# Patient Record
Sex: FEMALE | Race: WHITE | NOT HISPANIC OR LATINO | Employment: OTHER | URBAN - METROPOLITAN AREA
[De-identification: names, ages, dates, MRNs, and addresses within clinical notes are randomized per-mention and may not be internally consistent; named-entity substitution may affect disease eponyms.]

---

## 2017-01-04 ENCOUNTER — OFFICE VISIT (OUTPATIENT)
Dept: UROLOGY | Facility: CLINIC | Age: 22
End: 2017-01-04
Payer: COMMERCIAL

## 2017-01-04 VITALS — HEIGHT: 56 IN | HEART RATE: 85 BPM | DIASTOLIC BLOOD PRESSURE: 77 MMHG | SYSTOLIC BLOOD PRESSURE: 132 MMHG

## 2017-01-04 DIAGNOSIS — N31.9 NEUROGENIC BLADDER: Primary | ICD-10-CM

## 2017-01-04 DIAGNOSIS — Z78.9 SELF-CATHETERIZES URINARY BLADDER: ICD-10-CM

## 2017-01-04 PROCEDURE — 1159F MED LIST DOCD IN RCRD: CPT | Mod: S$GLB,,, | Performed by: UROLOGY

## 2017-01-04 PROCEDURE — 99999 PR PBB SHADOW E&M-EST. PATIENT-LVL III: CPT | Mod: PBBFAC,,, | Performed by: UROLOGY

## 2017-01-04 PROCEDURE — 99213 OFFICE O/P EST LOW 20 MIN: CPT | Mod: S$GLB,,, | Performed by: UROLOGY

## 2017-01-04 NOTE — PROGRESS NOTES
CHIEF COMPLAINT:    Ms. Samuel is a 21 y.o. female presenting for a follow up on neurogenic bladder.    PRESENTING ILLNESS:    Shashank Samuel is a 21 y.o. female who returns stating that she did not bring her voiding diary but she has been catheterizing well and emptying better.  She denies having an incontinence issue since her last visit.  She has decreased her intake and is drinking 16.9 oz bottles of water and drinks 5 a day.      PATIENT HISTORY:    Past Medical History   Diagnosis Date    Arnold-Chiari malformation, type II     Hydrocephalus     Neurogenic bladder     Seizures      only w/ shunt malfunction    Spinal bifida, closed        Past Surgical History   Procedure Laterality Date    Cystostomy      Myelominingocele repair      Bladder surgery      Colon surgery       flush site for bowel movements from appendix    Ventriculoperitoneal shunt      Eye surgery       x 5    Strabismus surgery       ou dr peña       Family History   Problem Relation Age of Onset    Cataracts Maternal Grandmother      Social History    Marital status: Single     Social History Main Topics    Smoking status: Never Smoker    Smokeless tobacco: Never Used    Alcohol use No    Drug use: No    Sexual activity: No     Social History Narrative    Intact family. Wheelchair bound.  Self catheterizes herself       Allergies:  Latex, natural rubber; Nitrofurantoin; Bactrim  [sulfamethoxazole-trimethoprim]; Gardasil  [h papillomavirus vac,qval (pf)]; Menactra  [mening vac a,c,y,w135 dip (pf)]; Nitrofurantoin; Sulfa (sulfonamide antibiotics); Tramadol; and Zofran [ondansetron hcl (pf)]    Medications:  Outpatient Encounter Prescriptions as of 1/4/2017   Medication Sig Dispense Refill    catheter 10 Fr Misc Use up to 7 times daily for self catheterization 210 each 11    catheter 8 Fr Misc Use up to twice daily 40 each 11    diaper,brief,adult,disposable (DEPEND PANTS) Misc Change BID for pressure ulcer 60 each 11     epinephrine (EPIPEN 2-DEEPIKA) 0.3 mg/0.3 mL AtIn USE ONE INJECTION INTO THE MUSCLE AS NEEDED FOR ANAPHYLAXIS 2 each 0    glycerin pediatric suppository Use PRN for constipation    We need 1 bottle 12 suppository 5    levetiracetam (KEPPRA) 500 MG Tab Take 1 tablet (500 mg total) by mouth 2 (two) times daily. 60 tablet 0    lidocaine (LIDODERM) 5 % Place 2-3 patches onto the skin once daily. Remove & Discard patch within 12 hours or as directed by MD Intreiano patch 2    menthol-zinc oxide (CALMOSEPTINE) 0.44-20.6 % Oint Apply topically 2 (two) times daily as needed. 1 Tube 6    menthol-zinc oxide (CALMOSEPTINE) 0.44-20.6 % Oint Apply topically daily as needed.      oxycodone-acetaminophen (PERCOCET)  mg per tablet Take 1 tablet by mouth every 4 (four) hours as needed for Pain. 180 tablet 0    syringe, disposable, 60 mL Syrg Use for self catheterization up to 7 times daily 60 each 11    EPIPEN 2-DEEPIKA 0.3 mg/0.3 mL AtIn INJECT PEN INTO THE MUSCLE AS NEEDED FOR ANAPHYLAXIS AS A ONE TIME USE 2 each 0     No facility-administered encounter medications on file as of 1/4/2017.          PHYSICAL EXAMINATION:    The patient generally appears in good health, is appropriately interactive, and is in no apparent distress.  Does not smell of urine today.      Skin: No lesions.    Mental: Cooperative with normal affect.    Neuro: Grossly intact.    HEENT: Normal. No evidence of lymphadenopathy.    Chest:  normal inspiratory effort.    Extremities: No clubbing, cyanosis, or edema      IMPRESSION:    Encounter Diagnoses   Name Primary?    Neurogenic bladder Yes    Self-catheterizes urinary bladder        PLAN:    1.  Decrease fluid intake by 1 bottle  2.  Continue trying to self catheterize more effectively as she has been doing  3.  Follow up in 1 year.

## 2017-01-18 RX ORDER — OXYCODONE AND ACETAMINOPHEN 10; 325 MG/1; MG/1
1 TABLET ORAL EVERY 4 HOURS PRN
Qty: 180 TABLET | Refills: 0 | Status: SHIPPED | OUTPATIENT
Start: 2017-01-22 | End: 2017-02-21 | Stop reason: SDUPTHER

## 2017-01-18 RX ORDER — LIDOCAINE 50 MG/G
2-3 PATCH TOPICAL DAILY
Qty: 90 PATCH | Refills: 2 | Status: SHIPPED | OUTPATIENT
Start: 2017-01-18 | End: 2017-02-21 | Stop reason: SDUPTHER

## 2017-01-18 NOTE — TELEPHONE ENCOUNTER
12/19/16 last Rx refill- lidocaine  12/23/16 last Rx refill-oxycodone  02/14/16 RTC      Original authorizing provider: MD Shashank Hernandez would like a refill of the following medications:   lidocaine (LIDODERM) 5 % [Mary Clark MD]   oxycodone-acetaminophen (PERCOCET)  mg per tablet [Mary Clark MD]     [address] [address] [facility]     Comment:

## 2017-01-25 ENCOUNTER — TELEPHONE (OUTPATIENT)
Dept: UROLOGY | Facility: CLINIC | Age: 22
End: 2017-01-25

## 2017-01-25 NOTE — TELEPHONE ENCOUNTER
Shashank gets her catheters from [facility].  She uses 12 Fr catheters.  [phone]    Called to verify if she needs an updated prescription.  They do.  They will fax the rx to us.

## 2017-01-31 DIAGNOSIS — R56.9 CONVULSIONS, UNSPECIFIED CONVULSION TYPE: ICD-10-CM

## 2017-01-31 RX ORDER — LEVETIRACETAM 500 MG/1
TABLET ORAL
Qty: 60 TABLET | Refills: 0 | Status: SHIPPED | OUTPATIENT
Start: 2017-01-31 | End: 2017-03-01 | Stop reason: SDUPTHER

## 2017-02-03 ENCOUNTER — OFFICE VISIT (OUTPATIENT)
Dept: PLASTIC SURGERY | Facility: CLINIC | Age: 22
End: 2017-02-03
Payer: COMMERCIAL

## 2017-02-03 VITALS — BODY MASS INDEX: 22.5 KG/M2 | WEIGHT: 100 LBS | HEIGHT: 56 IN

## 2017-02-03 DIAGNOSIS — G89.29 CHRONIC NECK PAIN: ICD-10-CM

## 2017-02-03 DIAGNOSIS — M54.50 CHRONIC BILATERAL LOW BACK PAIN WITHOUT SCIATICA: ICD-10-CM

## 2017-02-03 DIAGNOSIS — Q05.2 SPINA BIFIDA OF LUMBAR REGION WITH HYDROCEPHALUS: ICD-10-CM

## 2017-02-03 DIAGNOSIS — M54.2 CHRONIC NECK PAIN: ICD-10-CM

## 2017-02-03 DIAGNOSIS — G89.29 CHRONIC BILATERAL LOW BACK PAIN WITHOUT SCIATICA: ICD-10-CM

## 2017-02-03 DIAGNOSIS — N62 MACROMASTIA: Primary | ICD-10-CM

## 2017-02-03 DIAGNOSIS — R21 EXCORIATED RASH: ICD-10-CM

## 2017-02-03 PROCEDURE — 99999 PR PBB SHADOW E&M-EST. PATIENT-LVL II: CPT | Mod: PBBFAC,,, | Performed by: SURGERY

## 2017-02-03 PROCEDURE — 99213 OFFICE O/P EST LOW 20 MIN: CPT | Mod: S$GLB,,, | Performed by: SURGERY

## 2017-02-03 NOTE — LETTER
February 3, 2017      Mary Clark MD  [address]  [facility] [address]           [facility] Plastic Surgery  [address] [facility] [address] [place] [address]  Phone: [phone]  Fax: [phone]          Patient: Shashank Samuel   MR Number: [id number]   YOB: 1995   Date of Visit: 2/3/2017       Dear Dr. Mary Clark:    Thank you for referring Shashank Samuel to me for evaluation. Attached you will find relevant portions of my assessment and plan of care.    If you have questions, please do not hesitate to call me. I look forward to following Shashank Samuel along with you.    Sincerely,    Lyndon Sesay MD    Enclosure  CC:  No Recipients    If you would like to receive this communication electronically, please contact [email or web address] or [phone] to request more information on [facility] [employer] access.    For providers and/or their staff who would like to refer a patient to [facility], please contact us through our one-stop-shop provider referral line, [facility], at [phone].    If you feel you have received this communication in error or would no longer like to receive these types of communications, please e-mail [email or web address]

## 2017-02-03 NOTE — LETTER
[place] [facility] Plastic Surgery  [address] [facility] [address]  [place] [address]  Phone: [phone]  Fax: [phone] February 22, 2017      Mary Clark MD  [address]  [place] [address]    Patient: Shashank Samuel   MR Number: [id number]   YOB: 1995   Date of Visit: 2/3/2017     Dear Dr. Clark:    Thank you for referring Shashank Samuel to me for evaluation. Below are the relevant portions of my assessment and plan of care.    Miss Shashank Samuel is a 21-year-old female, born with spina bifida and hydrocephalus. She has had Newman rods placed in the past for severe scoliosis. She is wheelchair-bound now. She was referred to Plastic Surgery three years ago due to her heavy pendulous breasts, which her orthopedic surgeon believes is causing fracture to her Newman rods. She was denied a breast reduction in the past because of her age only. This young lady has recentlly fractured her rods and again she is being referred by Physical Medicine to reevaluate her for a breast reduction.     She is not a candidate for replacing the rods because of calcification of the rods. She has been treated with physical therapy, again without any relief of the severe pain that she suffers secondary to her breasts. She has tried nonsteroidal anti-inflammatory medications. She is now taking Percocet and Lidocaine patches again to try to reduce the pain without complaint. She has deep shoulder grooving.    If you have questions, please do not hesitate to call me. I look forward to following Shashank along with you.    Sincerely,        Lyndon Sesay MD  Section of Plastic & Reconstructive Surgery  [facility]     CRWINDY/dr SUSHIL Garcia MD

## 2017-02-03 NOTE — PROGRESS NOTES
Shashank Samuel is a 21-year-old white female who was born with spina bifida and   hydrocephalus.  She has had Newman rods placed in the past for severe   scoliosis.  She is wheelchair-bound now.  She was referred 3 years ago to   Plastic Surgery due to her heavy pendulous breasts, which her orthopedic surgeon   believes is causing fracture of her Newman rods.  She was denied a breast   reduction in the past because of her age only.  This young lady has fractured   her rods again recently and again she has been sent by Physical Medicine to   reevaluate her for a breast reduction.  She is not a candidate for replacing the   rods because of calcification of the rods.  She has been treated with physical   therapy, again without any relief of the severe pain that she suffers secondary   to her breasts.  She has tried nonsteroidal anti-inflammatory medications, is   now taking Percocet and lidocaine patches again to try to reduce the pain   without complaint.  She has deep shoulder grooving.    PHYSICAL EXAMINATION:  Reveals that she is a wheelchair-bound young lady.  She   has large pendulous breasts.    ASSESSMENT:  1.  Chronic severe back pain, which is caused by the weight of her breasts.  2.  Macromastia.    She needs to undergo a breast reduction in order to help with her chronic neck   and back pain and to prevent further fracture of her Newman rods.  This is   definitely a medically indicated procedure being referred by Physical Medicine   for this procedure.      KALI  dd: 02/03/2017 11:04:27 (CST)  td: 02/03/2017 16:15:04 (CST)  Doc ID   #[id number]  Job ID #[id number]    CC:

## 2017-02-14 ENCOUNTER — OFFICE VISIT (OUTPATIENT)
Dept: PHYSICAL MEDICINE AND REHAB | Facility: CLINIC | Age: 22
End: 2017-02-14
Payer: COMMERCIAL

## 2017-02-14 VITALS — SYSTOLIC BLOOD PRESSURE: 118 MMHG | HEART RATE: 90 BPM | HEIGHT: 56 IN | DIASTOLIC BLOOD PRESSURE: 76 MMHG

## 2017-02-14 DIAGNOSIS — Q05.2 SPINA BIFIDA OF LUMBAR REGION WITH HYDROCEPHALUS: ICD-10-CM

## 2017-02-14 DIAGNOSIS — M41.9 SCOLIOSIS, UNSPECIFIED SCOLIOSIS TYPE, UNSPECIFIED SPINAL REGION: ICD-10-CM

## 2017-02-14 DIAGNOSIS — G82.20 PARAPLEGIA: ICD-10-CM

## 2017-02-14 DIAGNOSIS — M54.6 CHRONIC MIDLINE THORACIC BACK PAIN: Primary | ICD-10-CM

## 2017-02-14 DIAGNOSIS — G89.29 CHRONIC MIDLINE THORACIC BACK PAIN: Primary | ICD-10-CM

## 2017-02-14 PROCEDURE — 99213 OFFICE O/P EST LOW 20 MIN: CPT | Mod: S$GLB,,, | Performed by: PHYSICAL MEDICINE & REHABILITATION

## 2017-02-14 PROCEDURE — 99999 PR PBB SHADOW E&M-EST. PATIENT-LVL II: CPT | Mod: PBBFAC,,, | Performed by: PHYSICAL MEDICINE & REHABILITATION

## 2017-02-14 NOTE — PROGRESS NOTES
Subjective:       Patient ID: Shashank Samuel is a 21 y.o. female.    Chief Complaint: No chief complaint on file.    HPI    Ms. Samuel is a 21-year-old white female with past medical history of spina   bifida, complete T7 paraplegia, status post thoracolumbar fusion surgery with   rods, hydrocephalus status post  shunt, and neurogenic bladder.  She is   followed up in the Physical Medicine Clinic for chronic thoracic pain.  Her last   visit was on 11/14/2016.  She is maintained on oxycodone/APAP p.r.n.  Since her   last visit, the patient developed osteomyelitis in her feet.  She underwent   prolonged antibiotic course, but later necessitated left partial foot amputation   and left fifth toe amputation.  She stayed for about 12 weeks in longterm acute   unit in [place].  She is now maintained on oral antibiotics.    She is coming today to the clinic for followup.  Her back pain is about the   same.  It is a constant aching pain in thoracic spine.  It is usually localized.    It is aggravated by excess activity and better with rest and her pain   medications.  Her maximum pain is 9-10/10 and minimum 2-3/10.  Today, it is   2-3/10.    She is currently taking oxycodone/APAP 10/325 p.r.n., usually four times per   day, but occasionally six times.      MS/HN  dd: 02/14/2017 13:59:10 (CST)  td: 02/15/2017 03:53:07 (CST)  Doc ID   #[id number]  Job ID #[id number]    CC:         Review of Systems   Constitutional: Positive for fatigue.   Gastrointestinal: Positive for abdominal pain. Negative for constipation, nausea and vomiting.   Musculoskeletal: Positive for back pain. Negative for neck pain.   Neurological: Negative for dizziness and headaches.   Psychiatric/Behavioral: Negative for behavioral problems and sleep disturbance.       Objective:      Physical Exam   Constitutional: She appears well-developed and well-nourished. No distress.   Coming to the clinic in a manual wheelchair, accompanied by her mother.     Neck:  Normal range of motion.   Musculoskeletal:   BUE:  ROM:full.  Strength:    RUE: 5-/5 at shoulder abduction, elbow flexion, elbow extension, hand .   LUE: 5-/5 at shoulder abduction, elbow flexion, elbow extension, hand .  Sensation to pinprick:   RUE: intact.   LUE: intact.      BLE:  ROM:mild bilateral knee flexion contractures.  Strength:      RLE: 0/5.     LLE:  0/5.  Sensation to pinprick:     RLE:absent.      LLE: absent.          Neurological: She is alert.   Skin:   Multiple healed back surgery scars.      Psychiatric: She has a normal mood and affect.   Vitals reviewed.      Assessment:       1. Chronic midline thoracic back pain    2. Paraplegia, T7 Complete    3. Spina bifida of lumbar region with hydrocephalus    4. Scoliosis, unspecified scoliosis type, unspecified spinal region         Plan:     - DRUGS OF ABUSE SCREEN, BLOOD; Future  - Continue oxycodone-acetaminophen (PERCOCET)  mg per tablet; Take 1 tablet by mouth 4 times per day as needed for Pain. May try to reduce the strength next refill.  - Return in about 3 months (around 5/14/2017).

## 2017-02-21 RX ORDER — LIDOCAINE 50 MG/G
2-3 PATCH TOPICAL DAILY
Qty: 90 PATCH | Refills: 2 | Status: SHIPPED | OUTPATIENT
Start: 2017-02-21 | End: 2017-05-17 | Stop reason: SDUPTHER

## 2017-02-21 RX ORDER — OXYCODONE AND ACETAMINOPHEN 10; 325 MG/1; MG/1
1 TABLET ORAL EVERY 4 HOURS PRN
Qty: 180 TABLET | Refills: 0 | Status: SHIPPED | OUTPATIENT
Start: 2017-02-21 | End: 2017-03-20 | Stop reason: SDUPTHER

## 2017-02-21 NOTE — TELEPHONE ENCOUNTER
02/14/17 last office visit   01/18/17 last Rx refill  05/15/17 RTC    Original authorizing provider: MD Shashank Hernandez would like a refill of the following medications:   lidocaine (LIDODERM) 5 % [Mary Clark MD]   oxycodone-acetaminophen (PERCOCET)  mg per tablet [Mary Clark MD]     Preferred pharmacy: [address]     Comment:

## 2017-03-01 DIAGNOSIS — R56.9 CONVULSIONS, UNSPECIFIED CONVULSION TYPE: ICD-10-CM

## 2017-03-01 RX ORDER — LEVETIRACETAM 500 MG/1
TABLET ORAL
Qty: 60 TABLET | Refills: 0 | Status: SHIPPED | OUTPATIENT
Start: 2017-03-01 | End: 2017-03-31 | Stop reason: SDUPTHER

## 2017-03-20 RX ORDER — OXYCODONE AND ACETAMINOPHEN 10; 325 MG/1; MG/1
1 TABLET ORAL EVERY 4 HOURS PRN
Qty: 180 TABLET | Refills: 0 | Status: SHIPPED | OUTPATIENT
Start: 2017-03-23 | End: 2017-04-21 | Stop reason: SDUPTHER

## 2017-03-20 NOTE — TELEPHONE ENCOUNTER
02/14/17 last Office visit  02/21/17 last Rx refill  05/15/17 RTC    Original authorizing provider: MD Shashank Hernandez would like a refill of the following medications:   oxycodone-acetaminophen (PERCOCET)  mg per tablet [Mary Clark MD]     [address] [address] [facility]     Comment:   Shashank needs her blood test scheduled still can you do that with the Makayla Feng

## 2017-03-31 DIAGNOSIS — R56.9 CONVULSIONS, UNSPECIFIED CONVULSION TYPE: ICD-10-CM

## 2017-03-31 RX ORDER — LEVETIRACETAM 500 MG/1
500 TABLET ORAL 2 TIMES DAILY
Qty: 60 TABLET | Refills: 0 | Status: SHIPPED | OUTPATIENT
Start: 2017-03-31 | End: 2017-04-20 | Stop reason: SDUPTHER

## 2017-03-31 NOTE — TELEPHONE ENCOUNTER
----- Message from Trudi Srinivasan sent at 3/31/2017 11:31 AM CDT -----  Contact: Father- Abdi  Patient need refill.     levetiracetam (KEPPRA) 500 MG Tab

## 2017-04-03 DIAGNOSIS — R56.9 CONVULSIONS, UNSPECIFIED CONVULSION TYPE: ICD-10-CM

## 2017-04-03 RX ORDER — LEVETIRACETAM 500 MG/1
500 TABLET ORAL 2 TIMES DAILY
Qty: 60 TABLET | Refills: 0 | Status: CANCELLED | OUTPATIENT
Start: 2017-04-03

## 2017-04-03 NOTE — LETTER
April 4, 2017    Shashank Samuel  [address] [facility] [address]             [facility]  [address]  [address]  [place] [facility] [address]  Phone: [phone]  Fax: [phone] Dear Ms. Samuel:    This is a reminder that it is time for an appointment with Dr. Garcia and blood work. Please call [phone] to schedule your appointment       If you have any questions or concerns, please don't hesitate to call.    Sincerely,        William Garcia MD

## 2017-04-20 DIAGNOSIS — T65.811A: ICD-10-CM

## 2017-04-20 DIAGNOSIS — R56.9 CONVULSIONS, UNSPECIFIED CONVULSION TYPE: ICD-10-CM

## 2017-04-20 RX ORDER — EPINEPHRINE 0.3 MG/.3ML
1 INJECTION SUBCUTANEOUS ONCE
Qty: 0.3 ML | Refills: 0 | Status: SHIPPED | OUTPATIENT
Start: 2017-04-20 | End: 2017-04-20

## 2017-04-20 RX ORDER — LEVETIRACETAM 500 MG/1
500 TABLET ORAL 2 TIMES DAILY
Qty: 60 TABLET | Refills: 0 | Status: SHIPPED | OUTPATIENT
Start: 2017-04-20 | End: 2017-06-01 | Stop reason: SDUPTHER

## 2017-04-21 RX ORDER — OXYCODONE AND ACETAMINOPHEN 10; 325 MG/1; MG/1
1 TABLET ORAL EVERY 4 HOURS PRN
Qty: 180 TABLET | Refills: 0 | Status: SHIPPED | OUTPATIENT
Start: 2017-04-21 | End: 2017-05-17 | Stop reason: SDUPTHER

## 2017-05-02 ENCOUNTER — TELEPHONE (OUTPATIENT)
Dept: PLASTIC SURGERY | Facility: CLINIC | Age: 22
End: 2017-05-02

## 2017-05-17 ENCOUNTER — TELEPHONE (OUTPATIENT)
Dept: PLASTIC SURGERY | Facility: CLINIC | Age: 22
End: 2017-05-17

## 2017-05-17 DIAGNOSIS — T65.811A: ICD-10-CM

## 2017-05-17 RX ORDER — EPINEPHRINE 0.3 MG/.3ML
INJECTION SUBCUTANEOUS
Qty: 2 EACH | Refills: 0 | Status: SHIPPED | OUTPATIENT
Start: 2017-05-17 | End: 2018-01-15 | Stop reason: SDUPTHER

## 2017-05-17 RX ORDER — OXYCODONE AND ACETAMINOPHEN 10; 325 MG/1; MG/1
1 TABLET ORAL EVERY 4 HOURS PRN
Qty: 180 TABLET | Refills: 0 | Status: SHIPPED | OUTPATIENT
Start: 2017-05-17 | End: 2017-06-26 | Stop reason: SDUPTHER

## 2017-05-17 RX ORDER — LIDOCAINE 50 MG/G
2-3 PATCH TOPICAL DAILY
Qty: 90 PATCH | Refills: 2 | Status: ON HOLD | OUTPATIENT
Start: 2017-05-17 | End: 2020-03-26

## 2017-05-17 NOTE — TELEPHONE ENCOUNTER
02/14/17  Last  Office visit  04/02/2017 last Rx refill-Oxycodone  02/21/17 last Rx refill-Lidocaine  05/30/17 RTC          Original authorizing provider: MD Shashank Hernandez would like a refill of the following medications:   lidocaine (LIDODERM) 5 % [Mary Clark MD]   oxycodone-acetaminophen (PERCOCET)  mg per tablet [Mary Clark MD]     [address] [address] [facility]     Comment:       Medication renewals requested in this message routed to other providers:   epinephrine (EPIPEN 2-DEEPIKA) 0.3 mg/0.3 mL AtIn [William Garcia MD]

## 2017-05-30 ENCOUNTER — OFFICE VISIT (OUTPATIENT)
Dept: PHYSICAL MEDICINE AND REHAB | Facility: CLINIC | Age: 22
End: 2017-05-30
Payer: COMMERCIAL

## 2017-05-30 VITALS — DIASTOLIC BLOOD PRESSURE: 78 MMHG | HEART RATE: 86 BPM | SYSTOLIC BLOOD PRESSURE: 118 MMHG

## 2017-05-30 DIAGNOSIS — Q05.2 SPINA BIFIDA OF LUMBAR REGION WITH HYDROCEPHALUS: ICD-10-CM

## 2017-05-30 DIAGNOSIS — M41.9 SCOLIOSIS, UNSPECIFIED SCOLIOSIS TYPE, UNSPECIFIED SPINAL REGION: ICD-10-CM

## 2017-05-30 DIAGNOSIS — G89.29 CHRONIC MIDLINE THORACIC BACK PAIN: Primary | ICD-10-CM

## 2017-05-30 DIAGNOSIS — G82.20 PARAPLEGIA: ICD-10-CM

## 2017-05-30 DIAGNOSIS — M54.6 CHRONIC MIDLINE THORACIC BACK PAIN: Primary | ICD-10-CM

## 2017-05-30 PROCEDURE — 99213 OFFICE O/P EST LOW 20 MIN: CPT | Mod: S$GLB,,, | Performed by: PHYSICAL MEDICINE & REHABILITATION

## 2017-05-30 PROCEDURE — 99999 PR PBB SHADOW E&M-EST. PATIENT-LVL II: CPT | Mod: PBBFAC,,, | Performed by: PHYSICAL MEDICINE & REHABILITATION

## 2017-05-30 NOTE — PROGRESS NOTES
Subjective:       Patient ID: Shashank Samuel is a 22 y.o. female.    Chief Complaint: No chief complaint on file.    HPI    Mrs. Samuel is a 22-year-old white female with past medical history of spina   bifida, complete T7 paraplegia, status post thoracolumbar fusion surgery with   rods, hydrocephalus status post  shunt and neurogenic bladder.  She is   followed up in the Physical Medicine Clinic for chronic thoracic pain.  Her last   visit was on 02/14/2017.  She has been maintained on oxycodone/APAP p.r.n.  She   was also referred in the past to Plastic Surgery due to large breasts   contributing to her back pain.  She was seen on 02/03/2017.  Breast reduction   surgery was suggested, but the patient is still awaiting insurance approval.    She was contacted recently by Plastic Surgery, but she did not get back to them   yet.    The patient continues to complain of thoracic spine pain.  It is an intermittent   aching pain.  It is aggravated by movement including transfers and propelling   her wheelchair.  It is better with rest and her pain medications.  Her maximum   pain is 7-8/10 and minimum 0/10.  Today, it is 0/10.  She continues to take   oxycodone/APAP p.r.n., usually 5 to 6 times per day.    The patient also finished a course of antibiotics for chronic osteomyelitis of   her feet, ultimately requiring left partial foot amputation and left fifth toe   amputation.  She has been medically stable with that respect.      MS/HN  dd: 05/30/2017 14:43:08 (CDT)  td: 05/31/2017 09:37:07 (CDT)  Doc ID   #[id number]  Job ID #[id number]    CC:         Review of Systems   Constitutional: Positive for fatigue.   Gastrointestinal: Negative for nausea and vomiting.   Musculoskeletal: Positive for back pain. Negative for neck pain.   Neurological: Negative for dizziness and headaches.   Psychiatric/Behavioral: Negative for behavioral problems and sleep disturbance.       Objective:      Physical Exam   Constitutional: She  appears well-developed and well-nourished. No distress.   Coming to the clinic in a manual wheelchair, accompanied by her mother.     Neck: Normal range of motion.   Musculoskeletal:   BUE:  ROM:full.  Strength:    RUE: 5-/5 at shoulder abduction, elbow flexion, elbow extension, hand .   LUE: 5-/5 at shoulder abduction, elbow flexion, elbow extension, hand .  Sensation to pinprick:   RUE: intact.   LUE: intact.      BLE:  ROM:mild bilateral knee flexion contractures.  Strength:      RLE: 0/5.     LLE:  0/5.  Sensation to pinprick:     RLE:absent.      LLE: absent.          Neurological: She is alert.   Skin:   Multiple healed back surgery scars.      Psychiatric: She has a normal mood and affect.   Vitals reviewed.      Assessment:       1. Chronic midline thoracic back pain    2. Paraplegia, T7 Complete    3. Spina bifida of lumbar region with hydrocephalus    4. Scoliosis, unspecified scoliosis type, unspecified spinal region         Plan:     - DRUGS OF ABUSE SCREEN, BLOOD; Future  - Continue oxycodone-acetaminophen (PERCOCET)  mg per tablet; Take 1 tablet by mouth very 4-6 hours as needed for Pain.   - Return in about 3 months (around 8/30/2017).

## 2017-05-31 ENCOUNTER — TELEPHONE (OUTPATIENT)
Dept: PLASTIC SURGERY | Facility: CLINIC | Age: 22
End: 2017-05-31

## 2017-05-31 NOTE — TELEPHONE ENCOUNTER
Returned pt's call re: her request to schedule breast reduction surgery. No answer. Left a voice message.

## 2017-05-31 NOTE — TELEPHONE ENCOUNTER
----- Message from Shawnee Lares sent at 5/31/2017 10:27 AM CDT -----  Contact: Padma/mom  Pt states she is returning phone call from 05/17/2017 to schedule surgery.Please call Padma back @ [phone] or [phone] (dad).Thank you.

## 2017-06-01 ENCOUNTER — TELEPHONE (OUTPATIENT)
Dept: PLASTIC SURGERY | Facility: CLINIC | Age: 22
End: 2017-06-01

## 2017-06-01 DIAGNOSIS — R56.9 CONVULSIONS, UNSPECIFIED CONVULSION TYPE: ICD-10-CM

## 2017-06-01 RX ORDER — LEVETIRACETAM 500 MG/1
500 TABLET ORAL 2 TIMES DAILY
Qty: 60 TABLET | Refills: 0 | Status: SHIPPED | OUTPATIENT
Start: 2017-06-01 | End: 2017-06-25 | Stop reason: SDUPTHER

## 2017-06-01 NOTE — TELEPHONE ENCOUNTER
Multiple unsuccessful attempts dating back to April 2017 to contact pt regarding breast reduction surgery. No answer again today. Left a voice message.

## 2017-06-13 RX ORDER — OXYCODONE AND ACETAMINOPHEN 10; 325 MG/1; MG/1
1 TABLET ORAL EVERY 4 HOURS PRN
Qty: 180 TABLET | Refills: 0 | Status: CANCELLED | OUTPATIENT
Start: 2017-06-13 | End: 2017-07-13

## 2017-06-19 RX ORDER — OXYCODONE AND ACETAMINOPHEN 10; 325 MG/1; MG/1
1 TABLET ORAL EVERY 4 HOURS PRN
Qty: 180 TABLET | Refills: 0 | OUTPATIENT
Start: 2017-06-19 | End: 2017-07-19

## 2017-06-25 DIAGNOSIS — R56.9 CONVULSIONS, UNSPECIFIED CONVULSION TYPE: ICD-10-CM

## 2017-06-26 ENCOUNTER — TELEPHONE (OUTPATIENT)
Dept: UROLOGY | Facility: CLINIC | Age: 22
End: 2017-06-26

## 2017-06-26 RX ORDER — OXYCODONE AND ACETAMINOPHEN 10; 325 MG/1; MG/1
1 TABLET ORAL EVERY 4 HOURS PRN
Qty: 180 TABLET | Refills: 0 | Status: SHIPPED | OUTPATIENT
Start: 2017-06-26 | End: 2017-07-24 | Stop reason: SDUPTHER

## 2017-06-26 RX ORDER — LEVETIRACETAM 500 MG/1
TABLET ORAL
Qty: 60 TABLET | Refills: 0 | Status: SHIPPED | OUTPATIENT
Start: 2017-06-26 | End: 2017-06-29 | Stop reason: SDUPTHER

## 2017-06-26 NOTE — TELEPHONE ENCOUNTER
----- Message from Ana Chiu sent at 6/26/2017  8:06 AM CDT -----  Contact: Miya Samuel  Mom called regarding if any urologists that specialize spinal bifida in the [place] area. Stating her colon rerouted goes through her navel. They see Urologists in [facility] but want someone close. Please contact [phone]

## 2017-06-26 NOTE — TELEPHONE ENCOUNTER
05/30/17 Last office visit   05/17/17 last Rx refill  08/29/17 RTC      Shashank Samuel would like a refill of the following medications:   oxycodone-acetaminophen (PERCOCET)  mg per tablet [Mary Clark MD]     Preferred pharmacy: [address] [facility]     Comment:   She is out and is now currently bed books no due to back pain. She needs her medication to elevate the pa N. Could it be the 2 gallons ns of water she consumed and the way she voids would speed up the removal of the medicine from her system. She's never shown signs that f addiction.

## 2017-06-29 DIAGNOSIS — R56.9 CONVULSIONS, UNSPECIFIED CONVULSION TYPE: ICD-10-CM

## 2017-06-29 NOTE — TELEPHONE ENCOUNTER
----- Message from Yessi Santizo sent at 6/29/2017 12:17 PM CDT -----  Contact: Mom  Please call pharmacy to add refills to pt medication of levetiracetam (KEPPRA) 500 MG Tab

## 2017-06-30 RX ORDER — LEVETIRACETAM 500 MG/1
500 TABLET ORAL 2 TIMES DAILY
Qty: 60 TABLET | Refills: 1 | Status: SHIPPED | OUTPATIENT
Start: 2017-06-30 | End: 2017-07-25 | Stop reason: SDUPTHER

## 2017-07-13 ENCOUNTER — TELEPHONE (OUTPATIENT)
Dept: PLASTIC SURGERY | Facility: CLINIC | Age: 22
End: 2017-07-13

## 2017-07-13 NOTE — TELEPHONE ENCOUNTER
Pt's mother called stating that no one called her to schedule her daughter's breast reduction surgery. Pt said that she spoke with someone name Lexie and was told that Dr. Sesay' schedule was booked. Spoke with Jhoan about pt and was told that she ( Jhoan) had called the pt several times throughout the month of April and May and no one answered or returned her call. Was also told that Rosa had also tried to call to schedule pt. Pt's mother was very upset and asking for another Plastic Surgeon to do her daughter's surgery. She was informed that Dr. Sesay was the only Plastic Surgeon at [facility] to do Breast Reductions and he has no available spots for this year. Pt said she will find somewhere else to go.

## 2017-07-19 RX ORDER — OXYCODONE AND ACETAMINOPHEN 10; 325 MG/1; MG/1
1 TABLET ORAL EVERY 4 HOURS PRN
Qty: 180 TABLET | Refills: 0 | Status: CANCELLED | OUTPATIENT
Start: 2017-07-19 | End: 2017-08-18

## 2017-07-21 RX ORDER — OXYCODONE AND ACETAMINOPHEN 10; 325 MG/1; MG/1
1 TABLET ORAL EVERY 4 HOURS PRN
Qty: 180 TABLET | Refills: 0 | Status: CANCELLED | OUTPATIENT
Start: 2017-07-21 | End: 2017-08-20

## 2017-07-24 ENCOUNTER — PATIENT MESSAGE (OUTPATIENT)
Dept: PHYSICAL MEDICINE AND REHAB | Facility: CLINIC | Age: 22
End: 2017-07-24

## 2017-07-24 ENCOUNTER — PATIENT MESSAGE (OUTPATIENT)
Dept: FAMILY MEDICINE | Facility: CLINIC | Age: 22
End: 2017-07-24

## 2017-07-24 DIAGNOSIS — R56.9 CONVULSIONS, UNSPECIFIED CONVULSION TYPE: ICD-10-CM

## 2017-07-24 RX ORDER — OXYCODONE AND ACETAMINOPHEN 10; 325 MG/1; MG/1
1 TABLET ORAL EVERY 4 HOURS PRN
Qty: 180 TABLET | Refills: 0 | Status: SHIPPED | OUTPATIENT
Start: 2017-07-26 | End: 2017-08-22 | Stop reason: SDUPTHER

## 2017-07-25 RX ORDER — LEVETIRACETAM 500 MG/1
500 TABLET ORAL 2 TIMES DAILY
Qty: 60 TABLET | Refills: 1 | Status: SHIPPED | OUTPATIENT
Start: 2017-07-25 | End: 2017-09-22 | Stop reason: SDUPTHER

## 2017-08-09 PROBLEM — N62 BREAST HYPERTROPHY: Status: ACTIVE | Noted: 2017-08-09

## 2017-08-19 ENCOUNTER — PATIENT MESSAGE (OUTPATIENT)
Dept: PHYSICAL MEDICINE AND REHAB | Facility: CLINIC | Age: 22
End: 2017-08-19

## 2017-08-22 RX ORDER — OXYCODONE AND ACETAMINOPHEN 10; 325 MG/1; MG/1
1 TABLET ORAL EVERY 4 HOURS PRN
Qty: 180 TABLET | Refills: 0 | Status: SHIPPED | OUTPATIENT
Start: 2017-08-23 | End: 2017-09-15 | Stop reason: SDUPTHER

## 2017-09-15 RX ORDER — OXYCODONE AND ACETAMINOPHEN 10; 325 MG/1; MG/1
1 TABLET ORAL EVERY 4 HOURS PRN
Qty: 180 TABLET | Refills: 0 | Status: SHIPPED | OUTPATIENT
Start: 2017-09-21 | End: 2017-10-17 | Stop reason: SDUPTHER

## 2017-09-21 ENCOUNTER — PATIENT MESSAGE (OUTPATIENT)
Dept: WOUND CARE | Facility: CLINIC | Age: 22
End: 2017-09-21

## 2017-09-21 ENCOUNTER — PATIENT MESSAGE (OUTPATIENT)
Dept: PLASTIC SURGERY | Facility: CLINIC | Age: 22
End: 2017-09-21

## 2017-09-21 ENCOUNTER — PATIENT MESSAGE (OUTPATIENT)
Dept: UROLOGY | Facility: CLINIC | Age: 22
End: 2017-09-21

## 2017-09-21 ENCOUNTER — PATIENT MESSAGE (OUTPATIENT)
Dept: FAMILY MEDICINE | Facility: CLINIC | Age: 22
End: 2017-09-21

## 2017-09-21 ENCOUNTER — PATIENT MESSAGE (OUTPATIENT)
Dept: PHYSICAL MEDICINE AND REHAB | Facility: CLINIC | Age: 22
End: 2017-09-21

## 2017-09-22 DIAGNOSIS — R56.9 CONVULSIONS, UNSPECIFIED CONVULSION TYPE: ICD-10-CM

## 2017-09-22 RX ORDER — LEVETIRACETAM 500 MG/1
500 TABLET ORAL 2 TIMES DAILY
Qty: 60 TABLET | Refills: 0 | Status: SHIPPED | OUTPATIENT
Start: 2017-09-22 | End: 2017-10-12 | Stop reason: SDUPTHER

## 2017-09-29 DIAGNOSIS — L97.221 NON-PRESSURE CHRONIC ULCER OF LEFT CALF, LIMITED TO BREAKDOWN OF SKIN: Primary | ICD-10-CM

## 2017-09-29 RX ORDER — MENTHOL AND ZINC OXIDE .44; 20.625 G/100G; G/100G
OINTMENT TOPICAL 2 TIMES DAILY PRN
Qty: 1 TUBE | Refills: 6 | Status: ON HOLD | COMMUNITY
Start: 2017-09-29 | End: 2020-04-02 | Stop reason: HOSPADM

## 2017-10-12 DIAGNOSIS — R56.9 CONVULSIONS, UNSPECIFIED CONVULSION TYPE: ICD-10-CM

## 2017-10-12 RX ORDER — LEVETIRACETAM 500 MG/1
500 TABLET ORAL 2 TIMES DAILY
Qty: 60 TABLET | Refills: 0 | Status: SHIPPED | OUTPATIENT
Start: 2017-10-12 | End: 2017-11-14 | Stop reason: SDUPTHER

## 2017-10-17 RX ORDER — OXYCODONE AND ACETAMINOPHEN 10; 325 MG/1; MG/1
1 TABLET ORAL EVERY 4 HOURS PRN
Qty: 180 TABLET | Refills: 0 | Status: SHIPPED | OUTPATIENT
Start: 2017-10-18 | End: 2017-10-19 | Stop reason: SDUPTHER

## 2017-10-19 RX ORDER — OXYCODONE AND ACETAMINOPHEN 10; 325 MG/1; MG/1
1 TABLET ORAL EVERY 4 HOURS PRN
Qty: 180 TABLET | Refills: 0 | Status: SHIPPED | OUTPATIENT
Start: 2017-10-20 | End: 2017-11-13 | Stop reason: SDUPTHER

## 2017-10-23 ENCOUNTER — TELEPHONE (OUTPATIENT)
Dept: PHYSICAL MEDICINE AND REHAB | Facility: CLINIC | Age: 22
End: 2017-10-23

## 2017-10-23 NOTE — TELEPHONE ENCOUNTER
----- Message from Mary Clark MD sent at 10/19/2017  9:35 AM CDT -----  Contact: Padma Omalley [phone]  Pls let her know I sent the prescription to [facility] pharmacy dated for tomorrow.  Her lat prescription was picked up 9/21/17.  ----- Message -----  From: Halle Garcia MA  Sent: 10/18/2017  10:20 AM  To: Mary Clark MD    Please forward Rx refill to [facility].  ----- Message -----  From: Lesa David  Sent: 10/18/2017   9:59 AM  To: Eduardo CHRISTIAN Staff    Padma is calling to see if script for patients oxycodone-acetaminophen (PERCOCET)  mg per tablet can be called into another pharmacy, the original pharmacy it was called into will have a week delay. Mom also told she was told that the medical necessity code was missing and the insurance will not cover it. Mom is worried that patient will have withdrawals. Please call into [facility]       [address] [place] [address]  Hours: Open today · 7AM-7PM  Phone: [phone]

## 2017-11-13 RX ORDER — OXYCODONE AND ACETAMINOPHEN 10; 325 MG/1; MG/1
1 TABLET ORAL EVERY 4 HOURS PRN
Qty: 180 TABLET | Refills: 0 | Status: SHIPPED | OUTPATIENT
Start: 2017-11-18 | End: 2017-12-14 | Stop reason: SDUPTHER

## 2017-11-14 DIAGNOSIS — R56.9 CONVULSIONS, UNSPECIFIED CONVULSION TYPE: ICD-10-CM

## 2017-11-14 RX ORDER — LEVETIRACETAM 500 MG/1
500 TABLET ORAL 2 TIMES DAILY
Qty: 15 TABLET | Refills: 0 | Status: SHIPPED | OUTPATIENT
Start: 2017-11-14 | End: 2017-11-15 | Stop reason: SDUPTHER

## 2017-11-15 ENCOUNTER — OFFICE VISIT (OUTPATIENT)
Dept: FAMILY MEDICINE | Facility: CLINIC | Age: 22
End: 2017-11-15
Payer: COMMERCIAL

## 2017-11-15 VITALS
DIASTOLIC BLOOD PRESSURE: 88 MMHG | SYSTOLIC BLOOD PRESSURE: 136 MMHG | TEMPERATURE: 98 F | HEART RATE: 92 BPM | RESPIRATION RATE: 16 BRPM | OXYGEN SATURATION: 99 %

## 2017-11-15 DIAGNOSIS — R56.9 CONVULSIONS, UNSPECIFIED CONVULSION TYPE: ICD-10-CM

## 2017-11-15 PROCEDURE — 99214 OFFICE O/P EST MOD 30 MIN: CPT | Mod: S$GLB,,, | Performed by: PHYSICIAN ASSISTANT

## 2017-11-15 PROCEDURE — 99999 PR PBB SHADOW E&M-EST. PATIENT-LVL IV: CPT | Mod: PBBFAC,,, | Performed by: PHYSICIAN ASSISTANT

## 2017-11-15 RX ORDER — LEVETIRACETAM 500 MG/1
500 TABLET ORAL 2 TIMES DAILY
Qty: 60 TABLET | Refills: 11 | Status: SHIPPED | OUTPATIENT
Start: 2017-11-15 | End: 2018-01-15 | Stop reason: SDUPTHER

## 2017-11-15 NOTE — PROGRESS NOTES
Subjective:       Patient ID: Shashank Samuel is a 22 y.o. female with multiple medical diagnoses as listed in the medical history and problem list that presents for Medication Refill  .    Chief Complaint: Medication Refill      HPI   Pt here today for refills of Keppra. Has not had any seizures since she has been on it.     She does have home health right now that comes MWF. Recent breast reduction surgery in August. She had a follow up about a month ago.     Review of Systems   Constitutional: Negative for chills and fever.   Skin: Positive for wound (right breast from surgery site).         PAST MEDICAL HISTORY:  Past Medical History:   Diagnosis Date    Arnold-Chiari malformation, type II     Encounter for blood transfusion     Hydrocephalus     Neurogenic bladder     self catheterizes every 3 hours    Seizures     only w/ shunt malfunction    Spinal bifida, closed     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       SOCIAL HISTORY:  Social History     Social History    Marital status: Single     Spouse name: N/A    Number of children: N/A    Years of education: N/A     Occupational History    Not on file.     Social History Main Topics    Smoking status: Never Smoker    Smokeless tobacco: Never Used    Alcohol use No    Drug use: No    Sexual activity: No     Other Topics Concern    Not on file     Social History Narrative    Intact family. Wheelchair bound.  Self catheterizes herself       ALLERGIES AND MEDICATIONS: updated and reviewed.  Review of patient's allergies indicates:   Allergen Reactions    Latex, natural rubber Anaphylaxis and Other (See Comments)     angioedema    Nitrofurantoin Shortness Of Breath    Bactrim  [sulfamethoxazole-trimethoprim]      Other reaction(s): Swelling of face    Gardasil  [h papillomavirus vac,qval (pf)]      Other reaction(s): Shortness of breath    Menactra  [mening vac a,c,y,w135 dip (pf)]      Other reaction(s): Shortness of breath    Nitrofurantoin      Other reaction(s):  Difficulty breathing    Sulfa (sulfonamide antibiotics)     Tramadol Hives    Zofran [ondansetron hcl (pf)]      Current Outpatient Prescriptions   Medication Sig Dispense Refill    catheter 12 Fr Misc by Misc.(Non-Drug; Combo Route) route.      catheter 8 Fr Misc Use up to twice daily 40 each 11    diaper,brief,adult,disposable (DEPEND PANTS) Misc Change BID for pressure ulcer 60 each 11    epinephrine (EPIPEN 2-DEEPIKA) 0.3 mg/0.3 mL AtIn USE ONE INJECTION INTO THE MUSCLE AS NEEDED FOR ANAPHYLAXIS 2 each 0    glycerin pediatric suppository Use PRN for constipation    We need 1 bottle 12 suppository 5    levETIRAcetam (KEPPRA) 500 MG Tab Take 1 tablet (500 mg total) by mouth 2 (two) times daily. (last refill, need appointment) 15 tablet 0    lidocaine (LIDODERM) 5 % Place 2-3 patches onto the skin once daily. Remove & Discard patch within 12 hours or as directed by MD Interiano patch 2    menthol-zinc oxide (CALMOSEPTINE) 0.44-20.6 % Oint Apply topically 2 (two) times daily as needed. 1 Tube 6    [START ON 11/18/2017] oxyCODONE-acetaminophen (PERCOCET)  mg per tablet Take 1 tablet by mouth every 4 (four) hours as needed for Pain. 180 tablet 0    syringe, disposable, 60 mL Syrg Use for self catheterization up to 7 times daily 60 each 11     No current facility-administered medications for this visit.          Objective:   /88   Pulse 92   Temp 98.2 °F (36.8 °C) (Oral)   Resp 16   LMP 10/25/2017   SpO2 99%      Physical Exam   Constitutional: She is oriented to person, place, and time.   Wheelchair with her boots on    Eyes: Conjunctivae and EOM are normal.   Cardiovascular: Normal rate and regular rhythm.    Pulmonary/Chest: Effort normal and breath sounds normal.   Neurological: She is alert and oriented to person, place, and time.   Psychiatric: She has a normal mood and affect. Her behavior is normal.           Assessment:       1. Convulsions, unspecified convulsion type        Plan:        Convulsions, unspecified convulsion type  -     levETIRAcetam (KEPPRA) 500 MG Tab; Take 1 tablet (500 mg total) by mouth 2 (two) times daily. (last refill, need appointment)  Dispense: 60 tablet; Refill: 5    Since no seizure no labs today. Will refill for 6 months.         No Follow-up on file.

## 2017-11-17 PROBLEM — N62 BREAST HYPERTROPHY: Status: RESOLVED | Noted: 2017-08-09 | Resolved: 2017-11-17

## 2017-12-14 ENCOUNTER — PATIENT MESSAGE (OUTPATIENT)
Dept: PHYSICAL MEDICINE AND REHAB | Facility: CLINIC | Age: 22
End: 2017-12-14

## 2017-12-14 RX ORDER — OXYCODONE AND ACETAMINOPHEN 10; 325 MG/1; MG/1
1 TABLET ORAL EVERY 4 HOURS PRN
Qty: 180 TABLET | Refills: 0 | OUTPATIENT
Start: 2017-12-14 | End: 2018-01-13

## 2017-12-14 RX ORDER — OXYCODONE AND ACETAMINOPHEN 10; 325 MG/1; MG/1
1 TABLET ORAL EVERY 4 HOURS PRN
Qty: 180 TABLET | Refills: 0 | Status: SHIPPED | OUTPATIENT
Start: 2017-12-18 | End: 2018-02-13 | Stop reason: SDUPTHER

## 2017-12-15 ENCOUNTER — PATIENT MESSAGE (OUTPATIENT)
Dept: FAMILY MEDICINE | Facility: CLINIC | Age: 22
End: 2017-12-15

## 2017-12-17 DIAGNOSIS — G40.309 NONINTRACTABLE GENERALIZED IDIOPATHIC EPILEPSY WITHOUT STATUS EPILEPTICUS: ICD-10-CM

## 2017-12-17 DIAGNOSIS — Q05.2 SPINA BIFIDA OF LUMBAR REGION WITH HYDROCEPHALUS: ICD-10-CM

## 2017-12-18 ENCOUNTER — OFFICE VISIT (OUTPATIENT)
Dept: FAMILY MEDICINE | Facility: CLINIC | Age: 22
End: 2017-12-18
Payer: COMMERCIAL

## 2017-12-18 VITALS
RESPIRATION RATE: 16 BRPM | DIASTOLIC BLOOD PRESSURE: 70 MMHG | TEMPERATURE: 98 F | OXYGEN SATURATION: 98 % | HEART RATE: 91 BPM | SYSTOLIC BLOOD PRESSURE: 106 MMHG

## 2017-12-18 DIAGNOSIS — L08.9 INFECTION OF LEFT FOOT: Primary | ICD-10-CM

## 2017-12-18 DIAGNOSIS — T75.3XXA MOTION SICKNESS, INITIAL ENCOUNTER: ICD-10-CM

## 2017-12-18 DIAGNOSIS — Q05.2 SPINA BIFIDA OF LUMBAR REGION WITH HYDROCEPHALUS: ICD-10-CM

## 2017-12-18 DIAGNOSIS — N31.9 NEUROGENIC BLADDER: ICD-10-CM

## 2017-12-18 PROCEDURE — 99214 OFFICE O/P EST MOD 30 MIN: CPT | Mod: S$GLB,,, | Performed by: PHYSICIAN ASSISTANT

## 2017-12-18 PROCEDURE — 99999 PR PBB SHADOW E&M-EST. PATIENT-LVL V: CPT | Mod: PBBFAC,,, | Performed by: PHYSICIAN ASSISTANT

## 2017-12-18 RX ORDER — SCOLOPAMINE TRANSDERMAL SYSTEM 1 MG/1
1 PATCH, EXTENDED RELEASE TRANSDERMAL
Qty: 4 PATCH | Refills: 0 | Status: SHIPPED | OUTPATIENT
Start: 2017-12-18 | End: 2018-01-17

## 2017-12-18 RX ORDER — CLINDAMYCIN HYDROCHLORIDE 300 MG/1
300 CAPSULE ORAL 3 TIMES DAILY
Qty: 30 CAPSULE | Refills: 0 | Status: SHIPPED | OUTPATIENT
Start: 2017-12-18 | End: 2017-12-28

## 2017-12-18 RX ORDER — LEVETIRACETAM 500 MG/1
500 TABLET ORAL 2 TIMES DAILY
Qty: 60 TABLET | Refills: 11 | Status: CANCELLED | OUTPATIENT
Start: 2017-12-18

## 2017-12-18 NOTE — PATIENT INSTRUCTIONS
Wound Check, Infection  You have a wound that has become infected. The wound will not heal properly unless the infection is cleared. Infection in a wound may also spread if it is not treated. In most cases, antibiotic medicines are prescribed to treat a wound infection.   Symptoms of a wound infection include:  · Redness or swelling around the wound  · Warmth coming from the wound  · New or worsening pain  · Red streaks around the wound  · Draining pus  · Fever  Home care  Follow all directions you are given to treat the infection.  Medicines  Take all medicines as prescribed.   · If you were given antibiotics, take them until they are gone or your healthcare provider tells you to stop. It is vital to finish the antibiotics even if you feel better. If you do not finish them, the infection may come back and be harder to treat.  · If your infection is not responding to the medicines you are taking, you may be prescribed new medicines.  · Take medicine for pain as directed by your healthcare provider.  Wound care  Care for your wound as directed by your healthcare provider.  · Apply a warm compress (clean cloth soaked in hot water) to the infected area for about 5 to 10 minutes at a time. Be very careful not to burn yourself. Test the cloth on a non-infected area to make sure it is not too hot.  · Continue to change the dressing daily. If it becomes wet, stained with wound fluid, or dirty, change it sooner. To change it:  ¨ Wash your hands with soap and water before changing the dressing.  ¨ Carefully remove the dressing and tape. If it sticks to the wound, you may need to wet it a little to remove it. (Do not do this if your healthcare provider has told you not to.)  ¨ Gently clean the wound with clean water (or saline) using gauze, a clean washcloth, or cotton swab.  ¨ Do not use soap, alcohol, peroxide or other cleansers.  ¨ If you were told to dry the wound before putting on a new dressing, gently pat. Do not  rub.  ¨ Throw out the old dressing.  ¨ Wash your hands again before opening the new, clean dressing.  ¨ Wash your hands again when you are done.  Follow-up care  Follow up with your healthcare provider as advised. If a culture was done, you will be notified if your treatment needs to change. Call as directed for the results.  When to seek medical advice  Call your health care provider right away if any of these occur:  · Symptoms of infection don't start to improve within 2 days of starting antibiotics  · Symptoms of infection get worse  · New symptoms, such as red streaks around the wound  · Fever of 100.4°F (38.0°C) or higher for more than 2 days after starting the antibiotics  Date Last Reviewed: 8/10/2015  © 1639-2845 [employer]. [address] [place] [address], [place] [address]. All rights reserved. This information is not intended as a substitute for professional medical care. Always follow your healthcare professional's instructions.
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
## 2017-12-19 ENCOUNTER — OFFICE VISIT (OUTPATIENT)
Dept: PHYSICAL MEDICINE AND REHAB | Facility: CLINIC | Age: 22
End: 2017-12-19
Payer: COMMERCIAL

## 2017-12-19 VITALS — DIASTOLIC BLOOD PRESSURE: 76 MMHG | SYSTOLIC BLOOD PRESSURE: 115 MMHG | HEART RATE: 86 BPM

## 2017-12-19 DIAGNOSIS — Q05.2 SPINA BIFIDA OF LUMBAR REGION WITH HYDROCEPHALUS: ICD-10-CM

## 2017-12-19 DIAGNOSIS — G89.29 CHRONIC MIDLINE THORACIC BACK PAIN: Primary | ICD-10-CM

## 2017-12-19 DIAGNOSIS — M54.6 CHRONIC MIDLINE THORACIC BACK PAIN: Primary | ICD-10-CM

## 2017-12-19 DIAGNOSIS — G82.20 PARAPLEGIA: ICD-10-CM

## 2017-12-19 DIAGNOSIS — M41.9 SCOLIOSIS, UNSPECIFIED SCOLIOSIS TYPE, UNSPECIFIED SPINAL REGION: ICD-10-CM

## 2017-12-19 PROCEDURE — 99999 PR PBB SHADOW E&M-EST. PATIENT-LVL II: CPT | Mod: PBBFAC,,, | Performed by: PHYSICAL MEDICINE & REHABILITATION

## 2017-12-19 PROCEDURE — 99213 OFFICE O/P EST LOW 20 MIN: CPT | Mod: S$GLB,,, | Performed by: PHYSICAL MEDICINE & REHABILITATION

## 2017-12-19 NOTE — PROGRESS NOTES
Subjective:       Patient ID: Shashank Samuel is a 22 y.o. female.    Chief Complaint: No chief complaint on file.    HPI    HISTORY OF PRESENT ILLNESS:  Ms. Samuel is a 22-year-old white female with past   medical history of spina bifida, complete T7 paraplegia, status post   thoracolumbar fusion surgery with rods, hydrocephalus status post  shunt and   neurogenic bowel/bladder.  She is followed up in the Physical Medicine Clinic   for chronic thoracic pain.  Her last visit was on 05/30/2017.  She was   maintained on p.r.n. oxycodone/APAP.  The patient was lost to follow up for   personal reasons.  Since her last visit, she underwent breast reduction surgery   on 08/14/2017.  She reports that it helped her back pain.  She still has some   ongoing wound care management under her left breast.  She is also being treated   for right foot ulcers.  She reports that there is ongoing workup to rule out   osteomyelitis.    Her thoracic pain has been under fair control.  It is an intermittent aching   pain in the interscapular regions.  She denies any radiation anteriorly to her   sternum.  Her pain is sporadic, but can sometimes be aggravated by lateral   transfers from her bed to the wheelchair.  Her maximum pain is 4-5/10 and   minimum 1-2/10.  Today, it is 1-2/10.    She is currently taking oxycodone/APAP 10/325 p.r.n., usually once per day.  Her   mother is with her and she reports that her dose has been tapered gradually   from six tablets per day down to once per day.      MS/HN  dd: 12/19/2017 14:10:54 (CST)  td: 12/20/2017 06:20:27 ([facility])  Doc ID   #[id number]  Job ID #[id number]    CC:         Review of Systems   Constitutional: Negative for fatigue.   Gastrointestinal: Negative for nausea and vomiting.   Musculoskeletal: Positive for back pain. Negative for neck pain.   Neurological: Negative for dizziness and headaches.   Psychiatric/Behavioral: Negative for behavioral problems and sleep disturbance.       Objective:       Physical Exam   Constitutional: She appears well-developed and well-nourished. No distress.   Coming to the clinic in a manual wheelchair, accompanied by her mother.     Neck: Normal range of motion.   Musculoskeletal:   BUE:  ROM:full.  Strength:    RUE: 5-/5 at shoulder abduction, elbow flexion, elbow extension, hand .   LUE: 5-/5 at shoulder abduction, elbow flexion, elbow extension, hand .  Sensation to pinprick:   RUE: intact.   LUE: intact.      BLE:  ROM:mild bilateral knee flexion contractures.  Strength:      RLE: 0/5.     LLE:  0/5.  Sensation to pinprick:     RLE:absent.      LLE: absent.          Neurological: She is alert.   Skin:   Multiple healed back surgery scars.      Psychiatric: She has a normal mood and affect.   Vitals reviewed.      Assessment:       1. Chronic midline thoracic back pain    2. Paraplegia, T7 Complete    3. Spina bifida of lumbar region with hydrocephalus    4. Scoliosis, unspecified scoliosis type, unspecified spinal region         Plan:     - Continue OTC naproxen 220 mg po bid prn.  - Continue oxycodone-acetaminophen (PERCOCET)  mg per tablet; Take 1 tablet by mouth once daily as needed for Pain.   - Return in about 4 months (around 4/19/2018).

## 2017-12-20 ENCOUNTER — OUTPATIENT CASE MANAGEMENT (OUTPATIENT)
Dept: ADMINISTRATIVE | Facility: OTHER | Age: 22
End: 2017-12-20

## 2017-12-20 ENCOUNTER — TELEPHONE (OUTPATIENT)
Dept: FAMILY MEDICINE | Facility: CLINIC | Age: 22
End: 2017-12-20

## 2017-12-20 NOTE — PROGRESS NOTES
Please note the following patient has been referred to the [facility] Case Management Department.    Please see the patient's  for more information.    Please contact Outpatient Case Management at Ext. [phone] with any questions.    Thank you,    Ida Steen, SSC

## 2017-12-20 NOTE — TELEPHONE ENCOUNTER
----- Message from Ida Steen sent at 12/20/2017 12:46 PM CST -----  Please note the following patient has been referred to the [facility] Case Management Department.     Please see the patient's  for more information.     Please contact Outpatient Case Management at Ext. [phone] with any questions.     Thank you,     Ida Steen, SSC

## 2017-12-21 ENCOUNTER — TELEPHONE (OUTPATIENT)
Dept: FAMILY MEDICINE | Facility: CLINIC | Age: 22
End: 2017-12-21

## 2017-12-21 ENCOUNTER — TELEPHONE (OUTPATIENT)
Dept: ADMINISTRATIVE | Facility: OTHER | Age: 22
End: 2017-12-21
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
## 2018-01-14 ENCOUNTER — PATIENT MESSAGE (OUTPATIENT)
Dept: FAMILY MEDICINE | Facility: CLINIC | Age: 23
End: 2018-01-14

## 2018-01-15 DIAGNOSIS — R56.9 CONVULSIONS, UNSPECIFIED CONVULSION TYPE: ICD-10-CM

## 2018-01-15 DIAGNOSIS — T65.811A: ICD-10-CM

## 2018-01-15 DIAGNOSIS — T75.3XXA MOTION SICKNESS, INITIAL ENCOUNTER: ICD-10-CM

## 2018-01-15 RX ORDER — SCOLOPAMINE TRANSDERMAL SYSTEM 1 MG/1
1 PATCH, EXTENDED RELEASE TRANSDERMAL
Qty: 4 PATCH | Refills: 0 | OUTPATIENT
Start: 2018-01-15 | End: 2018-02-14

## 2018-01-15 RX ORDER — EPINEPHRINE 0.3 MG/.3ML
INJECTION SUBCUTANEOUS
Qty: 2 EACH | Refills: 0 | Status: ON HOLD | OUTPATIENT
Start: 2018-01-15 | End: 2020-04-02 | Stop reason: HOSPADM

## 2018-01-15 RX ORDER — LEVETIRACETAM 500 MG/1
500 TABLET ORAL 2 TIMES DAILY
Qty: 60 TABLET | Refills: 11 | Status: SHIPPED | OUTPATIENT
Start: 2018-01-15 | End: 2018-02-21 | Stop reason: SDUPTHER

## 2018-01-18 RX ORDER — OXYCODONE AND ACETAMINOPHEN 10; 325 MG/1; MG/1
1 TABLET ORAL EVERY 4 HOURS PRN
Qty: 180 TABLET | Refills: 0 | OUTPATIENT
Start: 2018-01-18 | End: 2018-02-17

## 2018-01-22 RX ORDER — OXYCODONE AND ACETAMINOPHEN 10; 325 MG/1; MG/1
1 TABLET ORAL EVERY 4 HOURS PRN
Qty: 180 TABLET | Refills: 0 | Status: CANCELLED | OUTPATIENT
Start: 2018-01-22 | End: 2018-02-21

## 2018-02-11 ENCOUNTER — PATIENT MESSAGE (OUTPATIENT)
Dept: PHYSICAL MEDICINE AND REHAB | Facility: CLINIC | Age: 23
End: 2018-02-11

## 2018-02-12 ENCOUNTER — PATIENT MESSAGE (OUTPATIENT)
Dept: PHYSICAL MEDICINE AND REHAB | Facility: CLINIC | Age: 23
End: 2018-02-12

## 2018-02-13 RX ORDER — OXYCODONE AND ACETAMINOPHEN 10; 325 MG/1; MG/1
1 TABLET ORAL EVERY 6 HOURS PRN
Qty: 120 TABLET | Refills: 0 | Status: SHIPPED | OUTPATIENT
Start: 2018-02-13 | End: 2018-03-12 | Stop reason: SDUPTHER

## 2018-02-20 ENCOUNTER — TELEPHONE (OUTPATIENT)
Dept: PHYSICAL MEDICINE AND REHAB | Facility: CLINIC | Age: 23
End: 2018-02-20

## 2018-02-20 NOTE — TELEPHONE ENCOUNTER
----- Message from Mary Clark MD sent at 2/20/2018  4:18 PM CST -----  Contact: self  Pls let her know this medication is not prescribed by me but by Dr. William Garcia.  They should call his office for prior Authorization.  ----- Message -----  From: Halle Garcia MA  Sent: 2/20/2018   2:28 PM  To: Mary Clark MD        ----- Message -----  From: Felicita Hercules  Sent: 2/20/2018   1:58 PM  To: Eduardo CHRISTIAN Staff    Pt called and stated that she attempted to get a refill on her rx for levETIRAcetam (KEPPRA) 500 MG Tab at her pharmacy, however her pharmacist stated that they cannot refill the medication without a physician's authorization for more refills. Please authorize, and call in to pharmacy at [phone]. Pt stated that she ran out of the medication yesterday, so she would like for this to be addressed asap. Pt would like the nurse to give her a call back to confirm when rx is called in.    Pt can be reached at [phone].

## 2018-02-21 DIAGNOSIS — R56.9 CONVULSIONS, UNSPECIFIED CONVULSION TYPE: ICD-10-CM

## 2018-02-21 RX ORDER — LEVETIRACETAM 500 MG/1
500 TABLET ORAL 2 TIMES DAILY
Qty: 60 TABLET | Refills: 11 | Status: ON HOLD | OUTPATIENT
Start: 2018-02-21 | End: 2020-03-26

## 2018-02-26 ENCOUNTER — PATIENT MESSAGE (OUTPATIENT)
Dept: PHYSICAL MEDICINE AND REHAB | Facility: CLINIC | Age: 23
End: 2018-02-26

## 2018-03-12 RX ORDER — OXYCODONE AND ACETAMINOPHEN 10; 325 MG/1; MG/1
1 TABLET ORAL EVERY 6 HOURS PRN
Qty: 120 TABLET | Refills: 0 | Status: SHIPPED | OUTPATIENT
Start: 2018-03-15 | End: 2018-04-10 | Stop reason: SDUPTHER

## 2018-03-13 ENCOUNTER — PATIENT MESSAGE (OUTPATIENT)
Dept: PHYSICAL MEDICINE AND REHAB | Facility: CLINIC | Age: 23
End: 2018-03-13

## 2018-04-10 ENCOUNTER — PATIENT MESSAGE (OUTPATIENT)
Dept: PHYSICAL MEDICINE AND REHAB | Facility: CLINIC | Age: 23
End: 2018-04-10

## 2018-04-10 DIAGNOSIS — G82.20 PARAPLEGIA: ICD-10-CM

## 2018-04-10 DIAGNOSIS — Q05.2 SPINA BIFIDA OF LUMBAR REGION WITH HYDROCEPHALUS: ICD-10-CM

## 2018-04-10 DIAGNOSIS — M54.6 CHRONIC MIDLINE THORACIC BACK PAIN: Primary | ICD-10-CM

## 2018-04-10 DIAGNOSIS — G89.29 CHRONIC MIDLINE THORACIC BACK PAIN: Primary | ICD-10-CM

## 2018-04-10 DIAGNOSIS — M41.9 SCOLIOSIS, UNSPECIFIED SCOLIOSIS TYPE, UNSPECIFIED SPINAL REGION: ICD-10-CM

## 2018-04-10 RX ORDER — OXYCODONE AND ACETAMINOPHEN 10; 325 MG/1; MG/1
1 TABLET ORAL EVERY 6 HOURS PRN
Qty: 120 TABLET | Refills: 0 | Status: ON HOLD | OUTPATIENT
Start: 2018-04-14 | End: 2021-08-02

## 2018-12-04 PROCEDURE — 99232 SBSQ HOSP IP/OBS MODERATE 35: CPT | Mod: ,,, | Performed by: UROLOGY

## 2018-12-04 PROCEDURE — 99232 PR SUBSEQUENT HOSPITAL CARE,LEVL II: ICD-10-PCS | Mod: ,,, | Performed by: UROLOGY

## 2018-12-05 ENCOUNTER — OUTSIDE PLACE OF SERVICE (OUTPATIENT)
Dept: UROLOGY | Facility: CLINIC | Age: 23
End: 2018-12-05

## 2018-12-05 ENCOUNTER — TELEPHONE (OUTPATIENT)
Dept: UROLOGY | Facility: CLINIC | Age: 23
End: 2018-12-05

## 2018-12-05 PROCEDURE — 51700 IRRIGATION OF BLADDER: CPT | Mod: ,,, | Performed by: UROLOGY

## 2018-12-05 PROCEDURE — 99233 SBSQ HOSP IP/OBS HIGH 50: CPT | Mod: 25,,, | Performed by: UROLOGY

## 2018-12-05 NOTE — TELEPHONE ENCOUNTER
----- Message from Miguel Mcleod sent at 12/5/2018  2:43 PM CST -----  Contact: Teresa Samano want to speak with a nurse regarding putting catheter in patient please call back at [phone]

## 2018-12-05 NOTE — TELEPHONE ENCOUNTER
Returned call, nurse states that patient gets in and out catheterized and they are now unable to get catheter in, wants advisement, informed our provider is not available but I can inform the MD on call. She stated she will contact the MD on call.

## 2018-12-06 ENCOUNTER — OUTSIDE PLACE OF SERVICE (OUTPATIENT)
Dept: UROLOGY | Facility: CLINIC | Age: 23
End: 2018-12-06

## 2018-12-06 ENCOUNTER — TELEPHONE (OUTPATIENT)
Dept: UROLOGY | Facility: CLINIC | Age: 23
End: 2018-12-06

## 2018-12-06 ENCOUNTER — HISTORICAL (OUTPATIENT)
Dept: ADMINISTRATIVE | Facility: HOSPITAL | Age: 23
End: 2018-12-06

## 2018-12-06 NOTE — TELEPHONE ENCOUNTER
----- Message from Diane Newby sent at 12/6/2018 10:28 AM CST -----  Contact: John at [facility]   John at [facility] needs to speak to the nurse about patient    Please call back [phone]  Ask for John

## 2018-12-06 NOTE — TELEPHONE ENCOUNTER
This pt was seen at [facility] today but note was written into [facility] for convenience and making future follow-up easier. It was copied over to [facility] notes.       Urology Consult Progress Note-[place]  Staff: Saleem/Anai/Quang/Lorenza    Referring physician or department: Keron Snow    Subjective:      Shashank Samuel is a 23 y.o. female with arnold chiari malformation, hydrocephalus, spina bifida and neurogenic bladder s/p ACE, augmentation cystoplasty and mitrofanoff at 7yrs old    fuds 11/17/16 by : With a capacity of 1200 ml, she may not be draining the catheter well, especially with the mucous production of the bowel augment.  There is a communication issue with this patient.  She acts like she wants to know the answer one wants to hear instead of answering questions directly.     Re-consulted for difficulty with placing catheter via mitrofanoff  Nurse was able to do cic via urethra     They have been having difficulty for the past few months catheterizing.        Objective:       Nursing note and vitals reviewed.    Physical Exam   Constitutional: Pt is oriented to person, place, and time. Pt appears well-developed and well-nourished.   Head: Normocephalic and atraumatic.   Eyes: Pupils are equal, round, and reactive to light.   Cardiovascular: no pallor  Pulmonary/Chest: Effort normal.   Abdominal: no distension  Musculoskeletal: abnormal range of motion  Neurological: Pt is alert and oriented to person, place, and time.   Skin: Skin is intact.       Bedside irrigation:  Umbilicus - placed an 8fr and 10fr easily. Then placed a 12 fr with some difficulty but able to advance. Was planning to teach pt to place but could not replace sitting or laying down. Retried multiple times. Then placed a 10 and finally an 8 again which advanced. Irrigated and aspirated and a large volume of mucus removed. Aspirated a total of around 400cc then irrigated and aspirated until urine clear. Then tried a  12fr again and it went. Left 12 fr in for now.       Labs:       Cr/gfr: 0.55  Wbc: 10.6 down from 28.1 on admit    Component       Urine Culture, Routine   12/3/18       NG, cath: leuk and blood   9/30/2016       PROVIDENCIA RETTGERI . . .   6/15/2016       YEAST, NOT GOVIND ALBICANS . . .   5/19/2016       PSEUDOMONAS AERUGINOSA . . .   8/12/2015      11:12 AM clinically necessary.   8/12/2015      11:12 AM Multiple organisms isolated. None in predominance.  Repeat if   1/5/2015      11:33 AM DIPHTHEROIDS . . .   1/5/2015      11:33 AM ESCHERICHIA COLI . . .   3/14/2014       ESCHERICHIA COLI ESBL . . .   10/16/2013       ESCHERICHIA COLI . . .   4/16/2013       MULTIPLE ORGANISMS ISOLATED. NONE IN PREDOMINANCE. REPEAT IF CLINICALLY NECESSARY.   11/15/2012       ESCHERICHIA COLI   3/18/2012       >100,000 ORGANISMS/ML -KLEBSIELLA PNEUMONIAE . . .   6/22/2011      2:09 PM NO OTHER SIGNIFICANT ISOLATE.   6/22/2011      2:09 PM >100,000 ORGANISMS/ML    MORGANELLA MORGANII . . .   3/30/2011      5:07 PM >100,000 ORGANISMS/ML    PROTEUS MIRABILIS . . .   3/30/2011      5:07 PM >100,000 ORGANISMS/ML    KLEBSIELLA PNEUMONIAE . . .   11/7/2008      12:15 PM NO OTHER SIGNIFICANT ISOLATE.   11/7/2008      12:15 PM >100,000 ORGANISMS/ML -ESCHERICHIA COLI . . .   2/26/2008       >100,000 ORGANISMS/ML -CITROBACTER FREUNDII COMPLEX . . .   5/7/2007       >100,000 ORGANISMS/ML -ESCHERICHIA COLI . . .       Imaging   ctap w iv 12/3/18  Kidneys are normal in size with moderate distention of the renal pelvis and calyces. There is mild distention of the left ureter as well. The urinary bladder has irregular thickened walls and seems to have a fistulous extension to the umbilicus and this may represent a persistent urachus  Augmented bladder not overly distended      rbus 9/30/16  On the right, the kidney measures cm.  No mass, cyst or hydronephrosis is noted.  The renal parenchymal thickness and echogenicity is normal.  No  perinephric masses or fluid collections are seen.  On the left the kidney measures cm.  No mass, cyst or hydronephrosis is noted.  The renal parenchymal thickness and echogenicity is normal.  No perinephric masses or fluid collections are seen.  The bladder is large and patulous.  A focal mass is not seen    Assessment:     Shashank Samuel is a 23 y.o. female with with arnold chiari malformation, hydrocephalus, spina bifida and neurogenic bladder s/p ACE, augmentation cystoplasty and mitrofanoff at 7yrs old with b hydro with mostly empty bladder admitted for sepsis and uti who know has difficulty catheterizing the mitrofanoff stoma.        Plan:     Unable to easily advance 12fr every time. 10fr and 8fr drain but not adequately  -will leave 12fr in overnight for now, likley to fall out without balloon. If it falls out recommend indwelling naranjo while pt in the hospital but continue to recath with 12fr, 10fr or 8fr if unable.   -recommend repeat rbus to see if improved with adequate drainage.     1.pt will need to f/u in [place] with either  (who she last saw in 2016) at [facility], I will send her a message to arrange f/u. She says she's been having this problem for a few months.   2. Renal  Imaging shows b hydro with thick bladder. Recommend repeat imaging.   3. Family will need to learn CIC via urethra for now until they can f/u with /. Would do this with 12fr ot 14fr and irrigate 2x a day to make sure pouch drains adequately.         Elisa Monge MD  [facility]   Office: [phone]

## 2018-12-06 NOTE — TELEPHONE ENCOUNTER
Spoke with John at [facility] he states patient's spt came out and he is unable to get catheter back in. Per  informed John to place 16f uretheral naranjo for now while patient inpatient. Patient needs to be taught CIC every 3 hours with 16f. John states he cannot place the 8fr in spt due to unable to get catheter in per  this is okay. Informed them to call [facility] to schedule appointment follow up with . John verbally voiced understanding.

## 2018-12-06 NOTE — TELEPHONE ENCOUNTER
----- Message from Elisa Monge MD sent at 12/5/2018 10:40 PM CST -----  Pt needs to f/u with dr hoyos  Please request copy of my note  F/u progress level III  Bladder irrigation

## 2018-12-10 ENCOUNTER — TELEPHONE (OUTPATIENT)
Dept: UROLOGY | Facility: CLINIC | Age: 23
End: 2018-12-10

## 2018-12-10 NOTE — TELEPHONE ENCOUNTER
, Wanted to make sure this pt had f/u with  for her difficulty in cathing with mitrofanoff. Previously ahd surgery by  as well.     Brenda please make sure pt called to make f/u with  or  for her mitrofanoff

## 2018-12-10 NOTE — TELEPHONE ENCOUNTER
Spoke with [facility] nurse Blanca patient is currently still admitted in the hospital. Gave recommendations for patient to schedule follow up outpatient with .

## 2018-12-17 ENCOUNTER — TELEPHONE (OUTPATIENT)
Dept: UROLOGY | Facility: CLINIC | Age: 23
End: 2018-12-17

## 2018-12-18 NOTE — TELEPHONE ENCOUNTER
Please call pt an ask her to call and make follow-up with 's office if she is still not able to catheterize her stoma. He will see adult patients still if he has operated on them sometimes

## 2019-01-01 NOTE — TELEPHONE ENCOUNTER
Was refilled on 03/31/17. Requesting additional refills.   Last ov 07/26/16  Return in about 4 weeks (around 8/23/2016) for assess treatment plan.   Statement Selected

## 2019-01-15 ENCOUNTER — TELEPHONE (OUTPATIENT)
Dept: INFECTIOUS DISEASES | Facility: CLINIC | Age: 24
End: 2019-01-15

## 2019-01-15 NOTE — TELEPHONE ENCOUNTER
----- Message from Gill Mullins MD sent at 1/15/2019  2:21 PM CST -----  Contact: [facility] [phone]  First available  ----- Message -----  From: Thelma Correa  Sent: 1/15/2019  11:05 AM  To: Gill Mullins MD    Is being discharged from facility and needs a follow up appointment. How soon?

## 2019-01-21 ENCOUNTER — TELEPHONE (OUTPATIENT)
Dept: INFECTIOUS DISEASES | Facility: CLINIC | Age: 24
End: 2019-01-21

## 2019-01-21 NOTE — TELEPHONE ENCOUNTER
Was in the hospital for 6 weeks discharged a couple days ago saw dr Alegre a infection in her foot . She is needed to see her for a follow up

## 2019-01-24 ENCOUNTER — TELEPHONE (OUTPATIENT)
Dept: INFECTIOUS DISEASES | Facility: CLINIC | Age: 24
End: 2019-01-24

## 2019-01-24 NOTE — TELEPHONE ENCOUNTER
Left message on phone recorder stating that the appointment I had originally told her we had for Friday, January 25th at 2:30 pm has been filled.

## 2019-01-24 NOTE — TELEPHONE ENCOUNTER
----- Message from Thelma Correa sent at 1/23/2019 11:30 AM CST -----  Left message for her to call schedule an appointment for Dr Mullins on Friday, January 25th at 2:30

## 2019-01-29 PROBLEM — L03.115 CELLULITIS OF RIGHT FOOT: Status: ACTIVE | Noted: 2019-01-29

## 2019-01-29 PROBLEM — Z87.39 PERSONAL HISTORY OF OSTEOMYELITIS: Status: ACTIVE | Noted: 2019-01-29

## 2019-01-29 PROBLEM — L89.890: Status: ACTIVE | Noted: 2019-01-29

## 2019-02-08 ENCOUNTER — TELEPHONE (OUTPATIENT)
Dept: UROLOGY | Facility: CLINIC | Age: 24
End: 2019-02-08

## 2019-02-08 ENCOUNTER — OFFICE VISIT (OUTPATIENT)
Dept: UROLOGY | Facility: CLINIC | Age: 24
End: 2019-02-08
Payer: MEDICAID

## 2019-02-08 VITALS
BODY MASS INDEX: 22.95 KG/M2 | DIASTOLIC BLOOD PRESSURE: 72 MMHG | SYSTOLIC BLOOD PRESSURE: 129 MMHG | HEIGHT: 56 IN | HEART RATE: 91 BPM | WEIGHT: 102 LBS

## 2019-02-08 DIAGNOSIS — Z87.440 HISTORY OF RECURRENT UTI (URINARY TRACT INFECTION): ICD-10-CM

## 2019-02-08 DIAGNOSIS — N31.9 NEUROGENIC BLADDER: Primary | ICD-10-CM

## 2019-02-08 PROCEDURE — 99213 PR OFFICE/OUTPT VISIT, EST, LEVL III, 20-29 MIN: ICD-10-PCS | Mod: S$PBB,,, | Performed by: UROLOGY

## 2019-02-08 PROCEDURE — 99999 PR PBB SHADOW E&M-EST. PATIENT-LVL V: ICD-10-PCS | Mod: PBBFAC,,, | Performed by: UROLOGY

## 2019-02-08 PROCEDURE — 99215 OFFICE O/P EST HI 40 MIN: CPT | Mod: PBBFAC | Performed by: UROLOGY

## 2019-02-08 PROCEDURE — 87086 URINE CULTURE/COLONY COUNT: CPT

## 2019-02-08 PROCEDURE — 99999 PR PBB SHADOW E&M-EST. PATIENT-LVL V: CPT | Mod: PBBFAC,,, | Performed by: UROLOGY

## 2019-02-08 PROCEDURE — 99213 OFFICE O/P EST LOW 20 MIN: CPT | Mod: S$PBB,,, | Performed by: UROLOGY

## 2019-02-08 NOTE — PROGRESS NOTES
CHIEF COMPLAINT:    Mrs. Samuel is a 23 y.o. female presenting for a hospital follow up    PRESENTING ILLNESS:    Shashank Samuel is a 23 y.o. female who is accompanied by her mother and sister today.  She was hospitalized for two months after developing heel ulcers and becoming septic.  Apparently, she had osteomylitis, and it sounds like the organism became blood borne, infecting her bladder and kidneys.  She had some difficulty self catheterizing through the Mitrofanoff as she became edematous.  She was able to maintain intermittent catheterization but the urine was malodorous and cloudy.  She was hospitalized with high dose vancomycin IV, presently she is on cefalexin 500 mg bid.  She still catheterizes through the Mitrofanoff every 3-4 hours while awake.  At night she leaks through the urethra as it acts as a pop off.      It has been a while since she has had a FUDS and her mother was advised to bring her back due to the intractable nature of the urinary tract infections.      REVIEW OF SYSTEMS:    Review of Systems   Constitutional: Negative.    HENT: Negative.    Eyes: Negative.    Respiratory: Negative.    Cardiovascular: Negative.    Gastrointestinal:        Bowel incontinence   Genitourinary:        Insensate   Musculoskeletal: Negative.    Skin: Negative.    Neurological:        History of spina bifida   Endo/Heme/Allergies: Negative.    Psychiatric/Behavioral: Negative.        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       Past Surgical History:   Procedure Laterality Date    AMPUTATION      right 4th and 5th toes; left 5th toe and portion of left great toe    BACK SURGERY      BLADDER SURGERY      COLON SURGERY      flush site for bowel  movements from appendix    CYSTOSTOMY      EYE SURGERY      x 5    FLUORO URODYNAMIC STUDY (FUDS) N/A 11/15/2016    Performed by [facility]ST F[facility]    MYELOMININGOCELE REPAIR      REDUCTION-MAMMOPLASTY-BILATERAL Bilateral 8/9/2017    [facility] OR    STRABISMUS SURGERY      ou dr peña    VENTRICULOPERITONEAL SHUNT         Family History   Problem Relation Age of Onset    Cataracts Maternal Grandmother      Socioeconomic History    Marital status: Single   Tobacco Use    Smoking status: Never Smoker    Smokeless tobacco: Never Used   Substance and Sexual Activity    Alcohol use: No    Drug use: No    Sexual activity: No   Social History Narrative    Intact family. Wheelchair bound.  Self catheterizes herself       Allergies:  Latex, natural rubber; Nitrofurantoin; Bactrim  [sulfamethoxazole-trimethoprim]; Gardasil  [h papillomavirus vac,qval (pf)]; Menactra  [mening vac a,c,y,w135 dip (pf)]; Nitrofurantoin; Sulfa (sulfonamide antibiotics); Tramadol; and Zofran [ondansetron hcl (pf)]    Medications:  Outpatient Encounter Medications as of 2/8/2019   Medication Sig Dispense Refill    catheter 12 Fr Misc by Misc.(Non-Drug; Combo Route) route.      catheter 8 Fr Misc Use up to twice daily 40 each 11    ciprofloxacin HCl (CIPRO) 500 MG tablet Take 1 tablet (500 mg total) by mouth 2 (two) times daily. for 10 days 20 tablet 0    clindamycin (CLEOCIN) 300 MG capsule Take 1 capsule (300 mg total) by mouth every 8 (eight) hours. for 10 days 30 capsule 0    diaper,brief,adult,disposable (DEPEND PANTS) Misc Change BID for pressure ulcer 60 each 11    EPINEPHrine (EPIPEN 2-DEEPIKA) 0.3 mg/0.3 mL AtIn USE ONE INJECTION INTO THE MUSCLE AS NEEDED FOR ANAPHYLAXIS 2 each 0    glycerin pediatric suppository Use PRN for constipation    We need 1 bottle 12 suppository 5    levETIRAcetam (KEPPRA) 500 MG Tab Take 1 tablet (500 mg total) by mouth 2 (two) times  daily. (last refill, need appointment) 60 tablet 11    lidocaine (LIDODERM) 5 % Place 2-3 patches onto the skin once daily. Remove & Discard patch within 12 hours or as directed by MD Interiano patch 2    menthol-zinc oxide (CALMOSEPTINE) 0.44-20.6 % Oint Apply topically 2 (two) times daily as needed. 1 Tube 6    mupirocin (BACTROBAN) 2 % ointment Apply topically once daily. for 10 days 30 g 3    syringe, disposable, 60 mL Syrg Use for self catheterization up to 7 times daily 60 each 11    oxyCODONE-acetaminophen (PERCOCET)  mg per tablet Take 1 tablet by mouth every 6 (six) hours as needed for Pain. 120 tablet 0     No facility-administered encounter medications on file as of 2/8/2019.          PHYSICAL EXAMINATION:    The patient generally appears in good health, is appropriately interactive, and is in no apparent distress.    Skin: No lesions.    Mental: Cooperative with normal affect.    Neuro: Grossly intact.    HEENT: Normal. No evidence of lymphadenopathy.    Chest:  normal inspiratory effort.    Abdomen:  Soft, non-tender. No masses or organomegaly. Bladder is not palpable. No evidence of flank discomfort. No evidence of inguinal hernia.  Site for the Mitrofanoff is CDI.  No erythema, edema    Extremities: No clubbing, cyanosis, or edema      LABS:    Lab Results   Component Value Date    BUN 11 05/23/2016    CREATININE 0.50 05/23/2016       IMPRESSION:    Encounter Diagnoses   Name Primary?    Neurogenic bladder Yes    History of recurrent UTI (urinary tract infection)        PLAN:    1.  The catheterized specimen was sent for culture  2.  The patient and her mother state they irrigate the mucous from the bladder regularly  3.  FUDS.

## 2019-02-08 NOTE — PATIENT INSTRUCTIONS
Urodynamics Studies     The bladder holds urine until it leaves the body through the urethra.     Urodynamics studies are a series of tests that give your doctor a close look at the working of your bladder and urethra. The tests can help your doctor learn about any problems storing urine or voiding (eliminating) urine from your body.  Understanding the lower urinary tract  The lower part of the urinary tract has several parts.  · The bladder stores urine until youre ready to release it.  · The urethra is the tube that carries urine from the bladder out of the body.  · The sphincter is made up of muscles around the opening of the bladder. The sphincter muscles tighten to hold urine in the bladder. They relax to let urine flow. Signals from the brain tell the sphincter when to tighten and relax. These signals also tell the bladder when to contract to let urine flow out of the body.  Why you need a urodynamics study  This test may be ordered if you:  · Are incontinent (leak urine)  · Have a bladder that does not empty all the way.  · Have symptoms such as the need to urinate often or a constant strong need to urinate  · Have intermittent or weak urine stream  · Have persistent urinary tract infections  Preparing for the study  · Tell your doctor about any medicine youre taking. Ask if you should stop them before the study.  · Keep a diary of your bathroom habits. Do this for a few days before the study. This diary can be a helpful part of the evaluation.  · Ask if you need to arrive for the study with a full bladder.  Date Last Reviewed: 1/1/2017  © 3330-0979 [employer], [facility]. [address] [place] [address], [place] [address]. All rights reserved. This information is not intended as a substitute for professional medical care. Always follow your healthcare professional's instructions.        Urodynamic Studies     The equipment used for the study varies depending upon the facility and what tests are done.      Urodynamic studies may be done in your doctors office, a clinic, or a hospital. The studies may take up to an hour or more. This depends on which tests your doctor does. The tests are generally painless. You wont need sedating medicine.  Tests that may be done  Uroflowmetry. This measures the amount and speed of urine you void from your bladder. You urinate into a funnel. Its attached to a computer that records your urine flow over time. The amount of urine left in your bladder after you void may also be measured right after this test.  Cystometry. This test evaluates how much your bladder can hold. It also measures how strong your bladder muscle is and how well the signals work that tell you when your bladder is full. Your healthcare provider fills your bladder with sterile water or saline solution, through a catheter. Your doctor will instruct you to report any sensations you feel. Mention if theyre similar to symptoms youve felt at home. Your doctor may ask you to cough, stand and walk, or bear down during this test.  Electromyogram. This helps evaluate the muscle contractions that control urination, such as sphincter muscle contractions. Your healthcare provider may place electrode patches or wires near your rectum or urethra to make the recording. He or she may ask you to try to tighten or relax your sphincter muscles during this test.  Pressure flow study. This test measures your detrusor, urethral, and abdominal pressures. Detrusor is the muscle surrounding the bladder walls that relaxes to allow your bladder to fill, and and contracts to squeeze out urine. A pressure flow study is often done after cystometry. Youre asked to urinate while a probe in your urethra measures pressures.  Video cystourethrography. This takes video pictures of urine flow through your urinary tract. It can help identify blockages or other problems. The bladder is filled with an X-ray contrast fluid. Then X-ray video  pictures are taken as the fluid is urinated out. Ultrasound imaging may also be combined with routine urodynamic studies.  Ambulatory urodynamics. This test can be used to evaluate you while doing usual activities.  Getting your results  After the study, youll get dressed and return to the consultation room. Test results may be ready soon after the study is finished. Or, you may return to your doctors office in a few days for your results. Your doctor can talk with you about the study report and your options.   Date Last Reviewed: 1/1/2017  © 5787-0735 [employer]. [address] [place] [address]. All rights reserved. This information is not intended as a substitute for professional medical care. Always follow your healthcare professional's instructions.

## 2019-02-10 LAB — BACTERIA UR CULT: NORMAL

## 2019-03-12 ENCOUNTER — HOSPITAL ENCOUNTER (OUTPATIENT)
Facility: HOSPITAL | Age: 24
Discharge: HOME | End: 2019-03-12
Attending: UROLOGY | Admitting: UROLOGY
Payer: MEDICAID

## 2019-03-12 VITALS
OXYGEN SATURATION: 100 % | DIASTOLIC BLOOD PRESSURE: 76 MMHG | RESPIRATION RATE: 17 BRPM | SYSTOLIC BLOOD PRESSURE: 126 MMHG | HEART RATE: 74 BPM | TEMPERATURE: 98 F | BODY MASS INDEX: 22.5 KG/M2 | HEIGHT: 56 IN | WEIGHT: 100 LBS

## 2019-03-12 DIAGNOSIS — Q05.2 SPINA BIFIDA OF LUMBAR REGION WITH HYDROCEPHALUS: Primary | ICD-10-CM

## 2019-03-12 DIAGNOSIS — N31.9 NEUROGENIC BLADDER: ICD-10-CM

## 2019-03-12 PROCEDURE — 87077 CULTURE AEROBIC IDENTIFY: CPT

## 2019-03-12 PROCEDURE — 87086 URINE CULTURE/COLONY COUNT: CPT

## 2019-03-12 PROCEDURE — 36000705 HC OR TIME LEV I EA ADD 15 MIN: Performed by: UROLOGY

## 2019-03-12 PROCEDURE — 87186 SC STD MICRODIL/AGAR DIL: CPT

## 2019-03-12 PROCEDURE — 87088 URINE BACTERIA CULTURE: CPT

## 2019-03-12 PROCEDURE — 36000704 HC OR TIME LEV I 1ST 15 MIN: Performed by: UROLOGY

## 2019-03-12 NOTE — OP NOTE
Cancellation note: The patient presented stating that her urine was particularly malodorous.  The urinalysis was brightly nitrite positive.  A catheterized specimen was sent for culture.  Will plan to treat and try to get testing done while she is on antibiotics.

## 2019-03-15 ENCOUNTER — TELEPHONE (OUTPATIENT)
Dept: UROLOGY | Facility: CLINIC | Age: 24
End: 2019-03-15

## 2019-03-15 ENCOUNTER — PATIENT MESSAGE (OUTPATIENT)
Dept: UROLOGY | Facility: CLINIC | Age: 24
End: 2019-03-15

## 2019-03-15 DIAGNOSIS — N31.9 NEUROGENIC BLADDER: Primary | ICD-10-CM

## 2019-03-15 DIAGNOSIS — N30.90 BLADDER INFECTION: Primary | ICD-10-CM

## 2019-03-15 DIAGNOSIS — R56.9 CONVULSIONS, UNSPECIFIED CONVULSION TYPE: ICD-10-CM

## 2019-03-15 LAB — BACTERIA UR CULT: NORMAL

## 2019-03-15 RX ORDER — DOXYCYCLINE 100 MG/1
100 CAPSULE ORAL 2 TIMES DAILY
Qty: 14 CAPSULE | Refills: 1 | Status: SHIPPED | OUTPATIENT
Start: 2019-03-15 | End: 2019-03-22

## 2019-03-18 RX ORDER — LEVETIRACETAM 500 MG/1
TABLET ORAL
Qty: 60 TABLET | Refills: 11 | OUTPATIENT
Start: 2019-03-18

## 2019-03-27 ENCOUNTER — TELEPHONE (OUTPATIENT)
Dept: UROLOGY | Facility: CLINIC | Age: 24
End: 2019-03-27

## 2019-03-27 DIAGNOSIS — N30.90 BLADDER INFECTION: Primary | ICD-10-CM

## 2019-03-27 RX ORDER — DOXYCYCLINE 100 MG/1
100 CAPSULE ORAL 2 TIMES DAILY
Qty: 14 CAPSULE | Refills: 0 | Status: SHIPPED | OUTPATIENT
Start: 2019-03-27 | End: 2019-04-03

## 2019-03-27 NOTE — TELEPHONE ENCOUNTER
Doxycycline sent to preferred pharmacy.  Start on Sunday, the 31st in anticipation of FUDS on 4/4/2019

## 2019-03-27 NOTE — TELEPHONE ENCOUNTER
Spoke to pt's mom and told her Dr. Charles called in an antibiotic for the pt to start on Sunday, March 31 to prepare for FUDs test on 4/4. Pt's mom verbalized understanding.

## 2019-04-04 ENCOUNTER — HOSPITAL ENCOUNTER (OUTPATIENT)
Facility: HOSPITAL | Age: 24
Discharge: HOME | End: 2019-04-04
Attending: UROLOGY | Admitting: UROLOGY
Payer: MEDICAID

## 2019-04-04 VITALS
OXYGEN SATURATION: 100 % | DIASTOLIC BLOOD PRESSURE: 84 MMHG | HEIGHT: 56 IN | RESPIRATION RATE: 16 BRPM | WEIGHT: 110 LBS | SYSTOLIC BLOOD PRESSURE: 125 MMHG | BODY MASS INDEX: 24.75 KG/M2 | HEART RATE: 78 BPM | TEMPERATURE: 98 F

## 2019-04-04 DIAGNOSIS — N32.9 BLADDER DISORDER: ICD-10-CM

## 2019-04-04 PROCEDURE — 36000705 HC OR TIME LEV I EA ADD 15 MIN: Performed by: UROLOGY

## 2019-04-04 PROCEDURE — 36000704 HC OR TIME LEV I 1ST 15 MIN: Performed by: UROLOGY

## 2019-04-04 PROCEDURE — 51784 ANAL/URINARY MUSCLE STUDY: CPT | Mod: 26,51,, | Performed by: UROLOGY

## 2019-04-04 PROCEDURE — 51728 PR COMPLEX CYSTOMETROGRAM VOIDING PRESSURE STUDIES: ICD-10-PCS | Mod: 26,,, | Performed by: UROLOGY

## 2019-04-04 PROCEDURE — 51797 INTRAABDOMINAL PRESSURE TEST: CPT | Mod: 26,,, | Performed by: UROLOGY

## 2019-04-04 PROCEDURE — 51728 CYSTOMETROGRAM W/VP: CPT | Mod: 26,,, | Performed by: UROLOGY

## 2019-04-04 PROCEDURE — 76000 PR  FLUOROSCOPE EXAMINATION: ICD-10-PCS | Mod: 26,59,, | Performed by: UROLOGY

## 2019-04-04 PROCEDURE — 76000 FLUOROSCOPY <1 HR PHYS/QHP: CPT | Mod: 26,59,, | Performed by: UROLOGY

## 2019-04-04 PROCEDURE — 51784 PR ANAL/URINARY MUSCLE STUDY: ICD-10-PCS | Mod: 26,51,, | Performed by: UROLOGY

## 2019-04-04 PROCEDURE — 51797 PR VOIDING PRESS STUDY INTRA-ABDOMINAL VOID: ICD-10-PCS | Mod: 26,,, | Performed by: UROLOGY

## 2019-04-04 PROCEDURE — 27200973 HC CYSTO SUPPLY IV (URODYNAMICS): Performed by: UROLOGY

## 2019-04-04 NOTE — SUBJECTIVE & OBJECTIVE
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    STRABISMUS SURGERY      ou dr peña    URODYNAMIC STUDY, FLUOROSCOPIC N/A 3/12/2019    Performed by [facility] at [facility] OR 1ST FLR    VENTRICULOPERITONEAL SHUNT         Review of patient's allergies indicates:   Allergen Reactions    Latex, natural rubber Anaphylaxis and Other (See Comments)     angioedema    Nitrofurantoin Shortness Of Breath    Bactrim  [sulfamethoxazole-trimethoprim]      Other reaction(s): Swelling of face    Gardasil  [h papillomavirus vac,qval (pf)]      Other reaction(s): Shortness of breath    Menactra  [mening vac a,c,y,w135 dip (pf)]      Other reaction(s): Shortness of breath    Nitrofurantoin      Other reaction(s): Difficulty breathing    Sulfa (sulfonamide antibiotics)     Tramadol Hives    Zofran [ondansetron hcl (pf)]        Family History     Problem Relation (Age of Onset)    Cataracts Maternal Grandmother          Tobacco Use    Smoking status: Never Smoker    Smokeless tobacco: Never Used   Substance and Sexual Activity    Alcohol use: No    Drug use: No    Sexual activity: Never       Review of Systems   Constitutional: Negative.    HENT:  Negative.    Eyes: Negative.    Respiratory: Negative.    Cardiovascular: Negative.    Gastrointestinal: Negative.    Genitourinary:        Urinary incontinence   Musculoskeletal:        Paraplegic from spina bifida   Skin: Negative.    Neurological:        Spina bifida   Psychiatric/Behavioral:        Can be difficult getting a straight answer from the patient       Objective:     Temp:  [97.7 °F (36.5 °C)] 97.7 °F (36.5 °C)  Pulse:  [78] 78  Resp:  [16] 16  SpO2:  [100 %] 100 %  BP: (125)/(84) 125/84     Body mass index is 24.66 kg/m².    No intake/output data recorded.       Drains     Drain                 Colostomy Other (see comments) RLQ -- days         Urostomy 08/09/17 0920 other (see comments) Umbilicus 603 days                Physical Exam   Constitutional: She appears well-developed and well-nourished.   HENT:   Head: Normocephalic.   Neck: Normal range of motion.   Pulmonary/Chest: Effort normal.   Musculoskeletal: Normal range of motion.   Neurological: She is alert.   Skin: Skin is warm and dry.     Psychiatric: She has a normal mood and affect. Her behavior is normal.

## 2019-04-04 NOTE — H&P
[facility]  Urology  History & Physical    Patient Name: Shashank Samuel  MRN: [id number]  Admission Date: 4/4/2019  Code Status: No Order   Attending Provider: Kenzie Charles MD  Primary Care Physician: Primary Doctor No  Principal Problem:<principal problem not specified>    Subjective:     HPI:  CHIEF COMPLAINT:     Mrs. Samuel is a 23 y.o. female presenting for a hospital follow up     PRESENTING ILLNESS:     Shashank Samuel is a 23 y.o. female who is accompanied by her mother and sister today.  She was hospitalized for two months after developing heel ulcers and becoming septic.  Apparently, she had osteomylitis, and it sounds like the organism became blood borne, infecting her bladder and kidneys.  She had some difficulty self catheterizing through the Mitrofanoff as she became edematous.  She was able to maintain intermittent catheterization but the urine was malodorous and cloudy.  She was hospitalized with high dose vancomycin IV, presently she is on cefalexin 500 mg bid.  She still catheterizes through the Mitrofanoff every 3-4 hours while awake.  At night she leaks through the urethra as it acts as a pop off.       It has been a while since she has had a FUDS and her mother was advised to bring her back due to the intractable nature of the urinary tract infections.      The patient was cancelled the last time she was scheduled as the urine was grossly nitrite positive.  She has been on culture appropriate antibiotics and is presently taking doxycycline.       REVIEW OF SYSTEMS:     Review of Systems   Constitutional: Negative.    HENT: Negative.    Eyes: Negative.    Respiratory: Negative.    Cardiovascular: Negative.    Gastrointestinal:        Bowel incontinence   Genitourinary:        Insensate   Musculoskeletal: Negative.    Skin: Negative.    Neurological:        History of spina bifida   Endo/Heme/Allergies: Negative.    Psychiatric/Behavioral: Negative.         Past Medical  History:   Diagnosis Date    Arnold-Chiari malformation, type II     Encounter for blood transfusion     Hydrocephalus     Neurogenic bladder     self catheterizes every 3 hours    Seizures     only w/ shunt malfunction    Spinal bifida, closed     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       Past Surgical History:   Procedure Laterality Date    AMPUTATION      right 4th and 5th toes; left 5th toe and portion of left great toe    BACK SURGERY      BLADDER SURGERY      COLON SURGERY      flush site for bowel movements from appendix    CYSTOSTOMY      EYE SURGERY      x 5    FLUORO URODYNAMIC STUDY (FUDS) N/A 11/15/2016    [facility] 1ST FLR    MYELOMININGOCELE REPAIR      REDUCTION-MAMMOPLASTY-BILATERAL Bilateral 8/9/2017    [facility]    STRABISMUS SURGERY      ou dr peña    URODYNAMIC STUDY, FLUOROSCOPIC N/A 3/12/2019    Performed by [facility] at [facility] OR 1ST FLR    VENTRICULOPERITONEAL SHUNT         Review of patient's allergies indicates:   Allergen Reactions    Latex, natural rubber Anaphylaxis and Other (See Comments)     angioedema    Nitrofurantoin Shortness Of Breath    Bactrim  [sulfamethoxazole-trimethoprim]      Other reaction(s): Swelling of face    Gardasil  [h papillomavirus vac,qval (pf)]      Other reaction(s): Shortness of breath    Menactra  [mening vac a,c,y,w135 dip (pf)]      Other reaction(s): Shortness of breath    Nitrofurantoin      Other reaction(s): Difficulty breathing    Sulfa (sulfonamide antibiotics)     Tramadol Hives    Zofran [ondansetron hcl (pf)]        Family History     Problem Relation (Age of Onset)    Cataracts Maternal Grandmother          Tobacco Use    Smoking status: Never Smoker    Smokeless tobacco: Never Used   Substance and Sexual Activity    Alcohol use: No    Drug use: No    Sexual activity: Never       Review of Systems   Constitutional: Negative.    HENT: Negative.     Eyes: Negative.    Respiratory: Negative.    Cardiovascular: Negative.    Gastrointestinal: Negative.    Genitourinary:        Urinary incontinence   Musculoskeletal:        Paraplegic from spina bifida   Skin: Negative.    Neurological:        Spina bifida   Psychiatric/Behavioral:        Can be difficult getting a straight answer from the patient       Objective:     Temp:  [97.7 °F (36.5 °C)] 97.7 °F (36.5 °C)  Pulse:  [78] 78  Resp:  [16] 16  SpO2:  [100 %] 100 %  BP: (125)/(84) 125/84     Body mass index is 24.66 kg/m².    No intake/output data recorded.       Drains     Drain                 Colostomy Other (see comments) RLQ -- days         Urostomy 08/09/17 0920 other (see comments) Umbilicus 603 days                Physical Exam   Constitutional: She appears well-developed and well-nourished.   She smells strongly of urine  HENT:   Head: Normocephalic.   Neck: Normal range of motion.   Pulmonary/Chest: Effort normal.   Musculoskeletal: Normal range of motion.   Neurological: She is alert.   Skin: Skin is warm and dry.     Psychiatric: She has a normal mood and affect. Her behavior is normal.     Assessment and Plan:     23 year old woman with history of spina bifida, status post augmentation cystoplasty continent catheterizable stoma    1.  For BARRIE Charles MD  Urology  [facility]

## 2019-04-04 NOTE — OP NOTE
Date of procedure:  4/4/2019    Fluoro Urodynamic Report    Indication:  Neurogenic bladder in a patient with history of spina bifida    Patient was taken to the Urodynamic Suite and the patient was prepped and the urinary residual was drained with a catheter.  A 7 Fr dual lumen catheter was placed to measure intravesical pressures through the Mitrofanoff.  A 10 Fr balloon manometer was placed into the rectum for abdominal pressure measurements.  Patch EMG electrodes were placed on the perineum.  The patient was connected to the Victiv Urodynamic machine, using a multichannel technique.  The bladder was filled with Cysto ConRay at room temperature at a rate of 30 ml/min.  Patient is filled to urgency.  Filling is performed with the patient in the seated position.      The following are the results of the study:    1.  Amount drained from the bladder:  500 ml    2.  CMG       Sensation:         First Desire:  113.7 ml         Normal Desire:  286.6 ml         Strong Desire:  578 ml         Urgency:  642 ml       Capacity:  850 ml       Abnormal Contractions:  none       Compliance:  normal    3.  Abdominal Leak Point Pressure:  Did not leak    4.  EMG:  Normal guarding reflex    5. Fluoroscopy:  Bladder and augment showed no filling defects.  She had what appeared to be reflux only at capacity    6.  Analysis:  Sensation is difficult to gauge as the patient seems to answer what she thinks one wants to hear.  No leakage was noted during the entire filling phase.      7.  Recommendations:       A.  I encouraged her to cath when she has sensations that she did when she presented and had 500 ml in the bladder         B.  Also had a conversation about personal hygiene.  She stated that she took a shower yesterday evening but she smelled strongly of urine.  She wore a pull up today but stated that she does not always do so.

## 2019-04-04 NOTE — HPI
CHIEF COMPLAINT:     Mrs. Samuel is a 23 y.o. female presenting for a hospital follow up     PRESENTING ILLNESS:     Shashank Samuel is a 23 y.o. female who is accompanied by her mother and sister today.  She was hospitalized for two months after developing heel ulcers and becoming septic.  Apparently, she had osteomylitis, and it sounds like the organism became blood borne, infecting her bladder and kidneys.  She had some difficulty self catheterizing through the Mitrofanoff as she became edematous.  She was able to maintain intermittent catheterization but the urine was malodorous and cloudy.  She was hospitalized with high dose vancomycin IV, presently she is on cefalexin 500 mg bid.  She still catheterizes through the Mitrofanoff every 3-4 hours while awake.  At night she leaks through the urethra as it acts as a pop off.       It has been a while since she has had a FUDS and her mother was advised to bring her back due to the intractable nature of the urinary tract infections.      The patient was cancelled the last time she was scheduled as the urine was grossly nitrite positive.  She has been on culture appropriate antibiotics and is presently taking doxycycline.       REVIEW OF SYSTEMS:     Review of Systems   Constitutional: Negative.    HENT: Negative.    Eyes: Negative.    Respiratory: Negative.    Cardiovascular: Negative.    Gastrointestinal:        Bowel incontinence   Genitourinary:        Insensate   Musculoskeletal: Negative.    Skin: Negative.    Neurological:        History of spina bifida   Endo/Heme/Allergies: Negative.    Psychiatric/Behavioral: Negative.

## 2019-05-16 ENCOUNTER — OFFICE VISIT (OUTPATIENT)
Dept: PODIATRY | Facility: CLINIC | Age: 24
End: 2019-05-16
Payer: MEDICAID

## 2019-05-16 ENCOUNTER — TELEPHONE (OUTPATIENT)
Dept: PODIATRY | Facility: CLINIC | Age: 24
End: 2019-05-16

## 2019-05-16 VITALS
BODY MASS INDEX: 24.75 KG/M2 | DIASTOLIC BLOOD PRESSURE: 87 MMHG | HEART RATE: 101 BPM | RESPIRATION RATE: 16 BRPM | HEIGHT: 56 IN | SYSTOLIC BLOOD PRESSURE: 128 MMHG | WEIGHT: 110 LBS

## 2019-05-16 DIAGNOSIS — L97.519 ULCER OF RIGHT FOOT, UNSPECIFIED ULCER STAGE: Primary | ICD-10-CM

## 2019-05-16 DIAGNOSIS — L97.529 ULCER OF LEFT FOOT, UNSPECIFIED ULCER STAGE: ICD-10-CM

## 2019-05-16 PROCEDURE — 99213 PR OFFICE/OUTPT VISIT, EST, LEVL III, 20-29 MIN: ICD-10-PCS | Mod: 25,,, | Performed by: PODIATRIST

## 2019-05-16 PROCEDURE — 99213 OFFICE O/P EST LOW 20 MIN: CPT | Mod: 25,,, | Performed by: PODIATRIST

## 2019-05-16 PROCEDURE — 11042 WOUND DEBRIDEMENT: ICD-10-PCS | Mod: ,,, | Performed by: PODIATRIST

## 2019-05-16 PROCEDURE — 11042 DBRDMT SUBQ TIS 1ST 20SQCM/<: CPT | Mod: ,,, | Performed by: PODIATRIST

## 2019-05-16 NOTE — TELEPHONE ENCOUNTER
Called pt to instruct her to continue to see Dr. Simon for wound care as he is treating sacral wound.  I had to leave a message for the patient to call us back.  Cancelled wound care referral to Nadine.

## 2019-05-16 NOTE — PROGRESS NOTES
[address]  [place] [address]  Phone: [phone]   [phone]    Patient's PCP:Primary Doctor No  Referring Provider: No ref. provider found    Subjective:      Chief Complaint:: Foot Ulcer (right second toe b0ramyt)    HPI  Shashank Samuel is a 23 y.o. female who presents with a complaint of  New ulcer right foot second toe lasting for one month. Onset of the symptoms was unknown now she has VRE.  [employer] yet discussed he was to follow-up with nt symptoms include draining and redness. Treatment to date have included dressing changes three times a week with bactroban. Patients rates pain 0/10 on pain scale.        Vitals:    05/16/19 0931   BP: 128/87   Pulse: 101   Resp: 16     is  Past Surgical History:   Procedure Laterality Date    AMPUTATION      right 4th and 5th toes; left 5th toe and portion of left great toe    BACK SURGERY      BLADDER SURGERY      COLON SURGERY      flush site for bowel movements from appendix    CYSTOSTOMY      EYE SURGERY      x 5    FLUORO URODYNAMIC STUDY (FUDS) N/A 11/15/2016    [facility] 1ST FLR    MYELOMININGOCELE REPAIR      REDUCTION-MAMMOPLASTY-BILATERAL Bilateral 8/9/2017    Performed by [facility] at [facility] OR    STRABISMUS SURGERY      ou dr peña    URODYNAMIC STUDY, FLUOROSCOPIC N/A 4/4/2019    Performed by [facility] at [facility] OR 1ST FLR    URODYNAMIC STUDY, FLUOROSCOPIC N/A 3/12/2019    Performed by [facility] at [facility] OR 1ST FLR    VENTRICULOPERITONEAL SHUNT       Past Medical History:   Diagnosis Date    Arnold-Chiari malformation, type II     Encounter for blood transfusion     Hydrocephalus     Neurogenic bladder     self catheterizes every 3 hours    Seizures     only w/ shunt malfunction    Spinal bifida, closed     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     Family History   Problem Relation Age of Onset    Cataracts Maternal Grandmother     Amblyopia Neg Hx      Blindness Neg Hx     Cancer Neg Hx     Diabetes Neg Hx     Glaucoma Neg Hx     Hypertension Neg Hx     Macular degeneration Neg Hx     Retinal detachment Neg Hx     Strabismus Neg Hx     Stroke Neg Hx     Thyroid disease Neg Hx         Social History:   Marital Status: Single  Alcohol History:  reports that she does not drink alcohol.  Tobacco History:  reports that she has never smoked. She has never used smokeless tobacco.  Drug History:  reports that she does not use drugs.    Review of patient's allergies indicates:   Allergen Reactions    Latex, natural rubber Anaphylaxis and Other (See Comments)     angioedema    Nitrofurantoin Shortness Of Breath    Bactrim  [sulfamethoxazole-trimethoprim]      Other reaction(s): Swelling of face    Gardasil  [h papillomavirus vac,qval (pf)]      Other reaction(s): Shortness of breath    Menactra  [mening vac a,c,y,w135 dip (pf)]      Other reaction(s): Shortness of breath    Nitrofurantoin      Other reaction(s): Difficulty breathing    Sulfa (sulfonamide antibiotics)     Tramadol Hives    Zofran [ondansetron hcl (pf)]        Current Outpatient Medications   Medication Sig Dispense Refill    diaper,brief,adult,disposable (DEPEND PANTS) Misc Change BID for pressure ulcer 60 each 11    EPINEPHrine (EPIPEN 2-DEEPIKA) 0.3 mg/0.3 mL AtIn USE ONE INJECTION INTO THE MUSCLE AS NEEDED FOR ANAPHYLAXIS 2 each 0    glycerin pediatric suppository Use PRN for constipation    We need 1 bottle 12 suppository 5    levETIRAcetam (KEPPRA) 500 MG Tab Take 1 tablet (500 mg total) by mouth 2 (two) times daily. (last refill, need appointment) 60 tablet 11    lidocaine (LIDODERM) 5 % Place 2-3 patches onto the skin once daily. Remove & Discard patch within 12 hours or as directed by MD Interiano patch 2    menthol-zinc oxide (CALMOSEPTINE) 0.44-20.6 % Oint Apply topically 2 (two) times daily as needed. 1 Tube 6    syringe, disposable, 60 mL Syrg Use for self catheterization up to 7  times daily 60 each 11    catheter 12 Fr Misc by Misc.(Non-Drug; Combo Route) route.      catheter 8 Fr Misc Use up to twice daily 40 each 11    oxyCODONE-acetaminophen (PERCOCET)  mg per tablet Take 1 tablet by mouth every 6 (six) hours as needed for Pain. 120 tablet 0     No current facility-administered medications for this visit.        Review of Systems   Constitutional: Negative for chills, fatigue, fever and unexpected weight change.   HENT: Negative for hearing loss and trouble swallowing.    Eyes: Negative for photophobia and visual disturbance.   Respiratory: Negative for cough, shortness of breath and wheezing.    Cardiovascular: Negative for chest pain, palpitations and leg swelling.   Gastrointestinal: Negative for abdominal pain and nausea.   Genitourinary: Negative for dysuria and frequency.   Musculoskeletal: Positive for arthralgias. Negative for back pain and joint swelling.   Skin: Negative for rash.   Neurological: Positive for tremors, weakness, numbness and headaches. Negative for seizures.   Hematological: Bruises/bleeds easily.         Objective:        Physical Exam:   Foot Exam  Physical Exam   Pressure ulcer of toe of right foot, 2nd toe  No signs of infection. Small amount of serosanguineous drainage. Grade 3 ulceration. 70 percent red granular tissue, and 30% yellow fibrotic slough base.  Measures: 1.6cm by 1.7cm by 0.2cm deep.   Pressure ulcer of right lateral foot  No signs of infection. Small amount of serosanguineous drainage. Grade 3 ulceration.  90% pink tissue base and 10% yellow fibrotic slough base  Measures 1.2 cm x 0.6 cm x 0.1 cm depth  Pressure ulcer of left 1st toe  No signs of infection. Small amount of serosanguineous drainage. Grade 3 ulceration. 90 percent red granular tissue, and 10% yellow fibrotic slough base.  Measures approximately 1.1 cm x 0.8 cm x 0.1 cm          Assessment:       1. Ulcer of right foot, unspecified ulcer stage      Plan:   Ulcer of  "right foot, unspecified ulcer stage  -     Cancel: Ambulatory consult to Wound Clinic      No follow-ups on file.    Wound Debridement  Date/Time: 5/16/2019 11:12 AM  Performed by: Etta Bland DPM  Authorized by: Etta Bland DPM     Time out: Immediately prior to procedure a "time out" was called to verify the correct patient, procedure, equipment, support staff and site/side marked as required.    Consent Done?:  Yes (Verbal)    Preparation: Patient was prepped and draped in usual sterile fashion    Local anesthesia used?: No      Wound Details:    Location:  Right foot    Location:  Right 2nd Toe    Type of Debridement:  Excisional       Length (cm):  1.6       Area (sq cm):  2.72       Width (cm):  1.7       Percent Debrided (%):  100       Depth (cm):  0.2       Total Area Debrided (sq cm):  2.72    Depth of debridement:  Subcutaneous tissue    Tissue debrided:  Dermis, Epidermis and Subcutaneous    Devitalized tissue debrided:  Necrotic/Eschar, Biofilm and Slough    Instruments:  Forceps and Curette    Additional wounds:  2    2nd Wound Details:     Location:  Right foot    Location:  Right Midfoot    Location:  Right Midfoot    Type of Debridement:  Excisional       Length (cm):  1.2       Area (sq cm):  0.72       Width (cm):  0.6       Percent Debrided (%):  100       Depth (cm):  0.1       Total Area Debrided (sq cm):  0.72    Depth of debridement:  Subcutaneous tissue    Tissue debrided:  Dermis, Epidermis and Subcutaneous    Devitalized tissue debrided:  Necrotic/Eschar, Slough and Biofilm    Instruments:  Curette and Forceps    3rd Wound Details:     Location:  Left foot    Location:  Left 1st Toe    Location:  Left 1st Toe       Length (cm):  1.1       Area (sq cm):  0.88       Width (cm):  0.8       Percent Debrided (%):  100       Depth (cm):  0.1       Total Area Debrided (sq cm):  0.88    Depth of debridement:  Subcutaneous tissue    Tissue debrided:  Epidermis, Dermis and Subcutaneous    " Devitalized tissue debrided:  Biofilm, Necrotic/Eschar and Slough    Instruments:  Curette and Forceps    Bleeding:  Minimal  Hemostasis Achieved: Yes    Method Used:  Pressure  Patient tolerance:  Patient tolerated the procedure well with no immediate complications     This ulcer was debrided in an attempt to decrease bioburden,  to try to decrease the risk of infection, and to promote healing.  Wounds dressed with iodosorp, 4 by 4s and kerlix.     -       Counseling:     I provided patient education verbally regarding:   Patient diagnosis, treatment options, as well as alternatives, risks, and benefits.     This note was created using Dragon voice recognition software that occasionally misinterpreted phrases or words.     Follow up with me in wound care center.    Patient sister to do wound dressing: Remove dressing, cleanse wounds with wound cleanser, pat dry. Daily wound care to all feet wounds: Apply antibiotic ointment Mupirocin to wounds, cover with border foam dressing or absorbent dressing and tape.  Wear padded boots to both feet at all times. Protect areas from trauma while transferring positions.  Elevate legs often during the day. Do chair lifts every 15-30 minutes.  Change your position every 1-2 hours, take breaks during playing video games to change position, Chair lifts/pressure release every 15-20 minutes while in your chair and elevate legs.

## 2019-08-12 ENCOUNTER — TELEPHONE (OUTPATIENT)
Dept: UROLOGY | Facility: CLINIC | Age: 24
End: 2019-08-12

## 2019-08-12 NOTE — TELEPHONE ENCOUNTER
----- Message from Alma Logan sent at 8/12/2019  2:31 PM CDT -----  Contact: Kayleen (mom): [phone]  Needs Advice    Reason for call: pt's mom state they are changing the company the pt get her cath from and would like for Rx to be sent to the new medical supply company         Communication Preference: Kayleen (mom): [phone]    Additional Information: can fax Rx  [facility] at [phone]

## 2019-08-21 NOTE — TELEPHONE ENCOUNTER
----- Message from Yun Gipson sent at 8/19/2019 10:50 AM CDT -----  Contact: Kalyani ([facility]) @ [phone] ext [phone]  Calling to speak with someone regarding an order/referral for the patient's catheters , says patient has been out for 6 days. Please call.

## 2019-09-09 ENCOUNTER — OFFICE VISIT (OUTPATIENT)
Dept: PODIATRY | Facility: CLINIC | Age: 24
End: 2019-09-09
Payer: MEDICAID

## 2019-09-09 VITALS
DIASTOLIC BLOOD PRESSURE: 78 MMHG | SYSTOLIC BLOOD PRESSURE: 127 MMHG | HEIGHT: 56 IN | BODY MASS INDEX: 24.75 KG/M2 | HEART RATE: 85 BPM | WEIGHT: 110 LBS

## 2019-09-09 DIAGNOSIS — L97.514 SKIN ULCER OF RIGHT FOOT WITH NECROSIS OF BONE: Primary | ICD-10-CM

## 2019-09-09 PROCEDURE — 99213 OFFICE O/P EST LOW 20 MIN: CPT | Mod: S$PBB,,, | Performed by: PODIATRIST

## 2019-09-09 PROCEDURE — 99999 PR PBB SHADOW E&M-EST. PATIENT-LVL III: ICD-10-PCS | Mod: PBBFAC,,, | Performed by: PODIATRIST

## 2019-09-09 PROCEDURE — 99213 OFFICE O/P EST LOW 20 MIN: CPT | Mod: PBBFAC | Performed by: PODIATRIST

## 2019-09-09 PROCEDURE — 99999 PR PBB SHADOW E&M-EST. PATIENT-LVL III: CPT | Mod: PBBFAC,,, | Performed by: PODIATRIST

## 2019-09-09 PROCEDURE — 99213 PR OFFICE/OUTPT VISIT, EST, LEVL III, 20-29 MIN: ICD-10-PCS | Mod: S$PBB,,, | Performed by: PODIATRIST

## 2019-09-09 RX ORDER — AMOXICILLIN AND CLAVULANATE POTASSIUM 875; 125 MG/1; MG/1
1 TABLET, FILM COATED ORAL EVERY 12 HOURS
Qty: 20 TABLET | Refills: 1 | Status: SHIPPED | OUTPATIENT
Start: 2019-09-09 | End: 2019-09-19

## 2019-09-09 NOTE — PROGRESS NOTES
[address] [facility] [address]  [place] [address]  Phone: [phone]   [phone]    Patient's PCP:Primary Doctor No  Referring Provider: No ref. provider found    Subjective:      Chief Complaint:: Foot Injury (Right foot wound)    SABRINA Samuel is a 24 y.o. female who presents with a complaint of a Right foot wound lasting for months.Treatment to date have included antibiotic ointment and covering. Patient does not feel pain due to being paralyzed.         Vitals:    09/09/19 0947   BP: 127/78   Pulse: 85     Shoe Size:     Past Surgical History:   Procedure Laterality Date    AMPUTATION      right 4th and 5th toes; left 5th toe and portion of left great toe    BACK SURGERY      BLADDER SURGERY      COLON SURGERY      flush site for bowel movements from appendix    CYSTOSTOMY      EYE SURGERY      x 5    FLUORO URODYNAMIC STUDY (FUDS) N/A 11/15/2016    [facility]    MYELOMININGOCELE REPAIR      REDUCTION-MAMMOPLASTY-BILATERAL Bilateral 8/9/2017    Performed by Kamran Allen MD at [facility] OR    STRABISMUS SURGERY      ou dr peña    URODYNAMIC STUDY, FLUOROSCOPIC N/A 4/4/2019    Performed by [facility]zie Charles MD at [facility] OR [address] [place] [address]    URODYNAMIC STUDY, FLUOROSCOPIC N/A 3/12/2019    Performed by [facility] at [facility]    VENTRICULOPERITONEAL SHUNT       Past Medical History:   Diagnosis Date    Arnold-Chiari malformation, type II     Encounter for blood transfusion     Hydrocephalus     Neurogenic bladder     self catheterizes every 3 hours    Seizures     only w/ shunt malfunction    Spinal bifida, closed     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     Family History   Problem Relation Age of Onset    Cataracts Maternal Grandmother     Amblyopia Neg Hx     Blindness Neg Hx     Cancer Neg Hx     Diabetes Neg Hx     Glaucoma Neg Hx     Hypertension Neg Hx     Macular degeneration Neg Hx     Retinal detachment Neg Hx     Strabismus  Neg Hx     Stroke Neg Hx     Thyroid disease Neg Hx         Social History:   Marital Status: Single  Alcohol History:  reports that she does not drink alcohol.  Tobacco History:  reports that she has never smoked. She has never used smokeless tobacco.  Drug History:  reports that she does not use drugs.    Review of patient's allergies indicates:   Allergen Reactions    Latex, natural rubber Anaphylaxis and Other (See Comments)     angioedema    Nitrofurantoin Shortness Of Breath    Bactrim  [sulfamethoxazole-trimethoprim]      Other reaction(s): Swelling of face    Gardasil  [h papillomavirus vac,qval (pf)]      Other reaction(s): Shortness of breath    Menactra  [mening vac a,c,y,w135 dip (pf)]      Other reaction(s): Shortness of breath    Nitrofurantoin      Other reaction(s): Difficulty breathing    Sulfa (sulfonamide antibiotics)     Tramadol Hives    Zofran [ondansetron hcl (pf)]        Current Outpatient Medications   Medication Sig Dispense Refill    catheter 12 Fr Misc by Misc.(Non-Drug; Combo Route) route.      catheter 8 Fr Misc Use up to twice daily 40 each 11    diaper,brief,adult,disposable (DEPEND PANTS) Misc Change BID for pressure ulcer 60 each 11    EPINEPHrine (EPIPEN 2-DEEPIKA) 0.3 mg/0.3 mL AtIn USE ONE INJECTION INTO THE MUSCLE AS NEEDED FOR ANAPHYLAXIS 2 each 0    glycerin pediatric suppository Use PRN for constipation    We need 1 bottle 12 suppository 5    levETIRAcetam (KEPPRA) 500 MG Tab Take 1 tablet (500 mg total) by mouth 2 (two) times daily. (last refill, need appointment) 60 tablet 11    lidocaine (LIDODERM) 5 % Place 2-3 patches onto the skin once daily. Remove & Discard patch within 12 hours or as directed by MD Interiano patch 2    menthol-zinc oxide (CALMOSEPTINE) 0.44-20.6 % Oint Apply topically 2 (two) times daily as needed. 1 Tube 6    syringe, disposable, 60 mL Syrg Use for self catheterization up to 7 times daily 60 each 11    amoxicillin-clavulanate 875-125mg  (AUGMENTIN) 875-125 mg per tablet Take 1 tablet by mouth every 12 (twelve) hours. for 10 days 20 tablet 1    oxyCODONE-acetaminophen (PERCOCET)  mg per tablet Take 1 tablet by mouth every 6 (six) hours as needed for Pain. 120 tablet 0     No current facility-administered medications for this visit.        Review of Systems      Objective:                Physical Exam:   Foot Exam  Physical Exam   Musculoskeletal:        Feet:        Imaging:            Assessment:       1. Skin ulcer of right foot with necrosis of bone      Plan:   Skin ulcer of right foot with necrosis of bone    Other orders  -     amoxicillin-clavulanate 875-125mg (AUGMENTIN) 875-125 mg per tablet; Take 1 tablet by mouth every 12 (twelve) hours. for 10 days  Dispense: 20 tablet; Refill: 1    Patient was educated about the entire pre, cornelio and post-operative time period.  They  were educated about the pro's and con's of surgery.  All conservative measures have been exhausted. Patient understands the particular risks involved which may occur in connection with the procedure proposed including pain, swelling, infection, stiffness, decreased ROM, recurrence, rejection, numbness, delayed healing, scar formation.    Patient was educated that their diagnosis may be surgically treated.  The patient's problem will probably advance and usually will not get better without surgery.  All of the pre-operative treatment plans have been exhausted or will no longer be successful at this point in time.  Patient was told of the possible outcomes and expectations of the surgical procedure.  They  will need to be followed-up post-operatively.  Today, pictures were drawn, questions answered.      We discussed the following surgical procedures:  Amputation 2nd toe right foot.  The patient and family will discuss this and also discuss with wound care doctor.  They decide to proceed with the amputation toe notify my office to be scheduled in a week.  If they  decide not to than no continue wound care with Dr. Van.   No follow-ups on file.    Procedures - None    Counseling:     I provided patient education verbally regarding:   Patient diagnosis, treatment options, as well as alternatives, risks, and benefits.     This note was created using Dragon voice recognition software that occasionally misinterpreted phrases or words.

## 2019-10-21 ENCOUNTER — OFFICE VISIT (OUTPATIENT)
Dept: PODIATRY | Facility: CLINIC | Age: 24
End: 2019-10-21
Payer: MEDICAID

## 2019-10-21 VITALS
HEART RATE: 62 BPM | SYSTOLIC BLOOD PRESSURE: 126 MMHG | DIASTOLIC BLOOD PRESSURE: 76 MMHG | WEIGHT: 110 LBS | HEIGHT: 56 IN | BODY MASS INDEX: 24.75 KG/M2

## 2019-10-21 DIAGNOSIS — L97.514 SKIN ULCER OF RIGHT FOOT WITH NECROSIS OF BONE: Primary | ICD-10-CM

## 2019-10-21 DIAGNOSIS — L97.514 ULCER OF TOE OF RIGHT FOOT, WITH NECROSIS OF BONE: ICD-10-CM

## 2019-10-21 PROCEDURE — 99214 PR OFFICE/OUTPT VISIT, EST, LEVL IV, 30-39 MIN: ICD-10-PCS | Mod: S$PBB,,, | Performed by: PODIATRIST

## 2019-10-21 PROCEDURE — 99213 OFFICE O/P EST LOW 20 MIN: CPT | Performed by: PODIATRIST

## 2019-10-21 PROCEDURE — 99214 OFFICE O/P EST MOD 30 MIN: CPT | Mod: S$PBB,,, | Performed by: PODIATRIST

## 2019-10-21 RX ORDER — LEVOFLOXACIN 500 MG/1
TABLET, FILM COATED ORAL
Refills: 0 | Status: ON HOLD | COMMUNITY
Start: 2019-10-09 | End: 2020-03-26

## 2019-10-21 RX ORDER — NITROFURANTOIN 25; 75 MG/1; MG/1
CAPSULE ORAL
Refills: 0 | Status: ON HOLD | COMMUNITY
Start: 2019-10-01 | End: 2020-03-26

## 2019-10-21 NOTE — PROGRESS NOTES
[address] [facility] [address]  [place] [address]  Phone: [phone]   [phone]    Patient's PCP:Primary Doctor No  Referring Provider: No ref. provider found    Subjective:      Chief Complaint:: Consult (right second toe ulcer)    HPI  Shashank Samuel is a 24 y.o. female who presents with office today to discuss amputation of right second toe.I have been doing daily dressings.    Vitals:    10/21/19 0928   BP: 126/76   Pulse: 62     Shoe Size:     Past Surgical History:   Procedure Laterality Date    AMPUTATION      right 4th and 5th toes; left 5th toe and portion of left great toe    BACK SURGERY      BLADDER SURGERY      COLON SURGERY      flush site for bowel movements from appendix    CYSTOSTOMY      EYE SURGERY      x 5    FLUOROSCOPIC URODYNAMIC STUDY N/A 3/12/2019    Procedure: URODYNAMIC STUDY, FLUOROSCOPIC;  Surgeon: Kenzie Charles MD;  Location: [facility] OR [address] [place] [address];  Service: Urology;  Laterality: N/A;  1 hour    FLUOROSCOPIC URODYNAMIC STUDY N/A 4/4/2019    Procedure: URODYNAMIC STUDY, FLUOROSCOPIC;  Surgeon: Kenzie Charles MD;  Location: [facility] OR [address] [place] [address];  Service: Urology;  Laterality: N/A;  1 hour    MYELOMININGOCELE REPAIR      STRABISMUS SURGERY      ou dr peña    VENTRICULOPERITONEAL SHUNT       Past Medical History:   Diagnosis Date    Arnold-Chiari malformation, type II     Encounter for blood transfusion     Hydrocephalus     Neurogenic bladder     self catheterizes every 3 hours    Seizures     only w/ shunt malfunction    Spinal bifida, closed     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     Family History   Problem Relation Age of Onset    Cataracts Maternal Grandmother     Amblyopia Neg Hx     Blindness Neg Hx     Cancer Neg Hx     Diabetes Neg Hx     Glaucoma Neg Hx     Hypertension Neg Hx     Macular degeneration Neg Hx     Retinal detachment Neg Hx     Strabismus Neg Hx     Stroke Neg Hx     Thyroid disease Neg Hx         Social History:   Marital Status:  Single  Alcohol History:  reports that she does not drink alcohol.  Tobacco History:  reports that she has never smoked. She has never used smokeless tobacco.  Drug History:  reports that she does not use drugs.    Review of patient's allergies indicates:   Allergen Reactions    Latex, natural rubber Anaphylaxis and Other (See Comments)     angioedema    Nitrofurantoin Shortness Of Breath    Bactrim  [sulfamethoxazole-trimethoprim]      Other reaction(s): Swelling of face    Gardasil  [h papillomavirus vac,qval (pf)]      Other reaction(s): Shortness of breath    Menactra  [mening vac a,c,y,w135 dip (pf)]      Other reaction(s): Shortness of breath    Nitrofurantoin      Other reaction(s): Difficulty breathing    Sulfa (sulfonamide antibiotics)     Tramadol Hives    Zofran [ondansetron hcl (pf)]        Current Outpatient Medications   Medication Sig Dispense Refill    catheter 12 Fr Misc by Misc.(Non-Drug; Combo Route) route.      catheter 8 Fr Misc Use up to twice daily 40 each 11    diaper,brief,adult,disposable (DEPEND PANTS) Misc Change BID for pressure ulcer 60 each 11    EPINEPHrine (EPIPEN 2-DEEPIKA) 0.3 mg/0.3 mL AtIn USE ONE INJECTION INTO THE MUSCLE AS NEEDED FOR ANAPHYLAXIS 2 each 0    glycerin pediatric suppository Use PRN for constipation    We need 1 bottle 12 suppository 5    levETIRAcetam (KEPPRA) 500 MG Tab Take 1 tablet (500 mg total) by mouth 2 (two) times daily. (last refill, need appointment) 60 tablet 11    levoFLOXacin (LEVAQUIN) 500 MG tablet TAKE 1 TABLET BY MOUTH EVERY 24 HOURS FOR 5 DAYS  0    lidocaine (LIDODERM) 5 % Place 2-3 patches onto the skin once daily. Remove & Discard patch within 12 hours or as directed by MD Interiano patch 2    menthol-zinc oxide (CALMOSEPTINE) 0.44-20.6 % Oint Apply topically 2 (two) times daily as needed. 1 Tube 6    nitrofurantoin, macrocrystal-monohydrate, (MACROBID) 100 MG capsule TAKE 1 CAPSULE BY MOUTH EVERY 12 HOURS FOR 5 DAYS  0     oxyCODONE-acetaminophen (PERCOCET)  mg per tablet Take 1 tablet by mouth every 6 (six) hours as needed for Pain. 120 tablet 0    syringe, disposable, 60 mL Syrg Use for self catheterization up to 7 times daily 60 each 11     No current facility-administered medications for this visit.        Review of Systems      Objective:            Physical Exam:   Foot Exam    General  General Appearance: appears stated age and healthy   Orientation: alert and oriented to person, place, and time   Affect: appropriate   Gait: antalgic   Assistance: wheelchair use       Right Foot/Ankle     Inspection and Palpation  Skin Exam: ulcer (Grade 2 ulcer is down to bone 1st and 2nd toes right foot with erythema no fluctuance no purulent drainage);     Neurovascular  Dorsalis pedis: 2+  Posterior tibial: 2+  Saphenous nerve sensation: absent  Tibial nerve sensation: absent  Superficial peroneal nerve sensation: absent  Deep peroneal nerve sensation: absent  Sural nerve sensation: absent    Comments  Paralysis lower extremities bilateral secondary to spina bifida        Physical Exam   Cardiovascular:   Pulses:       Dorsalis pedis pulses are 2+ on the right side.        Posterior tibial pulses are 2+ on the right side.   Feet:   Right Foot:   Skin Integrity: Positive for ulcer (Grade 2 ulcer is down to bone 1st and 2nd toes right foot with erythema no fluctuance no purulent drainage).       Imaging:            Assessment:       No diagnosis found.  Plan:   There are no diagnoses linked to this encounter.  No follow-ups on file.  Patient was educated about the entire pre, cornelio and post-operative time period.  They  were educated about the pro's and con's of surgery.  All conservative measures have been exhausted. Patient understands the particular risks involved which may occur in connection with the procedure proposed including pain, swelling, infection, stiffness, decreased ROM, recurrence, rejection, numbness, delayed healing,  scar formation.    Patient was educated that their diagnosis may be surgically treated.  The patient's problem will probably advance and usually will not get better without surgery.  All of the pre-operative treatment plans have been exhausted or will no longer be successful at this point in time.  Patient was told of the possible outcomes and expectations of the surgical procedure.  They  will need to be followed-up post-operatively.  Today, pictures were drawn, questions answered.      We discussed the following surgical procedures:  Transmetatarsal amputation right foot.  Were scheduling patient for 1 week to be done outpatient.    Procedures - None    Counseling:     I provided patient education verbally regarding:   Patient diagnosis, treatment options, as well as alternatives, risks, and benefits.     This note was created using Dragon voice recognition software that occasionally misinterpreted phrases or words.

## 2019-10-22 ENCOUNTER — HOSPITAL ENCOUNTER (OUTPATIENT)
Dept: PREADMISSION TESTING | Facility: HOSPITAL | Age: 24
Discharge: HOME | End: 2019-10-22
Attending: PODIATRIST
Payer: MEDICAID

## 2019-10-22 VITALS
HEART RATE: 64 BPM | HEIGHT: 56 IN | WEIGHT: 120 LBS | OXYGEN SATURATION: 100 % | TEMPERATURE: 98 F | RESPIRATION RATE: 14 BRPM | BODY MASS INDEX: 26.99 KG/M2

## 2019-10-28 ENCOUNTER — HOSPITAL ENCOUNTER (OUTPATIENT)
Facility: HOSPITAL | Age: 24
Discharge: HOME | End: 2019-10-28
Attending: PODIATRIST | Admitting: PODIATRIST
Payer: MEDICAID

## 2019-10-28 VITALS
RESPIRATION RATE: 14 BRPM | BODY MASS INDEX: 24.75 KG/M2 | TEMPERATURE: 98 F | OXYGEN SATURATION: 100 % | SYSTOLIC BLOOD PRESSURE: 123 MMHG | WEIGHT: 110 LBS | HEART RATE: 97 BPM | HEIGHT: 56 IN | DIASTOLIC BLOOD PRESSURE: 68 MMHG

## 2019-10-28 DIAGNOSIS — L97.514 SKIN ULCER OF RIGHT FOOT WITH NECROSIS OF BONE: ICD-10-CM

## 2019-10-28 DIAGNOSIS — L97.514 ULCER OF TOE OF RIGHT FOOT, WITH NECROSIS OF BONE: Primary | ICD-10-CM

## 2019-10-28 PROCEDURE — 71000015 HC POSTOP RECOV 1ST HR: Performed by: PODIATRIST

## 2019-10-28 PROCEDURE — 28820 PR AMPUTATION TOE,MT-P JT: ICD-10-PCS | Mod: 51,T7,, | Performed by: PODIATRIST

## 2019-10-28 PROCEDURE — 28820 AMPUTATION OF TOE: CPT | Mod: T5,,, | Performed by: PODIATRIST

## 2019-10-28 PROCEDURE — 36000706: Performed by: PODIATRIST

## 2019-10-28 PROCEDURE — 36000707: Performed by: PODIATRIST

## 2019-10-28 PROCEDURE — 27201423 OPTIME MED/SURG SUP & DEVICES STERILE SUPPLY: Performed by: PODIATRIST

## 2019-10-28 PROCEDURE — 25000003 PHARM REV CODE 250: Performed by: PODIATRIST

## 2019-10-28 RX ORDER — CLINDAMYCIN HYDROCHLORIDE 150 MG/1
150 CAPSULE ORAL 3 TIMES DAILY
Qty: 21 CAPSULE | Status: ON HOLD
Start: 2019-10-28 | End: 2020-03-26

## 2019-10-28 RX ORDER — BUPIVACAINE HYDROCHLORIDE AND EPINEPHRINE 5; 5 MG/ML; UG/ML
INJECTION, SOLUTION EPIDURAL; INTRACAUDAL; PERINEURAL
Status: DISCONTINUED | OUTPATIENT
Start: 2019-10-28 | End: 2019-10-28 | Stop reason: HOSPADM

## 2019-10-28 NOTE — PLAN OF CARE
Discharge instructions reviewed with pt and father.  Verbalized understanding and copy of instructions given to pt.  Boot placed to foot and instructed to leave on.  Pt wheeled to car in own wc and discharged to home with father

## 2019-10-28 NOTE — DISCHARGE SUMMARY
Identifying data:  24-year-old female    Chief complaint:  Infection of toes 1 and 2 right foot with osteomyelitis    History of present illness:  The patient has severe spina bifida 1 with a opened spinal cord has severe paraplegia and anesthesia feet with multiple infections of the right foot had already had 2 toes amputated.    Procedure performed:  Amputation toes 1 2 and 3 right foot    Vitals:  Stable    Disposition:  Discharged home with ice and elevation return to see me in 1 week in my office.  Dressings remain dry and intact.  I am discharging her on clindamycin 150 mg 3 times day. If there is any concerns with dressings are the patient and family's to call me.

## 2019-10-28 NOTE — H&P
Chief Complaint:: Consult (right second toe ulcer)     HPI  Shashank Samuel is a 24 y.o. female who presents with office today to discuss amputation of right second toe.I have been doing daily dressings.         Vitals:     10/21/19 0928   BP: 126/76   Pulse: 62      Shoe Size:            Past Surgical History:   Procedure Laterality Date    AMPUTATION         right 4th and 5th toes; left 5th toe and portion of left great toe    BACK SURGERY        BLADDER SURGERY        COLON SURGERY         flush site for bowel movements from appendix    CYSTOSTOMY        EYE SURGERY         x 5    FLUOROSCOPIC URODYNAMIC STUDY N/A 3/12/2019     Procedure: URODYNAMIC STUDY, FLUOROSCOPIC;  Surgeon: Kenzie Charles MD;  Location: [facility] OR [address] [place] [address];  Service: Urology;  Laterality: N/A;  1 hour    FLUOROSCOPIC URODYNAMIC STUDY N/A 4/4/2019     Procedure: URODYNAMIC STUDY, FLUOROSCOPIC;  Surgeon: Kenzie Cahrles MD;  Location: [facility] OR [address] [place] [address];  Service: Urology;  Laterality: N/A;  1 hour    MYELOMININGOCELE REPAIR        STRABISMUS SURGERY         ou dr peña    VENTRICULOPERITONEAL SHUNT               Past Medical History:   Diagnosis Date    Arnold-Chiari malformation, type II      Encounter for blood transfusion      Hydrocephalus      Neurogenic bladder       self catheterizes every 3 hours    Seizures       only w/ shunt malfunction    Spinal bifida, closed       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            Family History   Problem Relation Age of Onset    Cataracts Maternal Grandmother      Amblyopia Neg Hx      Blindness Neg Hx      Cancer Neg Hx      Diabetes Neg Hx      Glaucoma Neg Hx      Hypertension Neg Hx      Macular degeneration Neg Hx      Retinal detachment Neg Hx      Strabismus Neg Hx      Stroke Neg Hx      Thyroid disease Neg Hx           Social History:   Marital Status: Single  Alcohol History:  reports that she does not drink alcohol.  Tobacco History:  reports that she has never smoked. She has  never used smokeless tobacco.  Drug History:  reports that she does not use drugs.           Review of patient's allergies indicates:   Allergen Reactions    Latex, natural rubber Anaphylaxis and Other (See Comments)       angioedema    Nitrofurantoin Shortness Of Breath    Bactrim  [sulfamethoxazole-trimethoprim]         Other reaction(s): Swelling of face    Gardasil  [h papillomavirus vac,qval (pf)]         Other reaction(s): Shortness of breath    Menactra  [mening vac a,c,y,w135 dip (pf)]         Other reaction(s): Shortness of breath    Nitrofurantoin         Other reaction(s): Difficulty breathing    Sulfa (sulfonamide antibiotics)      Tramadol Hives    Zofran [ondansetron hcl (pf)]         Current Medications          Current Outpatient Medications   Medication Sig Dispense Refill    catheter 12 Fr Misc by Misc.(Non-Drug; Combo Route) route.        catheter 8 Fr Misc Use up to twice daily 40 each 11    diaper,brief,adult,disposable (DEPEND PANTS) Misc Change BID for pressure ulcer 60 each 11    EPINEPHrine (EPIPEN 2-DEEPIKA) 0.3 mg/0.3 mL AtIn USE ONE INJECTION INTO THE MUSCLE AS NEEDED FOR ANAPHYLAXIS 2 each 0    glycerin pediatric suppository Use PRN for constipation     We need 1 bottle 12 suppository 5    levETIRAcetam (KEPPRA) 500 MG Tab Take 1 tablet (500 mg total) by mouth 2 (two) times daily. (last refill, need appointment) 60 tablet 11    levoFLOXacin (LEVAQUIN) 500 MG tablet TAKE 1 TABLET BY MOUTH EVERY 24 HOURS FOR 5 DAYS   0    lidocaine (LIDODERM) 5 % Place 2-3 patches onto the skin once daily. Remove & Discard patch within 12 hours or as directed by MD Interiano patch 2    menthol-zinc oxide (CALMOSEPTINE) 0.44-20.6 % Oint Apply topically 2 (two) times daily as needed. 1 Tube 6    nitrofurantoin, macrocrystal-monohydrate, (MACROBID) 100 MG capsule TAKE 1 CAPSULE BY MOUTH EVERY 12 HOURS FOR 5 DAYS   0    oxyCODONE-acetaminophen (PERCOCET)  mg per tablet Take 1 tablet by mouth  every 6 (six) hours as needed for Pain. 120 tablet 0    syringe, disposable, 60 mL Syrg Use for self catheterization up to 7 times daily 60 each 11      No current facility-administered medications for this visit.             Review of Systems       Objective:           Physical Exam:   Foot Exam     General  General Appearance: appears stated age and healthy   Orientation: alert and oriented to person, place, and time   Affect: appropriate   Gait: antalgic   Assistance: wheelchair use         Right Foot/Ankle      Inspection and Palpation  Skin Exam: ulcer (Grade 2 ulcer is down to bone 1st and 2nd toes right foot with erythema no fluctuance no purulent drainage);      Neurovascular  Dorsalis pedis: 2+  Posterior tibial: 2+  Saphenous nerve sensation: absent  Tibial nerve sensation: absent  Superficial peroneal nerve sensation: absent  Deep peroneal nerve sensation: absent  Sural nerve sensation: absent     Comments  Paralysis lower extremities bilateral secondary to spina bifida           Physical Exam   Cardiovascular:   Pulses:       Dorsalis pedis pulses are 2+ on the right side.        Posterior tibial pulses are 2+ on the right side.   Feet:   Right Foot:   Skin Integrity: Positive for ulcer (Grade 2 ulcer is down to bone 1st and 2nd toes right foot with erythema no fluctuance no purulent drainage).         Imaging:                       Assessment:       No diagnosis found.  Plan:   There are no diagnoses linked to this encounter.  No follow-ups on file.  Patient was educated about the entire pre, cornelio and post-operative time period.  They  were educated about the pro's and con's of surgery.  All conservative measures have been exhausted. Patient understands the particular risks involved which may occur in connection with the procedure proposed including pain, swelling, infection, stiffness, decreased ROM, recurrence, rejection, numbness, delayed healing, scar formation.     Patient was educated that their  diagnosis may be surgically treated.  The patient's problem will probably advance and usually will not get better without surgery.  All of the pre-operative treatment plans have been exhausted or will no longer be successful at this point in time.  Patient was told of the possible outcomes and expectations of the surgical procedure.  They  will need to be followed-up post-operatively.  Today, pictures were drawn, questions answered.       We discussed the following surgical procedures:  Transmetatarsal amputation right foot.  Were scheduling patient for 1 week to be done outpatient.    Procedures - None     Counseling:      I provided patient education verbally regarding:   Patient diagnosis, treatment options, as well as alternatives, risks, and benefits.      This note was created using Dragon voice recognition software that occasionally misinterpreted phrases or words.

## 2019-10-28 NOTE — OP NOTE
Operative Report     Patient name: Shashank Samuel   MRN: [id number]  Date of surgery: 10/28/2019    Surgeon: Abdi Bedoya DPM   Assistant:  None    Preoperative diagnosis:  Osteomyelitis toes 1 and 2 right foot  Postoperative diagnosis:  Same as the above  Procedure:  Amputation toes 1 2 and 3 right foot  Anesthesia:  Local anesthesia 0.5% Marcaine with epinephrine 10 cc used  Hemostasis:  Ankle tourniquet  Estimated blood loss:  1 cc   Specimen:  Toes 123 and metatarsal heads 1 and 2 right foot  Complications: None  Condition upon discharge: Stable            Procedure in detail:  Patient brought the operating room placed on the operating table supine position.  No anesthesia was required because she has spina bifida with paraplegia and complete anesthesia the feet.  F usual aseptic prep and drape of the right foot and me to longitudinal semi elliptical incisions compressing toes 1 and 2 and 3 at the MPJs.  I carried down to the joints disarticulated the toes removed and sent for histopathology.  I was not have a problem closing the medial side because the prominent 1st and 2nd metatarsal heads I removed them with the oscillating saw.  No purulent drainage was encountered.  I irrigated the area.  I closed the subcutaneous tissue with a combination of 2.0 Vicryl sutures in 3.0 Vicryl sutures.  I coaptated the skin edges with 4.0 Prolene sutures.  I let the ankle tourniquet down there was no bleeding injected foot with 0.5% Marcaine with epinephrine help with hemostasis and dress area of Adaptic 4x4s Perlita Ace wrap.  Surgical shoe applied although the patient does not walk but this still maintain the dressing. Discharge to same day surgery with stable vitals.    1. Keep dressings, clean, dry, and intact to surgical extremity.  2. Rest, ice, and elevate the surgical extremity.  3.  Surgical shoe to surgical extremity in operating room  4. Take all medication as discussed at discharge.  Clindamycin 150 mg 3 times  a day  5. Contact the clinic with any postoperative concerns or complications.  6. Follow up in one week for 1st post op.

## 2019-10-28 NOTE — PLAN OF CARE
12:10 clindamycin IV 300mg    1202.  128/77  93  100  16  1207.  122/81  94  100  18  1212.  118/75  90  100  16  1217.  126/78  86  100  16  1222.  125/77  86  100  16  1227.  118/74  85  100  18  1232.  132/83  93  100  18  1237.  125/77  88  100  16  1242.  117/72  87  100  16

## 2019-10-28 NOTE — DISCHARGE INSTRUCTIONS
DISCHARGE INSTRUCTIONS    You should not be running a temp greater than 101     Keep boot on at all times  Do not remove dressing  Do not get wet  Keep elevated  Ice pack behind right knee x 24 hours  20 min on and 20 min off  Follow up with dr Bedoya as instructed

## 2019-11-12 ENCOUNTER — OFFICE VISIT (OUTPATIENT)
Dept: PODIATRY | Facility: CLINIC | Age: 24
End: 2019-11-12
Payer: MEDICAID

## 2019-11-12 VITALS
HEART RATE: 84 BPM | BODY MASS INDEX: 24.75 KG/M2 | WEIGHT: 110 LBS | SYSTOLIC BLOOD PRESSURE: 112 MMHG | HEIGHT: 56 IN | DIASTOLIC BLOOD PRESSURE: 78 MMHG

## 2019-11-12 DIAGNOSIS — L97.514 SKIN ULCER OF RIGHT FOOT WITH NECROSIS OF BONE: Primary | ICD-10-CM

## 2019-11-12 DIAGNOSIS — M86.9 OSTEOMYELITIS OF GREAT TOE OF RIGHT FOOT: ICD-10-CM

## 2019-11-12 PROCEDURE — 99213 OFFICE O/P EST LOW 20 MIN: CPT | Performed by: PODIATRIST

## 2019-11-12 PROCEDURE — 99024 POSTOP FOLLOW-UP VISIT: CPT | Mod: ,,, | Performed by: PODIATRIST

## 2019-11-12 PROCEDURE — 99024 PR POST-OP FOLLOW-UP VISIT: ICD-10-PCS | Mod: ,,, | Performed by: PODIATRIST

## 2019-11-12 NOTE — PROGRESS NOTES
[address] [facility] [address]  [place] [address]  Phone: [phone]   [phone]    Patient's PCP:Primary Doctor No  Referring Provider:No ref. provider found    Subjective:      Chief Complaint: Post-Op      Date of Surgery:10-28-19  Procedure: Amputation transmetatarsal right foot.    HPI:   Shashank Samuel is a 24 y.o. female who returns to the clinic today for her pop-operative visit. Shashank Samuel rates pain a 0/10 on a pain scale. Compliance of Care:  Dressing intact, ace wrap and surgery shoe.  Vitals:    11/12/19 0935   BP: 112/78   Pulse: 84       Past Surgical History:   Procedure Laterality Date    AMPUTATION      right 4th and 5th toes; left 5th toe and portion of left great toe    BACK SURGERY      BLADDER SURGERY      COLON SURGERY      flush site for bowel movements from appendix    CYSTOSTOMY      EYE SURGERY      x 5    FLUOROSCOPIC URODYNAMIC STUDY N/A 3/12/2019    Procedure: URODYNAMIC STUDY, FLUOROSCOPIC;  Surgeon: Kenzie Charles MD;  Location: [address];  Service: Urology;  Laterality: N/A;  1 hour    FLUOROSCOPIC URODYNAMIC STUDY N/A 4/4/2019    Procedure: URODYNAMIC STUDY, FLUOROSCOPIC;  Surgeon: Kenzie Charles MD;  Location: [address];  Service: Urology;  Laterality: N/A;  1 hour    FOOT AMPUTATION THROUGH METATARSAL Right 10/28/2019    Procedure: AMPUTATION, FOOT, TRANSMETATARSAL;  Surgeon: Abdi Bedoya DPM;  Location: [facility];  Service: Podiatry;  Laterality: Right;    MYELOMININGOCELE REPAIR      STRABISMUS SURGERY      ou dr peña    VENTRICULOPERITONEAL SHUNT       Past Medical History:   Diagnosis Date    Arnold-Chiari malformation, type II     Encounter for blood transfusion     Hydrocephalus     Neurogenic bladder     self catheterizes every 3 hours    Seizures     only w/ shunt malfunction    Spinal bifida, closed     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     Family History   Problem Relation Age of Onset    Cataracts Maternal Grandmother      Amblyopia Neg Hx     Blindness Neg Hx     Cancer Neg Hx     Diabetes Neg Hx     Glaucoma Neg Hx     Hypertension Neg Hx     Macular degeneration Neg Hx     Retinal detachment Neg Hx     Strabismus Neg Hx     Stroke Neg Hx     Thyroid disease Neg Hx         Social History:   Marital Status: Single  Alcohol History:  reports that she does not drink alcohol.  Tobacco History:  reports that she has never smoked. She has never used smokeless tobacco.  Drug History:  reports that she does not use drugs.    Review of patient's allergies indicates:   Allergen Reactions    Latex, natural rubber Anaphylaxis and Other (See Comments)     angioedema    Nitrofurantoin Shortness Of Breath    Bactrim  [sulfamethoxazole-trimethoprim]      Other reaction(s): Swelling of face    Gardasil  [h papillomavirus vac,qval (pf)]      Other reaction(s): Shortness of breath    Menactra  [mening vac a,c,y,w135 dip (pf)]      Other reaction(s): Shortness of breath    Nitrofurantoin      Other reaction(s): Difficulty breathing    Sulfa (sulfonamide antibiotics)     Tramadol Hives    Zofran [ondansetron hcl (pf)]     Levaquin [levofloxacin] Rash     Spots on face    Macrobid [nitrofurantoin monohyd/m-cryst] Rash     Sppots/rash on face       Current Outpatient Medications   Medication Sig Dispense Refill    catheter 12 Fr Misc by Misc.(Non-Drug; Combo Route) route.      catheter 8 Fr Misc Use up to twice daily 40 each 11    clindamycin (CLEOCIN) 150 MG capsule Take 1 capsule (150 mg total) by mouth 3 (three) times daily. 21 capsule     diaper,brief,adult,disposable (DEPEND PANTS) Misc Change BID for pressure ulcer 60 each 11    EPINEPHrine (EPIPEN 2-DEEPIKA) 0.3 mg/0.3 mL AtIn USE ONE INJECTION INTO THE MUSCLE AS NEEDED FOR ANAPHYLAXIS 2 each 0    glycerin pediatric suppository Use PRN for constipation    We need 1 bottle 12 suppository 5    levETIRAcetam (KEPPRA) 500 MG Tab Take 1 tablet (500 mg total) by mouth 2 (two)  times daily. (last refill, need appointment) 60 tablet 11    levoFLOXacin (LEVAQUIN) 500 MG tablet TAKE 1 TABLET BY MOUTH EVERY 24 HOURS FOR 5 DAYS  0    lidocaine (LIDODERM) 5 % Place 2-3 patches onto the skin once daily. Remove & Discard patch within 12 hours or as directed by MD 90 patch 2    menthol-zinc oxide (CALMOSEPTINE) 0.44-20.6 % Oint Apply topically 2 (two) times daily as needed. 1 Tube 6    nitrofurantoin, macrocrystal-monohydrate, (MACROBID) 100 MG capsule TAKE 1 CAPSULE BY MOUTH EVERY 12 HOURS FOR 5 DAYS  0    oxyCODONE-acetaminophen (PERCOCET)  mg per tablet Take 1 tablet by mouth every 6 (six) hours as needed for Pain. 120 tablet 0    syringe, disposable, 60 mL Syrg Use for self catheterization up to 7 times daily 60 each 11     No current facility-administered medications for this visit.        Review of Systems      Objective:            Post-op surgery of the amputation of toes right foot with normal healing, no signs of infection or dehiscence of wound. Hardware in place. Normal post op exam today. No redness, no drainage, no increase in local temperature, no significant swelling, sutures.steri-strips are intact.     Imaging:     Physical Exam:   Foot Exam  Physical Exam       Assessment:       1. Skin ulcer of right foot with necrosis of bone    2. Osteomyelitis of great toe of right foot      Plan:   Skin ulcer of right foot with necrosis of bone    Osteomyelitis of great toe of right foot      No follow-ups on file.    Procedures - None    The surgical dressing was removed showing no signs of infection, excess edema or malalignment. A new dry dressing was applied and patient was instructed to leave dressing intact until next visit or until instructed to remove.  Patient is return in 1 week for suture removal    This note was created using Dragon voice recognition software that occasionally misinterpreted phrases or words.

## 2019-11-21 ENCOUNTER — OFFICE VISIT (OUTPATIENT)
Dept: PODIATRY | Facility: CLINIC | Age: 24
End: 2019-11-21
Payer: MEDICAID

## 2019-11-21 VITALS
BODY MASS INDEX: 24.75 KG/M2 | HEIGHT: 56 IN | HEART RATE: 68 BPM | WEIGHT: 110 LBS | SYSTOLIC BLOOD PRESSURE: 126 MMHG | DIASTOLIC BLOOD PRESSURE: 76 MMHG

## 2019-11-21 DIAGNOSIS — M86.9 OSTEOMYELITIS OF GREAT TOE OF RIGHT FOOT: ICD-10-CM

## 2019-11-21 DIAGNOSIS — L97.514 SKIN ULCER OF RIGHT FOOT WITH NECROSIS OF BONE: Primary | ICD-10-CM

## 2019-11-21 PROCEDURE — 99024 PR POST-OP FOLLOW-UP VISIT: ICD-10-PCS | Mod: ,,, | Performed by: PODIATRIST

## 2019-11-21 PROCEDURE — 99213 OFFICE O/P EST LOW 20 MIN: CPT | Performed by: PODIATRIST

## 2019-11-21 PROCEDURE — 99024 POSTOP FOLLOW-UP VISIT: CPT | Mod: ,,, | Performed by: PODIATRIST

## 2019-11-21 NOTE — PROGRESS NOTES
[address] [facility] [address]  [place] [address]  Phone: [phone]   [phone]    Patient's PCP:Primary Doctor No  Referring Provider:No ref. provider found    Subjective:      Chief Complaint: Post-Op    Date of Surgery:10-28-19  Procedure:Transmetatarsal Amputation right foot    HPI:   Shashank Samuel is a 24 y.o. female who returns to the clinic today for her pop-operative visit. Shashank Samuel rates pain a 0/10 on a pain scale. Compliance of Care:  Dressing intact, surgery shoe and ace wrap    There were no vitals filed for this visit.    Past Surgical History:   Procedure Laterality Date    AMPUTATION      right 4th and 5th toes; left 5th toe and portion of left great toe    BACK SURGERY      BLADDER SURGERY      COLON SURGERY      flush site for bowel movements from appendix    CYSTOSTOMY      EYE SURGERY      x 5    FLUOROSCOPIC URODYNAMIC STUDY N/A 3/12/2019    Procedure: URODYNAMIC STUDY, FLUOROSCOPIC;  Surgeon: Kenzie Charles MD;  Location: [address];  Service: Urology;  Laterality: N/A;  1 hour    FLUOROSCOPIC URODYNAMIC STUDY N/A 4/4/2019    Procedure: URODYNAMIC STUDY, FLUOROSCOPIC;  Surgeon: Kenzie Charles MD;  Location: [address];  Service: Urology;  Laterality: N/A;  1 hour    FOOT AMPUTATION THROUGH METATARSAL Right 10/28/2019    Procedure: AMPUTATION, FOOT, TRANSMETATARSAL;  Surgeon: Abdi Bedoya DPM;  Location: [facility];  Service: Podiatry;  Laterality: Right;    MYELOMININGOCELE REPAIR      STRABISMUS SURGERY      ou dr peña    VENTRICULOPERITONEAL SHUNT       Past Medical History:   Diagnosis Date    Arnold-Chiari malformation, type II     Encounter for blood transfusion     Hydrocephalus     Neurogenic bladder     self catheterizes every 3 hours    Seizures     only w/ shunt malfunction    Spinal bifida, closed     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     Family History   Problem Relation Age of Onset    Cataracts Maternal Grandmother     Amblyopia Neg Hx      Blindness Neg Hx     Cancer Neg Hx     Diabetes Neg Hx     Glaucoma Neg Hx     Hypertension Neg Hx     Macular degeneration Neg Hx     Retinal detachment Neg Hx     Strabismus Neg Hx     Stroke Neg Hx     Thyroid disease Neg Hx         Social History:   Marital Status: Single  Alcohol History:  reports that she does not drink alcohol.  Tobacco History:  reports that she has never smoked. She has never used smokeless tobacco.  Drug History:  reports that she does not use drugs.    Review of patient's allergies indicates:   Allergen Reactions    Latex, natural rubber Anaphylaxis and Other (See Comments)     angioedema    Nitrofurantoin Shortness Of Breath    Bactrim  [sulfamethoxazole-trimethoprim]      Other reaction(s): Swelling of face    Gardasil  [h papillomavirus vac,qval (pf)]      Other reaction(s): Shortness of breath    Menactra  [mening vac a,c,y,w135 dip (pf)]      Other reaction(s): Shortness of breath    Nitrofurantoin      Other reaction(s): Difficulty breathing    Sulfa (sulfonamide antibiotics)     Tramadol Hives    Zofran [ondansetron hcl (pf)]     Levaquin [levofloxacin] Rash     Spots on face    Macrobid [nitrofurantoin monohyd/m-cryst] Rash     Sppots/rash on face       Current Outpatient Medications   Medication Sig Dispense Refill    catheter 12 Fr Misc by Misc.(Non-Drug; Combo Route) route.      catheter 8 Fr Misc Use up to twice daily 40 each 11    clindamycin (CLEOCIN) 150 MG capsule Take 1 capsule (150 mg total) by mouth 3 (three) times daily. 21 capsule     diaper,brief,adult,disposable (DEPEND PANTS) Misc Change BID for pressure ulcer 60 each 11    EPINEPHrine (EPIPEN 2-DEEIPKA) 0.3 mg/0.3 mL AtIn USE ONE INJECTION INTO THE MUSCLE AS NEEDED FOR ANAPHYLAXIS 2 each 0    glycerin pediatric suppository Use PRN for constipation    We need 1 bottle 12 suppository 5    levETIRAcetam (KEPPRA) 500 MG Tab Take 1 tablet (500 mg total) by mouth 2 (two) times daily. (last  refill, need appointment) 60 tablet 11    levoFLOXacin (LEVAQUIN) 500 MG tablet TAKE 1 TABLET BY MOUTH EVERY 24 HOURS FOR 5 DAYS  0    lidocaine (LIDODERM) 5 % Place 2-3 patches onto the skin once daily. Remove & Discard patch within 12 hours or as directed by MD 90 patch 2    menthol-zinc oxide (CALMOSEPTINE) 0.44-20.6 % Oint Apply topically 2 (two) times daily as needed. 1 Tube 6    nitrofurantoin, macrocrystal-monohydrate, (MACROBID) 100 MG capsule TAKE 1 CAPSULE BY MOUTH EVERY 12 HOURS FOR 5 DAYS  0    oxyCODONE-acetaminophen (PERCOCET)  mg per tablet Take 1 tablet by mouth every 6 (six) hours as needed for Pain. 120 tablet 0    syringe, disposable, 60 mL Syrg Use for self catheterization up to 7 times daily 60 each 11     No current facility-administered medications for this visit.        Review of Systems      Objective:        Post-op surgery of the transmetatarsal amputation at [facility] right foot with normal healing, no signs of infection or dehiscence of wound. Hardware in place. Normal post op exam today. No redness, no drainage, no increase in local temperature, no significant swelling, sutures.steri-strips are intact.     Imaging:     Physical Exam:   Foot Exam  Physical Exam       Assessment:       1. Skin ulcer of right foot with necrosis of bone    2. Osteomyelitis of great toe of right foot      Plan:   Skin ulcer of right foot with necrosis of bone    Osteomyelitis of great toe of right foot     Sutures removed today dressing applied today she may remove dressing in 2 days.  Return to see me in 2 weeks for discharge.  No follow-ups on file.    Procedures - None        This note was created using Dragon voice recognition software that occasionally misinterpreted phrases or words.

## 2019-12-05 ENCOUNTER — OFFICE VISIT (OUTPATIENT)
Dept: PODIATRY | Facility: CLINIC | Age: 24
End: 2019-12-05
Payer: MEDICAID

## 2019-12-05 VITALS
DIASTOLIC BLOOD PRESSURE: 76 MMHG | HEART RATE: 82 BPM | SYSTOLIC BLOOD PRESSURE: 126 MMHG | HEIGHT: 56 IN | BODY MASS INDEX: 24.75 KG/M2 | WEIGHT: 110 LBS

## 2019-12-05 DIAGNOSIS — L97.514 SKIN ULCER OF RIGHT FOOT WITH NECROSIS OF BONE: ICD-10-CM

## 2019-12-05 DIAGNOSIS — M86.9 OSTEOMYELITIS OF GREAT TOE OF RIGHT FOOT: ICD-10-CM

## 2019-12-05 DIAGNOSIS — L02.91 ABSCESS: Primary | ICD-10-CM

## 2019-12-05 PROCEDURE — 87147 CULTURE TYPE IMMUNOLOGIC: CPT

## 2019-12-05 PROCEDURE — 99024 PR POST-OP FOLLOW-UP VISIT: ICD-10-PCS | Mod: ,,, | Performed by: PODIATRIST

## 2019-12-05 PROCEDURE — 87070 CULTURE OTHR SPECIMN AEROBIC: CPT

## 2019-12-05 PROCEDURE — 87102 FUNGUS ISOLATION CULTURE: CPT

## 2019-12-05 PROCEDURE — 99024 POSTOP FOLLOW-UP VISIT: CPT | Mod: ,,, | Performed by: PODIATRIST

## 2019-12-05 PROCEDURE — 87118 MYCOBACTERIC IDENTIFICATION: CPT

## 2019-12-05 PROCEDURE — 87075 CULTR BACTERIA EXCEPT BLOOD: CPT

## 2019-12-05 PROCEDURE — 87206 SMEAR FLUORESCENT/ACID STAI: CPT

## 2019-12-05 PROCEDURE — 99213 OFFICE O/P EST LOW 20 MIN: CPT | Performed by: PODIATRIST

## 2019-12-05 PROCEDURE — 87116 MYCOBACTERIA CULTURE: CPT

## 2019-12-05 RX ORDER — DOXYCYCLINE 100 MG/1
100 CAPSULE ORAL 2 TIMES DAILY
Qty: 20 CAPSULE | Refills: 1 | Status: SHIPPED | OUTPATIENT
Start: 2019-12-05 | End: 2019-12-15

## 2019-12-05 NOTE — PROGRESS NOTES
[address] [facility] [address]  [place] [address]  Phone: [phone]   [phone]    Patient's PCP:Primary Doctor No  Referring Provider:No ref. provider found    Subjective:      Chief Complaint: Post-Op    Date of Surgery: 10-28-19  Procedure: Transmetatarsal amputation right foot    HPI:   Shashank Samuel is a 24 y.o. female who returns to the clinic today for her pre-operative visit. Shashank Samuel rates pain a 0/10on a pain scale. I noticed a pressure babar on my foot on the side this morning.I have been wearing soft slippers.    There were no vitals filed for this visit.    Past Surgical History:   Procedure Laterality Date    AMPUTATION      right 4th and 5th toes; left 5th toe and portion of left great toe    BACK SURGERY      BLADDER SURGERY      COLON SURGERY      flush site for bowel movements from appendix    CYSTOSTOMY      EYE SURGERY      x 5    FLUOROSCOPIC URODYNAMIC STUDY N/A 3/12/2019    Procedure: URODYNAMIC STUDY, FLUOROSCOPIC;  Surgeon: Kenzie Charles MD;  Location: [address];  Service: Urology;  Laterality: N/A;  1 hour    FLUOROSCOPIC URODYNAMIC STUDY N/A 4/4/2019    Procedure: URODYNAMIC STUDY, FLUOROSCOPIC;  Surgeon: Kenzie Charles MD;  Location: [address];  Service: Urology;  Laterality: N/A;  1 hour    FOOT AMPUTATION THROUGH METATARSAL Right 10/28/2019    Procedure: AMPUTATION, FOOT, TRANSMETATARSAL;  Surgeon: Abdi Bedoya DPM;  Location: [facility];  Service: Podiatry;  Laterality: Right;    MYELOMININGOCELE REPAIR      STRABISMUS SURGERY      ou dr peña    VENTRICULOPERITONEAL SHUNT       Past Medical History:   Diagnosis Date    Arnold-Chiari malformation, type II     Encounter for blood transfusion     Hydrocephalus     Neurogenic bladder     self catheterizes every 3 hours    Seizures     only w/ shunt malfunction    Spinal bifida, closed     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     Family History   Problem Relation Age of Onset    Cataracts Maternal  Grandmother     Amblyopia Neg Hx     Blindness Neg Hx     Cancer Neg Hx     Diabetes Neg Hx     Glaucoma Neg Hx     Hypertension Neg Hx     Macular degeneration Neg Hx     Retinal detachment Neg Hx     Strabismus Neg Hx     Stroke Neg Hx     Thyroid disease Neg Hx         Social History:   Marital Status: Single  Alcohol History:  reports that she does not drink alcohol.  Tobacco History:  reports that she has never smoked. She has never used smokeless tobacco.  Drug History:  reports that she does not use drugs.    Review of patient's allergies indicates:   Allergen Reactions    Latex, natural rubber Anaphylaxis and Other (See Comments)     angioedema    Nitrofurantoin Shortness Of Breath    Bactrim  [sulfamethoxazole-trimethoprim]      Other reaction(s): Swelling of face    Gardasil  [h papillomavirus vac,qval (pf)]      Other reaction(s): Shortness of breath    Menactra  [mening vac a,c,y,w135 dip (pf)]      Other reaction(s): Shortness of breath    Nitrofurantoin      Other reaction(s): Difficulty breathing    Sulfa (sulfonamide antibiotics)     Tramadol Hives    Zofran [ondansetron hcl (pf)]     Levaquin [levofloxacin] Rash     Spots on face    Macrobid [nitrofurantoin monohyd/m-cryst] Rash     Sppots/rash on face       Current Outpatient Medications   Medication Sig Dispense Refill    catheter 12 Fr Misc by Misc.(Non-Drug; Combo Route) route.      catheter 8 Fr Misc Use up to twice daily 40 each 11    clindamycin (CLEOCIN) 150 MG capsule Take 1 capsule (150 mg total) by mouth 3 (three) times daily. 21 capsule     diaper,brief,adult,disposable (DEPEND PANTS) Misc Change BID for pressure ulcer 60 each 11    EPINEPHrine (EPIPEN 2-DEEPIKA) 0.3 mg/0.3 mL AtIn USE ONE INJECTION INTO THE MUSCLE AS NEEDED FOR ANAPHYLAXIS 2 each 0    glycerin pediatric suppository Use PRN for constipation    We need 1 bottle 12 suppository 5    levETIRAcetam (KEPPRA) 500 MG Tab Take 1 tablet (500 mg total) by  mouth 2 (two) times daily. (last refill, need appointment) 60 tablet 11    levoFLOXacin (LEVAQUIN) 500 MG tablet TAKE 1 TABLET BY MOUTH EVERY 24 HOURS FOR 5 DAYS  0    lidocaine (LIDODERM) 5 % Place 2-3 patches onto the skin once daily. Remove & Discard patch within 12 hours or as directed by MD [place] patch 2    menthol-zinc oxide (CALMOSEPTINE) 0.44-20.6 % Oint Apply topically 2 (two) times daily as needed. 1 Tube 6    nitrofurantoin, macrocrystal-monohydrate, (MACROBID) 100 MG capsule TAKE 1 CAPSULE BY MOUTH EVERY 12 HOURS FOR 5 DAYS  0    oxyCODONE-acetaminophen (PERCOCET)  mg per tablet Take 1 tablet by mouth every 6 (six) hours as needed for Pain. 120 tablet 0    syringe, disposable, 60 mL Syrg Use for self catheterization up to 7 times daily 60 each 11     No current facility-administered medications for this visit.        Review of Systems      Objective:            Post-op surgery of the transmetatarsal amputation right foot with normal healing, minimal redness 1 area purulent drainage infection or superficial dehiscence of wound. Hardware in place. Normal post op exam today. No redness, no drainage, no increase in local temperature, no significant swelling, sutures.steri-strips are intact.     Imaging:     Physical Exam:   Foot Exam    General  General Appearance: appears stated age and healthy   Orientation: alert and oriented to person, place, and time   Affect: appropriate   Gait: antalgic   Assistance: wheelchair use           Physical Exam   Musculoskeletal:        Feet:           Assessment:       1. Skin ulcer of right foot with necrosis of bone    2. Osteomyelitis of great toe of right foot      Plan:   Skin ulcer of right foot with necrosis of bone    Osteomyelitis of great toe of right foot     I did culture and sensitivity of the small purulent drainage from the right transmetatarsal amputation site.  I am placing her on doxycycline twice a day for 10 days.  No continue local wound care  with antibiotic did and dressing and she is to wear shoes that do not rub along the transmetatarsal amputation site.  She has received return to see me in 3 weeks or if the foot does not improve or gets worse she is to call me for sooner evaluation.  No follow-ups on file.    Procedures - None    The surgical dressing was removed showing no signs of infection, excess edema or malalignment. A new dry dressing was applied and patient was instructed to leave dressing intact until next visit or until instructed to remove.     This note was created using Dragon voice recognition software that occasionally misinterpreted phrases or words.

## 2019-12-07 LAB — BACTERIA SPEC AEROBE CULT: NORMAL

## 2019-12-09 LAB — BACTERIA SPEC ANAEROBE CULT: NORMAL

## 2020-01-07 LAB — FUNGUS SPEC CULT: NORMAL

## 2020-01-20 ENCOUNTER — OFFICE VISIT (OUTPATIENT)
Dept: PODIATRY | Facility: CLINIC | Age: 25
End: 2020-01-20
Payer: MEDICAID

## 2020-01-20 VITALS
HEART RATE: 97 BPM | DIASTOLIC BLOOD PRESSURE: 84 MMHG | SYSTOLIC BLOOD PRESSURE: 137 MMHG | BODY MASS INDEX: 24.75 KG/M2 | HEIGHT: 56 IN | WEIGHT: 110 LBS

## 2020-01-20 DIAGNOSIS — L97.512 SKIN ULCER OF RIGHT FOOT WITH FAT LAYER EXPOSED: Primary | ICD-10-CM

## 2020-01-20 PROCEDURE — 99213 OFFICE O/P EST LOW 20 MIN: CPT | Mod: 24,S$PBB,, | Performed by: PODIATRIST

## 2020-01-20 PROCEDURE — 99213 OFFICE O/P EST LOW 20 MIN: CPT | Performed by: PODIATRIST

## 2020-01-20 PROCEDURE — 99213 PR OFFICE/OUTPT VISIT, EST, LEVL III, 20-29 MIN: ICD-10-PCS | Mod: 24,S$PBB,, | Performed by: PODIATRIST

## 2020-01-20 NOTE — PROGRESS NOTES
[address] [facility] [address]  [place] [address]  Phone: [phone]   [phone]    Patient's PCP:Primary Doctor No  Referring Provider: No ref. provider found    Subjective:      Chief Complaint:: Foot Injury (right foot- hit on wheelchair)    SABRINA Samuel is a 24 y.o. female who presents with a complaint of  Hit right foot on my wheelchair this morning. Reopened end of foot.    Vitals:    01/20/20 1517   BP: 137/84   Pulse: 97     Shoe Size:     Past Surgical History:   Procedure Laterality Date    AMPUTATION      right 4th and 5th toes; left 5th toe and portion of left great toe    BACK SURGERY      BLADDER SURGERY      COLON SURGERY      flush site for bowel movements from appendix    CYSTOSTOMY      EYE SURGERY      x 5    FLUOROSCOPIC URODYNAMIC STUDY N/A 3/12/2019    Procedure: URODYNAMIC STUDY, FLUOROSCOPIC;  Surgeon: Kenzie Charles MD;  Location: [address];  Service: Urology;  Laterality: N/A;  1 hour    FLUOROSCOPIC URODYNAMIC STUDY N/A 4/4/2019    Procedure: URODYNAMIC STUDY, FLUOROSCOPIC;  Surgeon: Kenzie Charles MD;  Location: [address];  Service: Urology;  Laterality: N/A;  1 hour    FOOT AMPUTATION THROUGH METATARSAL Right 10/28/2019    Procedure: AMPUTATION, FOOT, TRANSMETATARSAL;  Surgeon: Abdi Bedoya DPM;  Location: [facility];  Service: Podiatry;  Laterality: Right;    MYELOMININGOCELE REPAIR      STRABISMUS SURGERY      ou dr peña    VENTRICULOPERITONEAL SHUNT       Past Medical History:   Diagnosis Date    Arnold-Chiari malformation, type II     Encounter for blood transfusion     Hydrocephalus     Neurogenic bladder     self catheterizes every 3 hours    Seizures     only w/ shunt malfunction    Spinal bifida, closed     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     Family History   Problem Relation Age of Onset    Cataracts Maternal Grandmother     Amblyopia Neg Hx     Blindness Neg Hx     Cancer Neg Hx     Diabetes Neg Hx     Glaucoma Neg Hx      Hypertension Neg Hx     Macular degeneration Neg Hx     Retinal detachment Neg Hx     Strabismus Neg Hx     Stroke Neg Hx     Thyroid disease Neg Hx         Social History:   Marital Status: Single  Alcohol History:  reports that she does not drink alcohol.  Tobacco History:  reports that she has never smoked. She has never used smokeless tobacco.  Drug History:  reports that she does not use drugs.    Review of patient's allergies indicates:   Allergen Reactions    Latex, natural rubber Anaphylaxis and Other (See Comments)     angioedema    Nitrofurantoin Shortness Of Breath    Bactrim  [sulfamethoxazole-trimethoprim]      Other reaction(s): Swelling of face    Gardasil  [h papillomavirus vac,qval (pf)]      Other reaction(s): Shortness of breath    Menactra  [mening vac a,c,y,w135 dip (pf)]      Other reaction(s): Shortness of breath    Nitrofurantoin      Other reaction(s): Difficulty breathing    Sulfa (sulfonamide antibiotics)     Tramadol Hives    Zofran [ondansetron hcl (pf)]     Levaquin [levofloxacin] Rash     Spots on face    Macrobid [nitrofurantoin monohyd/m-cryst] Rash     Sppots/rash on face       Current Outpatient Medications   Medication Sig Dispense Refill    catheter 12 Fr Misc by Misc.(Non-Drug; Combo Route) route.      catheter 8 Fr Misc Use up to twice daily 40 each 11    diaper,brief,adult,disposable (DEPEND PANTS) Misc Change BID for pressure ulcer 60 each 11    EPINEPHrine (EPIPEN 2-DEEPIKA) 0.3 mg/0.3 mL AtIn USE ONE INJECTION INTO THE MUSCLE AS NEEDED FOR ANAPHYLAXIS 2 each 0    glycerin pediatric suppository Use PRN for constipation    We need 1 bottle 12 suppository 5    levETIRAcetam (KEPPRA) 500 MG Tab Take 1 tablet (500 mg total) by mouth 2 (two) times daily. (last refill, need appointment) 60 tablet 11    lidocaine (LIDODERM) 5 % Place 2-3 patches onto the skin once daily. Remove & Discard patch within 12 hours or as directed by MD Interiano patch 2    menthol-zinc oxide  (CALMOSEPTINE) 0.44-20.6 % Oint Apply topically 2 (two) times daily as needed. 1 Tube 6    nitrofurantoin, macrocrystal-monohydrate, (MACROBID) 100 MG capsule TAKE 1 CAPSULE BY MOUTH EVERY 12 HOURS FOR 5 DAYS  0    syringe, disposable, 60 mL Syrg Use for self catheterization up to 7 times daily 60 each 11    clindamycin (CLEOCIN) 150 MG capsule Take 1 capsule (150 mg total) by mouth 3 (three) times daily. (Patient not taking: Reported on 1/20/2020) 21 capsule     levoFLOXacin (LEVAQUIN) 500 MG tablet TAKE 1 TABLET BY MOUTH EVERY 24 HOURS FOR 5 DAYS  0    oxyCODONE-acetaminophen (PERCOCET)  mg per tablet Take 1 tablet by mouth every 6 (six) hours as needed for Pain. 120 tablet 0     No current facility-administered medications for this visit.        Review of Systems      Objective:        Physical Exam:   Foot Exam    General  General Appearance: appears stated age and healthy   Orientation: alert and oriented to person, place, and time   Affect: appropriate   Gait: antalgic   Assistance: wheelchair use       Right Foot/Ankle     Inspection and Palpation  Skin Exam: ulcer (Patient has superficial dehiscence of amputation site 1st toe right foot from trauma.  There is no signs of infection.  Clean granulation tissue.);           Physical Exam   Musculoskeletal:        Feet:    Feet:   Right Foot:   Skin Integrity: Positive for ulcer (Patient has superficial dehiscence of amputation site 1st toe right foot from trauma.  There is no signs of infection.  Clean granulation tissue.).        Imaging:            Assessment:       1. Skin ulcer of right foot with fat layer exposed      Plan:   Skin ulcer of right foot with fat layer exposed     Patient will clean the wound daily apply Bactroban and re-dress.  Return to see me in 2 weeks for evaluation.  No procedures performed today if the wound does not show adequate healing I will do a secondary closure.  No follow-ups on file.    Procedures -  None    Counseling:     I provided patient education verbally regarding:   Patient diagnosis, treatment options, as well as alternatives, risks, and benefits.     This note was created using Dragon voice recognition software that occasionally misinterpreted phrases or words.

## 2020-01-23 LAB
ACID FAST MOD KINY STN SPEC: NORMAL
MYCOBACTERIUM SPEC QL CULT: NORMAL

## 2020-02-03 ENCOUNTER — TELEPHONE (OUTPATIENT)
Dept: PHYSICAL MEDICINE AND REHAB | Facility: CLINIC | Age: 25
End: 2020-02-03

## 2020-02-03 ENCOUNTER — OFFICE VISIT (OUTPATIENT)
Dept: PODIATRY | Facility: CLINIC | Age: 25
End: 2020-02-03
Payer: MEDICAID

## 2020-02-03 VITALS — HEIGHT: 56 IN | BODY MASS INDEX: 24.66 KG/M2 | RESPIRATION RATE: 16 BRPM

## 2020-02-03 DIAGNOSIS — L97.514 FOOT ULCER WITH NECROSIS OF BONE, RIGHT: Primary | ICD-10-CM

## 2020-02-03 DIAGNOSIS — R56.9 CONVULSIONS, UNSPECIFIED CONVULSION TYPE: ICD-10-CM

## 2020-02-03 PROCEDURE — 99213 OFFICE O/P EST LOW 20 MIN: CPT | Performed by: PODIATRIST

## 2020-02-03 PROCEDURE — 87077 CULTURE AEROBIC IDENTIFY: CPT

## 2020-02-03 PROCEDURE — 87118 MYCOBACTERIC IDENTIFICATION: CPT

## 2020-02-03 PROCEDURE — 87186 SC STD MICRODIL/AGAR DIL: CPT | Mod: 59

## 2020-02-03 PROCEDURE — 99213 PR OFFICE/OUTPT VISIT, EST, LEVL III, 20-29 MIN: ICD-10-PCS | Mod: S$PBB,,, | Performed by: PODIATRIST

## 2020-02-03 PROCEDURE — 87147 CULTURE TYPE IMMUNOLOGIC: CPT

## 2020-02-03 PROCEDURE — 87116 MYCOBACTERIA CULTURE: CPT

## 2020-02-03 PROCEDURE — 87102 FUNGUS ISOLATION CULTURE: CPT

## 2020-02-03 PROCEDURE — 87070 CULTURE OTHR SPECIMN AEROBIC: CPT

## 2020-02-03 PROCEDURE — 87075 CULTR BACTERIA EXCEPT BLOOD: CPT

## 2020-02-03 PROCEDURE — 87206 SMEAR FLUORESCENT/ACID STAI: CPT

## 2020-02-03 PROCEDURE — 99213 OFFICE O/P EST LOW 20 MIN: CPT | Mod: S$PBB,,, | Performed by: PODIATRIST

## 2020-02-03 RX ORDER — DOXYCYCLINE 100 MG/1
100 CAPSULE ORAL EVERY 12 HOURS
Qty: 20 CAPSULE | Refills: 1 | Status: SHIPPED | OUTPATIENT
Start: 2020-02-03 | End: 2020-02-13

## 2020-02-03 NOTE — TELEPHONE ENCOUNTER
MD Halle Wilson MA   Caller: Unspecified (Today,  2:01 PM)             Please let the patient know I have not seen her for over 2 years.   I did not prescribed Keppra either.  It was prescribed by Dr. William Garcia.  She should call his office for refill.    Previous Messages         Message on patient answer machine.  Office number also provided.            12/19/17, last office visit        ----- Message from Miky Diaz sent at 2/3/2020  1:41 PM CST -----  Contact: Pt  Rx Refill/Request- The Pt states that she is completely out and her stomach has been upset.       Is this a Refill or New Rx:  Refill    Rx Name and Strength:  levETIRAcetam (KEPPRA) 500 MG Tab    [id number] [address]- PHONE [phone]    Communication Preference:[phone]    Additional Information:

## 2020-02-03 NOTE — TELEPHONE ENCOUNTER
Last Office Visit Info:   The patient's last visit with Primary Doctor No was on Visit date not found.    The patient's last visit in current department was on Visit date not found.        Last CBC Results:   Lab Results   Component Value Date    WBC 3.01 (L) 06/15/2016    HGB 11.5 (L) 06/15/2016    HCT 35.7 (L) 06/15/2016     06/15/2016       Last CMP Results  Lab Results   Component Value Date     05/23/2016    K 4.2 05/23/2016     05/23/2016    CO2 24 05/23/2016    BUN 11 05/23/2016    CREATININE 0.50 05/23/2016    CALCIUM 8.8 05/23/2016    ALBUMIN 4.9 (H) 11/15/2012    AST 11 11/15/2012    ALT 18 11/15/2012       Last Lipids  No results found for: CHOL, TRIG, HDL, LDLCALC    Last A1C  No results found for: HGBA1C    Last TSH  No results found for: TSH      Current Med Refills  Medication List with Changes/Refills   Current Medications    CATHETER 12 FR MISC    by Misc.(Non-Drug; Combo Route) route.       Start Date: --        End Date: --    CATHETER 8 FR MISC    Use up to twice daily       Start Date: 1/22/2016 End Date: --    CLINDAMYCIN (CLEOCIN) 150 MG CAPSULE    Take 1 capsule (150 mg total) by mouth 3 (three) times daily.       Start Date: 10/28/2019End Date: --    DIAPER,BRIEF,ADULT,DISPOSABLE (DEPEND PANTS) MISC    Change BID for pressure ulcer       Start Date: 10/17/2016End Date: --    DOXYCYCLINE (VIBRAMYCIN) 100 MG CAP    Take 1 capsule (100 mg total) by mouth every 12 (twelve) hours. for 10 days       Start Date: 2/3/2020  End Date: 2/13/2020    EPINEPHRINE (EPIPEN 2-DEEPIKA) 0.3 MG/0.3 ML ATIN    USE ONE INJECTION INTO THE MUSCLE AS NEEDED FOR ANAPHYLAXIS       Start Date: 1/15/2018 End Date: --    GLYCERIN PEDIATRIC SUPPOSITORY    Use PRN for constipation    We need 1 bottle       Start Date: 9/22/2016 End Date: --    LEVETIRACETAM (KEPPRA) 500 MG TAB    Take 1 tablet (500 mg total) by mouth 2 (two) times daily. (last refill, need appointment)       Start Date: 2/21/2018 End Date:  --    LEVOFLOXACIN (LEVAQUIN) 500 MG TABLET    TAKE 1 TABLET BY MOUTH EVERY 24 HOURS FOR 5 DAYS       Start Date: 10/9/2019 End Date: --    LIDOCAINE (LIDODERM) 5 %    Place 2-3 patches onto the skin once daily. Remove & Discard patch within 12 hours or as directed by MD       Start Date: 5/17/2017 End Date: 11/15/2020    MENTHOL-ZINC OXIDE (CALMOSEPTINE) 0.44-20.6 % OINT    Apply topically 2 (two) times daily as needed.       Start Date: 9/29/2017 End Date: --    NITROFURANTOIN, MACROCRYSTAL-MONOHYDRATE, (MACROBID) 100 MG CAPSULE    TAKE 1 CAPSULE BY MOUTH EVERY 12 HOURS FOR 5 DAYS       Start Date: 10/1/2019 End Date: --    OXYCODONE-ACETAMINOPHEN (PERCOCET)  MG PER TABLET    Take 1 tablet by mouth every 6 (six) hours as needed for Pain.       Start Date: 4/14/2018 End Date: 5/28/2019    SYRINGE, DISPOSABLE, 60 ML SYRG    Use for self catheterization up to 7 times daily       Start Date: 1/22/2016 End Date: --       Order(s) placed per written order guidelines: none    Please advise.

## 2020-02-03 NOTE — PROGRESS NOTES
[address] [facility] [address]  [place] [address]  Phone: [phone]   [phone]    Patient's PCP:Primary Doctor No  Referring Provider: No ref. provider found    Subjective:      Chief Complaint:: Follow-up (ulcer right foot)    SABRINA Samuel is a 24 y.o. female who presents to clinic to follow-up for ulcer right foot.  Noticed new wounds on lower leg and right foot, no pain.  Has been dressing with Mepilex bandages.    Vitals:    02/03/20 1017   Resp: 16     Shoe Size:     Past Surgical History:   Procedure Laterality Date    AMPUTATION      right 4th and 5th toes; left 5th toe and portion of left great toe    BACK SURGERY      BLADDER SURGERY      COLON SURGERY      flush site for bowel movements from appendix    CYSTOSTOMY      EYE SURGERY      x 5    FLUOROSCOPIC URODYNAMIC STUDY N/A 3/12/2019    Procedure: URODYNAMIC STUDY, FLUOROSCOPIC;  Surgeon: Kenzie Charles MD;  Location: [address];  Service: Urology;  Laterality: N/A;  1 hour    FLUOROSCOPIC URODYNAMIC STUDY N/A 4/4/2019    Procedure: URODYNAMIC STUDY, FLUOROSCOPIC;  Surgeon: Kenzie Charles MD;  Location: [address];  Service: Urology;  Laterality: N/A;  1 hour    FOOT AMPUTATION THROUGH METATARSAL Right 10/28/2019    Procedure: AMPUTATION, FOOT, TRANSMETATARSAL;  Surgeon: Abdi Bedoya DPM;  Location: [facility];  Service: Podiatry;  Laterality: Right;    MYELOMININGOCELE REPAIR      STRABISMUS SURGERY      ou dr peña    VENTRICULOPERITONEAL SHUNT       Past Medical History:   Diagnosis Date    Arnold-Chiari malformation, type II     Encounter for blood transfusion     Hydrocephalus     Neurogenic bladder     self catheterizes every 3 hours    Seizures     only w/ shunt malfunction    Spinal bifida, closed     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     Family History   Problem Relation Age of Onset    Cataracts Maternal Grandmother     Amblyopia Neg Hx     Blindness Neg Hx     Cancer Neg Hx     Diabetes Neg Hx      Glaucoma Neg Hx     Hypertension Neg Hx     Macular degeneration Neg Hx     Retinal detachment Neg Hx     Strabismus Neg Hx     Stroke Neg Hx     Thyroid disease Neg Hx         Social History:   Marital Status: Single  Alcohol History:  reports that she does not drink alcohol.  Tobacco History:  reports that she has never smoked. She has never used smokeless tobacco.  Drug History:  reports that she does not use drugs.    Review of patient's allergies indicates:   Allergen Reactions    Latex, natural rubber Anaphylaxis and Other (See Comments)     angioedema    Nitrofurantoin Shortness Of Breath    Bactrim  [sulfamethoxazole-trimethoprim]      Other reaction(s): Swelling of face    Gardasil  [h papillomavirus vac,qval (pf)]      Other reaction(s): Shortness of breath    Menactra  [mening vac a,c,y,w135 dip (pf)]      Other reaction(s): Shortness of breath    Nitrofurantoin      Other reaction(s): Difficulty breathing    Sulfa (sulfonamide antibiotics)     Tramadol Hives    Zofran [ondansetron hcl (pf)]     Levaquin [levofloxacin] Rash     Spots on face    Macrobid [nitrofurantoin monohyd/m-cryst] Rash     Sppots/rash on face       Current Outpatient Medications   Medication Sig Dispense Refill    catheter 12 Fr Misc by Misc.(Non-Drug; Combo Route) route.      catheter 8 Fr Misc Use up to twice daily 40 each 11    clindamycin (CLEOCIN) 150 MG capsule Take 1 capsule (150 mg total) by mouth 3 (three) times daily. 21 capsule     diaper,brief,adult,disposable (DEPEND PANTS) Misc Change BID for pressure ulcer 60 each 11    EPINEPHrine (EPIPEN 2-DEEPIKA) 0.3 mg/0.3 mL AtIn USE ONE INJECTION INTO THE MUSCLE AS NEEDED FOR ANAPHYLAXIS 2 each 0    glycerin pediatric suppository Use PRN for constipation    We need 1 bottle 12 suppository 5    levETIRAcetam (KEPPRA) 500 MG Tab Take 1 tablet (500 mg total) by mouth 2 (two) times daily. (last refill, need appointment) 60 tablet 11    levoFLOXacin  (LEVAQUIN) 500 MG tablet TAKE 1 TABLET BY MOUTH EVERY 24 HOURS FOR 5 DAYS  0    lidocaine (LIDODERM) 5 % Place 2-3 patches onto the skin once daily. Remove & Discard patch within 12 hours or as directed by MD Interiano patch 2    menthol-zinc oxide (CALMOSEPTINE) 0.44-20.6 % Oint Apply topically 2 (two) times daily as needed. 1 Tube 6    nitrofurantoin, macrocrystal-monohydrate, (MACROBID) 100 MG capsule TAKE 1 CAPSULE BY MOUTH EVERY 12 HOURS FOR 5 DAYS  0    syringe, disposable, 60 mL Syrg Use for self catheterization up to 7 times daily 60 each 11    doxycycline (VIBRAMYCIN) 100 MG Cap Take 1 capsule (100 mg total) by mouth every 12 (twelve) hours. for 10 days 20 capsule 1    oxyCODONE-acetaminophen (PERCOCET)  mg per tablet Take 1 tablet by mouth every 6 (six) hours as needed for Pain. 120 tablet 0     No current facility-administered medications for this visit.        Review of Systems      Objective:        Physical Exam:   Foot Exam    General  General Appearance: appears stated age and healthy   Orientation: alert and oriented to person, place, and time   Affect: appropriate   Gait: antalgic   Assistance: wheelchair use       Right Foot/Ankle     Inspection and Palpation  Skin Exam: ulcer (medial incision site of TMA probes to bone with perimeter of redness but no purulent drainage.  Small lateral foot ulcer and lower leg ulcer without infection);     Neurovascular  Dorsalis pedis: 2+  Posterior tibial: 2+  Saphenous nerve sensation: absent  Tibial nerve sensation: absent  Superficial peroneal nerve sensation: absent  Deep peroneal nerve sensation: absent  Sural nerve sensation: absent          Physical Exam   Cardiovascular:   Pulses:       Dorsalis pedis pulses are 2+ on the right side.        Posterior tibial pulses are 2+ on the right side.   Musculoskeletal:        Feet:    Feet:   Right Foot:   Skin Integrity: Positive for ulcer (medial incision site of TMA probes to bone with perimeter of redness  but no purulent drainage.  Small lateral foot ulcer and lower leg ulcer without infection).               Imaging:            Assessment:       1. Foot ulcer with necrosis of bone, right      Plan:   Foot ulcer with necrosis of bone, right  -     NM Bone Scan 3 Phase Foot; Future; Expected date: 02/03/2020  -     Aerobic culture  -     Culture, Anaerobic  -     AFB Culture & Smear  -     Fungus culture    Other orders  -     doxycycline (VIBRAMYCIN) 100 MG Cap; Take 1 capsule (100 mg total) by mouth every 12 (twelve) hours. for 10 days  Dispense: 20 capsule; Refill: 1    superficial debridement and C and S performed.  Continue local wound care and I am ordering three phase bone scan attention to the right foot for osteo.  Pt can not have MRI because of metal in her back from spina bifida.  If bone infection will take pt to OR for revision of the TMA.  No follow-ups on file.    Procedures - None    Counseling:   I discussed what bone infection is and the treatment choices are either 6 weeks of IV antibiotics ( rarely oral antibiotics) with no guarantee of cure or removal of infected bone which can include amputation of the foot part infected.  I provided patient education verbally regarding:   Patient diagnosis, treatment options, as well as alternatives, risks, and benefits.     This note was created using Dragon voice recognition software that occasionally misinterpreted phrases or words.

## 2020-02-03 NOTE — TELEPHONE ENCOUNTER
----- Message from Miky Diaz sent at 2/3/2020  1:41 PM CST -----  Contact: Pt  Rx Refill/Request- The Pt states that she is completely out and her stomach has been upset.       Is this a Refill or New Rx:  Refill    Rx Name and Strength:  levETIRAcetam (KEPPRA) 500 MG Tab    Preferred Pharmacy with phone number: [facility] [address] [facility] [address] [employer] [address] [employer]- PHONE [phone]    Communication Preference:[phone]    Additional Information:

## 2020-02-04 ENCOUNTER — TELEPHONE (OUTPATIENT)
Dept: NEUROLOGY | Facility: CLINIC | Age: 25
End: 2020-02-04

## 2020-02-04 RX ORDER — LEVETIRACETAM 500 MG/1
500 TABLET ORAL 2 TIMES DAILY
Qty: 60 TABLET | Refills: 11 | OUTPATIENT
Start: 2020-02-04

## 2020-02-04 NOTE — TELEPHONE ENCOUNTER
----- Message from Brandie Lane sent at 2/4/2020  9:20 AM CST -----  Contact: Kayleen (mother) @ [phone]  Calling to schedule a NP appt for seizures asap but nothing is available.  Pt has Medicaid.  Pls call.

## 2020-02-04 NOTE — TELEPHONE ENCOUNTER
----- Message from Brandie Lane sent at 2/4/2020  9:20 AM CST -----  Contact: Kayleen (mother) @ [phone]  Calling to schedule a NP appt for seizures asap but nothing is available.  Pt has Medicaid.  Pls call.

## 2020-02-04 NOTE — TELEPHONE ENCOUNTER
Sorry we currently do not have any availability. It could be around the May time frame before we possibly get any open appt. thanks

## 2020-02-05 ENCOUNTER — TELEPHONE (OUTPATIENT)
Dept: PODIATRY | Facility: CLINIC | Age: 25
End: 2020-02-05

## 2020-02-05 DIAGNOSIS — L97.514 ULCER OF TOE OF RIGHT FOOT, WITH NECROSIS OF BONE: Primary | ICD-10-CM

## 2020-02-05 DIAGNOSIS — L97.512 SKIN ULCER OF RIGHT FOOT WITH FAT LAYER EXPOSED: ICD-10-CM

## 2020-02-05 LAB
BACTERIA SPEC AEROBE CULT: ABNORMAL
BACTERIA SPEC AEROBE CULT: ABNORMAL
BACTERIA SPEC ANAEROBE CULT: NORMAL

## 2020-02-05 NOTE — TELEPHONE ENCOUNTER
Imaging called and said that the needed a most recent x-ray of foot on right side. Putting in the order now and patient will have them before the bone scan.

## 2020-02-07 ENCOUNTER — TELEPHONE (OUTPATIENT)
Dept: PHYSICAL MEDICINE AND REHAB | Facility: CLINIC | Age: 25
End: 2020-02-07

## 2020-02-07 NOTE — TELEPHONE ENCOUNTER
MD Halle Wilson MA   Caller:02/03/20              Please let the patient know I have not seen her for over 2 years.   I did not prescribed Keppra either.  It was prescribed by Dr. William Garcia.  She should call his office for refill             ----- Message from Olvin Motley sent at 2/7/2020  4:13 PM CST -----  Contact: Patient @ [phone]  Patient returning a missed call, pls return call

## 2020-02-20 ENCOUNTER — HOSPITAL ENCOUNTER (OUTPATIENT)
Dept: RADIOLOGY | Facility: HOSPITAL | Age: 25
Discharge: HOME | End: 2020-02-20
Attending: PODIATRIST
Payer: MEDICAID

## 2020-02-20 DIAGNOSIS — L97.514 FOOT ULCER WITH NECROSIS OF BONE, RIGHT: ICD-10-CM

## 2020-02-20 DIAGNOSIS — L97.514 ULCER OF TOE OF RIGHT FOOT, WITH NECROSIS OF BONE: ICD-10-CM

## 2020-02-20 DIAGNOSIS — L97.512 SKIN ULCER OF RIGHT FOOT WITH FAT LAYER EXPOSED: ICD-10-CM

## 2020-02-20 PROCEDURE — A9503 TC99M MEDRONATE: HCPCS

## 2020-02-20 PROCEDURE — 73630 X-RAY EXAM OF FOOT: CPT | Mod: TC,RT

## 2020-02-24 ENCOUNTER — TELEPHONE (OUTPATIENT)
Dept: PODIATRY | Facility: CLINIC | Age: 25
End: 2020-02-24

## 2020-02-24 NOTE — TELEPHONE ENCOUNTER
Left message for patient to call back regarding results of Bone Scan.    Per- Dr. Thomason No bone infection, to continue wound care and return to clinic in 2-3 weeks.

## 2020-03-02 LAB — FUNGUS SPEC CULT: NORMAL

## 2020-03-23 ENCOUNTER — OFFICE VISIT (OUTPATIENT)
Dept: PODIATRY | Facility: CLINIC | Age: 25
End: 2020-03-23
Payer: MEDICAID

## 2020-03-23 VITALS
RESPIRATION RATE: 18 BRPM | BODY MASS INDEX: 24.75 KG/M2 | SYSTOLIC BLOOD PRESSURE: 137 MMHG | HEIGHT: 56 IN | WEIGHT: 110 LBS | DIASTOLIC BLOOD PRESSURE: 80 MMHG | TEMPERATURE: 98 F

## 2020-03-23 DIAGNOSIS — L97.514 SKIN ULCER OF RIGHT FOOT WITH NECROSIS OF BONE: ICD-10-CM

## 2020-03-23 DIAGNOSIS — L97.514 ULCER OF TOE OF RIGHT FOOT, WITH NECROSIS OF BONE: Primary | ICD-10-CM

## 2020-03-23 PROCEDURE — 99214 OFFICE O/P EST MOD 30 MIN: CPT | Performed by: PODIATRIST

## 2020-03-23 PROCEDURE — 99213 PR OFFICE/OUTPT VISIT, EST, LEVL III, 20-29 MIN: ICD-10-PCS | Mod: S$PBB,,, | Performed by: PODIATRIST

## 2020-03-23 PROCEDURE — 99213 OFFICE O/P EST LOW 20 MIN: CPT | Mod: S$PBB,,, | Performed by: PODIATRIST

## 2020-03-23 NOTE — PROGRESS NOTES
[address] [facility] [address]  [place] [address]  Phone: [phone]   [phone]    Patient's PCP:Primary Doctor No  Referring Provider: No ref. provider found    Subjective:      Chief Complaint:: Follow-up ( Foot ulcer with necrosis of bone, right)    SABRINA  Shashank Samuel is a 24 y.o. female who returns to clinic for follow- up on Foot ulcer with necrosis of bone, right. wounds are healing however skin is inflammed, no pain or drainage.  Has been dressing with Mepilex bandages and needs restock        Vitals:    03/23/20 1057   Resp: 18   Temp: 97.8 °F (36.6 °C)     Shoe Size:     Past Surgical History:   Procedure Laterality Date    AMPUTATION      right 4th and 5th toes; left 5th toe and portion of left great toe    BACK SURGERY      BLADDER SURGERY      COLON SURGERY      flush site for bowel movements from appendix    CYSTOSTOMY      EYE SURGERY      x 5    FLUOROSCOPIC URODYNAMIC STUDY N/A 3/12/2019    Procedure: URODYNAMIC STUDY, FLUOROSCOPIC;  Surgeon: Kenzie Charles MD;  Location: [address];  Service: Urology;  Laterality: N/A;  1 hour    FLUOROSCOPIC URODYNAMIC STUDY N/A 4/4/2019    Procedure: URODYNAMIC STUDY, FLUOROSCOPIC;  Surgeon: Kenzie Charles MD;  Location: [address];  Service: Urology;  Laterality: N/A;  1 hour    FOOT AMPUTATION THROUGH METATARSAL Right 10/28/2019    Procedure: AMPUTATION, FOOT, TRANSMETATARSAL;  Surgeon: Abdi Bedoya DPM;  Location: [facility];  Service: Podiatry;  Laterality: Right;    MYELOMININGOCELE REPAIR      STRABISMUS SURGERY      ou dr peña    VENTRICULOPERITONEAL SHUNT       Past Medical History:   Diagnosis Date    Arnold-Chiari malformation, type II     Encounter for blood transfusion     Hydrocephalus     Neurogenic bladder     self catheterizes every 3 hours    Seizures     only w/ shunt malfunction    Spinal bifida, closed     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     Family History   Problem Relation Age of Onset    Cataracts  Maternal Grandmother     Amblyopia Neg Hx     Blindness Neg Hx     Cancer Neg Hx     Diabetes Neg Hx     Glaucoma Neg Hx     Hypertension Neg Hx     Macular degeneration Neg Hx     Retinal detachment Neg Hx     Strabismus Neg Hx     Stroke Neg Hx     Thyroid disease Neg Hx         Social History:   Marital Status: Single  Alcohol History:  reports that she does not drink alcohol.  Tobacco History:  reports that she has never smoked. She has never used smokeless tobacco.  Drug History:  reports that she does not use drugs.    Review of patient's allergies indicates:   Allergen Reactions    Latex, natural rubber Anaphylaxis and Other (See Comments)     angioedema    Nitrofurantoin Shortness Of Breath    Bactrim  [sulfamethoxazole-trimethoprim]      Other reaction(s): Swelling of face    Gardasil  [h papillomavirus vac,qval (pf)]      Other reaction(s): Shortness of breath    Menactra  [mening vac a,c,y,w135 dip (pf)]      Other reaction(s): Shortness of breath    Nitrofurantoin      Other reaction(s): Difficulty breathing    Sulfa (sulfonamide antibiotics)     Tramadol Hives    Zofran [ondansetron hcl (pf)]     Levaquin [levofloxacin] Rash     Spots on face    Macrobid [nitrofurantoin monohyd/m-cryst] Rash     Sppots/rash on face       Current Outpatient Medications   Medication Sig Dispense Refill    catheter 12 Fr Misc by Misc.(Non-Drug; Combo Route) route.      catheter 8 Fr Misc Use up to twice daily 40 each 11    clindamycin (CLEOCIN) 150 MG capsule Take 1 capsule (150 mg total) by mouth 3 (three) times daily. 21 capsule     diaper,brief,adult,disposable (DEPEND PANTS) Misc Change BID for pressure ulcer 60 each 11    EPINEPHrine (EPIPEN 2-DEEPIKA) 0.3 mg/0.3 mL AtIn USE ONE INJECTION INTO THE MUSCLE AS NEEDED FOR ANAPHYLAXIS 2 each 0    glycerin pediatric suppository Use PRN for constipation    We need 1 bottle 12 suppository 5    levETIRAcetam (KEPPRA) 500 MG Tab Take 1 tablet (500 mg  total) by mouth 2 (two) times daily. (last refill, need appointment) 60 tablet 11    levoFLOXacin (LEVAQUIN) 500 MG tablet TAKE 1 TABLET BY MOUTH EVERY 24 HOURS FOR 5 DAYS  0    lidocaine (LIDODERM) 5 % Place 2-3 patches onto the skin once daily. Remove & Discard patch within 12 hours or as directed by MD Interiano patch 2    menthol-zinc oxide (CALMOSEPTINE) 0.44-20.6 % Oint Apply topically 2 (two) times daily as needed. 1 Tube 6    nitrofurantoin, macrocrystal-monohydrate, (MACROBID) 100 MG capsule TAKE 1 CAPSULE BY MOUTH EVERY 12 HOURS FOR 5 DAYS  0    oxyCODONE-acetaminophen (PERCOCET)  mg per tablet Take 1 tablet by mouth every 6 (six) hours as needed for Pain. 120 tablet 0    syringe, disposable, 60 mL Syrg Use for self catheterization up to 7 times daily 60 each 11     No current facility-administered medications for this visit.        Review of Systems      Objective:        Physical Exam:   Foot Exam    General  General Appearance: appears stated age and healthy   Orientation: alert and oriented to person, place, and time   Affect: appropriate   Gait: antalgic   Assistance: wheelchair use       Right Foot/Ankle     Inspection and Palpation  Skin Exam: ulcer (The distal lateral ulcer has gotten bigger since last visit and there is more redness and edema than last visit.  No purulent drainage no fluctuance no lymphangitis.);           Physical Exam   Musculoskeletal:        Feet:    Feet:   Right Foot:   Skin Integrity: Positive for ulcer (The distal lateral ulcer has gotten bigger since last visit and there is more redness and edema than last visit.  No purulent drainage no fluctuance no lymphangitis.).           Imaging:            Assessment:       No diagnosis found.  Plan:   There are no diagnoses linked to this encounter.  No follow-ups on file.  Patient will continue local wound care at home.  I am ordering a 3 phase bone scan with attention the right foot for osteomyelitis.  Patient has metal in  her back and is unable to do an MRI.  Depending on the results of the 3 phase bone scan she may need to have a full-thickness debridement possibility of revision of the transmetatarsal amputation into a more proximal amputation.  We will contact the patient once we get the results of the three-phase bone scan.  Procedures - None    Counseling:     I provided patient education verbally regarding:   Patient diagnosis, treatment options, as well as alternatives, risks, and benefits.     This note was created using Dragon voice recognition software that occasionally misinterpreted phrases or words.

## 2020-03-25 ENCOUNTER — TELEPHONE (OUTPATIENT)
Dept: PODIATRY | Facility: CLINIC | Age: 25
End: 2020-03-25

## 2020-03-25 DIAGNOSIS — L97.514 SKIN ULCER OF RIGHT FOOT WITH NECROSIS OF BONE: ICD-10-CM

## 2020-03-25 DIAGNOSIS — L97.514 ULCER OF TOE OF RIGHT FOOT, WITH NECROSIS OF BONE: Primary | ICD-10-CM

## 2020-03-25 NOTE — TELEPHONE ENCOUNTER
Imaging called and patient is scheduled tomorrow for the bone scan and they need a new xray order to see if anything has changed.      Order attached, please sign.

## 2020-03-25 NOTE — TELEPHONE ENCOUNTER
Joana Jean from [facility] scheduling department regarding order placed for 3 Phase Bone Scan and if STAT was correctly indicated. Order is corrected marked STAT due to risk of recurrent osteomyelitis and she is unable to have MRIs due to metal plate in her back.

## 2020-03-26 ENCOUNTER — HOSPITAL ENCOUNTER (OUTPATIENT)
Dept: RADIOLOGY | Facility: HOSPITAL | Age: 25
Discharge: HOME | DRG: 501 | End: 2020-03-26
Attending: PODIATRIST
Payer: MEDICAID

## 2020-03-26 ENCOUNTER — HOSPITAL ENCOUNTER (OUTPATIENT)
Dept: RADIOLOGY | Facility: HOSPITAL | Age: 25
Discharge: HOME | End: 2020-03-26
Attending: PODIATRIST
Payer: MEDICAID

## 2020-03-26 ENCOUNTER — HOSPITAL ENCOUNTER (INPATIENT)
Facility: HOSPITAL | Age: 25
LOS: 7 days | Discharge: NURSING, REHAB OR LONG-TERM CARE | DRG: 501 | End: 2020-04-02
Attending: INTERNAL MEDICINE | Admitting: INTERNAL MEDICINE
Payer: MEDICAID

## 2020-03-26 ENCOUNTER — ANESTHESIA EVENT (OUTPATIENT)
Dept: SURGERY | Facility: HOSPITAL | Age: 25
DRG: 501 | End: 2020-03-26
Payer: MEDICAID

## 2020-03-26 DIAGNOSIS — M86.171 ACUTE OSTEOMYELITIS OF RIGHT FOOT: ICD-10-CM

## 2020-03-26 DIAGNOSIS — L97.512 SKIN ULCER OF RIGHT FOOT WITH FAT LAYER EXPOSED: ICD-10-CM

## 2020-03-26 DIAGNOSIS — R07.9 CHEST PAIN: ICD-10-CM

## 2020-03-26 DIAGNOSIS — L97.514 ULCER OF TOE OF RIGHT FOOT, WITH NECROSIS OF BONE: ICD-10-CM

## 2020-03-26 DIAGNOSIS — L97.514 SKIN ULCER OF RIGHT FOOT WITH NECROSIS OF BONE: ICD-10-CM

## 2020-03-26 DIAGNOSIS — M86.9 OSTEOMYELITIS: Primary | ICD-10-CM

## 2020-03-26 DIAGNOSIS — L97.514 ULCER OF TOE OF RIGHT FOOT, WITH NECROSIS OF BONE: Primary | ICD-10-CM

## 2020-03-26 PROBLEM — Q05.9: Status: ACTIVE | Noted: 2020-03-26

## 2020-03-26 LAB
ACID FAST MOD KINY STN SPEC: NORMAL
ALBUMIN SERPL BCP-MCNC: 4.5 G/DL (ref 3.5–5.2)
ALP SERPL-CCNC: 71 U/L (ref 55–135)
ALT SERPL W/O P-5'-P-CCNC: 12 U/L (ref 10–44)
ANION GAP SERPL CALC-SCNC: 11 MMOL/L (ref 8–16)
AST SERPL-CCNC: 12 U/L (ref 10–40)
BACTERIA #/AREA URNS HPF: ABNORMAL /HPF
BASOPHILS # BLD AUTO: 0.03 K/UL (ref 0–0.2)
BASOPHILS NFR BLD: 0.4 % (ref 0–1.9)
BILIRUB SERPL-MCNC: 0.5 MG/DL (ref 0.1–1)
BILIRUB UR QL STRIP: NEGATIVE
BUN SERPL-MCNC: 17 MG/DL (ref 6–20)
CALCIUM SERPL-MCNC: 9.3 MG/DL (ref 8.7–10.5)
CHLORIDE SERPL-SCNC: 107 MMOL/L (ref 95–110)
CLARITY UR: ABNORMAL
CO2 SERPL-SCNC: 22 MMOL/L (ref 23–29)
COLOR UR: YELLOW
CREAT SERPL-MCNC: 0.5 MG/DL (ref 0.5–1.4)
DIFFERENTIAL METHOD: ABNORMAL
EOSINOPHIL # BLD AUTO: 0 K/UL (ref 0–0.5)
EOSINOPHIL NFR BLD: 0.5 % (ref 0–8)
ERYTHROCYTE [DISTWIDTH] IN BLOOD BY AUTOMATED COUNT: 15.7 % (ref 11.5–14.5)
EST. GFR  (AFRICAN AMERICAN): >60 ML/MIN/1.73 M^2
EST. GFR  (NON AFRICAN AMERICAN): >60 ML/MIN/1.73 M^2
GLUCOSE SERPL-MCNC: 63 MG/DL (ref 70–110)
GLUCOSE UR QL STRIP: NEGATIVE
HCT VFR BLD AUTO: 43.1 % (ref 37–48.5)
HGB BLD-MCNC: 13.3 G/DL (ref 12–16)
HGB UR QL STRIP: ABNORMAL
HYALINE CASTS #/AREA URNS LPF: 3 /LPF
IMM GRANULOCYTES # BLD AUTO: 0.02 K/UL (ref 0–0.04)
IMM GRANULOCYTES NFR BLD AUTO: 0.3 % (ref 0–0.5)
KETONES UR QL STRIP: NEGATIVE
LEUKOCYTE ESTERASE UR QL STRIP: ABNORMAL
LYMPHOCYTES # BLD AUTO: 2.1 K/UL (ref 1–4.8)
LYMPHOCYTES NFR BLD: 26.9 % (ref 18–48)
MCH RBC QN AUTO: 25.8 PG (ref 27–31)
MCHC RBC AUTO-ENTMCNC: 30.9 G/DL (ref 32–36)
MCV RBC AUTO: 84 FL (ref 82–98)
MICROSCOPIC COMMENT: ABNORMAL
MONOCYTES # BLD AUTO: 0.6 K/UL (ref 0.3–1)
MONOCYTES NFR BLD: 7.4 % (ref 4–15)
MYCOBACTERIUM SPEC QL CULT: NORMAL
NEUTROPHILS # BLD AUTO: 5.1 K/UL (ref 1.8–7.7)
NEUTROPHILS NFR BLD: 64.5 % (ref 38–73)
NITRITE UR QL STRIP: NEGATIVE
NRBC BLD-RTO: 0 /100 WBC
PH UR STRIP: 8 [PH] (ref 5–8)
PLATELET # BLD AUTO: 445 K/UL (ref 150–350)
PMV BLD AUTO: 9.4 FL (ref 9.2–12.9)
POTASSIUM SERPL-SCNC: 3.8 MMOL/L (ref 3.5–5.1)
PROT SERPL-MCNC: 8.7 G/DL (ref 6–8.4)
PROT UR QL STRIP: ABNORMAL
RBC # BLD AUTO: 5.15 M/UL (ref 4–5.4)
RBC #/AREA URNS HPF: 7 /HPF (ref 0–4)
SODIUM SERPL-SCNC: 140 MMOL/L (ref 136–145)
SP GR UR STRIP: 1.01 (ref 1–1.03)
SQUAMOUS #/AREA URNS HPF: 0 /HPF
URN SPEC COLLECT METH UR: ABNORMAL
UROBILINOGEN UR STRIP-ACNC: NEGATIVE EU/DL
WBC # BLD AUTO: 7.85 K/UL (ref 3.9–12.7)
WBC #/AREA URNS HPF: >100 /HPF (ref 0–5)

## 2020-03-26 PROCEDURE — 87086 URINE CULTURE/COLONY COUNT: CPT

## 2020-03-26 PROCEDURE — 80053 COMPREHEN METABOLIC PANEL: CPT

## 2020-03-26 PROCEDURE — 12000002 HC ACUTE/MED SURGE SEMI-PRIVATE ROOM

## 2020-03-26 PROCEDURE — 73630 X-RAY EXAM OF FOOT: CPT | Mod: TC,RT

## 2020-03-26 PROCEDURE — 81001 URINALYSIS AUTO W/SCOPE: CPT

## 2020-03-26 PROCEDURE — 85025 COMPLETE CBC W/AUTO DIFF WBC: CPT

## 2020-03-26 PROCEDURE — A9503 TC99M MEDRONATE: HCPCS

## 2020-03-26 PROCEDURE — 36415 COLL VENOUS BLD VENIPUNCTURE: CPT

## 2020-03-26 RX ORDER — OXYCODONE AND ACETAMINOPHEN 10; 325 MG/1; MG/1
1 TABLET ORAL EVERY 6 HOURS PRN
Status: DISCONTINUED | OUTPATIENT
Start: 2020-03-26 | End: 2020-04-02 | Stop reason: HOSPADM

## 2020-03-26 RX ORDER — ACETAMINOPHEN 325 MG/1
650 TABLET ORAL EVERY 4 HOURS PRN
Status: DISCONTINUED | OUTPATIENT
Start: 2020-03-26 | End: 2020-04-02 | Stop reason: HOSPADM

## 2020-03-26 RX ORDER — ACETAMINOPHEN 325 MG/1
650 TABLET ORAL EVERY 8 HOURS PRN
Status: DISCONTINUED | OUTPATIENT
Start: 2020-03-26 | End: 2020-04-02 | Stop reason: HOSPADM

## 2020-03-26 RX ORDER — LEVETIRACETAM 500 MG/1
500 TABLET ORAL 2 TIMES DAILY
Status: DISCONTINUED | OUTPATIENT
Start: 2020-03-26 | End: 2020-03-26

## 2020-03-26 NOTE — ANESTHESIA PREPROCEDURE EVALUATION
03/27/2020  Shashank Samuel is a 24 y.o., female.  Patient Active Problem List   Diagnosis    Spina bifida, lumbar region    Hydrocephalus    Neurogenic bladder    Scoliosis    Paraplegia, T7 Complete    Urinary obstruction    Chronic low back pain    Acute osteomyelitis of right foot    Self-catheterizes urinary bladder    Latex allergy, anaphylaxis    Toe amputation status, left    Nonintractable generalized idiopathic epilepsy without status epilepticus    Personal history of osteomyelitis    Skin ulcer of right foot with fat layer exposed    Pressure ulcer of right foot, unstageable    Cellulitis of right foot    Ulcer of toe of right foot, with necrosis of bone    Osteomyelitis of great toe of right foot    Meningomyelocele    Osteomyelitis       Past Surgical History:   Procedure Laterality Date    AMPUTATION      right 4th and 5th toes; left 5th toe and portion of left great toe    BACK SURGERY      BLADDER SURGERY      COLON SURGERY      flush site for bowel movements from appendix    CYSTOSTOMY      EYE SURGERY      x 5    FLUOROSCOPIC URODYNAMIC STUDY N/A 3/12/2019    Procedure: URODYNAMIC STUDY, FLUOROSCOPIC;  Surgeon: Kenzie Charles MD;  Location: [address];  Service: Urology;  Laterality: N/A;  1 hour    FLUOROSCOPIC URODYNAMIC STUDY N/A 4/4/2019    Procedure: URODYNAMIC STUDY, FLUOROSCOPIC;  Surgeon: Kenzie Charles MD;  Location: [address];  Service: Urology;  Laterality: N/A;  1 hour    FOOT AMPUTATION THROUGH METATARSAL Right 10/28/2019    Procedure: AMPUTATION, FOOT, TRANSMETATARSAL;  Surgeon: Abdi Bedoya DPM;  Location: [facility];  Service: Podiatry;  Laterality: Right;    MYELOMININGOCELE REPAIR      STRABISMUS SURGERY      ou dr peña    VENTRICULOPERITONEAL SHUNT          Tobacco Use:  The patient  reports that she has never  smoked. She has never used smokeless tobacco.               Lab Results   Component Value Date    WBC 7.85 03/26/2020    HGB 13.3 03/26/2020    HCT 43.1 03/26/2020    MCV 84 03/26/2020     (H) 03/26/2020     BMP  Lab Results   Component Value Date     03/26/2020    K 3.8 03/26/2020     03/26/2020    CO2 22 (L) 03/26/2020    BUN 17 03/26/2020    CREATININE 0.5 03/26/2020    CALCIUM 9.3 03/26/2020    ANIONGAP 11 03/26/2020    ESTGFRAFRICA >60.0 03/26/2020    EGFRNONAA >60.0 03/26/2020           Pre-op Assessment    I have reviewed the Patient Summary Reports.     I have reviewed the Nursing Notes.   I have reviewed the Medications.     Review of Systems  Anesthesia Hx:  No problems with previous Anesthesia Denies Hx of Anesthetic complications  Denies Family Hx of Anesthesia complications.   Denies Personal Hx of Anesthesia complications.   Social:  Non-Smoker, No Alcohol Use    Hematology/Oncology:     Oncology Normal    -- Anemia:   EENT/Dental:EENT/Dental Normal   Cardiovascular:  Cardiovascular Normal  ECG has been reviewed.    Pulmonary:  Pulmonary Normal    Renal/:  Renal/ Normal     Hepatic/GI:  Hepatic/GI Normal    Musculoskeletal:  Spine Disorders: lumbar Chronic Pain    Neurological:   Seizures T7 paraplegia  Spina bifida  Hydrocephalus with  shunt  Neurogenic bladder  Last seizure years ago the patient has been discharged from neurologist care  Chronic Pain Syndrome Seizure Disorder , Most recent seizure occurred >1 year ago  Nervous System Malformations, Chiari Malformation, Hydrocephalus, s/p  shunt, stable, Spina Bifida    Endocrine:  Endocrine Normal    Dermatological:  Skin Normal    Psych:  Psychiatric Normal           Physical Exam   Airway/Jaw/Neck:  Airway Findings: Mouth Opening: Normal Tongue: Normal  Mallampati: II  Improves to II with phonation.  TM Distance: Normal, at least 6 cm  Jaw/Neck Findings:  Neck ROM: Normal ROM      Dental:  Dental Findings: In tact, Lower  retainer   Chest/Lungs:  Chest/Lungs Findings: Normal Respiratory Rate, Clear to auscultation     Heart/Vascular:  Heart Findings: Rate: Normal  Rhythm: Regular Rhythm             Anesthesia Plan  Type of Anesthesia, risks & benefits discussed:  Anesthesia Type:  general, MAC  Patient's Preference: General or MAC  Intra-op Monitoring Plan: standard ASA monitors  Intra-op Monitoring Plan Comments:   Post Op Pain Control Plan:   Post Op Pain Control Plan Comments:   Induction:   IV  Beta Blocker:  Patient is not currently on a Beta-Blocker (No further documentation required).       Informed Consent: Patient understands risks and agrees with Anesthesia plan.  Questions answered. Anesthesia consent signed with patient.  ASA Score: 3     Day of Surgery Review of History & Physical:        Anesthesia Plan Notes: GETA or MAC with Propofol        Ready For Surgery From Anesthesia Perspective.

## 2020-03-26 NOTE — CONSULTS
Consults   Identifying data:  24-year-old female    Chief complaint:  Infected right foot    History of present illness:  Patient previous removal of all toes on right foot by me was doing quite well cornelio over the last couple weeks dehisced the wound got infected she came in to see me in the office a few days back and order a 3 phase bone scan which positive for osteomyelitis of remaining bones.  I enter admitted to the hospital for incision drainage debridement and bone removal for culture and sensitivity aerobic, anaerobic, acid-fast, and fungal cultures.    Objective:  Vascular:  Palpable pedal pulses  Neurological:  Insensate feet and paraplegia secondary of myelomeningocele  Integument:  Grade 2 ulceration with erythema edema throughout the foot distal metatarsal amputation site right foot            Assessment:  Infected right foot with osteomyelitis    Plan:  Plantar taken to the operating room tomorrow do an incision drainage debridement and bone biopsies for cultures for IV antibiotics per Infectious Disease.  Patient will need to go to long-term a care acute facility for care.

## 2020-03-27 ENCOUNTER — ANESTHESIA (OUTPATIENT)
Dept: SURGERY | Facility: HOSPITAL | Age: 25
DRG: 501 | End: 2020-03-27
Payer: MEDICAID

## 2020-03-27 LAB
ANION GAP SERPL CALC-SCNC: 8 MMOL/L (ref 8–16)
BASOPHILS # BLD AUTO: 0.04 K/UL (ref 0–0.2)
BASOPHILS NFR BLD: 0.4 % (ref 0–1.9)
BUN SERPL-MCNC: 20 MG/DL (ref 6–20)
CALCIUM SERPL-MCNC: 8.5 MG/DL (ref 8.7–10.5)
CHLORIDE SERPL-SCNC: 107 MMOL/L (ref 95–110)
CO2 SERPL-SCNC: 20 MMOL/L (ref 23–29)
CREAT SERPL-MCNC: 0.3 MG/DL (ref 0.5–1.4)
DIFFERENTIAL METHOD: ABNORMAL
EOSINOPHIL # BLD AUTO: 0.1 K/UL (ref 0–0.5)
EOSINOPHIL NFR BLD: 0.7 % (ref 0–8)
ERYTHROCYTE [DISTWIDTH] IN BLOOD BY AUTOMATED COUNT: 15.6 % (ref 11.5–14.5)
EST. GFR  (AFRICAN AMERICAN): >60 ML/MIN/1.73 M^2
EST. GFR  (NON AFRICAN AMERICAN): >60 ML/MIN/1.73 M^2
GLUCOSE SERPL-MCNC: 82 MG/DL (ref 70–110)
HCT VFR BLD AUTO: 34.9 % (ref 37–48.5)
HGB BLD-MCNC: 11 G/DL (ref 12–16)
IMM GRANULOCYTES # BLD AUTO: 0.03 K/UL (ref 0–0.04)
IMM GRANULOCYTES NFR BLD AUTO: 0.3 % (ref 0–0.5)
LYMPHOCYTES # BLD AUTO: 2.5 K/UL (ref 1–4.8)
LYMPHOCYTES NFR BLD: 26.5 % (ref 18–48)
MAGNESIUM SERPL-MCNC: 2.1 MG/DL (ref 1.6–2.6)
MCH RBC QN AUTO: 26 PG (ref 27–31)
MCHC RBC AUTO-ENTMCNC: 31.5 G/DL (ref 32–36)
MCV RBC AUTO: 83 FL (ref 82–98)
MONOCYTES # BLD AUTO: 1 K/UL (ref 0.3–1)
MONOCYTES NFR BLD: 10.2 % (ref 4–15)
NEUTROPHILS # BLD AUTO: 5.8 K/UL (ref 1.8–7.7)
NEUTROPHILS NFR BLD: 61.9 % (ref 38–73)
NRBC BLD-RTO: 0 /100 WBC
PLATELET # BLD AUTO: 375 K/UL (ref 150–350)
PMV BLD AUTO: 9.6 FL (ref 9.2–12.9)
POTASSIUM SERPL-SCNC: 4 MMOL/L (ref 3.5–5.1)
RBC # BLD AUTO: 4.23 M/UL (ref 4–5.4)
SODIUM SERPL-SCNC: 135 MMOL/L (ref 136–145)
WBC # BLD AUTO: 9.4 K/UL (ref 3.9–12.7)

## 2020-03-27 PROCEDURE — 63600175 PHARM REV CODE 636 W HCPCS: Performed by: NURSE ANESTHETIST, CERTIFIED REGISTERED

## 2020-03-27 PROCEDURE — 25000003 PHARM REV CODE 250: Performed by: PODIATRIST

## 2020-03-27 PROCEDURE — 87077 CULTURE AEROBIC IDENTIFY: CPT | Mod: 59

## 2020-03-27 PROCEDURE — 87102 FUNGUS ISOLATION CULTURE: CPT

## 2020-03-27 PROCEDURE — 28112 PR FULL EXCIS 2,3 OR 4TH METATAR HEAD: ICD-10-PCS | Mod: 51,T6,, | Performed by: PODIATRIST

## 2020-03-27 PROCEDURE — 80048 BASIC METABOLIC PNL TOTAL CA: CPT

## 2020-03-27 PROCEDURE — 37000009 HC ANESTHESIA EA ADD 15 MINS: Performed by: PODIATRIST

## 2020-03-27 PROCEDURE — 11042 DBRDMT SUBQ TIS 1ST 20SQCM/<: CPT | Mod: 59,,, | Performed by: PODIATRIST

## 2020-03-27 PROCEDURE — 37000008 HC ANESTHESIA 1ST 15 MINUTES: Performed by: PODIATRIST

## 2020-03-27 PROCEDURE — 11042 PR DEBRIDEMENT, SKIN, SUB-Q TISSUE,=<20 SQ CM: ICD-10-PCS | Mod: 59,,, | Performed by: PODIATRIST

## 2020-03-27 PROCEDURE — 63600175 PHARM REV CODE 636 W HCPCS: Performed by: INTERNAL MEDICINE

## 2020-03-27 PROCEDURE — 36415 COLL VENOUS BLD VENIPUNCTURE: CPT

## 2020-03-27 PROCEDURE — 87147 CULTURE TYPE IMMUNOLOGIC: CPT

## 2020-03-27 PROCEDURE — 83735 ASSAY OF MAGNESIUM: CPT

## 2020-03-27 PROCEDURE — 85025 COMPLETE CBC W/AUTO DIFF WBC: CPT

## 2020-03-27 PROCEDURE — 87118 MYCOBACTERIC IDENTIFICATION: CPT

## 2020-03-27 PROCEDURE — 71000033 HC RECOVERY, INTIAL HOUR: Performed by: PODIATRIST

## 2020-03-27 PROCEDURE — 36000707: Performed by: PODIATRIST

## 2020-03-27 PROCEDURE — 87205 SMEAR GRAM STAIN: CPT

## 2020-03-27 PROCEDURE — 87116 MYCOBACTERIA CULTURE: CPT

## 2020-03-27 PROCEDURE — 36000706: Performed by: PODIATRIST

## 2020-03-27 PROCEDURE — 94761 N-INVAS EAR/PLS OXIMETRY MLT: CPT

## 2020-03-27 PROCEDURE — 87075 CULTR BACTERIA EXCEPT BLOOD: CPT

## 2020-03-27 PROCEDURE — 71000039 HC RECOVERY, EACH ADD'L HOUR: Performed by: PODIATRIST

## 2020-03-27 PROCEDURE — 25000003 PHARM REV CODE 250: Performed by: INTERNAL MEDICINE

## 2020-03-27 PROCEDURE — 27201423 OPTIME MED/SURG SUP & DEVICES STERILE SUPPLY: Performed by: PODIATRIST

## 2020-03-27 PROCEDURE — 87070 CULTURE OTHR SPECIMN AEROBIC: CPT

## 2020-03-27 PROCEDURE — 28111 PART REMOVAL OF METATARSAL: CPT | Mod: T5,,, | Performed by: PODIATRIST

## 2020-03-27 PROCEDURE — 87206 SMEAR FLUORESCENT/ACID STAI: CPT

## 2020-03-27 PROCEDURE — 28112 PART REMOVAL OF METATARSAL: CPT | Mod: 51,T6,, | Performed by: PODIATRIST

## 2020-03-27 PROCEDURE — 28111 PR FULL EXCIS 1ST METATARSAL HEAD: ICD-10-PCS | Mod: T5,,, | Performed by: PODIATRIST

## 2020-03-27 PROCEDURE — 12000002 HC ACUTE/MED SURGE SEMI-PRIVATE ROOM

## 2020-03-27 PROCEDURE — 87186 SC STD MICRODIL/AGAR DIL: CPT

## 2020-03-27 RX ORDER — VANCOMYCIN HCL IN 5 % DEXTROSE 1G/250ML
1000 PLASTIC BAG, INJECTION (ML) INTRAVENOUS
Status: DISCONTINUED | OUTPATIENT
Start: 2020-03-27 | End: 2020-03-27

## 2020-03-27 RX ORDER — BALSAM PERU/CASTOR OIL
OINTMENT (GRAM) TOPICAL DAILY
Status: DISCONTINUED | OUTPATIENT
Start: 2020-03-27 | End: 2020-03-31

## 2020-03-27 RX ORDER — MIDAZOLAM HYDROCHLORIDE 1 MG/ML
INJECTION INTRAMUSCULAR; INTRAVENOUS
Status: DISCONTINUED | OUTPATIENT
Start: 2020-03-27 | End: 2020-03-27

## 2020-03-27 RX ORDER — DIPHENHYDRAMINE HYDROCHLORIDE 50 MG/ML
12.5 INJECTION INTRAMUSCULAR; INTRAVENOUS
Status: DISCONTINUED | OUTPATIENT
Start: 2020-03-27 | End: 2020-04-02 | Stop reason: HOSPADM

## 2020-03-27 RX ORDER — MEPERIDINE HYDROCHLORIDE 50 MG/ML
12.5 INJECTION INTRAMUSCULAR; INTRAVENOUS; SUBCUTANEOUS EVERY 10 MIN PRN
Status: ACTIVE | OUTPATIENT
Start: 2020-03-27 | End: 2020-03-27

## 2020-03-27 RX ORDER — ONDANSETRON 2 MG/ML
INJECTION INTRAMUSCULAR; INTRAVENOUS
Status: DISCONTINUED | OUTPATIENT
Start: 2020-03-27 | End: 2020-03-27

## 2020-03-27 RX ORDER — FENTANYL CITRATE 50 UG/ML
25 INJECTION, SOLUTION INTRAMUSCULAR; INTRAVENOUS
Status: DISCONTINUED | OUTPATIENT
Start: 2020-03-27 | End: 2020-04-02 | Stop reason: HOSPADM

## 2020-03-27 RX ORDER — VANCOMYCIN HCL IN 5 % DEXTROSE 1G/250ML
1000 PLASTIC BAG, INJECTION (ML) INTRAVENOUS
Status: DISCONTINUED | OUTPATIENT
Start: 2020-03-27 | End: 2020-03-30

## 2020-03-27 RX ORDER — BUPIVACAINE HYDROCHLORIDE AND EPINEPHRINE 5; 5 MG/ML; UG/ML
INJECTION, SOLUTION EPIDURAL; INTRACAUDAL; PERINEURAL
Status: DISCONTINUED | OUTPATIENT
Start: 2020-03-27 | End: 2020-03-27 | Stop reason: HOSPADM

## 2020-03-27 RX ORDER — PROMETHAZINE HYDROCHLORIDE 25 MG/1
25 TABLET ORAL EVERY 6 HOURS PRN
Status: DISCONTINUED | OUTPATIENT
Start: 2020-03-27 | End: 2020-04-02 | Stop reason: HOSPADM

## 2020-03-27 RX ORDER — PROPOFOL 10 MG/ML
VIAL (ML) INTRAVENOUS
Status: DISCONTINUED | OUTPATIENT
Start: 2020-03-27 | End: 2020-03-27

## 2020-03-27 RX ORDER — SODIUM CHLORIDE 9 MG/ML
INJECTION, SOLUTION INTRAVENOUS CONTINUOUS PRN
Status: DISCONTINUED | OUTPATIENT
Start: 2020-03-27 | End: 2020-03-27

## 2020-03-27 RX ADMIN — VANCOMYCIN HYDROCHLORIDE 1000 MG: 1 INJECTION, POWDER, LYOPHILIZED, FOR SOLUTION INTRAVENOUS at 10:03

## 2020-03-27 RX ADMIN — PROPOFOL 30 MG: 10 INJECTION, EMULSION INTRAVENOUS at 09:03

## 2020-03-27 RX ADMIN — PROPOFOL 20 MG: 10 INJECTION, EMULSION INTRAVENOUS at 10:03

## 2020-03-27 RX ADMIN — PROPOFOL 10 MG: 10 INJECTION, EMULSION INTRAVENOUS at 10:03

## 2020-03-27 RX ADMIN — SODIUM CHLORIDE: 0.9 INJECTION, SOLUTION INTRAVENOUS at 09:03

## 2020-03-27 RX ADMIN — ONDANSETRON 4 MG: 2 INJECTION INTRAMUSCULAR; INTRAVENOUS at 09:03

## 2020-03-27 RX ADMIN — CASTOR OIL AND BALSAM, PERU: 788; 87 OINTMENT TOPICAL at 04:03

## 2020-03-27 RX ADMIN — CEFTRIAXONE 2 G: 2 INJECTION, SOLUTION INTRAVENOUS at 09:03

## 2020-03-27 RX ADMIN — MIDAZOLAM HYDROCHLORIDE 2 MG: 1 INJECTION, SOLUTION INTRAMUSCULAR; INTRAVENOUS at 09:03

## 2020-03-27 NOTE — SUBJECTIVE & OBJECTIVE
Past Medical History:   Diagnosis Date    Arnold-Chiari malformation, type II     Encounter for blood transfusion     Hydrocephalus     Neurogenic bladder     self catheterizes every 3 hours    Seizures     only w/ shunt malfunction    Spinal bifida, closed     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       Past Surgical History:   Procedure Laterality Date    AMPUTATION      right 4th and 5th toes; left 5th toe and portion of left great toe    BACK SURGERY      BLADDER SURGERY      COLON SURGERY      flush site for bowel movements from appendix    CYSTOSTOMY      EYE SURGERY      x 5    FLUOROSCOPIC URODYNAMIC STUDY N/A 3/12/2019    Procedure: URODYNAMIC STUDY, FLUOROSCOPIC;  Surgeon: Kenzie Charles MD;  Location: [facility] OR [address] [place] [address];  Service: Urology;  Laterality: N/A;  1 hour    FLUOROSCOPIC URODYNAMIC STUDY N/A 4/4/2019    Procedure: URODYNAMIC STUDY, FLUOROSCOPIC;  Surgeon: Kenzie Charles MD;  Location: [address];  Service: Urology;  Laterality: N/A;  1 hour    FOOT AMPUTATION THROUGH METATARSAL Right 10/28/2019    Procedure: AMPUTATION, FOOT, TRANSMETATARSAL;  Surgeon: Abdi Bedoya DPM;  Location: [facility];  Service: Podiatry;  Laterality: Right;    MYELOMININGOCELE REPAIR      STRABISMUS SURGERY      ou dr peña    VENTRICULOPERITONEAL SHUNT         Review of patient's allergies indicates:   Allergen Reactions    Latex, natural rubber Anaphylaxis and Other (See Comments)     angioedema    Nitrofurantoin Shortness Of Breath    Bactrim  [sulfamethoxazole-trimethoprim]      Other reaction(s): Swelling of face    Gardasil  [h papillomavirus vac,qval (pf)]      Other reaction(s): Shortness of breath    Menactra  [mening vac a,c,y,w135 dip (pf)]      Other reaction(s): Shortness of breath    Nitrofurantoin      Other reaction(s): Difficulty breathing    Sulfa (sulfonamide antibiotics)     Tramadol Hives    Zofran [ondansetron hcl (pf)]     Levaquin [levofloxacin] Rash     Spots on face     Macrobid [nitrofurantoin monohyd/m-cryst] Rash     Sppots/rash on face       No current facility-administered medications on file prior to encounter.      Current Outpatient Medications on File Prior to Encounter   Medication Sig    catheter 12 Fr Misc by Misc.(Non-Drug; Combo Route) route.    catheter 8 Fr Misc Use up to twice daily    diaper,brief,adult,disposable (DEPEND PANTS) Misc Change BID for pressure ulcer    EPINEPHrine (EPIPEN 2-DEEPIKA) 0.3 mg/0.3 mL AtIn USE ONE INJECTION INTO THE MUSCLE AS NEEDED FOR ANAPHYLAXIS    glycerin pediatric suppository Use PRN for constipation    We need 1 bottle    menthol-zinc oxide (CALMOSEPTINE) 0.44-20.6 % Oint Apply topically 2 (two) times daily as needed.    oxyCODONE-acetaminophen (PERCOCET)  mg per tablet Take 1 tablet by mouth every 6 (six) hours as needed for Pain.    syringe, disposable, 60 mL Syrg Use for self catheterization up to 7 times daily    [DISCONTINUED] clindamycin (CLEOCIN) 150 MG capsule Take 1 capsule (150 mg total) by mouth 3 (three) times daily.    [DISCONTINUED] levETIRAcetam (KEPPRA) 500 MG Tab Take 1 tablet (500 mg total) by mouth 2 (two) times daily. (last refill, need appointment)    [DISCONTINUED] levoFLOXacin (LEVAQUIN) 500 MG tablet TAKE 1 TABLET BY MOUTH EVERY 24 HOURS FOR 5 DAYS    [DISCONTINUED] lidocaine (LIDODERM) 5 % Place 2-3 patches onto the skin once daily. Remove & Discard patch within 12 hours or as directed by MD    [DISCONTINUED] nitrofurantoin, macrocrystal-monohydrate, (MACROBID) 100 MG capsule TAKE 1 CAPSULE BY MOUTH EVERY 12 HOURS FOR 5 DAYS     Family History     Problem Relation (Age of Onset)    Cataracts Maternal Grandmother        Tobacco Use    Smoking status: Never Smoker    Smokeless tobacco: Never Used   Substance and Sexual Activity    Alcohol use: No    Drug use: No    Sexual activity: Never       Objective:     Vital Signs (Most Recent):  Temp: 98.6 °F (37 °C) (03/26/20 1632)  Pulse: 88  (03/26/20 1632)  Resp: 18 (03/26/20 1632)  BP: (!) 150/93 (03/26/20 1632)  SpO2: 100 % (03/26/20 1632) Vital Signs (24h Range):  Temp:  [98.6 °F (37 °C)] 98.6 °F (37 °C)  Pulse:  [88] 88  Resp:  [18] 18  SpO2:  [100 %] 100 %  BP: (150)/(93) 150/93     Weight: 51.7 kg (113 lb 15.7 oz)  Body mass index is 25.55 kg/m².    Physical Exam   Constitutional: She is oriented to person, place, and time. She appears well-developed and well-nourished.   HENT:   Head: Normocephalic and atraumatic.   Mouth/Throat: Oropharynx is clear and moist. No oropharyngeal exudate.   Eyes: Pupils are equal, round, and reactive to light. Conjunctivae are normal. No scleral icterus.   Neck: Neck supple. No thyromegaly present.   Cardiovascular: Normal rate, regular rhythm, normal heart sounds and intact distal pulses.   Pulmonary/Chest: Effort normal and breath sounds normal. No stridor. No respiratory distress.   Abdominal: Soft. Bowel sounds are normal. She exhibits no distension. There is no tenderness.   Musculoskeletal: She exhibits edema and deformity. She exhibits no tenderness.   S/p right metatarsal amputation    Neurological: She is alert and oriented to person, place, and time. A sensory deficit is present.   Paraplegic    Skin: Capillary refill takes less than 2 seconds. There is erythema. No pallor.   Right foot distal ulcer with erythema and edema    Psychiatric: She has a normal mood and affect. Her behavior is normal.   Nursing note and vitals reviewed.              CRANIAL NERVES     CN III, IV, VI   Pupils are equal, round, and reactive to light.       Significant Labs:   CBC:   Recent Labs   Lab 03/26/20  1706   WBC 7.85   HGB 13.3   HCT 43.1   *     CMP:   Recent Labs   Lab 03/26/20  1706      K 3.8      CO2 22*   GLU 63*   BUN 17   CREATININE 0.5   CALCIUM 9.3   PROT 8.7*   ALBUMIN 4.5   BILITOT 0.5   ALKPHOS 71   AST 12   ALT 12   ANIONGAP 11   EGFRNONAA >60.0       Significant Imaging: I have reviewed  all pertinent imaging results/findings within the past 24 hours.

## 2020-03-27 NOTE — H&P
[facility] Medicine  History & Physical    Patient Name: Shashank Samuel  MRN: [id number]  Admission Date: 3/26/2020  Attending Physician: Giorgi Pratt MD  Primary Care Provider: Primary Doctor No         Patient information was obtained from patient and past medical records.     Subjective:     Principal Problem:Acute osteomyelitis of right foot    Chief Complaint:   Chief Complaint   Patient presents with    Osteomyelitis         HPI: Patient is a 24-year-old  female with known history of meningiomyelocele, spina bifida and paraplegia as well as neurogenic bladder who presents to the hospital as a direct admit from her podiatrist's office secondary to right foot osteomyelitis.    She has known history of right foot osteomyelitis and has hx of amputation of all her right foot toes.  Over the last 2 weeks she noticed wound dehiscence with redness and swelling and presented to her podiatrist's office. Sx are gradually worsening. She underwent a bone scan earlier today which revelaed right foot osteomyelitis after which she was directed to be admitted here for incision and debridement.  Patient has baseline lack of sensation in her feet secondary to her paraplegia.  She does admit to increased redness, swelling as well as ulceration at the metatarsal site.  No exacerbating or relieving factors.  Symptoms are moderate in nature.  She denies fever, chills, nausea/vomiting or lightheadedness/dizziness.  At baseline she self catheterizes herself secondary to neurogenic bladder.      Rest of the 10 point review of systems is negative except as mentioned above.      Past Medical History:   Diagnosis Date    Arnold-Chiari malformation, type II     Encounter for blood transfusion     Hydrocephalus     Neurogenic bladder     self catheterizes every 3 hours    Seizures     only w/ shunt malfunction    Spinal bifida, closed     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       Past Surgical History:   Procedure  Laterality Date    AMPUTATION      right 4th and 5th toes; left 5th toe and portion of left great toe    BACK SURGERY      BLADDER SURGERY      COLON SURGERY      flush site for bowel movements from appendix    CYSTOSTOMY      EYE SURGERY      x 5    FLUOROSCOPIC URODYNAMIC STUDY N/A 3/12/2019    Procedure: URODYNAMIC STUDY, FLUOROSCOPIC;  Surgeon: Kenzie Charles MD;  Location: [facility] OR [address] [place] [address];  Service: Urology;  Laterality: N/A;  1 hour    FLUOROSCOPIC URODYNAMIC STUDY N/A 4/4/2019    Procedure: URODYNAMIC STUDY, FLUOROSCOPIC;  Surgeon: Kenzie Charles MD;  Location: [facility] OR [address] [place] [address];  Service: Urology;  Laterality: N/A;  1 hour    FOOT AMPUTATION THROUGH METATARSAL Right 10/28/2019    Procedure: AMPUTATION, FOOT, TRANSMETATARSAL;  Surgeon: Abdi Bedoya DPM;  Location: [facility];  Service: Podiatry;  Laterality: Right;    MYELOMININGOCELE REPAIR      STRABISMUS SURGERY      ou dr peña    VENTRICULOPERITONEAL SHUNT         Review of patient's allergies indicates:   Allergen Reactions    Latex, natural rubber Anaphylaxis and Other (See Comments)     angioedema    Nitrofurantoin Shortness Of Breath    Bactrim  [sulfamethoxazole-trimethoprim]      Other reaction(s): Swelling of face    Gardasil  [h papillomavirus vac,qval (pf)]      Other reaction(s): Shortness of breath    Menactra  [mening vac a,c,y,w135 dip (pf)]      Other reaction(s): Shortness of breath    Nitrofurantoin      Other reaction(s): Difficulty breathing    Sulfa (sulfonamide antibiotics)     Tramadol Hives    Zofran [ondansetron hcl (pf)]     Levaquin [levofloxacin] Rash     Spots on face    Macrobid [nitrofurantoin monohyd/m-cryst] Rash     Sppots/rash on face       No current facility-administered medications on file prior to encounter.      Current Outpatient Medications on File Prior to Encounter   Medication Sig    catheter 12 Fr Misc by Misc.(Non-Drug; Combo Route) route.    catheter 8 Fr Misc Use up to twice  daily    diaper,brief,adult,disposable (DEPEND PANTS) Misc Change BID for pressure ulcer    EPINEPHrine (EPIPEN 2-DEEPIKA) 0.3 mg/0.3 mL AtIn USE ONE INJECTION INTO THE MUSCLE AS NEEDED FOR ANAPHYLAXIS    glycerin pediatric suppository Use PRN for constipation    We need 1 bottle    menthol-zinc oxide (CALMOSEPTINE) 0.44-20.6 % Oint Apply topically 2 (two) times daily as needed.    oxyCODONE-acetaminophen (PERCOCET)  mg per tablet Take 1 tablet by mouth every 6 (six) hours as needed for Pain.    syringe, disposable, 60 mL Syrg Use for self catheterization up to 7 times daily    [DISCONTINUED] clindamycin (CLEOCIN) 150 MG capsule Take 1 capsule (150 mg total) by mouth 3 (three) times daily.    [DISCONTINUED] levETIRAcetam (KEPPRA) 500 MG Tab Take 1 tablet (500 mg total) by mouth 2 (two) times daily. (last refill, need appointment)    [DISCONTINUED] levoFLOXacin (LEVAQUIN) 500 MG tablet TAKE 1 TABLET BY MOUTH EVERY 24 HOURS FOR 5 DAYS    [DISCONTINUED] lidocaine (LIDODERM) 5 % Place 2-3 patches onto the skin once daily. Remove & Discard patch within 12 hours or as directed by MD    [DISCONTINUED] nitrofurantoin, macrocrystal-monohydrate, (MACROBID) 100 MG capsule TAKE 1 CAPSULE BY MOUTH EVERY 12 HOURS FOR 5 DAYS     Family History     Problem Relation (Age of Onset)    Cataracts Maternal Grandmother        Tobacco Use    Smoking status: Never Smoker    Smokeless tobacco: Never Used   Substance and Sexual Activity    Alcohol use: No    Drug use: No    Sexual activity: Never       Objective:     Vital Signs (Most Recent):  Temp: 98.6 °F (37 °C) (03/26/20 1632)  Pulse: 88 (03/26/20 1632)  Resp: 18 (03/26/20 1632)  BP: (!) 150/93 (03/26/20 1632)  SpO2: 100 % (03/26/20 1632) Vital Signs (24h Range):  Temp:  [98.6 °F (37 °C)] 98.6 °F (37 °C)  Pulse:  [88] 88  Resp:  [18] 18  SpO2:  [100 %] 100 %  BP: (150)/(93) 150/93     Weight: 51.7 kg (113 lb 15.7 oz)  Body mass index is 25.55 kg/m².    Physical Exam    Constitutional: She is oriented to person, place, and time. She appears well-developed and well-nourished.   HENT:   Head: Normocephalic and atraumatic.   Mouth/Throat: Oropharynx is clear and moist. No oropharyngeal exudate.   Eyes: Pupils are equal, round, and reactive to light. Conjunctivae are normal. No scleral icterus.   Neck: Neck supple. No thyromegaly present.   Cardiovascular: Normal rate, regular rhythm, normal heart sounds and intact distal pulses.   Pulmonary/Chest: Effort normal and breath sounds normal. No stridor. No respiratory distress.   Abdominal: Soft. Bowel sounds are normal. She exhibits no distension. There is no tenderness.   Musculoskeletal: She exhibits edema and deformity. She exhibits no tenderness.   S/p right metatarsal amputation    Neurological: She is alert and oriented to person, place, and time. A sensory deficit is present.   Paraplegic    Skin: Capillary refill takes less than 2 seconds. There is erythema. No pallor.   Right foot distal ulcer with erythema and edema    Psychiatric: She has a normal mood and affect. Her behavior is normal.   Nursing note and vitals reviewed.              CRANIAL NERVES     CN III, IV, VI   Pupils are equal, round, and reactive to light.       Significant Labs:   CBC:   Recent Labs   Lab 03/26/20  1706   WBC 7.85   HGB 13.3   HCT 43.1   *     CMP:   Recent Labs   Lab 03/26/20  1706      K 3.8      CO2 22*   GLU 63*   BUN 17   CREATININE 0.5   CALCIUM 9.3   PROT 8.7*   ALBUMIN 4.5   BILITOT 0.5   ALKPHOS 71   AST 12   ALT 12   ANIONGAP 11   EGFRNONAA >60.0       Significant Imaging: I have reviewed all pertinent imaging results/findings within the past 24 hours.       Assessment/Plan:     Active Hospital Problems    Diagnosis  POA    *Acute osteomyelitis of right foot [M86.171]  Yes    Meningomyelocele [Q05.9]  Not Applicable    Osteomyelitis [M86.9]  Yes    Ulcer of toe of right foot, with necrosis of bone [L97.514]  Yes     Self-catheterizes urinary bladder [Z78.9]  Yes    Paraplegia, T7 Complete [G82.20]  Yes    Neurogenic bladder [N31.9]  Yes    Spina bifida, lumbar region [Q05.7]  Not Applicable      Resolved Hospital Problems   No resolved problems to display.       Plan:  Podiatry on board   Plan for full-thickness debridement with possibility of revision of the transmetatarsal amputation into a more proximal amputation  NPO from midnight   Self-cath for neurogenic bladder   Consulted ID; will wait until intra-operative wound cultures are obtained before starting antibiotics       VTE Risk Mitigation (From admission, onward)         Ordered     Place sequential compression device  Until discontinued      03/26/20 1617     IP VTE HIGH RISK PATIENT  Once      03/26/20 1617                   Giorgi Pratt MD  Department of Hospital Medicine   [facility]

## 2020-03-27 NOTE — PLAN OF CARE
03/27/20 0833   Patient Assessment/Suction   Level of Consciousness (AVPU) alert   Respiratory Effort Normal;Unlabored   Expansion/Accessory Muscles/Retractions no use of accessory muscles   PRE-TX-O2   O2 Device (Oxygen Therapy) room air   SpO2 99 %   Pulse Oximetry Type Intermittent   $ Pulse Oximetry - Multiple Charge Pulse Oximetry - Multiple   Pulse 90   Resp 18

## 2020-03-27 NOTE — PLAN OF CARE
Problem: Adult Inpatient Plan of Care  Goal: Plan of Care Review  Outcome: Ongoing, Progressing  Goal: Patient-Specific Goal (Individualization)  Outcome: Ongoing, Progressing  Goal: Absence of Hospital-Acquired Illness or Injury  Outcome: Ongoing, Progressing  Goal: Optimal Comfort and Wellbeing  Outcome: Ongoing, Progressing  Goal: Readiness for Transition of Care  Outcome: Ongoing, Progressing  Goal: Rounds/Family Conference  Outcome: Ongoing, Progressing     Problem: Skin Injury Risk Increased  Goal: Skin Health and Integrity  Outcome: Ongoing, Progressing     Problem: Fall Injury Risk  Goal: Absence of Fall and Fall-Related Injury  Outcome: Ongoing, Progressing     Problem: Wound  Goal: Optimal Wound Healing  Outcome: Ongoing, Progressing

## 2020-03-27 NOTE — TRANSFER OF CARE
"Anesthesia Transfer of Care Note    Patient: Shashank Samuel    Procedure(s) Performed: Procedure(s) (LRB):  DEBRIDEMENT, FOOT (Right)    Patient location: PACU    Anesthesia Type: MAC    Transport from OR: Transported from OR on room air with adequate spontaneous ventilation    Post pain: adequate analgesia    Post assessment: no apparent anesthetic complications    Post vital signs: stable    Level of consciousness: awake and alert    Nausea/Vomiting: no nausea/vomiting    Complications: none    Transfer of care protocol was followed      Last vitals:   Visit Vitals  /78   Pulse 90   Temp 36.9 °C (98.5 °F) (Oral)   Resp 18   Ht 4' 8" (1.422 m)   Wt 51.7 kg (113 lb 15.7 oz)   LMP  (LMP Unknown)   SpO2 99%   Breastfeeding? No   BMI 25.55 kg/m²     "

## 2020-03-27 NOTE — PROGRESS NOTES
[facility] Medicine  Progress Note    Patient Name: hSashank Samuel  MRN: [id number]  Patient Class: IP- Inpatient   Admission Date: 3/26/2020  Length of Stay: 1 days  Attending Physician: Giorgi Pratt MD  Primary Care Provider: Primary Doctor No        Subjective:     Principal Problem:Acute osteomyelitis of right foot        HPI:  Patient is a 24-year-old  female with known history of meningiomyelocele, spina bifida and paraplegia as well as neurogenic bladder who presents to the hospital as a direct admit from her podiatrist's office secondary to right foot osteomyelitis.    She has known history of right foot osteomyelitis and has hx of amputation of all her right foot toes.  Over the last 2 weeks she noticed wound dehiscence with redness and swelling and presented to her podiatrist's office. Sx are gradually worsening. She underwent a bone scan earlier today which revelaed right foot osteomyelitis after which she was directed to be admitted here for incision and debridement.  Patient has baseline lack of sensation in her feet secondary to her paraplegia.  She does admit to increased redness, swelling as well as ulceration at the metatarsal site.  No exacerbating or relieving factors.  Symptoms are moderate in nature.  She denies fever, chills, nausea/vomiting or lightheadedness/dizziness.  At baseline she self catheterizes herself secondary to neurogenic bladder.      Rest of the 10 point review of systems is negative except as mentioned above.      Overview/Hospital Course:  No notes on file    Interval History:  No acute overnight events reported.  Remained afebrile overnight.  Awaiting full-thickness debridement for right foot osteomyelitis later today.  Denies worsening redness or swelling.  Also denies fever or chills.      Objective:     Vital Signs (Most Recent):  Temp: 98.5 °F (36.9 °C) (03/27/20 0701)  Pulse: 90 (03/27/20 0833)  Resp: 18 (03/27/20 0833)  BP: 124/78  (03/27/20 0701)  SpO2: 99 % (03/27/20 0833) Vital Signs (24h Range):  Temp:  [98 °F (36.7 °C)-98.8 °F (37.1 °C)] 98.5 °F (36.9 °C)  Pulse:  [] 90  Resp:  [16-18] 18  SpO2:  [95 %-100 %] 99 %  BP: (111-150)/(74-93) 124/78     Weight: 51.7 kg (113 lb 15.7 oz)  Body mass index is 25.55 kg/m².    Intake/Output Summary (Last 24 hours) at 3/27/2020 1006  Last data filed at 3/27/2020 0400  Gross per 24 hour   Intake --   Output 2000 ml   Net -2000 ml      Physical Exam   Constitutional: She is oriented to person, place, and time. She appears well-developed and well-nourished.   HENT:   Head: Normocephalic and atraumatic.   Mouth/Throat: Oropharynx is clear and moist. No oropharyngeal exudate.   Eyes: Pupils are equal, round, and reactive to light. Conjunctivae are normal. No scleral icterus.   Neck: Neck supple. No thyromegaly present.   Cardiovascular: Normal rate, regular rhythm, normal heart sounds and intact distal pulses.   Pulmonary/Chest: Effort normal and breath sounds normal. No stridor. No respiratory distress.   Abdominal: Soft. Bowel sounds are normal. She exhibits no distension. There is no tenderness.   Musculoskeletal: She exhibits edema and deformity. She exhibits no tenderness.   S/p right metatarsal amputation    Neurological: She is alert and oriented to person, place, and time. A sensory deficit is present.   Paraplegic    Skin: Capillary refill takes less than 2 seconds. There is erythema. No pallor.   Right foot distal ulcer with erythema and edema    Psychiatric: She has a normal mood and affect. Her behavior is normal.   Nursing note and vitals reviewed.      Significant Labs:   CBC:   Recent Labs   Lab 03/26/20  1706 03/27/20 0427   WBC 7.85 9.40   HGB 13.3 11.0*   HCT 43.1 34.9*   * 375*     CMP:   Recent Labs   Lab 03/26/20 1706 03/27/20 0427    135*   K 3.8 4.0    107   CO2 22* 20*   GLU 63* 82   BUN 17 20   CREATININE 0.5 0.3*   CALCIUM 9.3 8.5*   PROT 8.7*  --     ALBUMIN 4.5  --    BILITOT 0.5  --    ALKPHOS 71  --    AST 12  --    ALT 12  --    ANIONGAP 11 8   EGFRNONAA >60.0 >60.0       Significant Imaging: I have reviewed all pertinent imaging results/findings within the past 24 hours.     Procedure Component Value Units Date/Time   X-Ray Chest AP Portable [[id number]] Resulted: 03/26/20 1719   Order Status: Completed Updated: 03/26/20 1721   Narrative:     EXAMINATION:  XR CHEST AP PORTABLE    CLINICAL HISTORY:  Preoperative exam, Non-pressure chronic ulcer of other part of right foot with necrosis of bone, L 97.514, L 97.512.    FINDINGS:  Portable chest radiograph compared to multiple prior exams shows the cardiomediastinal silhouette and pulmonary vasculature are within normal limits.    The lungs are normally expanded, with no consolidation, large pleural effusion, or evidence of pulmonary edema. No confluent infiltrates or pneumothorax.  There are thoracic spinal fusion rods and spinal fusion hardware, with unchanged alignment.  Ventriculoperitoneal shunt catheter courses through the left hemithorax.   Impression:       No evidence of active cardiopulmonary disease.      Electronically signed by: Joe Fuentes MD  Date: 03/26/2020  Time: 17:19           Assessment/Plan:      Active Hospital Problems    Diagnosis    *Acute osteomyelitis of right foot    Meningomyelocele    Osteomyelitis    Ulcer of toe of right foot, with necrosis of bone    Self-catheterizes urinary bladder    Paraplegia, T7 Complete    Neurogenic bladder    Spina bifida, lumbar region       Plan:  OR later today for full-thickness debridement with possibility of revision of the transmetatarsal amputation into a more proximal amputation  Consulted ID; will wait until intra-operative wound cultures are obtained before starting antibiotics   Self-cath for neurogenic bladder      VTE Risk Mitigation (From admission, onward)         Ordered     Place sequential compression device  Until  discontinued      03/26/20 1617     IP VTE HIGH RISK PATIENT  Once      03/26/20 1617                      Giorgi Pratt MD  Department of Hospital Medicine   [facility]

## 2020-03-27 NOTE — NURSING
24 yr old female  Alert paraplegic had surgery on the rt foot today.      Sacral is red blanching and has scars   Dressing intact to the rt foot ankle.     rt hip  Rt hip stage 2 injury 1x3x.2   Rt hip Clean with chlorhexidine/ns.  Pat dry. Apply Medihoney and cover with mepilex. Daily

## 2020-03-27 NOTE — PLAN OF CARE
"   03/27/20 1128   Discharge Assessment   Assessment Type Discharge Planning Assessment   Confirmed/corrected address and phone number on facesheet? Yes   Assessment information obtained from? Caregiver   Communicated expected length of stay with patient/caregiver no   Prior to hospitilization cognitive status: Alert/Oriented   Prior to hospitalization functional status: Completely Dependent   Current cognitive status: Alert/Oriented   Current Functional Status: Completely Dependent   Facility Arrived From: home   Lives With parent(s)   Able to Return to Prior Arrangements no   Is patient able to care for self after discharge? Yes   Who are your caregiver(s) and their phone number(s)? Kayleen [phone]   Patient's perception of discharge disposition home or selfcare   Readmission Within the Last 30 Days no previous admission in last 30 days   Patient currently being followed by outpatient case management? No   Patient currently receives any other outside agency services? No   Equipment Currently Used at Home wheelchair;slide board;shower chair;bath bench   Do you have any problems affording any of your prescribed medications? No   Is the patient taking medications as prescribed? yes   Does the patient have transportation home? Yes   Transportation Anticipated family or friend will provide   Dialysis Name and Scheduled days n/a   Does the patient receive services at the [facility]? No   Discharge Plan A Home with family;Home Health   Discharge Plan B Long-term acute care facility (LTAC)   DME Needed Upon Discharge  none   Patient/Family in Agreement with Plan yes       CM spoke to mom(Kayleen [phone]) who was ok with doing discharge planning over the phone, Family would rather take Shashank home due to her "being susceptible to all bugs" She states she and her other daughter should be able to manage her needs.  LTAC only as last result.    "

## 2020-03-27 NOTE — PLAN OF CARE
Problem: Adult Inpatient Plan of Care  Goal: Plan of Care Review  Outcome: Ongoing, Progressing     Problem: Adult Inpatient Plan of Care  Goal: Patient-Specific Goal (Individualization)  Outcome: Ongoing, Progressing     Problem: Adult Inpatient Plan of Care  Goal: Optimal Comfort and Wellbeing  Outcome: Ongoing, Progressing     Problem: Adult Inpatient Plan of Care  Goal: Rounds/Family Conference  Outcome: Ongoing, Progressing     Problem: Skin Injury Risk Increased  Goal: Skin Health and Integrity  Outcome: Ongoing, Progressing     Problem: Fall Injury Risk  Goal: Absence of Fall and Fall-Related Injury  Outcome: Ongoing, Progressing

## 2020-03-27 NOTE — OP NOTE
Operative Report     Patient name: Shashank Samuel   MRN: [id number]  Date of surgery: 3/27/2020    Surgeon: Abdi Bedoya DPM   Assistant:  None    Preoperative diagnosis:  1.  Ulceration right foot site of previous transmetatarsal amputation with osteomyelitis.  2.  Grade 2 ulcer distal lateral right ankle with no signs of infection  Postoperative diagnosis:  Same as the above  Procedure:  1.  Revision of transmetatarsal amputation right foot with bone from 1st and 2nd metatarsal being sent for culture and sensitivity aerobic, anaerobic, acid-fast, fungal cultures.  2.  Debridement of lateral ankle ulcer epidermal and dermal and subcutaneous tissue  Anesthesia:  IV sedation monitored anesthesia care  Hemostasis:  Calf tourniquet 250 mm of mercury  Estimated blood loss:  5 cc   Specimen:  Bone for 1st and 2nd metatarsal for aerobic, anaerobic, acid-fast, and fungal cultures.  Ulcerated skin sent out for histopathology.  Complications: None  Condition upon discharge: Stable                Procedure in detail:  Patient brought the operating room placed on the operating table in a supine position.  She had IV sedation performed along with monitored anesthesia care.  The right leg and foot were prepped and draped in the usual aseptic manner.  Attention was placed to the previous transmetatarsal amputation site of the right foot which now had a grade 2 ulcer approximately 4 cm x 2 cm x 1 cm deep.  Three-phase bone scan was consistent with osteomyelitis of 1st and 2nd metatarsal.  I did 2 semi elliptical converging incisions in excise the complete ulcer area and carried the dissection down to the bone of the 1st and 2nd metatarsals.  No purulent drainage was encountered.  I underscored the 1st and 2nd metatarsal remnants and used a saw and removed the heads and sent them out for a aerobic, anaerobic, acid-fast, and fungal cultures.  I irrigated the area.  I deflated the calf tourniquet and coagulated bleeders.  I  really elevated the tourniquet irrigated the area again and I closed the skin with simple interrupted 3.0 Prolene sutures.  I placed my attention to the lateral ankle ulcer and I used a curette and curetted the dermis and epidermis and subcutaneous tissue down to bleeding tissue.  I irrigated the area.  No purulent drainage was seen.  It did not extending deeper the subcutaneous tissue.  I injected the forefoot with 0.5% Marcaine with epinephrine.  I dressed the incision site and the ulcer with Adaptic 4x4s Perlita.  The pneumatic calf tourniquet was deflated.  Ace wrap applied.  Patient left the operating room with stable vitals.    1.  Patient will be transferred back to her room in the hospital.  2.  I will consult wound care for sacral and pelvic of ulcerations.

## 2020-03-27 NOTE — SUBJECTIVE & OBJECTIVE
Interval History:  No acute overnight events reported.  Remained afebrile overnight.  Awaiting full-thickness debridement for right foot osteomyelitis later today.  Denies worsening redness or swelling.  Also denies fever or chills.      Objective:     Vital Signs (Most Recent):  Temp: 98.5 °F (36.9 °C) (03/27/20 0701)  Pulse: 90 (03/27/20 0833)  Resp: 18 (03/27/20 0833)  BP: 124/78 (03/27/20 0701)  SpO2: 99 % (03/27/20 0833) Vital Signs (24h Range):  Temp:  [98 °F (36.7 °C)-98.8 °F (37.1 °C)] 98.5 °F (36.9 °C)  Pulse:  [] 90  Resp:  [16-18] 18  SpO2:  [95 %-100 %] 99 %  BP: (111-150)/(74-93) 124/78     Weight: 51.7 kg (113 lb 15.7 oz)  Body mass index is 25.55 kg/m².    Intake/Output Summary (Last 24 hours) at 3/27/2020 1006  Last data filed at 3/27/2020 0400  Gross per 24 hour   Intake --   Output 2000 ml   Net -2000 ml      Physical Exam   Constitutional: She is oriented to person, place, and time. She appears well-developed and well-nourished.   HENT:   Head: Normocephalic and atraumatic.   Mouth/Throat: Oropharynx is clear and moist. No oropharyngeal exudate.   Eyes: Pupils are equal, round, and reactive to light. Conjunctivae are normal. No scleral icterus.   Neck: Neck supple. No thyromegaly present.   Cardiovascular: Normal rate, regular rhythm, normal heart sounds and intact distal pulses.   Pulmonary/Chest: Effort normal and breath sounds normal. No stridor. No respiratory distress.   Abdominal: Soft. Bowel sounds are normal. She exhibits no distension. There is no tenderness.   Musculoskeletal: She exhibits edema and deformity. She exhibits no tenderness.   S/p right metatarsal amputation    Neurological: She is alert and oriented to person, place, and time. A sensory deficit is present.   Paraplegic    Skin: Capillary refill takes less than 2 seconds. There is erythema. No pallor.   Right foot distal ulcer with erythema and edema    Psychiatric: She has a normal mood and affect. Her behavior is  normal.   Nursing note and vitals reviewed.      Significant Labs:   CBC:   Recent Labs   Lab 03/26/20  1706 03/27/20 0427   WBC 7.85 9.40   HGB 13.3 11.0*   HCT 43.1 34.9*   * 375*     CMP:   Recent Labs   Lab 03/26/20  1706 03/27/20 0427    135*   K 3.8 4.0    107   CO2 22* 20*   GLU 63* 82   BUN 17 20   CREATININE 0.5 0.3*   CALCIUM 9.3 8.5*   PROT 8.7*  --    ALBUMIN 4.5  --    BILITOT 0.5  --    ALKPHOS 71  --    AST 12  --    ALT 12  --    ANIONGAP 11 8   EGFRNONAA >60.0 >60.0       Significant Imaging: I have reviewed all pertinent imaging results/findings within the past 24 hours.     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    FINDINGS:  Portable chest radiograph compared to multiple prior exams shows the cardiomediastinal silhouette and pulmonary vasculature are within normal limits.    The lungs are normally expanded, with no consolidation, large pleural effusion, or evidence of pulmonary edema. No confluent infiltrates or pneumothorax.  There are thoracic spinal fusion rods and spinal fusion hardware, with unchanged alignment.  Ventriculoperitoneal shunt catheter courses through the left hemithorax.   Impression:       No evidence of active cardiopulmonary disease.      Electronically signed by: Joe Fuentes MD  Date: 03/26/2020  Time: 17:19

## 2020-03-27 NOTE — HPI
Patient is a 24-year-old  female with known history of meningiomyelocele, spina bifida and paraplegia as well as neurogenic bladder who presents to the hospital as a direct admit from her podiatrist's office secondary to right foot osteomyelitis.    She has known history of right foot osteomyelitis and has hx of amputation of all her right foot toes.  Over the last 2 weeks she noticed wound dehiscence with redness and swelling and presented to her podiatrist's office. Sx are gradually worsening. She underwent a bone scan earlier today which revelaed right foot osteomyelitis after which she was directed to be admitted here for incision and debridement.  Patient has baseline lack of sensation in her feet secondary to her paraplegia.  She does admit to increased redness, swelling as well as ulceration at the metatarsal site.  No exacerbating or relieving factors.  Symptoms are moderate in nature.  She denies fever, chills, nausea/vomiting or lightheadedness/dizziness.  At baseline she self catheterizes herself secondary to neurogenic bladder.      Rest of the 10 point review of systems is negative except as mentioned above.

## 2020-03-27 NOTE — ANESTHESIA POSTPROCEDURE EVALUATION
Anesthesia Post Evaluation    Patient: Shashank Samuel    Procedure(s) Performed: Procedure(s) (LRB):  DEBRIDEMENT, FOOT (Right)    Final Anesthesia Type: general    Patient location during evaluation: PACU  Patient participation: Yes- Able to Participate  Level of consciousness: awake and alert and oriented  Post-procedure vital signs: reviewed and stable  Pain management: adequate  Airway patency: patent    PONV status at discharge: No PONV  Anesthetic complications: no      Cardiovascular status: blood pressure returned to baseline, hemodynamically stable and stable  Respiratory status: unassisted, spontaneous ventilation and room air  Hydration status: euvolemic  Follow-up not needed.          Vitals Value Taken Time   /83 3/27/2020 11:00 AM   Temp 36.4 °C (97.5 °F) 3/27/2020 11:00 AM   Pulse 75 3/27/2020 11:07 AM   Resp 16 3/27/2020 11:00 AM   SpO2 100 % 3/27/2020 11:07 AM   Vitals shown include unvalidated device data.      No case tracking events are documented in the log.      Pain/Yahaira Score: Yahaira Score: 9 (3/27/2020 11:00 AM)

## 2020-03-28 LAB
ANION GAP SERPL CALC-SCNC: 10 MMOL/L (ref 8–16)
BACTERIA UR CULT: NORMAL
BACTERIA UR CULT: NORMAL
BASOPHILS # BLD AUTO: 0.01 K/UL (ref 0–0.2)
BASOPHILS NFR BLD: 0.1 % (ref 0–1.9)
BUN SERPL-MCNC: 17 MG/DL (ref 6–20)
CALCIUM SERPL-MCNC: 8.3 MG/DL (ref 8.7–10.5)
CHLORIDE SERPL-SCNC: 110 MMOL/L (ref 95–110)
CO2 SERPL-SCNC: 18 MMOL/L (ref 23–29)
CREAT SERPL-MCNC: <0.3 MG/DL (ref 0.5–1.4)
CRP SERPL-MCNC: 3.46 MG/DL (ref 0–0.75)
DIFFERENTIAL METHOD: ABNORMAL
EOSINOPHIL # BLD AUTO: 0.1 K/UL (ref 0–0.5)
EOSINOPHIL NFR BLD: 0.9 % (ref 0–8)
ERYTHROCYTE [DISTWIDTH] IN BLOOD BY AUTOMATED COUNT: 15.7 % (ref 11.5–14.5)
EST. GFR  (AFRICAN AMERICAN): >60 ML/MIN/1.73 M^2
EST. GFR  (NON AFRICAN AMERICAN): >60 ML/MIN/1.73 M^2
GLUCOSE SERPL-MCNC: 89 MG/DL (ref 70–110)
HCT VFR BLD AUTO: 34.6 % (ref 37–48.5)
HGB BLD-MCNC: 10.7 G/DL (ref 12–16)
IMM GRANULOCYTES # BLD AUTO: 0.02 K/UL (ref 0–0.04)
IMM GRANULOCYTES NFR BLD AUTO: 0.3 % (ref 0–0.5)
LYMPHOCYTES # BLD AUTO: 1.6 K/UL (ref 1–4.8)
LYMPHOCYTES NFR BLD: 21 % (ref 18–48)
MAGNESIUM SERPL-MCNC: 2 MG/DL (ref 1.6–2.6)
MCH RBC QN AUTO: 25.7 PG (ref 27–31)
MCHC RBC AUTO-ENTMCNC: 30.9 G/DL (ref 32–36)
MCV RBC AUTO: 83 FL (ref 82–98)
MONOCYTES # BLD AUTO: 0.9 K/UL (ref 0.3–1)
MONOCYTES NFR BLD: 12 % (ref 4–15)
NEUTROPHILS # BLD AUTO: 5.1 K/UL (ref 1.8–7.7)
NEUTROPHILS NFR BLD: 65.7 % (ref 38–73)
NRBC BLD-RTO: 0 /100 WBC
PLATELET # BLD AUTO: 356 K/UL (ref 150–350)
PMV BLD AUTO: 9.9 FL (ref 9.2–12.9)
POTASSIUM SERPL-SCNC: 4.1 MMOL/L (ref 3.5–5.1)
RBC # BLD AUTO: 4.17 M/UL (ref 4–5.4)
SODIUM SERPL-SCNC: 138 MMOL/L (ref 136–145)
WBC # BLD AUTO: 7.81 K/UL (ref 3.9–12.7)

## 2020-03-28 PROCEDURE — 80048 BASIC METABOLIC PNL TOTAL CA: CPT

## 2020-03-28 PROCEDURE — 25000003 PHARM REV CODE 250: Performed by: INTERNAL MEDICINE

## 2020-03-28 PROCEDURE — 63600175 PHARM REV CODE 636 W HCPCS: Performed by: INTERNAL MEDICINE

## 2020-03-28 PROCEDURE — 86140 C-REACTIVE PROTEIN: CPT

## 2020-03-28 PROCEDURE — 12000002 HC ACUTE/MED SURGE SEMI-PRIVATE ROOM

## 2020-03-28 PROCEDURE — 85025 COMPLETE CBC W/AUTO DIFF WBC: CPT

## 2020-03-28 PROCEDURE — 83735 ASSAY OF MAGNESIUM: CPT

## 2020-03-28 PROCEDURE — 36415 COLL VENOUS BLD VENIPUNCTURE: CPT

## 2020-03-28 RX ORDER — DIPHENHYDRAMINE HCL 25 MG
25 CAPSULE ORAL EVERY 6 HOURS PRN
Status: DISCONTINUED | OUTPATIENT
Start: 2020-03-28 | End: 2020-04-02 | Stop reason: HOSPADM

## 2020-03-28 RX ADMIN — ACETAMINOPHEN 650 MG: 325 TABLET ORAL at 09:03

## 2020-03-28 RX ADMIN — DIPHENHYDRAMINE HYDROCHLORIDE 25 MG: 25 CAPSULE ORAL at 01:03

## 2020-03-28 RX ADMIN — CEFTRIAXONE 2 G: 2 INJECTION, SOLUTION INTRAVENOUS at 08:03

## 2020-03-28 RX ADMIN — CASTOR OIL AND BALSAM, PERU: 788; 87 OINTMENT TOPICAL at 09:03

## 2020-03-28 RX ADMIN — VANCOMYCIN HYDROCHLORIDE 1000 MG: 1 INJECTION, POWDER, LYOPHILIZED, FOR SOLUTION INTRAVENOUS at 09:03

## 2020-03-28 NOTE — SUBJECTIVE & OBJECTIVE
Interval History:  No acute overnight events reported.  She is status post revision of transmetatarsal amputation of the right foot in the setting of osteomyelitis.  Afebrile overnight.  No pain reported; patient has baseline sensory deficit secondary to paraplegia.      Objective:     Vital Signs (Most Recent):  Temp: 98.9 °F (37.2 °C) (03/28/20 0749)  Pulse: 101 (03/28/20 0749)  Resp: 16 (03/28/20 0749)  BP: 111/75 (03/28/20 0749)  SpO2: 100 % (03/28/20 0749) Vital Signs (24h Range):  Temp:  [97.4 °F (36.3 °C)-99 °F (37.2 °C)] 98.9 °F (37.2 °C)  Pulse:  [] 101  Resp:  [16-20] 16  SpO2:  [96 %-100 %] 100 %  BP: (111-143)/(73-89) 111/75     Weight: 51.7 kg (113 lb 15.7 oz)  Body mass index is 25.55 kg/m².    Intake/Output Summary (Last 24 hours) at 3/28/2020 0933  Last data filed at 3/27/2020 1427  Gross per 24 hour   Intake 900 ml   Output 960 ml   Net -60 ml      Physical Exam   Constitutional: She is oriented to person, place, and time. She appears well-developed and well-nourished.   HENT:   Head: Normocephalic and atraumatic.   Mouth/Throat: Oropharynx is clear and moist. No oropharyngeal exudate.   Eyes: Pupils are equal, round, and reactive to light. Conjunctivae are normal. No scleral icterus.   Neck: Neck supple. No thyromegaly present.   Cardiovascular: Normal rate, regular rhythm, normal heart sounds and intact distal pulses.   Pulmonary/Chest: Effort normal and breath sounds normal. No stridor. No respiratory distress.   Abdominal: Soft. Bowel sounds are normal. She exhibits no distension. There is no tenderness.   Musculoskeletal: She exhibits deformity. She exhibits no tenderness.   S/p right metatarsal amputation    Neurological: She is alert and oriented to person, place, and time. A sensory deficit is present.   Paraplegic    Skin: Capillary refill takes less than 2 seconds. No pallor.   Right foot dressing noted which is clean, dry and intact  Right hip - stage 2 ulcer - POA   Psychiatric:  She has a normal mood and affect. Her behavior is normal.   Nursing note and vitals reviewed.      Significant Labs:   CBC:   Recent Labs   Lab 03/26/20  1706 03/27/20  0427 03/28/20  0548   WBC 7.85 9.40 7.81   HGB 13.3 11.0* 10.7*   HCT 43.1 34.9* 34.6*   * 375* 356*     CMP:   Recent Labs   Lab 03/26/20  1706 03/27/20 0427 03/28/20  0548    135* 138   K 3.8 4.0 4.1    107 110   CO2 22* 20* 18*   GLU 63* 82 89   BUN 17 20 17   CREATININE 0.5 0.3* <0.3*   CALCIUM 9.3 8.5* 8.3*   PROT 8.7*  --   --    ALBUMIN 4.5  --   --    BILITOT 0.5  --   --    ALKPHOS 71  --   --    AST 12  --   --    ALT 12  --   --    ANIONGAP 11 8 10   EGFRNONAA >60.0 >60.0 >60.0

## 2020-03-28 NOTE — PROGRESS NOTES
[facility]  Podiatry  Progress Note    Patient Name: Shashank Samuel  MRN: [id number]  Admission Date: 3/26/2020  Hospital Length of Stay: 2 days  Attending Physician: Girogi Pratt MD  Primary Care Provider: Primary Doctor No     Subjective:     Interval History: Pt lying in bed. No no complaints. Surgical dressing clean, dry, and intact.     Follow-up For: Procedure(s) (LRB):  AMPUTATION, FOOT, TRANSMETATARSAL (Right)  DEBRIDEMENT, WOUND    Post-Operative Day: 1 Day Post-Op    Scheduled Meds:   balsam peru-castor oiL   Topical (Top) Daily    cefTRIAXone (ROCEPHIN) IVPB  2 g Intravenous Q24H    vancomycin (VANCOCIN) IVPB  1,000 mg Intravenous Q12H     Continuous Infusions:  PRN Meds:acetaminophen, acetaminophen, diphenhydrAMINE, diphenhydrAMINE, fentaNYL, oxyCODONE-acetaminophen, promethazine (PHENERGAN) IVPB, promethazine (PHENERGAN) IVPB, promethazine, Pharmacy to dose Vancomycin consult **AND** vancomycin - pharmacy to dose    Review of Systems  Objective:     Vital Signs (Most Recent):  Temp: 98.9 °F (37.2 °C) (03/28/20 0749)  Pulse: 101 (03/28/20 0749)  Resp: 16 (03/28/20 0749)  BP: 111/75 (03/28/20 0749)  SpO2: 100 % (03/28/20 0749) Vital Signs (24h Range):  Temp:  [97.4 °F (36.3 °C)-99 °F (37.2 °C)] 98.9 °F (37.2 °C)  Pulse:  [] 101  Resp:  [16-20] 16  SpO2:  [96 %-100 %] 100 %  BP: (111-143)/(73-89) 111/75     Weight: 51.7 kg (113 lb 15.7 oz)  Body mass index is 25.55 kg/m².    Foot Exam    Right Foot/Ankle     Comments  Surgical dressing clean, dry, and intact. No strike through noted. Left undisturbed.           Laboratory:  Wound Cultures: No results back yet.        Assessment/Plan:     Active Diagnoses:    Diagnosis Date Noted POA    PRINCIPAL PROBLEM:  Acute osteomyelitis of right foot [M86.171] 10/09/2015 Yes    Meningomyelocele [Q05.9] 03/26/2020 Not Applicable    Osteomyelitis [M86.9] 03/26/2020 Yes    Ulcer of toe of right foot, with necrosis of bone [L97.514]  10/28/2019 Yes    Self-catheterizes urinary bladder [Z78.9] 01/19/2016 Yes    Paraplegia, T7 Complete [G82.20] 12/10/2013 Yes    Neurogenic bladder [N31.9]  Yes    Spina bifida, lumbar region [Q05.7] 04/16/2013 Not Applicable      Problems Resolved During this Admission:       Await bone culture results  Continue local wound care  Pt to LTAC once results final     Roc Bland DPM  Podiatry  [facility]

## 2020-03-28 NOTE — PLAN OF CARE
Problem: Adult Inpatient Plan of Care  Goal: Plan of Care Review  Outcome: Ongoing, Progressing  Goal: Patient-Specific Goal (Individualization)  Outcome: Ongoing, Progressing  Goal: Absence of Hospital-Acquired Illness or Injury  Outcome: Ongoing, Progressing  Goal: Optimal Comfort and Wellbeing  Outcome: Ongoing, Progressing  Goal: Readiness for Transition of Care  Outcome: Ongoing, Progressing  Goal: Rounds/Family Conference  Outcome: Ongoing, Progressing     Problem: Skin Injury Risk Increased  Goal: Skin Health and Integrity  Outcome: Ongoing, Progressing     Problem: Fall Injury Risk  Goal: Absence of Fall and Fall-Related Injury  Outcome: Ongoing, Progressing     Problem: Wound  Goal: Optimal Wound Healing  Outcome: Ongoing, Progressing     Problem: Infection  Goal: Infection Symptom Resolution  Outcome: Ongoing, Progressing

## 2020-03-28 NOTE — PLAN OF CARE
Problem: Adult Inpatient Plan of Care  Goal: Plan of Care Review  Outcome: Ongoing, Progressing     Problem: Adult Inpatient Plan of Care  Goal: Patient-Specific Goal (Individualization)  Outcome: Ongoing, Progressing     Problem: Adult Inpatient Plan of Care  Goal: Optimal Comfort and Wellbeing  Outcome: Ongoing, Progressing     Problem: Adult Inpatient Plan of Care  Goal: Readiness for Transition of Care  Outcome: Ongoing, Progressing     Problem: Adult Inpatient Plan of Care  Goal: Rounds/Family Conference  Outcome: Ongoing, Progressing     Problem: Skin Injury Risk Increased  Goal: Skin Health and Integrity  Outcome: Ongoing, Progressing     Problem: Fall Injury Risk  Goal: Absence of Fall and Fall-Related Injury  Outcome: Ongoing, Progressing

## 2020-03-28 NOTE — CONSULTS
"Consult Note  Infectious Disease    Reason for Consult:      HPI: Shashank Samuel is a24 y.o.  d  female with PMHx of meningiomyelocele, spina bifida and paraplegia as well as neurogenic bladder,  self caths herself, right foot osteomyelitis and has hx of amputation of all her right foot toes came in referred by podiatrist.  Patient has been  complaining of increased redness swelling pains and ulceration at right metatarsal site.    She was seen by podiatrist about 2 weeks ago, underwent wound care, bone scan which revealed right foot osteomyelitis after which she was directed to be admitted here for incision and debridement.      Patient denies systemic signs and symptoms of infection.  Podiatrist already performed on 03/27/2020 :   "  Revision of transmetatarsal amputation right foot with bone from 1st and 2nd metatarsal being sent for culture and sensitivity aerobic, anaerobic, acid-fast, fungal cultures.  2.  Debridement of lateral ankle ulcer epidermal and dermal and subcutaneous tissue"    Antibiotics (From admission, onward)    None        Antivirals (From admission, onward)    None        Antifungals (From admission, onward)    None            Review of patient's allergies indicates:   Allergen Reactions    Latex, natural rubber Anaphylaxis and Other (See Comments)     angioedema    Nitrofurantoin Shortness Of Breath    Bactrim  [sulfamethoxazole-trimethoprim]      Other reaction(s): Swelling of face    Gardasil  [h papillomavirus vac,qval (pf)]      Other reaction(s): Shortness of breath    Menactra  [mening vac a,c,y,w135 dip (pf)]      Other reaction(s): Shortness of breath    Nitrofurantoin      Other reaction(s): Difficulty breathing    Sulfa (sulfonamide antibiotics)     Tramadol Hives    Zofran [ondansetron hcl (pf)]     Levaquin [levofloxacin] Rash     Spots on face    Macrobid [nitrofurantoin monohyd/m-cryst] Rash     Sppots/rash on face     Past Medical History:   Diagnosis " Date    Arnold-Chiari malformation, type II     Encounter for blood transfusion     Hydrocephalus     Neurogenic bladder     self catheterizes every 3 hours    Seizures     only w/ shunt malfunction    Spinal bifida, closed     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     Past Surgical History:   Procedure Laterality Date    AMPUTATION      right 4th and 5th toes; left 5th toe and portion of left great toe    BACK SURGERY      BLADDER SURGERY      COLON SURGERY      flush site for bowel movements from appendix    CYSTOSTOMY      EYE SURGERY      x 5    FLUOROSCOPIC URODYNAMIC STUDY N/A 3/12/2019    Procedure: URODYNAMIC STUDY, FLUOROSCOPIC;  Surgeon: Kenzie Charles MD;  Location: [address];  Service: Urology;  Laterality: N/A;  1 hour    FLUOROSCOPIC URODYNAMIC STUDY N/A 4/4/2019    Procedure: URODYNAMIC STUDY, FLUOROSCOPIC;  Surgeon: Kenzie Charles MD;  Location: [address];  Service: Urology;  Laterality: N/A;  1 hour    FOOT AMPUTATION THROUGH METATARSAL Right 10/28/2019    Procedure: AMPUTATION, FOOT, TRANSMETATARSAL;  Surgeon: Abdi Bedoya DPM;  Location: [facility];  Service: Podiatry;  Laterality: Right;    MYELOMININGOCELE REPAIR      STRABISMUS SURGERY      ou dr peña    VENTRICULOPERITONEAL SHUNT       Social History     Socioeconomic History    Marital status: Single     Spouse name: Not on file    Number of children: Not on file    Years of education: Not on file    Highest education level: Not on file   Occupational History    Not on file   Social Needs    Financial resource strain: Not on file    Food insecurity:     Worry: Not on file     Inability: Not on file    Transportation needs:     Medical: Not on file     Non-medical: Not on file   Tobacco Use    Smoking status: Never Smoker    Smokeless tobacco: Never Used   Substance and Sexual Activity    Alcohol use: No    Drug use: No    Sexual activity: Never   Lifestyle    Physical activity:     Days per week: Not on file      Minutes per session: Not on file    Stress: Not on file   Relationships    Social connections:     Talks on phone: Not on file     Gets together: Not on file     Attends Anabaptism service: Not on file     Active member of club or organization: Not on file     Attends meetings of clubs or organizations: Not on file     Relationship status: Not on file   Other Topics Concern    Not on file   Social History Narrative    Intact family. Wheelchair bound.  Self catheterizes herself     Family History   Problem Relation Age of Onset    Cataracts Maternal Grandmother     Amblyopia Neg Hx     Blindness Neg Hx     Cancer Neg Hx     Diabetes Neg Hx     Glaucoma Neg Hx     Hypertension Neg Hx     Macular degeneration Neg Hx     Retinal detachment Neg Hx     Strabismus Neg Hx     Stroke Neg Hx     Thyroid disease Neg Hx        Pertinent medications noted:     Review of Systems:   No chills, fever, sweats, weight loss  No change in vision, loss of vision or diplopia  No sinus congestion, purulent nasal discharge, post nasal drip or facial pain  No pain in mouth or throat. No problems with teeth, gums.  No chest pain, palpitations, syncope  No cough, sputum production, shortness of breath, dyspnea on exertion, pleurisy, hemoptysis  No nausea, vomiting, diarrhea, constipation, blood in stool, or focal abd pain,  No dysphagia, odynophagia  No dysuria, hematuria, strangury, retention, incontinence, nocturia, prostatism,   No vaginal/penile discharge, genital ulcers, risk for STD  She self caths due to neurogenic bladder  No swelling of joints, redness of joints, injuries, or new focal pain  No unusual headaches, dizziness, vertigo, numbness, paresthesias, neuropathy, falls.  Same paraplegia, smaller lower extremities due to baseline spina bifida  No anxiety, depression, substance abuse, sleep disturbance  No diabetes, thyroid, hypogonadal conditions  No bleeding, lymphadenopathy, anemia, malignancy, unusual  bruising  As above skin changes  No TB exposure  No recent/prior steroids  Outdoor activities:  No  Travel:  No  Implants:   Antibiotic History:     EXAM & DIAGNOSTICS REVIEWED:   Vitals:     Temp:  [97.4 °F (36.3 °C)-98.8 °F (37.1 °C)]   Temp: 98.5 °F (36.9 °C) (03/27/20 1600)  Pulse: 102 (03/27/20 1600)  Resp: 18 (03/27/20 1600)  BP: 120/79 (03/27/20 1600)  SpO2: 100 % (03/27/20 1600)    Intake/Output Summary (Last 24 hours) at 3/27/2020 1913  Last data filed at 3/27/2020 1427  Gross per 24 hour   Intake 900 ml   Output 1960 ml   Net -1060 ml       General:  In NAD. Alert and attentive, cooperative, comfortable  Eyes:  Anicteric, PERRL, EOMI  ENT:  No ulcers, exudates, thrush, nares patent, dentition is fair  Neck:  supple, no masses or adenopathy appreciated  Lungs: Clear, no consolidation, rales, wheezes, rub  Heart:  RRR, no gallop/murmur/rub noted  Abd:  Soft, NT, ND, normal BS, no masses or organomegaly appreciated.  :  Voids/Heranndez, urine clear, no flank tenderness  Musc:  Lower extremities smaller than upper extremities   Skin:  No rashes. No palmar or plantar lesions. No subungual petechiae  Wound: Please see picture.  Today dressing has not been disturbed.  It has just been wrapped up by podiatrist  Neuro: Alert, attentive, speech fluent, face symmetric,  paraplegic  Psych:  Calm, cooperative  Lymphatic:     No cervical, supraclavicular, axillary, or inguinal nodes  Extrem: Decreased size, see pictures  VAD:       Isolation:                  Lines/Tubes/Drains:    General Labs reviewed:  Recent Labs   Lab 03/26/20  1706 03/27/20 0427   WBC 7.85 9.40   HGB 13.3 11.0*   HCT 43.1 34.9*   * 375*       Recent Labs   Lab 03/26/20  1706 03/27/20 0427    135*   K 3.8 4.0    107   CO2 22* 20*   BUN 17 20   CREATININE 0.5 0.3*   CALCIUM 9.3 8.5*   PROT 8.7*  --    BILITOT 0.5  --    ALKPHOS 71  --    ALT 12  --    AST 12  --            Micro:  Microbiology Results (last 7 days)     Procedure  Component Value Units Date/Time    Tissue culture [[id number]] Collected:  03/27/20 1015    Order Status:  Completed Specimen:  Bone from Foot, Right Updated:  03/27/20 1423     Gram Stain Result No WBC's      No organisms seen    Narrative:       1st and 2nd metatarsal    AFB Culture & Smear [[id number]] Collected:  03/27/20 1015    Order Status:  Sent Specimen:  Bone from Foot, Right Updated:  03/27/20 1038    Fungus culture [[id number]] Collected:  03/27/20 1015    Order Status:  Sent Specimen:  Bone from Foot, Right Updated:  03/27/20 1037    Culture, Anaerobe [[id number]] Collected:  03/27/20 1015    Order Status:  Sent Specimen:  Bone from Foot, Right Updated:  03/27/20 1036    Aerobic culture [[id number]] Collected:  03/27/20 1015    Order Status:  Canceled Specimen:  Bone from Foot, Right     Culture, Anaerobe [[id number]]     Order Status:  Canceled Specimen:  Bone from Foot, Right     Aerobic culture [[id number]]     Order Status:  Canceled Specimen:  Bone from Foot, Right     AFB Culture & Smear [[id number]]     Order Status:  Canceled Specimen:  Bone from Foot, Right     Fungus culture [[id number]]     Order Status:  Canceled Specimen:  Bone from Foot, Right     Urine culture [[id number]] Collected:  03/26/20 1932    Order Status:  Completed Specimen:  Urine Updated:  03/27/20 0807     Urine Culture, Routine Insufficient incubation, culture in progress    Narrative:       Preferred Collection Type->Urine, Clean Catch  Specimen Source->Urine        Shashank Samuel #[id number] (CSN: [id number]) (24 y.o. F)    Results    Aerobic culture (Order [id number])   Contains abnormal data Aerobic culture   Order: [id number]   Status:  Final result   Visible to patient:  No (Not Released) Next appt:  None Dx:  Foot ulcer with necrosis of bone, right   Specimen Information: Foot, Right; Wound        Component 1mo ago   Aerobic Bacterial Culture Abnormal    METHICILLIN RESISTANT STAPHYLOCOCCUS AUREUS   Moderate     Aerobic Bacterial  Culture Abnormal    PROTEUS MIRABILIS   Few     Resulting Agency [facility]   Susceptibility      Methicillin resistant staphylococcus aureus Proteus mirabilis     CULTURE, AEROBIC  (SPECIFY SOURCE) CULTURE, AEROBIC  (SPECIFY SOURCE)     Amox/K Clav'ate   <=8/4 mcg/mL Sensitive     Amp/Sulbactam   <=8/4 mcg/mL Sensitive     Ampicillin   <=8 mcg/mL Sensitive     Cefazolin   <=8 mcg/mL Sensitive     Cefepime   <=4 mcg/mL Sensitive     Ceftriaxone 32 mcg/mL Resistant <=8 mcg/mL Sensitive     Ciprofloxacin   <=1 mcg/mL Sensitive     Clindamycin <=0.5 mcg/mL Sensitive       Ertapenem   <=2 mcg/mL Sensitive     Erythromycin <=0.5 mcg/mL Sensitive       Gentamicin   <=4 mcg/mL Sensitive     Levofloxacin   <=2 mcg/mL Sensitive     Meropenem   <=1 mcg/mL Sensitive     Oxacillin >2 mcg/mL Resistant       Penicillin 8 mcg/mL Resistant       Piperacillin/Tazo   <=16 mcg/mL Sensitive     Tetracycline <=4 mcg/mL Sensitive       Tobramycin   <=4 mcg/mL Sensitive     Trimeth/Sulfa <=0.5/9.5 m... Sensitive <=2/38 mcg/mL Sensitive     Vancomycin 1 mcg/mL Sensitive                Linear View                    Imaging Reviewed:   CXR  No evidence of active cardiopulmonary disease.  Bone scanTriple phase radiotracer uptake identified in the region of the 1st and 2nd metatarsal osteotomies and at the head of the 3rd metatarsal.  Findings are consistent with osteomyelitis.  Correlate.  Cardiology:    IMPRESSION & PLAN     -Osteomyelitis of  transmetatarsal amputation with osteomyelitis.    -Sp 023/27  Revision of transmetatarsal amputation right foot with bone from 1st and 2nd metatarsal being sent for culture  -Grade 2 ulcer distal lateral right ankle   -Sacral ulcers , stage 1-2.  Patient is aware she needs to stay off of it  - ESR 55  - UTI, Pyuria    Recommendations:  based on prior cultures-- will give vanc and cetriaxone  LTAC placement  Follow cultures

## 2020-03-28 NOTE — PROGRESS NOTES
"Progress Note  Infectious Disease    Reason for Consult:  Osteomyelitis of right metatarsal area    Consult 03/27/2020 Shashank Samuel is a24 y.o.  d  female with PMHx of meningiomyelocele, spina bifida and paraplegia as well as neurogenic bladder,  self caths herself, right foot osteomyelitis and has hx of amputation of all her right foot toes came in referred by podiatrist.  Patient has been  complaining of increased redness swelling pains and ulceration at right metatarsal site.    She was seen by podiatrist about 2 weeks ago, underwent wound care, bone scan which revealed right foot osteomyelitis after which she was directed to be admitted here for incision and debridement.      Patient denies systemic signs and symptoms of infection.  Podiatrist already performed on 03/27/2020 :   "  Revision of transmetatarsal amputation right foot with bone from 1st and 2nd metatarsal being sent for culture and sensitivity aerobic, anaerobic, acid-fast, fungal cultures.  2.  Debridement of lateral ankle ulcer epidermal and dermal and subcutaneous tissue"      03/28/2020.  T-max 99°.  Feels well.  No pain over the right foot as she does not have a sensation or movement  Walking different showed 110.      Antibiotics (From admission, onward)    Start     Stop Route Frequency Ordered    03/27/20 2100  vancomycin in dextrose 5 % 1 gram/250 mL IVPB 1,000 mg      -- IV Every 12 hours (non-standard times) 03/27/20 1953 03/27/20 2021  vancomycin - pharmacy to dose  (vancomycin IVPB)      -- IV pharmacy to manage frequency 03/27/20 1921 03/27/20 2000  cefTRIAXone (ROCEPHIN) 2 g in dextrose 5 % 50 mL IVPB      -- IV Every 24 hours (non-standard times) 03/27/20 1921        Antivirals (From admission, onward)    None        Antifungals (From admission, onward)    None          EXAM & DIAGNOSTICS REVIEWED:   Vitals:     Temp:  [97.4 °F (36.3 °C)-99 °F (37.2 °C)]   Temp: 98.9 °F (37.2 °C) (03/28/20 0749)  Pulse: 101 " (03/28/20 0749)  Resp: 16 (03/28/20 0749)  BP: 111/75 (03/28/20 0749)  SpO2: 100 % (03/28/20 0749)    Intake/Output Summary (Last 24 hours) at 3/28/2020 0948  Last data filed at 3/27/2020 1427  Gross per 24 hour   Intake 900 ml   Output 960 ml   Net -60 ml       General:  In NAD. Alert and attentive, cooperative, comfortable  Eyes:  Anicteric, EOMI  ENT:  No ulcers, exudates, thrush, nares patent, dentition is fair  Neck:  supple, no masses or adenopathy appreciated  Lungs: Clear, no consolidation, rales, wheezes, rub  Heart:  RRR, no gallop/murmur/rub noted  Abd:  Soft, NT, ND, normal BS, no masses or organomegaly appreciated.  :  Voids/Hernandez, urine clear, no flank tenderness  Musc:  Lower extremities smaller than upper extremities   Skin:  No rashes. No palmar or plantar lesions. No subungual petechiae  Wound:   03/27/2020 Please see picture.  Today dressing has not been disturbed.  It has just been wrapped up by podiatrist  03/28/2020---- I looked at right  foot.  It is healing great.  Stitches in place    Neuro: Alert, attentive, speech fluent, face symmetric paraplegic  Psych:  Calm, cooperative  Lymphatic:     No cervical, supraclavicular, axillary, or inguinal nodes  Extrem: Decreased size, see pictures  VAD:       Isolation:                  Lines/Tubes/Drains:    General Labs reviewed:  Recent Labs   Lab 03/26/20 1706 03/27/20 0427 03/28/20  0548   WBC 7.85 9.40 7.81   HGB 13.3 11.0* 10.7*   HCT 43.1 34.9* 34.6*   * 375* 356*       Recent Labs   Lab 03/26/20 1706 03/27/20 0427 03/28/20  0548    135* 138   K 3.8 4.0 4.1    107 110   CO2 22* 20* 18*   BUN 17 20 17   CREATININE 0.5 0.3* <0.3*   CALCIUM 9.3 8.5* 8.3*   PROT 8.7*  --   --    BILITOT 0.5  --   --    ALKPHOS 71  --   --    ALT 12  --   --    AST 12  --   --            Micro:  Microbiology Results (last 7 days)     Procedure Component Value Units Date/Time    Tissue culture [[id number]] Collected:  03/27/20 1015    Order  Status:  Completed Specimen:  Bone from Foot, Right Updated:  03/28/20 0824     Aerobic Culture - Tissue Insufficient incubation, culture in progress     Gram Stain Result No WBC's      No organisms seen    Narrative:       1st and 2nd metatarsal    Urine culture [[id number]] Collected:  03/26/20 1932    Order Status:  Completed Specimen:  Urine Updated:  03/28/20 0736     Urine Culture, Routine Multiple organisms isolated. None in predominance.  Repeat if      clinically necessary.    Narrative:       Preferred Collection Type->Urine, Clean Catch  Specimen Source->Urine    AFB Culture & Smear [[id number]] Collected:  03/27/20 1015    Order Status:  Sent Specimen:  Bone from Foot, Right Updated:  03/27/20 1038    Fungus culture [[id number]] Collected:  03/27/20 1015    Order Status:  Sent Specimen:  Bone from Foot, Right Updated:  03/27/20 1037    Culture, Anaerobe [[id number]] Collected:  03/27/20 1015    Order Status:  Sent Specimen:  Bone from Foot, Right Updated:  03/27/20 1036    Aerobic culture [[id number]] Collected:  03/27/20 1015    Order Status:  Canceled Specimen:  Bone from Foot, Right     Culture, Anaerobe [[id number]]     Order Status:  Canceled Specimen:  Bone from Foot, Right     Aerobic culture [[id number]]     Order Status:  Canceled Specimen:  Bone from Foot, Right     AFB Culture & Smear [[id number]]     Order Status:  Canceled Specimen:  Bone from Foot, Right     Fungus culture [[id number]]     Order Status:  Canceled Specimen:  Bone from Foot, Right         Shashank Samuel #[id number] (CSN: [id number]) (24 y.o. F)    Results    Aerobic culture (Order [id number])   Contains abnormal data Aerobic culture   Order: [id number]   Status:  Final result   Visible to patient:  No (Not Released) Next appt:  None Dx:  Foot ulcer with necrosis of bone, right   Specimen Information: Foot, Right; Wound        Component 1mo ago   Aerobic Bacterial Culture Abnormal    METHICILLIN RESISTANT STAPHYLOCOCCUS AUREUS   Moderate      Aerobic Bacterial Culture Abnormal    PROTEUS MIRABILIS   Few     Resulting Agency [facility]   Susceptibility      Methicillin resistant staphylococcus aureus Proteus mirabilis     CULTURE, AEROBIC  (SPECIFY SOURCE) CULTURE, AEROBIC  (SPECIFY SOURCE)     Amox/K Clav'ate   <=8/4 mcg/mL Sensitive     Amp/Sulbactam   <=8/4 mcg/mL Sensitive     Ampicillin   <=8 mcg/mL Sensitive     Cefazolin   <=8 mcg/mL Sensitive     Cefepime   <=4 mcg/mL Sensitive     Ceftriaxone 32 mcg/mL Resistant <=8 mcg/mL Sensitive     Ciprofloxacin   <=1 mcg/mL Sensitive     Clindamycin <=0.5 mcg/mL Sensitive       Ertapenem   <=2 mcg/mL Sensitive     Erythromycin <=0.5 mcg/mL Sensitive       Gentamicin   <=4 mcg/mL Sensitive     Levofloxacin   <=2 mcg/mL Sensitive     Meropenem   <=1 mcg/mL Sensitive     Oxacillin >2 mcg/mL Resistant       Penicillin 8 mcg/mL Resistant       Piperacillin/Tazo   <=16 mcg/mL Sensitive     Tetracycline <=4 mcg/mL Sensitive       Tobramycin   <=4 mcg/mL Sensitive     Trimeth/Sulfa <=0.5/9.5 m... Sensitive <=2/38 mcg/mL Sensitive     Vancomycin 1 mcg/mL Sensitive                Linear View                    Imaging Reviewed:   CXR   CT  Cardiology:    IMPRESSION & PLAN     -Osteomyelitis of  transmetatarsal amputation with osteomyelitis.    -Sp 03/27  Revision of transmetatarsal amputation right foot with bone from 1st and 2nd metatarsal being sent for culture  -Grade 2 ulcer distal lateral right ankle   -Sacral ulcers , stage 1-2.  Patient is aware she needs to stay off of it  - ESR 55  - UTI, Pyuria    Recommendations:  based on prior cultures-- will give vanc and cetriaxone  LTAC placement  Follow cultures obtained on 03/27 from Dr. Bedoya

## 2020-03-28 NOTE — PROGRESS NOTES
[facility] Medicine  Progress Note    Patient Name: Shashank Samuel  MRN: [id number]  Patient Class: IP- Inpatient   Admission Date: 3/26/2020  Length of Stay: 2 days  Attending Physician: Giorgi Pratt MD  Primary Care Provider: Primary Doctor No        Subjective:     Principal Problem:Acute osteomyelitis of right foot        HPI:  Patient is a 24-year-old  female with known history of meningiomyelocele, spina bifida and paraplegia as well as neurogenic bladder who presents to the hospital as a direct admit from her podiatrist's office secondary to right foot osteomyelitis.    She has known history of right foot osteomyelitis and has hx of amputation of all her right foot toes.  Over the last 2 weeks she noticed wound dehiscence with redness and swelling and presented to her podiatrist's office. Sx are gradually worsening. She underwent a bone scan earlier today which revelaed right foot osteomyelitis after which she was directed to be admitted here for incision and debridement.  Patient has baseline lack of sensation in her feet secondary to her paraplegia.  She does admit to increased redness, swelling as well as ulceration at the metatarsal site.  No exacerbating or relieving factors.  Symptoms are moderate in nature.  She denies fever, chills, nausea/vomiting or lightheadedness/dizziness.  At baseline she self catheterizes herself secondary to neurogenic bladder.      Rest of the 10 point review of systems is negative except as mentioned above.      Overview/Hospital Course:  No notes on file    Interval History:  No acute overnight events reported.  She is status post revision of transmetatarsal amputation of the right foot in the setting of osteomyelitis.  Afebrile overnight.  No pain reported; patient has baseline sensory deficit secondary to paraplegia.      Objective:     Vital Signs (Most Recent):  Temp: 98.9 °F (37.2 °C) (03/28/20 0749)  Pulse: 101 (03/28/20  0749)  Resp: 16 (03/28/20 0749)  BP: 111/75 (03/28/20 0749)  SpO2: 100 % (03/28/20 0749) Vital Signs (24h Range):  Temp:  [97.4 °F (36.3 °C)-99 °F (37.2 °C)] 98.9 °F (37.2 °C)  Pulse:  [] 101  Resp:  [16-20] 16  SpO2:  [96 %-100 %] 100 %  BP: (111-143)/(73-89) 111/75     Weight: 51.7 kg (113 lb 15.7 oz)  Body mass index is 25.55 kg/m².    Intake/Output Summary (Last 24 hours) at 3/28/2020 0933  Last data filed at 3/27/2020 1427  Gross per 24 hour   Intake 900 ml   Output 960 ml   Net -60 ml      Physical Exam   Constitutional: She is oriented to person, place, and time. She appears well-developed and well-nourished.   HENT:   Head: Normocephalic and atraumatic.   Mouth/Throat: Oropharynx is clear and moist. No oropharyngeal exudate.   Eyes: Pupils are equal, round, and reactive to light. Conjunctivae are normal. No scleral icterus.   Neck: Neck supple. No thyromegaly present.   Cardiovascular: Normal rate, regular rhythm, normal heart sounds and intact distal pulses.   Pulmonary/Chest: Effort normal and breath sounds normal. No stridor. No respiratory distress.   Abdominal: Soft. Bowel sounds are normal. She exhibits no distension. There is no tenderness.   Musculoskeletal: She exhibits deformity. She exhibits no tenderness.   S/p right metatarsal amputation    Neurological: She is alert and oriented to person, place, and time. A sensory deficit is present.   Paraplegic    Skin: Capillary refill takes less than 2 seconds. No pallor.   Right foot dressing noted which is clean, dry and intact  Right hip - stage 2 ulcer - POA   Psychiatric: She has a normal mood and affect. Her behavior is normal.   Nursing note and vitals reviewed.      Significant Labs:   CBC:   Recent Labs   Lab 03/26/20  1706 03/27/20  0427 03/28/20  0548   WBC 7.85 9.40 7.81   HGB 13.3 11.0* 10.7*   HCT 43.1 34.9* 34.6*   * 375* 356*     CMP:   Recent Labs   Lab 03/26/20  1706 03/27/20  0427 03/28/20  0548    135* 138   K 3.8  4.0 4.1    107 110   CO2 22* 20* 18*   GLU 63* 82 89   BUN 17 20 17   CREATININE 0.5 0.3* <0.3*   CALCIUM 9.3 8.5* 8.3*   PROT 8.7*  --   --    ALBUMIN 4.5  --   --    BILITOT 0.5  --   --    ALKPHOS 71  --   --    AST 12  --   --    ALT 12  --   --    ANIONGAP 11 8 10   EGFRNONAA >60.0 >60.0 >60.0           Assessment/Plan:      Active Hospital Problems    Diagnosis    *Acute osteomyelitis of right foot    Meningomyelocele    Osteomyelitis    Ulcer of toe of right foot, with necrosis of bone    Self-catheterizes urinary bladder    Paraplegia, T7 Complete    Neurogenic bladder    Spina bifida, lumbar region       Plan:  POD day 1 - s/p revision of the transmetatarsal amputation of right foot and debridement of lateral ankle ulcer   Continue empiric vancomycin and ceftriaxone; follow intraoperative wound cultures  ID and podiatry following   Self-cath for neurogenic bladder    Wound care on board    VTE Risk Mitigation (From admission, onward)         Ordered     IP VTE LOW RISK PATIENT  Once      03/27/20 1036     Place sequential compression device  Until discontinued      03/26/20 1617                      Giorgi Pratt MD  Department of Hospital Medicine   [facility]

## 2020-03-29 LAB
ANION GAP SERPL CALC-SCNC: 11 MMOL/L (ref 8–16)
BASOPHILS # BLD AUTO: 0.02 K/UL (ref 0–0.2)
BASOPHILS NFR BLD: 0.2 % (ref 0–1.9)
BUN SERPL-MCNC: 20 MG/DL (ref 6–20)
CALCIUM SERPL-MCNC: 9.2 MG/DL (ref 8.7–10.5)
CHLORIDE SERPL-SCNC: 103 MMOL/L (ref 95–110)
CO2 SERPL-SCNC: 22 MMOL/L (ref 23–29)
CREAT SERPL-MCNC: 0.5 MG/DL (ref 0.5–1.4)
DIFFERENTIAL METHOD: ABNORMAL
EOSINOPHIL # BLD AUTO: 0.2 K/UL (ref 0–0.5)
EOSINOPHIL NFR BLD: 1.6 % (ref 0–8)
ERYTHROCYTE [DISTWIDTH] IN BLOOD BY AUTOMATED COUNT: 15.9 % (ref 11.5–14.5)
EST. GFR  (AFRICAN AMERICAN): >60 ML/MIN/1.73 M^2
EST. GFR  (NON AFRICAN AMERICAN): >60 ML/MIN/1.73 M^2
GLUCOSE SERPL-MCNC: 91 MG/DL (ref 70–110)
HCT VFR BLD AUTO: 39.6 % (ref 37–48.5)
HGB BLD-MCNC: 12.1 G/DL (ref 12–16)
IMM GRANULOCYTES # BLD AUTO: 0.03 K/UL (ref 0–0.04)
IMM GRANULOCYTES NFR BLD AUTO: 0.3 % (ref 0–0.5)
LYMPHOCYTES # BLD AUTO: 1.9 K/UL (ref 1–4.8)
LYMPHOCYTES NFR BLD: 20.4 % (ref 18–48)
MAGNESIUM SERPL-MCNC: 2.3 MG/DL (ref 1.6–2.6)
MCH RBC QN AUTO: 25.9 PG (ref 27–31)
MCHC RBC AUTO-ENTMCNC: 30.6 G/DL (ref 32–36)
MCV RBC AUTO: 85 FL (ref 82–98)
MONOCYTES # BLD AUTO: 0.8 K/UL (ref 0.3–1)
MONOCYTES NFR BLD: 9.1 % (ref 4–15)
NEUTROPHILS # BLD AUTO: 6.4 K/UL (ref 1.8–7.7)
NEUTROPHILS NFR BLD: 68.4 % (ref 38–73)
NRBC BLD-RTO: 0 /100 WBC
PLATELET # BLD AUTO: 408 K/UL (ref 150–350)
PMV BLD AUTO: 9.9 FL (ref 9.2–12.9)
POTASSIUM SERPL-SCNC: 4.2 MMOL/L (ref 3.5–5.1)
RBC # BLD AUTO: 4.68 M/UL (ref 4–5.4)
SODIUM SERPL-SCNC: 136 MMOL/L (ref 136–145)
VANCOMYCIN TROUGH SERPL-MCNC: 20.8 UG/ML (ref 10–22)
WBC # BLD AUTO: 9.28 K/UL (ref 3.9–12.7)

## 2020-03-29 PROCEDURE — 80048 BASIC METABOLIC PNL TOTAL CA: CPT

## 2020-03-29 PROCEDURE — 63600175 PHARM REV CODE 636 W HCPCS: Performed by: INTERNAL MEDICINE

## 2020-03-29 PROCEDURE — 12000002 HC ACUTE/MED SURGE SEMI-PRIVATE ROOM

## 2020-03-29 PROCEDURE — 83735 ASSAY OF MAGNESIUM: CPT

## 2020-03-29 PROCEDURE — 25000003 PHARM REV CODE 250: Performed by: PODIATRIST

## 2020-03-29 PROCEDURE — 25000003 PHARM REV CODE 250: Performed by: INTERNAL MEDICINE

## 2020-03-29 PROCEDURE — 85025 COMPLETE CBC W/AUTO DIFF WBC: CPT

## 2020-03-29 PROCEDURE — 36415 COLL VENOUS BLD VENIPUNCTURE: CPT

## 2020-03-29 PROCEDURE — 80202 ASSAY OF VANCOMYCIN: CPT

## 2020-03-29 RX ORDER — CEFEPIME HYDROCHLORIDE 1 G/50ML
2 INJECTION, SOLUTION INTRAVENOUS
Status: DISCONTINUED | OUTPATIENT
Start: 2020-03-29 | End: 2020-04-02 | Stop reason: HOSPADM

## 2020-03-29 RX ADMIN — PROMETHAZINE HYDROCHLORIDE 25 MG: 25 TABLET ORAL at 09:03

## 2020-03-29 RX ADMIN — VANCOMYCIN HYDROCHLORIDE 1000 MG: 1 INJECTION, POWDER, LYOPHILIZED, FOR SOLUTION INTRAVENOUS at 09:03

## 2020-03-29 RX ADMIN — PROMETHAZINE HYDROCHLORIDE 25 MG: 25 TABLET ORAL at 01:03

## 2020-03-29 RX ADMIN — PROMETHAZINE HYDROCHLORIDE 6.25 MG: 25 INJECTION INTRAMUSCULAR; INTRAVENOUS at 09:03

## 2020-03-29 RX ADMIN — CEFEPIME HYDROCHLORIDE 2 G: 2 INJECTION, SOLUTION INTRAVENOUS at 12:03

## 2020-03-29 RX ADMIN — CASTOR OIL AND BALSAM, PERU: 788; 87 OINTMENT TOPICAL at 09:03

## 2020-03-29 RX ADMIN — Medication: at 12:03

## 2020-03-29 RX ADMIN — PROMETHAZINE HYDROCHLORIDE 25 MG: 25 TABLET ORAL at 03:03

## 2020-03-29 RX ADMIN — CEFEPIME HYDROCHLORIDE 2 G: 2 INJECTION, SOLUTION INTRAVENOUS at 11:03

## 2020-03-29 RX ADMIN — VANCOMYCIN HYDROCHLORIDE 1000 MG: 1 INJECTION, POWDER, LYOPHILIZED, FOR SOLUTION INTRAVENOUS at 08:03

## 2020-03-29 NOTE — PROGRESS NOTES
"Progress Note  Infectious Disease    Reason for Consult:  Osteomyelitis of right metatarsal area    Consult 03/27/2020 Shashank Samuel is a24 y.o.  d  female with PMHx of meningiomyelocele, spina bifida and paraplegia as well as neurogenic bladder,  self caths herself, right foot osteomyelitis and has hx of amputation of all her right foot toes came in referred by podiatrist.  Patient has been  complaining of increased redness swelling pains and ulceration at right metatarsal site.    She was seen by podiatrist about 2 weeks ago, underwent wound care, bone scan which revealed right foot osteomyelitis after which she was directed to be admitted here for incision and debridement.      Patient denies systemic signs and symptoms of infection.  Podiatrist already performed on 03/27/2020 :   "  Revision of transmetatarsal amputation right foot with bone from 1st and 2nd metatarsal being sent for culture and sensitivity aerobic, anaerobic, acid-fast, fungal cultures.  2.  Debridement of lateral ankle ulcer epidermal and dermal and subcutaneous tissue"    03/28/2020.  T-max 99°.  Feels well.  No pain over the right foot as she does not have a sensation or movement  Walking different showed 110.    03/29/2020.  She feels constipated, bloated.  She had a bowel movement yesterday.  She uses glycerin enema for a good BM    Antibiotics (From admission, onward)    Start     Stop Route Frequency Ordered    03/27/20 2100  vancomycin in dextrose 5 % 1 gram/250 mL IVPB 1,000 mg      -- IV Every 12 hours (non-standard times) 03/27/20 1953 03/27/20 2021  vancomycin - pharmacy to dose  (vancomycin IVPB)      -- IV pharmacy to manage frequency 03/27/20 1921 03/27/20 2000  cefTRIAXone (ROCEPHIN) 2 g in dextrose 5 % 50 mL IVPB      -- IV Every 24 hours (non-standard times) 03/27/20 1921        Antivirals (From admission, onward)    None        Antifungals (From admission, onward)    None          EXAM & DIAGNOSTICS " REVIEWED:   Vitals:     Temp:  [98.1 °F (36.7 °C)-99.7 °F (37.6 °C)]   Temp: 99.1 °F (37.3 °C) (03/29/20 0800)  Pulse: 102 (03/29/20 0800)  Resp: 18 (03/29/20 0800)  BP: 123/74 (03/29/20 0800)  SpO2: 100 % (03/29/20 0800)    Intake/Output Summary (Last 24 hours) at 3/29/2020 1044  Last data filed at 3/29/2020 0500  Gross per 24 hour   Intake 880 ml   Output 1300 ml   Net -420 ml       General:  In NAD. Alert and attentive, cooperative, comfortable  Eyes:  Anicteric, EOMI  ENT:  No ulcers, exudates, thrush, nares patent, dentition is fair  Neck:  supple,  Lungs:   Heart:    Abd:    :    Musc:  Lower extremities smaller than upper extremities   Skin:  No rashes. No palmar or plantar lesions. No subungual petechiae  Wound:   03/27/2020 Please see picture.  Today dressing has not been disturbed.  It has just been wrapped up by podiatrist  03/28/2020---- I looked at right  foot.  It is healing great.  Stitches in place  03/29/2020.  Dressing is not disturbed    Neuro: Alert, attentive, speech fluent, face symmetric paraplegic  Psych:  Calm, cooperative  Extrem: Decreased size, see pictures  VAD:       Isolation:                  Lines/Tubes/Drains:    General Labs reviewed:  Recent Labs   Lab 03/27/20  0427 03/28/20  0548 03/29/20  0523   WBC 9.40 7.81 9.28   HGB 11.0* 10.7* 12.1   HCT 34.9* 34.6* 39.6   * 356* 408*       Recent Labs   Lab 03/26/20  1706 03/27/20  0427 03/28/20  0548 03/29/20  0523    135* 138 136   K 3.8 4.0 4.1 4.2    107 110 103   CO2 22* 20* 18* 22*   BUN 17 20 17 20   CREATININE 0.5 0.3* <0.3* 0.5   CALCIUM 9.3 8.5* 8.3* 9.2   PROT 8.7*  --   --   --    BILITOT 0.5  --   --   --    ALKPHOS 71  --   --   --    ALT 12  --   --   --    AST 12  --   --   --        Micro:  Microbiology Results (last 7 days)     Procedure Component Value Units Date/Time    Culture, Anaerobe [[id number]] Collected:  03/27/20 1015    Order Status:  Completed Specimen:  Bone from Foot, Right Updated:   03/29/20 0953     Anaerobic Culture Culture in progress    Narrative:       1st and 2nd metatarsal    Tissue culture [[id number]]  (Abnormal) Collected:  03/27/20 1015    Order Status:  Completed Specimen:  Bone from Foot, Right Updated:  03/29/20 0820     Aerobic Culture - Tissue PRESUMPTIVE PSEUDOMONAS SPECIES  From broth only  Identification and susceptibility pending       Gram Stain Result No WBC's      No organisms seen    Narrative:       1st and 2nd metatarsal    AFB Culture & Smear [[id number]] Collected:  03/27/20 1015    Order Status:  Completed Specimen:  Bone from Foot, Right Updated:  03/28/20 1330     AFB CULTURE STAIN No acid fast bacilli seen.     AFB CULTURE STAIN Testing performed by:     AFB CULTURE STAIN Lab [facility]     AFB CULTURE STAIN [address] [facility]     [place] [address]     AFB CULTURE STAIN Dr.Brian Robel MD    Narrative:       1st and 2nd metatarsal    Urine culture [[id number]] Collected:  03/26/20 1932    Order Status:  Completed Specimen:  Urine Updated:  03/28/20 0736     Urine Culture, Routine Multiple organisms isolated. None in predominance.  Repeat if      clinically necessary.    Narrative:       Preferred Collection Type->Urine, Clean Catch  Specimen Source->Urine    Fungus culture [[id number]] Collected:  03/27/20 1015    Order Status:  Sent Specimen:  Bone from Foot, Right Updated:  03/27/20 1037    Aerobic culture [[id number]] Collected:  03/27/20 1015    Order Status:  Canceled Specimen:  Bone from Foot, Right     Culture, Anaerobe [[id number]]     Order Status:  Canceled Specimen:  Bone from Foot, Right     Aerobic culture [[id number]]     Order Status:  Canceled Specimen:  Bone from Foot, Right     AFB Culture & Smear [[id number]]     Order Status:  Canceled Specimen:  Bone from Foot, Right     Fungus culture [[id number]]     Order Status:  Canceled Specimen:  Bone from Foot, Right         Shashank Samuel #[id number] (CSN: [id number]) (24  y.o. F)    Results    Aerobic culture (Order [id number])   Contains abnormal data Aerobic culture   Order: [id number]   Status:  Final result   Visible to patient:  No (Not Released) Next appt:  None Dx:  Foot ulcer with necrosis of bone, right   Specimen Information: Foot, Right; Wound        Component 1mo ago   Aerobic Bacterial Culture Abnormal    METHICILLIN RESISTANT STAPHYLOCOCCUS AUREUS   Moderate     Aerobic Bacterial Culture Abnormal    PROTEUS MIRABILIS   Few     Resulting Agency [facility]   Susceptibility      Methicillin resistant staphylococcus aureus Proteus mirabilis     CULTURE, AEROBIC  (SPECIFY SOURCE) CULTURE, AEROBIC  (SPECIFY SOURCE)     Amox/K Clav'ate   <=8/4 mcg/mL Sensitive     Amp/Sulbactam   <=8/4 mcg/mL Sensitive     Ampicillin   <=8 mcg/mL Sensitive     Cefazolin   <=8 mcg/mL Sensitive     Cefepime   <=4 mcg/mL Sensitive     Ceftriaxone 32 mcg/mL Resistant <=8 mcg/mL Sensitive     Ciprofloxacin   <=1 mcg/mL Sensitive     Clindamycin <=0.5 mcg/mL Sensitive       Ertapenem   <=2 mcg/mL Sensitive     Erythromycin <=0.5 mcg/mL Sensitive       Gentamicin   <=4 mcg/mL Sensitive     Levofloxacin   <=2 mcg/mL Sensitive     Meropenem   <=1 mcg/mL Sensitive     Oxacillin >2 mcg/mL Resistant       Penicillin 8 mcg/mL Resistant       Piperacillin/Tazo   <=16 mcg/mL Sensitive     Tetracycline <=4 mcg/mL Sensitive       Tobramycin   <=4 mcg/mL Sensitive     Trimeth/Sulfa <=0.5/9.5 m... Sensitive <=2/38 mcg/mL Sensitive     Vancomycin 1 mcg/mL Sensitive                Linear View                    Imaging Reviewed:      IMPRESSION & PLAN     -Osteomyelitis of  transmetatarsal amputation with osteomyelitis.    -Sp 03/27  Revision of transmetatarsal amputation right foot with bone from 1st and 2nd metatarsal being sent for culture  -Grade 2 ulcer distal lateral right ankle   -Sacral ulcers , stage 1-2.  Patient is aware she needs to stay off of it  - ESR 55  - UTI, Pyuria    Recommendations:  - LTAC  placement  - I gave vanc and cetriaxone based on prior cultures but now she is growing Pseudomonas  Change ceftriaxone to cefepime  Follow cultures obtained on 03/27 from Dr. Bedoya

## 2020-03-29 NOTE — NURSING
Pt given oral Phenergan for c/o nausea. Pt vomited right after she swallowed the pill. Will notify MD

## 2020-03-29 NOTE — PLAN OF CARE
Pain is well controlled, slept well and safety maintained. Dressing to foot changed after seen and examined by Dr. Montemayor. No other events or issues.

## 2020-03-29 NOTE — PROGRESS NOTES
"[facility] Medicine Progress Note  Patient Name: Shashank Samuel MRN: [id number]   Patient Class: IP- Inpatient  Length of Stay: 3   Admission Date: 3/26/2020  3:54 PM Attending Physician: Radha Jones MD   Primary Care Provider: Primary Doctor No Face-to-Face encounter date: 03/29/2020   Chief Complaint: Osteomyelitis     Assessment & Plan:   Shashank Samuel is a 24 y.o. female admitted for    Problem List  Acute osteomyelitis of right foot  Meningomyelocele  Osteomyelitis  Ulcer of toe of right foot, with necrosis of bone  Self-catheterizes urinary bladder  Paraplegia, T7 Complete  Neurogenic bladder  Spina bifida, lumbar region  Nausea    Plan:  POD day 2 - s/p revision of the transmetatarsal amputation of right foot and debridement of lateral ankle ulcer   Pain well controlled  Phenergan ordered  Continue empiric vancomycin and ceftriaxone; follow intraoperative wound cultures, Presumptive pseudomonas, awaiting final recc from ID  ID and podiatry following   Self-cath for neurogenic bladder    Wound care on board       Discharge Planning:   LTAC  Subjective:    Interval History: Patient is doing well. She was nauseous yesterday but otherwise feels better. No concerns/issues overnight reported by the patient or the nursing staff.    Review of Systems All other Review of Systems were found to be negative expect for that mentioned already in HPI.   Objective:   Physical Exam  /86   Pulse 88   Temp 98.1 °F (36.7 °C) (Oral)   Resp 18   Ht 4' 8" (1.422 m)   Wt 51.7 kg (113 lb 15.7 oz)   LMP  (LMP Unknown)   SpO2 100%   Breastfeeding? No   BMI 25.55 kg/m²   Vitals reviewed.    Constitutional: No distress.   HENT: Atraumatic.   Cardiovascular: Normal rate, regular rhythm and normal heart sounds.   Pulmonary/Chest: Effort normal. Clear to auscultation bilaterally. No wheezes.   Abdominal: Soft. Bowel sounds are normal. Exhibits no distension and no mass. No " tenderness  Neurological: Alert.   Skin: Skin is warm and dry.     Following labs were Reviewed   Recent Labs   Lab 03/29/20  0523   WBC 9.28   HGB 12.1   HCT 39.6   *   CALCIUM 9.2      K 4.2   CO2 22*      BUN 20   CREATININE 0.5     No results found for: POCTGLUCOSE     All labs within the past 24 hours have been reviewed  Microbiology Results (last 7 days)     Procedure Component Value Units Date/Time    Tissue culture [[id number]]  (Abnormal) Collected:  03/27/20 1015    Order Status:  Completed Specimen:  Bone from Foot, Right Updated:  03/29/20 0820     Aerobic Culture - Tissue PRESUMPTIVE PSEUDOMONAS SPECIES  From broth only  Identification and susceptibility pending       Gram Stain Result No WBC's      No organisms seen    Narrative:       1st and 2nd metatarsal    AFB Culture & Smear [[id number]] Collected:  03/27/20 1015    Order Status:  Completed Specimen:  Bone from Foot, Right Updated:  03/28/20 1330     AFB CULTURE STAIN No acid fast bacilli seen.     AFB CULTURE STAIN Testing performed by:     AFB CULTURE STAIN Lab [facility]     [facility] STAIN [address] [facility]     [place] [address]     AFB CULTURE STAIN Dr.Brian Robel MD    Narrative:       1st and 2nd metatarsal    Urine culture [[id number]] Collected:  03/26/20 1932    Order Status:  Completed Specimen:  Urine Updated:  03/28/20 0736     Urine Culture, Routine Multiple organisms isolated. None in predominance.  Repeat if      clinically necessary.    Narrative:       Preferred Collection Type->Urine, Clean Catch  Specimen Source->Urine    Fungus culture [[id number]] Collected:  03/27/20 1015    Order Status:  Sent Specimen:  Bone from Foot, Right Updated:  03/27/20 1037    Culture, Anaerobe [[id number]] Collected:  03/27/20 1015    Order Status:  Sent Specimen:  Bone from Foot, Right Updated:  03/27/20 1036    Aerobic culture [[id number]] Collected:  03/27/20 1015    Order Status:  Canceled  Specimen:  Bone from Foot, Right     Culture, Anaerobe [[id number]]     Order Status:  Canceled Specimen:  Bone from Foot, Right     Aerobic culture [[id number]]     Order Status:  Canceled Specimen:  Bone from Foot, Right     AFB Culture & Smear [[id number]]     Order Status:  Canceled Specimen:  Bone from Foot, Right     Fungus culture [[id number]]     Order Status:  Canceled Specimen:  Bone from Foot, Right         X-Ray Chest AP Portable   Final Result      No evidence of active cardiopulmonary disease.         Electronically signed by: Joe Fuentes MD   Date:    03/26/2020   Time:    17:19          Inpatient medications  Scheduled Meds:   balsam peru-castor oiL   Topical (Top) Daily    cefTRIAXone (ROCEPHIN) IVPB  2 g Intravenous Q24H    vancomycin (VANCOCIN) IVPB  1,000 mg Intravenous Q12H     Continuous Infusions:  PRN Meds:.acetaminophen, acetaminophen, diphenhydrAMINE, diphenhydrAMINE, fentaNYL, oxyCODONE-acetaminophen, promethazine (PHENERGAN) IVPB, promethazine (PHENERGAN) IVPB, promethazine, Pharmacy to dose Vancomycin consult **AND** vancomycin - pharmacy to dose    Above encounter included review of the medical records, interviewing and examining the patient face-to-face, discussion with family and other health care providers, ordering and interpreting lab/test results and formulating a plan of care.     Medical Decision Making:    [] Low Complexity  [x] Moderate Complexity  [] High Complexity    Radha Jones  [facility] Hospitalist  03/29/2020

## 2020-03-30 LAB
ANION GAP SERPL CALC-SCNC: 9 MMOL/L (ref 8–16)
BACTERIA SPEC ANAEROBE CULT: NORMAL
BASOPHILS # BLD AUTO: 0.02 K/UL (ref 0–0.2)
BASOPHILS NFR BLD: 0.2 % (ref 0–1.9)
BUN SERPL-MCNC: 17 MG/DL (ref 6–20)
CALCIUM SERPL-MCNC: 8.8 MG/DL (ref 8.7–10.5)
CHLORIDE SERPL-SCNC: 106 MMOL/L (ref 95–110)
CO2 SERPL-SCNC: 19 MMOL/L (ref 23–29)
CREAT SERPL-MCNC: 0.4 MG/DL (ref 0.5–1.4)
DIFFERENTIAL METHOD: ABNORMAL
EOSINOPHIL # BLD AUTO: 0.1 K/UL (ref 0–0.5)
EOSINOPHIL NFR BLD: 1.1 % (ref 0–8)
ERYTHROCYTE [DISTWIDTH] IN BLOOD BY AUTOMATED COUNT: 15.6 % (ref 11.5–14.5)
EST. GFR  (AFRICAN AMERICAN): >60 ML/MIN/1.73 M^2
EST. GFR  (NON AFRICAN AMERICAN): >60 ML/MIN/1.73 M^2
GLUCOSE SERPL-MCNC: 99 MG/DL (ref 70–110)
HCT VFR BLD AUTO: 36 % (ref 37–48.5)
HGB BLD-MCNC: 11.1 G/DL (ref 12–16)
IMM GRANULOCYTES # BLD AUTO: 0.05 K/UL (ref 0–0.04)
IMM GRANULOCYTES NFR BLD AUTO: 0.4 % (ref 0–0.5)
LYMPHOCYTES # BLD AUTO: 1.7 K/UL (ref 1–4.8)
LYMPHOCYTES NFR BLD: 15 % (ref 18–48)
MAGNESIUM SERPL-MCNC: 2.5 MG/DL (ref 1.6–2.6)
MCH RBC QN AUTO: 26.1 PG (ref 27–31)
MCHC RBC AUTO-ENTMCNC: 30.8 G/DL (ref 32–36)
MCV RBC AUTO: 85 FL (ref 82–98)
MONOCYTES # BLD AUTO: 1.1 K/UL (ref 0.3–1)
MONOCYTES NFR BLD: 9.5 % (ref 4–15)
NEUTROPHILS # BLD AUTO: 8.3 K/UL (ref 1.8–7.7)
NEUTROPHILS NFR BLD: 73.8 % (ref 38–73)
NRBC BLD-RTO: 0 /100 WBC
PLATELET # BLD AUTO: 403 K/UL (ref 150–350)
PMV BLD AUTO: 9.8 FL (ref 9.2–12.9)
POTASSIUM SERPL-SCNC: 4.1 MMOL/L (ref 3.5–5.1)
RBC # BLD AUTO: 4.25 M/UL (ref 4–5.4)
SODIUM SERPL-SCNC: 134 MMOL/L (ref 136–145)
VANCOMYCIN TROUGH SERPL-MCNC: 11.3 UG/ML (ref 10–22)
WBC # BLD AUTO: 11.31 K/UL (ref 3.9–12.7)

## 2020-03-30 PROCEDURE — 63600175 PHARM REV CODE 636 W HCPCS: Performed by: INTERNAL MEDICINE

## 2020-03-30 PROCEDURE — 80202 ASSAY OF VANCOMYCIN: CPT

## 2020-03-30 PROCEDURE — 36569 INSJ PICC 5 YR+ W/O IMAGING: CPT

## 2020-03-30 PROCEDURE — 83735 ASSAY OF MAGNESIUM: CPT

## 2020-03-30 PROCEDURE — 12000002 HC ACUTE/MED SURGE SEMI-PRIVATE ROOM

## 2020-03-30 PROCEDURE — 80048 BASIC METABOLIC PNL TOTAL CA: CPT

## 2020-03-30 PROCEDURE — 85025 COMPLETE CBC W/AUTO DIFF WBC: CPT

## 2020-03-30 PROCEDURE — 36415 COLL VENOUS BLD VENIPUNCTURE: CPT

## 2020-03-30 RX ORDER — VANCOMYCIN HCL IN 5 % DEXTROSE 1G/250ML
1000 PLASTIC BAG, INJECTION (ML) INTRAVENOUS
Status: DISCONTINUED | OUTPATIENT
Start: 2020-03-30 | End: 2020-03-31

## 2020-03-30 RX ADMIN — VANCOMYCIN HYDROCHLORIDE 1000 MG: 1 INJECTION, POWDER, LYOPHILIZED, FOR SOLUTION INTRAVENOUS at 09:03

## 2020-03-30 RX ADMIN — VANCOMYCIN HYDROCHLORIDE 1000 MG: 1 INJECTION, POWDER, LYOPHILIZED, FOR SOLUTION INTRAVENOUS at 10:03

## 2020-03-30 RX ADMIN — CEFEPIME HYDROCHLORIDE 2 G: 2 INJECTION, SOLUTION INTRAVENOUS at 01:03

## 2020-03-30 NOTE — PROGRESS NOTES
"Formerly [facility] Medicine Progress Note  Patient Name: Shashank Samuel MRN: [id number]   Patient Class: IP- Inpatient  Length of Stay: 4   Admission Date: 3/26/2020  3:54 PM Attending Physician: Radha Jones MD   Primary Care Provider: Primary Doctor No Face-to-Face encounter date: 03/30/2020   Chief Complaint: Osteomyelitis     Assessment & Plan:   Shashank Samuel is a 24 y.o. female admitted for    Problem List  Acute osteomyelitis of right foot  Meningomyelocele  Osteomyelitis  Ulcer of toe of right foot, with necrosis of bone  Self-catheterizes urinary bladder  Paraplegia, T7 Complete  Neurogenic bladder  Spina bifida, lumbar region  Nausea    Plan:  POD day 3 - s/p revision of the transmetatarsal amputation of right foot and debridement of lateral ankle ulcer   Pain well controlled  Phenergan ordered  Continue empiric vancomycin and cefepime; follow intraoperative wound cultures, Presumptive pseudomonas, Staph awaiting final recc from ID  ID and podiatry following   Self-cath for neurogenic bladder    Wound care on board       Discharge Planning:   LTAC  Subjective:    Interval History: Patient is doing well. No concerns/issues overnight reported by the patient or the nursing staff.    Review of Systems All other Review of Systems were found to be negative expect for that mentioned already in HPI.   Objective:   Physical Exam  /77   Pulse 100   Temp 98.6 °F (37 °C) (Oral)   Resp 18   Ht 4' 8" (1.422 m)   Wt 51.7 kg (113 lb 15.7 oz)   LMP  (LMP Unknown)   SpO2 98%   Breastfeeding? No   BMI 25.55 kg/m²   Vitals reviewed.    Constitutional: No distress.   HENT: Atraumatic.   Cardiovascular: Normal rate, regular rhythm and normal heart sounds.   Pulmonary/Chest: Effort normal. Clear to auscultation bilaterally. No wheezes.   Abdominal: Soft. Bowel sounds are normal. Exhibits no distension and no mass. No tenderness  Neurological: Alert.   Skin: Skin is warm and dry. "     Following labs were Reviewed   Recent Labs   Lab 03/30/20  0506   WBC 11.31   HGB 11.1*   HCT 36.0*   *   CALCIUM 8.8   *   K 4.1   CO2 19*      BUN 17   CREATININE 0.4*     No results found for: POCTGLUCOSE     All labs within the past 24 hours have been reviewed  Microbiology Results (last 7 days)     Procedure Component Value Units Date/Time    Culture, Anaerobe [[id number]] Collected:  03/27/20 1015    Order Status:  Completed Specimen:  Bone from Foot, Right Updated:  03/30/20 0736     Anaerobic Culture No anaerobes isolated    Narrative:       1st and 2nd metatarsal    Tissue culture [[id number]]  (Abnormal)  (Susceptibility) Collected:  03/27/20 1015    Order Status:  Completed Specimen:  Bone from Foot, Right Updated:  03/30/20 0702     Aerobic Culture - Tissue PSEUDOMONAS AERUGINOSA  From broth only        STAPHYLOCOCCUS AUREUS  Rare  Susceptibility pending       Gram Stain Result No WBC's      No organisms seen    Narrative:       1st and 2nd metatarsal    AFB Culture & Smear [[id number]] Collected:  03/27/20 1015    Order Status:  Completed Specimen:  Bone from Foot, Right Updated:  03/28/20 1330     AFB CULTURE STAIN No acid fast bacilli seen.     AFB CULTURE STAIN Testing performed by:     AFB CULTURE STAIN Lab [facility]     AFB CULTURE STAIN [address] [facility] [place] [place]     [place] [address]     AFB CULTURE STAIN Dr.Brian Robel MD    Narrative:       1st and 2nd metatarsal    Urine culture [[id number]] Collected:  03/26/20 1932    Order Status:  Completed Specimen:  Urine Updated:  03/28/20 0736     Urine Culture, Routine Multiple organisms isolated. None in predominance.  Repeat if      clinically necessary.    Narrative:       Preferred Collection Type->Urine, Clean Catch  Specimen Source->Urine    Fungus culture [[id number]] Collected:  03/27/20 1015    Order Status:  Sent Specimen:  Bone from Foot, Right Updated:  03/27/20 1037    Aerobic culture  [[id number]] Collected:  03/27/20 1015    Order Status:  Canceled Specimen:  Bone from Foot, Right     Culture, Anaerobe [[id number]]     Order Status:  Canceled Specimen:  Bone from Foot, Right     Aerobic culture [[id number]]     Order Status:  Canceled Specimen:  Bone from Foot, Right     AFB Culture & Smear [[id number]]     Order Status:  Canceled Specimen:  Bone from Foot, Right     Fungus culture [[id number]]     Order Status:  Canceled Specimen:  Bone from Foot, Right         X-Ray Chest AP Portable   Final Result      1. No acute cardiopulmonary abnormality.   2. Right PICC line with tip in the anatomic region of the SVC.  No pneumothorax.         Electronically signed by: Kamran Batista MD   Date:    03/30/2020   Time:    09:38      X-Ray Chest AP Portable   Final Result      No evidence of active cardiopulmonary disease.         Electronically signed by: Joe Fuentes MD   Date:    03/26/2020   Time:    17:19          Inpatient medications  Scheduled Meds:   balsam peru-castor oiL   Topical (Top) Daily    ceFEPime (MAXIPIME) IVPB  2 g Intravenous Q12H    vancomycin (VANCOCIN) IVPB  1,000 mg Intravenous Q10H     Continuous Infusions:  PRN Meds:.acetaminophen, acetaminophen, diphenhydrAMINE, diphenhydrAMINE, fentaNYL, oxyCODONE-acetaminophen, promethazine (PHENERGAN) IVPB, promethazine (PHENERGAN) IVPB, promethazine, Pharmacy to dose Vancomycin consult **AND** vancomycin - pharmacy to dose    Above encounter included review of the medical records, interviewing and examining the patient face-to-face, discussion with family and other health care providers, ordering and interpreting lab/test results and formulating a plan of care.     Medical Decision Making:    [] Low Complexity  [x] Moderate Complexity  [] High Complexity    Radha Jones  [facility] Hospitalist  03/30/2020

## 2020-03-30 NOTE — PLAN OF CARE
Problem: Adult Inpatient Plan of Care  Goal: Plan of Care Review  Outcome: Ongoing, Not Progressing  Goal: Patient-Specific Goal (Individualization)  Outcome: Ongoing, Not Progressing  Goal: Absence of Hospital-Acquired Illness or Injury  Outcome: Ongoing, Not Progressing  Goal: Optimal Comfort and Wellbeing  Outcome: Ongoing, Not Progressing  Goal: Readiness for Transition of Care  Outcome: Ongoing, Not Progressing  Goal: Rounds/Family Conference  Outcome: Ongoing, Not Progressing     Problem: Skin Injury Risk Increased  Goal: Skin Health and Integrity  Outcome: Ongoing, Not Progressing     Problem: Fall Injury Risk  Goal: Absence of Fall and Fall-Related Injury  Outcome: Ongoing, Not Progressing     Problem: Wound  Goal: Optimal Wound Healing  Outcome: Ongoing, Not Progressing     Problem: Infection  Goal: Infection Symptom Resolution  Outcome: Ongoing, Not Progressing

## 2020-03-30 NOTE — PROGRESS NOTES
"[facility]  Adult Nutrition   Progress Note (Initial Assessment)     SUMMARY     Recommendations  Recommendation/Intervention: 1. Continue diet as tolerated by pt 2.  to assist with meal choices daily 3. Steve BID to aid healing   Goals: 1. Pt to meet at least 75% estimated needs via PO diet 2. Wound healing   Nutrition Goal Status: new    Dietitian Rounds Brief    Pt assessed 2' LOS. Noted s/p revision of R transmetatarsal amputation (3/27) by podiatry. Awaiting LTAC per notes. Tolerating diet; intake good. Occasional N/V noted--tx with medications. LBM 3/29. Noted sacral decubitus. Agreeable to Stvee--has taken in past.     Reason for Assessment  Reason For Assessment: length of stay  Diagnosis: infection/sepsis(osteo of R foot)  Relevant Medical History: paraplegia, spina bifida, neurogenic bladder     Nutrition Risk Screen  Nutrition Risk Screen: large or nonhealing wound, burn or pressure injury       Wound 03/27/20 1037 Sacral Spine-Wound Image: (OR nurse noted after cleaning pt, Dr. Bedoya notified, wound care nurse consulted.)  MST Score: 0  Have you recently lost weight without trying?: No  Weight loss score: 0  Have you been eating poorly because of a decreased appetite?: No  Appetite score: 0       Nutrition/Diet History  Spiritual, Cultural Beliefs, Rastafarian Practices, Values that Affect Care: no  Food Allergies: NKFA  Factors Affecting Nutritional Intake: None identified at this time    Anthropometrics  Temp: 98.6 °F (37 °C)  Height: 4' 8" (142.2 cm)  Height (inches): 56 in  Weight Method: Bed Scale  Weight: 51.7 kg (113 lb 15.7 oz)  Weight (lb): 113.98 lb  Ideal Body Weight (IBW), Female: 80 lb  % Ideal Body Weight, Female (lb): 142.48 %  BMI (Calculated): 25.6  BMI Grade: 25 - 29.9 - overweight       Weight History:  Wt Readings from Last 10 Encounters:   03/30/20 51.7 kg (113 lb 15.7 oz)   03/23/20 49.9 kg (110 lb)   01/20/20 49.9 kg (110 lb)   12/05/19 49.9 kg (110 lb) "   11/21/19 49.9 kg (110 lb)   11/12/19 49.9 kg (110 lb)   10/28/19 49.9 kg (110 lb)   10/22/19 54.4 kg (120 lb)   10/21/19 49.9 kg (110 lb)   09/09/19 49.9 kg (110 lb)       Lab/Procedures/Meds: Pertinent Labs Reviewed  Clinical Chemistry:  Recent Labs   Lab 03/26/20  1706  03/30/20  0506      < > 134*   K 3.8   < > 4.1      < > 106   CO2 22*   < > 19*   GLU 63*   < > 99   BUN 17   < > 17   CREATININE 0.5   < > 0.4*   CALCIUM 9.3   < > 8.8   PROT 8.7*  --   --    ALBUMIN 4.5  --   --    BILITOT 0.5  --   --    ALKPHOS 71  --   --    AST 12  --   --    ALT 12  --   --    ANIONGAP 11   < > 9   ESTGFRAFRICA >60.0   < > >60.0   EGFRNONAA >60.0   < > >60.0   MG  --    < > 2.5    < > = values in this interval not displayed.     CBC:   Recent Labs   Lab 03/30/20  0506   WBC 11.31   RBC 4.25   HGB 11.1*   HCT 36.0*   *   MCV 85   MCH 26.1*   MCHC 30.8*     Inflammatory Labs:  Recent Labs   Lab 03/28/20  0548   CRP 3.46*       Medications: Pertinent Medications reviewed  Scheduled Meds:   balsam peru-castor oiL   Topical (Top) Daily    ceFEPime (MAXIPIME) IVPB  2 g Intravenous Q12H    vancomycin (VANCOCIN) IVPB  1,000 mg Intravenous Q10H     Continuous Infusions:  PRN Meds:.acetaminophen, acetaminophen, diphenhydrAMINE, diphenhydrAMINE, fentaNYL, oxyCODONE-acetaminophen, promethazine (PHENERGAN) IVPB, promethazine (PHENERGAN) IVPB, promethazine, Pharmacy to dose Vancomycin consult **AND** vancomycin - pharmacy to dose    Estimated/Assessed Needs  Weight Used For Calorie Calculations: 51.7 kg (113 lb 15.7 oz)  Energy Calorie Requirements (kcal): 4343-0791 (25-30 kcal/kg)  Energy Need Method: Kcal/kg  Protein Requirements: 62-77 g (1.2-1.5 g/kg)  Weight Used For Protein Calculations: 51.7 kg (113 lb 15.7 oz)     Estimated Fluid Requirement Method: RDA Method  RDA Method (mL): 1292       Nutrition Prescription Ordered  Current Diet Order: regular     Evaluation of Received Nutrient/Fluid Intake  Energy  Calories Required: meeting needs  Protein Required: meeting needs  Fluid Required: meeting needs  Tolerance: tolerating     Intake/Output Summary (Last 24 hours) at 3/30/2020 1345  Last data filed at 3/29/2020 2358  Gross per 24 hour   Intake 300 ml   Output 1250 ml   Net -950 ml        % Meal Intake: Other: % (varied)    Nutrition Risk  Level of Risk/Frequency of Follow-up: moderate - high     Monitor and Evaluation  Food and Nutrient Intake: energy intake, food and beverage intake  Food and Nutrient Adminstration: diet order  Physical Activity and Function: nutrition-related ADLs and IADLs  Anthropometric Measurements: weight, weight change  Biochemical Data, Medical Tests and Procedures: electrolyte and renal panel, gastrointestinal profile, glucose/endocrine profile  Nutrition-Focused Physical Findings: overall appearance     Nutrition Follow-Up  RD Follow-up?: Yes    Ninfa Douglass RD 03/30/2020 1:45 PM

## 2020-03-30 NOTE — PLAN OF CARE
Problem: Wound  Goal: Optimal Wound Healing  Outcome: Ongoing, Progressing       Recommendation/Intervention: 1. Continue diet as tolerated by pt 2.  to assist with meal choices daily 3. Steve BID to aid healing   Goals: 1. Pt to meet at least 75% estimated needs via PO diet 2. Wound healing

## 2020-03-30 NOTE — PROGRESS NOTES
"Progress Note  Infectious Disease    Reason for Consult:  Osteomyelitis of right metatarsal area    Consult 03/27/2020 Shashank Samuel is a24 y.o.  d  female with PMHx of meningiomyelocele, spina bifida and paraplegia as well as neurogenic bladder,  self caths herself, right foot osteomyelitis and has hx of amputation of all her right foot toes came in referred by podiatrist.  Patient has been  complaining of increased redness swelling pains and ulceration at right metatarsal site.    She was seen by podiatrist about 2 weeks ago, underwent wound care, bone scan which revealed right foot osteomyelitis after which she was directed to be admitted here for incision and debridement.      Patient denies systemic signs and symptoms of infection.  Podiatrist already performed on 03/27/2020 :   "  Revision of transmetatarsal amputation right foot with bone from 1st and 2nd metatarsal being sent for culture and sensitivity aerobic, anaerobic, acid-fast, fungal cultures.  2.  Debridement of lateral ankle ulcer epidermal and dermal and subcutaneous tissue"    03/28/2020.  T-max 99°.  Feels well.  No pain over the right foot as she does not have a sensation or movement  Walking different showed 110.    03/29/2020.  She feels constipated, bloated.  She had a bowel movement yesterday.  She uses glycerin enema for a good BM    03/30/2020  Afebrile  Watching shows as usual  She had a good BM  Feels back to normal    Antibiotics (From admission, onward)    Start     Stop Route Frequency Ordered    03/30/20 2000  vancomycin in dextrose 5 % 1 gram/250 mL IVPB 1,000 mg      -- IV Every 10 hours 03/30/20 1057    03/29/20 1200  cefepime in dextrose 5 % IVPB 2 g      -- IV Every 12 hours (non-standard times) 03/29/20 1049    03/27/20 2021  vancomycin - pharmacy to dose  (vancomycin IVPB)      -- IV pharmacy to manage frequency 03/27/20 1921        Antivirals (From admission, onward)    None        Antifungals (From admission, " onward)    None          EXAM & DIAGNOSTICS REVIEWED:   Vitals:     Temp:  [98.6 °F (37 °C)-99.1 °F (37.3 °C)]   Temp: 98.8 °F (37.1 °C) (03/30/20 1600)  Pulse: 108 (03/30/20 1600)  Resp: 20 (03/30/20 1600)  BP: 126/85 (03/30/20 1600)  SpO2: 96 % (03/30/20 1600)    Intake/Output Summary (Last 24 hours) at 3/30/2020 1718  Last data filed at 3/29/2020 2358  Gross per 24 hour   Intake 300 ml   Output 600 ml   Net -300 ml       General:  In NAD. Alert and attentive, cooperative, comfortable  Eyes:  Anicteric, EOMI  ENT:    Neck:  supple,  Lungs:   Heart:    Abd:    :    Musc:  Lower extremities smaller than upper extremities   Skin:  No rashes on exposed skin  Wound:   03/27/2020 Please see picture.  Today dressing has not been disturbed.  It has just been wrapped up by podiatrist  03/28/2020---- I looked at right  foot.  It is healing great.  Stitches in place  03/29/2020.  Dressing is not disturbed  03/30/2020 dressing is not disturbed    Neuro: Alert, attentive, speech fluent, face symmetric paraplegic  Psych:  Calm, cooperative  Extrem: Decreased size, see pictures  VAD:       Isolation: MRSA    On admission:          Lines/Tubes/Drains:    General Labs reviewed:  Recent Labs   Lab 03/28/20  0548 03/29/20  0523 03/30/20  0506   WBC 7.81 9.28 11.31   HGB 10.7* 12.1 11.1*   HCT 34.6* 39.6 36.0*   * 408* 403*       Recent Labs   Lab 03/26/20  1706  03/28/20  0548 03/29/20  0523 03/30/20  0506      < > 138 136 134*   K 3.8   < > 4.1 4.2 4.1      < > 110 103 106   CO2 22*   < > 18* 22* 19*   BUN 17   < > 17 20 17   CREATININE 0.5   < > <0.3* 0.5 0.4*   CALCIUM 9.3   < > 8.3* 9.2 8.8   PROT 8.7*  --   --   --   --    BILITOT 0.5  --   --   --   --    ALKPHOS 71  --   --   --   --    ALT 12  --   --   --   --    AST 12  --   --   --   --     < > = values in this interval not displayed.       Micro:  Microbiology Results (last 7 days)     Procedure Component Value Units Date/Time    Culture, Anaerobe  [[id number]] Collected:  03/27/20 1015    Order Status:  Completed Specimen:  Bone from Foot, Right Updated:  03/30/20 0736     Anaerobic Culture No anaerobes isolated    Narrative:       1st and 2nd metatarsal    Tissue culture [[id number]]  (Abnormal)  (Susceptibility) Collected:  03/27/20 1015    Order Status:  Completed Specimen:  Bone from Foot, Right Updated:  03/30/20 0702     Aerobic Culture - Tissue PSEUDOMONAS AERUGINOSA  From broth only        STAPHYLOCOCCUS AUREUS  Rare  Susceptibility pending       Gram Stain Result No WBC's      No organisms seen    Narrative:       1st and 2nd metatarsal    AFB Culture & Smear [[id number]] Collected:  03/27/20 1015    Order Status:  Completed Specimen:  Bone from Foot, Right Updated:  03/28/20 1330     AFB CULTURE STAIN No acid fast bacilli seen.     AFB CULTURE STAIN Testing performed by:     AFB CULTURE STAIN Lab [facility]     AFB CULTURE STAIN [address] [facility]     [place] [address]     AFB CULTURE STAIN Dr.Brian Robel MD    Narrative:       1st and 2nd metatarsal    Urine culture [[id number]] Collected:  03/26/20 1932    Order Status:  Completed Specimen:  Urine Updated:  03/28/20 0736     Urine Culture, Routine Multiple organisms isolated. None in predominance.  Repeat if      clinically necessary.    Narrative:       Preferred Collection Type->Urine, Clean Catch  Specimen Source->Urine    Fungus culture [[id number]] Collected:  03/27/20 1015    Order Status:  Sent Specimen:  Bone from Foot, Right Updated:  03/27/20 1037    Aerobic culture [[id number]] Collected:  03/27/20 1015    Order Status:  Canceled Specimen:  Bone from Foot, Right     Culture, Anaerobe [[id number]]     Order Status:  Canceled Specimen:  Bone from Foot, Right     Aerobic culture [[id number]]     Order Status:  Canceled Specimen:  Bone from Foot, Right     AFB Culture & Smear [[id number]]     Order Status:  Canceled Specimen:  Bone from Foot, Right     Fungus  culture [[id number]]     Order Status:  Canceled Specimen:  Bone from Foot, Right         Shashank Samuel #[id number] (CSN: [id number]) (24 y.o. F)    Results    Aerobic culture (Order [id number])   Contains abnormal data Aerobic culture   Order: [id number]   Status:  Final result   Visible to patient:  No (Not Released) Next appt:  None Dx:  Foot ulcer with necrosis of bone, right   Specimen Information: Foot, Right; Wound        Component 1mo ago   Aerobic Bacterial Culture Abnormal    METHICILLIN RESISTANT STAPHYLOCOCCUS AUREUS   Moderate     Aerobic Bacterial Culture Abnormal    PROTEUS MIRABILIS   Few     Resulting Agency [facility]   Susceptibility      Methicillin resistant staphylococcus aureus Proteus mirabilis     CULTURE, AEROBIC  (SPECIFY SOURCE) CULTURE, AEROBIC  (SPECIFY SOURCE)     Amox/K Clav'ate   <=8/4 mcg/mL Sensitive     Amp/Sulbactam   <=8/4 mcg/mL Sensitive     Ampicillin   <=8 mcg/mL Sensitive     Cefazolin   <=8 mcg/mL Sensitive     Cefepime   <=4 mcg/mL Sensitive     Ceftriaxone 32 mcg/mL Resistant <=8 mcg/mL Sensitive     Ciprofloxacin   <=1 mcg/mL Sensitive     Clindamycin <=0.5 mcg/mL Sensitive       Ertapenem   <=2 mcg/mL Sensitive     Erythromycin <=0.5 mcg/mL Sensitive       Gentamicin   <=4 mcg/mL Sensitive     Levofloxacin   <=2 mcg/mL Sensitive     Meropenem   <=1 mcg/mL Sensitive     Oxacillin >2 mcg/mL Resistant       Penicillin 8 mcg/mL Resistant       Piperacillin/Tazo   <=16 mcg/mL Sensitive     Tetracycline <=4 mcg/mL Sensitive       Tobramycin   <=4 mcg/mL Sensitive     Trimeth/Sulfa <=0.5/9.5 m... Sensitive <=2/38 mcg/mL Sensitive     Vancomycin 1 mcg/mL Sensitive                Linear View                    Imaging Reviewed:      IMPRESSION & PLAN     -Osteomyelitis of  transmetatarsal amputation with osteomyelitis  due to pseudomonas + MSSA, (prior Cx MRSA and proteus)  -Sp 03/27  Revision of transmetatarsal amputation right foot with bone from 1st and 2nd metatarsal being sent for  culture  -Grade 2 ulcer distal lateral right ankle   -Sacral ulcers , stage 1-2.  Patient is aware she needs to stay off of it  - ESR 55  - UTI, Pyuria    Recommendations:  - LTAC placement  - I gave vanc + cefepime  -  6  Weeks, counting from  03/27/2020  -  Weekly labs: ESR. CRP, CBC, BMP  Biweekly labs: VANc trough      Will sign off  Thank you

## 2020-03-30 NOTE — PROGRESS NOTES
VANCOMYCIN PHARMACOKINETIC NOTE:  Vancomycin Day # 4    Objective:    24 y.o., female, Actual Body Weight = 51.7 kg (113 lb 15.7 oz)    Diagnosis/Indication for Vancomycin:  Osteomyelitis   Desired Vancomycin Peak:  35-40 mcg/ml; Desired Trough: 15-20 mcg/ml    Current Vancomycin Regimen:  1000 mg IV every 12 hours    Date Time Dose # Dosage Serum Vancomycin Level Comments   3/27/20 22:59 1 1000 mg       3/28/20 09:44 2 1000 mg        21:21 3 1000 mg       3/29/20 08:38 -   20.8 mcg/ml Trough     09:38 4 1000 mg        20:58 5 1000 mg       3/30/20 09:39 -   11.3 mcg/ml Trough     10:42 6 1000 mg           Receiving other antibiotics:   Antibiotics (From admission, onward)      Start     Stop Route Frequency Ordered    03/29/20 1200  cefepime in dextrose 5 % IVPB 2 g      -- IV Every 12 hours (non-standard times) 03/29/20 1049             The patient has the following labs:     3/30/2020 Estimated Creatinine Clearance: 180.1 mL/min (A) (based on SCr of 0.4 mg/dL (L)). Lab Results   Component Value Date    BUN 17 03/30/2020       Lab Results   Component Value Date    WBC 11.31 03/30/2020          Vancomycin Trough:  11.3 mcg/mL collected 12.7 hours after Dose # 5 (drawn correctly).    Microbiology Results (last 7 days)       Procedure Component Value Units Date/Time    Culture, Anaerobe [[id number]] Collected:  03/27/20 1015    Order Status:  Completed Specimen:  Bone from Foot, Right Updated:  03/30/20 0736     Anaerobic Culture No anaerobes isolated    Narrative:       1st and 2nd metatarsal    Tissue culture [[id number]]  (Abnormal)  (Susceptibility) Collected:  03/27/20 1015    Order Status:  Completed Specimen:  Bone from Foot, Right Updated:  03/30/20 0702     Aerobic Culture - Tissue PSEUDOMONAS AERUGINOSA  From broth only        STAPHYLOCOCCUS AUREUS  Rare  Susceptibility pending       Gram Stain Result No WBC's      No organisms seen    Narrative:       1st and 2nd metatarsal    AFB Culture & Smear [[id number]]  Collected:  03/27/20 1015    Order Status:  Completed Specimen:  Bone from Foot, Right Updated:  03/28/20 1330     AFB CULTURE STAIN No acid fast bacilli seen.     AFB CULTURE STAIN Testing performed by:     AFB CULTURE STAIN Lab [facility] [place]     AFB CULTURE STAIN [address] [facility]     [place] [address]     AFB CULTURE STAIN Dr.Brian Robel MD    Narrative:       1st and 2nd metatarsal    Urine culture [[id number]] Collected:  03/26/20 1932    Order Status:  Completed Specimen:  Urine Updated:  03/28/20 0736     Urine Culture, Routine Multiple organisms isolated. None in predominance.  Repeat if      clinically necessary.    Narrative:       Preferred Collection Type->Urine, Clean Catch  Specimen Source->Urine    Fungus culture [[id number]] Collected:  03/27/20 1015    Order Status:  Sent Specimen:  Bone from Foot, Right Updated:  03/27/20 1037    Aerobic culture [[id number]] Collected:  03/27/20 1015    Order Status:  Canceled Specimen:  Bone from Foot, Right     Culture, Anaerobe [[id number]]     Order Status:  Canceled Specimen:  Bone from Foot, Right     Aerobic culture [[id number]]     Order Status:  Canceled Specimen:  Bone from Foot, Right     AFB Culture & Smear [[id number]]     Order Status:  Canceled Specimen:  Bone from Foot, Right     Fungus culture [[id number]]     Order Status:  Canceled Specimen:  Bone from Foot, Right              Assessment:  Vancomycin dose =  19.3 mg/kg  Renal function is: stable.  Vancomycin trough is below goal range.    Plan:  Will change vancomycin to 1000 mg IV every 10 hours.  Will obtain vancomycin trough after 3 more dose(s), prior to dose due 3/31 at 1600.    Pharmacy will continue to manage vancomycin therapy and adjust regimen as necessary.    Thank you for allowing us to participate in this patient's care.     Douglas Lozada 3/30/2020 10:47 AM  Department of Pharmacy  Ext [phone]

## 2020-03-31 LAB
ANION GAP SERPL CALC-SCNC: 8 MMOL/L (ref 8–16)
BASOPHILS # BLD AUTO: 0.03 K/UL (ref 0–0.2)
BASOPHILS NFR BLD: 0.4 % (ref 0–1.9)
BUN SERPL-MCNC: 24 MG/DL (ref 6–20)
CALCIUM SERPL-MCNC: 8.7 MG/DL (ref 8.7–10.5)
CHLORIDE SERPL-SCNC: 110 MMOL/L (ref 95–110)
CO2 SERPL-SCNC: 18 MMOL/L (ref 23–29)
CREAT SERPL-MCNC: 0.3 MG/DL (ref 0.5–1.4)
DIFFERENTIAL METHOD: ABNORMAL
EOSINOPHIL # BLD AUTO: 0.2 K/UL (ref 0–0.5)
EOSINOPHIL NFR BLD: 2.1 % (ref 0–8)
ERYTHROCYTE [DISTWIDTH] IN BLOOD BY AUTOMATED COUNT: 15.3 % (ref 11.5–14.5)
EST. GFR  (AFRICAN AMERICAN): >60 ML/MIN/1.73 M^2
EST. GFR  (NON AFRICAN AMERICAN): >60 ML/MIN/1.73 M^2
GLUCOSE SERPL-MCNC: 97 MG/DL (ref 70–110)
HCT VFR BLD AUTO: 33.1 % (ref 37–48.5)
HGB BLD-MCNC: 10.4 G/DL (ref 12–16)
IMM GRANULOCYTES # BLD AUTO: 0.03 K/UL (ref 0–0.04)
IMM GRANULOCYTES NFR BLD AUTO: 0.4 % (ref 0–0.5)
LYMPHOCYTES # BLD AUTO: 1.5 K/UL (ref 1–4.8)
LYMPHOCYTES NFR BLD: 21.2 % (ref 18–48)
MAGNESIUM SERPL-MCNC: 2.1 MG/DL (ref 1.6–2.6)
MCH RBC QN AUTO: 25.9 PG (ref 27–31)
MCHC RBC AUTO-ENTMCNC: 31.4 G/DL (ref 32–36)
MCV RBC AUTO: 83 FL (ref 82–98)
MONOCYTES # BLD AUTO: 0.7 K/UL (ref 0.3–1)
MONOCYTES NFR BLD: 10.5 % (ref 4–15)
NEUTROPHILS # BLD AUTO: 4.6 K/UL (ref 1.8–7.7)
NEUTROPHILS NFR BLD: 65.4 % (ref 38–73)
NRBC BLD-RTO: 0 /100 WBC
PLATELET # BLD AUTO: 307 K/UL (ref 150–350)
PMV BLD AUTO: 9.7 FL (ref 9.2–12.9)
POTASSIUM SERPL-SCNC: 3.5 MMOL/L (ref 3.5–5.1)
RBC # BLD AUTO: 4.01 M/UL (ref 4–5.4)
SODIUM SERPL-SCNC: 136 MMOL/L (ref 136–145)
VANCOMYCIN TROUGH SERPL-MCNC: 18.2 UG/ML (ref 10–22)
WBC # BLD AUTO: 6.98 K/UL (ref 3.9–12.7)

## 2020-03-31 PROCEDURE — 83735 ASSAY OF MAGNESIUM: CPT

## 2020-03-31 PROCEDURE — 80202 ASSAY OF VANCOMYCIN: CPT

## 2020-03-31 PROCEDURE — 63600175 PHARM REV CODE 636 W HCPCS: Performed by: INTERNAL MEDICINE

## 2020-03-31 PROCEDURE — 80048 BASIC METABOLIC PNL TOTAL CA: CPT

## 2020-03-31 PROCEDURE — 85025 COMPLETE CBC W/AUTO DIFF WBC: CPT

## 2020-03-31 PROCEDURE — 12000002 HC ACUTE/MED SURGE SEMI-PRIVATE ROOM

## 2020-03-31 RX ORDER — VANCOMYCIN HCL IN 5 % DEXTROSE 1G/250ML
1000 PLASTIC BAG, INJECTION (ML) INTRAVENOUS
Status: DISCONTINUED | OUTPATIENT
Start: 2020-03-31 | End: 2020-04-02 | Stop reason: HOSPADM

## 2020-03-31 RX ADMIN — CEFEPIME HYDROCHLORIDE 2 G: 2 INJECTION, SOLUTION INTRAVENOUS at 12:03

## 2020-03-31 RX ADMIN — VANCOMYCIN HYDROCHLORIDE 1000 MG: 1 INJECTION, POWDER, LYOPHILIZED, FOR SOLUTION INTRAVENOUS at 05:03

## 2020-03-31 RX ADMIN — VANCOMYCIN HYDROCHLORIDE 1000 MG: 1 INJECTION, POWDER, LYOPHILIZED, FOR SOLUTION INTRAVENOUS at 07:03

## 2020-03-31 RX ADMIN — CASTOR OIL AND BALSAM, PERU: 788; 87 OINTMENT TOPICAL at 08:03

## 2020-03-31 RX ADMIN — CEFEPIME HYDROCHLORIDE 2 G: 2 INJECTION, SOLUTION INTRAVENOUS at 11:03

## 2020-03-31 RX ADMIN — CEFEPIME HYDROCHLORIDE 2 G: 2 INJECTION, SOLUTION INTRAVENOUS at 02:03

## 2020-03-31 NOTE — HOSPITAL COURSE
03/31 Consultant's attendance noted and appreciated. Reportedly mother did not want patient to be transferred to LTAC because of COVID fears. Discussed with patient that no COVID patient are being sent to LTAC; that it was only patients like herself. Patient is now open to the idea of transfer. No new complaints. No CFP/SOB. Labs reviewed CBC and BMP stable. Discussed patient personally with  for LTAC placement.    04/01 Awaiting LTAC placement for 6 weeks antibiotic therapy. Labs reviewed: CBC stable; BMP mild pre-renal azotemia    04/02 Awaiting LTAC placement for 6 weeks antibiotic therapy. Lab holiday today. No new complaints  1630 hours Accepted to LTAC:Vancomycin and cefepime for 6  Weeks, counting from  03/27/2020 with weekly labs: ESR. CRP, CBC, BMP, and biweekly labs: vancomycin trough    VSS  Lungs: decreased entry  Heart: S1S2  Abdo: soft  Foot dressed

## 2020-03-31 NOTE — PROGRESS NOTES
Identifying data:  24-year-old female    Chief complaint:  Infected right foot    History of present illness:  Patient had a previous transmetatarsal amputation and informed that ulceration infection and saw me last week and was admitted to the hospital for revision of the amputation.  Her 3 phase bone scan was positive for osteomyelitis.    Objective:  Incision intact sutures in place no redness today.    Labs:  Patient has Pseudomonas and methicillin-resistant Staph aureus    Assessment:  Status post revision transmetatarsal amputation right foot with osteomyelitis of 1st and 2nd metatarsals.    Plan:  I agree with Infectious Disease that she needs to be sent to an LTAC to receive IV antibiotics for 6 weeks and wound care.  Once she sent to LTAC I will write orders for wound care.

## 2020-03-31 NOTE — PLAN OF CARE
03/31/20 1140   Discharge Assessment   Assessment Type Discharge Planning Reassessment     Patient choice discussed with patient for LTAC placement. Pt signed and document scanned. Referral called to [facility]. Await response.

## 2020-03-31 NOTE — PROGRESS NOTES
03/31/20 1030        Incision/Site 03/27/20 1025 Right Foot   Date First Assessed/Time First Assessed: 03/27/20 1025   Side: Right  Location: Foot   Wound Image     Dressing Appearance Intact;Dry   Drainage Amount None   Appearance Pink;Red;Sutures intact  (lateral foot 1x0.8cm dry black scab, right heel 0.5x0.6cm dry scab with pink area noted.)   Periwound Area Scar tissue   Care Cleansed with:;Wound cleanser  (incision line keep clean and dry, covered with gauze, venelex applied to wounds on lateral foot and heel.  spoke with Dr. Rubio with new recommendations.  orders received.)   Dressing   (gauze, kerlix, ace)        Wound 03/27/20 1037 Sacral Spine   Date First Assessed/Time First Assessed: 03/27/20 1037   Pre-existing: Yes  Location: Sacral Spine   Wound WDL   (pink scarring, no open wound)        Pressure Injury 03/31/20 1030 Right posterior Ischial tuberosity Stage 3   Date First Assessed/Time First Assessed: 03/31/20 1030   Pressure Injury Present on Admission: (c) yes  Side: Right  Orientation: posterior  Location: Ischial tuberosity  Staging: Stage 3   Wound Image    Staging Stage 2   Dressing Appearance Intact   Drainage Amount None   Appearance Yellow;Pink   Periwound Area Scar tissue   Wound Length (cm) 1.2 cm   Wound Width (cm) 2.5 cm   Wound Depth (cm) 0.2 cm   Wound Volume (cm^3) 0.6 cm^3   Wound Surface Area (cm^2) 3 cm^2   Care Cleansed with:;Wound cleanser  (recommendations for Med[facility], received orders)   Dressing   (Medihoney)        Pressure Injury 03/31/20 1030 Right lateral Leg Stage 2   Date First Assessed/Time First Assessed: 03/31/20 1030   Pressure Injury Present on Admission: (c) yes  Side: Right  Orientation: lateral  Location: Leg  Staging: Stage 2   Wound Image    Staging Stage 2   Dressing Appearance Intact   Drainage Amount None   Appearance Yellow;Pink   Periwound Area [place]   Wound Edges Open   Wound Length (cm) 1.5 cm   Wound Width (cm) 1 cm   Wound Depth (cm) 0.3 cm    Wound Volume (cm^3) 0.45 cm^3   Wound Surface Area (cm^2) 1.5 cm^2   Care Cleansed with:;Wound cleanser   Dressing   (cleaned and redress applied venelex.  spoke with Dr Rubio.)        Pressure Injury 03/31/20 1030 Left medial Heel   Date First Assessed/Time First Assessed: 03/31/20 1030   Pressure Injury Present on Admission: (c)   Side: Left  Orientation: medial  Location: Heel   Wound Image    Staging Stage 2   Drainage Amount None   Appearance Red   Periwound Area Pink   Wound Length (cm) 2 cm   Wound Width (cm) 2.8 cm   Wound Depth (cm)   (non open)   Wound Surface Area (cm^2) 5.6 cm^2   Care Cleansed with:;Wound cleanser  (applied venelex, Mepilex.)   6cm incisions with sutures intact

## 2020-03-31 NOTE — PROGRESS NOTES
[facility] Medicine  Progress Note    Patient Name: Shashank Samuel  MRN: [id number]  Patient Class: IP- Inpatient   Admission Date: 3/26/2020  Length of Stay: 5 days  Attending Physician: Epifanio Rubio MD  Primary Care Provider: Primary Doctor No        Subjective:     Principal Problem:Acute osteomyelitis of right foot        HPI:  Patient is a 24-year-old  female with known history of meningiomyelocele, spina bifida and paraplegia as well as neurogenic bladder who presents to the hospital as a direct admit from her podiatrist's office secondary to right foot osteomyelitis.    She has known history of right foot osteomyelitis and has hx of amputation of all her right foot toes.  Over the last 2 weeks she noticed wound dehiscence with redness and swelling and presented to her podiatrist's office. Sx are gradually worsening. She underwent a bone scan earlier today which revelaed right foot osteomyelitis after which she was directed to be admitted here for incision and debridement.  Patient has baseline lack of sensation in her feet secondary to her paraplegia.  She does admit to increased redness, swelling as well as ulceration at the metatarsal site.  No exacerbating or relieving factors.  Symptoms are moderate in nature.  She denies fever, chills, nausea/vomiting or lightheadedness/dizziness.  At baseline she self catheterizes herself secondary to neurogenic bladder.      Rest of the 10 point review of systems is negative except as mentioned above.      Overview/Hospital Course:  03/31 Consultant's attendance noted and appreciated. Reportedly mother did not want patient to be transferred to LTAC because of COVID fears. Discussed with patient that no COVID patient are being sent to LTAC; that it was only patients like herself. Patient is now open to the idea of transfer. No new complaints. No CFP/SOB. Labs reviewed CBC and BMP stable. Discussed patient personally with Case  Manager for LTAC placement.    Interval History:  No acute overnight events reported.  She is status post revision of transmetatarsal amputation of the right foot in the setting of osteomyelitis.  Afebrile overnight.  No pain reported; patient has baseline sensory deficit secondary to paraplegia.      Objective:     Vital Signs (Most Recent):  Temp: 98.9 °F (37.2 °C) (03/28/20 0749)  Pulse: 101 (03/28/20 0749)  Resp: 16 (03/28/20 0749)  BP: 111/75 (03/28/20 0749)  SpO2: 100 % (03/28/20 0749) Vital Signs (24h Range):  Temp:  [97.4 °F (36.3 °C)-99 °F (37.2 °C)] 98.9 °F (37.2 °C)  Pulse:  [] 101  Resp:  [16-20] 16  SpO2:  [96 %-100 %] 100 %  BP: (111-143)/(73-89) 111/75     Weight: 51.7 kg (113 lb 15.7 oz)  Body mass index is 25.55 kg/m².    Intake/Output Summary (Last 24 hours) at 3/28/2020 0933  Last data filed at 3/27/2020 1427  Gross per 24 hour   Intake 900 ml   Output 960 ml   Net -60 ml      Physical Exam   Constitutional: She is oriented to person, place, and time. She appears well-developed and well-nourished.   HENT:   Head: Normocephalic and atraumatic.   Mouth/Throat: Oropharynx is clear and moist. No oropharyngeal exudate.   Eyes: Pupils are equal, round, and reactive to light. Conjunctivae are normal. No scleral icterus.   Neck: Neck supple. No thyromegaly present.   Cardiovascular: Normal rate, regular rhythm, normal heart sounds and intact distal pulses.   Pulmonary/Chest: Effort normal and breath sounds normal. No stridor. No respiratory distress.   Abdominal: Soft. Bowel sounds are normal. She exhibits no distension. There is no tenderness.   Musculoskeletal: She exhibits deformity. She exhibits no tenderness.   S/p right metatarsal amputation    Neurological: She is alert and oriented to person, place, and time. A sensory deficit is present.   Paraplegic    Skin: Capillary refill takes less than 2 seconds. No pallor.   Right foot dressing noted which is clean, dry and intact  Right hip - stage  2 ulcer - POA   Psychiatric: She has a normal mood and affect. Her behavior is normal.   Nursing note and vitals reviewed.      Significant Labs:   CBC:   Recent Labs   Lab 03/26/20  1706 03/27/20  0427 03/28/20  0548   WBC 7.85 9.40 7.81   HGB 13.3 11.0* 10.7*   HCT 43.1 34.9* 34.6*   * 375* 356*     CMP:   Recent Labs   Lab 03/26/20  1706 03/27/20  0427 03/28/20  0548    135* 138   K 3.8 4.0 4.1    107 110   CO2 22* 20* 18*   GLU 63* 82 89   BUN 17 20 17   CREATININE 0.5 0.3* <0.3*   CALCIUM 9.3 8.5* 8.3*   PROT 8.7*  --   --    ALBUMIN 4.5  --   --    BILITOT 0.5  --   --    ALKPHOS 71  --   --    AST 12  --   --    ALT 12  --   --    ANIONGAP 11 8 10   EGFRNONAA >60.0 >60.0 >60.0           Assessment/Plan:      * Acute osteomyelitis of right foot  Continue current management; LTAC placement for 6 weeks ABx      Osteomyelitis  As above      Meningomyelocele  Continue current management      Ulcer of toe of right foot, with necrosis of bone    Continue current management    Self-catheterizes urinary bladder    Continue current management    Paraplegia, T7 Complete  Continue current management      Neurogenic bladder    Continue current management    Spina bifida, lumbar region  Continue current management        VTE Risk Mitigation (From admission, onward)         Ordered     IP VTE LOW RISK PATIENT  Once      03/27/20 1036     Place sequential compression device  Until discontinued      03/26/20 1617                      Epifanio Rubio MD  Department of Hospital Medicine   Formerly [facility], later [facility]

## 2020-04-01 LAB
BACTERIA TISS AEROBE CULT: ABNORMAL
BACTERIA TISS AEROBE CULT: ABNORMAL
GRAM STN SPEC: ABNORMAL
GRAM STN SPEC: ABNORMAL

## 2020-04-01 PROCEDURE — 25000003 PHARM REV CODE 250: Performed by: INTERNAL MEDICINE

## 2020-04-01 PROCEDURE — 12000002 HC ACUTE/MED SURGE SEMI-PRIVATE ROOM

## 2020-04-01 PROCEDURE — 63600175 PHARM REV CODE 636 W HCPCS: Performed by: INTERNAL MEDICINE

## 2020-04-01 RX ADMIN — CEFEPIME HYDROCHLORIDE 2 G: 2 INJECTION, SOLUTION INTRAVENOUS at 11:04

## 2020-04-01 RX ADMIN — VANCOMYCIN HYDROCHLORIDE 1000 MG: 1 INJECTION, POWDER, LYOPHILIZED, FOR SOLUTION INTRAVENOUS at 03:04

## 2020-04-01 RX ADMIN — COLLAGENASE SANTYL: 250 OINTMENT TOPICAL at 09:04

## 2020-04-01 NOTE — PROGRESS NOTES
Identifying data:  24-year-old female    Chief complaint:  Infected right foot, ulcer lateral right ankle, new pre ulcerative area left heel    History of present illness:  Patient is status post revisional transmetatarsal amputation right foot with excision of 1st and 2nd metatarsals for osteomyelitis.  Debridement of lateral ankle ulcer right and now receiving wound care for the right foot and ankle and the left heel.    Objective:  The right foot is looking good suture line intact no purulent drainage no fluctuance no compromise of flaps.  Right ankle ulcers clean and slowly healing with wound care.  Left heel has stage I pre ulcerative area with no signs of infection.    Assessment:  1.  Osteomyelitis right foot with revisional transmetatarsal amputation 2.  Grade 2 ulcer lateral right ankle 3.  Pre ulcerative lesion left heel    Plan:  Patient will be discharged to LTAC and I am writing wound care orders to have dry dressings performed on the right incision site and Santyl to right ankle ulcer.  [place] honey to the left heel and heel protection devices on both feet.  We will follow up with the patient at the LTAC as needed.

## 2020-04-01 NOTE — PLAN OF CARE
Patient accepted at [facility] for LTAC with a target date for admission on Thursday. MD updated on above information. CM following.

## 2020-04-01 NOTE — SUBJECTIVE & OBJECTIVE
Interval History: stable    Review of Systems   Constitutional: Negative.    HENT: Negative.    Eyes: Negative.    Respiratory: Negative.    Cardiovascular: Negative.    Gastrointestinal: Negative.    Endocrine: Negative.    Genitourinary: Negative.    Musculoskeletal: Negative.    Skin: Negative.    Allergic/Immunologic: Negative.    Neurological: Negative.    Hematological: Negative.    All other systems reviewed and are negative.    Objective:     Vital Signs (Most Recent):  Temp: 99.8 °F (37.7 °C) (04/01/20 1202)  Pulse: 110 (04/01/20 1202)  Resp: 17 (04/01/20 1202)  BP: 114/71 (04/01/20 1202)  SpO2: (!) 94 % (04/01/20 1202) Vital Signs (24h Range):  Temp:  [97.5 °F (36.4 °C)-99.8 °F (37.7 °C)] 99.8 °F (37.7 °C)  Pulse:  [] 110  Resp:  [17-19] 17  SpO2:  [94 %-100 %] 94 %  BP: (114-146)/(71-96) 114/71     Weight: 51.7 kg (113 lb 15.7 oz)  Body mass index is 25.55 kg/m².    Intake/Output Summary (Last 24 hours) at 4/1/2020 1319  Last data filed at 4/1/2020 0500  Gross per 24 hour   Intake 1000 ml   Output 1400 ml   Net -400 ml      Physical Exam   Constitutional: She is oriented to person, place, and time. She appears well-developed and well-nourished.   HENT:   Head: Normocephalic and atraumatic.   Eyes: Pupils are equal, round, and reactive to light. Conjunctivae and EOM are normal.   Neck: Normal range of motion. Neck supple.   Cardiovascular: Normal rate, regular rhythm, normal heart sounds and intact distal pulses.   Pulmonary/Chest: Effort normal and breath sounds normal.   Decreased entry bases without adventitious sounds   Abdominal: Soft. Bowel sounds are normal.   Musculoskeletal:   Dressed previously described wound   Neurological: She is alert and oriented to person, place, and time.   Skin: Skin is warm and dry. Capillary refill takes less than 2 seconds.   Psychiatric: She has a normal mood and affect. Her behavior is normal. Judgment and thought content normal.   Nursing note and vitals  reviewed.      Significant Labs:   BMP:   Recent Labs   Lab 03/31/20  0501   GLU 97      K 3.5      CO2 18*   BUN 24*   CREATININE 0.3*   CALCIUM 8.7   MG 2.1     CBC:   Recent Labs   Lab 03/31/20  0501   WBC 6.98   HGB 10.4*   HCT 33.1*          Significant Imaging: I have reviewed and interpreted all pertinent imaging results/findings within the past 24 hours.

## 2020-04-02 VITALS
HEIGHT: 56 IN | HEART RATE: 94 BPM | SYSTOLIC BLOOD PRESSURE: 128 MMHG | OXYGEN SATURATION: 99 % | TEMPERATURE: 99 F | RESPIRATION RATE: 18 BRPM | WEIGHT: 114 LBS | BODY MASS INDEX: 25.64 KG/M2 | DIASTOLIC BLOOD PRESSURE: 73 MMHG

## 2020-04-02 LAB — VANCOMYCIN TROUGH SERPL-MCNC: 18.9 UG/ML (ref 10–22)

## 2020-04-02 PROCEDURE — 63600175 PHARM REV CODE 636 W HCPCS: Performed by: INTERNAL MEDICINE

## 2020-04-02 PROCEDURE — 80202 ASSAY OF VANCOMYCIN: CPT

## 2020-04-02 RX ORDER — VANCOMYCIN HCL IN 5 % DEXTROSE 1G/250ML
1000 PLASTIC BAG, INJECTION (ML) INTRAVENOUS
Status: ON HOLD
Start: 2020-04-02 | End: 2021-01-08

## 2020-04-02 RX ORDER — CEFEPIME HYDROCHLORIDE 1 G/50ML
2 INJECTION, SOLUTION INTRAVENOUS EVERY 12 HOURS
Status: ON HOLD
Start: 2020-04-03 | End: 2021-01-08

## 2020-04-02 RX ADMIN — VANCOMYCIN HYDROCHLORIDE 1000 MG: 1 INJECTION, POWDER, LYOPHILIZED, FOR SOLUTION INTRAVENOUS at 02:04

## 2020-04-02 RX ADMIN — CEFEPIME HYDROCHLORIDE 2 G: 2 INJECTION, SOLUTION INTRAVENOUS at 12:04

## 2020-04-02 RX ADMIN — COLLAGENASE SANTYL: 250 OINTMENT TOPICAL at 09:04

## 2020-04-02 RX ADMIN — VANCOMYCIN HYDROCHLORIDE 1000 MG: 1 INJECTION, POWDER, LYOPHILIZED, FOR SOLUTION INTRAVENOUS at 01:04

## 2020-04-02 NOTE — DISCHARGE SUMMARY
[facility] Medicine  Discharge Summary      Patient Name: Shashank Samuel  MRN: [id number]  Admission Date: 3/26/2020  Hospital Length of Stay: 7 days  Discharge Date and Time:  04/02/2020 4:35 PM  Attending Physician: Epifanio Rubio MD   Discharging Provider: Epifanio Rubio MD  Primary Care Provider: Primary Doctor No      HPI:   Patient is a 24-year-old  female with known history of meningiomyelocele, spina bifida and paraplegia as well as neurogenic bladder who presents to the hospital as a direct admit from her podiatrist's office secondary to right foot osteomyelitis.    She has known history of right foot osteomyelitis and has hx of amputation of all her right foot toes.  Over the last 2 weeks she noticed wound dehiscence with redness and swelling and presented to her podiatrist's office. Sx are gradually worsening. She underwent a bone scan earlier today which revelaed right foot osteomyelitis after which she was directed to be admitted here for incision and debridement.  Patient has baseline lack of sensation in her feet secondary to her paraplegia.  She does admit to increased redness, swelling as well as ulceration at the metatarsal site.  No exacerbating or relieving factors.  Symptoms are moderate in nature.  She denies fever, chills, nausea/vomiting or lightheadedness/dizziness.  At baseline she self catheterizes herself secondary to neurogenic bladder.      Rest of the 10 point review of systems is negative except as mentioned above.      Procedure(s) (LRB):  AMPUTATION, FOOT, TRANSMETATARSAL (Right)  DEBRIDEMENT, WOUND      Hospital Course:   03/31 Consultant's attendance noted and appreciated. Reportedly mother did not want patient to be transferred to LTAC because of COVID fears. Discussed with patient that no COVID patient are being sent to LTAC; that it was only patients like herself. Patient is now open to the idea of transfer. No new complaints. No CFP/SOB.  Labs reviewed CBC and BMP stable. Discussed patient personally with  for LTAC placement.    04/01 Awaiting LTAC placement for 6 weeks antibiotic therapy. Labs reviewed: CBC stable; BMP mild pre-renal azotemia    04/02 Awaiting LTAC placement for 6 weeks antibiotic therapy. Lab holiday today. No new complaints  1630 hours Accepted to LTAC:Vancomycin and cefepime for 6  Weeks, counting from  03/27/2020  with weekly labs: ESR. CRP, CBC, BMP, and biweekly labs: vancomycin trough    VSS  Lungs: decreased entry  Heart: S1S2  Abdo: soft  Foot dressed     Consults:   Consults (From admission, onward)        Status Ordering Provider     Consult to Anesthesiology  Once     Provider:  Evan Urias MD    Acknowledged BARBI FONSECA     Inpatient consult to Infectious Diseases  Once     Provider:  Gill Mullins MD    Completed LALO PIRES     Inpatient consult to PICC Line Nurse  Once     Provider:  (Not yet assigned)    Acknowledged ADELA BEAL     Inpatient consult to   Once     Provider:  (Not yet assigned)    Completed BARBI FONSECA     Inpatient consult to Social Work/Case Management  Once     Provider:  (Not yet assigned)    Completed ADELA BEAL     Pharmacy to dose Vancomycin consult  Once     Provider:  (Not yet assigned)    Acknowledged ADELA BEAL          No new Assessment & Plan notes have been filed under this hospital service since the last note was generated.  Service: Hospital Medicine    Final Active Diagnoses:    Diagnosis Date Noted POA    PRINCIPAL PROBLEM:  Acute osteomyelitis of right foot [M86.171] 10/09/2015 Yes    Meningomyelocele [Q05.9] 03/26/2020 Not Applicable    Osteomyelitis [M86.9] 03/26/2020 Yes    Ulcer of toe of right foot, with necrosis of bone [L97.514] 10/28/2019 Yes    Self-catheterizes urinary bladder [Z78.9] 01/19/2016 Yes    Paraplegia, T7 Complete [G82.20] 12/10/2013 Yes    Neurogenic bladder [N31.9]  Yes    Spina bifida,  lumbar region [Q05.7] 04/16/2013 Not Applicable      Problems Resolved During this Admission:       Discharged Condition: good    Disposition: Long Term Care    Follow Up:    Patient Instructions:      Diet Adult Regular     Activity as tolerated       Significant Diagnostic Studies: Labs: BMP: No results for input(s): GLU, NA, K, CL, CO2, BUN, CREATININE, CALCIUM, MG in the last 48 hours. and CBC No results for input(s): WBC, HGB, HCT, PLT in the last 48 hours.    Pending Diagnostic Studies:     None         Medications:  Reconciled Home Medications:      Medication List      START taking these medications    cefepime in dextrose 5 % 2 gram/50 mL Pgbk  Commonly known as:  MAXIPIME  Inject 50 mLs (2 g total) into the vein every 12 (twelve) hours.  Start taking on:  April 3, 2020     collagenase ointment  Commonly known as:  SANTYL  Apply topically once daily.  Start taking on:  April 3, 2020     vancomycin in dextrose 5 % 1 gram/250 mL Soln  Inject 250 mLs (1,000 mg total) into the vein every 10 hours.        CONTINUE taking these medications    oxyCODONE-acetaminophen  mg per tablet  Commonly known as:  PERCOCET  Take 1 tablet by mouth every 6 (six) hours as needed for Pain.        STOP taking these medications    catheter 12 Fr Misc     catheter 8 Fr Misc     diaper,brief,adult,disposable Misc  Commonly known as:  DEPEND PANTS     EPINEPHrine 0.3 mg/0.3 mL Atin  Commonly known as:  EPIPEN 2-DEEPIKA     glycerin pediatric suppository     menthol-zinc oxide 0.44-20.6 % Oint  Commonly known as:  CALMOSEPTINE     syringe (disposable) 60 mL Syrg            Indwelling Lines/Drains at time of discharge:   Lines/Drains/Airways     Peripherally Inserted Central Catheter Line            PICC Double Lumen 03/30/20 0830 right brachial;right cephalic 3 days          Drain                 Colostomy Other (see comments) RLQ -- days                Time spent on the discharge of patient: 32 minutes  Patient was seen and examined  on the date of discharge and determined to be suitable for discharge.         Epifanio Rubio MD  Department of Hospital Medicine  [facility]

## 2020-04-02 NOTE — NURSING
Called report to CC at [facility] [phone].     They set up transport MOLLY.  They will arrive in a few minutes.

## 2020-04-02 NOTE — PROGRESS NOTES
Critical access hospital Medicine  Progress Note    Patient Name: Shashank Samuel  MRN: [id number]  Patient Class: IP- Inpatient   Admission Date: 3/26/2020  Length of Stay: 7 days  Attending Physician: Epifanio Rubio MD  Primary Care Provider: Primary Doctor No        Subjective:     Principal Problem:Acute osteomyelitis of right foot        HPI:  Patient is a 24-year-old  female with known history of meningiomyelocele, spina bifida and paraplegia as well as neurogenic bladder who presents to the hospital as a direct admit from her podiatrist's office secondary to right foot osteomyelitis.    She has known history of right foot osteomyelitis and has hx of amputation of all her right foot toes.  Over the last 2 weeks she noticed wound dehiscence with redness and swelling and presented to her podiatrist's office. Sx are gradually worsening. She underwent a bone scan earlier today which revelaed right foot osteomyelitis after which she was directed to be admitted here for incision and debridement.  Patient has baseline lack of sensation in her feet secondary to her paraplegia.  She does admit to increased redness, swelling as well as ulceration at the metatarsal site.  No exacerbating or relieving factors.  Symptoms are moderate in nature.  She denies fever, chills, nausea/vomiting or lightheadedness/dizziness.  At baseline she self catheterizes herself secondary to neurogenic bladder.      Rest of the 10 point review of systems is negative except as mentioned above.      Overview/Hospital Course:  03/31 Consultant's attendance noted and appreciated. Reportedly mother did not want patient to be transferred to LTAC because of COVID fears. Discussed with patient that no COVID patient are being sent to LTAC; that it was only patients like herself. Patient is now open to the idea of transfer. No new complaints. No CFP/SOB. Labs reviewed CBC and BMP stable. Discussed patient personally with Case  Manager for LTAC placement.    04/01 Awaiting LTAC placement for 6 weeks antibiotic therapy. Labs reviewed: CBC stable; BMP mild pre-renal azotemia    04/02 Awaiting LTAC placement for 6 weeks antibiotic therapy. Lab holiday today. No new complaints    Interval History: stable    Review of Systems   Constitutional: Negative.    HENT: Negative.    Eyes: Negative.    Respiratory: Negative.    Cardiovascular: Negative.    Gastrointestinal: Negative.    Endocrine: Negative.    Genitourinary: Negative.    Musculoskeletal: Negative.    Skin: Negative.    Allergic/Immunologic: Negative.    Neurological: Negative.    Hematological: Negative.    All other systems reviewed and are negative.    Objective:     Vital Signs (Most Recent):  Temp: 98.5 °F (36.9 °C) (04/02/20 1227)  Pulse: 102 (04/02/20 1227)  Resp: 19 (04/02/20 1227)  BP: 119/75 (04/02/20 1227)  SpO2: 100 % (04/02/20 1227) Vital Signs (24h Range):  Temp:  [97.4 °F (36.3 °C)-98.6 °F (37 °C)] 98.5 °F (36.9 °C)  Pulse:  [] 102  Resp:  [16-19] 19  SpO2:  [98 %-100 %] 100 %  BP: (119-134)/(75-90) 119/75     Weight: 51.7 kg (113 lb 15.7 oz)  Body mass index is 25.55 kg/m².    Intake/Output Summary (Last 24 hours) at 4/2/2020 1401  Last data filed at 4/2/2020 1323  Gross per 24 hour   Intake 250 ml   Output 1000 ml   Net -750 ml      Physical Exam   Constitutional: She is oriented to person, place, and time. She appears well-developed and well-nourished.   HENT:   Head: Normocephalic and atraumatic.   Eyes: Pupils are equal, round, and reactive to light. Conjunctivae and EOM are normal.   Neck: Normal range of motion. Neck supple.   Cardiovascular: Normal rate, regular rhythm, normal heart sounds and intact distal pulses.   Pulmonary/Chest: Effort normal and breath sounds normal.   Abdominal: Soft. Bowel sounds are normal.   Musculoskeletal:   .  Osteomyelitis right foot with revisional transmetatarsal amputation 2.  Grade 2 ulcer lateral right ankle 3.  Pre  ulcerative lesion left heel   Neurological: She is alert and oriented to person, place, and time.   Spina bifida changes   Skin: Capillary refill takes less than 2 seconds.   Psychiatric: She has a normal mood and affect. Her behavior is normal. Judgment and thought content normal.   Nursing note and vitals reviewed.      Significant Labs: None    Significant Imaging: I have reviewed and interpreted all pertinent imaging results/findings within the past 24 hours.      Assessment/Plan:      * Acute osteomyelitis of right foot  Continue current management; LTAC placement for 6 weeks ABx      Osteomyelitis  As above      Meningomyelocele  Continue current management      Ulcer of toe of right foot, with necrosis of bone    Continue current management    Self-catheterizes urinary bladder    Continue current management    Paraplegia, T7 Complete  Continue current management      Neurogenic bladder    Continue current management    Spina bifida, lumbar region  Continue current management        VTE Risk Mitigation (From admission, onward)         Ordered     IP VTE LOW RISK PATIENT  Once      03/27/20 1036     Place sequential compression device  Until discontinued      03/26/20 5831                      Epifanio Rubio MD  Department of Hospital Medicine   [facility]

## 2020-04-02 NOTE — SUBJECTIVE & OBJECTIVE
Interval History: stable    Review of Systems   Constitutional: Negative.    HENT: Negative.    Eyes: Negative.    Respiratory: Negative.    Cardiovascular: Negative.    Gastrointestinal: Negative.    Endocrine: Negative.    Genitourinary: Negative.    Musculoskeletal: Negative.    Skin: Negative.    Allergic/Immunologic: Negative.    Neurological: Negative.    Hematological: Negative.    All other systems reviewed and are negative.    Objective:     Vital Signs (Most Recent):  Temp: 98.5 °F (36.9 °C) (04/02/20 1227)  Pulse: 102 (04/02/20 1227)  Resp: 19 (04/02/20 1227)  BP: 119/75 (04/02/20 1227)  SpO2: 100 % (04/02/20 1227) Vital Signs (24h Range):  Temp:  [97.4 °F (36.3 °C)-98.6 °F (37 °C)] 98.5 °F (36.9 °C)  Pulse:  [] 102  Resp:  [16-19] 19  SpO2:  [98 %-100 %] 100 %  BP: (119-134)/(75-90) 119/75     Weight: 51.7 kg (113 lb 15.7 oz)  Body mass index is 25.55 kg/m².    Intake/Output Summary (Last 24 hours) at 4/2/2020 1401  Last data filed at 4/2/2020 1323  Gross per 24 hour   Intake 250 ml   Output 1000 ml   Net -750 ml      Physical Exam   Constitutional: She is oriented to person, place, and time. She appears well-developed and well-nourished.   HENT:   Head: Normocephalic and atraumatic.   Eyes: Pupils are equal, round, and reactive to light. Conjunctivae and EOM are normal.   Neck: Normal range of motion. Neck supple.   Cardiovascular: Normal rate, regular rhythm, normal heart sounds and intact distal pulses.   Pulmonary/Chest: Effort normal and breath sounds normal.   Abdominal: Soft. Bowel sounds are normal.   Musculoskeletal:   .  Osteomyelitis right foot with revisional transmetatarsal amputation 2.  Grade 2 ulcer lateral right ankle 3.  Pre ulcerative lesion left heel   Neurological: She is alert and oriented to person, place, and time.   Spina bifida changes   Skin: Capillary refill takes less than 2 seconds.   Psychiatric: She has a normal mood and affect. Her behavior is normal. Judgment and  thought content normal.   Nursing note and vitals reviewed.      Significant Labs: None    Significant Imaging: I have reviewed and interpreted all pertinent imaging results/findings within the past 24 hours.

## 2020-04-02 NOTE — PROGRESS NOTES
04/02/20 1530        Incision/Site 03/27/20 1025 Right Foot   Date First Assessed/Time First Assessed: 03/27/20 1025   Side: Right  Location: Foot   Appearance   (incision appears to be healing, sutures intact)   Care   (remains clean and dry,  cleaned periwound. redressed with abd, kerlix, ace)        Wound 03/27/20 1037 Sacral Spine   Date First Assessed/Time First Assessed: 03/27/20 1037   Pre-existing: Yes  Location: Sacral Spine   Drainage Amount None   Appearance   (small 0.2cm round opening with 0.5x0.1cm purple discoloration,  appears to be healing periwound is scarred)   Care Cleansed with:;Wound cleanser  (applied Medihoney)        Pressure Injury 03/31/20 1030 Right posterior Ischial tuberosity Stage 3   Date First Assessed/Time First Assessed: 03/31/20 1030   Pressure Injury Present on Admission: (c) yes  Side: Right  Orientation: posterior  Location: Ischial tuberosity  Staging: Stage 3   Appearance   (no changes, periwound is scarring)   Care   (Medihoney applied and new Mepilex)        Pressure Injury 03/31/20 1030 Right lateral Leg Stage 2   Date First Assessed/Time First Assessed: 03/31/20 1030   Pressure Injury Present on Admission: (c) yes  Side: Right  Orientation: lateral  Location: Leg  Staging: Stage 2   Appearance   (pink healing wound, small amount of bleeding when cleaned)   Care Wound cleanser   Dressing   (applied Santyl, covered with abd, gauze wrap, ace)        Pressure Injury 03/31/20 1030 Left medial Heel   Date First Assessed/Time First Assessed: 03/31/20 1030   Pressure Injury Present on Admission: (c)   Side: Left  Orientation: medial  Location: Heel   Appearance   (small fluid filled blister, )   Dressing   (cleaned and medihoney applied, covered with mepilex, heel protectors, elevated on pillow)

## 2020-04-02 NOTE — PLAN OF CARE
04/02/20 1329   Post-Acute Status   Post-Acute Authorization Placement   Post-Acute Placement Status Referrals Sent   Called Hamida from [facility] and they have no beds. Reached out to Indy at [facility] and she submitted it to insurance for approval. Will follow up.

## 2020-04-02 NOTE — PLAN OF CARE
04/02/20 1647   Final Note   Assessment Type Final Discharge Note   Anticipated Discharge Disposition Long Term   Sent discharge orders to [facility] via right fax. Told Kia the nurse to call report to Valeria at [phone]. Indy will set up transport to come  patient.

## 2020-04-27 LAB — FUNGUS SPEC CULT: NORMAL

## 2020-05-10 LAB
ACID FAST MOD KINY STN SPEC: NORMAL
MYCOBACTERIUM SPEC QL CULT: NORMAL

## 2020-05-21 ENCOUNTER — OFFICE VISIT (OUTPATIENT)
Dept: PODIATRY | Facility: CLINIC | Age: 25
End: 2020-05-21
Payer: MEDICAID

## 2020-05-21 VITALS
DIASTOLIC BLOOD PRESSURE: 79 MMHG | TEMPERATURE: 98 F | HEIGHT: 56 IN | BODY MASS INDEX: 25.55 KG/M2 | SYSTOLIC BLOOD PRESSURE: 124 MMHG | HEART RATE: 88 BPM

## 2020-05-21 DIAGNOSIS — L97.511 SKIN ULCER OF RIGHT FOOT, LIMITED TO BREAKDOWN OF SKIN: Primary | ICD-10-CM

## 2020-05-21 PROCEDURE — 99213 OFFICE O/P EST LOW 20 MIN: CPT | Performed by: PODIATRIST

## 2020-05-21 PROCEDURE — 99024 PR POST-OP FOLLOW-UP VISIT: ICD-10-PCS | Mod: S$PBB,,, | Performed by: PODIATRIST

## 2020-05-21 PROCEDURE — 99024 POSTOP FOLLOW-UP VISIT: CPT | Mod: S$PBB,,, | Performed by: PODIATRIST

## 2020-05-21 NOTE — PROGRESS NOTES
[address]  [place] [address]  Phone: [phone]   [phone]    Patient's PCP:Primary Doctor No  Referring Provider:No ref. provider found    Subjective:      Chief Complaint: Post-Op    Date of Surgery: 03/27/20  Procedure: 1.  Revision of transmetatarsal amputation right foot with bone from 1st and 2nd metatarsal being sent for culture and sensitivity aerobic, anaerobic, acid-fast, fungal cultures.  2.  Debridement of lateral ankle ulcer epidermal and dermal and subcutaneous tissue    HPI:   Shashank Samuel is a 24 y.o. female who returns to the clinic today for her post-operative visit. Shashank Samuel rates pain a 0/10 on a pain scale.    Also c/o of wound opening up on right lower leg    Vitals:    05/21/20 1341   BP: 124/79   Pulse: 88   Temp: 98.2 °F (36.8 °C)       Past Surgical History:   Procedure Laterality Date    AMPUTATION      right 4th and 5th toes; left 5th toe and portion of left great toe    BACK SURGERY      BLADDER SURGERY      COLON SURGERY      flush site for bowel movements from appendix    CYSTOSTOMY      EYE SURGERY      x 5    FLUOROSCOPIC URODYNAMIC STUDY N/A 3/12/2019    Procedure: URODYNAMIC STUDY, FLUOROSCOPIC;  Surgeon: Kenzie Charles MD;  Location: [address];  Service: Urology;  Laterality: N/A;  1 hour    FLUOROSCOPIC URODYNAMIC STUDY N/A 4/4/2019    Procedure: URODYNAMIC STUDY, FLUOROSCOPIC;  Surgeon: Kenzie Charles MD;  Location: [address];  Service: Urology;  Laterality: N/A;  1 hour    FOOT AMPUTATION THROUGH METATARSAL Right 10/28/2019    Procedure: AMPUTATION, FOOT, TRANSMETATARSAL;  Surgeon: Abdi Bedoya DPM;  Location[facility] OR;  Service: Podiatry;  Laterality: Right;    FOOT AMPUTATION THROUGH METATARSAL Right 3/27/2020    Procedure: AMPUTATION, FOOT, TRANSMETATARSAL;  Surgeon: Abdi Bedoya DPM;  Location: [facility];  Service: Podiatry;  Laterality: Right;  REVISION    MYELOMININGOCELE REPAIR       STRABISMUS SURGERY      ou dr peña    VENTRICULOPERITONEAL SHUNT      WOUND DEBRIDEMENT  3/27/2020    Procedure: DEBRIDEMENT, WOUND;  Surgeon: Abdi Bedoya DPM;  Location: [facility] OR;  Service: Podiatry;;     Past Medical History:   Diagnosis Date    Arnold-Chiari malformation, type II     Encounter for blood transfusion     Hydrocephalus     Neurogenic bladder     self catheterizes every 3 hours    Seizures     only w/ shunt malfunction    Spinal bifida, closed     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     Family History   Problem Relation Age of Onset    Cataracts Maternal Grandmother     Amblyopia Neg Hx     Blindness Neg Hx     Cancer Neg Hx     Diabetes Neg Hx     Glaucoma Neg Hx     Hypertension Neg Hx     Macular degeneration Neg Hx     Retinal detachment Neg Hx     Strabismus Neg Hx     Stroke Neg Hx     Thyroid disease Neg Hx         Social History:   Marital Status: Single  Alcohol History:  reports that she does not drink alcohol.  Tobacco History:  reports that she has never smoked. She has never used smokeless tobacco.  Drug History:  reports that she does not use drugs.    Review of patient's allergies indicates:   Allergen Reactions    Latex, natural rubber Anaphylaxis and Other (See Comments)     angioedema    Nitrofurantoin Shortness Of Breath    Bactrim  [sulfamethoxazole-trimethoprim]      Other reaction(s): Swelling of face    Gardasil  [h papillomavirus vac,qval (pf)]      Other reaction(s): Shortness of breath    Menactra  [mening vac a,c,y,w135 dip (pf)]      Other reaction(s): Shortness of breath    Nitrofurantoin      Other reaction(s): Difficulty breathing    Sulfa (sulfonamide antibiotics)     Tramadol Hives    Zofran [ondansetron hcl (pf)]     Levaquin [levofloxacin] Rash     Spots on face    Macrobid [nitrofurantoin monohyd/m-cryst] Rash     Sppots/rash on face       Current Outpatient Medications   Medication Sig Dispense Refill    cefepime in dextrose 5 % (MAXIPIME) 2  gram/50 mL PgBk Inject 50 mLs (2 g total) into the vein every 12 (twelve) hours.      collagenase (SANTYL) ointment Apply topically once daily.  0    vancomycin HCl in 5 % dextrose (VANCOMYCIN IN DEXTROSE 5 %) 1 gram/250 mL Soln Inject 250 mLs (1,000 mg total) into the vein every 10 hours.      oxyCODONE-acetaminophen (PERCOCET)  mg per tablet Take 1 tablet by mouth every 6 (six) hours as needed for Pain. (Patient not taking: Reported on 5/21/2020) 120 tablet 0     No current facility-administered medications for this visit.        Review of Systems      Objective:        Post-op surgery of the revisional transmetatarsal amputation right foot with normal healing, no signs of infection or dehiscence of wound. Hardware in place. Normal post op exam today. No redness, no drainage, no increase in local temperature, no significant swelling, sutures.steri-strips are intact.     Imaging:     Physical Exam:   Foot Exam    General  General Appearance: appears stated age and healthy   Orientation: alert and oriented to person, place, and time   Affect: appropriate   Gait: antalgic   Assistance: wheelchair use       Right Foot/Ankle     Inspection and Palpation  Skin Exam: ulcer (Grade 2 ulcer approximately 2 cm in diameter lateral 5th metatarsal with perimeter of slough pink granulation tissue with some fibrous tissue no fluctuance minimal redness no purulent drainage.  Superficial grade 1 ulcer lateral lower leg just proximal to );     Neurovascular  Dorsalis pedis: 2+  Posterior tibial: 2+      Left Foot/Ankle      Inspection and Palpation  Skin Exam: skin intact;     Neurovascular  Dorsalis pedis: 2+  Posterior tibial: 2+          Physical Exam   Cardiovascular:   Pulses:       Dorsalis pedis pulses are 2+ on the right side, and 2+ on the left side.        Posterior tibial pulses are 2+ on the right side, and 2+ on the left side.   Musculoskeletal:        Feet:    Feet:   Right Foot:   Skin Integrity: Positive for  ulcer (Grade 2 ulcer approximately 2 cm in diameter lateral 5th metatarsal with perimeter of slough pink granulation tissue with some fibrous tissue no fluctuance minimal redness no purulent drainage.  Superficial grade 1 ulcer lateral lower leg just proximal to ).              Assessment:       1. Skin ulcer of right foot, limited to breakdown of skin      Plan:   Skin ulcer of right foot, limited to breakdown of skin     1.  Today I evaluated the ulcerations on the right foot there was no need for debridement today.  2.  I am recommending continue using the Medihoney and I am giving her some samples of a silver alginate dressing to apply the wound daily.  She is return to see me in 3 weeks and monitor for any signs of infection.  3.  Unfortunately the patient unfortunately lives in a somewhat less desired environment as far as cleanliness goes.  I told them to try to keep the area clean and free of animal hair.  No follow-ups on file.    Procedures - None    The surgical dressing was removed showing no signs of infection, excess edema or malalignment. A new dry dressing was applied and patient was instructed to leave dressing intact until next visit or until instructed to remove.     This note was created using Dragon voice recognition software that occasionally misinterpreted phrases or words.

## 2020-06-11 ENCOUNTER — OFFICE VISIT (OUTPATIENT)
Dept: PODIATRY | Facility: CLINIC | Age: 25
End: 2020-06-11
Payer: MEDICAID

## 2020-06-11 VITALS
HEART RATE: 84 BPM | WEIGHT: 113 LBS | SYSTOLIC BLOOD PRESSURE: 124 MMHG | DIASTOLIC BLOOD PRESSURE: 78 MMHG | BODY MASS INDEX: 25.42 KG/M2 | TEMPERATURE: 98 F | HEIGHT: 56 IN

## 2020-06-11 DIAGNOSIS — L97.511 SKIN ULCER OF RIGHT FOOT, LIMITED TO BREAKDOWN OF SKIN: Primary | ICD-10-CM

## 2020-06-11 PROCEDURE — 97597 DBRDMT OPN WND 1ST 20 CM/<: CPT | Mod: PBBFAC | Performed by: PODIATRIST

## 2020-06-11 PROCEDURE — 97597 WOUND DEBRIDEMENT: ICD-10-PCS | Mod: 79,S$PBB,, | Performed by: PODIATRIST

## 2020-06-11 PROCEDURE — 99024 POSTOP FOLLOW-UP VISIT: CPT | Mod: ,,, | Performed by: PODIATRIST

## 2020-06-11 PROCEDURE — 99024 PR POST-OP FOLLOW-UP VISIT: ICD-10-PCS | Mod: ,,, | Performed by: PODIATRIST

## 2020-06-11 PROCEDURE — 99214 OFFICE O/P EST MOD 30 MIN: CPT | Mod: 25 | Performed by: PODIATRIST

## 2020-06-11 NOTE — PROGRESS NOTES
[address] [facility] [address]  [place] [address]  Phone: [phone]   [phone]    Patient's PCP:Primary Doctor No  Referring Provider:No ref. provider found    Subjective:      Chief Complaint: Post-Op        Date of Surgery:3-27-20  Procedure:  Revision of transmetatarsal amputation right foot with bone from 1st and 2nd metatarsal being sent for culture and sensitivity aerobic, anaerobic, acid-fast, fungal cultures.  2.  Debridement of lateral ankle ulcer epidermal and dermal and subcutaneous tissue    HPI:   Shashank Samuel is a 25 y.o. female who returns to the clinic today for her post-operative visit. Shashank Samuel rates pain a 0/10 on a pain scale. Compliance of Care: dressing intact.Daily dressing changes at home     Vitals:    06/11/20 0940   BP: 124/78   Pulse: 84   Temp: 98.4 °F (36.9 °C)       Past Surgical History:   Procedure Laterality Date    AMPUTATION      right 4th and 5th toes; left 5th toe and portion of left great toe    BACK SURGERY      BLADDER SURGERY      COLON SURGERY      flush site for bowel movements from appendix    CYSTOSTOMY      EYE SURGERY      x 5    FLUOROSCOPIC URODYNAMIC STUDY N/A 3/12/2019    Procedure: URODYNAMIC STUDY, FLUOROSCOPIC;  Surgeon: Kenzie Charles MD;  Location: [address];  Service: Urology;  Laterality: N/A;  1 hour    FLUOROSCOPIC URODYNAMIC STUDY N/A 4/4/2019    Procedure: URODYNAMIC STUDY, FLUOROSCOPIC;  Surgeon: Kenzie Charles MD;  Location: [address];  Service: Urology;  Laterality: N/A;  1 hour    FOOT AMPUTATION THROUGH METATARSAL Right 10/28/2019    Procedure: AMPUTATION, FOOT, TRANSMETATARSAL;  Surgeon: Abdi Bedoya DPM;  Location: [facility] OR;  Service: Podiatry;  Laterality: Right;    FOOT AMPUTATION THROUGH METATARSAL Right 3/27/2020    Procedure: AMPUTATION, FOOT, TRANSMETATARSAL;  Surgeon: Abdi Bedoya DPM;  Location: [facility];  Service: Podiatry;  Laterality: Right;  REVISION    MYELOMININGOCELE  REPAIR      STRABISMUS SURGERY      ou dr peña    VENTRICULOPERITONEAL SHUNT      WOUND DEBRIDEMENT  3/27/2020    Procedure: DEBRIDEMENT, WOUND;  Surgeon: Abdi Bedoya DPM;  Location: [facility] OR;  Service: Podiatry;;     Past Medical History:   Diagnosis Date    Arnold-Chiari malformation, type II     Encounter for blood transfusion     Hydrocephalus     Neurogenic bladder     self catheterizes every 3 hours    Seizures     only w/ shunt malfunction    Spinal bifida, closed     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     Family History   Problem Relation Age of Onset    Cataracts Maternal Grandmother     Amblyopia Neg Hx     Blindness Neg Hx     Cancer Neg Hx     Diabetes Neg Hx     Glaucoma Neg Hx     Hypertension Neg Hx     Macular degeneration Neg Hx     Retinal detachment Neg Hx     Strabismus Neg Hx     Stroke Neg Hx     Thyroid disease Neg Hx         Social History:   Marital Status: Single  Alcohol History:  reports that she does not drink alcohol.  Tobacco History:  reports that she has never smoked. She has never used smokeless tobacco.  Drug History:  reports that she does not use drugs.    Review of patient's allergies indicates:   Allergen Reactions    Latex, natural rubber Anaphylaxis and Other (See Comments)     angioedema    Nitrofurantoin Shortness Of Breath    Bactrim  [sulfamethoxazole-trimethoprim]      Other reaction(s): Swelling of face    Gardasil  [h papillomavirus vac,qval (pf)]      Other reaction(s): Shortness of breath    Menactra  [mening vac a,c,y,w135 dip (pf)]      Other reaction(s): Shortness of breath    Nitrofurantoin      Other reaction(s): Difficulty breathing    Sulfa (sulfonamide antibiotics)     Tramadol Hives    Zofran [ondansetron hcl (pf)]     Levaquin [levofloxacin] Rash     Spots on face    Macrobid [nitrofurantoin monohyd/m-cryst] Rash     Sppots/rash on face       Current Outpatient Medications   Medication Sig Dispense Refill    cefepime in dextrose 5 %  (MAXIPIME) 2 gram/50 mL PgBk Inject 50 mLs (2 g total) into the vein every 12 (twelve) hours.      collagenase (SANTYL) ointment Apply topically once daily.  0    oxyCODONE-acetaminophen (PERCOCET)  mg per tablet Take 1 tablet by mouth every 6 (six) hours as needed for Pain. (Patient not taking: Reported on 5/21/2020) 120 tablet 0    vancomycin HCl in 5 % dextrose (VANCOMYCIN IN DEXTROSE 5 %) 1 gram/250 mL Soln Inject 250 mLs (1,000 mg total) into the vein every 10 hours.       No current facility-administered medications for this visit.        Review of Systems      Objective:        Post-op surgery of the revision transmetatarsal amputation  Right foot with normal healing, no signs of infection or dehiscence of wound. Hardware in place. Normal post op exam today. No redness, no drainage, no increase in local temperature, no significant swelling, sutures.steri-strips are intact.     Imaging:     Physical Exam:   Foot Exam    General  General Appearance: appears stated age and healthy   Orientation: alert and oriented to person, place, and time   Affect: appropriate   Gait: antalgic   Assistance: wheelchair use       Right Foot/Ankle     Inspection and Palpation  Skin Exam: ulcer (grade 2 ulcer lateral 5th metatarsal 2.5 cm by 1 cm wide by .1 cm deep with no SOI and pink granulation tissue with a small amount of fibrotic tissue.);           Physical Exam   Feet:   Right Foot:   Skin Integrity: Positive for ulcer (grade 2 ulcer lateral 5th metatarsal 2.5 cm by 1 cm wide by .1 cm deep with no SOI and pink granulation tissue with a small amount of fibrotic tissue.).              Assessment:       1. Skin ulcer of right foot, limited to breakdown of skin      Plan:   Skin ulcer of right foot, limited to breakdown of skin    1.  I evaluated the patient today.  2.  Continue local wound care with medi honey daily and redress  3.  No follow-ups on file.    Wound Debridement  Date/Time: 6/11/2020 9:40 AM  Performed  "by: Abdi Bedoya DPM  Authorized by: Abdi Bedoya DPM     Time out: Immediately prior to procedure a "time out" was called to verify the correct patient, procedure, equipment, support staff and site/side marked as required.    Consent Done?:  Yes (Verbal)    Preparation: Patient was prepped and draped in usual sterile fashion    Local anesthesia used?: No      Wound Details:    Location:  Right foot    Location:  Right 5th Metatarsal Head       Length (cm):  2.5       Area (sq cm):  2.5       Width (cm):  1       Percent Debrided (%):  100       Depth (cm):  0.1       Total Area Debrided (sq cm):  2.5    Depth of debridement:  Epidermis/Dermis    Tissue debrided:  Dermis and Epidermis    Devitalized tissue debrided:  Biofilm and Fibrin    Instruments:  Curette    Bleeding:  Minimal  Hemostasis Achieved: Yes    Method Used:  Pressure  Patient tolerance:  Patient tolerated the procedure well with no immediate complications     Continue medi honey and dressing daily.  Return 4 weeks.     - None    The surgical dressing was removed showing no signs of infection, excess edema or malalignment. A new dry dressing was applied and patient was instructed to leave dressing intact until next visit or until instructed to remove.     This note was created using Dragon voice recognition software that occasionally misinterpreted phrases or words.          "

## 2020-08-03 ENCOUNTER — OFFICE VISIT (OUTPATIENT)
Dept: PODIATRY | Facility: CLINIC | Age: 25
End: 2020-08-03
Payer: MEDICAID

## 2020-08-03 VITALS
SYSTOLIC BLOOD PRESSURE: 122 MMHG | HEIGHT: 56 IN | TEMPERATURE: 99 F | BODY MASS INDEX: 25.42 KG/M2 | HEART RATE: 86 BPM | WEIGHT: 113 LBS | DIASTOLIC BLOOD PRESSURE: 78 MMHG

## 2020-08-03 DIAGNOSIS — L97.512 SKIN ULCER OF RIGHT FOOT WITH FAT LAYER EXPOSED: Primary | ICD-10-CM

## 2020-08-03 PROCEDURE — 99213 OFFICE O/P EST LOW 20 MIN: CPT | Mod: S$PBB,,, | Performed by: PODIATRIST

## 2020-08-03 PROCEDURE — 99214 OFFICE O/P EST MOD 30 MIN: CPT | Performed by: PODIATRIST

## 2020-08-03 PROCEDURE — 99213 PR OFFICE/OUTPT VISIT, EST, LEVL III, 20-29 MIN: ICD-10-PCS | Mod: S$PBB,,, | Performed by: PODIATRIST

## 2020-08-03 NOTE — PROGRESS NOTES
[address] [facility] [address]  [place] [address]  Phone: [phone]   [phone]    Patient's PCP:Primary Doctor No  Referring Provider: No ref. provider found    Subjective:      Chief Complaint:: Follow-up (ulcer right foot lateral)    SABRINA Samuel is a 25 y.o. female who presents with a follow up ulcer lateral right foot. Daily dressing with medi honey. Pain scale 0/10.  There were no vitals filed for this visit.  Shoe Size:     Past Surgical History:   Procedure Laterality Date    AMPUTATION      right 4th and 5th toes; left 5th toe and portion of left great toe    BACK SURGERY      BLADDER SURGERY      COLON SURGERY      flush site for bowel movements from appendix    CYSTOSTOMY      EYE SURGERY      x 5    FLUOROSCOPIC URODYNAMIC STUDY N/A 3/12/2019    Procedure: URODYNAMIC STUDY, FLUOROSCOPIC;  Surgeon: Kenzie Charles MD;  Location: [address];  Service: Urology;  Laterality: N/A;  1 hour    FLUOROSCOPIC URODYNAMIC STUDY N/A 4/4/2019    Procedure: URODYNAMIC STUDY, FLUOROSCOPIC;  Surgeon: Kenzie Charles MD;  Location: [address];  Service: Urology;  Laterality: N/A;  1 hour    FOOT AMPUTATION THROUGH METATARSAL Right 10/28/2019    Procedure: AMPUTATION, FOOT, TRANSMETATARSAL;  Surgeon: Abdi Bedoya DPM;  Location: [facility];  Service: Podiatry;  Laterality: Right;    FOOT AMPUTATION THROUGH METATARSAL Right 3/27/2020    Procedure: AMPUTATION, FOOT, TRANSMETATARSAL;  Surgeon: Abdi Bedoya DPM;  Location: [facility];  Service: Podiatry;  Laterality: Right;  REVISION    MYELOMININGOCELE REPAIR      STRABISMUS SURGERY      ou dr peña    VENTRICULOPERITONEAL SHUNT      WOUND DEBRIDEMENT  3/27/2020    Procedure: DEBRIDEMENT, WOUND;  Surgeon: Abdi Bedoya DPM;  Location: [facility];  Service: Podiatry;;     Past Medical History:   Diagnosis Date    Arnold-Chiari malformation, type II     Encounter for blood transfusion     Hydrocephalus      Neurogenic bladder     self catheterizes every 3 hours    Seizures     only w/ shunt malfunction    Spinal bifida, closed     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     Family History   Problem Relation Age of Onset    Cataracts Maternal Grandmother     Amblyopia Neg Hx     Blindness Neg Hx     Cancer Neg Hx     Diabetes Neg Hx     Glaucoma Neg Hx     Hypertension Neg Hx     Macular degeneration Neg Hx     Retinal detachment Neg Hx     Strabismus Neg Hx     Stroke Neg Hx     Thyroid disease Neg Hx         Social History:   Marital Status: Single  Alcohol History:  reports no history of alcohol use.  Tobacco History:  reports that she has never smoked. She has never used smokeless tobacco.  Drug History:  reports no history of drug use.    Review of patient's allergies indicates:   Allergen Reactions    Latex, natural rubber Anaphylaxis and Other (See Comments)     angioedema    Nitrofurantoin Shortness Of Breath    Bactrim  [sulfamethoxazole-trimethoprim]      Other reaction(s): Swelling of face    Gardasil  [h papillomavirus vac,qval (pf)]      Other reaction(s): Shortness of breath    Menactra  [mening vac a,c,y,w135 dip (pf)]      Other reaction(s): Shortness of breath    Nitrofurantoin      Other reaction(s): Difficulty breathing    Sulfa (sulfonamide antibiotics)     Tramadol Hives    Zofran [ondansetron hcl (pf)]     Levaquin [levofloxacin] Rash     Spots on face    Macrobid [nitrofurantoin monohyd/m-cryst] Rash     Sppots/rash on face       Current Outpatient Medications   Medication Sig Dispense Refill    cefepime in dextrose 5 % (MAXIPIME) 2 gram/50 mL PgBk Inject 50 mLs (2 g total) into the vein every 12 (twelve) hours.      collagenase (SANTYL) ointment Apply topically once daily.  0    oxyCODONE-acetaminophen (PERCOCET)  mg per tablet Take 1 tablet by mouth every 6 (six) hours as needed for Pain. (Patient not taking: Reported on 5/21/2020) 120 tablet 0    vancomycin HCl in 5 % dextrose  (VANCOMYCIN IN DEXTROSE 5 %) 1 gram/250 mL Soln Inject 250 mLs (1,000 mg total) into the vein every 10 hours.       No current facility-administered medications for this visit.        Review of Systems      Objective:        Physical Exam:   Foot Exam    General  General Appearance: appears stated age and healthy   Orientation: alert and oriented to person, place, and time   Affect: appropriate   Gait: antalgic   Assistance: wheelchair use       Right Foot/Ankle     Inspection and Palpation  Ecchymosis: none  Tenderness: none   Swelling: none   Skin Exam: ulcer (Grade 2 ulcer approximately 2 cm x 1 cm x 0.1 cm deep lateral transmetatarsal amputation site with no signs of infection);     Neurovascular  Dorsalis pedis: 2+  Posterior tibial: 2+  Saphenous nerve sensation: absent  Tibial nerve sensation: absent  Superficial peroneal nerve sensation: absent  Deep peroneal nerve sensation: absent  Sural nerve sensation: absent          Physical Exam   Cardiovascular:   Pulses:       Dorsalis pedis pulses are 2+ on the right side.        Posterior tibial pulses are 2+ on the right side.   Musculoskeletal:        Feet:    Feet:   Right Foot:   Skin Integrity: Positive for ulcer (Grade 2 ulcer approximately 2 cm x 1 cm x 0.1 cm deep lateral transmetatarsal amputation site with no signs of infection).           Imaging:            Assessment:       1. Skin ulcer of right foot, limited to breakdown of skin      Plan:   Skin ulcer of right foot, limited to breakdown of skin     1.  Today evaluate the patient and recommend a silver alginate dressing that I am giving her samples of the be applied to the area daily.  She return to see me in 4 weeks.  If there is an non enough improvement I will consider applying Apligraf.  2.  Return in 4 weeks.  No follow-ups on file.    Procedures - None    Counseling:     I provided patient education verbally regarding:   Patient diagnosis, treatment options, as well as alternatives, risks, and  benefits.     This note was created using Dragon voice recognition software that occasionally misinterpreted phrases or words.

## 2020-08-31 ENCOUNTER — OFFICE VISIT (OUTPATIENT)
Dept: PODIATRY | Facility: CLINIC | Age: 25
End: 2020-08-31
Payer: MEDICAID

## 2020-08-31 VITALS
HEART RATE: 78 BPM | BODY MASS INDEX: 25.42 KG/M2 | HEIGHT: 56 IN | TEMPERATURE: 99 F | WEIGHT: 113 LBS | SYSTOLIC BLOOD PRESSURE: 136 MMHG | DIASTOLIC BLOOD PRESSURE: 80 MMHG

## 2020-08-31 DIAGNOSIS — L97.512 SKIN ULCER OF RIGHT FOOT WITH FAT LAYER EXPOSED: Primary | ICD-10-CM

## 2020-08-31 PROCEDURE — 99213 PR OFFICE/OUTPT VISIT, EST, LEVL III, 20-29 MIN: ICD-10-PCS | Mod: S$PBB,,, | Performed by: PODIATRIST

## 2020-08-31 PROCEDURE — 99213 OFFICE O/P EST LOW 20 MIN: CPT | Mod: S$PBB,,, | Performed by: PODIATRIST

## 2020-08-31 PROCEDURE — 99214 OFFICE O/P EST MOD 30 MIN: CPT | Performed by: PODIATRIST

## 2020-08-31 RX ORDER — DOXYCYCLINE 100 MG/1
100 CAPSULE ORAL 2 TIMES DAILY
Qty: 20 CAPSULE | Refills: 0 | Status: ON HOLD | OUTPATIENT
Start: 2020-08-31 | End: 2021-01-08

## 2020-08-31 NOTE — PROGRESS NOTES
[address] [facility] [address]  [place] [address]  Phone: [phone]   [phone]    Patient's PCP:Primary Doctor No  Referring Provider: No ref. provider found    Subjective:      Chief Complaint:: Follow-up (ulcer lateral right foot)    HPI  Shashank Samuel is a 25 y.o. female who presents with a follow up ulcer lateral right foot.Daily dressing with medi honey. Pain scale 0/10.       Vitals:    08/31/20 0952   BP: 136/80   Pulse: 78   Temp: 98.7 °F (37.1 °C)     Shoe Size:     Past Surgical History:   Procedure Laterality Date    AMPUTATION      right 4th and 5th toes; left 5th toe and portion of left great toe    BACK SURGERY      BLADDER SURGERY      COLON SURGERY      flush site for bowel movements from appendix    CYSTOSTOMY      EYE SURGERY      x 5    FLUOROSCOPIC URODYNAMIC STUDY N/A 3/12/2019    Procedure: URODYNAMIC STUDY, FLUOROSCOPIC;  Surgeon: Kenzie Charles MD;  Location: [address];  Service: Urology;  Laterality: N/A;  1 hour    FLUOROSCOPIC URODYNAMIC STUDY N/A 4/4/2019    Procedure: URODYNAMIC STUDY, FLUOROSCOPIC;  Surgeon: Kenzie Charles MD;  Location: [address];  Service: Urology;  Laterality: N/A;  1 hour    FOOT AMPUTATION THROUGH METATARSAL Right 10/28/2019    Procedure: AMPUTATION, FOOT, TRANSMETATARSAL;  Surgeon: Abdi Bedoya DPM;  Location: [facility];  Service: Podiatry;  Laterality: Right;    FOOT AMPUTATION THROUGH METATARSAL Right 3/27/2020    Procedure: AMPUTATION, FOOT, TRANSMETATARSAL;  Surgeon: Abdi Bedoya DPM;  Location: [facility] OR;  Service: Podiatry;  Laterality: Right;  REVISION    MYELOMININGOCELE REPAIR      STRABISMUS SURGERY      ou dr peña    VENTRICULOPERITONEAL SHUNT      WOUND DEBRIDEMENT  3/27/2020    Procedure: DEBRIDEMENT, WOUND;  Surgeon: Abdi Bedoya DPM;  Location: [facility];  Service: Podiatry;;     Past Medical History:   Diagnosis Date    Arnold-Chiari malformation, type II     Encounter for blood  transfusion     Hydrocephalus     Neurogenic bladder     self catheterizes every 3 hours    Seizures     only w/ shunt malfunction    Spinal bifida, closed     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     Family History   Problem Relation Age of Onset    Cataracts Maternal Grandmother     Amblyopia Neg Hx     Blindness Neg Hx     Cancer Neg Hx     Diabetes Neg Hx     Glaucoma Neg Hx     Hypertension Neg Hx     Macular degeneration Neg Hx     Retinal detachment Neg Hx     Strabismus Neg Hx     Stroke Neg Hx     Thyroid disease Neg Hx         Social History:   Marital Status: Single  Alcohol History:  reports no history of alcohol use.  Tobacco History:  reports that she has never smoked. She has never used smokeless tobacco.  Drug History:  reports no history of drug use.    Review of patient's allergies indicates:   Allergen Reactions    Latex, natural rubber Anaphylaxis and Other (See Comments)     angioedema    Nitrofurantoin Shortness Of Breath    Bactrim  [sulfamethoxazole-trimethoprim]      Other reaction(s): Swelling of face    Gardasil  [h papillomavirus vac,qval (pf)]      Other reaction(s): Shortness of breath    Menactra  [mening vac a,c,y,w135 dip (pf)]      Other reaction(s): Shortness of breath    Nitrofurantoin      Other reaction(s): Difficulty breathing    Sulfa (sulfonamide antibiotics)     Tramadol Hives    Zofran [ondansetron hcl (pf)]     Levaquin [levofloxacin] Rash     Spots on face    Macrobid [nitrofurantoin monohyd/m-cryst] Rash     Sppots/rash on face       Current Outpatient Medications   Medication Sig Dispense Refill    collagenase (SANTYL) ointment Apply topically once daily.  0    cefepime in dextrose 5 % (MAXIPIME) 2 gram/50 mL PgBk Inject 50 mLs (2 g total) into the vein every 12 (twelve) hours. (Patient not taking: Reported on 8/3/2020)      doxycycline (MONODOX) 100 MG capsule Take 1 capsule (100 mg total) by mouth 2 (two) times daily. 20 capsule 0    oxyCODONE-acetaminophen  (PERCOCET)  mg per tablet Take 1 tablet by mouth every 6 (six) hours as needed for Pain. (Patient not taking: Reported on 5/21/2020) 120 tablet 0    vancomycin HCl in 5 % dextrose (VANCOMYCIN IN DEXTROSE 5 %) 1 gram/250 mL Soln Inject 250 mLs (1,000 mg total) into the vein every 10 hours. (Patient not taking: Reported on 8/3/2020)       No current facility-administered medications for this visit.        Review of Systems      Objective:        Physical Exam:   Foot Exam    General  General Appearance: appears stated age and healthy   Orientation: alert and oriented to person, place, and time   Affect: appropriate   Gait: antalgic   Assistance: wheelchair use       Right Foot/Ankle     Inspection and Palpation  Skin Exam: ulcer (Grade 2 ulcer lateral foot approximately 3 cm x 2 cm x 0.  2 cm deep with perimeter of erythema no fluctuance no purulent drainage fibrous tissue in the base.);     Neurovascular  Dorsalis pedis: 2+  Posterior tibial: 2+  Saphenous nerve sensation: absent  Tibial nerve sensation: absent  Superficial peroneal nerve sensation: absent  Deep peroneal nerve sensation: absent  Sural nerve sensation: absent          Physical Exam   Cardiovascular:   Pulses:       Dorsalis pedis pulses are 2+ on the right side.        Posterior tibial pulses are 2+ on the right side.   Musculoskeletal:        Feet:    Feet:   Right Foot:   Skin Integrity: Positive for ulcer (Grade 2 ulcer lateral foot approximately 3 cm x 2 cm x 0.  2 cm deep with perimeter of erythema no fluctuance no purulent drainage fibrous tissue in the base.).               Imaging:            Assessment:       1. Skin ulcer of right foot with fat layer exposed      Plan:   Skin ulcer of right foot with fat layer exposed  -     doxycycline (MONODOX) 100 MG capsule; Take 1 capsule (100 mg total) by mouth 2 (two) times daily.  Dispense: 20 capsule; Refill: 0     1.  Today evaluate the patient I debrided the ulceration with a curette down  to bleeding tissue.  She will continue local wound care at home with antibiotics and dressing and I am recommending she use the heel protector she has at home to offload the ulcer.  As I explained hurt her father part of the problem is that she is paraplegic and she rest with foot on the lateral surface putting pressure on the ulcer.  I placed her in doxycycline 100 mg twice a day and she is to return to see me in 4 weeks or if the redness because more severe she is to call me.  No follow-ups on file.    Procedures - None    Counseling:     I provided patient education verbally regarding:   Patient diagnosis, treatment options, as well as alternatives, risks, and benefits.     This note was created using Dragon voice recognition software that occasionally misinterpreted phrases or words.

## 2020-12-08 ENCOUNTER — TELEPHONE (OUTPATIENT)
Dept: PODIATRY | Facility: CLINIC | Age: 25
End: 2020-12-08

## 2020-12-08 NOTE — TELEPHONE ENCOUNTER
Spoke to pt's mother today. Pt's ins has changed to a medicaid plan that we are out of network with. Advised mother of this and to please go to ER if pt needs to.

## 2020-12-29 ENCOUNTER — OFFICE VISIT (OUTPATIENT)
Dept: PODIATRY | Facility: CLINIC | Age: 25
End: 2020-12-29
Payer: MEDICAID

## 2020-12-29 VITALS — WEIGHT: 112 LBS | HEIGHT: 56 IN | BODY MASS INDEX: 25.19 KG/M2 | RESPIRATION RATE: 18 BRPM | HEART RATE: 80 BPM

## 2020-12-29 DIAGNOSIS — L97.512 SKIN ULCER OF RIGHT FOOT WITH FAT LAYER EXPOSED: Primary | ICD-10-CM

## 2020-12-29 PROCEDURE — 99213 PR OFFICE/OUTPT VISIT, EST, LEVL III, 20-29 MIN: ICD-10-PCS | Mod: S$GLB,,, | Performed by: PODIATRIST

## 2020-12-29 PROCEDURE — 99213 OFFICE O/P EST LOW 20 MIN: CPT | Mod: S$GLB,,, | Performed by: PODIATRIST

## 2020-12-29 NOTE — PROGRESS NOTES
"  [address] [facility] [address]  [place] [address]  Phone: [phone]   [phone]    Patient's PCP:Primary Doctor No  Referring Provider: No ref. provider found    Subjective:      Chief Complaint:: Follow-up (Ulcer right foot)    HPI  Shashank Samuel is a 25 y.o. female who presents follow-up right foot lateral aspect ulcer. Dressing changes daily with medi-honey and padded bandage.      Vitals:    12/29/20 1024   Pulse: 80   Resp: 18   Weight: 50.8 kg (112 lb)   Height: 4' 8" (1.422 m)   PainSc: 0-No pain      Shoe Size:     Past Surgical History:   Procedure Laterality Date    AMPUTATION      right 4th and 5th toes; left 5th toe and portion of left great toe    BACK SURGERY      BLADDER SURGERY      COLON SURGERY      flush site for bowel movements from appendix    CYSTOSTOMY      EYE SURGERY      x 5    FLUOROSCOPIC URODYNAMIC STUDY N/A 3/12/2019    Procedure: URODYNAMIC STUDY, FLUOROSCOPIC;  Surgeon: Kenzie Charles MD;  Location: [address];  Service: Urology;  Laterality: N/A;  1 hour    FLUOROSCOPIC URODYNAMIC STUDY N/A 4/4/2019    Procedure: URODYNAMIC STUDY, FLUOROSCOPIC;  Surgeon: Kenzie Charles MD;  Location: [address];  Service: Urology;  Laterality: N/A;  1 hour    FOOT AMPUTATION THROUGH METATARSAL Right 10/28/2019    Procedure: AMPUTATION, FOOT, TRANSMETATARSAL;  Surgeon: Abdi Bedoya DPM;  Location: [facility];  Service: Podiatry;  Laterality: Right;    FOOT AMPUTATION THROUGH METATARSAL Right 3/27/2020    Procedure: AMPUTATION, FOOT, TRANSMETATARSAL;  Surgeon: Abdi Bedoya DPM;  Location: [facility];  Service: Podiatry;  Laterality: Right;  REVISION    MYELOMININGOCELE REPAIR      STRABISMUS SURGERY      ou dr peña    VENTRICULOPERITONEAL SHUNT      WOUND DEBRIDEMENT  3/27/2020    Procedure: DEBRIDEMENT, WOUND;  Surgeon: Abdi Bedoya DPM;  Location: [facility];  Service: Podiatry;;     Past Medical History:   Diagnosis Date    Arnold-Chiari " malformation, type II     Encounter for blood transfusion     Hydrocephalus     Neurogenic bladder     self catheterizes every 3 hours    Seizures     only w/ shunt malfunction    Spinal bifida, closed     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     Family History   Problem Relation Age of Onset    Cataracts Maternal Grandmother     Amblyopia Neg Hx     Blindness Neg Hx     Cancer Neg Hx     Diabetes Neg Hx     Glaucoma Neg Hx     Hypertension Neg Hx     Macular degeneration Neg Hx     Retinal detachment Neg Hx     Strabismus Neg Hx     Stroke Neg Hx     Thyroid disease Neg Hx         Social History:   Marital Status: Single  Alcohol History:  reports no history of alcohol use.  Tobacco History:  reports that she has never smoked. She has never used smokeless tobacco.  Drug History:  reports no history of drug use.    Review of patient's allergies indicates:   Allergen Reactions    Latex, natural rubber Anaphylaxis and Other (See Comments)     angioedema    Nitrofurantoin Shortness Of Breath    Bactrim  [sulfamethoxazole-trimethoprim]      Other reaction(s): Swelling of face    Gardasil  [h papillomavirus vac,qval (pf)]      Other reaction(s): Shortness of breath    Menactra  [mening vac a,c,y,w135 dip (pf)]      Other reaction(s): Shortness of breath    Nitrofurantoin      Other reaction(s): Difficulty breathing    Sulfa (sulfonamide antibiotics)     Tramadol Hives    Zofran [ondansetron hcl (pf)]     Levaquin [levofloxacin] Rash     Spots on face    Macrobid [nitrofurantoin monohyd/m-cryst] Rash     Sppots/rash on face       Current Outpatient Medications   Medication Sig Dispense Refill    cefepime in dextrose 5 % (MAXIPIME) 2 gram/50 mL PgBk Inject 50 mLs (2 g total) into the vein every 12 (twelve) hours. (Patient not taking: Reported on 8/3/2020)      collagenase (SANTYL) ointment Apply topically once daily. (Patient not taking: Reported on 12/29/2020)  0    doxycycline (MONODOX) 100 MG capsule Take 1  capsule (100 mg total) by mouth 2 (two) times daily. (Patient not taking: Reported on 12/29/2020) 20 capsule 0    oxyCODONE-acetaminophen (PERCOCET)  mg per tablet Take 1 tablet by mouth every 6 (six) hours as needed for Pain. (Patient not taking: Reported on 5/21/2020) 120 tablet 0    vancomycin HCl in 5 % dextrose (VANCOMYCIN IN DEXTROSE 5 %) 1 gram/250 mL Soln Inject 250 mLs (1,000 mg total) into the vein every 10 hours. (Patient not taking: Reported on 8/3/2020)       No current facility-administered medications for this visit.        Review of Systems      Objective:        Physical Exam:   Foot Exam    General  General Appearance: appears stated age and healthy   Orientation: alert and oriented to person, place, and time   Affect: appropriate   Gait: antalgic   Assistance: wheelchair use           Physical Exam   Musculoskeletal:        Feet:        Imaging:            Assessment:       1. Skin ulcer of right foot with fat layer exposed      Plan:   Skin ulcer of right foot with fat layer exposed  -     Cancel: NM Bone Scan 3 Phase Foot; Future; Expected date: 12/29/2020  -     NM Bone Scan 3 Phase Foot; Future; Expected date: 12/29/2020     1. Today evaluate patient we discussed that there is no improvement in the ulceration on the lateral aspect of the transmetatarsal amputation of the right foot. Patient states she cannot wear the heel protection devices to relieve pressure on the foot because they will not stay in place on her foot.  2.  I will order a 3 phase bone scan to make sure there is no residual bone infection in the foot. If the bone scan shows infection then of perform a bone biopsy for IV antibiotics.  If there is no infection I will schedule her for debridement outpatient application of Apligraf.  My office will contact her.  No follow-ups on file.    Procedures  No notes on file       Counseling:     I provided patient education verbally regarding:   Patient diagnosis, treatment  options, as well as alternatives, risks, and benefits.     This note was created using Dragon voice recognition software that occasionally misinterpreted phrases or words.

## 2020-12-30 ENCOUNTER — HOSPITAL ENCOUNTER (OUTPATIENT)
Dept: RADIOLOGY | Facility: HOSPITAL | Age: 25
Discharge: HOME | End: 2020-12-30
Attending: PODIATRIST
Payer: MEDICAID

## 2020-12-30 ENCOUNTER — TELEPHONE (OUTPATIENT)
Dept: PODIATRY | Facility: CLINIC | Age: 25
End: 2020-12-30

## 2020-12-30 DIAGNOSIS — L97.512 SKIN ULCER OF RIGHT FOOT WITH FAT LAYER EXPOSED: ICD-10-CM

## 2020-12-30 PROCEDURE — A9503 TC99M MEDRONATE: HCPCS

## 2020-12-30 PROCEDURE — 73630 X-RAY EXAM OF FOOT: CPT | Mod: TC,RT

## 2020-12-30 NOTE — TELEPHONE ENCOUNTER
Left message on voicemail regarding Bone scan results and scheduling for surgery. I will continue to follow up with patient until she is reached.

## 2021-01-06 ENCOUNTER — TELEPHONE (OUTPATIENT)
Dept: PODIATRY | Facility: CLINIC | Age: 26
End: 2021-01-06

## 2021-01-06 ENCOUNTER — HOSPITAL ENCOUNTER (OUTPATIENT)
Dept: PREADMISSION TESTING | Facility: HOSPITAL | Age: 26
Discharge: HOME | End: 2021-01-06
Attending: PODIATRIST
Payer: MEDICAID

## 2021-01-06 VITALS
RESPIRATION RATE: 14 BRPM | HEIGHT: 56 IN | SYSTOLIC BLOOD PRESSURE: 121 MMHG | DIASTOLIC BLOOD PRESSURE: 82 MMHG | HEART RATE: 94 BPM | OXYGEN SATURATION: 98 % | WEIGHT: 112 LBS | TEMPERATURE: 98 F | BODY MASS INDEX: 25.19 KG/M2

## 2021-01-06 DIAGNOSIS — L97.512 SKIN ULCER OF RIGHT FOOT WITH FAT LAYER EXPOSED: ICD-10-CM

## 2021-01-06 LAB
ALBUMIN SERPL BCP-MCNC: 3.5 G/DL (ref 3.5–5.2)
ALP SERPL-CCNC: 64 U/L (ref 55–135)
ALT SERPL W/O P-5'-P-CCNC: 14 U/L (ref 10–44)
ANION GAP SERPL CALC-SCNC: 11 MMOL/L (ref 8–16)
AST SERPL-CCNC: 12 U/L (ref 10–40)
BASOPHILS # BLD AUTO: 0.01 K/UL (ref 0–0.2)
BASOPHILS NFR BLD: 0.1 % (ref 0–1.9)
BILIRUB SERPL-MCNC: 0.6 MG/DL (ref 0.1–1)
BUN SERPL-MCNC: 10 MG/DL (ref 6–20)
CALCIUM SERPL-MCNC: 9 MG/DL (ref 8.7–10.5)
CHLORIDE SERPL-SCNC: 107 MMOL/L (ref 95–110)
CO2 SERPL-SCNC: 23 MMOL/L (ref 23–29)
CREAT SERPL-MCNC: 0.3 MG/DL (ref 0.5–1.4)
DIFFERENTIAL METHOD: ABNORMAL
EOSINOPHIL # BLD AUTO: 0.1 K/UL (ref 0–0.5)
EOSINOPHIL NFR BLD: 0.7 % (ref 0–8)
ERYTHROCYTE [DISTWIDTH] IN BLOOD BY AUTOMATED COUNT: 14.6 % (ref 11.5–14.5)
EST. GFR  (AFRICAN AMERICAN): >60 ML/MIN/1.73 M^2
EST. GFR  (NON AFRICAN AMERICAN): >60 ML/MIN/1.73 M^2
GLUCOSE SERPL-MCNC: 65 MG/DL (ref 70–110)
HCT VFR BLD AUTO: 34.8 % (ref 37–48.5)
HGB BLD-MCNC: 10.3 G/DL (ref 12–16)
IMM GRANULOCYTES # BLD AUTO: 0.02 K/UL (ref 0–0.04)
IMM GRANULOCYTES NFR BLD AUTO: 0.3 % (ref 0–0.5)
LYMPHOCYTES # BLD AUTO: 1.4 K/UL (ref 1–4.8)
LYMPHOCYTES NFR BLD: 20.9 % (ref 18–48)
MCH RBC QN AUTO: 23.6 PG (ref 27–31)
MCHC RBC AUTO-ENTMCNC: 29.6 G/DL (ref 32–36)
MCV RBC AUTO: 80 FL (ref 82–98)
MONOCYTES # BLD AUTO: 0.7 K/UL (ref 0.3–1)
MONOCYTES NFR BLD: 10 % (ref 4–15)
NEUTROPHILS # BLD AUTO: 4.6 K/UL (ref 1.8–7.7)
NEUTROPHILS NFR BLD: 68 % (ref 38–73)
NRBC BLD-RTO: 0 /100 WBC
PLATELET # BLD AUTO: 499 K/UL (ref 150–350)
PMV BLD AUTO: 8.8 FL (ref 9.2–12.9)
POTASSIUM SERPL-SCNC: 3.5 MMOL/L (ref 3.5–5.1)
PROT SERPL-MCNC: 8.3 G/DL (ref 6–8.4)
RBC # BLD AUTO: 4.37 M/UL (ref 4–5.4)
SODIUM SERPL-SCNC: 141 MMOL/L (ref 136–145)
WBC # BLD AUTO: 6.79 K/UL (ref 3.9–12.7)

## 2021-01-06 PROCEDURE — 85025 COMPLETE CBC W/AUTO DIFF WBC: CPT

## 2021-01-06 PROCEDURE — 36415 COLL VENOUS BLD VENIPUNCTURE: CPT

## 2021-01-06 PROCEDURE — U0003 INFECTIOUS AGENT DETECTION BY NUCLEIC ACID (DNA OR RNA); SEVERE ACUTE RESPIRATORY SYNDROME CORONAVIRUS 2 (SARS-COV-2) (CORONAVIRUS DISEASE [COVID-19]), AMPLIFIED PROBE TECHNIQUE, MAKING USE OF HIGH THROUGHPUT TECHNOLOGIES AS DESCRIBED BY CMS-2020-01-R: HCPCS

## 2021-01-06 PROCEDURE — 80053 COMPREHEN METABOLIC PANEL: CPT

## 2021-01-07 LAB — SARS-COV-2 RNA RESP QL NAA+PROBE: NOT DETECTED

## 2021-01-08 ENCOUNTER — ANESTHESIA EVENT (OUTPATIENT)
Dept: SURGERY | Facility: HOSPITAL | Age: 26
End: 2021-01-08
Payer: MEDICAID

## 2021-01-08 ENCOUNTER — ANESTHESIA (OUTPATIENT)
Dept: SURGERY | Facility: HOSPITAL | Age: 26
End: 2021-01-08
Payer: MEDICAID

## 2021-01-08 ENCOUNTER — HOSPITAL ENCOUNTER (OUTPATIENT)
Facility: HOSPITAL | Age: 26
Discharge: HOME | End: 2021-01-08
Attending: PODIATRIST | Admitting: PODIATRIST
Payer: MEDICAID

## 2021-01-08 VITALS
HEIGHT: 56 IN | BODY MASS INDEX: 25.19 KG/M2 | SYSTOLIC BLOOD PRESSURE: 118 MMHG | DIASTOLIC BLOOD PRESSURE: 80 MMHG | RESPIRATION RATE: 18 BRPM | OXYGEN SATURATION: 100 % | TEMPERATURE: 98 F | HEART RATE: 90 BPM | WEIGHT: 112 LBS

## 2021-01-08 DIAGNOSIS — Z41.9 SURGERY, ELECTIVE: ICD-10-CM

## 2021-01-08 DIAGNOSIS — L97.512 SKIN ULCER OF RIGHT FOOT WITH FAT LAYER EXPOSED: Primary | ICD-10-CM

## 2021-01-08 LAB — HCG SERPL QL: NEGATIVE

## 2021-01-08 PROCEDURE — 00400 ANES INTEGUMENTARY SYS NOS: CPT | Performed by: PODIATRIST

## 2021-01-08 PROCEDURE — 63600175 PHARM REV CODE 636 W HCPCS: Performed by: NURSE ANESTHETIST, CERTIFIED REGISTERED

## 2021-01-08 PROCEDURE — 27201423 OPTIME MED/SURG SUP & DEVICES STERILE SUPPLY: Performed by: PODIATRIST

## 2021-01-08 PROCEDURE — 25000003 PHARM REV CODE 250: Performed by: NURSE ANESTHETIST, CERTIFIED REGISTERED

## 2021-01-08 PROCEDURE — 84703 CHORIONIC GONADOTROPIN ASSAY: CPT

## 2021-01-08 PROCEDURE — 15275 SKIN SUB GRAFT FACE/NK/HF/G: CPT | Mod: ,,, | Performed by: PODIATRIST

## 2021-01-08 PROCEDURE — 25000003 PHARM REV CODE 250: Performed by: PODIATRIST

## 2021-01-08 PROCEDURE — 36000706: Performed by: PODIATRIST

## 2021-01-08 PROCEDURE — 71000015 HC POSTOP RECOV 1ST HR: Performed by: PODIATRIST

## 2021-01-08 PROCEDURE — 15275 PR SKIN SUB GRAFT FACE/NK/HF/G UP TO 100 SQCM: ICD-10-PCS | Mod: ,,, | Performed by: PODIATRIST

## 2021-01-08 PROCEDURE — 37000008 HC ANESTHESIA 1ST 15 MINUTES: Performed by: PODIATRIST

## 2021-01-08 PROCEDURE — 37000009 HC ANESTHESIA EA ADD 15 MINS: Performed by: PODIATRIST

## 2021-01-08 PROCEDURE — 71000033 HC RECOVERY, INTIAL HOUR: Performed by: PODIATRIST

## 2021-01-08 PROCEDURE — 36000707: Performed by: PODIATRIST

## 2021-01-08 DEVICE — IMPLANTABLE DEVICE: Type: IMPLANTABLE DEVICE | Site: FOOT | Status: FUNCTIONAL

## 2021-01-08 RX ORDER — FAMOTIDINE 10 MG/ML
INJECTION INTRAVENOUS
Status: DISCONTINUED | OUTPATIENT
Start: 2021-01-08 | End: 2021-01-08

## 2021-01-08 RX ORDER — MIDAZOLAM HYDROCHLORIDE 1 MG/ML
INJECTION INTRAMUSCULAR; INTRAVENOUS
Status: DISCONTINUED | OUTPATIENT
Start: 2021-01-08 | End: 2021-01-08

## 2021-01-08 RX ORDER — FENTANYL CITRATE 50 UG/ML
INJECTION, SOLUTION INTRAMUSCULAR; INTRAVENOUS
Status: DISCONTINUED | OUTPATIENT
Start: 2021-01-08 | End: 2021-01-08

## 2021-01-08 RX ORDER — SODIUM CHLORIDE, SODIUM LACTATE, POTASSIUM CHLORIDE, CALCIUM CHLORIDE 600; 310; 30; 20 MG/100ML; MG/100ML; MG/100ML; MG/100ML
INJECTION, SOLUTION INTRAVENOUS CONTINUOUS PRN
Status: DISCONTINUED | OUTPATIENT
Start: 2021-01-08 | End: 2021-01-08

## 2021-01-08 RX ORDER — SODIUM CHLORIDE 0.9 G/100ML
IRRIGANT IRRIGATION
Status: DISCONTINUED | OUTPATIENT
Start: 2021-01-08 | End: 2021-01-08 | Stop reason: HOSPADM

## 2021-01-08 RX ORDER — PROPOFOL 10 MG/ML
VIAL (ML) INTRAVENOUS
Status: DISCONTINUED | OUTPATIENT
Start: 2021-01-08 | End: 2021-01-08

## 2021-01-08 RX ORDER — LIDOCAINE HYDROCHLORIDE 20 MG/ML
INJECTION, SOLUTION EPIDURAL; INFILTRATION; INTRACAUDAL; PERINEURAL
Status: DISCONTINUED | OUTPATIENT
Start: 2021-01-08 | End: 2021-01-08

## 2021-01-08 RX ORDER — DOXYCYCLINE 100 MG/1
100 CAPSULE ORAL 2 TIMES DAILY
Qty: 20 CAPSULE | Refills: 0 | Status: ON HOLD
Start: 2021-01-08 | End: 2021-08-02

## 2021-01-08 RX ORDER — MEPERIDINE HYDROCHLORIDE 50 MG/ML
12.5 INJECTION INTRAMUSCULAR; INTRAVENOUS; SUBCUTANEOUS EVERY 10 MIN PRN
Status: DISCONTINUED | OUTPATIENT
Start: 2021-01-08 | End: 2021-01-08 | Stop reason: HOSPADM

## 2021-01-08 RX ORDER — DIPHENHYDRAMINE HYDROCHLORIDE 50 MG/ML
25 INJECTION INTRAMUSCULAR; INTRAVENOUS EVERY 6 HOURS PRN
Status: DISCONTINUED | OUTPATIENT
Start: 2021-01-08 | End: 2021-01-08 | Stop reason: HOSPADM

## 2021-01-08 RX ADMIN — FENTANYL CITRATE 25 MCG: 50 INJECTION INTRAMUSCULAR; INTRAVENOUS at 08:01

## 2021-01-08 RX ADMIN — PROPOFOL 10 MG: 10 INJECTION, EMULSION INTRAVENOUS at 08:01

## 2021-01-08 RX ADMIN — LIDOCAINE HYDROCHLORIDE 40 MG: 20 INJECTION, SOLUTION INTRAVENOUS at 08:01

## 2021-01-08 RX ADMIN — PROPOFOL 20 MG: 10 INJECTION, EMULSION INTRAVENOUS at 08:01

## 2021-01-08 RX ADMIN — FAMOTIDINE 20 MG: 10 INJECTION, SOLUTION INTRAVENOUS at 08:01

## 2021-01-08 RX ADMIN — MIDAZOLAM HYDROCHLORIDE 1 MG: 1 INJECTION, SOLUTION INTRAMUSCULAR; INTRAVENOUS at 08:01

## 2021-01-08 RX ADMIN — SODIUM CHLORIDE, SODIUM LACTATE, POTASSIUM CHLORIDE, AND CALCIUM CHLORIDE: .6; .31; .03; .02 INJECTION, SOLUTION INTRAVENOUS at 08:01

## 2021-01-08 RX ADMIN — DEXTROSE 2 G: 50 INJECTION, SOLUTION INTRAVENOUS at 08:01

## 2021-01-12 ENCOUNTER — OFFICE VISIT (OUTPATIENT)
Dept: PODIATRY | Facility: CLINIC | Age: 26
End: 2021-01-12
Payer: MEDICAID

## 2021-01-12 VITALS
HEART RATE: 92 BPM | OXYGEN SATURATION: 99 % | HEIGHT: 55 IN | BODY MASS INDEX: 25.92 KG/M2 | WEIGHT: 112 LBS | RESPIRATION RATE: 20 BRPM

## 2021-01-12 DIAGNOSIS — L97.511 SKIN ULCER OF RIGHT FOOT, LIMITED TO BREAKDOWN OF SKIN: Primary | ICD-10-CM

## 2021-01-12 DIAGNOSIS — L97.512 SKIN ULCER OF RIGHT FOOT WITH FAT LAYER EXPOSED: ICD-10-CM

## 2021-01-12 PROCEDURE — 99213 PR OFFICE/OUTPT VISIT, EST, LEVL III, 20-29 MIN: ICD-10-PCS | Mod: S$GLB,,, | Performed by: PODIATRIST

## 2021-01-12 PROCEDURE — 99213 OFFICE O/P EST LOW 20 MIN: CPT | Mod: S$GLB,,, | Performed by: PODIATRIST

## 2021-02-02 ENCOUNTER — OFFICE VISIT (OUTPATIENT)
Dept: PODIATRY | Facility: CLINIC | Age: 26
End: 2021-02-02
Payer: MEDICAID

## 2021-02-02 VITALS
HEIGHT: 55 IN | WEIGHT: 112 LBS | OXYGEN SATURATION: 97 % | HEART RATE: 94 BPM | BODY MASS INDEX: 25.92 KG/M2 | RESPIRATION RATE: 18 BRPM

## 2021-02-02 DIAGNOSIS — L97.512 SKIN ULCER OF RIGHT FOOT WITH FAT LAYER EXPOSED: Primary | ICD-10-CM

## 2021-02-02 PROCEDURE — 99213 OFFICE O/P EST LOW 20 MIN: CPT | Mod: S$GLB,,, | Performed by: PODIATRIST

## 2021-02-02 PROCEDURE — 99213 PR OFFICE/OUTPT VISIT, EST, LEVL III, 20-29 MIN: ICD-10-PCS | Mod: S$GLB,,, | Performed by: PODIATRIST

## 2021-02-23 ENCOUNTER — OFFICE VISIT (OUTPATIENT)
Dept: PODIATRY | Facility: CLINIC | Age: 26
End: 2021-02-23
Payer: MEDICAID

## 2021-02-23 VITALS — BODY MASS INDEX: 25.92 KG/M2 | WEIGHT: 112 LBS | HEIGHT: 55 IN

## 2021-02-23 DIAGNOSIS — L97.512 SKIN ULCER OF RIGHT FOOT WITH FAT LAYER EXPOSED: Primary | ICD-10-CM

## 2021-02-23 PROCEDURE — 99213 OFFICE O/P EST LOW 20 MIN: CPT | Mod: S$GLB,,, | Performed by: PODIATRIST

## 2021-02-23 PROCEDURE — 99213 PR OFFICE/OUTPT VISIT, EST, LEVL III, 20-29 MIN: ICD-10-PCS | Mod: S$GLB,,, | Performed by: PODIATRIST

## 2021-03-08 ENCOUNTER — OFFICE VISIT (OUTPATIENT)
Dept: UROLOGY | Facility: CLINIC | Age: 26
End: 2021-03-08
Payer: MEDICAID

## 2021-03-08 VITALS
BODY MASS INDEX: 27 KG/M2 | DIASTOLIC BLOOD PRESSURE: 82 MMHG | HEART RATE: 82 BPM | SYSTOLIC BLOOD PRESSURE: 135 MMHG | HEIGHT: 55 IN

## 2021-03-08 DIAGNOSIS — Q05.2 SPINA BIFIDA OF LUMBAR REGION WITH HYDROCEPHALUS: ICD-10-CM

## 2021-03-08 DIAGNOSIS — Z78.9 INTERMITTENT SELF-CATHETERIZATION OF BLADDER: ICD-10-CM

## 2021-03-08 DIAGNOSIS — R33.9 URINARY RETENTION: ICD-10-CM

## 2021-03-08 DIAGNOSIS — N31.9 NEUROGENIC BLADDER: Primary | ICD-10-CM

## 2021-03-08 PROCEDURE — 99213 OFFICE O/P EST LOW 20 MIN: CPT | Mod: PBBFAC | Performed by: PHYSICIAN ASSISTANT

## 2021-03-08 PROCEDURE — 99999 PR PBB SHADOW E&M-EST. PATIENT-LVL III: ICD-10-PCS | Mod: PBBFAC,,, | Performed by: PHYSICIAN ASSISTANT

## 2021-03-08 PROCEDURE — 99213 PR OFFICE/OUTPT VISIT, EST, LEVL III, 20-29 MIN: ICD-10-PCS | Mod: S$PBB,,, | Performed by: PHYSICIAN ASSISTANT

## 2021-03-08 PROCEDURE — 99213 OFFICE O/P EST LOW 20 MIN: CPT | Mod: S$PBB,,, | Performed by: PHYSICIAN ASSISTANT

## 2021-03-08 PROCEDURE — 99999 PR PBB SHADOW E&M-EST. PATIENT-LVL III: CPT | Mod: PBBFAC,,, | Performed by: PHYSICIAN ASSISTANT

## 2021-03-10 ENCOUNTER — IMMUNIZATION (OUTPATIENT)
Dept: PRIMARY CARE CLINIC | Facility: CLINIC | Age: 26
End: 2021-03-10
Payer: MEDICAID

## 2021-03-10 DIAGNOSIS — Z23 NEED FOR VACCINATION: Primary | ICD-10-CM

## 2021-03-10 PROCEDURE — 91303 PR SARSCOV2 VAC AD26 .5ML IM: ICD-10-PCS | Mod: S$GLB,,, | Performed by: INTERNAL MEDICINE

## 2021-03-10 PROCEDURE — 0031A PR IMMUNIZ ADMIN, SARS-COV-2 COVID-19 VACC, 5X10VP/0.5ML: CPT | Mod: CV19,S$GLB,, | Performed by: INTERNAL MEDICINE

## 2021-03-10 PROCEDURE — 0031A PR IMMUNIZ ADMIN, SARS-COV-2 COVID-19 VACC, 5X10VP/0.5ML: ICD-10-PCS | Mod: CV19,S$GLB,, | Performed by: INTERNAL MEDICINE

## 2021-03-10 PROCEDURE — 91303 PR SARSCOV2 VAC AD26 .5ML IM: CPT | Mod: S$GLB,,, | Performed by: INTERNAL MEDICINE

## 2021-03-17 ENCOUNTER — TELEPHONE (OUTPATIENT)
Dept: UROLOGY | Facility: CLINIC | Age: 26
End: 2021-03-17

## 2021-03-23 ENCOUNTER — OFFICE VISIT (OUTPATIENT)
Dept: PODIATRY | Facility: CLINIC | Age: 26
End: 2021-03-23
Payer: MEDICAID

## 2021-03-23 VITALS
HEIGHT: 55 IN | HEART RATE: 76 BPM | BODY MASS INDEX: 25.92 KG/M2 | DIASTOLIC BLOOD PRESSURE: 78 MMHG | WEIGHT: 112 LBS | RESPIRATION RATE: 18 BRPM | OXYGEN SATURATION: 98 % | SYSTOLIC BLOOD PRESSURE: 128 MMHG

## 2021-03-23 DIAGNOSIS — L97.511 SKIN ULCER OF RIGHT FOOT, LIMITED TO BREAKDOWN OF SKIN: Primary | ICD-10-CM

## 2021-03-23 PROCEDURE — 99213 PR OFFICE/OUTPT VISIT, EST, LEVL III, 20-29 MIN: ICD-10-PCS | Mod: S$GLB,,, | Performed by: PODIATRIST

## 2021-03-23 PROCEDURE — 99213 OFFICE O/P EST LOW 20 MIN: CPT | Mod: S$GLB,,, | Performed by: PODIATRIST

## 2021-03-25 DIAGNOSIS — L97.514 ULCER OF TOE OF RIGHT FOOT, WITH NECROSIS OF BONE: ICD-10-CM

## 2021-03-25 DIAGNOSIS — L97.514 SKIN ULCER OF RIGHT FOOT WITH NECROSIS OF BONE: ICD-10-CM

## 2021-03-25 DIAGNOSIS — L97.511 SKIN ULCER OF RIGHT FOOT, LIMITED TO BREAKDOWN OF SKIN: Primary | ICD-10-CM

## 2021-03-26 ENCOUNTER — TELEPHONE (OUTPATIENT)
Dept: PODIATRY | Facility: CLINIC | Age: 26
End: 2021-03-26

## 2021-04-20 ENCOUNTER — OFFICE VISIT (OUTPATIENT)
Dept: PODIATRY | Facility: CLINIC | Age: 26
End: 2021-04-20
Payer: MEDICAID

## 2021-04-20 DIAGNOSIS — L97.511 SKIN ULCER OF RIGHT FOOT, LIMITED TO BREAKDOWN OF SKIN: Primary | ICD-10-CM

## 2021-04-20 PROCEDURE — 99213 OFFICE O/P EST LOW 20 MIN: CPT | Mod: S$GLB,,, | Performed by: PODIATRIST

## 2021-04-20 PROCEDURE — 99213 PR OFFICE/OUTPT VISIT, EST, LEVL III, 20-29 MIN: ICD-10-PCS | Mod: S$GLB,,, | Performed by: PODIATRIST

## 2021-05-27 ENCOUNTER — OFFICE VISIT (OUTPATIENT)
Dept: PODIATRY | Facility: CLINIC | Age: 26
End: 2021-05-27
Payer: MEDICAID

## 2021-05-27 VITALS
WEIGHT: 112 LBS | BODY MASS INDEX: 25.92 KG/M2 | SYSTOLIC BLOOD PRESSURE: 110 MMHG | DIASTOLIC BLOOD PRESSURE: 71 MMHG | HEART RATE: 102 BPM | HEIGHT: 55 IN

## 2021-05-27 DIAGNOSIS — Z89.431 HISTORY OF TRANSMETATARSAL AMPUTATION OF RIGHT FOOT: ICD-10-CM

## 2021-05-27 DIAGNOSIS — L97.511 SKIN ULCER OF RIGHT FOOT, LIMITED TO BREAKDOWN OF SKIN: Primary | ICD-10-CM

## 2021-05-27 PROCEDURE — 99213 OFFICE O/P EST LOW 20 MIN: CPT | Mod: S$GLB,,, | Performed by: PODIATRIST

## 2021-05-27 PROCEDURE — 99213 PR OFFICE/OUTPT VISIT, EST, LEVL III, 20-29 MIN: ICD-10-PCS | Mod: S$GLB,,, | Performed by: PODIATRIST

## 2021-07-29 ENCOUNTER — OFFICE VISIT (OUTPATIENT)
Dept: SURGERY | Facility: CLINIC | Age: 26
End: 2021-07-29
Payer: MEDICAID

## 2021-07-29 VITALS — DIASTOLIC BLOOD PRESSURE: 80 MMHG | HEART RATE: 153 BPM | TEMPERATURE: 98 F | SYSTOLIC BLOOD PRESSURE: 128 MMHG

## 2021-07-29 DIAGNOSIS — L89.159 PRESSURE INJURY OF SKIN OF SACRAL REGION, UNSPECIFIED INJURY STAGE: Primary | ICD-10-CM

## 2021-07-29 PROCEDURE — 99204 OFFICE O/P NEW MOD 45 MIN: CPT | Mod: S$GLB,,, | Performed by: STUDENT IN AN ORGANIZED HEALTH CARE EDUCATION/TRAINING PROGRAM

## 2021-07-29 PROCEDURE — 99204 PR OFFICE/OUTPT VISIT, NEW, LEVL IV, 45-59 MIN: ICD-10-PCS | Mod: S$GLB,,, | Performed by: STUDENT IN AN ORGANIZED HEALTH CARE EDUCATION/TRAINING PROGRAM

## 2021-07-29 RX ORDER — PROMETHAZINE HYDROCHLORIDE 25 MG/1
25 SUPPOSITORY RECTAL EVERY 4 HOURS PRN
COMMUNITY
Start: 2021-07-07 | End: 2022-01-20

## 2021-08-01 ENCOUNTER — HOSPITAL ENCOUNTER (EMERGENCY)
Facility: HOSPITAL | Age: 26
Discharge: ANOTHER HOSPITAL | End: 2021-08-01
Attending: EMERGENCY MEDICINE
Payer: MEDICAID

## 2021-08-01 VITALS
SYSTOLIC BLOOD PRESSURE: 122 MMHG | OXYGEN SATURATION: 100 % | HEART RATE: 120 BPM | DIASTOLIC BLOOD PRESSURE: 69 MMHG | TEMPERATURE: 100 F | RESPIRATION RATE: 22 BRPM | HEIGHT: 55 IN | WEIGHT: 110 LBS | BODY MASS INDEX: 25.46 KG/M2

## 2021-08-01 DIAGNOSIS — R10.9 ABDOMINAL PAIN: ICD-10-CM

## 2021-08-01 DIAGNOSIS — D64.9 SYMPTOMATIC ANEMIA: ICD-10-CM

## 2021-08-01 DIAGNOSIS — R11.10 VOMITING: ICD-10-CM

## 2021-08-01 DIAGNOSIS — N15.1 PERINEPHRIC ABSCESS: ICD-10-CM

## 2021-08-01 DIAGNOSIS — N13.6 ACUTE PYONEPHROSIS: Primary | ICD-10-CM

## 2021-08-01 DIAGNOSIS — N10 PYELONEPHRITIS, ACUTE: ICD-10-CM

## 2021-08-01 LAB
ABO + RH BLD: NORMAL
ALBUMIN SERPL BCP-MCNC: 2.3 G/DL (ref 3.5–5.2)
ALP SERPL-CCNC: 68 U/L (ref 55–135)
ALT SERPL W/O P-5'-P-CCNC: 11 U/L (ref 10–44)
ANION GAP SERPL CALC-SCNC: 10 MMOL/L (ref 8–16)
APTT PPP: 51.4 SEC (ref 25.6–35.8)
AST SERPL-CCNC: 12 U/L (ref 10–40)
B-HCG UR QL: NEGATIVE
BASOPHILS # BLD AUTO: 0.03 K/UL (ref 0–0.2)
BASOPHILS NFR BLD: 0.1 % (ref 0–1.9)
BILIRUB SERPL-MCNC: 0.4 MG/DL (ref 0.1–1)
BILIRUB UR QL STRIP: NEGATIVE
BLD GP AB SCN CELLS X3 SERPL QL: NORMAL
BUN SERPL-MCNC: 12 MG/DL (ref 6–20)
CALCIUM SERPL-MCNC: 8.3 MG/DL (ref 8.7–10.5)
CHLORIDE SERPL-SCNC: 99 MMOL/L (ref 95–110)
CLARITY UR: ABNORMAL
CO2 SERPL-SCNC: 22 MMOL/L (ref 23–29)
COLOR UR: ABNORMAL
CREAT SERPL-MCNC: 0.5 MG/DL (ref 0.5–1.4)
CTP QC/QA: YES
DIFFERENTIAL METHOD: ABNORMAL
EOSINOPHIL # BLD AUTO: 0 K/UL (ref 0–0.5)
EOSINOPHIL NFR BLD: 0.1 % (ref 0–8)
ERYTHROCYTE [DISTWIDTH] IN BLOOD BY AUTOMATED COUNT: 21.4 % (ref 11.5–14.5)
EST. GFR  (AFRICAN AMERICAN): >60 ML/MIN/1.73 M^2
EST. GFR  (NON AFRICAN AMERICAN): >60 ML/MIN/1.73 M^2
GLUCOSE SERPL-MCNC: 105 MG/DL (ref 70–110)
GLUCOSE UR QL STRIP: NEGATIVE
HCT VFR BLD AUTO: 23.5 % (ref 37–48.5)
HGB BLD-MCNC: 6.6 G/DL (ref 12–16)
HGB UR QL STRIP: ABNORMAL
IMM GRANULOCYTES # BLD AUTO: 0.23 K/UL (ref 0–0.04)
IMM GRANULOCYTES NFR BLD AUTO: 1.1 % (ref 0–0.5)
INR PPP: 1.8
KETONES UR QL STRIP: NEGATIVE
LACTATE SERPL-SCNC: 1 MMOL/L (ref 0.5–1.9)
LEUKOCYTE ESTERASE UR QL STRIP: ABNORMAL
LIPASE SERPL-CCNC: 17 U/L (ref 4–60)
LYMPHOCYTES # BLD AUTO: 2.1 K/UL (ref 1–4.8)
LYMPHOCYTES NFR BLD: 10.1 % (ref 18–48)
MCH RBC QN AUTO: 20.4 PG (ref 27–31)
MCHC RBC AUTO-ENTMCNC: 28.1 G/DL (ref 32–36)
MCV RBC AUTO: 73 FL (ref 82–98)
MICROSCOPIC COMMENT: ABNORMAL
MONOCYTES # BLD AUTO: 2.2 K/UL (ref 0.3–1)
MONOCYTES NFR BLD: 10.4 % (ref 4–15)
NEUTROPHILS # BLD AUTO: 16.1 K/UL (ref 1.8–7.7)
NEUTROPHILS NFR BLD: 78.2 % (ref 38–73)
NITRITE UR QL STRIP: NEGATIVE
NRBC BLD-RTO: 0 /100 WBC
PH UR STRIP: 8 [PH] (ref 5–8)
PLATELET # BLD AUTO: 693 K/UL (ref 150–450)
PMV BLD AUTO: 8.4 FL (ref 9.2–12.9)
POTASSIUM SERPL-SCNC: 3.9 MMOL/L (ref 3.5–5.1)
PROT SERPL-MCNC: 8.3 G/DL (ref 6–8.4)
PROT UR QL STRIP: NEGATIVE
PROTHROMBIN TIME: 20 SEC (ref 11.8–14.3)
RBC # BLD AUTO: 3.24 M/UL (ref 4–5.4)
RBC #/AREA URNS HPF: >100 /HPF (ref 0–4)
SARS-COV-2 RDRP RESP QL NAA+PROBE: NEGATIVE
SODIUM SERPL-SCNC: 131 MMOL/L (ref 136–145)
SP GR UR STRIP: 1 (ref 1–1.03)
TROPONIN I SERPL DL<=0.01 NG/ML-MCNC: 0.03 NG/ML
URN SPEC COLLECT METH UR: ABNORMAL
UROBILINOGEN UR STRIP-ACNC: NEGATIVE EU/DL
WBC # BLD AUTO: 20.6 K/UL (ref 3.9–12.7)
WBC #/AREA URNS HPF: 50 /HPF (ref 0–5)

## 2021-08-01 PROCEDURE — 93010 EKG 12-LEAD: ICD-10-PCS | Mod: ,,, | Performed by: INTERNAL MEDICINE

## 2021-08-01 PROCEDURE — 85025 COMPLETE CBC W/AUTO DIFF WBC: CPT | Performed by: NURSE PRACTITIONER

## 2021-08-01 PROCEDURE — 87040 BLOOD CULTURE FOR BACTERIA: CPT | Mod: 59 | Performed by: EMERGENCY MEDICINE

## 2021-08-01 PROCEDURE — U0002 COVID-19 LAB TEST NON-CDC: HCPCS | Performed by: NURSE PRACTITIONER

## 2021-08-01 PROCEDURE — 87086 URINE CULTURE/COLONY COUNT: CPT | Performed by: NURSE PRACTITIONER

## 2021-08-01 PROCEDURE — 81025 URINE PREGNANCY TEST: CPT | Performed by: NURSE PRACTITIONER

## 2021-08-01 PROCEDURE — 86900 BLOOD TYPING SEROLOGIC ABO: CPT | Performed by: EMERGENCY MEDICINE

## 2021-08-01 PROCEDURE — 25500020 PHARM REV CODE 255: Performed by: EMERGENCY MEDICINE

## 2021-08-01 PROCEDURE — 99285 EMERGENCY DEPT VISIT HI MDM: CPT | Mod: ,,, | Performed by: EMERGENCY MEDICINE

## 2021-08-01 PROCEDURE — 99285 EMERGENCY DEPT VISIT HI MDM: CPT | Mod: 25

## 2021-08-01 PROCEDURE — 25000003 PHARM REV CODE 250: Performed by: EMERGENCY MEDICINE

## 2021-08-01 PROCEDURE — 36415 COLL VENOUS BLD VENIPUNCTURE: CPT | Performed by: NURSE PRACTITIONER

## 2021-08-01 PROCEDURE — 84484 ASSAY OF TROPONIN QUANT: CPT | Performed by: EMERGENCY MEDICINE

## 2021-08-01 PROCEDURE — 63600175 PHARM REV CODE 636 W HCPCS: Performed by: EMERGENCY MEDICINE

## 2021-08-01 PROCEDURE — 85610 PROTHROMBIN TIME: CPT | Performed by: EMERGENCY MEDICINE

## 2021-08-01 PROCEDURE — C9113 INJ PANTOPRAZOLE SODIUM, VIA: HCPCS | Performed by: EMERGENCY MEDICINE

## 2021-08-01 PROCEDURE — 36430 TRANSFUSION BLD/BLD COMPNT: CPT

## 2021-08-01 PROCEDURE — 81001 URINALYSIS AUTO W/SCOPE: CPT | Performed by: NURSE PRACTITIONER

## 2021-08-01 PROCEDURE — 96374 THER/PROPH/DIAG INJ IV PUSH: CPT

## 2021-08-01 PROCEDURE — 93010 ELECTROCARDIOGRAM REPORT: CPT | Mod: ,,, | Performed by: INTERNAL MEDICINE

## 2021-08-01 PROCEDURE — 86920 COMPATIBILITY TEST SPIN: CPT | Performed by: EMERGENCY MEDICINE

## 2021-08-01 PROCEDURE — 96375 TX/PRO/DX INJ NEW DRUG ADDON: CPT

## 2021-08-01 PROCEDURE — 93005 ELECTROCARDIOGRAM TRACING: CPT | Performed by: INTERNAL MEDICINE

## 2021-08-01 PROCEDURE — 80053 COMPREHEN METABOLIC PANEL: CPT | Performed by: NURSE PRACTITIONER

## 2021-08-01 PROCEDURE — P9016 RBC LEUKOCYTES REDUCED: HCPCS | Performed by: EMERGENCY MEDICINE

## 2021-08-01 PROCEDURE — 83690 ASSAY OF LIPASE: CPT | Performed by: NURSE PRACTITIONER

## 2021-08-01 PROCEDURE — 51701 INSERT BLADDER CATHETER: CPT

## 2021-08-01 PROCEDURE — 85730 THROMBOPLASTIN TIME PARTIAL: CPT | Performed by: EMERGENCY MEDICINE

## 2021-08-01 PROCEDURE — 99285 PR EMERGENCY DEPT VISIT,LEVEL V: ICD-10-PCS | Mod: ,,, | Performed by: EMERGENCY MEDICINE

## 2021-08-01 PROCEDURE — 96361 HYDRATE IV INFUSION ADD-ON: CPT

## 2021-08-01 PROCEDURE — 99291 CRITICAL CARE FIRST HOUR: CPT

## 2021-08-01 PROCEDURE — 96365 THER/PROPH/DIAG IV INF INIT: CPT

## 2021-08-01 PROCEDURE — 83605 ASSAY OF LACTIC ACID: CPT | Performed by: EMERGENCY MEDICINE

## 2021-08-01 PROCEDURE — 96367 TX/PROPH/DG ADDL SEQ IV INF: CPT

## 2021-08-01 RX ORDER — ACETAMINOPHEN 500 MG
1000 TABLET ORAL EVERY 6 HOURS PRN
COMMUNITY
End: 2022-01-20

## 2021-08-01 RX ORDER — SODIUM CHLORIDE, SODIUM LACTATE, POTASSIUM CHLORIDE, CALCIUM CHLORIDE 600; 310; 30; 20 MG/100ML; MG/100ML; MG/100ML; MG/100ML
1000 INJECTION, SOLUTION INTRAVENOUS
Status: DISCONTINUED | OUTPATIENT
Start: 2021-08-01 | End: 2021-08-01 | Stop reason: HOSPADM

## 2021-08-01 RX ORDER — HYDROCODONE BITARTRATE AND ACETAMINOPHEN 500; 5 MG/1; MG/1
TABLET ORAL
Status: DISCONTINUED | OUTPATIENT
Start: 2021-08-01 | End: 2021-08-01 | Stop reason: HOSPADM

## 2021-08-01 RX ORDER — DIPHENHYDRAMINE HYDROCHLORIDE 50 MG/ML
25 INJECTION INTRAMUSCULAR; INTRAVENOUS
Status: COMPLETED | OUTPATIENT
Start: 2021-08-01 | End: 2021-08-01

## 2021-08-01 RX ORDER — PANTOPRAZOLE SODIUM 40 MG/10ML
80 INJECTION, POWDER, LYOPHILIZED, FOR SOLUTION INTRAVENOUS
Status: COMPLETED | OUTPATIENT
Start: 2021-08-01 | End: 2021-08-01

## 2021-08-01 RX ORDER — PROCHLORPERAZINE EDISYLATE 5 MG/ML
10 INJECTION INTRAMUSCULAR; INTRAVENOUS
Status: COMPLETED | OUTPATIENT
Start: 2021-08-01 | End: 2021-08-01

## 2021-08-01 RX ORDER — ACETAMINOPHEN 500 MG
1000 TABLET ORAL
Status: COMPLETED | OUTPATIENT
Start: 2021-08-01 | End: 2021-08-01

## 2021-08-01 RX ADMIN — SODIUM CHLORIDE 1497 ML: 0.9 INJECTION, SOLUTION INTRAVENOUS at 05:08

## 2021-08-01 RX ADMIN — PANTOPRAZOLE SODIUM 80 MG: 40 INJECTION, POWDER, LYOPHILIZED, FOR SOLUTION INTRAVENOUS at 05:08

## 2021-08-01 RX ADMIN — IOHEXOL 100 ML: 350 INJECTION, SOLUTION INTRAVENOUS at 05:08

## 2021-08-01 RX ADMIN — PROCHLORPERAZINE EDISYLATE 10 MG: 5 INJECTION INTRAMUSCULAR; INTRAVENOUS at 05:08

## 2021-08-01 RX ADMIN — DIPHENHYDRAMINE HYDROCHLORIDE 25 MG: 50 INJECTION INTRAMUSCULAR; INTRAVENOUS at 08:08

## 2021-08-01 RX ADMIN — VANCOMYCIN HYDROCHLORIDE 750 MG: 750 INJECTION, POWDER, LYOPHILIZED, FOR SOLUTION INTRAVENOUS at 07:08

## 2021-08-01 RX ADMIN — ACETAMINOPHEN 1000 MG: 500 TABLET, FILM COATED ORAL at 08:08

## 2021-08-01 RX ADMIN — PIPERACILLIN AND TAZOBACTAM 4.5 G: 4; .5 INJECTION, POWDER, LYOPHILIZED, FOR SOLUTION INTRAVENOUS; PARENTERAL at 05:08

## 2021-08-02 ENCOUNTER — ANESTHESIA EVENT (OUTPATIENT)
Dept: ENDOSCOPY | Facility: HOSPITAL | Age: 26
DRG: 871 | End: 2021-08-02
Payer: MEDICAID

## 2021-08-02 ENCOUNTER — HOSPITAL ENCOUNTER (INPATIENT)
Facility: HOSPITAL | Age: 26
LOS: 2 days | Discharge: HOME | DRG: 871 | End: 2021-08-04
Attending: EMERGENCY MEDICINE | Admitting: INTERNAL MEDICINE
Payer: MEDICAID

## 2021-08-02 ENCOUNTER — ANESTHESIA (OUTPATIENT)
Dept: ENDOSCOPY | Facility: HOSPITAL | Age: 26
DRG: 871 | End: 2021-08-02
Payer: MEDICAID

## 2021-08-02 DIAGNOSIS — D64.9 ANEMIA, UNSPECIFIED TYPE: ICD-10-CM

## 2021-08-02 DIAGNOSIS — Q05.9 SPINA BIFIDA, UNSPECIFIED HYDROCEPHALUS PRESENCE, UNSPECIFIED SPINAL REGION: ICD-10-CM

## 2021-08-02 DIAGNOSIS — R07.9 CHEST PAIN: ICD-10-CM

## 2021-08-02 DIAGNOSIS — N13.9 URINARY OBSTRUCTION: ICD-10-CM

## 2021-08-02 DIAGNOSIS — K68.19 RETROPERITONEAL ABSCESS: Primary | ICD-10-CM

## 2021-08-02 PROBLEM — A41.9 SEPSIS: Status: ACTIVE | Noted: 2021-08-02

## 2021-08-02 PROBLEM — N28.89 OBSTRUCTION OF KIDNEY: Status: ACTIVE | Noted: 2021-08-02

## 2021-08-02 PROBLEM — K59.2 NEUROGENIC BOWEL: Status: ACTIVE | Noted: 2021-08-02

## 2021-08-02 PROBLEM — Z87.898 HISTORY OF SEIZURES: Status: ACTIVE | Noted: 2021-08-02

## 2021-08-02 PROBLEM — D68.9 COAGULOPATHY: Status: ACTIVE | Noted: 2021-08-02

## 2021-08-02 LAB
ALBUMIN SERPL BCP-MCNC: 1.9 G/DL (ref 3.5–5.2)
ALP SERPL-CCNC: 78 U/L (ref 55–135)
ALT SERPL W/O P-5'-P-CCNC: 7 U/L (ref 10–44)
ANION GAP SERPL CALC-SCNC: 10 MMOL/L (ref 8–16)
ANISOCYTOSIS BLD QL SMEAR: SLIGHT
APTT BLDCRRT: 24.8 SEC (ref 21–32)
AST SERPL-CCNC: 13 U/L (ref 10–40)
BASOPHILS # BLD AUTO: 0.03 K/UL (ref 0–0.2)
BASOPHILS # BLD AUTO: 0.04 K/UL (ref 0–0.2)
BASOPHILS NFR BLD: 0.2 % (ref 0–1.9)
BASOPHILS NFR BLD: 0.3 % (ref 0–1.9)
BILIRUB SERPL-MCNC: 0.6 MG/DL (ref 0.1–1)
BUN SERPL-MCNC: 8 MG/DL (ref 6–20)
BURR CELLS BLD QL SMEAR: ABNORMAL
CALCIUM SERPL-MCNC: 8.4 MG/DL (ref 8.7–10.5)
CHLORIDE SERPL-SCNC: 109 MMOL/L (ref 95–110)
CO2 SERPL-SCNC: 18 MMOL/L (ref 23–29)
CREAT SERPL-MCNC: 0.5 MG/DL (ref 0.5–1.4)
DIFFERENTIAL METHOD: ABNORMAL
EOSINOPHIL # BLD AUTO: 0 K/UL (ref 0–0.5)
EOSINOPHIL # BLD AUTO: 0.1 K/UL (ref 0–0.5)
EOSINOPHIL NFR BLD: 0.1 % (ref 0–8)
EOSINOPHIL NFR BLD: 0.1 % (ref 0–8)
EOSINOPHIL NFR BLD: 0.2 % (ref 0–8)
EOSINOPHIL NFR BLD: 0.5 % (ref 0–8)
ERYTHROCYTE [DISTWIDTH] IN BLOOD BY AUTOMATED COUNT: 20.9 % (ref 11.5–14.5)
ERYTHROCYTE [DISTWIDTH] IN BLOOD BY AUTOMATED COUNT: 21 % (ref 11.5–14.5)
ERYTHROCYTE [DISTWIDTH] IN BLOOD BY AUTOMATED COUNT: 21 % (ref 11.5–14.5)
ERYTHROCYTE [DISTWIDTH] IN BLOOD BY AUTOMATED COUNT: 21.1 % (ref 11.5–14.5)
EST. GFR  (AFRICAN AMERICAN): >60 ML/MIN/1.73 M^2
EST. GFR  (NON AFRICAN AMERICAN): >60 ML/MIN/1.73 M^2
GLUCOSE SERPL-MCNC: 85 MG/DL (ref 70–110)
GRAM STN SPEC: NORMAL
HCT VFR BLD AUTO: 25.8 % (ref 37–48.5)
HCT VFR BLD AUTO: 26.3 % (ref 37–48.5)
HCT VFR BLD AUTO: 26.7 % (ref 37–48.5)
HCT VFR BLD AUTO: 26.8 % (ref 37–48.5)
HCV AB SERPL QL IA: NEGATIVE
HGB BLD-MCNC: 7.4 G/DL (ref 12–16)
HGB BLD-MCNC: 7.5 G/DL (ref 12–16)
HGB BLD-MCNC: 7.6 G/DL (ref 12–16)
HGB BLD-MCNC: 7.9 G/DL (ref 12–16)
HIV 1+2 AB+HIV1 P24 AG SERPL QL IA: NEGATIVE
HYPOCHROMIA BLD QL SMEAR: ABNORMAL
IMM GRANULOCYTES # BLD AUTO: 0.28 K/UL (ref 0–0.04)
IMM GRANULOCYTES # BLD AUTO: 0.31 K/UL (ref 0–0.04)
IMM GRANULOCYTES # BLD AUTO: 0.33 K/UL (ref 0–0.04)
IMM GRANULOCYTES # BLD AUTO: 0.34 K/UL (ref 0–0.04)
IMM GRANULOCYTES NFR BLD AUTO: 1.6 % (ref 0–0.5)
IMM GRANULOCYTES NFR BLD AUTO: 1.8 % (ref 0–0.5)
IMM GRANULOCYTES NFR BLD AUTO: 1.9 % (ref 0–0.5)
IMM GRANULOCYTES NFR BLD AUTO: 2.5 % (ref 0–0.5)
INR PPP: 1.1 (ref 0.8–1.2)
LYMPHOCYTES # BLD AUTO: 1.5 K/UL (ref 1–4.8)
LYMPHOCYTES # BLD AUTO: 1.8 K/UL (ref 1–4.8)
LYMPHOCYTES # BLD AUTO: 1.9 K/UL (ref 1–4.8)
LYMPHOCYTES # BLD AUTO: 2.2 K/UL (ref 1–4.8)
LYMPHOCYTES NFR BLD: 10.6 % (ref 18–48)
LYMPHOCYTES NFR BLD: 12.5 % (ref 18–48)
LYMPHOCYTES NFR BLD: 12.8 % (ref 18–48)
LYMPHOCYTES NFR BLD: 8.2 % (ref 18–48)
MAGNESIUM SERPL-MCNC: 1.9 MG/DL (ref 1.6–2.6)
MCH RBC QN AUTO: 21 PG (ref 27–31)
MCH RBC QN AUTO: 21.1 PG (ref 27–31)
MCH RBC QN AUTO: 21.6 PG (ref 27–31)
MCH RBC QN AUTO: 21.9 PG (ref 27–31)
MCHC RBC AUTO-ENTMCNC: 28.1 G/DL (ref 32–36)
MCHC RBC AUTO-ENTMCNC: 28.5 G/DL (ref 32–36)
MCHC RBC AUTO-ENTMCNC: 29.1 G/DL (ref 32–36)
MCHC RBC AUTO-ENTMCNC: 29.5 G/DL (ref 32–36)
MCV RBC AUTO: 74 FL (ref 82–98)
MCV RBC AUTO: 75 FL (ref 82–98)
MONOCYTES # BLD AUTO: 1.8 K/UL (ref 0.3–1)
MONOCYTES # BLD AUTO: 1.8 K/UL (ref 0.3–1)
MONOCYTES # BLD AUTO: 1.9 K/UL (ref 0.3–1)
MONOCYTES # BLD AUTO: 1.9 K/UL (ref 0.3–1)
MONOCYTES NFR BLD: 10.4 % (ref 4–15)
MONOCYTES NFR BLD: 10.8 % (ref 4–15)
MONOCYTES NFR BLD: 12.7 % (ref 4–15)
MONOCYTES NFR BLD: 9.9 % (ref 4–15)
NEUTROPHILS # BLD AUTO: 12.9 K/UL (ref 1.8–7.7)
NEUTROPHILS # BLD AUTO: 13.7 K/UL (ref 1.8–7.7)
NEUTROPHILS # BLD AUTO: 14.2 K/UL (ref 1.8–7.7)
NEUTROPHILS # BLD AUTO: 9.8 K/UL (ref 1.8–7.7)
NEUTROPHILS NFR BLD: 71.2 % (ref 38–73)
NEUTROPHILS NFR BLD: 74.5 % (ref 38–73)
NEUTROPHILS NFR BLD: 77.1 % (ref 38–73)
NEUTROPHILS NFR BLD: 79.7 % (ref 38–73)
NRBC BLD-RTO: 0 /100 WBC
OVALOCYTES BLD QL SMEAR: ABNORMAL
PHOSPHATE SERPL-MCNC: 2.7 MG/DL (ref 2.7–4.5)
PLATELET # BLD AUTO: 573 K/UL (ref 150–450)
PLATELET # BLD AUTO: 585 K/UL (ref 150–450)
PLATELET # BLD AUTO: 607 K/UL (ref 150–450)
PLATELET # BLD AUTO: 612 K/UL (ref 150–450)
PLATELET BLD QL SMEAR: ABNORMAL
PMV BLD AUTO: 8.4 FL (ref 9.2–12.9)
PMV BLD AUTO: 8.5 FL (ref 9.2–12.9)
PMV BLD AUTO: 8.6 FL (ref 9.2–12.9)
PMV BLD AUTO: 8.7 FL (ref 9.2–12.9)
POIKILOCYTOSIS BLD QL SMEAR: SLIGHT
POLYCHROMASIA BLD QL SMEAR: ABNORMAL
POTASSIUM SERPL-SCNC: 3.5 MMOL/L (ref 3.5–5.1)
PROT SERPL-MCNC: 7.7 G/DL (ref 6–8.4)
PROTHROMBIN TIME: 12.2 SEC (ref 9–12.5)
RBC # BLD AUTO: 3.48 M/UL (ref 4–5.4)
RBC # BLD AUTO: 3.53 M/UL (ref 4–5.4)
RBC # BLD AUTO: 3.61 M/UL (ref 4–5.4)
RBC # BLD AUTO: 3.61 M/UL (ref 4–5.4)
SODIUM SERPL-SCNC: 137 MMOL/L (ref 136–145)
WBC # BLD AUTO: 13.78 K/UL (ref 3.9–12.7)
WBC # BLD AUTO: 17.28 K/UL (ref 3.9–12.7)
WBC # BLD AUTO: 17.78 K/UL (ref 3.9–12.7)
WBC # BLD AUTO: 17.82 K/UL (ref 3.9–12.7)

## 2021-08-02 PROCEDURE — 86803 HEPATITIS C AB TEST: CPT | Performed by: EMERGENCY MEDICINE

## 2021-08-02 PROCEDURE — 87077 CULTURE AEROBIC IDENTIFY: CPT | Mod: 59 | Performed by: STUDENT IN AN ORGANIZED HEALTH CARE EDUCATION/TRAINING PROGRAM

## 2021-08-02 PROCEDURE — 25000003 PHARM REV CODE 250: Performed by: NURSE ANESTHETIST, CERTIFIED REGISTERED

## 2021-08-02 PROCEDURE — 99223 PR INITIAL HOSPITAL CARE,LEVL III: ICD-10-PCS | Mod: ,,, | Performed by: INTERNAL MEDICINE

## 2021-08-02 PROCEDURE — 63600175 PHARM REV CODE 636 W HCPCS: Performed by: STUDENT IN AN ORGANIZED HEALTH CARE EDUCATION/TRAINING PROGRAM

## 2021-08-02 PROCEDURE — 85610 PROTHROMBIN TIME: CPT | Performed by: EMERGENCY MEDICINE

## 2021-08-02 PROCEDURE — 87389 HIV-1 AG W/HIV-1&-2 AB AG IA: CPT | Performed by: EMERGENCY MEDICINE

## 2021-08-02 PROCEDURE — D9220A PRA ANESTHESIA: Mod: ANES,,, | Performed by: ANESTHESIOLOGY

## 2021-08-02 PROCEDURE — 87088 URINE BACTERIA CULTURE: CPT | Performed by: STUDENT IN AN ORGANIZED HEALTH CARE EDUCATION/TRAINING PROGRAM

## 2021-08-02 PROCEDURE — 37000009 HC ANESTHESIA EA ADD 15 MINS

## 2021-08-02 PROCEDURE — 25000003 PHARM REV CODE 250: Performed by: INTERNAL MEDICINE

## 2021-08-02 PROCEDURE — D9220A PRA ANESTHESIA: ICD-10-PCS | Mod: CRNA,,, | Performed by: NURSE ANESTHETIST, CERTIFIED REGISTERED

## 2021-08-02 PROCEDURE — 63600175 PHARM REV CODE 636 W HCPCS: Performed by: INTERNAL MEDICINE

## 2021-08-02 PROCEDURE — 63600175 PHARM REV CODE 636 W HCPCS: Performed by: NURSE ANESTHETIST, CERTIFIED REGISTERED

## 2021-08-02 PROCEDURE — D9220A PRA ANESTHESIA: Mod: CRNA,,, | Performed by: NURSE ANESTHETIST, CERTIFIED REGISTERED

## 2021-08-02 PROCEDURE — 25000003 PHARM REV CODE 250: Performed by: STUDENT IN AN ORGANIZED HEALTH CARE EDUCATION/TRAINING PROGRAM

## 2021-08-02 PROCEDURE — 99223 1ST HOSP IP/OBS HIGH 75: CPT | Mod: ,,, | Performed by: INTERNAL MEDICINE

## 2021-08-02 PROCEDURE — 94761 N-INVAS EAR/PLS OXIMETRY MLT: CPT

## 2021-08-02 PROCEDURE — 87184 SC STD DISK METHOD PER PLATE: CPT | Mod: 59 | Performed by: STUDENT IN AN ORGANIZED HEALTH CARE EDUCATION/TRAINING PROGRAM

## 2021-08-02 PROCEDURE — 85025 COMPLETE CBC W/AUTO DIFF WBC: CPT | Performed by: EMERGENCY MEDICINE

## 2021-08-02 PROCEDURE — 87075 CULTR BACTERIA EXCEPT BLOOD: CPT | Performed by: STUDENT IN AN ORGANIZED HEALTH CARE EDUCATION/TRAINING PROGRAM

## 2021-08-02 PROCEDURE — 71000033 HC RECOVERY, INTIAL HOUR

## 2021-08-02 PROCEDURE — 87205 SMEAR GRAM STAIN: CPT | Performed by: STUDENT IN AN ORGANIZED HEALTH CARE EDUCATION/TRAINING PROGRAM

## 2021-08-02 PROCEDURE — D9220A PRA ANESTHESIA: ICD-10-PCS | Mod: ANES,,, | Performed by: ANESTHESIOLOGY

## 2021-08-02 PROCEDURE — 84100 ASSAY OF PHOSPHORUS: CPT | Performed by: STUDENT IN AN ORGANIZED HEALTH CARE EDUCATION/TRAINING PROGRAM

## 2021-08-02 PROCEDURE — 87070 CULTURE OTHR SPECIMN AEROBIC: CPT | Performed by: STUDENT IN AN ORGANIZED HEALTH CARE EDUCATION/TRAINING PROGRAM

## 2021-08-02 PROCEDURE — 20600001 HC STEP DOWN PRIVATE ROOM

## 2021-08-02 PROCEDURE — 87086 URINE CULTURE/COLONY COUNT: CPT | Performed by: STUDENT IN AN ORGANIZED HEALTH CARE EDUCATION/TRAINING PROGRAM

## 2021-08-02 PROCEDURE — 37000008 HC ANESTHESIA 1ST 15 MINUTES

## 2021-08-02 PROCEDURE — 71000039 HC RECOVERY, EACH ADD'L HOUR

## 2021-08-02 PROCEDURE — 80053 COMPREHEN METABOLIC PANEL: CPT | Performed by: STUDENT IN AN ORGANIZED HEALTH CARE EDUCATION/TRAINING PROGRAM

## 2021-08-02 PROCEDURE — 85025 COMPLETE CBC W/AUTO DIFF WBC: CPT | Mod: 91 | Performed by: STUDENT IN AN ORGANIZED HEALTH CARE EDUCATION/TRAINING PROGRAM

## 2021-08-02 PROCEDURE — 85730 THROMBOPLASTIN TIME PARTIAL: CPT | Performed by: EMERGENCY MEDICINE

## 2021-08-02 PROCEDURE — 87186 SC STD MICRODIL/AGAR DIL: CPT | Mod: 59 | Performed by: STUDENT IN AN ORGANIZED HEALTH CARE EDUCATION/TRAINING PROGRAM

## 2021-08-02 PROCEDURE — 83735 ASSAY OF MAGNESIUM: CPT | Performed by: STUDENT IN AN ORGANIZED HEALTH CARE EDUCATION/TRAINING PROGRAM

## 2021-08-02 PROCEDURE — 36415 COLL VENOUS BLD VENIPUNCTURE: CPT | Performed by: STUDENT IN AN ORGANIZED HEALTH CARE EDUCATION/TRAINING PROGRAM

## 2021-08-02 RX ORDER — SODIUM CHLORIDE 0.9 % (FLUSH) 0.9 %
3 SYRINGE (ML) INJECTION
Status: DISCONTINUED | OUTPATIENT
Start: 2021-08-02 | End: 2021-08-02 | Stop reason: HOSPADM

## 2021-08-02 RX ORDER — HYDROCODONE BITARTRATE AND ACETAMINOPHEN 500; 5 MG/1; MG/1
TABLET ORAL
Status: DISCONTINUED | OUTPATIENT
Start: 2021-08-02 | End: 2021-08-03

## 2021-08-02 RX ORDER — GLUCAGON 1 MG
1 KIT INJECTION
Status: DISCONTINUED | OUTPATIENT
Start: 2021-08-02 | End: 2021-08-04 | Stop reason: HOSPADM

## 2021-08-02 RX ORDER — BENZONATATE 100 MG/1
100 CAPSULE ORAL 3 TIMES DAILY PRN
Status: DISCONTINUED | OUTPATIENT
Start: 2021-08-02 | End: 2021-08-04 | Stop reason: HOSPADM

## 2021-08-02 RX ORDER — LINEZOLID 600 MG/1
600 TABLET, FILM COATED ORAL EVERY 12 HOURS
Status: DISCONTINUED | OUTPATIENT
Start: 2021-08-02 | End: 2021-08-04

## 2021-08-02 RX ORDER — ACETAMINOPHEN 325 MG/1
650 TABLET ORAL EVERY 6 HOURS PRN
Status: DISCONTINUED | OUTPATIENT
Start: 2021-08-02 | End: 2021-08-04

## 2021-08-02 RX ORDER — IBUPROFEN 200 MG
24 TABLET ORAL
Status: DISCONTINUED | OUTPATIENT
Start: 2021-08-02 | End: 2021-08-04 | Stop reason: HOSPADM

## 2021-08-02 RX ORDER — LINEZOLID 2 MG/ML
600 INJECTION, SOLUTION INTRAVENOUS
Status: DISCONTINUED | OUTPATIENT
Start: 2021-08-02 | End: 2021-08-02

## 2021-08-02 RX ORDER — IBUPROFEN 200 MG
16 TABLET ORAL
Status: DISCONTINUED | OUTPATIENT
Start: 2021-08-02 | End: 2021-08-04 | Stop reason: HOSPADM

## 2021-08-02 RX ORDER — FENTANYL CITRATE 50 UG/ML
25 INJECTION, SOLUTION INTRAMUSCULAR; INTRAVENOUS EVERY 5 MIN PRN
Status: DISCONTINUED | OUTPATIENT
Start: 2021-08-02 | End: 2021-08-02 | Stop reason: HOSPADM

## 2021-08-02 RX ORDER — HYDROCODONE BITARTRATE AND ACETAMINOPHEN 5; 325 MG/1; MG/1
1 TABLET ORAL EVERY 6 HOURS PRN
Status: DISCONTINUED | OUTPATIENT
Start: 2021-08-02 | End: 2021-08-04

## 2021-08-02 RX ORDER — IBUPROFEN 200 MG
400 TABLET ORAL EVERY 6 HOURS PRN
COMMUNITY
End: 2022-01-20

## 2021-08-02 RX ORDER — CEFEPIME HYDROCHLORIDE 2 G/1
2 INJECTION, POWDER, FOR SOLUTION INTRAVENOUS
Status: DISCONTINUED | OUTPATIENT
Start: 2021-08-02 | End: 2021-08-04

## 2021-08-02 RX ORDER — HYDROMORPHONE HYDROCHLORIDE 1 MG/ML
0.2 INJECTION, SOLUTION INTRAMUSCULAR; INTRAVENOUS; SUBCUTANEOUS EVERY 5 MIN PRN
Status: DISCONTINUED | OUTPATIENT
Start: 2021-08-02 | End: 2021-08-02 | Stop reason: HOSPADM

## 2021-08-02 RX ORDER — SODIUM CHLORIDE 0.9 % (FLUSH) 0.9 %
10 SYRINGE (ML) INJECTION
Status: DISCONTINUED | OUTPATIENT
Start: 2021-08-02 | End: 2021-08-04 | Stop reason: HOSPADM

## 2021-08-02 RX ORDER — MIDAZOLAM HYDROCHLORIDE 1 MG/ML
INJECTION, SOLUTION INTRAMUSCULAR; INTRAVENOUS
Status: DISCONTINUED | OUTPATIENT
Start: 2021-08-02 | End: 2021-08-02

## 2021-08-02 RX ORDER — PROCHLORPERAZINE EDISYLATE 5 MG/ML
5 INJECTION INTRAMUSCULAR; INTRAVENOUS EVERY 6 HOURS PRN
Status: DISCONTINUED | OUTPATIENT
Start: 2021-08-02 | End: 2021-08-04 | Stop reason: HOSPADM

## 2021-08-02 RX ORDER — SODIUM CHLORIDE 9 MG/ML
INJECTION, SOLUTION INTRAVENOUS CONTINUOUS
Status: DISCONTINUED | OUTPATIENT
Start: 2021-08-02 | End: 2021-08-02

## 2021-08-02 RX ORDER — SODIUM CHLORIDE 0.9 % (FLUSH) 0.9 %
10 SYRINGE (ML) INJECTION
Status: DISCONTINUED | OUTPATIENT
Start: 2021-08-02 | End: 2021-08-02 | Stop reason: HOSPADM

## 2021-08-02 RX ORDER — PROPOFOL 10 MG/ML
VIAL (ML) INTRAVENOUS CONTINUOUS PRN
Status: DISCONTINUED | OUTPATIENT
Start: 2021-08-02 | End: 2021-08-02

## 2021-08-02 RX ORDER — FENTANYL CITRATE 50 UG/ML
INJECTION, SOLUTION INTRAMUSCULAR; INTRAVENOUS
Status: DISCONTINUED | OUTPATIENT
Start: 2021-08-02 | End: 2021-08-02

## 2021-08-02 RX ORDER — HYDROCODONE BITARTRATE AND ACETAMINOPHEN 10; 325 MG/1; MG/1
1 TABLET ORAL EVERY 6 HOURS PRN
Status: DISCONTINUED | OUTPATIENT
Start: 2021-08-02 | End: 2021-08-04 | Stop reason: HOSPADM

## 2021-08-02 RX ORDER — LIDOCAINE HYDROCHLORIDE 20 MG/ML
INJECTION INTRAVENOUS
Status: DISCONTINUED | OUTPATIENT
Start: 2021-08-02 | End: 2021-08-02

## 2021-08-02 RX ADMIN — LINEZOLID 600 MG: 600 TABLET, FILM COATED ORAL at 08:08

## 2021-08-02 RX ADMIN — PIPERACILLIN SODIUM AND TAZOBACTAM SODIUM 4.5 G: 4; .5 INJECTION, POWDER, FOR SOLUTION INTRAVENOUS at 04:08

## 2021-08-02 RX ADMIN — BENZONATATE 100 MG: 100 CAPSULE ORAL at 03:08

## 2021-08-02 RX ADMIN — MIDAZOLAM HYDROCHLORIDE 2 MG: 1 INJECTION, SOLUTION INTRAMUSCULAR; INTRAVENOUS at 04:08

## 2021-08-02 RX ADMIN — PIPERACILLIN SODIUM AND TAZOBACTAM SODIUM 4.5 G: 4; .5 INJECTION, POWDER, FOR SOLUTION INTRAVENOUS at 12:08

## 2021-08-02 RX ADMIN — FENTANYL CITRATE 25 MCG: 50 INJECTION, SOLUTION INTRAMUSCULAR; INTRAVENOUS at 05:08

## 2021-08-02 RX ADMIN — PROPOFOL 50 MCG/KG/MIN: 10 INJECTION, EMULSION INTRAVENOUS at 04:08

## 2021-08-02 RX ADMIN — HYDROCODONE BITARTRATE AND ACETAMINOPHEN 1 TABLET: 10; 325 TABLET ORAL at 02:08

## 2021-08-02 RX ADMIN — CEFEPIME 2 G: 2 INJECTION, POWDER, FOR SOLUTION INTRAVENOUS at 08:08

## 2021-08-02 RX ADMIN — FENTANYL CITRATE 25 MCG: 50 INJECTION, SOLUTION INTRAMUSCULAR; INTRAVENOUS at 04:08

## 2021-08-02 RX ADMIN — CEFEPIME 2 G: 2 INJECTION, POWDER, FOR SOLUTION INTRAVENOUS at 02:08

## 2021-08-02 RX ADMIN — LIDOCAINE HYDROCHLORIDE 40 MG: 20 INJECTION, SOLUTION INTRAVENOUS at 04:08

## 2021-08-02 RX ADMIN — LINEZOLID 600 MG: 600 INJECTION, SOLUTION INTRAVENOUS at 08:08

## 2021-08-03 LAB
ALBUMIN SERPL BCP-MCNC: 1.9 G/DL (ref 3.5–5.2)
ALLENS TEST: ABNORMAL
ALP SERPL-CCNC: 77 U/L (ref 55–135)
ALT SERPL W/O P-5'-P-CCNC: 6 U/L (ref 10–44)
ANION GAP SERPL CALC-SCNC: 13 MMOL/L (ref 8–16)
AST SERPL-CCNC: 11 U/L (ref 10–40)
BACTERIA UR CULT: NORMAL
BACTERIA UR CULT: NORMAL
BASOPHILS # BLD AUTO: 0.07 K/UL (ref 0–0.2)
BASOPHILS NFR BLD: 0.6 % (ref 0–1.9)
BILIRUB SERPL-MCNC: 0.3 MG/DL (ref 0.1–1)
BUN SERPL-MCNC: 12 MG/DL (ref 6–20)
CALCIUM SERPL-MCNC: 8.8 MG/DL (ref 8.7–10.5)
CHLORIDE SERPL-SCNC: 109 MMOL/L (ref 95–110)
CO2 SERPL-SCNC: 14 MMOL/L (ref 23–29)
CREAT SERPL-MCNC: 0.5 MG/DL (ref 0.5–1.4)
DELSYS: ABNORMAL
DIFFERENTIAL METHOD: ABNORMAL
EOSINOPHIL # BLD AUTO: 0.1 K/UL (ref 0–0.5)
EOSINOPHIL NFR BLD: 0.8 % (ref 0–8)
ERYTHROCYTE [DISTWIDTH] IN BLOOD BY AUTOMATED COUNT: 21.7 % (ref 11.5–14.5)
EST. GFR  (AFRICAN AMERICAN): >60 ML/MIN/1.73 M^2
EST. GFR  (NON AFRICAN AMERICAN): >60 ML/MIN/1.73 M^2
GLUCOSE SERPL-MCNC: 73 MG/DL (ref 70–110)
HCO3 UR-SCNC: 20.8 MMOL/L (ref 24–28)
HCT VFR BLD AUTO: 30.7 % (ref 37–48.5)
HGB BLD-MCNC: 8.4 G/DL (ref 12–16)
IMM GRANULOCYTES # BLD AUTO: 0.4 K/UL (ref 0–0.04)
IMM GRANULOCYTES NFR BLD AUTO: 3.6 % (ref 0–0.5)
LYMPHOCYTES # BLD AUTO: 1.9 K/UL (ref 1–4.8)
LYMPHOCYTES NFR BLD: 17.5 % (ref 18–48)
MAGNESIUM SERPL-MCNC: 1.9 MG/DL (ref 1.6–2.6)
MCH RBC QN AUTO: 20.8 PG (ref 27–31)
MCHC RBC AUTO-ENTMCNC: 27.4 G/DL (ref 32–36)
MCV RBC AUTO: 76 FL (ref 82–98)
MODE: ABNORMAL
MONOCYTES # BLD AUTO: 1.2 K/UL (ref 0.3–1)
MONOCYTES NFR BLD: 10.8 % (ref 4–15)
NEUTROPHILS # BLD AUTO: 7.4 K/UL (ref 1.8–7.7)
NEUTROPHILS NFR BLD: 66.7 % (ref 38–73)
NRBC BLD-RTO: 0 /100 WBC
PCO2 BLDA: 37.6 MMHG (ref 35–45)
PH SMN: 7.35 [PH] (ref 7.35–7.45)
PHOSPHATE SERPL-MCNC: 3.6 MG/DL (ref 2.7–4.5)
PLATELET # BLD AUTO: 552 K/UL (ref 150–450)
PMV BLD AUTO: 9.9 FL (ref 9.2–12.9)
PO2 BLDA: 58 MMHG (ref 40–60)
POC BE: -5 MMOL/L
POC SATURATED O2: 89 % (ref 95–100)
POC TCO2: 22 MMOL/L (ref 24–29)
POTASSIUM SERPL-SCNC: 4.1 MMOL/L (ref 3.5–5.1)
PROT SERPL-MCNC: 7.9 G/DL (ref 6–8.4)
RBC # BLD AUTO: 4.04 M/UL (ref 4–5.4)
SAMPLE: ABNORMAL
SITE: ABNORMAL
SODIUM SERPL-SCNC: 136 MMOL/L (ref 136–145)
SP02: 96
WBC # BLD AUTO: 11.08 K/UL (ref 3.9–12.7)

## 2021-08-03 PROCEDURE — 25000003 PHARM REV CODE 250: Performed by: INTERNAL MEDICINE

## 2021-08-03 PROCEDURE — 99233 SBSQ HOSP IP/OBS HIGH 50: CPT | Mod: ,,, | Performed by: INTERNAL MEDICINE

## 2021-08-03 PROCEDURE — 25000003 PHARM REV CODE 250: Performed by: STUDENT IN AN ORGANIZED HEALTH CARE EDUCATION/TRAINING PROGRAM

## 2021-08-03 PROCEDURE — 63600175 PHARM REV CODE 636 W HCPCS: Performed by: INTERNAL MEDICINE

## 2021-08-03 PROCEDURE — 36415 COLL VENOUS BLD VENIPUNCTURE: CPT | Performed by: STUDENT IN AN ORGANIZED HEALTH CARE EDUCATION/TRAINING PROGRAM

## 2021-08-03 PROCEDURE — 84100 ASSAY OF PHOSPHORUS: CPT | Performed by: STUDENT IN AN ORGANIZED HEALTH CARE EDUCATION/TRAINING PROGRAM

## 2021-08-03 PROCEDURE — 80053 COMPREHEN METABOLIC PANEL: CPT | Performed by: STUDENT IN AN ORGANIZED HEALTH CARE EDUCATION/TRAINING PROGRAM

## 2021-08-03 PROCEDURE — 82803 BLOOD GASES ANY COMBINATION: CPT

## 2021-08-03 PROCEDURE — 99233 PR SUBSEQUENT HOSPITAL CARE,LEVL III: ICD-10-PCS | Mod: ,,, | Performed by: INTERNAL MEDICINE

## 2021-08-03 PROCEDURE — 20600001 HC STEP DOWN PRIVATE ROOM

## 2021-08-03 PROCEDURE — 99900035 HC TECH TIME PER 15 MIN (STAT)

## 2021-08-03 PROCEDURE — 83735 ASSAY OF MAGNESIUM: CPT | Performed by: STUDENT IN AN ORGANIZED HEALTH CARE EDUCATION/TRAINING PROGRAM

## 2021-08-03 PROCEDURE — 85025 COMPLETE CBC W/AUTO DIFF WBC: CPT | Performed by: STUDENT IN AN ORGANIZED HEALTH CARE EDUCATION/TRAINING PROGRAM

## 2021-08-03 RX ADMIN — ACETAMINOPHEN 650 MG: 325 TABLET ORAL at 09:08

## 2021-08-03 RX ADMIN — LINEZOLID 600 MG: 600 TABLET, FILM COATED ORAL at 08:08

## 2021-08-03 RX ADMIN — CEFEPIME 2 G: 2 INJECTION, POWDER, FOR SOLUTION INTRAVENOUS at 05:08

## 2021-08-03 RX ADMIN — LINEZOLID 600 MG: 600 TABLET, FILM COATED ORAL at 09:08

## 2021-08-03 RX ADMIN — CEFEPIME 2 G: 2 INJECTION, POWDER, FOR SOLUTION INTRAVENOUS at 09:08

## 2021-08-03 RX ADMIN — HYDROCODONE BITARTRATE AND ACETAMINOPHEN 1 TABLET: 5; 325 TABLET ORAL at 08:08

## 2021-08-03 RX ADMIN — CEFEPIME 2 G: 2 INJECTION, POWDER, FOR SOLUTION INTRAVENOUS at 02:08

## 2021-08-03 RX ADMIN — BENZONATATE 100 MG: 100 CAPSULE ORAL at 08:08

## 2021-08-04 VITALS
HEIGHT: 55 IN | OXYGEN SATURATION: 99 % | DIASTOLIC BLOOD PRESSURE: 68 MMHG | HEART RATE: 94 BPM | BODY MASS INDEX: 24.48 KG/M2 | WEIGHT: 105.81 LBS | SYSTOLIC BLOOD PRESSURE: 108 MMHG | TEMPERATURE: 98 F | RESPIRATION RATE: 18 BRPM

## 2021-08-04 PROBLEM — Z79.899 DVT PROPHYLAXIS: Status: ACTIVE | Noted: 2021-08-04

## 2021-08-04 LAB
ALBUMIN SERPL BCP-MCNC: 1.9 G/DL (ref 3.5–5.2)
ALP SERPL-CCNC: 73 U/L (ref 55–135)
ALT SERPL W/O P-5'-P-CCNC: 8 U/L (ref 10–44)
ANION GAP SERPL CALC-SCNC: 7 MMOL/L (ref 8–16)
ANISOCYTOSIS BLD QL SMEAR: SLIGHT
AST SERPL-CCNC: 10 U/L (ref 10–40)
BASOPHILS # BLD AUTO: ABNORMAL K/UL (ref 0–0.2)
BASOPHILS NFR BLD: 0 % (ref 0–1.9)
BILIRUB SERPL-MCNC: 0.2 MG/DL (ref 0.1–1)
BUN SERPL-MCNC: 13 MG/DL (ref 6–20)
CALCIUM SERPL-MCNC: 8.5 MG/DL (ref 8.7–10.5)
CHLORIDE SERPL-SCNC: 108 MMOL/L (ref 95–110)
CO2 SERPL-SCNC: 20 MMOL/L (ref 23–29)
CREAT SERPL-MCNC: 0.6 MG/DL (ref 0.5–1.4)
DIFFERENTIAL METHOD: ABNORMAL
EOSINOPHIL # BLD AUTO: ABNORMAL K/UL (ref 0–0.5)
EOSINOPHIL NFR BLD: 1 % (ref 0–8)
ERYTHROCYTE [DISTWIDTH] IN BLOOD BY AUTOMATED COUNT: 21.3 % (ref 11.5–14.5)
EST. GFR  (AFRICAN AMERICAN): >60 ML/MIN/1.73 M^2
EST. GFR  (NON AFRICAN AMERICAN): >60 ML/MIN/1.73 M^2
GLUCOSE SERPL-MCNC: 78 MG/DL (ref 70–110)
HCT VFR BLD AUTO: 27.5 % (ref 37–48.5)
HGB BLD-MCNC: 7.8 G/DL (ref 12–16)
HYPOCHROMIA BLD QL SMEAR: ABNORMAL
IMM GRANULOCYTES # BLD AUTO: ABNORMAL K/UL (ref 0–0.04)
IMM GRANULOCYTES NFR BLD AUTO: ABNORMAL % (ref 0–0.5)
LYMPHOCYTES # BLD AUTO: ABNORMAL K/UL (ref 1–4.8)
LYMPHOCYTES NFR BLD: 20 % (ref 18–48)
MAGNESIUM SERPL-MCNC: 2 MG/DL (ref 1.6–2.6)
MCH RBC QN AUTO: 21.1 PG (ref 27–31)
MCHC RBC AUTO-ENTMCNC: 28.4 G/DL (ref 32–36)
MCV RBC AUTO: 74 FL (ref 82–98)
MONOCYTES # BLD AUTO: ABNORMAL K/UL (ref 0.3–1)
MONOCYTES NFR BLD: 2 % (ref 4–15)
MYELOCYTES NFR BLD MANUAL: 1 %
NEUTROPHILS NFR BLD: 76 % (ref 38–73)
NRBC BLD-RTO: 0 /100 WBC
OVALOCYTES BLD QL SMEAR: ABNORMAL
PHOSPHATE SERPL-MCNC: 3.9 MG/DL (ref 2.7–4.5)
PLATELET # BLD AUTO: 688 K/UL (ref 150–450)
PLATELET BLD QL SMEAR: ABNORMAL
PMV BLD AUTO: 8.7 FL (ref 9.2–12.9)
POIKILOCYTOSIS BLD QL SMEAR: SLIGHT
POLYCHROMASIA BLD QL SMEAR: ABNORMAL
POTASSIUM SERPL-SCNC: 3.8 MMOL/L (ref 3.5–5.1)
PROT SERPL-MCNC: 7.5 G/DL (ref 6–8.4)
RBC # BLD AUTO: 3.7 M/UL (ref 4–5.4)
SODIUM SERPL-SCNC: 135 MMOL/L (ref 136–145)
WBC # BLD AUTO: 8.51 K/UL (ref 3.9–12.7)

## 2021-08-04 PROCEDURE — 25000003 PHARM REV CODE 250: Performed by: STUDENT IN AN ORGANIZED HEALTH CARE EDUCATION/TRAINING PROGRAM

## 2021-08-04 PROCEDURE — 84100 ASSAY OF PHOSPHORUS: CPT | Performed by: STUDENT IN AN ORGANIZED HEALTH CARE EDUCATION/TRAINING PROGRAM

## 2021-08-04 PROCEDURE — 99233 PR SUBSEQUENT HOSPITAL CARE,LEVL III: ICD-10-PCS | Mod: ,,, | Performed by: INTERNAL MEDICINE

## 2021-08-04 PROCEDURE — 83735 ASSAY OF MAGNESIUM: CPT | Performed by: STUDENT IN AN ORGANIZED HEALTH CARE EDUCATION/TRAINING PROGRAM

## 2021-08-04 PROCEDURE — 63600175 PHARM REV CODE 636 W HCPCS

## 2021-08-04 PROCEDURE — 80053 COMPREHEN METABOLIC PANEL: CPT | Performed by: STUDENT IN AN ORGANIZED HEALTH CARE EDUCATION/TRAINING PROGRAM

## 2021-08-04 PROCEDURE — 36415 COLL VENOUS BLD VENIPUNCTURE: CPT | Performed by: STUDENT IN AN ORGANIZED HEALTH CARE EDUCATION/TRAINING PROGRAM

## 2021-08-04 PROCEDURE — 25000003 PHARM REV CODE 250: Performed by: INTERNAL MEDICINE

## 2021-08-04 PROCEDURE — 63600175 PHARM REV CODE 636 W HCPCS: Performed by: INTERNAL MEDICINE

## 2021-08-04 PROCEDURE — 85007 BL SMEAR W/DIFF WBC COUNT: CPT | Performed by: STUDENT IN AN ORGANIZED HEALTH CARE EDUCATION/TRAINING PROGRAM

## 2021-08-04 PROCEDURE — 85027 COMPLETE CBC AUTOMATED: CPT | Performed by: STUDENT IN AN ORGANIZED HEALTH CARE EDUCATION/TRAINING PROGRAM

## 2021-08-04 PROCEDURE — 99233 SBSQ HOSP IP/OBS HIGH 50: CPT | Mod: ,,, | Performed by: INTERNAL MEDICINE

## 2021-08-04 RX ORDER — CIPROFLOXACIN 500 MG/1
500 TABLET ORAL EVERY 12 HOURS
Status: DISCONTINUED | OUTPATIENT
Start: 2021-08-04 | End: 2021-08-04

## 2021-08-04 RX ORDER — ACETAMINOPHEN 325 MG/1
650 TABLET ORAL EVERY 6 HOURS PRN
Status: DISCONTINUED | OUTPATIENT
Start: 2021-08-04 | End: 2021-08-04 | Stop reason: HOSPADM

## 2021-08-04 RX ORDER — HYDROCODONE BITARTRATE AND ACETAMINOPHEN 5; 325 MG/1; MG/1
1 TABLET ORAL EVERY 6 HOURS PRN
Status: DISCONTINUED | OUTPATIENT
Start: 2021-08-04 | End: 2021-08-04 | Stop reason: HOSPADM

## 2021-08-04 RX ORDER — MUPIROCIN 20 MG/G
OINTMENT TOPICAL 2 TIMES DAILY
Status: DISCONTINUED | OUTPATIENT
Start: 2021-08-04 | End: 2021-08-04 | Stop reason: HOSPADM

## 2021-08-04 RX ORDER — CIPROFLOXACIN 500 MG/1
500 TABLET ORAL EVERY 12 HOURS
Status: DISCONTINUED | OUTPATIENT
Start: 2021-08-04 | End: 2021-08-04 | Stop reason: HOSPADM

## 2021-08-04 RX ORDER — HEPARIN SODIUM 5000 [USP'U]/ML
5000 INJECTION, SOLUTION INTRAVENOUS; SUBCUTANEOUS EVERY 12 HOURS
Status: DISCONTINUED | OUTPATIENT
Start: 2021-08-04 | End: 2021-08-04 | Stop reason: HOSPADM

## 2021-08-04 RX ORDER — CIPROFLOXACIN 500 MG/1
500 TABLET ORAL EVERY 12 HOURS
Qty: 60 TABLET | Refills: 0 | Status: SHIPPED | OUTPATIENT
Start: 2021-08-04 | End: 2021-09-01 | Stop reason: SDUPTHER

## 2021-08-04 RX ORDER — HYDROCODONE BITARTRATE AND ACETAMINOPHEN 5; 325 MG/1; MG/1
1 TABLET ORAL EVERY 6 HOURS PRN
Status: DISCONTINUED | OUTPATIENT
Start: 2021-08-04 | End: 2021-08-04

## 2021-08-04 RX ADMIN — CIPROFLOXACIN 500 MG: 500 TABLET, FILM COATED ORAL at 03:08

## 2021-08-04 RX ADMIN — ACETAMINOPHEN 650 MG: 325 TABLET ORAL at 08:08

## 2021-08-04 RX ADMIN — CEFEPIME 2 G: 2 INJECTION, POWDER, FOR SOLUTION INTRAVENOUS at 04:08

## 2021-08-04 RX ADMIN — HEPARIN SODIUM 5000 UNITS: 5000 INJECTION INTRAVENOUS; SUBCUTANEOUS at 10:08

## 2021-08-04 RX ADMIN — HYDROCODONE BITARTRATE AND ACETAMINOPHEN 1 TABLET: 5; 325 TABLET ORAL at 10:08

## 2021-08-04 RX ADMIN — LINEZOLID 600 MG: 600 TABLET, FILM COATED ORAL at 08:08

## 2021-08-05 ENCOUNTER — TELEPHONE (OUTPATIENT)
Dept: UROLOGY | Facility: CLINIC | Age: 26
End: 2021-08-05

## 2021-08-05 ENCOUNTER — HOSPITAL ENCOUNTER (EMERGENCY)
Facility: HOSPITAL | Age: 26
Discharge: HOME | End: 2021-08-05
Attending: EMERGENCY MEDICINE
Payer: MEDICAID

## 2021-08-05 VITALS
SYSTOLIC BLOOD PRESSURE: 126 MMHG | HEART RATE: 107 BPM | HEIGHT: 55 IN | OXYGEN SATURATION: 98 % | RESPIRATION RATE: 18 BRPM | DIASTOLIC BLOOD PRESSURE: 81 MMHG | TEMPERATURE: 99 F | BODY MASS INDEX: 20.71 KG/M2 | WEIGHT: 89.5 LBS

## 2021-08-05 DIAGNOSIS — T83.011A MALFUNCTION OF FOLEY CATHETER, INITIAL ENCOUNTER: Primary | ICD-10-CM

## 2021-08-05 LAB
BACTERIA SPEC AEROBE CULT: ABNORMAL
BLD PROD TYP BPU: NORMAL
BLD PROD TYP BPU: NORMAL
BLOOD UNIT EXPIRATION DATE: NORMAL
BLOOD UNIT EXPIRATION DATE: NORMAL
BLOOD UNIT TYPE CODE: 6200
BLOOD UNIT TYPE CODE: 6200
BLOOD UNIT TYPE: NORMAL
BLOOD UNIT TYPE: NORMAL
CODING SYSTEM: NORMAL
CODING SYSTEM: NORMAL
DISPENSE STATUS: NORMAL
DISPENSE STATUS: NORMAL
NUM UNITS TRANS PACKED RBC: NORMAL
NUM UNITS TRANS PACKED RBC: NORMAL

## 2021-08-05 PROCEDURE — 51702 INSERT TEMP BLADDER CATH: CPT

## 2021-08-05 PROCEDURE — 99283 EMERGENCY DEPT VISIT LOW MDM: CPT

## 2021-08-06 LAB
BACTERIA BLD CULT: NORMAL
BACTERIA BLD CULT: NORMAL
BACTERIA SPEC ANAEROBE CULT: NORMAL
BACTERIA UR CULT: ABNORMAL
BACTERIA UR CULT: ABNORMAL

## 2021-08-07 ENCOUNTER — HOSPITAL ENCOUNTER (EMERGENCY)
Facility: HOSPITAL | Age: 26
Discharge: HOME | End: 2021-08-07
Attending: EMERGENCY MEDICINE
Payer: MEDICAID

## 2021-08-07 VITALS
HEART RATE: 112 BPM | BODY MASS INDEX: 29.29 KG/M2 | RESPIRATION RATE: 18 BRPM | TEMPERATURE: 99 F | WEIGHT: 96 LBS | OXYGEN SATURATION: 98 % | SYSTOLIC BLOOD PRESSURE: 125 MMHG | DIASTOLIC BLOOD PRESSURE: 81 MMHG

## 2021-08-07 DIAGNOSIS — R33.9 URINARY RETENTION: ICD-10-CM

## 2021-08-07 DIAGNOSIS — T83.9XXA FOLEY CATHETER PROBLEM, INITIAL ENCOUNTER: Primary | ICD-10-CM

## 2021-08-07 PROCEDURE — 99283 EMERGENCY DEPT VISIT LOW MDM: CPT

## 2021-08-07 PROCEDURE — 51702 INSERT TEMP BLADDER CATH: CPT

## 2021-08-13 ENCOUNTER — TELEPHONE (OUTPATIENT)
Dept: INFECTIOUS DISEASES | Facility: CLINIC | Age: 26
End: 2021-08-13

## 2021-09-01 ENCOUNTER — HOSPITAL ENCOUNTER (EMERGENCY)
Facility: HOSPITAL | Age: 26
Discharge: HOME | End: 2021-09-01
Attending: EMERGENCY MEDICINE
Payer: MEDICAID

## 2021-09-01 VITALS
HEIGHT: 55 IN | HEART RATE: 82 BPM | SYSTOLIC BLOOD PRESSURE: 132 MMHG | BODY MASS INDEX: 22.21 KG/M2 | RESPIRATION RATE: 18 BRPM | TEMPERATURE: 98 F | OXYGEN SATURATION: 99 % | DIASTOLIC BLOOD PRESSURE: 82 MMHG | WEIGHT: 96 LBS

## 2021-09-01 DIAGNOSIS — Z98.890 HISTORY OF SUPRAPUBIC CATHETER: Primary | ICD-10-CM

## 2021-09-01 DIAGNOSIS — N12 PYELONEPHRITIS: ICD-10-CM

## 2021-09-01 LAB
BACTERIA #/AREA URNS HPF: ABNORMAL /HPF
BACTERIA #/AREA URNS HPF: NEGATIVE /HPF
BILIRUB UR QL STRIP: NEGATIVE
BILIRUB UR QL STRIP: NEGATIVE
CLARITY UR: ABNORMAL
CLARITY UR: ABNORMAL
COLOR UR: ABNORMAL
COLOR UR: YELLOW
GLUCOSE UR QL STRIP: NEGATIVE
GLUCOSE UR QL STRIP: NEGATIVE
HGB UR QL STRIP: ABNORMAL
HGB UR QL STRIP: ABNORMAL
HYALINE CASTS #/AREA URNS LPF: 135 /LPF
HYALINE CASTS #/AREA URNS LPF: 21 /LPF
KETONES UR QL STRIP: NEGATIVE
KETONES UR QL STRIP: NEGATIVE
LEUKOCYTE ESTERASE UR QL STRIP: ABNORMAL
LEUKOCYTE ESTERASE UR QL STRIP: ABNORMAL
MICROSCOPIC COMMENT: ABNORMAL
MICROSCOPIC COMMENT: ABNORMAL
NITRITE UR QL STRIP: NEGATIVE
NITRITE UR QL STRIP: NEGATIVE
PH UR STRIP: 7 [PH] (ref 5–8)
PH UR STRIP: 7 [PH] (ref 5–8)
PROT UR QL STRIP: ABNORMAL
PROT UR QL STRIP: NEGATIVE
RBC #/AREA URNS HPF: 2 /HPF (ref 0–4)
RBC #/AREA URNS HPF: 30 /HPF (ref 0–4)
SP GR UR STRIP: 1 (ref 1–1.03)
SP GR UR STRIP: 1.01 (ref 1–1.03)
SQUAMOUS #/AREA URNS HPF: 0 /HPF
SQUAMOUS #/AREA URNS HPF: 2 /HPF
URN SPEC COLLECT METH UR: ABNORMAL
URN SPEC COLLECT METH UR: ABNORMAL
UROBILINOGEN UR STRIP-ACNC: NEGATIVE EU/DL
UROBILINOGEN UR STRIP-ACNC: NEGATIVE EU/DL
WBC #/AREA URNS HPF: >100 /HPF (ref 0–5)
WBC #/AREA URNS HPF: >100 /HPF (ref 0–5)

## 2021-09-01 PROCEDURE — 87147 CULTURE TYPE IMMUNOLOGIC: CPT | Performed by: EMERGENCY MEDICINE

## 2021-09-01 PROCEDURE — 81001 URINALYSIS AUTO W/SCOPE: CPT | Mod: 91 | Performed by: EMERGENCY MEDICINE

## 2021-09-01 PROCEDURE — 87086 URINE CULTURE/COLONY COUNT: CPT | Mod: 59 | Performed by: EMERGENCY MEDICINE

## 2021-09-01 PROCEDURE — 99283 EMERGENCY DEPT VISIT LOW MDM: CPT

## 2021-09-01 RX ORDER — CIPROFLOXACIN 500 MG/1
500 TABLET ORAL 2 TIMES DAILY
Qty: 60 TABLET | Refills: 0 | Status: SHIPPED | OUTPATIENT
Start: 2021-09-01 | End: 2021-10-01

## 2021-09-03 LAB — BACTERIA UR CULT: ABNORMAL

## 2021-09-13 ENCOUNTER — TELEPHONE (OUTPATIENT)
Dept: INFECTIOUS DISEASES | Facility: CLINIC | Age: 26
End: 2021-09-13

## 2021-09-22 ENCOUNTER — OFFICE VISIT (OUTPATIENT)
Dept: UROLOGY | Facility: CLINIC | Age: 26
End: 2021-09-22
Payer: MEDICAID

## 2021-09-22 VITALS
SYSTOLIC BLOOD PRESSURE: 126 MMHG | DIASTOLIC BLOOD PRESSURE: 82 MMHG | HEIGHT: 55 IN | WEIGHT: 95.88 LBS | BODY MASS INDEX: 22.19 KG/M2 | HEART RATE: 79 BPM

## 2021-09-22 DIAGNOSIS — N31.9 NEUROGENIC BLADDER: ICD-10-CM

## 2021-09-22 DIAGNOSIS — Q05.2 SPINA BIFIDA OF LUMBAR REGION WITH HYDROCEPHALUS: ICD-10-CM

## 2021-09-22 DIAGNOSIS — Z78.9 INTERMITTENT SELF-CATHETERIZATION OF BLADDER: ICD-10-CM

## 2021-09-22 DIAGNOSIS — N10 ACUTE PYELONEPHRITIS: ICD-10-CM

## 2021-09-22 DIAGNOSIS — N13.30 HYDRONEPHROSIS OF RIGHT KIDNEY: Primary | ICD-10-CM

## 2021-09-22 PROCEDURE — 99215 PR OFFICE/OUTPT VISIT, EST, LEVL V, 40-54 MIN: ICD-10-PCS | Mod: S$PBB,,, | Performed by: UROLOGY

## 2021-09-22 PROCEDURE — 99999 PR PBB SHADOW E&M-EST. PATIENT-LVL III: ICD-10-PCS | Mod: PBBFAC,,, | Performed by: UROLOGY

## 2021-09-22 PROCEDURE — 99215 OFFICE O/P EST HI 40 MIN: CPT | Mod: S$PBB,,, | Performed by: UROLOGY

## 2021-09-22 PROCEDURE — 99999 PR PBB SHADOW E&M-EST. PATIENT-LVL III: CPT | Mod: PBBFAC,,, | Performed by: UROLOGY

## 2021-09-22 PROCEDURE — 99213 OFFICE O/P EST LOW 20 MIN: CPT | Mod: PBBFAC | Performed by: UROLOGY

## 2021-09-29 ENCOUNTER — OFFICE VISIT (OUTPATIENT)
Dept: UROLOGY | Facility: CLINIC | Age: 26
End: 2021-09-29
Payer: MEDICAID

## 2021-09-29 ENCOUNTER — HOSPITAL ENCOUNTER (OUTPATIENT)
Dept: RADIOLOGY | Facility: HOSPITAL | Age: 26
Discharge: HOME | End: 2021-09-29
Attending: UROLOGY
Payer: MEDICAID

## 2021-09-29 VITALS
HEIGHT: 55 IN | HEART RATE: 100 BPM | DIASTOLIC BLOOD PRESSURE: 85 MMHG | SYSTOLIC BLOOD PRESSURE: 136 MMHG | WEIGHT: 95 LBS | BODY MASS INDEX: 21.98 KG/M2

## 2021-09-29 DIAGNOSIS — N31.9 NEUROGENIC BLADDER: ICD-10-CM

## 2021-09-29 DIAGNOSIS — Q05.2 SPINA BIFIDA OF LUMBAR REGION WITH HYDROCEPHALUS: ICD-10-CM

## 2021-09-29 DIAGNOSIS — N10 ACUTE PYELONEPHRITIS: ICD-10-CM

## 2021-09-29 DIAGNOSIS — N13.30 HYDRONEPHROSIS OF RIGHT KIDNEY: Primary | ICD-10-CM

## 2021-09-29 DIAGNOSIS — Z78.9 INTERMITTENT SELF-CATHETERIZATION OF BLADDER: ICD-10-CM

## 2021-09-29 PROCEDURE — 99999 PR PBB SHADOW E&M-EST. PATIENT-LVL III: CPT | Mod: PBBFAC,,, | Performed by: UROLOGY

## 2021-09-29 PROCEDURE — 99215 OFFICE O/P EST HI 40 MIN: CPT | Mod: S$PBB,,, | Performed by: UROLOGY

## 2021-09-29 PROCEDURE — 78701 KIDNEY IMAGING WITH FLOW: CPT | Mod: 26,,, | Performed by: RADIOLOGY

## 2021-09-29 PROCEDURE — 87077 CULTURE AEROBIC IDENTIFY: CPT | Performed by: UROLOGY

## 2021-09-29 PROCEDURE — A9567 TECHNETIUM TC-99M AEROSOL: HCPCS

## 2021-09-29 PROCEDURE — 99215 PR OFFICE/OUTPT VISIT, EST, LEVL V, 40-54 MIN: ICD-10-PCS | Mod: S$PBB,,, | Performed by: UROLOGY

## 2021-09-29 PROCEDURE — 78701 KIDNEY IMAGING WITH FLOW: CPT | Mod: TC

## 2021-09-29 PROCEDURE — 99999 PR PBB SHADOW E&M-EST. PATIENT-LVL III: ICD-10-PCS | Mod: PBBFAC,,, | Performed by: UROLOGY

## 2021-09-29 PROCEDURE — 99213 OFFICE O/P EST LOW 20 MIN: CPT | Mod: PBBFAC,25 | Performed by: UROLOGY

## 2021-09-29 PROCEDURE — 87088 URINE BACTERIA CULTURE: CPT | Performed by: UROLOGY

## 2021-09-29 PROCEDURE — 87186 SC STD MICRODIL/AGAR DIL: CPT | Performed by: UROLOGY

## 2021-09-29 PROCEDURE — 78701 NM KIDNEY IMAGING MORPH W VASCULAR: ICD-10-PCS | Mod: 26,,, | Performed by: RADIOLOGY

## 2021-09-29 PROCEDURE — 87086 URINE CULTURE/COLONY COUNT: CPT | Performed by: UROLOGY

## 2021-10-03 LAB — BACTERIA UR CULT: ABNORMAL

## 2021-10-04 ENCOUNTER — TELEPHONE (OUTPATIENT)
Dept: UROLOGY | Facility: CLINIC | Age: 26
End: 2021-10-04

## 2021-10-04 DIAGNOSIS — N30.90 BLADDER INFECTION: Primary | ICD-10-CM

## 2021-10-04 DIAGNOSIS — N39.0 URINARY TRACT INFECTION WITHOUT HEMATURIA, SITE UNSPECIFIED: ICD-10-CM

## 2021-10-04 RX ORDER — DOXYCYCLINE 100 MG/1
100 CAPSULE ORAL 2 TIMES DAILY
Qty: 14 CAPSULE | Refills: 0 | Status: SHIPPED | OUTPATIENT
Start: 2021-10-04 | End: 2021-10-11

## 2021-10-06 ENCOUNTER — TELEPHONE (OUTPATIENT)
Dept: UROLOGY | Facility: CLINIC | Age: 26
End: 2021-10-06

## 2021-10-06 ENCOUNTER — OFFICE VISIT (OUTPATIENT)
Dept: UROLOGY | Facility: CLINIC | Age: 26
End: 2021-10-06
Payer: MEDICAID

## 2021-10-06 VITALS — SYSTOLIC BLOOD PRESSURE: 131 MMHG | DIASTOLIC BLOOD PRESSURE: 89 MMHG | HEART RATE: 85 BPM

## 2021-10-06 DIAGNOSIS — N28.89 OBSTRUCTION OF KIDNEY: ICD-10-CM

## 2021-10-06 DIAGNOSIS — K68.19 RETROPERITONEAL ABSCESS: Primary | ICD-10-CM

## 2021-10-06 DIAGNOSIS — N28.9 NON-FUNCTIONING KIDNEY: Primary | ICD-10-CM

## 2021-10-06 PROCEDURE — 99999 PR PBB SHADOW E&M-EST. PATIENT-LVL III: CPT | Mod: PBBFAC,,, | Performed by: UROLOGY

## 2021-10-06 PROCEDURE — 99215 OFFICE O/P EST HI 40 MIN: CPT | Mod: S$PBB,,, | Performed by: UROLOGY

## 2021-10-06 PROCEDURE — 99215 PR OFFICE/OUTPT VISIT, EST, LEVL V, 40-54 MIN: ICD-10-PCS | Mod: S$PBB,,, | Performed by: UROLOGY

## 2021-10-06 PROCEDURE — 99999 PR PBB SHADOW E&M-EST. PATIENT-LVL III: ICD-10-PCS | Mod: PBBFAC,,, | Performed by: UROLOGY

## 2021-10-06 PROCEDURE — 99213 OFFICE O/P EST LOW 20 MIN: CPT | Mod: PBBFAC | Performed by: UROLOGY

## 2021-10-06 RX ORDER — CIPROFLOXACIN 500 MG/1
500 TABLET ORAL
COMMUNITY
End: 2022-01-20

## 2021-10-07 ENCOUNTER — PATIENT MESSAGE (OUTPATIENT)
Dept: SURGERY | Facility: HOSPITAL | Age: 26
End: 2021-10-07

## 2021-10-07 ENCOUNTER — HOSPITAL ENCOUNTER (EMERGENCY)
Facility: HOSPITAL | Age: 26
Discharge: HOME | End: 2021-10-08
Attending: EMERGENCY MEDICINE
Payer: MEDICAID

## 2021-10-07 DIAGNOSIS — N39.0 URINARY TRACT INFECTION WITHOUT HEMATURIA, SITE UNSPECIFIED: Primary | ICD-10-CM

## 2021-10-07 DIAGNOSIS — T85.698A MALFUNCTION OF DEVICE, INITIAL ENCOUNTER: ICD-10-CM

## 2021-10-07 PROCEDURE — 99285 EMERGENCY DEPT VISIT HI MDM: CPT | Mod: 25

## 2021-10-07 PROCEDURE — 80053 COMPREHEN METABOLIC PANEL: CPT | Performed by: STUDENT IN AN ORGANIZED HEALTH CARE EDUCATION/TRAINING PROGRAM

## 2021-10-07 PROCEDURE — 96365 THER/PROPH/DIAG IV INF INIT: CPT

## 2021-10-07 PROCEDURE — 96361 HYDRATE IV INFUSION ADD-ON: CPT

## 2021-10-07 PROCEDURE — 83605 ASSAY OF LACTIC ACID: CPT | Performed by: STUDENT IN AN ORGANIZED HEALTH CARE EDUCATION/TRAINING PROGRAM

## 2021-10-07 PROCEDURE — 36415 COLL VENOUS BLD VENIPUNCTURE: CPT | Performed by: STUDENT IN AN ORGANIZED HEALTH CARE EDUCATION/TRAINING PROGRAM

## 2021-10-08 VITALS
BODY MASS INDEX: 24.75 KG/M2 | HEART RATE: 106 BPM | OXYGEN SATURATION: 99 % | WEIGHT: 110 LBS | SYSTOLIC BLOOD PRESSURE: 139 MMHG | DIASTOLIC BLOOD PRESSURE: 80 MMHG | TEMPERATURE: 98 F | RESPIRATION RATE: 18 BRPM | HEIGHT: 56 IN

## 2021-10-08 LAB
ALBUMIN SERPL BCP-MCNC: 3.9 G/DL (ref 3.5–5.2)
ALP SERPL-CCNC: 56 U/L (ref 55–135)
ALT SERPL W/O P-5'-P-CCNC: 16 U/L (ref 10–44)
ANION GAP SERPL CALC-SCNC: 12 MMOL/L (ref 8–16)
AST SERPL-CCNC: 19 U/L (ref 10–40)
B-HCG UR QL: NEGATIVE
BACTERIA #/AREA URNS HPF: NEGATIVE /HPF
BASOPHILS # BLD AUTO: 0.02 K/UL (ref 0–0.2)
BASOPHILS NFR BLD: 0.2 % (ref 0–1.9)
BILIRUB SERPL-MCNC: 0.7 MG/DL (ref 0.1–1)
BILIRUB UR QL STRIP: NEGATIVE
BUN SERPL-MCNC: 28 MG/DL (ref 6–20)
CALCIUM SERPL-MCNC: 8.4 MG/DL (ref 8.7–10.5)
CHLORIDE SERPL-SCNC: 106 MMOL/L (ref 95–110)
CLARITY UR: CLEAR
CO2 SERPL-SCNC: 15 MMOL/L (ref 23–29)
COLOR UR: YELLOW
CREAT SERPL-MCNC: 0.5 MG/DL (ref 0.5–1.4)
CTP QC/QA: YES
DIFFERENTIAL METHOD: ABNORMAL
EOSINOPHIL # BLD AUTO: 0 K/UL (ref 0–0.5)
EOSINOPHIL NFR BLD: 0.3 % (ref 0–8)
ERYTHROCYTE [DISTWIDTH] IN BLOOD BY AUTOMATED COUNT: 20.1 % (ref 11.5–14.5)
EST. GFR  (AFRICAN AMERICAN): >60 ML/MIN/1.73 M^2
EST. GFR  (NON AFRICAN AMERICAN): >60 ML/MIN/1.73 M^2
GLUCOSE SERPL-MCNC: 77 MG/DL (ref 70–110)
GLUCOSE UR QL STRIP: NEGATIVE
HCT VFR BLD AUTO: 36.2 % (ref 37–48.5)
HGB BLD-MCNC: 10.9 G/DL (ref 12–16)
HGB UR QL STRIP: NEGATIVE
HYALINE CASTS #/AREA URNS LPF: 5 /LPF
IMM GRANULOCYTES # BLD AUTO: 0.04 K/UL (ref 0–0.04)
IMM GRANULOCYTES NFR BLD AUTO: 0.3 % (ref 0–0.5)
KETONES UR QL STRIP: ABNORMAL
LACTATE SERPL-SCNC: 0.7 MMOL/L (ref 0.5–1.9)
LEUKOCYTE ESTERASE UR QL STRIP: ABNORMAL
LYMPHOCYTES # BLD AUTO: 0.6 K/UL (ref 1–4.8)
LYMPHOCYTES NFR BLD: 5 % (ref 18–48)
MCH RBC QN AUTO: 23.5 PG (ref 27–31)
MCHC RBC AUTO-ENTMCNC: 30.1 G/DL (ref 32–36)
MCV RBC AUTO: 78 FL (ref 82–98)
MICROSCOPIC COMMENT: ABNORMAL
MONOCYTES # BLD AUTO: 0.4 K/UL (ref 0.3–1)
MONOCYTES NFR BLD: 3 % (ref 4–15)
NEUTROPHILS # BLD AUTO: 10.5 K/UL (ref 1.8–7.7)
NEUTROPHILS NFR BLD: 91.2 % (ref 38–73)
NITRITE UR QL STRIP: POSITIVE
NRBC BLD-RTO: 0 /100 WBC
PH UR STRIP: 7 [PH] (ref 5–8)
PLATELET # BLD AUTO: 264 K/UL (ref 150–450)
PMV BLD AUTO: 9 FL (ref 9.2–12.9)
POTASSIUM SERPL-SCNC: 3.9 MMOL/L (ref 3.5–5.1)
PROT SERPL-MCNC: 8.4 G/DL (ref 6–8.4)
PROT UR QL STRIP: NEGATIVE
RBC # BLD AUTO: 4.64 M/UL (ref 4–5.4)
RBC #/AREA URNS HPF: 0 /HPF (ref 0–4)
SARS-COV-2 RDRP RESP QL NAA+PROBE: NEGATIVE
SODIUM SERPL-SCNC: 133 MMOL/L (ref 136–145)
SP GR UR STRIP: 1.01 (ref 1–1.03)
SQUAMOUS #/AREA URNS HPF: 3 /HPF
URN SPEC COLLECT METH UR: ABNORMAL
UROBILINOGEN UR STRIP-ACNC: NEGATIVE EU/DL
WBC # BLD AUTO: 11.51 K/UL (ref 3.9–12.7)
WBC #/AREA URNS HPF: 3 /HPF (ref 0–5)

## 2021-10-08 PROCEDURE — 63600175 PHARM REV CODE 636 W HCPCS: Performed by: STUDENT IN AN ORGANIZED HEALTH CARE EDUCATION/TRAINING PROGRAM

## 2021-10-08 PROCEDURE — 85025 COMPLETE CBC W/AUTO DIFF WBC: CPT | Performed by: STUDENT IN AN ORGANIZED HEALTH CARE EDUCATION/TRAINING PROGRAM

## 2021-10-08 PROCEDURE — U0002 COVID-19 LAB TEST NON-CDC: HCPCS | Performed by: STUDENT IN AN ORGANIZED HEALTH CARE EDUCATION/TRAINING PROGRAM

## 2021-10-08 PROCEDURE — 81025 URINE PREGNANCY TEST: CPT | Performed by: STUDENT IN AN ORGANIZED HEALTH CARE EDUCATION/TRAINING PROGRAM

## 2021-10-08 PROCEDURE — 25500020 PHARM REV CODE 255: Performed by: EMERGENCY MEDICINE

## 2021-10-08 PROCEDURE — 81001 URINALYSIS AUTO W/SCOPE: CPT | Performed by: STUDENT IN AN ORGANIZED HEALTH CARE EDUCATION/TRAINING PROGRAM

## 2021-10-08 PROCEDURE — 96365 THER/PROPH/DIAG IV INF INIT: CPT

## 2021-10-08 PROCEDURE — 96361 HYDRATE IV INFUSION ADD-ON: CPT

## 2021-10-08 RX ADMIN — IOHEXOL 100 ML: 350 INJECTION, SOLUTION INTRAVENOUS at 01:10

## 2021-10-08 RX ADMIN — SODIUM CHLORIDE, SODIUM LACTATE, POTASSIUM CHLORIDE, AND CALCIUM CHLORIDE 1497 ML: .6; .31; .03; .02 INJECTION, SOLUTION INTRAVENOUS at 12:10

## 2021-10-08 RX ADMIN — CEFTRIAXONE 1 G: 1 INJECTION, SOLUTION INTRAVENOUS at 01:10

## 2021-10-14 ENCOUNTER — HOSPITAL ENCOUNTER (OUTPATIENT)
Dept: RADIOLOGY | Facility: HOSPITAL | Age: 26
Discharge: HOME | End: 2021-10-14
Attending: STUDENT IN AN ORGANIZED HEALTH CARE EDUCATION/TRAINING PROGRAM
Payer: MEDICAID

## 2021-10-14 DIAGNOSIS — K68.19 RETROPERITONEAL ABSCESS: ICD-10-CM

## 2021-10-14 LAB
CREAT SERPL-MCNC: 0.4 MG/DL (ref 0.5–1.4)
SAMPLE: ABNORMAL

## 2021-10-14 PROCEDURE — 71260 CT THORAX DX C+: CPT | Mod: TC,PO

## 2021-10-14 PROCEDURE — 82565 ASSAY OF CREATININE: CPT | Mod: PO

## 2021-10-14 PROCEDURE — 25500020 PHARM REV CODE 255: Mod: PO | Performed by: STUDENT IN AN ORGANIZED HEALTH CARE EDUCATION/TRAINING PROGRAM

## 2021-10-14 RX ADMIN — IOHEXOL 100 ML: 350 INJECTION, SOLUTION INTRAVENOUS at 03:10

## 2021-10-25 ENCOUNTER — OFFICE VISIT (OUTPATIENT)
Dept: UROLOGY | Facility: CLINIC | Age: 26
End: 2021-10-25
Payer: MEDICAID

## 2021-10-25 DIAGNOSIS — N28.9 NON-FUNCTIONING KIDNEY: Primary | ICD-10-CM

## 2021-10-25 PROCEDURE — 87086 URINE CULTURE/COLONY COUNT: CPT | Performed by: STUDENT IN AN ORGANIZED HEALTH CARE EDUCATION/TRAINING PROGRAM

## 2021-10-25 PROCEDURE — 99999 PR PBB SHADOW E&M-EST. PATIENT-LVL I: CPT | Mod: PBBFAC,,,

## 2021-10-25 PROCEDURE — 87088 URINE BACTERIA CULTURE: CPT | Performed by: STUDENT IN AN ORGANIZED HEALTH CARE EDUCATION/TRAINING PROGRAM

## 2021-10-25 PROCEDURE — 99499 UNLISTED E&M SERVICE: CPT | Mod: S$PBB,,, | Performed by: UROLOGY

## 2021-10-25 PROCEDURE — 87186 SC STD MICRODIL/AGAR DIL: CPT | Performed by: STUDENT IN AN ORGANIZED HEALTH CARE EDUCATION/TRAINING PROGRAM

## 2021-10-25 PROCEDURE — 99211 OFF/OP EST MAY X REQ PHY/QHP: CPT | Mod: PBBFAC

## 2021-10-25 PROCEDURE — 99999 PR PBB SHADOW E&M-EST. PATIENT-LVL I: ICD-10-PCS | Mod: PBBFAC,,,

## 2021-10-25 PROCEDURE — 99499 NO LOS: ICD-10-PCS | Mod: S$PBB,,, | Performed by: UROLOGY

## 2021-10-25 PROCEDURE — 87077 CULTURE AEROBIC IDENTIFY: CPT | Performed by: STUDENT IN AN ORGANIZED HEALTH CARE EDUCATION/TRAINING PROGRAM

## 2021-10-25 RX ORDER — LIDOCAINE HYDROCHLORIDE 10 MG/ML
1 INJECTION, SOLUTION EPIDURAL; INFILTRATION; INTRACAUDAL; PERINEURAL ONCE
Status: CANCELLED | OUTPATIENT
Start: 2021-10-25 | End: 2021-10-25

## 2021-10-25 RX ORDER — HEPARIN SODIUM 5000 [USP'U]/ML
5000 INJECTION, SOLUTION INTRAVENOUS; SUBCUTANEOUS EVERY 8 HOURS
Status: CANCELLED | OUTPATIENT
Start: 2021-10-25

## 2021-10-27 ENCOUNTER — TELEPHONE (OUTPATIENT)
Dept: PREADMISSION TESTING | Facility: HOSPITAL | Age: 26
End: 2021-10-27
Payer: MEDICAID

## 2021-10-27 LAB — BACTERIA UR CULT: ABNORMAL

## 2021-10-29 ENCOUNTER — ANESTHESIA EVENT (OUTPATIENT)
Dept: SURGERY | Facility: HOSPITAL | Age: 26
DRG: 661 | End: 2021-10-29
Payer: MEDICAID

## 2021-11-02 ENCOUNTER — LAB VISIT (OUTPATIENT)
Dept: LAB | Facility: HOSPITAL | Age: 26
End: 2021-11-02
Attending: ANESTHESIOLOGY
Payer: MEDICAID

## 2021-11-02 DIAGNOSIS — Z01.818 PREOPERATIVE TESTING: ICD-10-CM

## 2021-11-02 LAB
ABO + RH BLD: NORMAL
BLD GP AB SCN CELLS X3 SERPL QL: NORMAL

## 2021-11-02 PROCEDURE — 86900 BLOOD TYPING SEROLOGIC ABO: CPT | Performed by: ANESTHESIOLOGY

## 2021-11-02 PROCEDURE — 36415 COLL VENOUS BLD VENIPUNCTURE: CPT | Performed by: ANESTHESIOLOGY

## 2021-11-03 ENCOUNTER — OFFICE VISIT (OUTPATIENT)
Dept: INTERNAL MEDICINE | Facility: CLINIC | Age: 26
DRG: 661 | End: 2021-11-03
Payer: MEDICAID

## 2021-11-03 VITALS
DIASTOLIC BLOOD PRESSURE: 79 MMHG | OXYGEN SATURATION: 100 % | RESPIRATION RATE: 18 BRPM | HEART RATE: 79 BPM | TEMPERATURE: 98 F | SYSTOLIC BLOOD PRESSURE: 133 MMHG

## 2021-11-03 DIAGNOSIS — N31.9 NEUROGENIC BLADDER: ICD-10-CM

## 2021-11-03 DIAGNOSIS — M54.50 CHRONIC LOW BACK PAIN, UNSPECIFIED BACK PAIN LATERALITY, UNSPECIFIED WHETHER SCIATICA PRESENT: ICD-10-CM

## 2021-11-03 DIAGNOSIS — G91.9 HYDROCEPHALUS, UNSPECIFIED TYPE: ICD-10-CM

## 2021-11-03 DIAGNOSIS — A41.9 SEPSIS, DUE TO UNSPECIFIED ORGANISM, UNSPECIFIED WHETHER ACUTE ORGAN DYSFUNCTION PRESENT: ICD-10-CM

## 2021-11-03 DIAGNOSIS — G40.309 NONINTRACTABLE GENERALIZED IDIOPATHIC EPILEPSY WITHOUT STATUS EPILEPTICUS: ICD-10-CM

## 2021-11-03 DIAGNOSIS — G89.29 CHRONIC LOW BACK PAIN, UNSPECIFIED BACK PAIN LATERALITY, UNSPECIFIED WHETHER SCIATICA PRESENT: ICD-10-CM

## 2021-11-03 DIAGNOSIS — M86.171 ACUTE OSTEOMYELITIS OF RIGHT FOOT: ICD-10-CM

## 2021-11-03 DIAGNOSIS — D68.9 COAGULOPATHY: ICD-10-CM

## 2021-11-03 DIAGNOSIS — K59.2 NEUROGENIC BOWEL: ICD-10-CM

## 2021-11-03 DIAGNOSIS — D64.9 ANEMIA, UNSPECIFIED TYPE: ICD-10-CM

## 2021-11-03 DIAGNOSIS — M41.9 SCOLIOSIS, UNSPECIFIED SCOLIOSIS TYPE, UNSPECIFIED SPINAL REGION: ICD-10-CM

## 2021-11-03 DIAGNOSIS — Q05.2 SPINA BIFIDA OF LUMBAR REGION WITH HYDROCEPHALUS: ICD-10-CM

## 2021-11-03 PROCEDURE — 99214 OFFICE O/P EST MOD 30 MIN: CPT | Mod: S$PBB,,, | Performed by: NURSE PRACTITIONER

## 2021-11-03 PROCEDURE — 99214 PR OFFICE/OUTPT VISIT, EST, LEVL IV, 30-39 MIN: ICD-10-PCS | Mod: S$PBB,,, | Performed by: NURSE PRACTITIONER

## 2021-11-03 PROCEDURE — 99999 PR PBB SHADOW E&M-EST. PATIENT-LVL III: CPT | Mod: PBBFAC,,, | Performed by: NURSE PRACTITIONER

## 2021-11-03 PROCEDURE — 99213 OFFICE O/P EST LOW 20 MIN: CPT | Mod: PBBFAC | Performed by: NURSE PRACTITIONER

## 2021-11-03 PROCEDURE — 99999 PR PBB SHADOW E&M-EST. PATIENT-LVL III: ICD-10-PCS | Mod: PBBFAC,,, | Performed by: NURSE PRACTITIONER

## 2021-11-04 ENCOUNTER — HOSPITAL ENCOUNTER (INPATIENT)
Facility: HOSPITAL | Age: 26
LOS: 4 days | Discharge: HOME | DRG: 661 | End: 2021-11-08
Attending: UROLOGY | Admitting: UROLOGY
Payer: MEDICAID

## 2021-11-04 ENCOUNTER — ANESTHESIA (OUTPATIENT)
Dept: SURGERY | Facility: HOSPITAL | Age: 26
DRG: 661 | End: 2021-11-04
Payer: MEDICAID

## 2021-11-04 DIAGNOSIS — Z01.818 PREOPERATIVE TESTING: ICD-10-CM

## 2021-11-04 DIAGNOSIS — N28.9 NON-FUNCTIONING KIDNEY: Primary | ICD-10-CM

## 2021-11-04 DIAGNOSIS — R00.0 SINUS TACHYCARDIA: ICD-10-CM

## 2021-11-04 LAB
ALBUMIN SERPL BCP-MCNC: 3.8 G/DL (ref 3.5–5.2)
ALP SERPL-CCNC: 42 U/L (ref 55–135)
ALT SERPL W/O P-5'-P-CCNC: 31 U/L (ref 10–44)
ANION GAP SERPL CALC-SCNC: 11 MMOL/L (ref 8–16)
ANION GAP SERPL CALC-SCNC: 11 MMOL/L (ref 8–16)
AST SERPL-CCNC: 38 U/L (ref 10–40)
B-HCG UR QL: NEGATIVE
BASOPHILS # BLD AUTO: 0.01 K/UL (ref 0–0.2)
BASOPHILS NFR BLD: 0.1 % (ref 0–1.9)
BILIRUB SERPL-MCNC: 0.1 MG/DL (ref 0.1–1)
BUN SERPL-MCNC: 19 MG/DL (ref 6–20)
BUN SERPL-MCNC: 19 MG/DL (ref 6–20)
CALCIUM SERPL-MCNC: 6.7 MG/DL (ref 8.7–10.5)
CALCIUM SERPL-MCNC: 6.7 MG/DL (ref 8.7–10.5)
CHLORIDE SERPL-SCNC: 112 MMOL/L (ref 95–110)
CHLORIDE SERPL-SCNC: 112 MMOL/L (ref 95–110)
CO2 SERPL-SCNC: 15 MMOL/L (ref 23–29)
CO2 SERPL-SCNC: 15 MMOL/L (ref 23–29)
CREAT SERPL-MCNC: 0.6 MG/DL (ref 0.5–1.4)
CREAT SERPL-MCNC: 0.6 MG/DL (ref 0.5–1.4)
CTP QC/QA: YES
DIFFERENTIAL METHOD: ABNORMAL
EOSINOPHIL # BLD AUTO: 0 K/UL (ref 0–0.5)
EOSINOPHIL NFR BLD: 0.3 % (ref 0–8)
ERYTHROCYTE [DISTWIDTH] IN BLOOD BY AUTOMATED COUNT: 19.1 % (ref 11.5–14.5)
EST. GFR  (AFRICAN AMERICAN): >60 ML/MIN/1.73 M^2
EST. GFR  (AFRICAN AMERICAN): >60 ML/MIN/1.73 M^2
EST. GFR  (NON AFRICAN AMERICAN): >60 ML/MIN/1.73 M^2
EST. GFR  (NON AFRICAN AMERICAN): >60 ML/MIN/1.73 M^2
GLUCOSE SERPL-MCNC: 131 MG/DL (ref 70–110)
GLUCOSE SERPL-MCNC: 131 MG/DL (ref 70–110)
HCT VFR BLD AUTO: 36.6 % (ref 37–48.5)
HGB BLD-MCNC: 10.9 G/DL (ref 12–16)
IMM GRANULOCYTES # BLD AUTO: 0.04 K/UL (ref 0–0.04)
IMM GRANULOCYTES NFR BLD AUTO: 0.6 % (ref 0–0.5)
LYMPHOCYTES # BLD AUTO: 0.6 K/UL (ref 1–4.8)
LYMPHOCYTES NFR BLD: 8.2 % (ref 18–48)
MAGNESIUM SERPL-MCNC: 2.1 MG/DL (ref 1.6–2.6)
MCH RBC QN AUTO: 23.9 PG (ref 27–31)
MCHC RBC AUTO-ENTMCNC: 29.8 G/DL (ref 32–36)
MCV RBC AUTO: 80 FL (ref 82–98)
MONOCYTES # BLD AUTO: 0.3 K/UL (ref 0.3–1)
MONOCYTES NFR BLD: 3.9 % (ref 4–15)
NEUTROPHILS # BLD AUTO: 5.8 K/UL (ref 1.8–7.7)
NEUTROPHILS NFR BLD: 86.9 % (ref 38–73)
NRBC BLD-RTO: 0 /100 WBC
PHOSPHATE SERPL-MCNC: 3.7 MG/DL (ref 2.7–4.5)
PLATELET # BLD AUTO: 160 K/UL (ref 150–450)
PLATELET BLD QL SMEAR: ABNORMAL
PMV BLD AUTO: ABNORMAL FL (ref 9.2–12.9)
POTASSIUM SERPL-SCNC: 4.1 MMOL/L (ref 3.5–5.1)
POTASSIUM SERPL-SCNC: 4.1 MMOL/L (ref 3.5–5.1)
PROT SERPL-MCNC: 6.1 G/DL (ref 6–8.4)
RBC # BLD AUTO: 4.56 M/UL (ref 4–5.4)
SODIUM SERPL-SCNC: 138 MMOL/L (ref 136–145)
SODIUM SERPL-SCNC: 138 MMOL/L (ref 136–145)
WBC # BLD AUTO: 6.71 K/UL (ref 3.9–12.7)

## 2021-11-04 PROCEDURE — 86920 COMPATIBILITY TEST SPIN: CPT | Performed by: ANESTHESIOLOGY

## 2021-11-04 PROCEDURE — 37000008 HC ANESTHESIA 1ST 15 MINUTES: Performed by: UROLOGY

## 2021-11-04 PROCEDURE — 88307 TISSUE EXAM BY PATHOLOGIST: CPT | Mod: 26,,, | Performed by: STUDENT IN AN ORGANIZED HEALTH CARE EDUCATION/TRAINING PROGRAM

## 2021-11-04 PROCEDURE — 63600175 PHARM REV CODE 636 W HCPCS: Performed by: STUDENT IN AN ORGANIZED HEALTH CARE EDUCATION/TRAINING PROGRAM

## 2021-11-04 PROCEDURE — 85025 COMPLETE CBC W/AUTO DIFF WBC: CPT | Performed by: STUDENT IN AN ORGANIZED HEALTH CARE EDUCATION/TRAINING PROGRAM

## 2021-11-04 PROCEDURE — 88307 TISSUE EXAM BY PATHOLOGIST: CPT | Performed by: STUDENT IN AN ORGANIZED HEALTH CARE EDUCATION/TRAINING PROGRAM

## 2021-11-04 PROCEDURE — P9045 ALBUMIN (HUMAN), 5%, 250 ML: HCPCS | Mod: JG | Performed by: NURSE ANESTHETIST, CERTIFIED REGISTERED

## 2021-11-04 PROCEDURE — 25000003 PHARM REV CODE 250: Performed by: STUDENT IN AN ORGANIZED HEALTH CARE EDUCATION/TRAINING PROGRAM

## 2021-11-04 PROCEDURE — 83735 ASSAY OF MAGNESIUM: CPT | Performed by: STUDENT IN AN ORGANIZED HEALTH CARE EDUCATION/TRAINING PROGRAM

## 2021-11-04 PROCEDURE — D9220A PRA ANESTHESIA: Mod: ,,, | Performed by: ANESTHESIOLOGY

## 2021-11-04 PROCEDURE — 27201423 OPTIME MED/SURG SUP & DEVICES STERILE SUPPLY: Performed by: UROLOGY

## 2021-11-04 PROCEDURE — D9220A PRA ANESTHESIA: ICD-10-PCS | Mod: ,,, | Performed by: ANESTHESIOLOGY

## 2021-11-04 PROCEDURE — 71000015 HC POSTOP RECOV 1ST HR: Performed by: UROLOGY

## 2021-11-04 PROCEDURE — 37000009 HC ANESTHESIA EA ADD 15 MINS: Performed by: UROLOGY

## 2021-11-04 PROCEDURE — 94761 N-INVAS EAR/PLS OXIMETRY MLT: CPT

## 2021-11-04 PROCEDURE — 84100 ASSAY OF PHOSPHORUS: CPT | Performed by: STUDENT IN AN ORGANIZED HEALTH CARE EDUCATION/TRAINING PROGRAM

## 2021-11-04 PROCEDURE — 71000033 HC RECOVERY, INTIAL HOUR: Performed by: UROLOGY

## 2021-11-04 PROCEDURE — C9290 INJ, BUPIVACAINE LIPOSOME: HCPCS | Performed by: UROLOGY

## 2021-11-04 PROCEDURE — 25000003 PHARM REV CODE 250: Performed by: NURSE ANESTHETIST, CERTIFIED REGISTERED

## 2021-11-04 PROCEDURE — 63600175 PHARM REV CODE 636 W HCPCS: Performed by: NURSE ANESTHETIST, CERTIFIED REGISTERED

## 2021-11-04 PROCEDURE — 36000708 HC OR TIME LEV III 1ST 15 MIN: Performed by: UROLOGY

## 2021-11-04 PROCEDURE — 63600175 PHARM REV CODE 636 W HCPCS

## 2021-11-04 PROCEDURE — 25000242 PHARM REV CODE 250 ALT 637 W/ HCPCS: Performed by: STUDENT IN AN ORGANIZED HEALTH CARE EDUCATION/TRAINING PROGRAM

## 2021-11-04 PROCEDURE — 36000709 HC OR TIME LEV III EA ADD 15 MIN: Performed by: UROLOGY

## 2021-11-04 PROCEDURE — 81025 URINE PREGNANCY TEST: CPT | Performed by: UROLOGY

## 2021-11-04 PROCEDURE — 80053 COMPREHEN METABOLIC PANEL: CPT | Performed by: STUDENT IN AN ORGANIZED HEALTH CARE EDUCATION/TRAINING PROGRAM

## 2021-11-04 PROCEDURE — 50220 REMOVE KIDNEY OPEN: CPT | Mod: 22,RT,, | Performed by: UROLOGY

## 2021-11-04 PROCEDURE — 71000016 HC POSTOP RECOV ADDL HR: Performed by: UROLOGY

## 2021-11-04 PROCEDURE — 20600001 HC STEP DOWN PRIVATE ROOM

## 2021-11-04 PROCEDURE — C1729 CATH, DRAINAGE: HCPCS | Performed by: UROLOGY

## 2021-11-04 PROCEDURE — 50220 PR REMV KIDNEY,W/RIB RESECTION: ICD-10-PCS | Mod: 22,RT,, | Performed by: UROLOGY

## 2021-11-04 PROCEDURE — 88307 PR  SURG PATH,LEVEL V: ICD-10-PCS | Mod: 26,,, | Performed by: STUDENT IN AN ORGANIZED HEALTH CARE EDUCATION/TRAINING PROGRAM

## 2021-11-04 PROCEDURE — 63600175 PHARM REV CODE 636 W HCPCS: Performed by: UROLOGY

## 2021-11-04 RX ORDER — HYDROMORPHONE HYDROCHLORIDE 2 MG/ML
INJECTION, SOLUTION INTRAMUSCULAR; INTRAVENOUS; SUBCUTANEOUS
Status: DISCONTINUED | OUTPATIENT
Start: 2021-11-04 | End: 2021-11-04

## 2021-11-04 RX ORDER — HYDROMORPHONE HYDROCHLORIDE 1 MG/ML
0.2 INJECTION, SOLUTION INTRAMUSCULAR; INTRAVENOUS; SUBCUTANEOUS EVERY 5 MIN PRN
Status: DISCONTINUED | OUTPATIENT
Start: 2021-11-04 | End: 2021-11-04 | Stop reason: HOSPADM

## 2021-11-04 RX ORDER — HYDROMORPHONE HYDROCHLORIDE 1 MG/ML
INJECTION, SOLUTION INTRAMUSCULAR; INTRAVENOUS; SUBCUTANEOUS
Status: COMPLETED
Start: 2021-11-04 | End: 2021-11-04

## 2021-11-04 RX ORDER — FAMOTIDINE 20 MG/1
20 TABLET, FILM COATED ORAL 2 TIMES DAILY
Status: DISCONTINUED | OUTPATIENT
Start: 2021-11-04 | End: 2021-11-08 | Stop reason: HOSPADM

## 2021-11-04 RX ORDER — DIPHENHYDRAMINE HYDROCHLORIDE 50 MG/ML
25 INJECTION INTRAMUSCULAR; INTRAVENOUS EVERY 6 HOURS PRN
Status: DISCONTINUED | OUTPATIENT
Start: 2021-11-04 | End: 2021-11-04 | Stop reason: HOSPADM

## 2021-11-04 RX ORDER — CEFAZOLIN SODIUM 1 G/3ML
INJECTION, POWDER, FOR SOLUTION INTRAMUSCULAR; INTRAVENOUS
Status: DISCONTINUED | OUTPATIENT
Start: 2021-11-04 | End: 2021-11-04

## 2021-11-04 RX ORDER — SODIUM CHLORIDE 0.9 % (FLUSH) 0.9 %
10 SYRINGE (ML) INJECTION
Status: DISCONTINUED | OUTPATIENT
Start: 2021-11-04 | End: 2021-11-08 | Stop reason: HOSPADM

## 2021-11-04 RX ORDER — HEPARIN SODIUM 5000 [USP'U]/ML
5000 INJECTION, SOLUTION INTRAVENOUS; SUBCUTANEOUS EVERY 8 HOURS
Status: DISCONTINUED | OUTPATIENT
Start: 2021-11-04 | End: 2021-11-04

## 2021-11-04 RX ORDER — EPHEDRINE SULFATE 50 MG/ML
INJECTION, SOLUTION INTRAVENOUS
Status: DISCONTINUED | OUTPATIENT
Start: 2021-11-04 | End: 2021-11-04

## 2021-11-04 RX ORDER — HYDROCODONE BITARTRATE AND ACETAMINOPHEN 500; 5 MG/1; MG/1
TABLET ORAL
Status: DISCONTINUED | OUTPATIENT
Start: 2021-11-04 | End: 2021-11-04

## 2021-11-04 RX ORDER — ALBUTEROL SULFATE 90 UG/1
AEROSOL, METERED RESPIRATORY (INHALATION)
Status: DISCONTINUED | OUTPATIENT
Start: 2021-11-04 | End: 2021-11-04

## 2021-11-04 RX ORDER — ALBUMIN HUMAN 50 G/1000ML
SOLUTION INTRAVENOUS CONTINUOUS PRN
Status: DISCONTINUED | OUTPATIENT
Start: 2021-11-04 | End: 2021-11-04

## 2021-11-04 RX ORDER — HYDROCODONE BITARTRATE AND ACETAMINOPHEN 500; 5 MG/1; MG/1
TABLET ORAL
Status: DISCONTINUED | OUTPATIENT
Start: 2021-11-04 | End: 2021-11-08 | Stop reason: HOSPADM

## 2021-11-04 RX ORDER — HEPARIN SODIUM 5000 [USP'U]/ML
5000 INJECTION, SOLUTION INTRAVENOUS; SUBCUTANEOUS EVERY 12 HOURS
Status: DISCONTINUED | OUTPATIENT
Start: 2021-11-04 | End: 2021-11-08 | Stop reason: HOSPADM

## 2021-11-04 RX ORDER — KETOROLAC TROMETHAMINE 30 MG/ML
15 INJECTION, SOLUTION INTRAMUSCULAR; INTRAVENOUS EVERY 6 HOURS
Status: COMPLETED | OUTPATIENT
Start: 2021-11-04 | End: 2021-11-07

## 2021-11-04 RX ORDER — DIPHENHYDRAMINE HCL 25 MG
25 CAPSULE ORAL EVERY 12 HOURS PRN
Status: DISCONTINUED | OUTPATIENT
Start: 2021-11-04 | End: 2021-11-04

## 2021-11-04 RX ORDER — NEOSTIGMINE METHYLSULFATE 0.5 MG/ML
INJECTION, SOLUTION INTRAVENOUS
Status: DISCONTINUED | OUTPATIENT
Start: 2021-11-04 | End: 2021-11-04

## 2021-11-04 RX ORDER — HALOPERIDOL 5 MG/ML
0.5 INJECTION INTRAMUSCULAR EVERY 10 MIN PRN
Status: DISCONTINUED | OUTPATIENT
Start: 2021-11-04 | End: 2021-11-04 | Stop reason: HOSPADM

## 2021-11-04 RX ORDER — VASOPRESSIN 20 [USP'U]/ML
INJECTION, SOLUTION INTRAMUSCULAR; SUBCUTANEOUS
Status: DISCONTINUED | OUTPATIENT
Start: 2021-11-04 | End: 2021-11-04

## 2021-11-04 RX ORDER — TALC
6 POWDER (GRAM) TOPICAL NIGHTLY PRN
Status: DISCONTINUED | OUTPATIENT
Start: 2021-11-04 | End: 2021-11-08 | Stop reason: HOSPADM

## 2021-11-04 RX ORDER — METHOCARBAMOL 750 MG/1
750 TABLET, FILM COATED ORAL EVERY 6 HOURS PRN
Status: DISCONTINUED | OUTPATIENT
Start: 2021-11-04 | End: 2021-11-08 | Stop reason: HOSPADM

## 2021-11-04 RX ORDER — OXYCODONE HYDROCHLORIDE 5 MG/1
5 TABLET ORAL EVERY 4 HOURS PRN
Status: DISCONTINUED | OUTPATIENT
Start: 2021-11-04 | End: 2021-11-08 | Stop reason: HOSPADM

## 2021-11-04 RX ORDER — SODIUM CHLORIDE, SODIUM LACTATE, POTASSIUM CHLORIDE, CALCIUM CHLORIDE 600; 310; 30; 20 MG/100ML; MG/100ML; MG/100ML; MG/100ML
INJECTION, SOLUTION INTRAVENOUS CONTINUOUS
Status: DISCONTINUED | OUTPATIENT
Start: 2021-11-04 | End: 2021-11-06

## 2021-11-04 RX ORDER — FENTANYL CITRATE 50 UG/ML
25 INJECTION, SOLUTION INTRAMUSCULAR; INTRAVENOUS EVERY 5 MIN PRN
Status: DISCONTINUED | OUTPATIENT
Start: 2021-11-04 | End: 2021-11-04 | Stop reason: HOSPADM

## 2021-11-04 RX ORDER — ACETAMINOPHEN 325 MG/1
650 TABLET ORAL EVERY 6 HOURS
Status: DISPENSED | OUTPATIENT
Start: 2021-11-04 | End: 2021-11-06

## 2021-11-04 RX ORDER — OXYCODONE HYDROCHLORIDE 10 MG/1
10 TABLET ORAL EVERY 4 HOURS PRN
Status: DISCONTINUED | OUTPATIENT
Start: 2021-11-04 | End: 2021-11-08 | Stop reason: HOSPADM

## 2021-11-04 RX ORDER — KETAMINE HYDROCHLORIDE 100 MG/ML
INJECTION, SOLUTION INTRAMUSCULAR; INTRAVENOUS
Status: DISCONTINUED | OUTPATIENT
Start: 2021-11-04 | End: 2021-11-04

## 2021-11-04 RX ORDER — PROMETHAZINE HYDROCHLORIDE 25 MG/1
25 SUPPOSITORY RECTAL EVERY 6 HOURS PRN
Status: DISCONTINUED | OUTPATIENT
Start: 2021-11-04 | End: 2021-11-08 | Stop reason: HOSPADM

## 2021-11-04 RX ORDER — PHENYLEPHRINE HYDROCHLORIDE 10 MG/ML
INJECTION INTRAVENOUS
Status: DISCONTINUED | OUTPATIENT
Start: 2021-11-04 | End: 2021-11-04

## 2021-11-04 RX ORDER — DEXMEDETOMIDINE HYDROCHLORIDE 100 UG/ML
INJECTION, SOLUTION INTRAVENOUS
Status: DISCONTINUED | OUTPATIENT
Start: 2021-11-04 | End: 2021-11-04

## 2021-11-04 RX ORDER — FENTANYL CITRATE 50 UG/ML
INJECTION, SOLUTION INTRAMUSCULAR; INTRAVENOUS
Status: DISCONTINUED | OUTPATIENT
Start: 2021-11-04 | End: 2021-11-04

## 2021-11-04 RX ORDER — LIDOCAINE HYDROCHLORIDE 20 MG/ML
INJECTION INTRAVENOUS
Status: DISCONTINUED | OUTPATIENT
Start: 2021-11-04 | End: 2021-11-04

## 2021-11-04 RX ORDER — DOXYCYCLINE HYCLATE 100 MG/1
500 TABLET, DELAYED RELEASE ORAL DAILY
COMMUNITY
End: 2022-01-20

## 2021-11-04 RX ORDER — PROPOFOL 10 MG/ML
VIAL (ML) INTRAVENOUS
Status: DISCONTINUED | OUTPATIENT
Start: 2021-11-04 | End: 2021-11-04

## 2021-11-04 RX ORDER — MIDAZOLAM HYDROCHLORIDE 1 MG/ML
INJECTION, SOLUTION INTRAMUSCULAR; INTRAVENOUS
Status: DISCONTINUED | OUTPATIENT
Start: 2021-11-04 | End: 2021-11-04

## 2021-11-04 RX ORDER — DEXAMETHASONE SODIUM PHOSPHATE 4 MG/ML
INJECTION, SOLUTION INTRA-ARTICULAR; INTRALESIONAL; INTRAMUSCULAR; INTRAVENOUS; SOFT TISSUE
Status: DISCONTINUED | OUTPATIENT
Start: 2021-11-04 | End: 2021-11-04

## 2021-11-04 RX ORDER — ROCURONIUM BROMIDE 10 MG/ML
INJECTION, SOLUTION INTRAVENOUS
Status: DISCONTINUED | OUTPATIENT
Start: 2021-11-04 | End: 2021-11-04

## 2021-11-04 RX ORDER — ACETAMINOPHEN 10 MG/ML
INJECTION, SOLUTION INTRAVENOUS
Status: DISCONTINUED | OUTPATIENT
Start: 2021-11-04 | End: 2021-11-04

## 2021-11-04 RX ORDER — LIDOCAINE HYDROCHLORIDE 10 MG/ML
1 INJECTION, SOLUTION EPIDURAL; INFILTRATION; INTRACAUDAL; PERINEURAL ONCE
Status: DISCONTINUED | OUTPATIENT
Start: 2021-11-04 | End: 2021-11-04

## 2021-11-04 RX ADMIN — MIDAZOLAM HYDROCHLORIDE 2 MG: 1 INJECTION, SOLUTION INTRAMUSCULAR; INTRAVENOUS at 11:11

## 2021-11-04 RX ADMIN — PHENYLEPHRINE HYDROCHLORIDE 200 MCG: 10 INJECTION INTRAVENOUS at 04:11

## 2021-11-04 RX ADMIN — CEFAZOLIN 2 G: 330 INJECTION, POWDER, FOR SOLUTION INTRAMUSCULAR; INTRAVENOUS at 04:11

## 2021-11-04 RX ADMIN — PHENYLEPHRINE HYDROCHLORIDE 100 MCG: 10 INJECTION INTRAVENOUS at 12:11

## 2021-11-04 RX ADMIN — NEOSTIGMINE METHYLSULFATE 4 MG: 0.5 INJECTION INTRAVENOUS at 05:11

## 2021-11-04 RX ADMIN — OXYCODONE HYDROCHLORIDE 10 MG: 10 TABLET ORAL at 06:11

## 2021-11-04 RX ADMIN — ACETAMINOPHEN 650 MG: 325 TABLET ORAL at 06:11

## 2021-11-04 RX ADMIN — ALBUTEROL SULFATE 8 PUFF: 108 INHALANT RESPIRATORY (INHALATION) at 02:11

## 2021-11-04 RX ADMIN — KETOROLAC TROMETHAMINE 15 MG: 30 INJECTION, SOLUTION INTRAMUSCULAR; INTRAVENOUS at 11:11

## 2021-11-04 RX ADMIN — CALCIUM GLUCONATE 1 G: 94 INJECTION, SOLUTION INTRAVENOUS at 08:11

## 2021-11-04 RX ADMIN — ALBUMIN (HUMAN): 12.5 SOLUTION INTRAVENOUS at 05:11

## 2021-11-04 RX ADMIN — PHENYLEPHRINE HYDROCHLORIDE 100 MCG: 10 INJECTION INTRAVENOUS at 02:11

## 2021-11-04 RX ADMIN — HYDROMORPHONE HYDROCHLORIDE 0.2 MG: 2 INJECTION INTRAMUSCULAR; INTRAVENOUS; SUBCUTANEOUS at 05:11

## 2021-11-04 RX ADMIN — EPHEDRINE SULFATE 10 MG: 50 INJECTION INTRAVENOUS at 04:11

## 2021-11-04 RX ADMIN — SODIUM CHLORIDE: 0.9 INJECTION, SOLUTION INTRAVENOUS at 12:11

## 2021-11-04 RX ADMIN — DEXMEDETOMIDINE HYDROCHLORIDE 12 MCG: 100 INJECTION, SOLUTION INTRAVENOUS at 04:11

## 2021-11-04 RX ADMIN — PHENYLEPHRINE HYDROCHLORIDE 300 MCG: 10 INJECTION INTRAVENOUS at 12:11

## 2021-11-04 RX ADMIN — VASOPRESSIN 2 UNITS: 20 INJECTION, SOLUTION INTRAMUSCULAR; SUBCUTANEOUS at 03:11

## 2021-11-04 RX ADMIN — KETAMINE HYDROCHLORIDE 10 MG: 100 INJECTION, SOLUTION, CONCENTRATE INTRAMUSCULAR; INTRAVENOUS at 03:11

## 2021-11-04 RX ADMIN — EPHEDRINE SULFATE 15 MG: 50 INJECTION INTRAVENOUS at 04:11

## 2021-11-04 RX ADMIN — GLYCOPYRROLATE 0.4 MG: 0.2 INJECTION, SOLUTION INTRAMUSCULAR; INTRAVITREAL at 05:11

## 2021-11-04 RX ADMIN — PHENYLEPHRINE HYDROCHLORIDE 200 MCG: 10 INJECTION INTRAVENOUS at 02:11

## 2021-11-04 RX ADMIN — PHENYLEPHRINE HYDROCHLORIDE 100 MCG: 10 INJECTION INTRAVENOUS at 01:11

## 2021-11-04 RX ADMIN — CALCIUM GLUCONATE 1 G: 98 INJECTION, SOLUTION INTRAVENOUS at 11:11

## 2021-11-04 RX ADMIN — ACETAMINOPHEN 1000 MG: 10 INJECTION INTRAVENOUS at 01:11

## 2021-11-04 RX ADMIN — KETOROLAC TROMETHAMINE 15 MG: 30 INJECTION, SOLUTION INTRAMUSCULAR; INTRAVENOUS at 06:11

## 2021-11-04 RX ADMIN — PROPOFOL 100 MG: 10 INJECTION, EMULSION INTRAVENOUS at 12:11

## 2021-11-04 RX ADMIN — HYDROMORPHONE HYDROCHLORIDE 0.2 MG: 1 INJECTION, SOLUTION INTRAMUSCULAR; INTRAVENOUS; SUBCUTANEOUS at 06:11

## 2021-11-04 RX ADMIN — PHENYLEPHRINE HYDROCHLORIDE 100 MCG: 10 INJECTION INTRAVENOUS at 03:11

## 2021-11-04 RX ADMIN — SUGAMMADEX 200 MG: 100 INJECTION, SOLUTION INTRAVENOUS at 05:11

## 2021-11-04 RX ADMIN — HYDROMORPHONE HYDROCHLORIDE 0.4 MG: 2 INJECTION INTRAMUSCULAR; INTRAVENOUS; SUBCUTANEOUS at 05:11

## 2021-11-04 RX ADMIN — ESMOLOL HYDROCHLORIDE 30 MG: 10 INJECTION INTRAVENOUS at 12:11

## 2021-11-04 RX ADMIN — FENTANYL CITRATE 50 MCG: 50 INJECTION, SOLUTION INTRAMUSCULAR; INTRAVENOUS at 01:11

## 2021-11-04 RX ADMIN — VASOPRESSIN 1 UNITS: 20 INJECTION, SOLUTION INTRAMUSCULAR; SUBCUTANEOUS at 03:11

## 2021-11-04 RX ADMIN — SODIUM CHLORIDE, SODIUM LACTATE, POTASSIUM CHLORIDE, AND CALCIUM CHLORIDE: .6; .31; .03; .02 INJECTION, SOLUTION INTRAVENOUS at 06:11

## 2021-11-04 RX ADMIN — ACETAMINOPHEN 650 MG: 325 TABLET ORAL at 11:11

## 2021-11-04 RX ADMIN — ROCURONIUM BROMIDE 10 MG: 10 INJECTION, SOLUTION INTRAVENOUS at 01:11

## 2021-11-04 RX ADMIN — ESMOLOL HYDROCHLORIDE 20 MG: 10 INJECTION INTRAVENOUS at 01:11

## 2021-11-04 RX ADMIN — FENTANYL CITRATE 50 MCG: 50 INJECTION, SOLUTION INTRAMUSCULAR; INTRAVENOUS at 04:11

## 2021-11-04 RX ADMIN — HEPARIN SODIUM 5000 UNITS: 5000 INJECTION INTRAVENOUS; SUBCUTANEOUS at 09:11

## 2021-11-04 RX ADMIN — ROCURONIUM BROMIDE 20 MG: 10 INJECTION, SOLUTION INTRAVENOUS at 12:11

## 2021-11-04 RX ADMIN — KETAMINE HYDROCHLORIDE 10 MG: 100 INJECTION, SOLUTION, CONCENTRATE INTRAMUSCULAR; INTRAVENOUS at 01:11

## 2021-11-04 RX ADMIN — CEFAZOLIN 2 G: 330 INJECTION, POWDER, FOR SOLUTION INTRAMUSCULAR; INTRAVENOUS at 12:11

## 2021-11-04 RX ADMIN — ROCURONIUM BROMIDE 50 MG: 10 INJECTION, SOLUTION INTRAVENOUS at 12:11

## 2021-11-04 RX ADMIN — LIDOCAINE HYDROCHLORIDE 80 MG: 20 INJECTION, SOLUTION INTRAVENOUS at 12:11

## 2021-11-04 RX ADMIN — ESMOLOL HYDROCHLORIDE 30 MG: 10 INJECTION INTRAVENOUS at 05:11

## 2021-11-04 RX ADMIN — ERTAPENEM 1 G: 1 INJECTION INTRAMUSCULAR; INTRAVENOUS at 01:11

## 2021-11-04 RX ADMIN — FAMOTIDINE 20 MG: 20 TABLET ORAL at 09:11

## 2021-11-04 RX ADMIN — HYDROMORPHONE HYDROCHLORIDE 0.2 MG: 2 INJECTION INTRAMUSCULAR; INTRAVENOUS; SUBCUTANEOUS at 04:11

## 2021-11-04 RX ADMIN — VASOPRESSIN 1 UNITS: 20 INJECTION, SOLUTION INTRAMUSCULAR; SUBCUTANEOUS at 04:11

## 2021-11-04 RX ADMIN — KETAMINE HYDROCHLORIDE 20 MG: 100 INJECTION, SOLUTION, CONCENTRATE INTRAMUSCULAR; INTRAVENOUS at 12:11

## 2021-11-04 RX ADMIN — ROCURONIUM BROMIDE 10 MG: 10 INJECTION, SOLUTION INTRAVENOUS at 03:11

## 2021-11-04 RX ADMIN — DEXAMETHASONE SODIUM PHOSPHATE 4 MG: 4 INJECTION, SOLUTION INTRAMUSCULAR; INTRAVENOUS at 05:11

## 2021-11-04 RX ADMIN — FENTANYL CITRATE 100 MCG: 50 INJECTION, SOLUTION INTRAMUSCULAR; INTRAVENOUS at 12:11

## 2021-11-05 LAB
ANION GAP SERPL CALC-SCNC: 10 MMOL/L (ref 8–16)
BASOPHILS # BLD AUTO: 0.01 K/UL (ref 0–0.2)
BASOPHILS NFR BLD: 0.1 % (ref 0–1.9)
BUN SERPL-MCNC: 13 MG/DL (ref 6–20)
CALCIUM SERPL-MCNC: 8.5 MG/DL (ref 8.7–10.5)
CHLORIDE SERPL-SCNC: 107 MMOL/L (ref 95–110)
CO2 SERPL-SCNC: 17 MMOL/L (ref 23–29)
CREAT SERPL-MCNC: 0.6 MG/DL (ref 0.5–1.4)
DIFFERENTIAL METHOD: ABNORMAL
EOSINOPHIL # BLD AUTO: 0 K/UL (ref 0–0.5)
EOSINOPHIL NFR BLD: 0 % (ref 0–8)
ERYTHROCYTE [DISTWIDTH] IN BLOOD BY AUTOMATED COUNT: 19.4 % (ref 11.5–14.5)
EST. GFR  (AFRICAN AMERICAN): >60 ML/MIN/1.73 M^2
EST. GFR  (NON AFRICAN AMERICAN): >60 ML/MIN/1.73 M^2
GLUCOSE SERPL-MCNC: 143 MG/DL (ref 70–110)
HCT VFR BLD AUTO: 33.3 % (ref 37–48.5)
HGB BLD-MCNC: 9.8 G/DL (ref 12–16)
IMM GRANULOCYTES # BLD AUTO: 0.03 K/UL (ref 0–0.04)
IMM GRANULOCYTES NFR BLD AUTO: 0.2 % (ref 0–0.5)
LYMPHOCYTES # BLD AUTO: 0.9 K/UL (ref 1–4.8)
LYMPHOCYTES NFR BLD: 7 % (ref 18–48)
MAGNESIUM SERPL-MCNC: 2.1 MG/DL (ref 1.6–2.6)
MCH RBC QN AUTO: 24 PG (ref 27–31)
MCHC RBC AUTO-ENTMCNC: 29.4 G/DL (ref 32–36)
MCV RBC AUTO: 82 FL (ref 82–98)
MONOCYTES # BLD AUTO: 0.8 K/UL (ref 0.3–1)
MONOCYTES NFR BLD: 6 % (ref 4–15)
NEUTROPHILS # BLD AUTO: 11.1 K/UL (ref 1.8–7.7)
NEUTROPHILS NFR BLD: 86.7 % (ref 38–73)
NRBC BLD-RTO: 0 /100 WBC
PHOSPHATE SERPL-MCNC: 3 MG/DL (ref 2.7–4.5)
PLATELET # BLD AUTO: 267 K/UL (ref 150–450)
PMV BLD AUTO: 9.8 FL (ref 9.2–12.9)
POTASSIUM SERPL-SCNC: 4.7 MMOL/L (ref 3.5–5.1)
RBC # BLD AUTO: 4.08 M/UL (ref 4–5.4)
SODIUM SERPL-SCNC: 134 MMOL/L (ref 136–145)
WBC # BLD AUTO: 12.77 K/UL (ref 3.9–12.7)

## 2021-11-05 PROCEDURE — 84100 ASSAY OF PHOSPHORUS: CPT | Performed by: STUDENT IN AN ORGANIZED HEALTH CARE EDUCATION/TRAINING PROGRAM

## 2021-11-05 PROCEDURE — 36415 COLL VENOUS BLD VENIPUNCTURE: CPT | Performed by: STUDENT IN AN ORGANIZED HEALTH CARE EDUCATION/TRAINING PROGRAM

## 2021-11-05 PROCEDURE — 80048 BASIC METABOLIC PNL TOTAL CA: CPT | Performed by: STUDENT IN AN ORGANIZED HEALTH CARE EDUCATION/TRAINING PROGRAM

## 2021-11-05 PROCEDURE — 85025 COMPLETE CBC W/AUTO DIFF WBC: CPT | Performed by: STUDENT IN AN ORGANIZED HEALTH CARE EDUCATION/TRAINING PROGRAM

## 2021-11-05 PROCEDURE — 63600175 PHARM REV CODE 636 W HCPCS: Performed by: STUDENT IN AN ORGANIZED HEALTH CARE EDUCATION/TRAINING PROGRAM

## 2021-11-05 PROCEDURE — 25000003 PHARM REV CODE 250: Performed by: UROLOGY

## 2021-11-05 PROCEDURE — 25000003 PHARM REV CODE 250: Performed by: STUDENT IN AN ORGANIZED HEALTH CARE EDUCATION/TRAINING PROGRAM

## 2021-11-05 PROCEDURE — 20600001 HC STEP DOWN PRIVATE ROOM

## 2021-11-05 PROCEDURE — 83735 ASSAY OF MAGNESIUM: CPT | Performed by: STUDENT IN AN ORGANIZED HEALTH CARE EDUCATION/TRAINING PROGRAM

## 2021-11-05 RX ORDER — MUPIROCIN 20 MG/G
OINTMENT TOPICAL 2 TIMES DAILY
Status: DISCONTINUED | OUTPATIENT
Start: 2021-11-05 | End: 2021-11-08 | Stop reason: HOSPADM

## 2021-11-05 RX ADMIN — KETOROLAC TROMETHAMINE 15 MG: 30 INJECTION, SOLUTION INTRAMUSCULAR; INTRAVENOUS at 06:11

## 2021-11-05 RX ADMIN — KETOROLAC TROMETHAMINE 15 MG: 30 INJECTION, SOLUTION INTRAMUSCULAR; INTRAVENOUS at 11:11

## 2021-11-05 RX ADMIN — FAMOTIDINE 20 MG: 20 TABLET ORAL at 08:11

## 2021-11-05 RX ADMIN — OXYCODONE HYDROCHLORIDE 10 MG: 10 TABLET ORAL at 10:11

## 2021-11-05 RX ADMIN — ACETAMINOPHEN 650 MG: 325 TABLET ORAL at 06:11

## 2021-11-05 RX ADMIN — HEPARIN SODIUM 5000 UNITS: 5000 INJECTION INTRAVENOUS; SUBCUTANEOUS at 10:11

## 2021-11-05 RX ADMIN — METHOCARBAMOL 750 MG: 750 TABLET ORAL at 02:11

## 2021-11-05 RX ADMIN — FAMOTIDINE 20 MG: 20 TABLET ORAL at 10:11

## 2021-11-05 RX ADMIN — MUPIROCIN: 20 OINTMENT TOPICAL at 09:11

## 2021-11-05 RX ADMIN — ERTAPENEM 1 G: 1 INJECTION INTRAMUSCULAR; INTRAVENOUS at 08:11

## 2021-11-05 RX ADMIN — PROMETHAZINE HYDROCHLORIDE 25 MG: 25 SUPPOSITORY RECTAL at 11:11

## 2021-11-05 RX ADMIN — HEPARIN SODIUM 5000 UNITS: 5000 INJECTION INTRAVENOUS; SUBCUTANEOUS at 08:11

## 2021-11-05 RX ADMIN — KETOROLAC TROMETHAMINE 15 MG: 30 INJECTION, SOLUTION INTRAMUSCULAR; INTRAVENOUS at 12:11

## 2021-11-05 RX ADMIN — OXYCODONE HYDROCHLORIDE 10 MG: 10 TABLET ORAL at 02:11

## 2021-11-05 RX ADMIN — OXYCODONE HYDROCHLORIDE 10 MG: 10 TABLET ORAL at 06:11

## 2021-11-06 LAB
ANION GAP SERPL CALC-SCNC: 8 MMOL/L (ref 8–16)
BASOPHILS # BLD AUTO: 0.01 K/UL (ref 0–0.2)
BASOPHILS # BLD AUTO: 0.01 K/UL (ref 0–0.2)
BASOPHILS NFR BLD: 0.2 % (ref 0–1.9)
BASOPHILS NFR BLD: 0.2 % (ref 0–1.9)
BUN SERPL-MCNC: 17 MG/DL (ref 6–20)
CALCIUM SERPL-MCNC: 7.5 MG/DL (ref 8.7–10.5)
CHLORIDE SERPL-SCNC: 112 MMOL/L (ref 95–110)
CO2 SERPL-SCNC: 18 MMOL/L (ref 23–29)
CREAT SERPL-MCNC: 0.6 MG/DL (ref 0.5–1.4)
DIFFERENTIAL METHOD: ABNORMAL
DIFFERENTIAL METHOD: ABNORMAL
EOSINOPHIL # BLD AUTO: 0 K/UL (ref 0–0.5)
EOSINOPHIL # BLD AUTO: 0.1 K/UL (ref 0–0.5)
EOSINOPHIL NFR BLD: 0.6 % (ref 0–8)
EOSINOPHIL NFR BLD: 1.1 % (ref 0–8)
ERYTHROCYTE [DISTWIDTH] IN BLOOD BY AUTOMATED COUNT: 19.6 % (ref 11.5–14.5)
ERYTHROCYTE [DISTWIDTH] IN BLOOD BY AUTOMATED COUNT: 19.8 % (ref 11.5–14.5)
EST. GFR  (AFRICAN AMERICAN): >60 ML/MIN/1.73 M^2
EST. GFR  (NON AFRICAN AMERICAN): >60 ML/MIN/1.73 M^2
GLUCOSE SERPL-MCNC: 88 MG/DL (ref 70–110)
HCT VFR BLD AUTO: 24.9 % (ref 37–48.5)
HCT VFR BLD AUTO: 29.3 % (ref 37–48.5)
HGB BLD-MCNC: 7.5 G/DL (ref 12–16)
HGB BLD-MCNC: 8.7 G/DL (ref 12–16)
IMM GRANULOCYTES # BLD AUTO: 0.01 K/UL (ref 0–0.04)
IMM GRANULOCYTES # BLD AUTO: 0.01 K/UL (ref 0–0.04)
IMM GRANULOCYTES NFR BLD AUTO: 0.2 % (ref 0–0.5)
IMM GRANULOCYTES NFR BLD AUTO: 0.2 % (ref 0–0.5)
LYMPHOCYTES # BLD AUTO: 2.7 K/UL (ref 1–4.8)
LYMPHOCYTES # BLD AUTO: 2.8 K/UL (ref 1–4.8)
LYMPHOCYTES NFR BLD: 41.2 % (ref 18–48)
LYMPHOCYTES NFR BLD: 43.4 % (ref 18–48)
MAGNESIUM SERPL-MCNC: 2 MG/DL (ref 1.6–2.6)
MCH RBC QN AUTO: 24.1 PG (ref 27–31)
MCH RBC QN AUTO: 24.2 PG (ref 27–31)
MCHC RBC AUTO-ENTMCNC: 29.7 G/DL (ref 32–36)
MCHC RBC AUTO-ENTMCNC: 30.1 G/DL (ref 32–36)
MCV RBC AUTO: 80 FL (ref 82–98)
MCV RBC AUTO: 81 FL (ref 82–98)
MONOCYTES # BLD AUTO: 0.5 K/UL (ref 0.3–1)
MONOCYTES # BLD AUTO: 0.5 K/UL (ref 0.3–1)
MONOCYTES NFR BLD: 7.6 % (ref 4–15)
MONOCYTES NFR BLD: 8.3 % (ref 4–15)
NEUTROPHILS # BLD AUTO: 3 K/UL (ref 1.8–7.7)
NEUTROPHILS # BLD AUTO: 3.3 K/UL (ref 1.8–7.7)
NEUTROPHILS NFR BLD: 46.8 % (ref 38–73)
NEUTROPHILS NFR BLD: 50.2 % (ref 38–73)
NRBC BLD-RTO: 0 /100 WBC
NRBC BLD-RTO: 0 /100 WBC
PHOSPHATE SERPL-MCNC: 4.4 MG/DL (ref 2.7–4.5)
PLATELET # BLD AUTO: 225 K/UL (ref 150–450)
PLATELET # BLD AUTO: 248 K/UL (ref 150–450)
PMV BLD AUTO: 9.3 FL (ref 9.2–12.9)
PMV BLD AUTO: 9.9 FL (ref 9.2–12.9)
POTASSIUM SERPL-SCNC: 4.5 MMOL/L (ref 3.5–5.1)
RBC # BLD AUTO: 3.11 M/UL (ref 4–5.4)
RBC # BLD AUTO: 3.6 M/UL (ref 4–5.4)
SODIUM SERPL-SCNC: 138 MMOL/L (ref 136–145)
WBC # BLD AUTO: 6.38 K/UL (ref 3.9–12.7)
WBC # BLD AUTO: 6.6 K/UL (ref 3.9–12.7)

## 2021-11-06 PROCEDURE — 80048 BASIC METABOLIC PNL TOTAL CA: CPT | Performed by: STUDENT IN AN ORGANIZED HEALTH CARE EDUCATION/TRAINING PROGRAM

## 2021-11-06 PROCEDURE — 25000003 PHARM REV CODE 250: Performed by: STUDENT IN AN ORGANIZED HEALTH CARE EDUCATION/TRAINING PROGRAM

## 2021-11-06 PROCEDURE — 63600175 PHARM REV CODE 636 W HCPCS: Performed by: STUDENT IN AN ORGANIZED HEALTH CARE EDUCATION/TRAINING PROGRAM

## 2021-11-06 PROCEDURE — 83735 ASSAY OF MAGNESIUM: CPT | Performed by: STUDENT IN AN ORGANIZED HEALTH CARE EDUCATION/TRAINING PROGRAM

## 2021-11-06 PROCEDURE — 20600001 HC STEP DOWN PRIVATE ROOM

## 2021-11-06 PROCEDURE — 85025 COMPLETE CBC W/AUTO DIFF WBC: CPT | Performed by: STUDENT IN AN ORGANIZED HEALTH CARE EDUCATION/TRAINING PROGRAM

## 2021-11-06 PROCEDURE — 36415 COLL VENOUS BLD VENIPUNCTURE: CPT | Performed by: STUDENT IN AN ORGANIZED HEALTH CARE EDUCATION/TRAINING PROGRAM

## 2021-11-06 PROCEDURE — 84100 ASSAY OF PHOSPHORUS: CPT | Performed by: STUDENT IN AN ORGANIZED HEALTH CARE EDUCATION/TRAINING PROGRAM

## 2021-11-06 RX ADMIN — ACETAMINOPHEN 650 MG: 325 TABLET ORAL at 05:11

## 2021-11-06 RX ADMIN — HEPARIN SODIUM 5000 UNITS: 5000 INJECTION INTRAVENOUS; SUBCUTANEOUS at 09:11

## 2021-11-06 RX ADMIN — MUPIROCIN: 20 OINTMENT TOPICAL at 09:11

## 2021-11-06 RX ADMIN — ERTAPENEM 1 G: 1 INJECTION INTRAMUSCULAR; INTRAVENOUS at 09:11

## 2021-11-06 RX ADMIN — FAMOTIDINE 20 MG: 20 TABLET ORAL at 09:11

## 2021-11-06 RX ADMIN — OXYCODONE HYDROCHLORIDE 10 MG: 10 TABLET ORAL at 11:11

## 2021-11-06 RX ADMIN — KETOROLAC TROMETHAMINE 15 MG: 30 INJECTION, SOLUTION INTRAMUSCULAR; INTRAVENOUS at 05:11

## 2021-11-06 RX ADMIN — SODIUM CHLORIDE, SODIUM LACTATE, POTASSIUM CHLORIDE, AND CALCIUM CHLORIDE: .6; .31; .03; .02 INJECTION, SOLUTION INTRAVENOUS at 05:11

## 2021-11-06 RX ADMIN — KETOROLAC TROMETHAMINE 15 MG: 30 INJECTION, SOLUTION INTRAMUSCULAR; INTRAVENOUS at 11:11

## 2021-11-06 RX ADMIN — ACETAMINOPHEN 650 MG: 325 TABLET ORAL at 12:11

## 2021-11-06 RX ADMIN — CALCIUM GLUCONATE 1 G: 98 INJECTION, SOLUTION INTRAVENOUS at 10:11

## 2021-11-06 RX ADMIN — OXYCODONE 5 MG: 5 TABLET ORAL at 06:11

## 2021-11-07 LAB
ANION GAP SERPL CALC-SCNC: 10 MMOL/L (ref 8–16)
BASOPHILS # BLD AUTO: 0.01 K/UL (ref 0–0.2)
BASOPHILS NFR BLD: 0.1 % (ref 0–1.9)
BUN SERPL-MCNC: 19 MG/DL (ref 6–20)
CALCIUM SERPL-MCNC: 8.5 MG/DL (ref 8.7–10.5)
CHLORIDE SERPL-SCNC: 107 MMOL/L (ref 95–110)
CO2 SERPL-SCNC: 22 MMOL/L (ref 23–29)
CREAT SERPL-MCNC: 0.5 MG/DL (ref 0.5–1.4)
DIFFERENTIAL METHOD: ABNORMAL
EOSINOPHIL # BLD AUTO: 0.1 K/UL (ref 0–0.5)
EOSINOPHIL NFR BLD: 0.9 % (ref 0–8)
ERYTHROCYTE [DISTWIDTH] IN BLOOD BY AUTOMATED COUNT: 19.7 % (ref 11.5–14.5)
EST. GFR  (AFRICAN AMERICAN): >60 ML/MIN/1.73 M^2
EST. GFR  (NON AFRICAN AMERICAN): >60 ML/MIN/1.73 M^2
GLUCOSE SERPL-MCNC: 79 MG/DL (ref 70–110)
HCT VFR BLD AUTO: 26.8 % (ref 37–48.5)
HGB BLD-MCNC: 8.1 G/DL (ref 12–16)
IMM GRANULOCYTES # BLD AUTO: 0.01 K/UL (ref 0–0.04)
IMM GRANULOCYTES NFR BLD AUTO: 0.1 % (ref 0–0.5)
LYMPHOCYTES # BLD AUTO: 2.8 K/UL (ref 1–4.8)
LYMPHOCYTES NFR BLD: 41.3 % (ref 18–48)
MAGNESIUM SERPL-MCNC: 2 MG/DL (ref 1.6–2.6)
MCH RBC QN AUTO: 24.3 PG (ref 27–31)
MCHC RBC AUTO-ENTMCNC: 30.2 G/DL (ref 32–36)
MCV RBC AUTO: 81 FL (ref 82–98)
MONOCYTES # BLD AUTO: 0.5 K/UL (ref 0.3–1)
MONOCYTES NFR BLD: 7.9 % (ref 4–15)
NEUTROPHILS # BLD AUTO: 3.3 K/UL (ref 1.8–7.7)
NEUTROPHILS NFR BLD: 49.7 % (ref 38–73)
NRBC BLD-RTO: 0 /100 WBC
PHOSPHATE SERPL-MCNC: 4.5 MG/DL (ref 2.7–4.5)
PLATELET # BLD AUTO: 249 K/UL (ref 150–450)
PMV BLD AUTO: 9.6 FL (ref 9.2–12.9)
POTASSIUM SERPL-SCNC: 4.9 MMOL/L (ref 3.5–5.1)
RBC # BLD AUTO: 3.33 M/UL (ref 4–5.4)
SODIUM SERPL-SCNC: 139 MMOL/L (ref 136–145)
WBC # BLD AUTO: 6.7 K/UL (ref 3.9–12.7)

## 2021-11-07 PROCEDURE — 25000003 PHARM REV CODE 250: Performed by: STUDENT IN AN ORGANIZED HEALTH CARE EDUCATION/TRAINING PROGRAM

## 2021-11-07 PROCEDURE — 83735 ASSAY OF MAGNESIUM: CPT | Performed by: STUDENT IN AN ORGANIZED HEALTH CARE EDUCATION/TRAINING PROGRAM

## 2021-11-07 PROCEDURE — 93010 EKG 12-LEAD: ICD-10-PCS | Mod: ,,, | Performed by: INTERNAL MEDICINE

## 2021-11-07 PROCEDURE — 63600175 PHARM REV CODE 636 W HCPCS: Performed by: STUDENT IN AN ORGANIZED HEALTH CARE EDUCATION/TRAINING PROGRAM

## 2021-11-07 PROCEDURE — 84100 ASSAY OF PHOSPHORUS: CPT | Performed by: STUDENT IN AN ORGANIZED HEALTH CARE EDUCATION/TRAINING PROGRAM

## 2021-11-07 PROCEDURE — 85025 COMPLETE CBC W/AUTO DIFF WBC: CPT | Performed by: STUDENT IN AN ORGANIZED HEALTH CARE EDUCATION/TRAINING PROGRAM

## 2021-11-07 PROCEDURE — 94799 UNLISTED PULMONARY SVC/PX: CPT

## 2021-11-07 PROCEDURE — 20600001 HC STEP DOWN PRIVATE ROOM

## 2021-11-07 PROCEDURE — 93010 ELECTROCARDIOGRAM REPORT: CPT | Mod: ,,, | Performed by: INTERNAL MEDICINE

## 2021-11-07 PROCEDURE — 94761 N-INVAS EAR/PLS OXIMETRY MLT: CPT

## 2021-11-07 PROCEDURE — 36415 COLL VENOUS BLD VENIPUNCTURE: CPT | Performed by: STUDENT IN AN ORGANIZED HEALTH CARE EDUCATION/TRAINING PROGRAM

## 2021-11-07 PROCEDURE — 25000003 PHARM REV CODE 250: Performed by: UROLOGY

## 2021-11-07 PROCEDURE — 80048 BASIC METABOLIC PNL TOTAL CA: CPT | Performed by: STUDENT IN AN ORGANIZED HEALTH CARE EDUCATION/TRAINING PROGRAM

## 2021-11-07 PROCEDURE — 93005 ELECTROCARDIOGRAM TRACING: CPT

## 2021-11-07 RX ADMIN — MUPIROCIN: 20 OINTMENT TOPICAL at 09:11

## 2021-11-07 RX ADMIN — OXYCODONE HYDROCHLORIDE 10 MG: 10 TABLET ORAL at 12:11

## 2021-11-07 RX ADMIN — KETOROLAC TROMETHAMINE 15 MG: 30 INJECTION, SOLUTION INTRAMUSCULAR; INTRAVENOUS at 12:11

## 2021-11-07 RX ADMIN — KETOROLAC TROMETHAMINE 15 MG: 30 INJECTION, SOLUTION INTRAMUSCULAR; INTRAVENOUS at 06:11

## 2021-11-07 RX ADMIN — HEPARIN SODIUM 5000 UNITS: 5000 INJECTION INTRAVENOUS; SUBCUTANEOUS at 09:11

## 2021-11-07 RX ADMIN — KETOROLAC TROMETHAMINE 15 MG: 30 INJECTION, SOLUTION INTRAMUSCULAR; INTRAVENOUS at 11:11

## 2021-11-07 RX ADMIN — ERTAPENEM 1 G: 1 INJECTION INTRAMUSCULAR; INTRAVENOUS at 08:11

## 2021-11-07 RX ADMIN — FAMOTIDINE 20 MG: 20 TABLET ORAL at 08:11

## 2021-11-07 RX ADMIN — OXYCODONE 5 MG: 5 TABLET ORAL at 08:11

## 2021-11-07 RX ADMIN — HEPARIN SODIUM 5000 UNITS: 5000 INJECTION INTRAVENOUS; SUBCUTANEOUS at 08:11

## 2021-11-07 RX ADMIN — FAMOTIDINE 20 MG: 20 TABLET ORAL at 09:11

## 2021-11-07 RX ADMIN — MUPIROCIN: 20 OINTMENT TOPICAL at 08:11

## 2021-11-07 RX ADMIN — OXYCODONE HYDROCHLORIDE 10 MG: 10 TABLET ORAL at 02:11

## 2021-11-07 RX ADMIN — OXYCODONE 5 MG: 5 TABLET ORAL at 07:11

## 2021-11-08 VITALS
RESPIRATION RATE: 18 BRPM | HEART RATE: 128 BPM | OXYGEN SATURATION: 100 % | BODY MASS INDEX: 24.75 KG/M2 | DIASTOLIC BLOOD PRESSURE: 78 MMHG | TEMPERATURE: 98 F | HEIGHT: 56 IN | WEIGHT: 110 LBS | SYSTOLIC BLOOD PRESSURE: 128 MMHG

## 2021-11-08 PROBLEM — R00.0 SINUS TACHYCARDIA: Status: ACTIVE | Noted: 2021-11-08

## 2021-11-08 PROBLEM — N28.9 NON-FUNCTIONING KIDNEY: Status: ACTIVE | Noted: 2021-11-08

## 2021-11-08 LAB
ANION GAP SERPL CALC-SCNC: 6 MMOL/L (ref 8–16)
BASOPHILS # BLD AUTO: 0.01 K/UL (ref 0–0.2)
BASOPHILS NFR BLD: 0.1 % (ref 0–1.9)
BLD PROD TYP BPU: NORMAL
BLD PROD TYP BPU: NORMAL
BLOOD UNIT EXPIRATION DATE: NORMAL
BLOOD UNIT EXPIRATION DATE: NORMAL
BLOOD UNIT TYPE CODE: 6200
BLOOD UNIT TYPE CODE: 6200
BLOOD UNIT TYPE: NORMAL
BLOOD UNIT TYPE: NORMAL
BUN SERPL-MCNC: 15 MG/DL (ref 6–20)
CALCIUM SERPL-MCNC: 8.1 MG/DL (ref 8.7–10.5)
CHLORIDE SERPL-SCNC: 104 MMOL/L (ref 95–110)
CO2 SERPL-SCNC: 23 MMOL/L (ref 23–29)
CODING SYSTEM: NORMAL
CODING SYSTEM: NORMAL
CREAT SERPL-MCNC: 0.5 MG/DL (ref 0.5–1.4)
DIFFERENTIAL METHOD: ABNORMAL
DISPENSE STATUS: NORMAL
DISPENSE STATUS: NORMAL
EOSINOPHIL # BLD AUTO: 0.1 K/UL (ref 0–0.5)
EOSINOPHIL NFR BLD: 1.2 % (ref 0–8)
ERYTHROCYTE [DISTWIDTH] IN BLOOD BY AUTOMATED COUNT: 19.2 % (ref 11.5–14.5)
EST. GFR  (AFRICAN AMERICAN): >60 ML/MIN/1.73 M^2
EST. GFR  (NON AFRICAN AMERICAN): >60 ML/MIN/1.73 M^2
GLUCOSE SERPL-MCNC: 91 MG/DL (ref 70–110)
HCT VFR BLD AUTO: 27.8 % (ref 37–48.5)
HGB BLD-MCNC: 8.2 G/DL (ref 12–16)
IMM GRANULOCYTES # BLD AUTO: 0.02 K/UL (ref 0–0.04)
IMM GRANULOCYTES NFR BLD AUTO: 0.3 % (ref 0–0.5)
LYMPHOCYTES # BLD AUTO: 1.7 K/UL (ref 1–4.8)
LYMPHOCYTES NFR BLD: 22.5 % (ref 18–48)
MAGNESIUM SERPL-MCNC: 1.8 MG/DL (ref 1.6–2.6)
MCH RBC QN AUTO: 24 PG (ref 27–31)
MCHC RBC AUTO-ENTMCNC: 29.5 G/DL (ref 32–36)
MCV RBC AUTO: 82 FL (ref 82–98)
MONOCYTES # BLD AUTO: 0.5 K/UL (ref 0.3–1)
MONOCYTES NFR BLD: 6.4 % (ref 4–15)
NEUTROPHILS # BLD AUTO: 5.1 K/UL (ref 1.8–7.7)
NEUTROPHILS NFR BLD: 69.5 % (ref 38–73)
NRBC BLD-RTO: 0 /100 WBC
PHOSPHATE SERPL-MCNC: 3 MG/DL (ref 2.7–4.5)
PLATELET # BLD AUTO: 257 K/UL (ref 150–450)
PMV BLD AUTO: 9.5 FL (ref 9.2–12.9)
POTASSIUM SERPL-SCNC: 4.7 MMOL/L (ref 3.5–5.1)
RBC # BLD AUTO: 3.41 M/UL (ref 4–5.4)
SODIUM SERPL-SCNC: 133 MMOL/L (ref 136–145)
TRANS ERYTHROCYTES VOL PATIENT: NORMAL ML
TRANS ERYTHROCYTES VOL PATIENT: NORMAL ML
WBC # BLD AUTO: 7.33 K/UL (ref 3.9–12.7)

## 2021-11-08 PROCEDURE — 84100 ASSAY OF PHOSPHORUS: CPT | Performed by: STUDENT IN AN ORGANIZED HEALTH CARE EDUCATION/TRAINING PROGRAM

## 2021-11-08 PROCEDURE — 99222 PR INITIAL HOSPITAL CARE,LEVL II: ICD-10-PCS | Mod: ,,, | Performed by: INTERNAL MEDICINE

## 2021-11-08 PROCEDURE — 99222 1ST HOSP IP/OBS MODERATE 55: CPT | Mod: ,,, | Performed by: INTERNAL MEDICINE

## 2021-11-08 PROCEDURE — 94761 N-INVAS EAR/PLS OXIMETRY MLT: CPT

## 2021-11-08 PROCEDURE — 80048 BASIC METABOLIC PNL TOTAL CA: CPT | Performed by: STUDENT IN AN ORGANIZED HEALTH CARE EDUCATION/TRAINING PROGRAM

## 2021-11-08 PROCEDURE — 83735 ASSAY OF MAGNESIUM: CPT | Performed by: STUDENT IN AN ORGANIZED HEALTH CARE EDUCATION/TRAINING PROGRAM

## 2021-11-08 PROCEDURE — 36415 COLL VENOUS BLD VENIPUNCTURE: CPT | Performed by: STUDENT IN AN ORGANIZED HEALTH CARE EDUCATION/TRAINING PROGRAM

## 2021-11-08 PROCEDURE — 63600175 PHARM REV CODE 636 W HCPCS: Performed by: STUDENT IN AN ORGANIZED HEALTH CARE EDUCATION/TRAINING PROGRAM

## 2021-11-08 PROCEDURE — 85025 COMPLETE CBC W/AUTO DIFF WBC: CPT | Performed by: STUDENT IN AN ORGANIZED HEALTH CARE EDUCATION/TRAINING PROGRAM

## 2021-11-08 PROCEDURE — 25000003 PHARM REV CODE 250: Performed by: STUDENT IN AN ORGANIZED HEALTH CARE EDUCATION/TRAINING PROGRAM

## 2021-11-08 RX ORDER — OXYCODONE HYDROCHLORIDE 5 MG/1
5 TABLET ORAL EVERY 4 HOURS PRN
Qty: 17 TABLET | Refills: 0 | Status: SHIPPED | OUTPATIENT
Start: 2021-11-08 | End: 2022-01-20

## 2021-11-08 RX ADMIN — MUPIROCIN: 20 OINTMENT TOPICAL at 09:11

## 2021-11-08 RX ADMIN — OXYCODONE 5 MG: 5 TABLET ORAL at 01:11

## 2021-11-08 RX ADMIN — OXYCODONE 5 MG: 5 TABLET ORAL at 09:11

## 2021-11-08 RX ADMIN — ERTAPENEM 1 G: 1 INJECTION INTRAMUSCULAR; INTRAVENOUS at 12:11

## 2021-11-08 RX ADMIN — HEPARIN SODIUM 5000 UNITS: 5000 INJECTION INTRAVENOUS; SUBCUTANEOUS at 09:11

## 2021-11-08 RX ADMIN — FAMOTIDINE 20 MG: 20 TABLET ORAL at 09:11

## 2021-11-09 ENCOUNTER — PATIENT OUTREACH (OUTPATIENT)
Dept: ADMINISTRATIVE | Facility: CLINIC | Age: 26
End: 2021-11-09
Payer: MEDICAID

## 2021-11-19 LAB
FINAL PATHOLOGIC DIAGNOSIS: NORMAL
GROSS: NORMAL
Lab: NORMAL

## 2021-12-01 ENCOUNTER — OFFICE VISIT (OUTPATIENT)
Dept: UROLOGY | Facility: CLINIC | Age: 26
End: 2021-12-01
Payer: MEDICAID

## 2021-12-01 VITALS — SYSTOLIC BLOOD PRESSURE: 137 MMHG | HEART RATE: 85 BPM | DIASTOLIC BLOOD PRESSURE: 86 MMHG

## 2021-12-01 DIAGNOSIS — N28.9 NON-FUNCTIONING KIDNEY: Primary | ICD-10-CM

## 2021-12-01 PROCEDURE — 99999 PR PBB SHADOW E&M-EST. PATIENT-LVL III: CPT | Mod: PBBFAC,,, | Performed by: UROLOGY

## 2021-12-01 PROCEDURE — 99213 OFFICE O/P EST LOW 20 MIN: CPT | Mod: PBBFAC | Performed by: UROLOGY

## 2021-12-01 PROCEDURE — 99024 PR POST-OP FOLLOW-UP VISIT: ICD-10-PCS | Mod: ,,, | Performed by: UROLOGY

## 2021-12-01 PROCEDURE — 99024 POSTOP FOLLOW-UP VISIT: CPT | Mod: ,,, | Performed by: UROLOGY

## 2021-12-01 PROCEDURE — 99999 PR PBB SHADOW E&M-EST. PATIENT-LVL III: ICD-10-PCS | Mod: PBBFAC,,, | Performed by: UROLOGY

## 2022-01-05 ENCOUNTER — PATIENT MESSAGE (OUTPATIENT)
Dept: ADMINISTRATIVE | Facility: OTHER | Age: 27
End: 2022-01-05
Payer: MEDICAID

## 2022-01-20 ENCOUNTER — OFFICE VISIT (OUTPATIENT)
Dept: UROLOGY | Facility: CLINIC | Age: 27
End: 2022-01-20
Payer: MEDICAID

## 2022-01-20 VITALS
SYSTOLIC BLOOD PRESSURE: 130 MMHG | HEART RATE: 89 BPM | BODY MASS INDEX: 24.75 KG/M2 | DIASTOLIC BLOOD PRESSURE: 74 MMHG | HEIGHT: 56 IN | WEIGHT: 110 LBS

## 2022-01-20 DIAGNOSIS — N30.90 BLADDER INFECTION: ICD-10-CM

## 2022-01-20 DIAGNOSIS — N31.9 NEUROGENIC BLADDER: Primary | ICD-10-CM

## 2022-01-20 PROCEDURE — 87086 URINE CULTURE/COLONY COUNT: CPT | Performed by: UROLOGY

## 2022-01-20 PROCEDURE — 1159F PR MEDICATION LIST DOCUMENTED IN MEDICAL RECORD: ICD-10-PCS | Mod: CPTII,,, | Performed by: UROLOGY

## 2022-01-20 PROCEDURE — 99999 PR PBB SHADOW E&M-EST. PATIENT-LVL III: CPT | Mod: PBBFAC,,, | Performed by: UROLOGY

## 2022-01-20 PROCEDURE — 1160F PR REVIEW ALL MEDS BY PRESCRIBER/CLIN PHARMACIST DOCUMENTED: ICD-10-PCS | Mod: CPTII,,, | Performed by: UROLOGY

## 2022-01-20 PROCEDURE — 87186 SC STD MICRODIL/AGAR DIL: CPT | Performed by: UROLOGY

## 2022-01-20 PROCEDURE — 3078F PR MOST RECENT DIASTOLIC BLOOD PRESSURE < 80 MM HG: ICD-10-PCS | Mod: CPTII,,, | Performed by: UROLOGY

## 2022-01-20 PROCEDURE — 99999 PR PBB SHADOW E&M-EST. PATIENT-LVL III: ICD-10-PCS | Mod: PBBFAC,,, | Performed by: UROLOGY

## 2022-01-20 PROCEDURE — 3008F PR BODY MASS INDEX (BMI) DOCUMENTED: ICD-10-PCS | Mod: CPTII,,, | Performed by: UROLOGY

## 2022-01-20 PROCEDURE — 1159F MED LIST DOCD IN RCRD: CPT | Mod: CPTII,,, | Performed by: UROLOGY

## 2022-01-20 PROCEDURE — 87077 CULTURE AEROBIC IDENTIFY: CPT | Performed by: UROLOGY

## 2022-01-20 PROCEDURE — 99024 PR POST-OP FOLLOW-UP VISIT: ICD-10-PCS | Mod: S$PBB,,, | Performed by: UROLOGY

## 2022-01-20 PROCEDURE — 99024 POSTOP FOLLOW-UP VISIT: CPT | Mod: S$PBB,,, | Performed by: UROLOGY

## 2022-01-20 PROCEDURE — 87088 URINE BACTERIA CULTURE: CPT | Performed by: UROLOGY

## 2022-01-20 PROCEDURE — 1160F RVW MEDS BY RX/DR IN RCRD: CPT | Mod: CPTII,,, | Performed by: UROLOGY

## 2022-01-20 PROCEDURE — 3078F DIAST BP <80 MM HG: CPT | Mod: CPTII,,, | Performed by: UROLOGY

## 2022-01-20 PROCEDURE — 3008F BODY MASS INDEX DOCD: CPT | Mod: CPTII,,, | Performed by: UROLOGY

## 2022-01-20 PROCEDURE — 99213 OFFICE O/P EST LOW 20 MIN: CPT | Mod: PBBFAC | Performed by: UROLOGY

## 2022-01-20 PROCEDURE — 3075F SYST BP GE 130 - 139MM HG: CPT | Mod: CPTII,,, | Performed by: UROLOGY

## 2022-01-20 PROCEDURE — 3075F PR MOST RECENT SYSTOLIC BLOOD PRESS GE 130-139MM HG: ICD-10-PCS | Mod: CPTII,,, | Performed by: UROLOGY

## 2022-01-20 RX ORDER — DOXYCYCLINE 100 MG/1
100 CAPSULE ORAL 2 TIMES DAILY
Qty: 14 CAPSULE | Refills: 0 | Status: SHIPPED | OUTPATIENT
Start: 2022-01-20 | End: 2022-01-27

## 2022-01-20 NOTE — PROGRESS NOTES
Dictation #1  MRN:[id number]  [facility]:[id number]  CHIEF COMPLAINT:    Mrs. Samuel is a 26 y.o. female presenting for a follow up on neurogenic bladder.  Patient status post nephrectomy after having perinephric abscess and non functioning right kidney    PRESENTING ILLNESS:    Shashank Samuel is a 26 y.o. female who returns accompanied by her mother.  She states she is doing well.  It took about 6 weeks for her to recover but she feels better now.  She has had more incontinence so her mother suspects she may be infected.  She is insensate cannot tell if she has dysuria or bladder pain.  She is self catheterizing every 2 hours per her mom.  Does not wake up to cath in the middle of the night.  I brought her in today to discuss doing another FUDS    She has a history of lumbar myelomeningocele with  shunt.  Underwent an ACE procedure with augmentation cystoplasty and Mitrofanoff at age 7.  She was noted to have right VU reflux but was instructed to perform intermittent self cath with < 500 ml in the bladder. Unclear if she was irrigating out her bladder.  She is challenging in that she often answers in a way that she thinks she should.      REVIEW OF SYSTEMS:    Review of Systems   Constitutional: Negative.    HENT: Negative.    Eyes: Negative.    Respiratory: Negative.    Cardiovascular: Negative.    Gastrointestinal: Negative.    Genitourinary:        Urge incontinence   Musculoskeletal: Positive for back pain.   Skin: Negative.    Neurological:        Paraplegic   Endo/Heme/Allergies: Negative.    Psychiatric/Behavioral: Negative.        PATIENT HISTORY:    Past Medical History:   Diagnosis Date    Anemia     Arnold-Chiari malformation, type II     Encounter for blood transfusion     Hydrocephalus     Neurogenic bladder     self catheterizes every 3 hours    Seizures     only w/ shunt malfunction    Spinal bifida, closed     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       Past Surgical History:   Procedure Laterality Date     AMPUTATION      right 4th and 5th toes; left 5th toe and portion of left great toe    BACK SURGERY      BLADDER SURGERY      BREAST SURGERY  2015    Reduction    COLON SURGERY      flush site for bowel movements from appendix    CYSTOSTOMY      DEBRIDEMENT OF FOOT Right 1/8/2021    Procedure: DEBRIDEMENT, FOOT;  Surgeon: Abdi Bedoya DPM;  Location: [facility];  Service: Podiatry;  Laterality: Right;    EYE SURGERY      x 5    FLUOROSCOPIC URODYNAMIC STUDY N/A 3/12/2019    Procedure: URODYNAMIC STUDY, FLUOROSCOPIC;  Surgeon: Kenzie Charles MD;  Location: [facility] 1ST FLR;  Service: Urology;  Laterality: N/A;  1 hour    FLUOROSCOPIC URODYNAMIC STUDY N/A 4/4/2019    Procedure: URODYNAMIC STUDY, FLUOROSCOPIC;  Surgeon: Kenzie Charles MD;  Location: [facility] 1ST FLR;  Service: Urology;  Laterality: N/A;  1 hour    FOOT AMPUTATION THROUGH METATARSAL Right 10/28/2019    Procedure: AMPUTATION, FOOT, TRANSMETATARSAL;  Surgeon: Abdi Bedoya DPM;  Location: [facility];  Service: Podiatry;  Laterality: Right;    FOOT AMPUTATION THROUGH METATARSAL Right 3/27/2020    Procedure: AMPUTATION, FOOT, TRANSMETATARSAL;  Surgeon: Abdi Bedoya DPM;  Location: [facility];  Service: Podiatry;  Laterality: Right;  REVISION    MYELOMININGOCELE REPAIR      NEPHRECTOMY Right 11/4/2021    Procedure: Nephrectomy/ OPEN;  Surgeon: Dash Rosenthal MD;  Location: [facility] 2ND FLR;  Service: Urology;  Laterality: Right;  4HRS    NEPHROSTOMY Right 8/2/2021    Procedure: Nephrostomy and perinephric abscess drain;  Surgeon: Lillian Surgeon;  Location: [facility];  Service: Anesthesiology;  Laterality: Right;    STRABISMUS SURGERY      ou dr peña    VENTRICULOPERITONEAL SHUNT      WOUND DEBRIDEMENT  3/27/2020    Procedure: DEBRIDEMENT, WOUND;  Surgeon: Abdi Bedoya DPM;  Location: [facility];  Service: Podiatry;;       Family History   Problem Relation Age of Onset    Breast cancer Mother     Gout Father      Cataracts Maternal Grandmother     Myocarditis Sister        Social History     Socioeconomic History    Marital status: Single   Tobacco Use    Smoking status: Never Smoker    Smokeless tobacco: Never Used   Substance and Sexual Activity    Alcohol use: Not Currently    Drug use: Never    Sexual activity: Never   Social History Narrative    Intact family. Wheelchair bound.  Self catheterizes herself       Allergies:  Latex, natural rubber; Zofran [ondansetron hcl (pf)]; Bactrim  [sulfamethoxazole-trimethoprim]; Gardasil  [h papillomavirus vac,qval (pf)]; Menactra  [mening vac a,c,y,w135 dip (pf)]; Nitrofurantoin; Sulfa (sulfonamide antibiotics); Tramadol; Levaquin [levofloxacin]; and Macrobid [nitrofurantoin monohyd/m-cryst]    Medications:  Outpatient Encounter Medications as of 1/20/2022   Medication Sig Dispense Refill    collagenase (SANTYL) ointment Apply topically once daily.  0    [DISCONTINUED] acetaminophen (TYLENOL) 500 MG tablet Take 1,000 mg by mouth every 6 (six) hours as needed for Pain.       [DISCONTINUED] ciprofloxacin HCl (CIPRO) 500 MG tablet Take 500 mg by mouth every 12 (twelve) hours.      [DISCONTINUED] doxycycline (DORYX) 100 MG EC tablet Take 500 mg by mouth once daily.      [DISCONTINUED] ibuprofen (ADVIL,MOTRIN) 200 MG tablet Take 400 mg by mouth every 6 (six) hours as needed for Pain.      [DISCONTINUED] oxyCODONE (ROXICODONE) 5 MG immediate release tablet Take 1 tablet (5 mg total) by mouth every 4 (four) hours as needed for Pain. 17 tablet 0    [DISCONTINUED] promethazine (PHENERGAN) 25 MG suppository Place 25 mg rectally every 4 (four) hours as needed for Nausea.        No facility-administered encounter medications on file as of 1/20/2022.         PHYSICAL EXAMINATION:    The patient generally appears in good health, is appropriately interactive, and is in no apparent distress.    Skin: No lesions.    Mental: Cooperative with normal affect.    Neuro: Grossly  intact.    HEENT: Normal. No evidence of lymphadenopathy.    Chest:  normal inspiratory effort.    Abdomen:  Soft, non-tender. No masses or organomegaly. Bladder is not palpable. No evidence of flank discomfort. No evidence of inguinal hernia.  Scar on the abdomen is well healed    Extremities: No clubbing, cyanosis, or edema      LABS:    Lab Results   Component Value Date    BUN 15 11/08/2021    CREATININE 0.5 11/08/2021       IMPRESSION:    Encounter Diagnoses   Name Primary?    Neurogenic bladder Yes       PLAN:    1.  The catheterized specimen was sent for culture  2.  For FUDS  3.  Doxycycline sent empirically to her preferred pharmacy

## 2022-01-23 LAB — BACTERIA UR CULT: ABNORMAL

## 2022-01-26 ENCOUNTER — TELEPHONE (OUTPATIENT)
Dept: UROLOGY | Facility: CLINIC | Age: 27
End: 2022-01-26
Payer: MEDICAID

## 2022-01-26 NOTE — TELEPHONE ENCOUNTER
----- Message from Inocencia Roque sent at 1/26/2022 10:59 AM CST -----  Regarding: Test Inquiry  Regarding:Pts mom called about scheduling  a bladder test that doctor asked for.  Pts will be out town 2/4-8 and from 2/13-17.        Call back number:[phone] Mariah (mother)

## 2022-01-31 ENCOUNTER — PATIENT MESSAGE (OUTPATIENT)
Dept: UROLOGY | Facility: CLINIC | Age: 27
End: 2022-01-31
Payer: MEDICAID

## 2022-02-10 ENCOUNTER — TELEPHONE (OUTPATIENT)
Dept: UROLOGY | Facility: CLINIC | Age: 27
End: 2022-02-10
Payer: MEDICAID

## 2022-02-10 DIAGNOSIS — N31.9 NEUROGENIC BLADDER: ICD-10-CM

## 2022-03-09 ENCOUNTER — PATIENT MESSAGE (OUTPATIENT)
Dept: UROLOGY | Facility: CLINIC | Age: 27
End: 2022-03-09
Payer: MEDICAID

## 2022-04-05 ENCOUNTER — HOSPITAL ENCOUNTER (OUTPATIENT)
Facility: HOSPITAL | Age: 27
Discharge: HOME | End: 2022-04-05
Attending: UROLOGY | Admitting: UROLOGY
Payer: MEDICAID

## 2022-04-05 ENCOUNTER — PATIENT MESSAGE (OUTPATIENT)
Dept: SURGERY | Facility: HOSPITAL | Age: 27
End: 2022-04-05
Payer: MEDICAID

## 2022-04-05 VITALS
DIASTOLIC BLOOD PRESSURE: 84 MMHG | RESPIRATION RATE: 20 BRPM | HEART RATE: 96 BPM | TEMPERATURE: 97 F | OXYGEN SATURATION: 100 % | SYSTOLIC BLOOD PRESSURE: 130 MMHG

## 2022-04-05 PROCEDURE — 87086 URINE CULTURE/COLONY COUNT: CPT | Performed by: STUDENT IN AN ORGANIZED HEALTH CARE EDUCATION/TRAINING PROGRAM

## 2022-04-05 PROCEDURE — 87077 CULTURE AEROBIC IDENTIFY: CPT | Performed by: STUDENT IN AN ORGANIZED HEALTH CARE EDUCATION/TRAINING PROGRAM

## 2022-04-05 PROCEDURE — 87088 URINE BACTERIA CULTURE: CPT | Performed by: STUDENT IN AN ORGANIZED HEALTH CARE EDUCATION/TRAINING PROGRAM

## 2022-04-05 PROCEDURE — 87186 SC STD MICRODIL/AGAR DIL: CPT | Mod: 59 | Performed by: STUDENT IN AN ORGANIZED HEALTH CARE EDUCATION/TRAINING PROGRAM

## 2022-04-05 RX ORDER — ACETAMINOPHEN 500 MG
1000 TABLET ORAL EVERY 6 HOURS PRN
COMMUNITY
End: 2023-04-05

## 2022-04-05 NOTE — PROGRESS NOTES
FUDS procedure cancelled by Dr. Charles due to infected urine. Sample sent to lab for culture. Pt discharged with all personal belongings.

## 2022-04-06 NOTE — TELEPHONE ENCOUNTER
----- Message from Cat Coyle sent at 4/6/2022 10:50 AM CDT -----  Regarding: PT inquiry  Pt mom calling to see about antibiotics for Bladder infections  [phone] (home)     [facility] [address] [place] [address] [address] [employer]  [address] [address]  Phone: [phone] Fax: [phone]

## 2022-04-06 NOTE — PROGRESS NOTES
Cancellation note:      The patient's urine was brightly nitrite positive.      The catheterized specimen was sent to for culture. Will reschedule.

## 2022-04-07 ENCOUNTER — PATIENT MESSAGE (OUTPATIENT)
Dept: UROLOGY | Facility: CLINIC | Age: 27
End: 2022-04-07
Payer: MEDICAID

## 2022-04-11 LAB — BACTERIA UR CULT: ABNORMAL

## 2022-04-13 ENCOUNTER — TELEPHONE (OUTPATIENT)
Dept: UROLOGY | Facility: HOSPITAL | Age: 27
End: 2022-04-13
Payer: MEDICAID

## 2022-04-13 DIAGNOSIS — N30.90 BLADDER INFECTION: Primary | ICD-10-CM

## 2022-04-13 DIAGNOSIS — N31.9 NEUROGENIC BLADDER: ICD-10-CM

## 2022-04-13 RX ORDER — CIPROFLOXACIN 500 MG/1
500 TABLET ORAL 2 TIMES DAILY
Qty: 14 TABLET | Refills: 0 | Status: SHIPPED | OUTPATIENT
Start: 2022-04-13 | End: 2022-04-20

## 2022-04-13 RX ORDER — CEPHALEXIN 500 MG/1
500 CAPSULE ORAL NIGHTLY
Qty: 30 CAPSULE | Refills: 0 | Status: ON HOLD | OUTPATIENT
Start: 2022-04-13 | End: 2022-09-24 | Stop reason: HOSPADM

## 2022-04-13 NOTE — TELEPHONE ENCOUNTER
Called and spoke to the patient's mother. t he patient is symptomatic has back pain.     Cipro was sent for treatment, and then she will start nightly keflex until we can get the FUDS done

## 2022-04-13 NOTE — TELEPHONE ENCOUNTER
----- Message from Douglas Radford MD sent at 4/13/2022 12:08 PM CDT -----    ----- Message -----  From: Clarence, [employer] [facility]  Sent: 4/6/2022  10:22 AM CDT  To: Douglas Radford MD

## 2022-04-13 NOTE — TELEPHONE ENCOUNTER
----- Message from Douglas Radford MD sent at 4/13/2022 12:08 PM CDT -----    ----- Message -----  From: Clarence, [employer] [facility]  Sent: 4/6/2022  10:22 AM CDT  To: Douglas Radford MD

## 2022-04-14 ENCOUNTER — TELEPHONE (OUTPATIENT)
Dept: UROLOGY | Facility: CLINIC | Age: 27
End: 2022-04-14
Payer: MEDICAID

## 2022-04-14 DIAGNOSIS — N31.9 NEUROGENIC BLADDER: Primary | ICD-10-CM

## 2022-05-11 ENCOUNTER — HOSPITAL ENCOUNTER (OUTPATIENT)
Facility: HOSPITAL | Age: 27
Discharge: HOME | End: 2022-05-11
Attending: UROLOGY | Admitting: UROLOGY
Payer: MEDICAID

## 2022-05-11 VITALS
DIASTOLIC BLOOD PRESSURE: 75 MMHG | HEART RATE: 89 BPM | OXYGEN SATURATION: 100 % | BODY MASS INDEX: 23.73 KG/M2 | WEIGHT: 110 LBS | TEMPERATURE: 98 F | SYSTOLIC BLOOD PRESSURE: 129 MMHG | HEIGHT: 57 IN | RESPIRATION RATE: 18 BRPM

## 2022-05-11 DIAGNOSIS — N32.9 BLADDER DISORDER: ICD-10-CM

## 2022-05-11 PROCEDURE — 51726 COMPLEX CYSTOMETROGRAM: CPT | Mod: 26,,, | Performed by: UROLOGY

## 2022-05-11 PROCEDURE — 27200973 HC CYSTO SUPPLY IV (URODYNAMICS): Performed by: UROLOGY

## 2022-05-11 PROCEDURE — 51784 ANAL/URINARY MUSCLE STUDY: CPT | Mod: 26,51,, | Performed by: UROLOGY

## 2022-05-11 PROCEDURE — 74430 CONTRAST X-RAY BLADDER: CPT | Mod: 26,,, | Performed by: UROLOGY

## 2022-05-11 PROCEDURE — 36000705 HC OR TIME LEV I EA ADD 15 MIN: Performed by: UROLOGY

## 2022-05-11 PROCEDURE — 51600 INJECTION FOR BLADDER X-RAY: CPT | Mod: 51,,, | Performed by: UROLOGY

## 2022-05-11 PROCEDURE — 74430 PR  X-RAY CYSTOGRAM, MIN 3 VIEW: ICD-10-PCS | Mod: 26,,, | Performed by: UROLOGY

## 2022-05-11 PROCEDURE — 51726 PR CYSTOMETROGRAM, COMPLEX: ICD-10-PCS | Mod: 26,,, | Performed by: UROLOGY

## 2022-05-11 PROCEDURE — 51600 PR INJECTION FOR BLADDER X-RAY: ICD-10-PCS | Mod: 51,,, | Performed by: UROLOGY

## 2022-05-11 PROCEDURE — 36000704 HC OR TIME LEV I 1ST 15 MIN: Performed by: UROLOGY

## 2022-05-11 PROCEDURE — 51784 PR ANAL/URINARY MUSCLE STUDY: ICD-10-PCS | Mod: 26,51,, | Performed by: UROLOGY

## 2022-05-11 NOTE — PROGRESS NOTES
Cysto RN notified pt's pre-procedure is complete. RN states pt cannot go for procedure until 2p. Pt's sister updated via phone, verbalizes understanding.

## 2022-05-11 NOTE — OP NOTE
Date of procedure:    5/11/2022    Fluoro Urodynamic Report    Indication:  Neurogenic bladder secondary to spina bifida, status post right nephrectomy for nonfunctioning kidney with perinephric abscess.     Patient was taken to the Urodynamic Suite where her bladder was drained with a 12 Fr catheter.  A 7 Fr dual lumen catheter was placed to measure intravesical pressures placed through her mitrofanoff.  A 10 Fr balloon manometer was placed into the rectum for abdominal pressure measurements.  Patch EMG electrodes were placed on the perineum.  The patient was connected to the [employer] Urodynamic machine, using a multichannel technique.  The bladder was filled with Cysto ConRay at room temperature at a rate of 30 ml/min.  Patient is filled to urgency.  Filling is performed with the patient in the seated position. She then self catheterized and drained the bladder.  Fluoroscopy was used to be assured that she emptied completely and this was confirmed.     The following are the results of the study:  1. Amount in her bladder:  457 ml    2.  CMG       Sensation:         First Desire:  143.9 ml         Normal Desire:  244.4 ml         Strong Desire:  334.8 ml         Urgency:  500 ml       Capacity:  850 ml       Abnormal Contractions:  Towards the end of filling, she had DO       Compliance:  Normal until 350 ml infused which was likely about 600+  in the bladder considering the volume at capacity    3.  EMG:  Stable throughout    4.  Volume at capacity was 850 ml    5.  Fluoroscopy--I have personally interpreted the images and the results are as follows:  There was no VU reflux, the augment is smooth in its configuration.     6.  Analysis:  Bladder is relatively stable until capacity reached.      7.  Recommendations:       A.  Recommended that she try to empty to completion and that she keep her volumes between 400 and 600 when she catheterizes       B.  She gets her catheters through [employer].         C.   Decrease fluid intake to decrease urine production.  Her sister is helping her with that.  They have her catheterize every 2 hours while awake.  She stays up until 5 am and is woken up at noon so likely catheterizes 8 times a day.  She needs a male catheter because she catheterizes through the Mitrofanoff.

## 2022-05-11 NOTE — BRIEF OP NOTE
[facility] - Surgery (1st Fl)  Brief Operative Note    Surgery Date: 5/11/2022     Surgeon(s) and Role:     * Kenzie Charles MD - Primary    Assisting Surgeon: None    Pre-op Diagnosis:  Neurogenic bladder [N31.9]    Post-op Diagnosis:  Post-Op Diagnosis Codes:     * Neurogenic bladder [N31.9]    Procedure(s) (LRB):  URODYNAMIC STUDY, FLUOROSCOPIC (N/A)    Anesthesia: N/A    Operative Findings: see op note.      Estimated Blood Loss:  n/a       Specimens:  n/a  Specimen (24h ago, onward)            None            Discharge Note    OUTCOME: Patient tolerated treatment/procedure well without complication and is now ready for discharge.    DISPOSITION: Home or Self Care    FINAL DIAGNOSIS:  <principal problem not specified>    FOLLOWUP: In clinic in 3 months.     DISCHARGE INSTRUCTIONS:      ACTIVITY  · There are no restrictions in activity. Start doing again the things you did before the procedure.  · You may experience a slight burning sensation. You may notice a small amount of blood in your urine. This will clear up within a day. Call the clinic if this continues beyond 48 hours.     DIET  · Continue your normal diet. You may eat the same foods you ate before your procedure.  · Drink plenty of fluids during the first 24-48 hours following your procedure.     MEDICATIONS  · Resume all other previous medications from your prescribing physician.  · Continue any pre=procedure antibiotics until they are all gone.     SIGNS AND SYMPTOMS TO REPORT TO THE DOCTOR  · Chills or fever greater than 101° F within 24 hours of procedure.  · Changes in urination, such as increased bleeding, foul smell, cloudy urine, or painful urination.  · Call your doctor with any questions or concerns.     For any emergency situation, call 911 immediately or go to your nearest emergency room.

## 2022-05-11 NOTE — H&P
CHIEF COMPLAINT:     Mrs. Samuel is a 26 y.o. female presenting for a follow up on neurogenic bladder.  Patient status post nephrectomy after having perinephric abscess and non functioning right kidney     PRESENTING ILLNESS:     Shashank Samuel is a 26 y.o. female who returns accompanied by her mother.  She states she is doing well.  It took about 6 weeks for her to recover but she feels better now.  She has had more incontinence so her mother suspects she may be infected.  She is insensate cannot tell if she has dysuria or bladder pain.  She is self catheterizing every 2 hours per her mom.  Does not wake up to cath in the middle of the night.  I brought her in today to discuss doing another FUDS     She has a history of lumbar myelomeningocele with  shunt.  Underwent an ACE procedure with augmentation cystoplasty and Mitrofanoff at age 7.  She was noted to have right VU reflux but was instructed to perform intermittent self cath with < 500 ml in the bladder. Unclear if she was irrigating out her bladder.  She is challenging in that she often answers in a way that she thinks she should.      The patient's procedure was cancelled on 4/5/2022 as her urine was nitrite positive.  She was on a 10 day course of Keflex, reports that she has been good about irrigating the augmented bladder to remove the mucus.     REVIEW OF SYSTEMS:     Review of Systems   Constitutional: Negative.    HENT: Negative.    Eyes: Negative.    Respiratory: Negative.    Cardiovascular: Negative.    Gastrointestinal: Negative.    Genitourinary:        Urge incontinence   Musculoskeletal: Positive for back pain.   Skin: Negative.    Neurological:        Paraplegic   Endo/Heme/Allergies: Negative.    Psychiatric/Behavioral: Negative.          PATIENT HISTORY:          Past Medical History:   Diagnosis Date    Anemia      Arnold-Chiari malformation, type II      Encounter for blood transfusion      Hydrocephalus      Neurogenic bladder        self catheterizes every 3 hours    Seizures       only w/ shunt malfunction    Spinal bifida, closed       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               Past Surgical History:   Procedure Laterality Date    AMPUTATION         right 4th and 5th toes; left 5th toe and portion of left great toe    BACK SURGERY        BLADDER SURGERY        BREAST SURGERY   2015     Reduction    COLON SURGERY         flush site for bowel movements from appendix    CYSTOSTOMY        DEBRIDEMENT OF FOOT Right 1/8/2021     Procedure: DEBRIDEMENT, FOOT;  Surgeon: Abdi Bedoya DPM;  Location: [facility];  Service: Podiatry;  Laterality: Right;    EYE SURGERY         x 5    FLUOROSCOPIC URODYNAMIC STUDY N/A 3/12/2019     Procedure: URODYNAMIC STUDY, FLUOROSCOPIC;  Surgeon: Kenzie Charles MD;  Location: [facility] 1ST FLR;  Service: Urology;  Laterality: N/A;  1 hour    FLUOROSCOPIC URODYNAMIC STUDY N/A 4/4/2019     Procedure: URODYNAMIC STUDY, FLUOROSCOPIC;  Surgeon: Kenzie Charles MD;  Location: [facility] 1ST FLR;  Service: Urology;  Laterality: N/A;  1 hour    FOOT AMPUTATION THROUGH METATARSAL Right 10/28/2019     Procedure: AMPUTATION, FOOT, TRANSMETATARSAL;  Surgeon: Abdi Bedoya DPM;  Location: [facility];  Service: Podiatry;  Laterality: Right;    FOOT AMPUTATION THROUGH METATARSAL Right 3/27/2020     Procedure: AMPUTATION, FOOT, TRANSMETATARSAL;  Surgeon: Abdi Bedoya DPM;  Location: [facility];  Service: Podiatry;  Laterality: Right;  REVISION    MYELOMININGOCELE REPAIR        NEPHRECTOMY Right 11/4/2021     Procedure: Nephrectomy/ OPEN;  Surgeon: Dash Rosenthal MD;  Location: [facility] OR 2ND FLR;  Service: Urology;  Laterality: Right;  4HRS    NEPHROSTOMY Right 8/2/2021     Procedure: Nephrostomy and perinephric abscess drain;  Surgeon: Lillian Surgeon;  Location: [facility];  Service: Anesthesiology;  Laterality: Right;    STRABISMUS SURGERY         ou dr peña    VENTRICULOPERITONEAL SHUNT        WOUND  DEBRIDEMENT   3/27/2020     Procedure: DEBRIDEMENT, WOUND;  Surgeon: Abdi Bedoya DPM;  Location: [facility] OR;  Service: Podiatry;;               Family History   Problem Relation Age of Onset    Breast cancer Mother      Gout Father      Cataracts Maternal Grandmother      Myocarditis Sister           Social History              Socioeconomic History    Marital status: Single   Tobacco Use    Smoking status: Never Smoker    Smokeless tobacco: Never Used   Substance and Sexual Activity    Alcohol use: Not Currently    Drug use: Never    Sexual activity: Never   Social History Narrative     Intact family. Wheelchair bound.  Self catheterizes herself            Allergies:  Latex, natural rubber; Zofran [ondansetron hcl (pf)]; Bactrim  [sulfamethoxazole-trimethoprim]; Gardasil  [h papillomavirus vac,qval (pf)]; Menactra  [mening vac a,c,y,w135 dip (pf)]; Nitrofurantoin; Sulfa (sulfonamide antibiotics); Tramadol; Levaquin [levofloxacin]; and Macrobid [nitrofurantoin monohyd/m-cryst]     Medications:  Encounter Medications          Outpatient Encounter Medications as of 1/20/2022   Medication Sig Dispense Refill    collagenase (SANTYL) ointment Apply topically once daily.   0    [DISCONTINUED] acetaminophen (TYLENOL) 500 MG tablet Take 1,000 mg by mouth every 6 (six) hours as needed for Pain.         [DISCONTINUED] ciprofloxacin HCl (CIPRO) 500 MG tablet Take 500 mg by mouth every 12 (twelve) hours.        [DISCONTINUED] doxycycline (DORYX) 100 MG EC tablet Take 500 mg by mouth once daily.        [DISCONTINUED] ibuprofen (ADVIL,MOTRIN) 200 MG tablet Take 400 mg by mouth every 6 (six) hours as needed for Pain.        [DISCONTINUED] oxyCODONE (ROXICODONE) 5 MG immediate release tablet Take 1 tablet (5 mg total) by mouth every 4 (four) hours as needed for Pain. 17 tablet 0    [DISCONTINUED] promethazine (PHENERGAN) 25 MG suppository Place 25 mg rectally every 4 (four) hours as needed for Nausea.            No facility-administered encounter medications on file as of 1/20/2022.               PHYSICAL EXAMINATION:     The patient generally appears in good health, is appropriately interactive, and is in no apparent distress.     Skin: No lesions.     Mental: Cooperative with normal affect.     Neuro: Grossly intact.     HEENT: Normal. No evidence of lymphadenopathy.     Chest:  normal inspiratory effort.     Abdomen:  Soft, non-tender. No masses or organomegaly. Bladder is not palpable. No evidence of flank discomfort. No evidence of inguinal hernia.  Scar on the abdomen is well healed     Extremities: No clubbing, cyanosis, or edema        LABS:           Lab Results   Component Value Date     BUN 15 11/08/2021     CREATININE 0.5 11/08/2021         IMPRESSION:          Encounter Diagnoses   Name Primary?    Neurogenic bladder Yes         PLAN:     1.  For FUDS

## 2022-07-13 ENCOUNTER — TELEPHONE (OUTPATIENT)
Dept: NEUROLOGY | Facility: CLINIC | Age: 27
End: 2022-07-13
Payer: MEDICAID

## 2022-07-13 NOTE — TELEPHONE ENCOUNTER
----- Message from Danelle Ho MA sent at 7/13/2022  2:39 PM CDT -----  Contact: pt    ----- Message -----  From: Anastacio Quigley  Sent: 7/13/2022  12:49 PM CDT  To: , #    Pt mother requesting call back RE: Pt mother states that pt is starting to have these seizures that she has never seen before, and would like to get her daughter seen asap        Confirmed contact below:  Contact Name:Shashank Samuel  Phone Number: [phone]

## 2022-08-17 ENCOUNTER — LAB VISIT (OUTPATIENT)
Dept: LAB | Facility: HOSPITAL | Age: 27
End: 2022-08-17
Payer: MEDICAID

## 2022-08-17 ENCOUNTER — OFFICE VISIT (OUTPATIENT)
Dept: NEUROLOGY | Facility: CLINIC | Age: 27
End: 2022-08-17
Payer: MEDICAID

## 2022-08-17 VITALS
DIASTOLIC BLOOD PRESSURE: 87 MMHG | SYSTOLIC BLOOD PRESSURE: 132 MMHG | HEIGHT: 57 IN | HEART RATE: 97 BPM | BODY MASS INDEX: 22.01 KG/M2 | WEIGHT: 102 LBS

## 2022-08-17 DIAGNOSIS — R56.9 WITNESSED SEIZURE-LIKE ACTIVITY: Primary | ICD-10-CM

## 2022-08-17 DIAGNOSIS — R56.9 WITNESSED SEIZURE-LIKE ACTIVITY: ICD-10-CM

## 2022-08-17 DIAGNOSIS — R56.9 CONVULSIONS, UNSPECIFIED CONVULSION TYPE: ICD-10-CM

## 2022-08-17 DIAGNOSIS — Z87.898 HISTORY OF SEIZURES: ICD-10-CM

## 2022-08-17 LAB
ALBUMIN SERPL BCP-MCNC: 4 G/DL (ref 3.5–5.2)
ALP SERPL-CCNC: 57 U/L (ref 55–135)
ALT SERPL W/O P-5'-P-CCNC: 46 U/L (ref 10–44)
ANION GAP SERPL CALC-SCNC: 7 MMOL/L (ref 8–16)
AST SERPL-CCNC: 29 U/L (ref 10–40)
BASOPHILS # BLD AUTO: 0.03 K/UL (ref 0–0.2)
BASOPHILS NFR BLD: 0.3 % (ref 0–1.9)
BILIRUB SERPL-MCNC: 0.3 MG/DL (ref 0.1–1)
BUN SERPL-MCNC: 20 MG/DL (ref 6–20)
CALCIUM SERPL-MCNC: 9.3 MG/DL (ref 8.7–10.5)
CHLORIDE SERPL-SCNC: 109 MMOL/L (ref 95–110)
CO2 SERPL-SCNC: 23 MMOL/L (ref 23–29)
CREAT SERPL-MCNC: 0.6 MG/DL (ref 0.5–1.4)
DIFFERENTIAL METHOD: ABNORMAL
EOSINOPHIL # BLD AUTO: 0.1 K/UL (ref 0–0.5)
EOSINOPHIL NFR BLD: 0.9 % (ref 0–8)
ERYTHROCYTE [DISTWIDTH] IN BLOOD BY AUTOMATED COUNT: 15.7 % (ref 11.5–14.5)
EST. GFR  (NO RACE VARIABLE): >60 ML/MIN/1.73 M^2
GLUCOSE SERPL-MCNC: 84 MG/DL (ref 70–110)
HCT VFR BLD AUTO: 39.4 % (ref 37–48.5)
HGB BLD-MCNC: 12.6 G/DL (ref 12–16)
IMM GRANULOCYTES # BLD AUTO: 0.06 K/UL (ref 0–0.04)
IMM GRANULOCYTES NFR BLD AUTO: 0.6 % (ref 0–0.5)
LYMPHOCYTES # BLD AUTO: 2.1 K/UL (ref 1–4.8)
LYMPHOCYTES NFR BLD: 21.9 % (ref 18–48)
MAGNESIUM SERPL-MCNC: 1.9 MG/DL (ref 1.6–2.6)
MCH RBC QN AUTO: 27.5 PG (ref 27–31)
MCHC RBC AUTO-ENTMCNC: 32 G/DL (ref 32–36)
MCV RBC AUTO: 86 FL (ref 82–98)
MONOCYTES # BLD AUTO: 0.7 K/UL (ref 0.3–1)
MONOCYTES NFR BLD: 7.7 % (ref 4–15)
NEUTROPHILS # BLD AUTO: 6.4 K/UL (ref 1.8–7.7)
NEUTROPHILS NFR BLD: 68.6 % (ref 38–73)
NRBC BLD-RTO: 0 /100 WBC
PLATELET # BLD AUTO: 304 K/UL (ref 150–450)
PMV BLD AUTO: 9.9 FL (ref 9.2–12.9)
POTASSIUM SERPL-SCNC: 4.1 MMOL/L (ref 3.5–5.1)
PROT SERPL-MCNC: 7.6 G/DL (ref 6–8.4)
RBC # BLD AUTO: 4.58 M/UL (ref 4–5.4)
SODIUM SERPL-SCNC: 139 MMOL/L (ref 136–145)
WBC # BLD AUTO: 9.36 K/UL (ref 3.9–12.7)

## 2022-08-17 PROCEDURE — 3008F BODY MASS INDEX DOCD: CPT | Mod: CPTII,,, | Performed by: STUDENT IN AN ORGANIZED HEALTH CARE EDUCATION/TRAINING PROGRAM

## 2022-08-17 PROCEDURE — 99999 PR PBB SHADOW E&M-EST. PATIENT-LVL III: ICD-10-PCS | Mod: PBBFAC,,,

## 2022-08-17 PROCEDURE — 99205 OFFICE O/P NEW HI 60 MIN: CPT | Mod: S$PBB,,, | Performed by: STUDENT IN AN ORGANIZED HEALTH CARE EDUCATION/TRAINING PROGRAM

## 2022-08-17 PROCEDURE — 36415 COLL VENOUS BLD VENIPUNCTURE: CPT

## 2022-08-17 PROCEDURE — 99213 OFFICE O/P EST LOW 20 MIN: CPT | Mod: PBBFAC

## 2022-08-17 PROCEDURE — 3079F DIAST BP 80-89 MM HG: CPT | Mod: CPTII,,, | Performed by: STUDENT IN AN ORGANIZED HEALTH CARE EDUCATION/TRAINING PROGRAM

## 2022-08-17 PROCEDURE — 80053 COMPREHEN METABOLIC PANEL: CPT

## 2022-08-17 PROCEDURE — 3075F PR MOST RECENT SYSTOLIC BLOOD PRESS GE 130-139MM HG: ICD-10-PCS | Mod: CPTII,,, | Performed by: STUDENT IN AN ORGANIZED HEALTH CARE EDUCATION/TRAINING PROGRAM

## 2022-08-17 PROCEDURE — 83735 ASSAY OF MAGNESIUM: CPT

## 2022-08-17 PROCEDURE — 3075F SYST BP GE 130 - 139MM HG: CPT | Mod: CPTII,,, | Performed by: STUDENT IN AN ORGANIZED HEALTH CARE EDUCATION/TRAINING PROGRAM

## 2022-08-17 PROCEDURE — 99999 PR PBB SHADOW E&M-EST. PATIENT-LVL III: CPT | Mod: PBBFAC,,,

## 2022-08-17 PROCEDURE — 85025 COMPLETE CBC W/AUTO DIFF WBC: CPT

## 2022-08-17 PROCEDURE — 1159F PR MEDICATION LIST DOCUMENTED IN MEDICAL RECORD: ICD-10-PCS | Mod: CPTII,,, | Performed by: STUDENT IN AN ORGANIZED HEALTH CARE EDUCATION/TRAINING PROGRAM

## 2022-08-17 PROCEDURE — 1159F MED LIST DOCD IN RCRD: CPT | Mod: CPTII,,, | Performed by: STUDENT IN AN ORGANIZED HEALTH CARE EDUCATION/TRAINING PROGRAM

## 2022-08-17 PROCEDURE — 3079F PR MOST RECENT DIASTOLIC BLOOD PRESSURE 80-89 MM HG: ICD-10-PCS | Mod: CPTII,,, | Performed by: STUDENT IN AN ORGANIZED HEALTH CARE EDUCATION/TRAINING PROGRAM

## 2022-08-17 PROCEDURE — 99205 PR OFFICE/OUTPT VISIT, NEW, LEVL V, 60-74 MIN: ICD-10-PCS | Mod: S$PBB,,, | Performed by: STUDENT IN AN ORGANIZED HEALTH CARE EDUCATION/TRAINING PROGRAM

## 2022-08-17 PROCEDURE — 3008F PR BODY MASS INDEX (BMI) DOCUMENTED: ICD-10-PCS | Mod: CPTII,,, | Performed by: STUDENT IN AN ORGANIZED HEALTH CARE EDUCATION/TRAINING PROGRAM

## 2022-08-17 RX ORDER — FERROUS SULFATE 325(65) MG
325 TABLET ORAL 2 TIMES DAILY
COMMUNITY

## 2022-08-17 RX ORDER — LEVETIRACETAM 500 MG/1
500 TABLET ORAL 2 TIMES DAILY
Qty: 60 TABLET | Refills: 11 | Status: ON HOLD | OUTPATIENT
Start: 2022-08-17 | End: 2022-09-24 | Stop reason: SDUPTHER

## 2022-08-17 RX ORDER — IBUPROFEN 200 MG
200 TABLET ORAL EVERY 6 HOURS PRN
COMMUNITY

## 2022-08-17 NOTE — PROGRESS NOTES
[facility] - NEUROLOGY [address] [facility], [place]    Date: 8/17/22  Patient Name: Shashank Samuel   MRN: [id number]   PCP: Verito Scott  Referring Provider:     Assessment:   Shashank Samuel is a 27 y.o. female presenting with seizure-like activity of new onset (though she does have a history of seizures in the past). 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 See HPI for detailed seizure event types.    Patient hasn't had any seizure events since Nov 2021 and non prior to that going back to 2012. Her recent episodes restarted March 2022, since which she has had 5 episodes, lasting up to 30 seconds. In this latest episode, the patient's R arm dropped, followed by vocal grunts. Mom has a video of the event. Patient appears to have a staring spell with eyes fixed in position. These new episodes are always followed by post-ictal cries, and proceeded by hunger. Patient has been drinking a lot of water recently. AEDs: Patient has not been on any anti-epileptics since 2020, before which she was on Keppra 500 BID.    Patient's seizure threshold likely lowered by changes in electrolyte abnormalities from excessive hydration, and possibly low glucose levels. Given that she was previously treated with levetiracetam & that this medication prescription fell through approximately 2 years ago, patient needs to be placed back on AEDs    Plan:     Patient counseled on seizure precautions  until six months seizure free including no driving or operating heavy machinery, no submerging in water, take showers instead of baths whenever possible, do not care for children alone, caution when using hot objects especially cooking and do not scale ladders or heights unsupported or unaccompanied.     -- restart Keppra 500 mg BID  -- ambulatory  EEG  -- Head CT (patient can't have MRI 2/2 metal / devices)  -- CBC/CMP/Mg labs  -- follow up in 1-2 months    Problem List Items Addressed This Visit        Neuro    History of seizures    Relevant Medications    levETIRAcetam (KEPPRA) 500 MG Tab    Other Relevant Orders    CT Head Without Contrast    Ambulatory EEG monitoring      Other Visit Diagnoses     Witnessed seizure-like activity    -  Primary    Relevant Medications    levETIRAcetam (KEPPRA) 500 MG Tab    Other Relevant Orders    CBC W/ AUTO DIFFERENTIAL    COMPREHENSIVE METABOLIC PANEL    Magnesium    CT Head Without Contrast    Ambulatory EEG monitoring    Convulsions, unspecified convulsion type        Relevant Orders    CT Head Without Contrast    Ambulatory EEG monitoring          08/17/2022  Robert More MD, [facility]  Neurology resident, PGY-1  Department of Neurology  [facility]        Subjective:      HPI:   Ms. Shashank Samuel is a 27 y.o. female presenting with seizure-like activity of new onset (though she does have a history of seizures in the past). 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    Type 1: First seizure event started at the age of 5, 2/2 to a reported shunt malfunction. Since then, she had episodes twice a year until the age of 10 at which point her seizures stopped. She had a repeat single seizure episode after a shunt malfunction in 2012. These episodes were associated with an abnormal R hand claw gesture as well as a 1 Hz rhythmic elbow extension.    New Type 2: She hasn't had any other seizure events  since then until March 2022, since which she has had 5 episodes, lasting up to 30 seconds. In this latest episode, the patient's R arm dropped, followed by vocal grunts. Mom has a video of the event. Patient appears to have a staring spell with eyes fixed in position. These new episodes are always followed by post-ictal cries, and proceeded by hunger. Patient has been drinking a lot of water recently.    AEDs: Patient has not been on any anti-epileptics since 2020, before which she was on Keppra 500 BID.      PAST MEDICAL HISTORY:  Past Medical History:   Diagnosis Date    Anemia     Arnold-Chiari malformation, type II     Encounter for blood transfusion     Hydrocephalus     Neurogenic bladder     self catheterizes every 3 hours    Seizures     only w/ shunt malfunction    Spinal bifida, closed     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       PAST SURGICAL HISTORY:  Past Surgical History:   Procedure Laterality Date    AMPUTATION      right 4th and 5th toes; left 5th toe and portion of left great toe    BACK SURGERY      BLADDER SURGERY      BREAST SURGERY  2015    Reduction    COLON SURGERY      flush site for bowel movements from appendix    CYSTOSTOMY      DEBRIDEMENT OF FOOT Right 1/8/2021    Procedure: DEBRIDEMENT, FOOT;  Surgeon: Abdi Bedoya DPM;  Location: [facility];  Service: Podiatry;  Laterality: Right;    EYE SURGERY      x 5    FLUOROSCOPIC URODYNAMIC STUDY N/A 3/12/2019    Procedure: URODYNAMIC STUDY, FLUOROSCOPIC;  Surgeon: Kenzie Charles MD;  Location: [address];  Service: Urology;  Laterality: N/A;  1 hour    FLUOROSCOPIC URODYNAMIC STUDY N/A 4/4/2019    Procedure: URODYNAMIC STUDY, FLUOROSCOPIC;  Surgeon: Kenzie Charles MD;  Location: [address];  Service: Urology;  Laterality: N/A;  1 hour    FLUOROSCOPIC URODYNAMIC STUDY N/A 5/11/2022    Procedure: URODYNAMIC STUDY, FLUOROSCOPIC;  Surgeon: Kenzie Charles MD;  Location: [address];  Service: Urology;  Laterality: N/A;   90min    FOOT AMPUTATION THROUGH METATARSAL Right 10/28/2019    Procedure: AMPUTATION, FOOT, TRANSMETATARSAL;  Surgeon: Abdi Bedoya DPM;  Location: [facility] OR;  Service: Podiatry;  Laterality: Right;    FOOT AMPUTATION THROUGH METATARSAL Right 3/27/2020    Procedure: AMPUTATION, FOOT, TRANSMETATARSAL;  Surgeon: Abdi Bedoya DPM;  Location: [facility] OR;  Service: Podiatry;  Laterality: Right;  REVISION    MYELOMININGOCELE REPAIR      NEPHRECTOMY Right 11/4/2021    Procedure: Nephrectomy/ OPEN;  Surgeon: aDsh Rosenthal MD;  Location: [facility] 2ND FLR;  Service: Urology;  Laterality: Right;  4HRS    NEPHROSTOMY Right 8/2/2021    Procedure: Nephrostomy and perinephric abscess drain;  Surgeon: Meg Surgeon;  Location: [facility];  Service: Anesthesiology;  Laterality: Right;    STRABISMUS SURGERY      ou dr peña    VENTRICULOPERITONEAL SHUNT      WOUND DEBRIDEMENT  3/27/2020    Procedure: DEBRIDEMENT, WOUND;  Surgeon: Abdi Bedoya DPM;  Location: [facility];  Service: Podiatry;;       CURRENT MEDS:  Current Outpatient Medications   Medication Sig Dispense Refill    acetaminophen (TYLENOL) 500 MG tablet Take 1,000 mg by mouth every 6 (six) hours as needed for Pain.      ferrous sulfate (FEOSOL) 325 mg (65 mg iron) Tab tablet Take 325 mg by mouth 2 (two) times daily.      ibuprofen (ADVIL,MOTRIN) 200 MG tablet Take 200 mg by mouth every 6 (six) hours as needed for Pain.      cephALEXin (KEFLEX) 500 MG capsule Take 1 capsule (500 mg total) by mouth every evening. Start after you complete the cipro. (Patient not taking: Reported on 8/17/2022) 30 capsule 0    collagenase (SANTYL) ointment Apply topically once daily. (Patient not taking: Reported on 8/17/2022)  0    levETIRAcetam (KEPPRA) 500 MG Tab Take 1 tablet (500 mg total) by mouth 2 (two) times daily. 60 tablet 11     No current facility-administered medications for this visit.       ALLERGIES:  Review of patient's allergies indicates:  "  Allergen Reactions    Latex, natural rubber Anaphylaxis and Other (See Comments)     angioedema    Zofran [ondansetron hcl (pf)] Swelling     Hives, "throat closes up"    Gardasil  [h papillomavirus vac,qval (pf)]      Other reaction(s): Shortness of breath    Menactra  [mening vac a,c,y,w135 dip (pf)]      Other reaction(s): Shortness of breath    Nitrofurantoin      Other reaction(s): Difficulty breathing    Sulfa (sulfonamide antibiotics)     Tramadol Hives    Levaquin [levofloxacin] Rash     Spots on face    Macrobid [nitrofurantoin monohyd/m-cryst] Rash     Sppots/rash on face       FAMILY HISTORY:  Family History   Problem Relation Age of Onset    Breast cancer Mother     Gout Father     Cataracts Maternal Grandmother     Myocarditis Sister     Amblyopia Neg Hx     Blindness Neg Hx     Cancer Neg Hx     Diabetes Neg Hx     Glaucoma Neg Hx     Hypertension Neg Hx     Macular degeneration Neg Hx     Retinal detachment Neg Hx     Strabismus Neg Hx     Stroke Neg Hx     Thyroid disease Neg Hx        SOCIAL HISTORY:  Social History     Tobacco Use    Smoking status: Never Smoker    Smokeless tobacco: Never Used   Substance Use Topics    Alcohol use: Not Currently    Drug use: Never       Review of Systems:  12 system review of systems is negative except for the symptoms mentioned in HPI.        Objective:     Vitals:    08/17/22 1314   BP: 132/87   BP Location: Right arm   Patient Position: Sitting   BP Method: Large (Automatic)   Pulse: 97   Weight: 46.3 kg (102 lb)   Height: 4' 8.5" (1.435 m)     General: NAD, well nourished   Eyes: no tearing, discharge, no erythema   ENT: moist mucous membranes of the oral cavity, nares patent    Neck: Supple, full range of motion  Cardiovascular: Warm and well perfused, pulses equal and symmetrical  Lungs: Normal work of breathing, normal chest wall excursions  Skin: No rash, lesions, or breakdown on exposed skin  Psychiatry: Mood and affect are " appropriate   Abdomen: soft, non tender, non distended  Extremeties: No cyanosis, clubbing or edema.    Neurological   MENTAL STATUS: Alert and oriented to person, place, and time. Attention and concentration within normal limits. Speech without dysarthria, able to name and repeat without difficulty. Recent and remote memory within normal limits   CRANIAL NERVES: Visual fields intact. PERRL. EOMI (decrased in range 2/2 eye surguries). Facial sensation intact. Face symmetrical. Hearing grossly intact. Full shoulder shrug bilaterally. Tongue protrudes midline   SENSORY: Sensation is intact to pin and light touch to approximately T7 level.    MOTOR: Normal bulk and tone in UE. No pronator drift.  5/5 deltoid, biceps, triceps, interosseous, hand  bilaterally. 0/5 in LE  REFLEXES: Symmetric and 2+ in UE, absent in LE.  CEREBELLAR/COORDINATION/GAIT: Finger to nose intact. Normal rapid alternating movements.

## 2022-08-17 NOTE — PATIENT INSTRUCTIONS
Dear Ms Shashank Samuel, you were seen in the neurology clinic for multiple recent episode of seizure-like activity. As we discussed, there are multiple factors that can contribute to lowering seizure threshold, & result in seizures in someone with preexistent propensity for the.  These include changes in electrolyte levels, infections, stress, lack of sleep. Given that you were previously treated with levetiracetam 500 mg, & that this medication prescription fell through approximately 2 years ago, the plan moving forward is to restart this medication with expectation that your seizures would cease.  We also recommend CT imaging and ambulatory EEG. Please contact us with questions or concerns, or if any seizure episodes occur.

## 2022-09-20 ENCOUNTER — HOSPITAL ENCOUNTER (EMERGENCY)
Facility: HOSPITAL | Age: 27
Discharge: ANOTHER HOSPITAL | End: 2022-09-20
Attending: EMERGENCY MEDICINE
Payer: MEDICAID

## 2022-09-20 VITALS
BODY MASS INDEX: 18.77 KG/M2 | RESPIRATION RATE: 27 BRPM | HEART RATE: 136 BPM | WEIGHT: 102 LBS | OXYGEN SATURATION: 98 % | DIASTOLIC BLOOD PRESSURE: 87 MMHG | HEIGHT: 62 IN | SYSTOLIC BLOOD PRESSURE: 123 MMHG | TEMPERATURE: 99 F

## 2022-09-20 DIAGNOSIS — R56.9 SEIZURE: ICD-10-CM

## 2022-09-20 DIAGNOSIS — R41.82 AMS (ALTERED MENTAL STATUS): ICD-10-CM

## 2022-09-20 DIAGNOSIS — G40.901 STATUS EPILEPTICUS: Primary | ICD-10-CM

## 2022-09-20 DIAGNOSIS — L89.90 PRESSURE INJURY OF SKIN, UNSPECIFIED INJURY STAGE, UNSPECIFIED LOCATION: ICD-10-CM

## 2022-09-20 LAB
ALBUMIN SERPL BCP-MCNC: 4.2 G/DL (ref 3.5–5.2)
ALLENS TEST: ABNORMAL
ALP SERPL-CCNC: 58 U/L (ref 55–135)
ALT SERPL W/O P-5'-P-CCNC: 20 U/L (ref 10–44)
ANION GAP SERPL CALC-SCNC: 13 MMOL/L (ref 8–16)
AST SERPL-CCNC: 20 U/L (ref 10–40)
BACTERIA #/AREA URNS HPF: ABNORMAL /HPF
BASOPHILS # BLD AUTO: 0.03 K/UL (ref 0–0.2)
BASOPHILS NFR BLD: 0.1 % (ref 0–1.9)
BILIRUB SERPL-MCNC: 0.7 MG/DL (ref 0.1–1)
BILIRUB UR QL STRIP: NEGATIVE
BUN SERPL-MCNC: 18 MG/DL (ref 6–20)
CALCIUM SERPL-MCNC: 8.8 MG/DL (ref 8.7–10.5)
CHLORIDE SERPL-SCNC: 107 MMOL/L (ref 95–110)
CLARITY UR: ABNORMAL
CO2 SERPL-SCNC: 18 MMOL/L (ref 23–29)
COLOR UR: YELLOW
CREAT SERPL-MCNC: 0.4 MG/DL (ref 0.5–1.4)
DELSYS: ABNORMAL
DIFFERENTIAL METHOD: ABNORMAL
EOSINOPHIL # BLD AUTO: 0 K/UL (ref 0–0.5)
EOSINOPHIL NFR BLD: 0 % (ref 0–8)
ERYTHROCYTE [DISTWIDTH] IN BLOOD BY AUTOMATED COUNT: 14.7 % (ref 11.5–14.5)
ERYTHROCYTE [SEDIMENTATION RATE] IN BLOOD BY WESTERGREN METHOD: 14 MM/H
EST. GFR  (NO RACE VARIABLE): >60 ML/MIN/1.73 M^2
FIO2: 40
GLUCOSE SERPL-MCNC: 126 MG/DL (ref 70–110)
GLUCOSE SERPL-MCNC: 135 MG/DL (ref 70–110)
GLUCOSE UR QL STRIP: NEGATIVE
HCO3 UR-SCNC: 20.1 MMOL/L (ref 24–28)
HCT VFR BLD AUTO: 41.9 % (ref 37–48.5)
HGB BLD-MCNC: 13.4 G/DL (ref 12–16)
HGB UR QL STRIP: ABNORMAL
HYALINE CASTS #/AREA URNS LPF: 62 /LPF
IMM GRANULOCYTES # BLD AUTO: 0.13 K/UL (ref 0–0.04)
IMM GRANULOCYTES NFR BLD AUTO: 0.6 % (ref 0–0.5)
KETONES UR QL STRIP: NEGATIVE
LACTATE SERPL-SCNC: 2.6 MMOL/L (ref 0.5–1.9)
LEUKOCYTE ESTERASE UR QL STRIP: ABNORMAL
LYMPHOCYTES # BLD AUTO: 1.1 K/UL (ref 1–4.8)
LYMPHOCYTES NFR BLD: 5 % (ref 18–48)
MAGNESIUM SERPL-MCNC: 1.9 MG/DL (ref 1.6–2.6)
MCH RBC QN AUTO: 27.7 PG (ref 27–31)
MCHC RBC AUTO-ENTMCNC: 32 G/DL (ref 32–36)
MCV RBC AUTO: 87 FL (ref 82–98)
MICROSCOPIC COMMENT: ABNORMAL
MIN VOL: 10
MODE: ABNORMAL
MONOCYTES # BLD AUTO: 1 K/UL (ref 0.3–1)
MONOCYTES NFR BLD: 4.5 % (ref 4–15)
NEUTROPHILS # BLD AUTO: 19.7 K/UL (ref 1.8–7.7)
NEUTROPHILS NFR BLD: 89.8 % (ref 38–73)
NITRITE UR QL STRIP: NEGATIVE
NRBC BLD-RTO: 0 /100 WBC
PCO2 BLDA: 38.2 MMHG (ref 35–45)
PEEP: 5
PH SMN: 7.33 [PH] (ref 7.35–7.45)
PH UR STRIP: 7 [PH] (ref 5–8)
PIP: 23
PLATELET # BLD AUTO: 364 K/UL (ref 150–450)
PMV BLD AUTO: 9.5 FL (ref 9.2–12.9)
PO2 BLDA: 272 MMHG (ref 80–100)
POC BE: -6 MMOL/L
POC SATURATED O2: 100 % (ref 95–100)
POC TCO2: 21 MMOL/L (ref 23–27)
POTASSIUM SERPL-SCNC: 3.8 MMOL/L (ref 3.5–5.1)
PROT SERPL-MCNC: 8 G/DL (ref 6–8.4)
PROT UR QL STRIP: ABNORMAL
RBC # BLD AUTO: 4.84 M/UL (ref 4–5.4)
RBC #/AREA URNS HPF: 3 /HPF (ref 0–4)
SAMPLE: ABNORMAL
SARS-COV-2 RDRP RESP QL NAA+PROBE: NEGATIVE
SITE: ABNORMAL
SODIUM SERPL-SCNC: 138 MMOL/L (ref 136–145)
SP GR UR STRIP: 1.01 (ref 1–1.03)
SP02: 100
SQUAMOUS #/AREA URNS HPF: 1 /HPF
URN SPEC COLLECT METH UR: ABNORMAL
UROBILINOGEN UR STRIP-ACNC: NEGATIVE EU/DL
VT: 350
WBC # BLD AUTO: 21.99 K/UL (ref 3.9–12.7)
WBC #/AREA URNS HPF: 46 /HPF (ref 0–5)

## 2022-09-20 PROCEDURE — U0002 COVID-19 LAB TEST NON-CDC: HCPCS | Performed by: EMERGENCY MEDICINE

## 2022-09-20 PROCEDURE — 80177 DRUG SCRN QUAN LEVETIRACETAM: CPT | Performed by: EMERGENCY MEDICINE

## 2022-09-20 PROCEDURE — 94002 VENT MGMT INPAT INIT DAY: CPT

## 2022-09-20 PROCEDURE — 83735 ASSAY OF MAGNESIUM: CPT | Performed by: EMERGENCY MEDICINE

## 2022-09-20 PROCEDURE — 81001 URINALYSIS AUTO W/SCOPE: CPT | Performed by: EMERGENCY MEDICINE

## 2022-09-20 PROCEDURE — 93010 EKG 12-LEAD: ICD-10-PCS | Mod: ,,, | Performed by: SPECIALIST

## 2022-09-20 PROCEDURE — 96376 TX/PRO/DX INJ SAME DRUG ADON: CPT | Mod: 59

## 2022-09-20 PROCEDURE — 94760 N-INVAS EAR/PLS OXIMETRY 1: CPT | Mod: XB

## 2022-09-20 PROCEDURE — 87040 BLOOD CULTURE FOR BACTERIA: CPT | Performed by: EMERGENCY MEDICINE

## 2022-09-20 PROCEDURE — 96365 THER/PROPH/DIAG IV INF INIT: CPT

## 2022-09-20 PROCEDURE — 80053 COMPREHEN METABOLIC PANEL: CPT | Performed by: EMERGENCY MEDICINE

## 2022-09-20 PROCEDURE — 95819 EEG AWAKE AND ASLEEP: CPT | Mod: 59

## 2022-09-20 PROCEDURE — 94799 UNLISTED PULMONARY SVC/PX: CPT

## 2022-09-20 PROCEDURE — 99900035 HC TECH TIME PER 15 MIN (STAT)

## 2022-09-20 PROCEDURE — 25000003 PHARM REV CODE 250: Performed by: INTERNAL MEDICINE

## 2022-09-20 PROCEDURE — 63600175 PHARM REV CODE 636 W HCPCS: Performed by: EMERGENCY MEDICINE

## 2022-09-20 PROCEDURE — 82803 BLOOD GASES ANY COMBINATION: CPT

## 2022-09-20 PROCEDURE — 83605 ASSAY OF LACTIC ACID: CPT | Performed by: EMERGENCY MEDICINE

## 2022-09-20 PROCEDURE — 96368 THER/DIAG CONCURRENT INF: CPT

## 2022-09-20 PROCEDURE — 31500 INSERT EMERGENCY AIRWAY: CPT

## 2022-09-20 PROCEDURE — 51702 INSERT TEMP BLADDER CATH: CPT

## 2022-09-20 PROCEDURE — 36600 WITHDRAWAL OF ARTERIAL BLOOD: CPT

## 2022-09-20 PROCEDURE — 85025 COMPLETE CBC W/AUTO DIFF WBC: CPT | Performed by: EMERGENCY MEDICINE

## 2022-09-20 PROCEDURE — 96375 TX/PRO/DX INJ NEW DRUG ADDON: CPT | Mod: 59

## 2022-09-20 PROCEDURE — 27000221 HC OXYGEN, UP TO 24 HOURS

## 2022-09-20 PROCEDURE — 63600175 PHARM REV CODE 636 W HCPCS: Performed by: INTERNAL MEDICINE

## 2022-09-20 PROCEDURE — 25000003 PHARM REV CODE 250: Performed by: EMERGENCY MEDICINE

## 2022-09-20 PROCEDURE — 96367 TX/PROPH/DG ADDL SEQ IV INF: CPT

## 2022-09-20 PROCEDURE — 96374 THER/PROPH/DIAG INJ IV PUSH: CPT | Mod: 59

## 2022-09-20 PROCEDURE — 82962 GLUCOSE BLOOD TEST: CPT

## 2022-09-20 PROCEDURE — 99900031 HC PATIENT EDUCATION (STAT)

## 2022-09-20 PROCEDURE — 94761 N-INVAS EAR/PLS OXIMETRY MLT: CPT

## 2022-09-20 PROCEDURE — 63600175 PHARM REV CODE 636 W HCPCS

## 2022-09-20 PROCEDURE — 99900026 HC AIRWAY MAINTENANCE (STAT)

## 2022-09-20 PROCEDURE — 99291 CRITICAL CARE FIRST HOUR: CPT | Mod: 25

## 2022-09-20 PROCEDURE — 93010 ELECTROCARDIOGRAM REPORT: CPT | Mod: ,,, | Performed by: SPECIALIST

## 2022-09-20 PROCEDURE — 93005 ELECTROCARDIOGRAM TRACING: CPT | Performed by: SPECIALIST

## 2022-09-20 RX ORDER — MIDAZOLAM HYDROCHLORIDE 1 MG/ML
5 INJECTION INTRAMUSCULAR; INTRAVENOUS ONCE
Status: DISCONTINUED | OUTPATIENT
Start: 2022-09-20 | End: 2022-09-20

## 2022-09-20 RX ORDER — LORAZEPAM 2 MG/ML
1 INJECTION INTRAMUSCULAR
Status: COMPLETED | OUTPATIENT
Start: 2022-09-20 | End: 2022-09-20

## 2022-09-20 RX ORDER — LEVETIRACETAM 10 MG/ML
1000 INJECTION INTRAVASCULAR
Status: DISCONTINUED | OUTPATIENT
Start: 2022-09-20 | End: 2022-09-20

## 2022-09-20 RX ORDER — SODIUM CHLORIDE 9 MG/ML
1000 INJECTION, SOLUTION INTRAVENOUS
Status: COMPLETED | OUTPATIENT
Start: 2022-09-20 | End: 2022-09-20

## 2022-09-20 RX ORDER — ETOMIDATE 2 MG/ML
0.3 INJECTION INTRAVENOUS
Status: DISCONTINUED | OUTPATIENT
Start: 2022-09-20 | End: 2022-09-20

## 2022-09-20 RX ORDER — PROPOFOL 10 MG/ML
INJECTION, EMULSION INTRAVENOUS
Status: DISCONTINUED
Start: 2022-09-20 | End: 2022-09-21 | Stop reason: HOSPADM

## 2022-09-20 RX ORDER — LORAZEPAM 2 MG/ML
INJECTION INTRAMUSCULAR
Status: COMPLETED
Start: 2022-09-20 | End: 2022-09-20

## 2022-09-20 RX ORDER — LORAZEPAM 2 MG/ML
INJECTION INTRAMUSCULAR CODE/TRAUMA/SEDATION MEDICATION
Status: COMPLETED | OUTPATIENT
Start: 2022-09-20 | End: 2022-09-20

## 2022-09-20 RX ORDER — ROCURONIUM BROMIDE 10 MG/ML
INJECTION, SOLUTION INTRAVENOUS CODE/TRAUMA/SEDATION MEDICATION
Status: COMPLETED | OUTPATIENT
Start: 2022-09-20 | End: 2022-09-20

## 2022-09-20 RX ORDER — LEVETIRACETAM 5 MG/ML
500 INJECTION INTRAVASCULAR
Status: DISCONTINUED | OUTPATIENT
Start: 2022-09-20 | End: 2022-09-20

## 2022-09-20 RX ORDER — CEFEPIME HYDROCHLORIDE 1 G/50ML
1 INJECTION, SOLUTION INTRAVENOUS
Status: COMPLETED | OUTPATIENT
Start: 2022-09-20 | End: 2022-09-20

## 2022-09-20 RX ORDER — LORAZEPAM 2 MG/ML
2 INJECTION INTRAMUSCULAR
Status: DISCONTINUED | OUTPATIENT
Start: 2022-09-20 | End: 2022-09-20

## 2022-09-20 RX ORDER — MIDAZOLAM HYDROCHLORIDE 5 MG/ML
INJECTION INTRAMUSCULAR; INTRAVENOUS CODE/TRAUMA/SEDATION MEDICATION
Status: COMPLETED | OUTPATIENT
Start: 2022-09-20 | End: 2022-09-20

## 2022-09-20 RX ORDER — ROCURONIUM BROMIDE 10 MG/ML
1 INJECTION, SOLUTION INTRAVENOUS ONCE
Status: DISCONTINUED | OUTPATIENT
Start: 2022-09-20 | End: 2022-09-20

## 2022-09-20 RX ORDER — ETOMIDATE 2 MG/ML
INJECTION INTRAVENOUS CODE/TRAUMA/SEDATION MEDICATION
Status: COMPLETED | OUTPATIENT
Start: 2022-09-20 | End: 2022-09-20

## 2022-09-20 RX ORDER — MIDAZOLAM HYDROCHLORIDE 5 MG/ML
5 INJECTION INTRAMUSCULAR; INTRAVENOUS ONCE
Status: DISCONTINUED | OUTPATIENT
Start: 2022-09-20 | End: 2022-09-20

## 2022-09-20 RX ORDER — PROPOFOL 10 MG/ML
0-50 INJECTION, EMULSION INTRAVENOUS CONTINUOUS
Status: DISCONTINUED | OUTPATIENT
Start: 2022-09-20 | End: 2022-09-21 | Stop reason: HOSPADM

## 2022-09-20 RX ORDER — LORAZEPAM 2 MG/ML
INJECTION INTRAMUSCULAR
Status: DISCONTINUED
Start: 2022-09-20 | End: 2022-09-21 | Stop reason: HOSPADM

## 2022-09-20 RX ORDER — LEVETIRACETAM 10 MG/ML
1000 INJECTION INTRAVASCULAR
Status: COMPLETED | OUTPATIENT
Start: 2022-09-20 | End: 2022-09-20

## 2022-09-20 RX ADMIN — CEFEPIME HYDROCHLORIDE 1 G: 1 INJECTION, SOLUTION INTRAVENOUS at 09:09

## 2022-09-20 RX ADMIN — LEVETIRACETAM 750 MG: 100 INJECTION, SOLUTION INTRAVENOUS at 09:09

## 2022-09-20 RX ADMIN — PROPOFOL 0.2 MCG/KG/MIN: 10 INJECTION, EMULSION INTRAVENOUS at 05:09

## 2022-09-20 RX ADMIN — LORAZEPAM 2 MG: 2 INJECTION INTRAMUSCULAR; INTRAVENOUS at 08:09

## 2022-09-20 RX ADMIN — LORAZEPAM 2 MG: 2 INJECTION INTRAMUSCULAR at 05:09

## 2022-09-20 RX ADMIN — MIDAZOLAM HYDROCHLORIDE 5 MG: 5 INJECTION, SOLUTION INTRAMUSCULAR; INTRAVENOUS at 05:09

## 2022-09-20 RX ADMIN — MIDAZOLAM HYDROCHLORIDE 1 MG/HR: 5 INJECTION, SOLUTION INTRAMUSCULAR; INTRAVENOUS at 10:09

## 2022-09-20 RX ADMIN — LORAZEPAM 2 MG: 2 INJECTION INTRAMUSCULAR at 08:09

## 2022-09-20 RX ADMIN — VANCOMYCIN HYDROCHLORIDE 750 MG: 750 INJECTION, POWDER, LYOPHILIZED, FOR SOLUTION INTRAVENOUS at 10:09

## 2022-09-20 RX ADMIN — LORAZEPAM 1 MG: 2 INJECTION INTRAMUSCULAR; INTRAVENOUS at 05:09

## 2022-09-20 RX ADMIN — LEVETIRACETAM INJECTION 1000 MG: 10 INJECTION INTRAVENOUS at 05:09

## 2022-09-20 RX ADMIN — ETOMIDATE 14 MG: 2 INJECTION, SOLUTION INTRAVENOUS at 05:09

## 2022-09-20 RX ADMIN — LORAZEPAM 1 MG: 2 INJECTION INTRAMUSCULAR at 04:09

## 2022-09-20 RX ADMIN — ROCURONIUM BROMIDE 50 MG: 10 INJECTION, SOLUTION INTRAVENOUS at 05:09

## 2022-09-20 RX ADMIN — SODIUM CHLORIDE 1000 ML: 0.9 INJECTION, SOLUTION INTRAVENOUS at 08:09

## 2022-09-20 RX ADMIN — LORAZEPAM 1 MG: 2 INJECTION INTRAMUSCULAR at 05:09

## 2022-09-20 NOTE — CARE UPDATE
09/20/22 1840   Patient Assessment/Suction   Level of Consciousness (AVPU) responds to pain   Respiratory Effort Normal;Unlabored   Expansion/Accessory Muscles/Retractions no use of accessory muscles   Rhythm/Pattern, Respiratory assisted mechanically   Cough Frequency no cough   PRE-TX-O2   O2 Device (Oxygen Therapy) ventilator   $ Is the patient on Low Flow Oxygen? Yes   Oxygen Concentration (%) 30   SpO2 100 %   Pulse Oximetry Type Continuous   $ Pulse Oximetry - Single Charge Pulse Oximetry - Single   Pulse (!) 126   Resp (!) 25   BP (!) 155/110   Airway Safety   Ambu bag with the patient? Yes, Adult Ambu   Is mask with the patient? Yes, Adult Mask   O2  at bedside? No   ETT at Bedside? Yes   ETT Size 7   Vent Select   Conventional Vent Y   Charged w/in last 24h YES   Preset Conventional Ventilator Settings   Vent ID [id number]   Vent Type    Ventilation Type VC   Vent Mode A/C   Humidity HME   Set Rate 14 BPM   Vt Set 350 mL   PEEP/CPAP 5 cmH20   Pressure Support 0 cmH20   Waveform RAMP   Peak Flow 50 L/min   Plateau Set/Insp. Hold (sec) 0   Trigger Sensitivity Flow/I-Trigger 2 L/min   Patient Ventilator Parameters   Resp Rate Total 24 br/min   Peak Airway Pressure 24 cmH2O   Mean Airway Pressure 11 cmH20   Plateau Pressure 0 cmH20   Exhaled Vt 386 mL   Total Ve 9.34 mL   I:E Ratio Measured 1:2.90   Conventional Ventilator Alarms   Resp Rate High Alarm 0 br/min   Press High Alarm 100 cmH2O   Apnea Rate 14   Apnea Volume (mL) 0 mL   Apnea Oxygen Concentration  36   Apnea Flow Rate (L/min) 60   T Apnea 20 sec(s)   Ready to Wean/Extubation Screen   FIO2<=50 (chart decimal) 0.3   MV<16L (chart vol.) 9.34   PEEP <=8 (chart #) 5   Ready to Wean Parameters   F/VT Ratio<105 (RSBI) (!) 64.77   Education   $ Education 15 min;Ventilator Oxygen   Labs   $ Was an ABG obtained? Arterial Puncture;ISTAT - Blood gas   $ Labs Tech Time 15 min   Critical Value Communication   Date Result Received 09/20/22   Time Result  Received 1853   Resulting Department of Critical Value rt   Who communicated critical value from resulting department? shalom rrt   Critical Test #1 ph   Critical Test #1 Result 7.32   Critical Test #2 hco3   Critical Test #2 Result 20   Doctor not notified of critical value due to: per physician order   Name of Notified Physician/Designee Des   Date Notified 09/20/22   Time Notified 1840   Read Back Verification Yes   Provider Notification   Reason for Communication Evaluate   Provider Name Des   Provider Role Attending physician   Method of Communication Face to face   Response At bedside   Notification Time 1840   Respiratory Evaluation   $ Care Plan Tech Time 15 min   $ Eval/Re-eval Charges Evaluation   Evaluation For New Orders

## 2022-09-20 NOTE — ED PROVIDER NOTES
"Encounter Date: 9/20/2022       History     Chief Complaint   Patient presents with    Seizures     Family states seizure at home, unwitnessed, fell in bathroom, pt was postictal upon ems arrival. Pt has hx seizures, keppra daily, pt did take her medication this am.      27-year-old female with history of spinal bifida with 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, history of hydrocephalus status post  shunts, history of seizure disorder, neurogenic bladder, Chiari now information type 2, and anemia presents to the ER after a seizure at home with abnormal mental status.  Patient was brought in by EMS.  Mother father and sister have arrived at bedside.  Mother reports that at 3:27 p.m. she activated EMS due to finding Shashank in the bathroom leaned against the edge of the tub with her neck in a very abnormal position and she noticed the right side of her head and neck was blue.  They reported she was not responsive and making gurgling respirations.  They did not see seizure-like activity.  She had gone to the bathroom on her own.  They carried her out into the living room.  He reports she not return to baseline during that time with EMS EMS reported that she remain postictal with them.  On arrival to the ER she had a seizure while in the ambulance stretcher day the lasted more than 2 minutes.  Patient sats remained 100% throughout this time heart rate was in the 120s to 130s.  Patient was immediately brought to a room for treatment.  Family reports she has never had a seizure like this in the past.  Mother reports she has had UTIs in the past.  They have not noticed any recent illness there has been no medication changes.  She takes Keppra 500 mg twice a day    Review of patient's allergies indicates:   Allergen Reactions    Latex, natural rubber Anaphylaxis and Other (See Comments)     angioedema    Zofran [ondansetron hcl (pf)] Swelling     Hives, "throat closes up"    Gardasil  [h papillomavirus vac,qval (pf)]      Other reaction(s): " Shortness of breath    Menactra  [mening vac a,c,y,w135 dip (pf)]      Other reaction(s): Shortness of breath    Nitrofurantoin      Other reaction(s): Difficulty breathing    Sulfa (sulfonamide antibiotics)     Tramadol Hives    Levaquin [levofloxacin] Rash     Spots on face    Macrobid [nitrofurantoin monohyd/m-cryst] Rash     Sppots/rash on face     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     Past Surgical History:   Procedure Laterality Date    AMPUTATION      right 4th and 5th toes; left 5th toe and portion of left great toe    BACK SURGERY      BLADDER SURGERY      BREAST SURGERY  2015    Reduction    COLON SURGERY      flush site for bowel movements from appendix    CYSTOSTOMY      DEBRIDEMENT OF FOOT Right 1/8/2021    Procedure: DEBRIDEMENT, FOOT;  Surgeon: Abdi Bedoya DPM;  Location: [facility];  Service: Podiatry;  Laterality: Right;    EYE SURGERY      x 5    FLUOROSCOPIC URODYNAMIC STUDY N/A 3/12/2019    Procedure: URODYNAMIC STUDY, FLUOROSCOPIC;  Surgeon: Kenzie Charles MD;  Location: [address];  Service: Urology;  Laterality: N/A;  1 hour    FLUOROSCOPIC URODYNAMIC STUDY N/A 4/4/2019    Procedure: URODYNAMIC STUDY, FLUOROSCOPIC;  Surgeon: Kenzie Charles MD;  Location: [address];  Service: Urology;  Laterality: N/A;  1 hour    FLUOROSCOPIC URODYNAMIC STUDY N/A 5/11/2022    Procedure: URODYNAMIC STUDY, FLUOROSCOPIC;  Surgeon: Kenzie Charles MD;  Location: [address];  Service: Urology;  Laterality: N/A;  90min    FOOT AMPUTATION THROUGH METATARSAL Right 10/28/2019    Procedure: AMPUTATION, FOOT, TRANSMETATARSAL;  Surgeon: Abdi Bedoya DPM;  Location: [facility];  Service: Podiatry;  Laterality: Right;    FOOT AMPUTATION  THROUGH METATARSAL Right 3/27/2020    Procedure: AMPUTATION, FOOT, TRANSMETATARSAL;  Surgeon: Abdi Bedoya DPM;  Location: [facility] OR;  Service: Podiatry;  Laterality: Right;  REVISION    MYELOMININGOCELE REPAIR      NEPHRECTOMY Right 11/4/2021    Procedure: Nephrectomy/ OPEN;  Surgeon: Dash Rosenthal MD;  Location: [facility] 2ND FLR;  Service: Urology;  Laterality: Right;  4HRS    NEPHROSTOMY Right 8/2/2021    Procedure: Nephrostomy and perinephric abscess drain;  Surgeon: Lillian Surgeon;  Location: [facility];  Service: Anesthesiology;  Laterality: Right;    REVISION OF VENTRICULOPERITONEAL SHUNT Left 9/21/2022    Procedure: REVISION, SHUNT, VENTRICULOPERITONEAL;  Surgeon: Maynor Calero MD;  Location: [facility] 2ND FLR;  Service: Neurosurgery;  Laterality: Left;    STRABISMUS SURGERY      ou dr peña    VENTRICULOPERITONEAL SHUNT      WOUND DEBRIDEMENT  3/27/2020    Procedure: DEBRIDEMENT, WOUND;  Surgeon: Abdi Bedoya DPM;  Location: [facility];  Service: Podiatry;;     Family History   Problem Relation Age of Onset    Breast cancer Mother     Gout Father     Cataracts Maternal Grandmother     Myocarditis Sister     Amblyopia Neg Hx     Blindness Neg Hx     Cancer Neg Hx     Diabetes Neg Hx     Glaucoma Neg Hx     Hypertension Neg Hx     Macular degeneration Neg Hx     Retinal detachment Neg Hx     Strabismus Neg Hx     Stroke Neg Hx     Thyroid disease Neg Hx      Social History     Tobacco Use    Smoking status: Never    Smokeless tobacco: Never   Substance Use Topics    Alcohol use: Not Currently    Drug use: Never     Review of Systems   Unable to perform ROS: Acuity of condition     Physical Exam     Initial Vitals   BP Pulse Resp Temp SpO2   09/20/22 1612 09/20/22 1612 09/20/22 1612 09/20/22 1710 09/20/22 1612   (!) 95/41 68 18 (!) 94.8 °F (34.9 °C) 100 %      MAP       --                Physical Exam    Nursing note and vitals reviewed.  Constitutional: She appears well-developed and well-nourished.  She is not diaphoretic. She appears distressed.   HENT:   Head: Normocephalic and atraumatic.   Right Ear: External ear normal.   Left Ear: External ear normal.   Nose: Nose normal.   Mouth/Throat: Oropharynx is clear and moist.   Eyes: Conjunctivae are normal.   Mid range pupils not reactive during seizure   Neck: Neck supple. No tracheal deviation present.   No sick performing to the neck paler mild erythematous line to the right lateral neck.  Unsure whether this is from the oxygen tubing that had been on her meds she arrived in the ER whether this is possibly from full in the bathroom   Normal range of motion.  Cardiovascular:  Normal rate, regular rhythm, normal heart sounds and intact distal pulses.     Exam reveals no gallop and no friction rub.       No murmur heard.  Heart rate 128 blood pressure 95/41   Pulmonary/Chest: Breath sounds normal. No stridor. No respiratory distress. She has no wheezes. She has no rhonchi. She has no rales. She exhibits no tenderness.   Sats 100% on room air respirations 18 clear breath sounds bilaterally   Abdominal: Abdomen is soft. Bowel sounds are normal. She exhibits no distension and no mass. There is no abdominal tenderness.   Patient appears to either have a open wounds or urostomy site to the lower abdomen to the right of midline no signs of secondary infection There is no rebound and no guarding.   Musculoskeletal:         General: No edema. Normal range of motion.      Cervical back: Normal range of motion and neck supple.     Neurological:   Patient is status epilepticus recurrent lead seizing in the ER with extension of bilateral upper extremities neck extended up and jaw clenching   Skin: Skin is warm and dry. No rash noted. No erythema. No pallor.            ED Course   Procedures  Labs Reviewed   CBC W/ AUTO DIFFERENTIAL - Abnormal; Notable for the following components:       Result Value    WBC 21.99 (*)     RDW 14.7 (*)     Immature Granulocytes 0.6 (*)      Gran # (ANC) 19.7 (*)     Immature Grans (Abs) 0.13 (*)     Gran % 89.8 (*)     Lymph % 5.0 (*)     All other components within normal limits   COMPREHENSIVE METABOLIC PANEL - Abnormal; Notable for the following components:    CO2 18 (*)     Glucose 135 (*)     Creatinine 0.4 (*)     All other components within normal limits   URINALYSIS - Abnormal; Notable for the following components:    Appearance, UA Hazy (*)     Protein, UA 1+ (*)     Occult Blood UA 1+ (*)     Leukocytes, UA 2+ (*)     All other components within normal limits   URINALYSIS MICROSCOPIC - Abnormal; Notable for the following components:    WBC, UA 46 (*)     Hyaline Casts, UA 62 (*)     All other components within normal limits   LACTIC ACID, PLASMA - Abnormal; Notable for the following components:    Lactate (Lactic Acid) 2.6 (*)     All other components within normal limits    Narrative:        Lactid acid critical result(s) called and verbal readback obtained   from Rip Stafford RN ER  by MS1 09/20/2022 21:35   POCT GLUCOSE - Abnormal; Notable for the following components:    POC Glucose 126 (*)     All other components within normal limits   ISTAT PROCEDURE - Abnormal; Notable for the following components:    POC PH 7.328 (*)     POC PO2 272 (*)     POC HCO3 20.1 (*)     POC TCO2 21 (*)     All other components within normal limits   CULTURE, BLOOD   MAGNESIUM   SARS-COV-2 RNA AMPLIFICATION, QUAL   LEVETIRACETAM  (KEPPRA) LEVEL     EKG Readings: (Independently Interpreted)   Initial Reading: No STEMI. Rhythm: Normal Sinus Rhythm. Heart Rate: 99. Ectopy: No Ectopy. Conduction: Normal.   ECG Results              EKG 12-lead (In process)  Result time 09/21/22 06:32:41      In process by Interface, Lab In [facility] (09/21/22 06:32:41)                   Narrative:    Test Reason : R41.82,    Vent. Rate : 099 BPM     Atrial Rate : 099 BPM     P-R Int : 132 ms          QRS Dur : 088 ms      QT Int : 374 ms       P-R-T Axes : 047 048 049 degrees      QTc Int : 479 ms    Normal sinus rhythm  Normal ECG  When compared with ECG of 07-NOV-2021 17:50,  Nonspecific T wave abnormality no longer evident in Inferior leads  T wave amplitude has increased in Lateral leads    Referred By: [email or web address]   SELF           Confirmed By:                                   Imaging Results              CT Cervical Spine Without Contrast (Final result)  Result time 09/20/22 19:20:45      Final result by Katelyn Rushing MD (09/20/22 19:20:45)                   Narrative:    All CT scans at this facility used dose modulation, iterative reconstruction and/or weight-based dosing when appropriate to reduce radiation doses  as low as reasonably achievable.    CT scan of the cervical spine    Clinical history is Neck trauma, focal neuro deficit or paresthesia (Age 16-64y); Neck trauma, intoxicated or obtunded (Age >= 16y)    Axial images the cervical spine were obtained with sagittal and coronal reconstructed images. The cervical spine is in satisfactory alignment. The vertebral bodies are of normal height. There is no fracture or subluxation. The facet joints are aligned. The odontoid process is intact the cranial cervical junction is normal. There is no spinal stenosis or foraminal narrowing. The paraspinous soft tissues are normal.    There is a left ventricular peritoneal shunt. There is an endotracheal tube in place. The lung apices are clear.    IMPRESSION: No evidence of cervical spine fracture    Electronically signed by:  Katelyn Rushing MD  9/20/2022 7:20 PM CDT Workstation: [id number]                                     CT Head Without Contrast (Final result)  Result time 09/20/22 19:12:56      Final result by Katelyn Rushing MD (09/20/22 19:12:56)                   Narrative:    All CT scans at this facility used dose modulation, iterative reconstruction and/or weight-based dosing when appropriate to reduce radiation doses  as low as reasonably achievable.    CLINICAL  INFORMATION:  Seizure, fall    COMPARISON: 3/20/2015    FINDINGS: There is a left parietal ventricular peritoneal shunt with the tip crossing midline in the right frontal horn. There is a right parietal ventricular peritoneal shunt with the tip in the posterior right lateral ventricle.    There is dilatation of the lateral ventricles predominantly involving the occipital horns which is new when compared to the prior study. The left occipital horn measures 3.8 cm. The right occipital horn measures 1.8 cm.    The fourth ventricle is not well visualized.    There is no hemorrhage, mass or midline shift.    The orbits are unremarkable. There is an endotracheal tube in place. The paranasal sinuses and mastoid air cells are clear.    IMPRESSION: Bilateral parietal ventricular peritoneal shunts in stable position with dilatation of the lateral ventricles predominantly involving the occipital horns which is new when compared to the prior study.    No hemorrhage or midline shift    Electronically signed by:  Katelyn Rushing MD  9/20/2022 7:12 PM CDT Workstation: [id number]                                     X-Ray Shunt Series (Final result)  Result time 09/20/22 18:54:12      Final result by Katelyn Rushing MD (09/20/22 18:54:12)                   Narrative:    Shunt series    Clinical history is head trauma five views    Two views of the skull demonstrate a left ventricular peritoneal shunt with the tip crossing midline. There is an endotracheal tube with the tip just above the dinh.    AP view of the chest There is hypoinflation. The lungs are clear. The heart is normal in size. There are median sternotomy wires. There are Newman rods within the lower thoracic and upper lumbar spine. There is a left ventriculoperitoneal shunt overlying the left side of the thoracic spine.    KUB  Demonstrates a normal bowel gas pattern. The tip of the left ventriculoperitoneal shunt is within the left lower quadrant.    There  are Newman rods within the lumbar spine with multilevel bony fusion    IMPRESSION: Left ventriculoperitoneal shunt with the tip in the left lower quadrant    Prior median sternotomy with hypoinflation.    Normal bowel gas pattern    Electronically signed by:  Katelyn Rushing MD  9/20/2022 6:54 PM CDT Workstation: [id number]                                     X-Ray Chest AP Portable (Final result)  Result time 09/20/22 17:43:33      Final result by Katelyn Rushing MD (09/20/22 17:43:33)                   Narrative:    Portable chest x-ray at 4:51 PM is compared to a prior study dated 8/1/2021    Clinical history is seizures    There are median sternotomy wires. There are Newman rods within the mid thoracic spine and upper lumbar spine. There is a left ventriculoperitoneal shunt.    There is hypoinflation. The cardiomediastinal silhouette is normal in size. The lungs are clear.    IMPRESSION: Prior CABG with no acute pulmonary process    Electronically signed by:  Katelyn Rushing MD  9/20/2022 5:43 PM CDT Workstation: [id number]                                  X-Rays:   Independently Interpreted Readings:   Chest X-Ray: Normal heart size.  No infiltrates.  No acute abnormalities.   Medications   lorazepam injection 1 mg (2 mg Intravenous Given 9/20/22 2028)   levETIRAcetam in NaCl (iso-os) IVPB 1,000 mg (0 mg Intravenous Stopped 9/20/22 1755)   cefepime in dextrose 5 % 1 gram/50 mL IVPB 1 g (0 g Intravenous Stopped 9/20/22 2206)   vancomycin 750 mg in dextrose 5 % 250 mL IVPB (ready to mix system) (750 mg Intravenous New Bag 9/20/22 2247)   lorazepam injection (2 mg Intravenous Given 9/20/22 1715)   midazolam (VERSED) 5 mg/mL injection (5 mg Intravenous Given 9/20/22 1716)   etomidate injection (14 mg Intravenous Given 9/20/22 1727)   rocuronium injection (50 mg Intravenous Given 9/20/22 1727)   0.9%  NaCl infusion (0 mLs Intravenous Stopped 9/20/22 2139)     Medical Decision Making:    Independently Interpreted Test(s):   I have ordered and independently interpreted X-rays - see prior notes.  I have ordered and independently interpreted EKG Reading(s) - see prior notes  Clinical Tests:   Lab Tests: Ordered and Reviewed  Radiological Study: Ordered and Reviewed  Medical Tests: Ordered and Reviewed  ED Management:  27-year-old female with history of spinal bifida with 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, history of hydrocephalus status post  shunts, history of seizure disorder, neurogenic bladder, Chiari now information type 2, and anemia presents to the ER after a seizure at home with abnormal mental status.  Patient was brought in by EMS.  Mother father and sister have arrived at bedside.  Mother reports that at 3:27 p.m. she activated EMS due to finding Shashank in the bathroom leaned against the edge of the tub with her neck in a very abnormal position and she noticed the right side of her head and neck was blue.  They reported she was not responsive and making gurgling respirations.  They did not see seizure-like activity.  She had gone to the bathroom on her own.  They carried her out into the living room.  He reports she not return to baseline during that time with EMS EMS reported that she remain postictal with them.  On arrival to the ER she had a seizure while in the ambulance stretcher day the lasted more than 2 minutes.  Patient sats remained 100% throughout this time heart rate was in the 120s to 130s.  Patient was immediately brought to a room for treatment.  Family reports she has never had a seizure like this in the past.  Mother reports she has had UTIs in the past.  They have not noticed any recent illness there has been no medication changes.  She takes Keppra 500 mg twice a day  Patient was brought immediately to a room she was given 1 mg Ativan seizure temporarily stop returned within several minutes.  Patient did not have any improvement of mental status GCS is remained 3.  She had a 2nd  seizure same was the 1st with extension of bilateral upper extremities clenching of her jaw and mid range pupils that were not reactive.  Patient also was making gurgling respirations oxygen saturations remained at 100% heart rate would go into the 120 to 130s.  She is given a 2nd dose of Ativan 1 mg.  Patient briefly for less than 1-2 minutes came out of seizure for returning to a seizure.  She was then ordered 1 g of Keppra IV.  And 2 more mg Ativan.  Dr. Smart was called and came to the patient's bedside patient is in status epilepticus he recommended a 2nd g of Keppra.  And Versed 5 mg IV.  Patient did not have improvement she was then intubated and is sedated on propofol.  We used etomidate and rocuronium for RSI.  Patient has 1 IV in the left arm I attempted left EJ was unsuccessful we are working on a 2nd line.  Patient also will have a catheterized urine specimen.  She has several decubitus ulcers to the buttocks but no signs of secondary infection.  She also had stool in her diaper.  She will be cleaned by nursing staff and will have a cath placed.  She will go to CT of the head and the neck.  Chest x-ray was clear.  Patient will need admitted to the ICU.  Dr. Smart is back at her bedside to check on her.  Sandy Nunes M.D.  6:10 PM 9/20/2022    CT scan cervical spine revealed no acute abnormality  CT scan of the head without contrast revealed   IMPRESSION: Bilateral parietal ventricular peritoneal shunts in stable position with dilatation of the lateral ventricles predominantly involving the occipital horns which is new when compared to the prior study -3/20/15.     No hemorrhage or midline shift    Armando discussed with Neurosurgery this finding  Sandy Nunes M.D.  7:37 PM 9/20/2022      Other:   I have discussed this case with another health care provider.  I spoke with LYNDA Lea,  who has reviewed the head CT patient's last shunt revision was more than 10 years ago.  Sandy Nunes M.D.   7:53 PM 9/20/2022            Attending Attestation:         Attending Critical Care:   Critical Care Times:   Direct Patient Care (initial evaluation, reassessments, and time considering the case)................................................................30 minutes.   Additional History from reviewing old medical records or taking additional history from the family, EMS, PCP, etc.......................10 minutes.   Ordering, Reviewing, and Interpreting Diagnostic Studies...............................................................................................................5 minutes.   Documentation..................................................................................................................................................................................10 minutes.   Consultation with other Physicians. .................................................................................................................................................5 minutes.   Consultation with the patient's family directly relating to the patient's condition, care, and DNR status (when patient unable)......5 minutes.   ==============================================================  Total Critical Care Time - exclusive of procedural time: 65 minutes.  ==============================================================  Critical care was necessary to treat or prevent imminent or life-threatening deterioration of the following conditions: status epilepticus.   The following critical care procedures were done by me (see procedure notes): airway management and endotracheal tube placement *.   Critical care was time spent personally by me on the following activities: obtaining history from patient or relative, examination of patient, review of old charts, ordering lab, x-rays, and/or EKG, development of treatment plan with patient or relative, ordering and performing treatments and interventions, evaluation of  patient's response to treatment, discussion with consultants, interpretation of cardiac measurements, re-evaluation of patient's conition and ventilator management.   Critical Care Condition: critical         ED Course as of 09/22/22 2155   Tue Sep 20, 2022   2128 Dr. Rick Han accepting neurologist at [facility].  Patient will be direct admit to neuro critical care unit. [AP]      ED Course User Index  [AP] Cuba Bolivar MD                 Clinical Impression:   Final diagnoses:  [R56.9] Seizure  [R41.82] AMS (altered mental status)  [R41.82] AMS (altered mental status) - post intubation  [G40.901] Status epilepticus (Primary)  [L89.90] Pressure injury of skin, unspecified injury stage, unspecified location        ED Disposition Condition    Transfer to Another Facility Critical                Sandy Nunes MD  09/20/22 1935       Sandy Nunes MD  09/20/22 1939       Sandy Nunes MD  09/20/22 1954       Sandy Nunes MD  09/22/22 2155

## 2022-09-20 NOTE — CONSULTS
[facility]  Department of Neurology  Neurology Consultation Note        PATIENT NAME: Shashank Samuel  MRN: [id number]  CSN: [id number]      TODAY'S DATE: 09/20/2022  ADMIT DATE: 9/20/2022                            CONSULTING PROVIDER: Dov Smart MD  CONSULT REQUESTED BY: Sandy Nunes MD      Reason for consult:  Status epilepticus       History obtained from mother and chart review.    HPI: Shashank Samuel is a 27 y.o. 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 at home presents from home after she she had a possible seizure and was found slumped over in the bathroom.     Patient was brought to the ER and since arrival to the ER, she had multiple tonic-clonic seizures without return to her baseline mental status.  She received 4 mg of Ativan with intermittent improved seizure activity.  She also received 1 g Keppra so far however continued to have seizure activity on my exam with tonic posturing of bilateral upper and lower extremities and upward deviated gaze.     Patient's mother is in the room helping with history.  Patient's mother states that she has a history of seizures and she has had about 3 seizures this year.  She was taken off Keppra and was restarted about a month and a half ago for recurrence of seizures.  She currently takes 500 mg b.i.d. of Keppra.    She denies any recent fevers, chills, recent infections, new medications.  She was in her usual health until this happened.      PREVIOUS MEDICAL HISTORY:  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     PREVIOUS SURGICAL  HISTORY:  Past Surgical History:   Procedure Laterality Date    AMPUTATION      right 4th and 5th toes; left 5th toe and portion of left great toe    BACK SURGERY      BLADDER SURGERY      BREAST SURGERY  2015    Reduction    COLON SURGERY      flush site for bowel movements from appendix    CYSTOSTOMY      DEBRIDEMENT OF FOOT Right 1/8/2021    Procedure: DEBRIDEMENT, FOOT;  Surgeon: Abdi Bedoya DPM;  Location: [facility];  Service: Podiatry;  Laterality: Right;    EYE SURGERY      x 5    FLUOROSCOPIC URODYNAMIC STUDY N/A 3/12/2019    Procedure: URODYNAMIC STUDY, FLUOROSCOPIC;  Surgeon: Kenzie Charles MD;  Location: [facility] 1ST FLR;  Service: Urology;  Laterality: N/A;  1 hour    FLUOROSCOPIC URODYNAMIC STUDY N/A 4/4/2019    Procedure: URODYNAMIC STUDY, FLUOROSCOPIC;  Surgeon: Kenzie Charles MD;  Location: [facility] 1ST FLR;  Service: Urology;  Laterality: N/A;  1 hour    FLUOROSCOPIC URODYNAMIC STUDY N/A 5/11/2022    Procedure: URODYNAMIC STUDY, FLUOROSCOPIC;  Surgeon: Kenzie Charles MD;  [address];  Service: Urology;  Laterality: N/A;  90min    FOOT AMPUTATION THROUGH METATARSAL Right 10/28/2019    Procedure: AMPUTATION, FOOT, TRANSMETATARSAL;  Surgeon: Abdi Bedoya DPM;  Location: [facility];  Service: Podiatry;  Laterality: Right;    FOOT AMPUTATION THROUGH METATARSAL Right 3/27/2020    Procedure: AMPUTATION, FOOT, TRANSMETATARSAL;  Surgeon: Abdi Bedoya DPM;  Location: [facility];  Service: Podiatry;  Laterality: Right;  REVISION    MYELOMININGOCELE REPAIR      NEPHRECTOMY Right 11/4/2021    Procedure: Nephrectomy/ OPEN;  Surgeon: Dash Rosenthal MD;  Location: [facility] 2ND FLR;  Service: Urology;  Laterality: Right;  4HRS    NEPHROSTOMY Right 8/2/2021    Procedure: Nephrostomy and perinephric abscess drain;  Surgeon: Lillian Surgeon;  Location: [facility];  Service: Anesthesiology;  Laterality: Right;    STRABISMUS SURGERY      ou dr peña    VENTRICULOPERITONEAL SHUNT       "WOUND DEBRIDEMENT  3/27/2020    Procedure: DEBRIDEMENT, WOUND;  Surgeon: Abdi Bedoya DPM;  Location: [facility] OR;  Service: Podiatry;;     FAMILY MEDICAL HISTORY:  Family History   Problem Relation Age of Onset    Breast cancer Mother     Gout Father     Cataracts Maternal Grandmother     Myocarditis Sister     Amblyopia Neg Hx     Blindness Neg Hx     Cancer Neg Hx     Diabetes Neg Hx     Glaucoma Neg Hx     Hypertension Neg Hx     Macular degeneration Neg Hx     Retinal detachment Neg Hx     Strabismus Neg Hx     Stroke Neg Hx     Thyroid disease Neg Hx      SOCIAL HISTORY:  Social History     Tobacco Use    Smoking status: Never    Smokeless tobacco: Never   Substance Use Topics    Alcohol use: Not Currently    Drug use: Never     ALLERGIES:  Review of patient's allergies indicates:   Allergen Reactions    Latex, natural rubber Anaphylaxis and Other (See Comments)     angioedema    Zofran [ondansetron hcl (pf)] Swelling     Hives, "throat closes up"    Gardasil  [h papillomavirus vac,qval (pf)]      Other reaction(s): Shortness of breath    Menactra  [mening vac a,c,y,w135 dip (pf)]      Other reaction(s): Shortness of breath    Nitrofurantoin      Other reaction(s): Difficulty breathing    Sulfa (sulfonamide antibiotics)     Tramadol Hives    Levaquin [levofloxacin] Rash     Spots on face    Macrobid [nitrofurantoin monohyd/m-cryst] Rash     Sppots/rash on face     HOME MEDICATIONS:  Prior to Admission medications    Medication Sig Start Date End Date Taking? Authorizing Provider   acetaminophen (TYLENOL) 500 MG tablet Take 1,000 mg by mouth every 6 (six) hours as needed for Pain.    Historical Provider   cephALEXin (KEFLEX) 500 MG capsule Take 1 capsule (500 mg total) by mouth every evening. Start after you complete the cipro.  Patient not taking: No sig reported 4/13/22   Kenzie Charles MD   collagenase (SANTYL) ointment Apply topically once daily.  Patient not taking: No sig reported 2/23/21   Abdi" "HERRERA Bedoya DPM   ferrous sulfate (FEOSOL) 325 mg (65 mg iron) Tab tablet Take 325 mg by mouth 2 (two) times daily.    Historical Provider   ibuprofen (ADVIL,MOTRIN) 200 MG tablet Take 200 mg by mouth every 6 (six) hours as needed for Pain.    Historical Provider   levETIRAcetam (KEPPRA) 500 MG Tab Take 1 tablet (500 mg total) by mouth 2 (two) times daily. 8/17/22 8/17/23  Robert More MD     CURRENT SCHEDULED MEDICATIONS:   etomidate  0.3 mg/kg Intravenous ED 1 Time    levetiracetam IV  1,000 mg Intravenous ED 1 Time    levetiracetam IV  1,000 mg Intravenous ED 1 Time    lorazepam        lorazepam        midazolam  5 mg Intravenous Once    rocuronium  1 mg/kg Intravenous Once     CURRENT INFUSIONS:   propofoL 0.2 mcg/kg/min (09/20/22 1737)     CURRENT PRN MEDICATIONS:      REVIEW OF SYSTEMS:  A review of systems cannot be obtained as the patient is unable to respond to questions appropriately.       PHYSICAL EXAM:  Patient Vitals for the past 24 hrs:   BP Pulse Resp SpO2 Height Weight   09/20/22 1612 (!) 95/41 68 18 100 % 5' 2" (1.575 m) 46.3 kg (102 lb)       GENERAL APPEARANCE:  Patient is actively seizing with tonic posturing of upper extremities and upward gaze deviation.  HEENT: Normocephalic and atraumatic. PERRL. Oropharynx unremarkable.  PULM: Normal respiratory effort. No accessory muscle use.  CV: RRR.  ABDOMEN: Soft, nontender.    SKIN: Clear; no rashes, lesions or skin breaks in exposed areas.    NEURO:  MENTAL STATUS:  Currently unresponsive, an active seizure with tonic posturing of upper extremities on upward gaze deviation.  Affect labile.    CRANIAL NERVES:  Pupils equal round and reactive to light, face is grossly symmetric.  Rest of cranial nerves cannot be assessed due to mental status    MOTOR:  Bulk normal in upper extremities.  Tone is increased in bilateral upper extremities.  Strength cannot be tested    REFLEXES:  DTRs 2+ throughout.  Plantar response equivocal bilaterally.  SENSATION: " not tested.  COORDINATION:  Not tested .  STATION: not tested.  GAIT: not tested.      Labs:  No results for input(s): NA, K, CL, CO2, BUN, CREATININE, LABGLOM, GLU, CALCIUM, PHOS, MG in the last 168 hours.  No results for input(s): WBC, HGB, HCT, PLT, NEUTOPHILPCT, MONOPCT, EOSPCT in the last 168 hours.  No results for input(s): ALBUMIN, PROT, BILITOT, ALKPHOS, ALT, AST in the last 168 hours.  Lab Results   Component Value Date    INR 1.1 08/02/2021     No results found for: CHOLTOT, TRIG, HDL, CHOLHDL, LDL  No results found for: HGBA1C  No results found for: PROTEINCSF, GLUCCSF, WBCCSF    Imaging:  I have reviewed and interpreted the pertinent imaging and lab results.      SURG FL Surgery Fluoro Usage  See OP Notes for results.     IMPRESSION: See OP Notes for results.     This procedure was auto-finalized by: Virtual Radiologist         ASSESSMENT & PLAN:    Refractory Status epilepticus      Plan:   Patient presented with multiple seizures which were refractory to benzodiazepines and antiseizure medications.    Recommend additional 1 g loading dose of Keppra in the ER.  Recommend intubation and sedation with propofol given persistent seizure activity.  CT head to be done in the ER.  Will check shunt series.  Will start patient on continues EEG.   Increase maintenance dose of Keppra to 750mg BID.   Keep patient is sedated overnight with a RASS of -4.  Based on EEG, will wean sedation in a.m..  Seizure precautions.    Neuro checks per unit protocol.   DVT prophylaxis  Avoid medications that lower seizure threshold  Will continue to follow      Seizure education.      No driving for now, no swimming alone, no climbing high areas, no operation of heavy machinery or working with high risk electricity equipment.    Continue to take AEDs as prescribed. If any major side effects from neurological medications go to the ED including mood changes and severe depression.  Patient and/or next of kin informed.  Follow up  Neurology in 2 weeks. Call [phone] to make an appointment.    Medication side effects discussed with the patient and/or caregiver.        Thank you kindly for including us in the care of this patient. Please do not hesitate to contact us with any questions.      Critical Care:  52 minutes of critical care time has been spent evaluating with the patient. Time includes chart review not limited to diagnostic imaging, labs, and vitals, patient assessment, discussion with family and nursing, current order evaluations, and new order entries.       Dov Smart MD  Neurology/vascular Neurology  Date of Service: 09/20/2022  5:39 PM    --------------------------------------------------------------------------------------------------------------------------------------------------------------------------------------------------------------------------------------------------------------  Please note: This note was transcribed using voice recognition software. Because of this technology there are often uinintended grammatical, spelling, and other transcription errors. Please disregard these errors.

## 2022-09-21 ENCOUNTER — ANESTHESIA EVENT (OUTPATIENT)
Dept: SURGERY | Facility: HOSPITAL | Age: 27
DRG: 025 | End: 2022-09-21
Payer: MEDICAID

## 2022-09-21 ENCOUNTER — HOSPITAL ENCOUNTER (INPATIENT)
Facility: HOSPITAL | Age: 27
LOS: 3 days | Discharge: HOME | DRG: 025 | End: 2022-09-24
Attending: PSYCHIATRY & NEUROLOGY | Admitting: PSYCHIATRY & NEUROLOGY
Payer: MEDICAID

## 2022-09-21 ENCOUNTER — ANESTHESIA (OUTPATIENT)
Dept: SURGERY | Facility: HOSPITAL | Age: 27
DRG: 025 | End: 2022-09-21
Payer: MEDICAID

## 2022-09-21 DIAGNOSIS — G91.9 HYDROCEPHALUS, UNSPECIFIED TYPE: ICD-10-CM

## 2022-09-21 DIAGNOSIS — G91.1 OBSTRUCTIVE HYDROCEPHALUS: Primary | ICD-10-CM

## 2022-09-21 DIAGNOSIS — R56.9 WITNESSED SEIZURE-LIKE ACTIVITY: ICD-10-CM

## 2022-09-21 DIAGNOSIS — Z87.898 HISTORY OF SEIZURES: ICD-10-CM

## 2022-09-21 DIAGNOSIS — G40.901 STATUS EPILEPTICUS: ICD-10-CM

## 2022-09-21 DIAGNOSIS — R00.0 TACHYCARDIA: ICD-10-CM

## 2022-09-21 PROBLEM — N30.00 ACUTE CYSTITIS WITHOUT HEMATURIA: Status: ACTIVE | Noted: 2022-09-21

## 2022-09-21 PROBLEM — J96.00 ACUTE RESPIRATORY FAILURE: Status: ACTIVE | Noted: 2022-09-21

## 2022-09-21 PROBLEM — R40.2430 GLASGOW COMA SCALE TOTAL SCORE 3-8: Status: ACTIVE | Noted: 2022-09-21

## 2022-09-21 LAB
ABO + RH BLD: NORMAL
ALBUMIN SERPL BCP-MCNC: 3.8 G/DL (ref 3.5–5.2)
ALLENS TEST: ABNORMAL
ALLENS TEST: ABNORMAL
ALP SERPL-CCNC: 66 U/L (ref 55–135)
ALT SERPL W/O P-5'-P-CCNC: 18 U/L (ref 10–44)
ANION GAP SERPL CALC-SCNC: 8 MMOL/L (ref 8–16)
APTT BLDCRRT: 22.5 SEC (ref 21–32)
ASCENDING AORTA: 2.99 CM
AST SERPL-CCNC: 23 U/L (ref 10–40)
AV INDEX (PROSTH): 0.91
AV MEAN GRADIENT: 2 MMHG
AV PEAK GRADIENT: 3 MMHG
AV VALVE AREA: 2.51 CM2
AV VELOCITY RATIO: 0.89
BACTERIA #/AREA URNS AUTO: ABNORMAL /HPF
BASOPHILS # BLD AUTO: 0.02 K/UL (ref 0–0.2)
BASOPHILS NFR BLD: 0.1 % (ref 0–1.9)
BILIRUB SERPL-MCNC: 0.4 MG/DL (ref 0.1–1)
BILIRUB UR QL STRIP: NEGATIVE
BLD GP AB SCN CELLS X3 SERPL QL: NORMAL
BSA FOR ECHO PROCEDURE: 1.51 M2
BUN SERPL-MCNC: 10 MG/DL (ref 6–20)
CALCIUM SERPL-MCNC: 8.4 MG/DL (ref 8.7–10.5)
CHLORIDE SERPL-SCNC: 109 MMOL/L (ref 95–110)
CLARITY CSF: ABNORMAL
CLARITY UR REFRACT.AUTO: CLEAR
CO2 SERPL-SCNC: 21 MMOL/L (ref 23–29)
COLOR CSF: COLORLESS
COLOR UR AUTO: COLORLESS
CREAT SERPL-MCNC: 0.5 MG/DL (ref 0.5–1.4)
CV ECHO LV RWT: 0.38 CM
DELSYS: ABNORMAL
DELSYS: ABNORMAL
DIFFERENTIAL METHOD: ABNORMAL
DOP CALC AO PEAK VEL: 0.82 M/S
DOP CALC AO VTI: 16.25 CM
DOP CALC LVOT AREA: 2.7 CM2
DOP CALC LVOT DIAMETER: 1.87 CM
DOP CALC LVOT PEAK VEL: 0.73 M/S
DOP CALC LVOT STROKE VOLUME: 40.71 CM3
DOP CALCLVOT PEAK VEL VTI: 14.83 CM
E WAVE DECELERATION TIME: 159.28 MSEC
E/A RATIO: 1.43
E/E' RATIO: 6.7 M/S
ECHO LV POSTERIOR WALL: 0.53 CM (ref 0.6–1.1)
EJECTION FRACTION: 55 %
EOSINOPHIL # BLD AUTO: 0 K/UL (ref 0–0.5)
EOSINOPHIL NFR BLD: 0.1 % (ref 0–8)
ERYTHROCYTE [DISTWIDTH] IN BLOOD BY AUTOMATED COUNT: 14.9 % (ref 11.5–14.5)
ERYTHROCYTE [SEDIMENTATION RATE] IN BLOOD BY WESTERGREN METHOD: 14 MM/H
ERYTHROCYTE [SEDIMENTATION RATE] IN BLOOD BY WESTERGREN METHOD: 14 MM/H
EST. GFR  (NO RACE VARIABLE): >60 ML/MIN/1.73 M^2
FIO2: 100
FIO2: 40
FRACTIONAL SHORTENING: 20 % (ref 28–44)
GLUCOSE CSF-MCNC: 77 MG/DL (ref 40–70)
GLUCOSE SERPL-MCNC: 93 MG/DL (ref 70–110)
GLUCOSE UR QL STRIP: NEGATIVE
HCO3 UR-SCNC: 19.9 MMOL/L (ref 24–28)
HCO3 UR-SCNC: 21.3 MMOL/L (ref 24–28)
HCT VFR BLD AUTO: 40.6 % (ref 37–48.5)
HGB BLD-MCNC: 13.2 G/DL (ref 12–16)
HGB UR QL STRIP: ABNORMAL
HYALINE CASTS UR QL AUTO: 2 /LPF
IMM GRANULOCYTES # BLD AUTO: 0.11 K/UL (ref 0–0.04)
IMM GRANULOCYTES NFR BLD AUTO: 0.8 % (ref 0–0.5)
INR PPP: 1 (ref 0.8–1.2)
INTERVENTRICULAR SEPTUM: 0.59 CM (ref 0.6–1.1)
KETONES UR QL STRIP: ABNORMAL
LA MAJOR: 3.68 CM
LA MINOR: 3.56 CM
LA WIDTH: 2.3 CM
LACTATE SERPL-SCNC: 1.8 MMOL/L (ref 0.5–2.2)
LEFT ATRIUM SIZE: 2.3 CM
LEFT ATRIUM VOLUME INDEX: 11.1 ML/M2
LEFT ATRIUM VOLUME: 16.27 CM3
LEFT INTERNAL DIMENSION IN SYSTOLE: 2.21 CM (ref 2.1–4)
LEFT VENTRICLE DIASTOLIC VOLUME INDEX: 19.59 ML/M2
LEFT VENTRICLE DIASTOLIC VOLUME: 28.6 ML
LEFT VENTRICLE MASS INDEX: 22 G/M2
LEFT VENTRICLE SYSTOLIC VOLUME INDEX: 11.3 ML/M2
LEFT VENTRICLE SYSTOLIC VOLUME: 16.43 ML
LEFT VENTRICULAR INTERNAL DIMENSION IN DIASTOLE: 2.76 CM (ref 3.5–6)
LEFT VENTRICULAR MASS: 31.71 G
LEUKOCYTE ESTERASE UR QL STRIP: ABNORMAL
LV LATERAL E/E' RATIO: 4.81 M/S
LV SEPTAL E/E' RATIO: 11 M/S
LYMPHOCYTES # BLD AUTO: 1.1 K/UL (ref 1–4.8)
LYMPHOCYTES NFR BLD: 8 % (ref 18–48)
LYMPHOCYTES NFR CSF MANUAL: 13 % (ref 40–80)
MAGNESIUM SERPL-MCNC: 1.9 MG/DL (ref 1.6–2.6)
MCH RBC QN AUTO: 28 PG (ref 27–31)
MCHC RBC AUTO-ENTMCNC: 32.5 G/DL (ref 32–36)
MCV RBC AUTO: 86 FL (ref 82–98)
MICROSCOPIC COMMENT: ABNORMAL
MODE: ABNORMAL
MODE: ABNORMAL
MONOCYTES # BLD AUTO: 1 K/UL (ref 0.3–1)
MONOCYTES NFR BLD: 7.2 % (ref 4–15)
MONOS+MACROS NFR CSF MANUAL: 2 % (ref 15–45)
MV PEAK A VEL: 0.54 M/S
MV PEAK E VEL: 0.77 M/S
MV STENOSIS PRESSURE HALF TIME: 46.19 MS
MV VALVE AREA P 1/2 METHOD: 4.76 CM2
NEUTROPHILS # BLD AUTO: 11.7 K/UL (ref 1.8–7.7)
NEUTROPHILS NFR BLD: 83.8 % (ref 38–73)
NEUTROPHILS NFR CSF MANUAL: 85 % (ref 0–6)
NITRITE UR QL STRIP: POSITIVE
NRBC BLD-RTO: 0 /100 WBC
PCO2 BLDA: 36.8 MMHG (ref 35–45)
PCO2 BLDA: 37 MMHG (ref 35–45)
PEEP: 5
PEEP: 5
PH SMN: 7.34 [PH] (ref 7.35–7.45)
PH SMN: 7.37 [PH] (ref 7.35–7.45)
PH UR STRIP: 7 [PH] (ref 5–8)
PHOSPHATE SERPL-MCNC: 2.8 MG/DL (ref 2.7–4.5)
PISA TR MAX VEL: 2.04 M/S
PLATELET # BLD AUTO: 253 K/UL (ref 150–450)
PLATELET BLD QL SMEAR: ABNORMAL
PMV BLD AUTO: 9.7 FL (ref 9.2–12.9)
PO2 BLDA: 138 MMHG (ref 80–100)
PO2 BLDA: 467 MMHG (ref 80–100)
POC BE: -4 MMOL/L
POC BE: -6 MMOL/L
POC SATURATED O2: 100 % (ref 95–100)
POC SATURATED O2: 99 % (ref 95–100)
POC TCO2: 21 MMOL/L (ref 23–27)
POC TCO2: 22 MMOL/L (ref 23–27)
POCT GLUCOSE: 115 MG/DL (ref 70–110)
POCT GLUCOSE: 96 MG/DL (ref 70–110)
POTASSIUM SERPL-SCNC: 3.9 MMOL/L (ref 3.5–5.1)
PROCALCITONIN SERPL IA-MCNC: 0.09 NG/ML
PROT CSF-MCNC: 8 MG/DL (ref 15–40)
PROT SERPL-MCNC: 7.3 G/DL (ref 6–8.4)
PROT UR QL STRIP: NEGATIVE
PROTHROMBIN TIME: 10.6 SEC (ref 9–12.5)
RA MAJOR: 3.09 CM
RA WIDTH: 2.48 CM
RBC # BLD AUTO: 4.72 M/UL (ref 4–5.4)
RBC # CSF: 143 /CU MM
RBC #/AREA URNS AUTO: 22 /HPF (ref 0–4)
RIGHT VENTRICULAR END-DIASTOLIC DIMENSION: 2.87 CM
SAMPLE: ABNORMAL
SAMPLE: ABNORMAL
SINUS: 2.51 CM
SITE: ABNORMAL
SITE: ABNORMAL
SODIUM SERPL-SCNC: 138 MMOL/L (ref 136–145)
SP GR UR STRIP: 1.01 (ref 1–1.03)
SPECIMEN VOL CSF: 2 ML
SQUAMOUS #/AREA URNS AUTO: 0 /HPF
STJ: 2.91 CM
TDI LATERAL: 0.16 M/S
TDI SEPTAL: 0.07 M/S
TDI: 0.12 M/S
TR MAX PG: 17 MMHG
TRICUSPID ANNULAR PLANE SYSTOLIC EXCURSION: 1.57 CM
URN SPEC COLLECT METH UR: ABNORMAL
VT: 325
VT: 325
WBC # BLD AUTO: 13.97 K/UL (ref 3.9–12.7)
WBC # CSF: 7 /CU MM (ref 0–5)
WBC #/AREA URNS AUTO: 10 /HPF (ref 0–5)

## 2022-09-21 PROCEDURE — 25000003 PHARM REV CODE 250: Performed by: PSYCHIATRY & NEUROLOGY

## 2022-09-21 PROCEDURE — 83735 ASSAY OF MAGNESIUM: CPT | Performed by: NURSE PRACTITIONER

## 2022-09-21 PROCEDURE — 63600175 PHARM REV CODE 636 W HCPCS: Performed by: NURSE PRACTITIONER

## 2022-09-21 PROCEDURE — 62230 REPLACE/REVISE BRAIN SHUNT: CPT | Mod: ,,, | Performed by: NEUROLOGICAL SURGERY

## 2022-09-21 PROCEDURE — 86901 BLOOD TYPING SEROLOGIC RH(D): CPT | Performed by: NURSE PRACTITIONER

## 2022-09-21 PROCEDURE — 87040 BLOOD CULTURE FOR BACTERIA: CPT | Mod: 59 | Performed by: NURSE PRACTITIONER

## 2022-09-21 PROCEDURE — 63600175 PHARM REV CODE 636 W HCPCS: Performed by: NURSE ANESTHETIST, CERTIFIED REGISTERED

## 2022-09-21 PROCEDURE — 84100 ASSAY OF PHOSPHORUS: CPT | Performed by: NURSE PRACTITIONER

## 2022-09-21 PROCEDURE — 27201423 OPTIME MED/SURG SUP & DEVICES STERILE SUPPLY: Performed by: NEUROLOGICAL SURGERY

## 2022-09-21 PROCEDURE — 37000009 HC ANESTHESIA EA ADD 15 MINS: Performed by: NEUROLOGICAL SURGERY

## 2022-09-21 PROCEDURE — 62230 PR REPLACEMENT/REVISION,CSF SHUNT: ICD-10-PCS | Mod: 80,,, | Performed by: NEUROLOGICAL SURGERY

## 2022-09-21 PROCEDURE — D9220A PRA ANESTHESIA: Mod: CRNA,,, | Performed by: NURSE ANESTHETIST, CERTIFIED REGISTERED

## 2022-09-21 PROCEDURE — 62230 REPLACE/REVISE BRAIN SHUNT: CPT | Mod: 80,,, | Performed by: NEUROLOGICAL SURGERY

## 2022-09-21 PROCEDURE — 27800903 OPTIME MED/SURG SUP & DEVICES OTHER IMPLANTS: Performed by: NEUROLOGICAL SURGERY

## 2022-09-21 PROCEDURE — 81001 URINALYSIS AUTO W/SCOPE: CPT | Performed by: NURSE PRACTITIONER

## 2022-09-21 PROCEDURE — 97167 OT EVAL HIGH COMPLEX 60 MIN: CPT

## 2022-09-21 PROCEDURE — 87106 FUNGI IDENTIFICATION YEAST: CPT | Performed by: NURSE PRACTITIONER

## 2022-09-21 PROCEDURE — 99900026 HC AIRWAY MAINTENANCE (STAT)

## 2022-09-21 PROCEDURE — 94002 VENT MGMT INPAT INIT DAY: CPT

## 2022-09-21 PROCEDURE — 87205 SMEAR GRAM STAIN: CPT

## 2022-09-21 PROCEDURE — 99900035 HC TECH TIME PER 15 MIN (STAT)

## 2022-09-21 PROCEDURE — D9220A PRA ANESTHESIA: ICD-10-PCS | Mod: CRNA,,, | Performed by: NURSE ANESTHETIST, CERTIFIED REGISTERED

## 2022-09-21 PROCEDURE — 82945 GLUCOSE OTHER FLUID: CPT

## 2022-09-21 PROCEDURE — 83605 ASSAY OF LACTIC ACID: CPT | Performed by: NURSE PRACTITIONER

## 2022-09-21 PROCEDURE — 20000000 HC ICU ROOM

## 2022-09-21 PROCEDURE — 63600175 PHARM REV CODE 636 W HCPCS: Performed by: NEUROLOGICAL SURGERY

## 2022-09-21 PROCEDURE — D9220A PRA ANESTHESIA: Mod: ANES,,, | Performed by: ANESTHESIOLOGY

## 2022-09-21 PROCEDURE — 99291 CRITICAL CARE FIRST HOUR: CPT | Mod: ,,, | Performed by: NURSE PRACTITIONER

## 2022-09-21 PROCEDURE — 87077 CULTURE AEROBIC IDENTIFY: CPT | Performed by: NURSE PRACTITIONER

## 2022-09-21 PROCEDURE — 51702 INSERT TEMP BLADDER CATH: CPT

## 2022-09-21 PROCEDURE — 89051 BODY FLUID CELL COUNT: CPT

## 2022-09-21 PROCEDURE — 99291 PR CRITICAL CARE, E/M 30-74 MINUTES: ICD-10-PCS | Mod: ,,, | Performed by: NURSE PRACTITIONER

## 2022-09-21 PROCEDURE — 37000008 HC ANESTHESIA 1ST 15 MINUTES: Performed by: NEUROLOGICAL SURGERY

## 2022-09-21 PROCEDURE — 36000711: Performed by: NEUROLOGICAL SURGERY

## 2022-09-21 PROCEDURE — 94761 N-INVAS EAR/PLS OXIMETRY MLT: CPT

## 2022-09-21 PROCEDURE — 95718 EEG PHYS/QHP 2-12 HR W/VEEG: CPT | Mod: ,,, | Performed by: PSYCHIATRY & NEUROLOGY

## 2022-09-21 PROCEDURE — 82803 BLOOD GASES ANY COMBINATION: CPT

## 2022-09-21 PROCEDURE — 87070 CULTURE OTHR SPECIMN AEROBIC: CPT | Mod: 59 | Performed by: NURSE PRACTITIONER

## 2022-09-21 PROCEDURE — 85025 COMPLETE CBC W/AUTO DIFF WBC: CPT | Performed by: PSYCHIATRY & NEUROLOGY

## 2022-09-21 PROCEDURE — 87070 CULTURE OTHR SPECIMN AEROBIC: CPT

## 2022-09-21 PROCEDURE — 25000003 PHARM REV CODE 250: Performed by: NURSE ANESTHETIST, CERTIFIED REGISTERED

## 2022-09-21 PROCEDURE — 80053 COMPREHEN METABOLIC PANEL: CPT | Performed by: NURSE PRACTITIONER

## 2022-09-21 PROCEDURE — 25000003 PHARM REV CODE 250: Performed by: NURSE PRACTITIONER

## 2022-09-21 PROCEDURE — 87205 SMEAR GRAM STAIN: CPT | Mod: 59 | Performed by: NURSE PRACTITIONER

## 2022-09-21 PROCEDURE — 27000221 HC OXYGEN, UP TO 24 HOURS

## 2022-09-21 PROCEDURE — 25500020 PHARM REV CODE 255: Performed by: PSYCHIATRY & NEUROLOGY

## 2022-09-21 PROCEDURE — 85610 PROTHROMBIN TIME: CPT | Performed by: PSYCHIATRY & NEUROLOGY

## 2022-09-21 PROCEDURE — 36600 WITHDRAWAL OF ARTERIAL BLOOD: CPT

## 2022-09-21 PROCEDURE — 25000003 PHARM REV CODE 250: Performed by: NEUROLOGICAL SURGERY

## 2022-09-21 PROCEDURE — 95718 PR EEG, W/VIDEO, CONT RECORD, I&R, 2-12 HRS: ICD-10-PCS | Mod: ,,, | Performed by: PSYCHIATRY & NEUROLOGY

## 2022-09-21 PROCEDURE — 84145 PROCALCITONIN (PCT): CPT | Performed by: NURSE PRACTITIONER

## 2022-09-21 PROCEDURE — 84157 ASSAY OF PROTEIN OTHER: CPT

## 2022-09-21 PROCEDURE — D9220A PRA ANESTHESIA: ICD-10-PCS | Mod: ANES,,, | Performed by: ANESTHESIOLOGY

## 2022-09-21 PROCEDURE — 85730 THROMBOPLASTIN TIME PARTIAL: CPT | Performed by: PSYCHIATRY & NEUROLOGY

## 2022-09-21 PROCEDURE — 36000710: Performed by: NEUROLOGICAL SURGERY

## 2022-09-21 PROCEDURE — C1729 CATH, DRAINAGE: HCPCS | Performed by: NEUROLOGICAL SURGERY

## 2022-09-21 PROCEDURE — 87186 SC STD MICRODIL/AGAR DIL: CPT | Performed by: NURSE PRACTITIONER

## 2022-09-21 PROCEDURE — 27200966 HC CLOSED SUCTION SYSTEM

## 2022-09-21 PROCEDURE — 97112 NEUROMUSCULAR REEDUCATION: CPT

## 2022-09-21 PROCEDURE — 62230 PR REPLACEMENT/REVISION,CSF SHUNT: ICD-10-PCS | Mod: ,,, | Performed by: NEUROLOGICAL SURGERY

## 2022-09-21 DEVICE — VALVE STRATA REGULAR: Type: IMPLANTABLE DEVICE | Site: CRANIAL | Status: FUNCTIONAL

## 2022-09-21 DEVICE — RESERVOIR BURR HOLE UNITIZED: Type: IMPLANTABLE DEVICE | Site: CRANIAL | Status: FUNCTIONAL

## 2022-09-21 RX ORDER — SODIUM CHLORIDE 9 MG/ML
INJECTION, SOLUTION INTRAVENOUS CONTINUOUS
Status: DISCONTINUED | OUTPATIENT
Start: 2022-09-21 | End: 2022-09-23

## 2022-09-21 RX ORDER — FAMOTIDINE 20 MG/1
20 TABLET, FILM COATED ORAL 2 TIMES DAILY
Status: DISCONTINUED | OUTPATIENT
Start: 2022-09-21 | End: 2022-09-22

## 2022-09-21 RX ORDER — LIDOCAINE HYDROCHLORIDE AND EPINEPHRINE 10; 10 MG/ML; UG/ML
INJECTION, SOLUTION INFILTRATION; PERINEURAL
Status: DISCONTINUED | OUTPATIENT
Start: 2022-09-21 | End: 2022-09-21 | Stop reason: HOSPADM

## 2022-09-21 RX ORDER — LABETALOL HCL 20 MG/4 ML
10 SYRINGE (ML) INTRAVENOUS EVERY 4 HOURS PRN
Status: DISCONTINUED | OUTPATIENT
Start: 2022-09-21 | End: 2022-09-24 | Stop reason: HOSPADM

## 2022-09-21 RX ORDER — PHENYLEPHRINE HCL IN 0.9% NACL 1 MG/10 ML
SYRINGE (ML) INTRAVENOUS
Status: DISCONTINUED | OUTPATIENT
Start: 2022-09-21 | End: 2022-09-21

## 2022-09-21 RX ORDER — EPHEDRINE SULFATE 50 MG/ML
INJECTION, SOLUTION INTRAVENOUS
Status: DISCONTINUED | OUTPATIENT
Start: 2022-09-21 | End: 2022-09-21

## 2022-09-21 RX ORDER — CEFEPIME HYDROCHLORIDE 1 G/50ML
1 INJECTION, SOLUTION INTRAVENOUS
Status: DISCONTINUED | OUTPATIENT
Start: 2022-09-21 | End: 2022-09-22

## 2022-09-21 RX ORDER — FENTANYL CITRATE 50 UG/ML
INJECTION, SOLUTION INTRAMUSCULAR; INTRAVENOUS
Status: DISCONTINUED | OUTPATIENT
Start: 2022-09-21 | End: 2022-09-21

## 2022-09-21 RX ORDER — FAMOTIDINE 20 MG/1
20 TABLET, FILM COATED ORAL 2 TIMES DAILY
Status: DISCONTINUED | OUTPATIENT
Start: 2022-09-21 | End: 2022-09-21

## 2022-09-21 RX ORDER — ACETAMINOPHEN 325 MG/1
650 TABLET ORAL EVERY 6 HOURS PRN
Status: DISCONTINUED | OUTPATIENT
Start: 2022-09-21 | End: 2022-09-24 | Stop reason: HOSPADM

## 2022-09-21 RX ORDER — BACITRACIN ZINC 500 UNIT/G
OINTMENT (GRAM) TOPICAL
Status: DISCONTINUED | OUTPATIENT
Start: 2022-09-21 | End: 2022-09-21 | Stop reason: HOSPADM

## 2022-09-21 RX ORDER — MUPIROCIN 20 MG/G
OINTMENT TOPICAL 2 TIMES DAILY
Status: DISCONTINUED | OUTPATIENT
Start: 2022-09-21 | End: 2022-09-24 | Stop reason: HOSPADM

## 2022-09-21 RX ORDER — VANCOMYCIN HCL IN 5 % DEXTROSE 1G/250ML
1000 PLASTIC BAG, INJECTION (ML) INTRAVENOUS
Status: DISCONTINUED | OUTPATIENT
Start: 2022-09-21 | End: 2022-09-22

## 2022-09-21 RX ORDER — LEVETIRACETAM 500 MG/5ML
750 INJECTION, SOLUTION, CONCENTRATE INTRAVENOUS EVERY 12 HOURS
Status: DISCONTINUED | OUTPATIENT
Start: 2022-09-21 | End: 2022-09-23

## 2022-09-21 RX ORDER — MIDAZOLAM HYDROCHLORIDE 1 MG/ML
INJECTION, SOLUTION INTRAMUSCULAR; INTRAVENOUS
Status: DISCONTINUED | OUTPATIENT
Start: 2022-09-21 | End: 2022-09-21

## 2022-09-21 RX ORDER — SODIUM CHLORIDE 0.9 % (FLUSH) 0.9 %
10 SYRINGE (ML) INJECTION
Status: DISCONTINUED | OUTPATIENT
Start: 2022-09-21 | End: 2022-09-24 | Stop reason: HOSPADM

## 2022-09-21 RX ORDER — ROCURONIUM BROMIDE 10 MG/ML
INJECTION, SOLUTION INTRAVENOUS
Status: DISCONTINUED | OUTPATIENT
Start: 2022-09-21 | End: 2022-09-21

## 2022-09-21 RX ORDER — AMOXICILLIN 250 MG
1 CAPSULE ORAL 2 TIMES DAILY
Status: DISCONTINUED | OUTPATIENT
Start: 2022-09-21 | End: 2022-09-22

## 2022-09-21 RX ADMIN — PROPOFOL 40 MCG/KG/MIN: 10 INJECTION, EMULSION INTRAVENOUS at 02:09

## 2022-09-21 RX ADMIN — GENTAMICIN 20 MG: 10 INJECTION, SOLUTION INTRAMUSCULAR; INTRAVENOUS at 01:09

## 2022-09-21 RX ADMIN — PROPOFOL 40 MCG/KG/MIN: 10 INJECTION, EMULSION INTRAVENOUS at 05:09

## 2022-09-21 RX ADMIN — SODIUM CHLORIDE, SODIUM GLUCONATE, SODIUM ACETATE, POTASSIUM CHLORIDE, MAGNESIUM CHLORIDE, SODIUM PHOSPHATE, DIBASIC, AND POTASSIUM PHOSPHATE: .53; .5; .37; .037; .03; .012; .00082 INJECTION, SOLUTION INTRAVENOUS at 12:09

## 2022-09-21 RX ADMIN — PROPOFOL 40 MCG/KG/MIN: 10 INJECTION, EMULSION INTRAVENOUS at 10:09

## 2022-09-21 RX ADMIN — IOHEXOL 75 ML: 350 INJECTION, SOLUTION INTRAVENOUS at 09:09

## 2022-09-21 RX ADMIN — FENTANYL CITRATE 50 MCG: 50 INJECTION, SOLUTION INTRAMUSCULAR; INTRAVENOUS at 12:09

## 2022-09-21 RX ADMIN — Medication 100 MCG: at 01:09

## 2022-09-21 RX ADMIN — ROCURONIUM BROMIDE 20 MG: 10 INJECTION INTRAVENOUS at 01:09

## 2022-09-21 RX ADMIN — EPHEDRINE SULFATE 5 MG: 50 INJECTION INTRAVENOUS at 01:09

## 2022-09-21 RX ADMIN — PROPOFOL 40 MCG/KG/MIN: 10 INJECTION, EMULSION INTRAVENOUS at 07:09

## 2022-09-21 RX ADMIN — SODIUM CHLORIDE 500 ML: 0.9 INJECTION, SOLUTION INTRAVENOUS at 01:09

## 2022-09-21 RX ADMIN — MUPIROCIN: 20 OINTMENT TOPICAL at 09:09

## 2022-09-21 RX ADMIN — VANCOMYCIN HYDROCHLORIDE 1000 MG: 1 INJECTION, POWDER, LYOPHILIZED, FOR SOLUTION INTRAVENOUS at 11:09

## 2022-09-21 RX ADMIN — LEVETIRACETAM 750 MG: 500 INJECTION, SOLUTION INTRAVENOUS at 09:09

## 2022-09-21 RX ADMIN — VANCOMYCIN HYDROCHLORIDE 20 MG: 500 INJECTION, POWDER, LYOPHILIZED, FOR SOLUTION INTRAVENOUS at 01:09

## 2022-09-21 RX ADMIN — SUGAMMADEX 200 MG: 100 INJECTION, SOLUTION INTRAVENOUS at 03:09

## 2022-09-21 RX ADMIN — Medication 200 MCG: at 01:09

## 2022-09-21 RX ADMIN — PROPOFOL 40 MCG/KG/MIN: 10 INJECTION, EMULSION INTRAVENOUS at 11:09

## 2022-09-21 RX ADMIN — CEFEPIME HYDROCHLORIDE 1 G: 1 INJECTION, SOLUTION INTRAVENOUS at 05:09

## 2022-09-21 RX ADMIN — SODIUM CHLORIDE 0.25 MCG/KG/MIN: 9 INJECTION, SOLUTION INTRAVENOUS at 01:09

## 2022-09-21 RX ADMIN — ROCURONIUM BROMIDE 50 MG: 10 INJECTION INTRAVENOUS at 12:09

## 2022-09-21 RX ADMIN — MIDAZOLAM 2 MG: 1 INJECTION INTRAMUSCULAR; INTRAVENOUS at 01:09

## 2022-09-21 RX ADMIN — CEFEPIME HYDROCHLORIDE 1 G: 1 INJECTION, SOLUTION INTRAVENOUS at 09:09

## 2022-09-21 RX ADMIN — SODIUM CHLORIDE: 0.9 INJECTION, SOLUTION INTRAVENOUS at 02:09

## 2022-09-21 NOTE — HPI
Patient is a 27F with PMH of spina bifida (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), Chiari Malformation, VPS, seizure disorder, and neurogenic bladder with UTIs who presents after seizures leading to status epilepticus. Patient was found in bathroom by parents slumped over in wheelchair, uncertain if she hit her head. She had seizures and went into status epilepticus in the ED, needing to be intubated and sedated. At baseline, patient is wheelchair bound, but alert/oriented and catheterizes herself as necessary. NSGY is consulted for hydrocephalus.    Previous shunt failures she has had have lead to nausea/vomiting per father. Her last revision was on 3/19/2012 with Dr. Calero. She has one old right VPS catheter still in place. Her left sided VPS is newer and the revised VPS. Of note, patient had Newman rods put in for scoliosis treatment, of which the left is broken. Patient was last seen in clinic in 2014.

## 2022-09-21 NOTE — OP NOTE
DATE OF PROCEDURE: 9/21/2022      PREOPERATIVE DIAGNOSES:   Shunt malfunction and hydrocephalus.     POSTOPERATIVE DIAGNOSES:   Shunt malfunction and hydrocephalus.     PROCEDURES PERFORMED:   Shunt exploration and replacement distal shunt catheter and valve.     Surgeon(s) and Role:     * Nevaeh Kwok MD - Primary     * Warren Blanca MD - Resident - Assisting     * Maynor Calero MD - assisting    Two staff neurosurgeons were necessary for the case given the fact that this was a complex shunt revision and the resident was a heron level resident who was not able to meaningfully participate in the case.     ANESTHESIA: General     ESTIMATED BLOOD LOSS: 25 mL.     INDICATION FOR PROCEDURE:  Ms. Samuel is a 27-year-old female with a history of spina bifida, Chiari malformation, seizure disorder, and  shunt placement.  Her last shunt revision was in 2012.  She presents in status epilepticus.  Imaging studies showed hydrocephalus consistent with shunt failure.  Given the patient's clinical presentation as well as radiographic findings, the decision was made to bring the patient to the operating room for shunt exploration and revision.        OPERATIVE NOTE: After obtaining informed consent, the patient was brought into the Operating Room. She was intubated and anesthetized by Anesthesia. Preoperative antibiotics were administered. She was placed supine on the operating room table with her head turned to the right and a shoulder roll under her left shoulder. Her head was placed in a horseshoe. The head, neck, chest and abdomen were shaved and prepped in the standard sterile fashion.  The previous left parieto-occipital head incision was identified and marked on the skin.  Lidocaine 1% with epinephrine was injected into the head incision.  We decided to start 1st with the proximal catheter.  Skin incision was made using the patient's previous skin incision and the 15 blade.  This was carried down to the level of the  periosteum using Bovie electrocautery.  We had to extend the incision slightly in order to appropriately free the surrounding scar tissue.  The proximal catheter was identified and freed from the surrounding scar tissue.  A 3-0 Vicryl suture was used to hold the skin flap back.  The proximal catheter was disconnected from the right angle Rickham reservoir.  Good flow of CSF was seen.  Therefore, we felt this was not a proximal occlusion and that there was no need to revise the proximal. Therefore, we felt it necessary to explore the distal portion of the shunt.  A hemoclip was placed across the proximal catheter and a 4-0 Nurolon was placed through the tip of the catheter so the catheter would not migrate back into the brain.  A blunt-tip needle was then used to connect to the Rickham reservoir and valve assembly.  The blunt tip needle was hooked up to a manometer.  We attempted to test distal runoff through the Rickham reservoir and valve using the manometer.  However, very sluggish distal flow was seen.  We felt that a distal revision was necessary at this point.  The incision was extended inferiorly in order to expose the distal portion of the valve in order to gain access to the distal catheter.  This was done using the 15 blade and Bovie electrocautery.  The Rickham reservoir and valve were removed from the distal catheter.  The blunt tip needle and manometer was again connected to the distal catheter.  This time, good distal runoff was seen.  Therefore, we felt that we did not need to replace the distal shunt tubing.  A new in-line strata valve was connected to the distal catheter.  This was secured using a 2-0 silk suture.  A new right angle Rickham reservoir was connected to the proximal aspect of the valve.  We then turned our attention to the proximal catheter.  The proximal catheter was trimmed just below the hemoclip.  Again, good CSF flow was seen.  The right angle Rickham reservoir was connected to the  proximal catheter.  The valve pumped and refilled without any issues.  Intrathecal vancomycin and gentamicin were then injected into the Rickham reservoir.  Both wounds were then copiously irrigated.  Hemostasis was obtained using bipolar electrocautery and FloSeal.  The wound was closed in layers.  4-0 Monocryl were used on the skin.  Sterile dressing was put into place.  The patient was woken back up by anesthesia and brought to the recovery room in stable condition.  All counts were correct at the end of the case.

## 2022-09-21 NOTE — PLAN OF CARE
Recommendations     1. Rec'd TF regimen of Peptamen VHP @ goal rate of 35 ml/hr to provide 840 kcals, 77 g pro, and 706 ml free water + 364 kcals from propofol.   - Add beneprotein BID x 14 days to promote wound healing. TF + beneprotein + propofol provides 1254 kcals and 89 g protein.      2. If propofol discontinued, rec'd TF regimen of Impact Peptide 1.5 @ goal rate of 35 ml/hr to provide 1260 kcals, 79 g pro, and 647 ml free water.   - Add beneprotein BID x 14 days to promote wound healing. TF + beneprotein provides 1310 kcals and 91 g pro.      3. If extubated and no longer on vent, rec'd TF regimen of Isosource 1.5 @ goal rate of 30 ml/hr to provide 1080 kcals, 49 g pro, and 550 ml free water.    - Add beneprotein BID x 14 days to promote wound healing. TF + beneprotein provides 1130 kcals and 61 g pro.   - Based on 1179 kcal (MSJ x 1.0 PAL) and 57-69 g pro (1.0-1.2 g/kg).      4. If able to tolerate PO intake, rec'd regular diet- texture per SLP.      5. If able to tolerate PO intake and thin liquids, add Steve BID x 14 days to promote wound healing.      6. Add vitamin C, zinc, and multivitamin to promote wound healing.      7. RD following.     Goals: Will meet % EEN/EPN by next RD f/u.  Nutrition Goal Status: new  Communication of RD Recs:  (POC)    Miley Lemons PL-LDN

## 2022-09-21 NOTE — TRANSFER OF CARE
"Anesthesia Transfer of Care Note    Patient: Shashank Samuel    Procedure(s) Performed: Procedure(s) (LRB):  REVISION, SHUNT, VENTRICULOPERITONEAL (Left)    Patient location: ICU    Anesthesia Type: general    Transport from OR: Upon arrival to PACU/ICU, patient attached to ventilator and auscultated to confirm bilateral breath sounds and adequate TV. Transported from OR intubated on 100% O2 by AMBU with adequate controlled ventilation    Post pain: adequate analgesia    Post assessment: no apparent anesthetic complications and tolerated procedure well    Post vital signs: stable    Level of consciousness: sedated    Nausea/Vomiting: no nausea/vomiting    Complications: none    Transfer of care protocol was followed      Last vitals:   Visit Vitals  /89   Pulse 97   Temp 36.5 °C (97.7 °F)   Resp 16   Ht 4' 8" (1.422 m)   Wt 57.6 kg (126 lb 15.8 oz)   SpO2 100%   BMI 28.47 kg/m²     "

## 2022-09-21 NOTE — SUBJECTIVE & OBJECTIVE
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     Past Surgical History:   Procedure Laterality Date    AMPUTATION      right 4th and 5th toes; left 5th toe and portion of left great toe    BACK SURGERY      BLADDER SURGERY      BREAST SURGERY  2015    Reduction    COLON SURGERY      flush site for bowel movements from appendix    CYSTOSTOMY      DEBRIDEMENT OF FOOT Right 1/8/2021    Procedure: DEBRIDEMENT, FOOT;  Surgeon: Abdi Bedoya DPM;  Location: [facility];  Service: Podiatry;  Laterality: Right;    EYE SURGERY      x 5    FLUOROSCOPIC URODYNAMIC STUDY N/A 3/12/2019    Procedure: URODYNAMIC STUDY, FLUOROSCOPIC;  Surgeon: Kenzie Charles MD;  Location: [facility] 1ST FLR;  Service: Urology;  Laterality: N/A;  1 hour    FLUOROSCOPIC URODYNAMIC STUDY N/A 4/4/2019    Procedure: URODYNAMIC STUDY, FLUOROSCOPIC;  Surgeon: Kenzie Charles MD;  Location: [facility] 1ST FLR;  Service: Urology;  Laterality: N/A;  1 hour    FLUOROSCOPIC URODYNAMIC STUDY N/A 5/11/2022    Procedure: URODYNAMIC STUDY, FLUOROSCOPIC;  Surgeon: Kenzie Charles MD;  Location: [facility] 1ST FLR;  Service: Urology;  Laterality: N/A;  90min    FOOT AMPUTATION THROUGH METATARSAL Right 10/28/2019    Procedure: AMPUTATION, FOOT, TRANSMETATARSAL;  Surgeon: Abdi Bedoya DPM;  Location: [facility];  Service: Podiatry;  Laterality: Right;    FOOT AMPUTATION THROUGH METATARSAL Right 3/27/2020    Procedure: AMPUTATION, FOOT, TRANSMETATARSAL;  Surgeon: Abdi Bedoya DPM;  Location: [facility] OR;  Service: Podiatry;  Laterality: Right;  REVISION    MYELOMININGOCELE REPAIR      NEPHRECTOMY Right 11/4/2021    Procedure: Nephrectomy/ OPEN;  Surgeon: Dash Rosenthal MD;  Location: [facility] OR 2ND FLR;  Service:  Urology;  Laterality: Right;  4HRS    NEPHROSTOMY Right 8/2/2021    Procedure: Nephrostomy and perinephric abscess drain;  Surgeon: Lillian Surgeon;  Location: [facility];  Service: Anesthesiology;  Laterality: Right;    STRABISMUS SURGERY      ou dr peña    VENTRICULOPERITONEAL SHUNT      WOUND DEBRIDEMENT  3/27/2020    Procedure: DEBRIDEMENT, WOUND;  Surgeon: Abdi Bedoya DPM;  Location: [facility] OR;  Service: Podiatry;;      Current Facility-Administered Medications on File Prior to Encounter   Medication Dose Route Frequency Provider Last Rate Last Admin    [COMPLETED] 0.9%  NaCl infusion  1,000 mL Intravenous ED 1 Time Cuba Bolivar MD   Stopped at 09/20/22 2139    [COMPLETED] cefepime in dextrose 5 % 1 gram/50 mL IVPB 1 g  1 g Intravenous ED 1 Time Cuba Bolivar MD   Stopped at 09/20/22 2206    [COMPLETED] etomidate injection   Intravenous Code/trauma/sedation Med Sandy Nunes MD   14 mg at 09/20/22 1727    [COMPLETED] levETIRAcetam in NaCl (iso-os) IVPB 1,000 mg  1,000 mg Intravenous ED 1 Time Sandy Nunes MD   Stopped at 09/20/22 1755    [COMPLETED] lorazepam injection 1 mg  1 mg Intravenous ED 1 Time Sandy Nunes MD   2 mg at 09/20/22 2028    [COMPLETED] lorazepam injection   Intravenous Code/trauma/sedation Med Sandy Nunes MD   2 mg at 09/20/22 1715    [COMPLETED] midazolam (VERSED) 5 mg/mL injection   Intravenous Code/trauma/sedation Med Sandy Nunes MD   5 mg at 09/20/22 1716    [COMPLETED] rocuronium injection   Intravenous Code/trauma/sedation Med Sandy Nunes MD   50 mg at 09/20/22 1727    [COMPLETED] vancomycin 750 mg in dextrose 5 % 250 mL IVPB (ready to mix system)  750 mg Intravenous Once Sandy Nunes  mL/hr at 09/20/22 2247 750 mg at 09/20/22 2247    [DISCONTINUED] etomidate injection 13.8 mg  0.3 mg/kg Intravenous ED 1 Time Sandy Nunes MD        [DISCONTINUED] levETIRAcetam (KEPPRA) 750 mg in dextrose 5 % 100 mL IVPB  750 mg  Intravenous Q12H Dov Smart MD   Stopped at 09/20/22 2139    [DISCONTINUED] levETIRAcetam in NaCl (iso-os) IVPB 1,000 mg  1,000 mg Intravenous ED 1 Time Sandy Nunes MD        [DISCONTINUED] levETIRAcetam in NaCl (iso-os) IVPB 500 mg  500 mg Intravenous ED 1 Time Sandy Nunes MD        [DISCONTINUED] lorazepam injection 2 mg  2 mg Intravenous ED 1 Time Cuba Bolivar MD        [DISCONTINUED] midazolam (VERSED) 1 mg/mL in dextrose 5 % 100 mL infusion (non-titrating)  1 mg/hr Intravenous Continuous Cuba Bolivar MD        [DISCONTINUED] midazolam (VERSED) 1 mg/mL injection 5 mg  5 mg Intravenous Once Sandy Nunes MD        [DISCONTINUED] midazolam (VERSED) 5 mg/mL injection 5 mg  5 mg Intravenous Once Sandy Nunes MD        [DISCONTINUED] midazolam 100 mg/100 mL in dextrose 5% infusion  0-5 mg/hr Intravenous Continuous Cuba Bolivar MD 1 mL/hr at 09/20/22 2246 1 mg/hr at 09/20/22 2246    [DISCONTINUED] propofol (DIPRIVAN) 10 mg/mL infusion  0-50 mcg/kg/min Intravenous Continuous Sandy Nunes MD 5.6 mL/hr at 09/20/22 2055 20 mcg/kg/min at 09/20/22 2055    [DISCONTINUED] propofoL (DIPRIVAN) 10 mg/mL infusion             [DISCONTINUED] rocuronium injection 46 mg  1 mg/kg Intravenous Once Sandy Nunes MD        [DISCONTINUED] vancomycin - pharmacy to dose   Intravenous pharmacy to manage frequency Cuba Bolivar MD         Current Outpatient Medications on File Prior to Encounter   Medication Sig Dispense Refill    acetaminophen (TYLENOL) 500 MG tablet Take 1,000 mg by mouth every 6 (six) hours as needed for Pain.      cephALEXin (KEFLEX) 500 MG capsule Take 1 capsule (500 mg total) by mouth every evening. Start after you complete the cipro. (Patient not taking: No sig reported) 30 capsule 0    collagenase (SANTYL) ointment Apply topically once daily. (Patient not taking: No sig reported)  0    ferrous sulfate (FEOSOL) 325 mg (65 mg iron) Tab tablet Take 325 mg by  mouth 2 (two) times daily.      ibuprofen (ADVIL,MOTRIN) 200 MG tablet Take 200 mg by mouth every 6 (six) hours as needed for Pain.      levETIRAcetam (KEPPRA) 500 MG Tab Take 1 tablet (500 mg total) by mouth 2 (two) times daily. 60 tablet 11      Allergies: Latex, natural rubber; Zofran [ondansetron hcl (pf)]; Gardasil  [h papillomavirus vac,qval (pf)]; Menactra  [mening vac a,c,y,w135 dip (pf)]; Nitrofurantoin; Sulfa (sulfonamide antibiotics); Tramadol; Levaquin [levofloxacin]; and Macrobid [nitrofurantoin monohyd/m-cryst]    Family History   Problem Relation Age of Onset    Breast cancer Mother     Gout Father     Cataracts Maternal Grandmother     Myocarditis Sister     Amblyopia Neg Hx     Blindness Neg Hx     Cancer Neg Hx     Diabetes Neg Hx     Glaucoma Neg Hx     Hypertension Neg Hx     Macular degeneration Neg Hx     Retinal detachment Neg Hx     Strabismus Neg Hx     Stroke Neg Hx     Thyroid disease Neg Hx      Social History     Tobacco Use    Smoking status: Never    Smokeless tobacco: Never   Substance Use Topics    Alcohol use: Not Currently    Drug use: Never     Review of Systems   Unable to perform ROS: Intubated   Objective:     Vitals:    Temp: 98.8 °F (37.1 °C)  Pulse: 106  Rhythm: sinus tachycardia  BP: (!) 140/84  MAP (mmHg): 108  Resp: 20  SpO2: 99 %  Oxygen Concentration (%): 40  O2 Device (Oxygen Therapy): ventilator  Vent Mode: A/C  Set Rate: 14 BPM  Vt Set: 325 mL  PEEP/CPAP: 5 cmH20  Peak Airway Pressure: 23 cmH2O  Mean Airway Pressure: 9.2 cmH20  Plateau Pressure: 0 cmH20    Temp  Min: 94.8 °F (34.9 °C)  Max: 99.1 °F (37.3 °C)  Pulse  Min: 68  Max: 146  BP  Min: 95/41  Max: 159/105  MAP (mmHg)  Min: 77  Max: 127  Resp  Min: 12  Max: 31  SpO2  Min: 89 %  Max: 100 %  Oxygen Concentration (%)  Min: 30  Max: 60    09/20 0701 - 09/21 0700  In: 1850 [I.V.:1350]  Out: 1150 [Urine:1150]           Physical Exam  Vitals and nursing note reviewed.   HENT:      Nose: Nose normal.       Mouth/Throat:      Pharynx: Oropharynx is clear.   Cardiovascular:      Rate and Rhythm: Regular rhythm. Tachycardia present.      Pulses: Normal pulses.      Heart sounds: Normal heart sounds.   Pulmonary:      Breath sounds: Normal breath sounds.   Abdominal:      General: Bowel sounds are normal.      Palpations: Abdomen is soft.   Skin:     General: Skin is warm.      Comments: Stage 3 to perineal area,    Neurological:      Comments: E1 V1t M3  Exam on propofol  PERRLA  Cough present  Flexion to pain upper extremities  No response to pain lowers        Today I personally reviewed pertinent medications, lines/drains/airways, imaging, cardiology results, laboratory results, microbiology results,

## 2022-09-21 NOTE — H&P
[facility]  Neurocritical Care  History & Physical    Admit Date: 9/21/2022  Service Date: 09/21/2022  Length of Stay: 0    Subjective:     Chief Complaint: Obstructive hydrocephalus    History of Present Illness: Shashank Samuel is a 27 y.o. 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       She originally presented to OSH after being found at home unresponsive, slumped over in the bathroom with her head hanging over the edge og the toilet/face blue (per father).    Patient was brought to the ER and after arrival to the ER, she had multiple tonic-clonic seizures without return to her baseline mental status.  She received 4-8 mg of Ativan with intermittent improved seizure activity.  She also received 750 mg Keppra , and continued to have seizure activity on my exam with tonic posturing of bilateral upper and lower extremities and upward deviated gaze.      Patient's mother is in the room helping with history.  Patient's mother states that she has a history of seizures and she has had about 3 seizures this year.  She was taken off Keppra and was restarted about a month and a half ago for recurrence of seizures.  She currently takes 500 mg b.i.d. of Keppra.     [place] [facility] [place].       Past Medical History:   Diagnosis Date    Anemia     Arnold-Chiari malformation, type II     Encounter for blood transfusion     Hydrocephalus     Neurogenic bladder     self catheterizes every 3 hours    Seizures     only w/ shunt malfunction    Spinal bifida, closed     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     Past Surgical History:   Procedure Laterality Date    AMPUTATION      right 4th and 5th toes; left 5th toe and portion of left great toe    BACK SURGERY      BLADDER  SURGERY      BREAST SURGERY  2015    Reduction    COLON SURGERY      flush site for bowel movements from appendix    CYSTOSTOMY      DEBRIDEMENT OF FOOT Right 1/8/2021    Procedure: DEBRIDEMENT, FOOT;  Surgeon: Abdi Bedoya DPM;  Location: [facility];  Service: Podiatry;  Laterality: Right;    EYE SURGERY      x 5    FLUOROSCOPIC URODYNAMIC STUDY N/A 3/12/2019    Procedure: URODYNAMIC STUDY, FLUOROSCOPIC;  Surgeon: Kenzie Charles MD;  Location: [facility] 1ST FLR;  Service: Urology;  Laterality: N/A;  1 hour    FLUOROSCOPIC URODYNAMIC STUDY N/A 4/4/2019    Procedure: URODYNAMIC STUDY, FLUOROSCOPIC;  Surgeon: Kenzie Charles MD;  Location: [facility] OR 1ST FLR;  Service: Urology;  Laterality: N/A;  1 hour    FLUOROSCOPIC URODYNAMIC STUDY N/A 5/11/2022    Procedure: URODYNAMIC STUDY, FLUOROSCOPIC;  Surgeon: Kenzie Charles MD;  Location: [facility] 1ST FLR;  Service: Urology;  Laterality: N/A;  90min    FOOT AMPUTATION THROUGH METATARSAL Right 10/28/2019    Procedure: AMPUTATION, FOOT, TRANSMETATARSAL;  Surgeon: Abdi Bedoya DPM;  Location: [facility] OR;  Service: Podiatry;  Laterality: Right;    FOOT AMPUTATION THROUGH METATARSAL Right 3/27/2020    Procedure: AMPUTATION, FOOT, TRANSMETATARSAL;  Surgeon: Abdi Bedoya DPM;  Location: [facility];  Service: Podiatry;  Laterality: Right;  REVISION    MYELOMININGOCELE REPAIR      NEPHRECTOMY Right 11/4/2021    Procedure: Nephrectomy/ OPEN;  Surgeon: Dash Rosenthal MD;  Location: [facility] 2ND FLR;  Service: Urology;  Laterality: Right;  4HRS    NEPHROSTOMY Right 8/2/2021    Procedure: Nephrostomy and perinephric abscess drain;  Surgeon: Lillian Surgeon;  Location: [facility];  Service: Anesthesiology;  Laterality: Right;    STRABISMUS SURGERY      ou dr peña    VENTRICULOPERITONEAL SHUNT      WOUND DEBRIDEMENT  3/27/2020    Procedure: DEBRIDEMENT, WOUND;  Surgeon: Abdi Bedoya DPM;  Location: [facility];  Service: Podiatry;;      Current  Facility-Administered Medications on File Prior to Encounter   Medication Dose Route Frequency Provider Last Rate Last Admin    [COMPLETED] 0.9%  NaCl infusion  1,000 mL Intravenous ED 1 Time Cuba Bolivar MD   Stopped at 09/20/22 2139    [COMPLETED] cefepime in dextrose 5 % 1 gram/50 mL IVPB 1 g  1 g Intravenous ED 1 Time Cuba Bolivar MD   Stopped at 09/20/22 2206    [COMPLETED] etomidate injection   Intravenous Code/trauma/sedation Med Sandy Nunes MD   14 mg at 09/20/22 1727    [COMPLETED] levETIRAcetam in NaCl (iso-os) IVPB 1,000 mg  1,000 mg Intravenous ED 1 Time Sandy Nunes MD   Stopped at 09/20/22 1755    [COMPLETED] lorazepam injection 1 mg  1 mg Intravenous ED 1 Time Sandy Nunes MD   2 mg at 09/20/22 2028    [COMPLETED] lorazepam injection   Intravenous Code/trauma/sedation Med Sandy Nunes MD   2 mg at 09/20/22 1715    [COMPLETED] midazolam (VERSED) 5 mg/mL injection   Intravenous Code/trauma/sedation Med Sandy Nunes MD   5 mg at 09/20/22 1716    [COMPLETED] rocuronium injection   Intravenous Code/trauma/sedation Med Sandy Nunes MD   50 mg at 09/20/22 1727    [COMPLETED] vancomycin 750 mg in dextrose 5 % 250 mL IVPB (ready to mix system)  750 mg Intravenous Once Sandy Nunes  mL/hr at 09/20/22 2247 750 mg at 09/20/22 2247    [DISCONTINUED] etomidate injection 13.8 mg  0.3 mg/kg Intravenous ED 1 Time Sandy Nunes MD        [DISCONTINUED] levETIRAcetam (KEPPRA) 750 mg in dextrose 5 % 100 mL IVPB  750 mg Intravenous Q12H Dov Smart MD   Stopped at 09/20/22 2139    [DISCONTINUED] levETIRAcetam in NaCl (iso-os) IVPB 1,000 mg  1,000 mg Intravenous ED 1 Time Sandy Nunes MD        [DISCONTINUED] levETIRAcetam in NaCl (iso-os) IVPB 500 mg  500 mg Intravenous ED 1 Time Sandy Nunes MD        [DISCONTINUED] lorazepam injection 2 mg  2 mg Intravenous ED 1 Time Cuba Bolivar MD        [DISCONTINUED] midazolam  (VERSED) 1 mg/mL in dextrose 5 % 100 mL infusion (non-titrating)  1 mg/hr Intravenous Continuous Cuba Bolivar MD        [DISCONTINUED] midazolam (VERSED) 1 mg/mL injection 5 mg  5 mg Intravenous Once Sandy Nunes MD        [DISCONTINUED] midazolam (VERSED) 5 mg/mL injection 5 mg  5 mg Intravenous Once Sandy Nunes MD        [DISCONTINUED] midazolam 100 mg/100 mL in dextrose 5% infusion  0-5 mg/hr Intravenous Continuous Cuba Bolivar MD 1 mL/hr at 09/20/22 2246 1 mg/hr at 09/20/22 2246    [DISCONTINUED] propofol (DIPRIVAN) 10 mg/mL infusion  0-50 mcg/kg/min Intravenous Continuous Sandy Nunes MD 5.6 mL/hr at 09/20/22 2055 20 mcg/kg/min at 09/20/22 2055    [DISCONTINUED] propofoL (DIPRIVAN) 10 mg/mL infusion             [DISCONTINUED] rocuronium injection 46 mg  1 mg/kg Intravenous Once Sandy Nunes MD        [DISCONTINUED] vancomycin - pharmacy to dose   Intravenous pharmacy to manage frequency Cuba Bolivar MD         Current Outpatient Medications on File Prior to Encounter   Medication Sig Dispense Refill    acetaminophen (TYLENOL) 500 MG tablet Take 1,000 mg by mouth every 6 (six) hours as needed for Pain.      cephALEXin (KEFLEX) 500 MG capsule Take 1 capsule (500 mg total) by mouth every evening. Start after you complete the cipro. (Patient not taking: No sig reported) 30 capsule 0    collagenase (SANTYL) ointment Apply topically once daily. (Patient not taking: No sig reported)  0    ferrous sulfate (FEOSOL) 325 mg (65 mg iron) Tab tablet Take 325 mg by mouth 2 (two) times daily.      ibuprofen (ADVIL,MOTRIN) 200 MG tablet Take 200 mg by mouth every 6 (six) hours as needed for Pain.      levETIRAcetam (KEPPRA) 500 MG Tab Take 1 tablet (500 mg total) by mouth 2 (two) times daily. 60 tablet 11      Allergies: Latex, natural rubber; Zofran [ondansetron hcl (pf)]; Gardasil  [h papillomavirus vac,qval (pf)]; Menactra  [mening vac a,c,y,w135 dip (pf)]; Nitrofurantoin;  Sulfa (sulfonamide antibiotics); Tramadol; Levaquin [levofloxacin]; and Macrobid [nitrofurantoin monohyd/m-cryst]    Family History   Problem Relation Age of Onset    Breast cancer Mother     Gout Father     Cataracts Maternal Grandmother     Myocarditis Sister     Amblyopia Neg Hx     Blindness Neg Hx     Cancer Neg Hx     Diabetes Neg Hx     Glaucoma Neg Hx     Hypertension Neg Hx     Macular degeneration Neg Hx     Retinal detachment Neg Hx     Strabismus Neg Hx     Stroke Neg Hx     Thyroid disease Neg Hx      Social History     Tobacco Use    Smoking status: Never    Smokeless tobacco: Never   Substance Use Topics    Alcohol use: Not Currently    Drug use: Never     Review of Systems   Unable to perform ROS: Intubated   Objective:     Vitals:    Temp: 98.8 °F (37.1 °C)  Pulse: 106  Rhythm: sinus tachycardia  BP: (!) 140/84  MAP (mmHg): 108  Resp: 20  SpO2: 99 %  Oxygen Concentration (%): 40  O2 Device (Oxygen Therapy): ventilator  Vent Mode: A/C  Set Rate: 14 BPM  Vt Set: 325 mL  PEEP/CPAP: 5 cmH20  Peak Airway Pressure: 23 cmH2O  Mean Airway Pressure: 9.2 cmH20  Plateau Pressure: 0 cmH20    Temp  Min: 94.8 °F (34.9 °C)  Max: 99.1 °F (37.3 °C)  Pulse  Min: 68  Max: 146  BP  Min: 95/41  Max: 159/105  MAP (mmHg)  Min: 77  Max: 127  Resp  Min: 12  Max: 31  SpO2  Min: 89 %  Max: 100 %  Oxygen Concentration (%)  Min: 30  Max: 60    09/20 0701 - 09/21 0700  In: 1850 [I.V.:1350]  Out: 1150 [Urine:1150]           Physical Exam  Vitals and nursing note reviewed.   HENT:      Nose: Nose normal.      Mouth/Throat:      Pharynx: Oropharynx is clear.   Cardiovascular:      Rate and Rhythm: Regular rhythm. Tachycardia present.      Pulses: Normal pulses.      Heart sounds: Normal heart sounds.   Pulmonary:      Breath sounds: Normal breath sounds.   Abdominal:      General: Bowel sounds are normal.      Palpations: Abdomen is soft.   Skin:     General: Skin is warm.      Comments: Stage 3 to perineal area,     Neurological:      Comments: E1 V1t M3  Exam on propofol  PERRLA  Cough present  Flexion to pain upper extremities  No response to pain lowers        Today I personally reviewed pertinent medications, lines/drains/airways, imaging, cardiology results, laboratory results, microbiology results,         Assessment/Plan:     Neuro  * Obstructive hydrocephalus  CTH at OSH with very large vents, decreased grey white, compated to only other CTH in system 02/2015.  Shunt present  NSGY consulted to eval/tap shunt  CSF CX sent  Shunt series performed prior to transfer      Anthony coma scale total score 3-8  R/o seizure   NSGy to Tap shunt and eval shunt revision to correct hydrocephalus     Status epilepticus  Hx is not clear after speaking with father.   Patient was on Keppra Years ago, it was stopped for a period and then restarted recently. Father was unsure exactly why. No head imaging in computer recently to correlate with Keppra being restarted.   Patient having generalized tonic clonic seizures without returning to baseline, taken to OSH ER and give Ativan (unknown amount as notes are not clear 4-8mg) there may have even been versed given as well. The patient was intubated and propofol was started at very low dose, then titrated upward. Versed gtt may have even been given at some point.     Patient arrived, no clinical seizures noted. Propofol at 40mcg/kg/min and EEG cap placed. No epileptiform seizure per epilepsy Dr. Rivers.   Keppra continued from OSH, 750mg bid     Pulmonary  Acute respiratory failure  Intubated at OSH during status epilepticus, hypoxia?  Wean vent as able  Daily CXR and ABG    Renal/  Acute cystitis without hematuria  UA with concern for UTI, broad spec abx continued  Pan CX    Patient does self cath through umbilicus suprapubic cath.           The patient is being Prophylaxed for:  Venous Thromboembolism with: Mechanical  Stress Ulcer with: Not Applicable   Ventilator Pneumonia with: not  applicable    Activity Orders          Turn patient starting at 09/21 0400    Elevate HOB starting at 09/21 0241    Diet NPO: NPO starting at 09/21 0241        Full Code    Critical condition in that Patient has a condition that poses threat to life and bodily function: hydrocephalus, brain compression, status epilepticus,      60 minutes of Critical care time was spent personally by me on the following activities: development of treatment plan with patient or surrogate and bedside caregivers, discussions with consultants, evaluation of patient's response to treatment, examination of patient, ordering and performing treatments and interventions, ordering and review of laboratory studies, ordering and review of radiographic studies, pulse oximetry, antibiotic titration if applicable, vasopressor titration if applicable, re-evaluation of patient's condition. This critical care time did not overlap with that of any other provider or involve time for any procedures. There is high probability for acute neurological change leading to clinical and possibly life-threatening deterioration requiring highest level of physician preparedness for urgent intervention.      Young Waldrop, NP  Neurocritical Care  [facility]

## 2022-09-21 NOTE — CONSULTS
Inpatient consult to Physical Medicine Rehab  Consult performed by: Yessi Crisostomo NP  Consult ordered by: Young Waldrop NP  Reason for consult: Assess rehab needs    Reviewed patient history and current admission.  Rehab team following.  Full consult to follow once medically stable.    MAYDA Sánchez, FNP-C  Physical Medicine & Rehabilitation   09/21/2022

## 2022-09-21 NOTE — PROGRESS NOTES
Certification of Assistant at Surgery       Surgery Date: 9/21/2022     Participating Surgeons:  Surgeon(s) and Role:     * Maynor Calero MD - Assist     * Nevaeh Kwok MD - Surgeon        Procedures:  Procedure(s) (LRB):  REVISION, SHUNT, VENTRICULOPERITONEAL (Left)    Assistant Surgeon's Certification of Necessity:  I understand that section 1842 (b) (6) (d) of the Social Security Act generally prohibits Medicare Part B reasonable charge payment for the services of assistants at surgery in teaching hospitals when qualified residents are available to furnish such services. I certify that the services for which payment is claimed were medically necessary, and that no qualified resident was available to perform the services. I further understand that these services are subject to post-payment review by the Medicare carrier.      Maynor Calero MD    09/21/2022  6:18 PM

## 2022-09-21 NOTE — PROGRESS NOTES
"Pharmacokinetic Initial Assessment: IV Vancomycin    Assessment/Plan:  Continue intravenous vancomycin after loading dose of 750 mg given once before consultfollowed by a maintenance dose of vancomycin 1000mg IV every 12 hours  Desired empiric serum trough concentration is 10 to 20 mcg/mL  Draw vancomycin trough level 60 min prior to fourth dose on 9/22 at approximately 1000  Pharmacy will continue to follow and monitor vancomycin.      Please contact pharmacy at extension [phone] with any questions regarding this assessment.     Thank you for the consult,   Danis Donovan       Patient brief summary:  Shashank Samuel is a 27 y.o. female initiated on antimicrobial therapy with IV Vancomycin for treatment of suspected  empiric    Drug Allergies:   Review of patient's allergies indicates:   Allergen Reactions    Latex, natural rubber Anaphylaxis and Other (See Comments)     angioedema    Zofran [ondansetron hcl (pf)] Swelling     Hives, "throat closes up"    Gardasil  [h papillomavirus vac,qval (pf)]      Other reaction(s): Shortness of breath    Menactra  [mening vac a,c,y,w135 dip (pf)]      Other reaction(s): Shortness of breath    Nitrofurantoin      Other reaction(s): Difficulty breathing    Sulfa (sulfonamide antibiotics)     Tramadol Hives    Levaquin [levofloxacin] Rash     Spots on face    Macrobid [nitrofurantoin monohyd/m-cryst] Rash     Sppots/rash on face       Actual Body Weight:   57.7kg    Renal Function:   Estimated Creatinine Clearance: 167.7 mL/min (A) (based on SCr of 0.4 mg/dL (L)).,     Dialysis Method (if applicable):  N/A    CBC (last 72 hours):  Recent Labs   Lab Result Units 09/20/22  1949   WBC K/uL 21.99*   Hemoglobin g/dL 13.4   Hematocrit % 41.9   Platelets K/uL 364   Gran % % 89.8*   Lymph % % 5.0*   Mono % % 4.5   Eosinophil % % 0.0   Basophil % % 0.1   Differential Method  Automated       Metabolic Panel (last 72 hours):  Recent Labs   Lab Result Units 09/20/22 1924 " 09/20/22 1949   Sodium mmol/L  --  138   Potassium mmol/L  --  3.8   Chloride mmol/L  --  107   CO2 mmol/L  --  18*   Glucose mg/dL  --  135*   Glucose, UA  Negative  --    BUN mg/dL  --  18   Creatinine mg/dL  --  0.4*   Albumin g/dL  --  4.2   Total Bilirubin mg/dL  --  0.7   Alkaline Phosphatase U/L  --  58   AST U/L  --  20   ALT U/L  --  20   Magnesium mg/dL  --  1.9       Drug levels (last 3 results):  No results for input(s): VANCOMYCINRA, VANCORANDOM, VANCOMYCINPE, VANCOPEAK, VANCOMYCINTR, VANCOTROUGH in the last 72 hours.    Microbiologic Results:  Microbiology Results (last 7 days)       Procedure Component Value Units Date/Time    CSF culture [[id number]]     Order Status: No result Specimen: CSF (Spinal Fluid) from CSF Shunt     Culture, Respiratory with Gram Stain [[id number]]     Order Status: No result Specimen: Respiratory     Blood culture [[id number]]     Order Status: Sent Specimen: Blood     Blood culture [[id number]]     Order Status: Sent Specimen: Blood

## 2022-09-21 NOTE — CONSULTS
[facility]  Adult Nutrition  Consult Note    SUMMARY     Recommendations    1. Rec'd TF regimen of Peptamen VHP @ goal rate of 35 ml/hr to provide 840 kcals, 77 g pro, and 706 ml free water + 364 kcals from propofol.   - Add beneprotein BID x 14 days to promote wound healing. TF + beneprotein + propofol provides 1254 kcals and 89 g protein.     2. If propofol discontinued, rec'd TF regimen of Impact Peptide 1.5 @ goal rate of 35 ml/hr to provide 1260 kcals, 79 g pro, and 647 ml free water.   - Add beneprotein BID x 14 days to promote wound healing. TF + beneprotein provides 1310 kcals and 91 g pro.     3. If extubated and no longer on vent, rec'd TF regimen of Isosource 1.5 @ goal rate of 30 ml/hr to provide 1080 kcals, 49 g pro, and 550 ml free water.    - Add beneprotein BID x 14 days to promote wound healing. TF + beneprotein provides 1130 kcals and 61 g pro.   - Based on 1179 kcal (MSJ x 1.0 PAL) and 57-69 g pro (1.0-1.2 g/kg).     4. If able to tolerate PO intake, rec'd regular diet- texture per SLP.     5. If able to tolerate PO intake and thin liquids, add Steve BID x 14 days to promote wound healing.     6. Add vitamin C, zinc, and multivitamin to promote wound healing.     7. RD following.    Goals: Will meet % EEN/EPN by next RD f/u.  Nutrition Goal Status: new  Communication of RD Recs:  (POC)    Assessment and Plan    Nutrition Problem  Inadequate oral intake    Related to (etiology):   Inability to consume sufficient needs    Signs and Symptoms (as evidenced by):   NPO status    Interventions/Recommendations (treatment strategy):  Collaboration of nutrition care with other providers  EN  Commercial food    Nutrition Diagnosis Status:   New    Reason for Assessment    Reason For Assessment: consult  Diagnosis:  (Hydrocephalus)  Relevant Medical History: Status epilepticus, acute cystitis without hematuria, acute respiratory failure, marina coma coma scale score 3-8, spina  "bifida, scoliosis, paraplegia (T7), sacral decubitus ulcer  Interdisciplinary Rounds: did not attend  General Information Comments: RD consulted to assess dietary needs 2/2 being intubated/on vent/ sedated. Unable to speak with pt or assess nutrition status PTA at this time. RD to provide TF recs. No issues with n/v/d/c. # and #. Unable to perform visual NFPE 2/2 other medical professional being in pt room during RD visit- will complete at next RD f/u or when pt more appropriate. LBM unknown.  Nutrition Discharge Planning: Pending hospital course    Nutrition Risk Screen    Nutrition Risk Screen: dysphagia or difficulty swallowing    Nutrition/Diet History    Spiritual, Cultural Beliefs, Congregational Practices, Values that Affect Care: no  Food Allergies: NKFA  Factors Affecting Nutritional Intake: NPO, difficulty/impaired swallowing, on mechanical ventilation    Anthropometrics    Temp: 97.7 °F (36.5 °C)  Height Method: Stated  Height: 4' 8" (142.2 cm)  Height (inches): 56 in  Weight Method: Bed Scale  Weight: 57.6 kg (126 lb 15.8 oz)  Weight (lb): 126.99 lb  Ideal Body Weight (IBW), Female: 80 lb  % Ideal Body Weight, Female (lb): 158.74 %  BMI (Calculated): 28.5  BMI Grade: 25 - 29.9 - overweight     Lab/Procedures/Meds    Pertinent Labs Reviewed: reviewed  Pertinent Labs Comments: Ca 8.4  Pertinent Medications Reviewed: reviewed  Pertinent Medications Comments: famotidine, senna-docusate, vancomycin, propofol    Estimated/Assessed Needs    Weight Used For Calorie Calculations: 57.6 kg (126 lb 15.8 oz)  Energy Calorie Requirements (kcal): 1342 kcal  [facility]  Protein Requirements: 69-86 g (1.2-1.5 g/kg)  Weight Used For Protein Calculations: 57.6 kg (126 lb 15.8 oz)  Fluid Requirements (mL): 1 ml or fluid per MD  Estimated Fluid Requirement Method: RDA Method  RDA Method (mL): 1342     Nutrition Prescription Ordered    Current Diet Order: NPO    Evaluation of Received " Nutrient/Fluid Intake    Other Calories (kcal): 364 (propofol @ 13.8 mL/hr)  % Kcal Needs: 27%  I/O: +1.0 L since admit  Energy Calories Required: not meeting needs  % Intake of Estimated Energy Needs: 0%  % Meal Intake: NPO    Nutrition Risk    Level of Risk/Frequency of Follow-up:  (1 time/week)     Monitor and Evaluation    Food and Nutrient Intake: enteral nutrition intake  Food and Nutrient Adminstration: enteral and parenteral nutrition administration  Knowledge/Beliefs/Attitudes: food and nutrition knowledge/skill, beliefs and attitudes  Physical Activity and Function: nutrition-related ADLs and IADLs  Anthropometric Measurements: height/length, weight, weight change, body mass index  Biochemical Data, Medical Tests and Procedures: electrolyte and renal panel, gastrointestinal profile, glucose/endocrine profile, inflammatory profile, lipid profile  Nutrition-Focused Physical Findings: overall appearance     Nutrition Follow-Up    RD Follow-up?: Yes    Miley Lemons [id number]

## 2022-09-21 NOTE — NURSING
Patient arrived to [facility] from [facility] via [facility].      Report received from: OSJARROD RN,      Type of stroke/diagnosis:  Seizures    Current symptoms: ETT, OG, on propofol and versed. Versed d/c on arrival. Gag and cough present, no gag. Withdraws in BUE and no movement on BLE.     Skin assessment done: Y/N    Wounds noted: suprapubic wound (previous suprapubic catheter), scars all over body, Right buttocks pressure ulcer, abrasion to left heel, healing ulcer on R outer foot, blanchable redness on both heels, left toe scab. No toes on right foot, and three toes on left foot.      *If wounds noted, was Wound Care consulted? Yes    Blanca Completed? ETT, failed    Patient Belongings on Admit: None    NCC notified: CITLALY Waldrop

## 2022-09-21 NOTE — HOSPITAL COURSE
9/21/2022: CTH with hydrocephalus. Pt had 25 cc shunt tap at bedside. LP pending. EEG without seizure activity. NSGY plan to take pt to OR today for shunt revision.   9/22/2022: Pt recovering well. She is waking up, responding to stimuli, moving BUE. Plan for extubation today. She remains tachycardic- suspect related to agitation. Will re-evaluate after extubation. EKG ordered.   9/23/2022: Pt extubated to room air. Pt is sinus tachycardia, 1 mg Mag given overnight. Vanc. restarted for positive sputum Cx, low grade fever and unexplained tachycardia yesterday although pt remains without complaints. Pt has history of tachycardia on chart review. Pt will be stepped down today.

## 2022-09-21 NOTE — PROGRESS NOTES
09/21/22 0900   WOCN Assessment   WOCN Total Time (mins) 30   Visit Date 09/21/22   Visit Time 0900   Consult Type New   WOCN Speciality Wound   Intervention assessed;changed;applied;chart review;coordination of care;orders        Wound 11/04/21 1011 Buttocks   Date First Assessed/Time First Assessed: 11/04/21 1011   Pre-existing: Yes  Location: Buttocks   Wound Image    Drainage Amount Small   Red (%), Wound Tissue Color 100 %   Wound Length (cm) 5.5 cm   Wound Width (cm) 6.5 cm   Wound Depth (cm) 0.1 cm   Wound Volume (cm^3) 3.575 cm^3   Wound Surface Area (cm^2) 35.75 cm^2        Incision/Site 01/08/21 0900 Right Foot   Date First Assessed/Time First Assessed: 01/08/21 0900   Present Prior to Hospital Arrival?: Yes  Side: Right  Location: Foot   Wound Image    Drainage Amount None   Red (%), Wound Tissue Color 100 %   Wound Length (cm) 3.5 cm   Wound Width (cm) 4 cm   Wound Depth (cm) 0.1 cm   Wound Volume (cm^3) 1.4 cm^3   Wound Surface Area (cm^2) 14 cm^2        Incision/Site 11/04/21 1307 Abdomen   Date First Assessed/Time First Assessed: 11/04/21 1307   Location: Abdomen   Wound Image    Red (%), Wound Tissue Color 100 %   Patient consult for wound care to right lateral foot, right lower quadrant and right ischium. The right lower quadrant is not a wound that is a stoma without any irritation to the stoma site.the right lateral foot, the site has a dry scab, Aquacel Ag is place to assist in drying. The right ischium is cleanse with soap and water pat dry apply triad paste daily and prn.

## 2022-09-21 NOTE — PROCEDURES
EEG REPORT    NAME: Shashank Samuel  : 1995  MRN: [id number]    DATE of EE2022    CLINICAL INDICATION: This is a 27 y.o. 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 at home presents from home after she she had a possible seizure being evaluated for multiple seizures    MEDICATIONS:  Keppra    EEG DESCRIPTION:  A 16-channel EEG was performed with electrode placement in 10/20 [employer] System. Longitudinal bipolar and referential montages were utilized in analysis.    There is no clear dominant posterior background rhythm.  The record consisted of mixture of delta and beta frequency rhythms    Stage 2 sleep structures were not seen.    Photic stimulation and hyperventilation   was not performed.    No epileptiform discharges were recorded. No electrographic or electroclinical seizures were recorded.    DIAGNOSIS AND INTERPRETATION: This is a abnormal EEG due to the presence of severe generalised slowing indicating severe encephalopathy. No lateralizing or epileptiform features are noted. No electrographic or electroclinical seizures are recorded.  Excess beta artifact is likely secondary to medication effect.       Dov Smart MD  Neurology    
How Severe Is It?: moderate
Is This A New Presentation, Or A Follow-Up?: Follow Up Vitiligo

## 2022-09-21 NOTE — ANESTHESIA PREPROCEDURE EVALUATION
[facility]  Anesthesia Pre-Operative Evaluation         Patient Name: Shashank Samuel  YOB: 1995  MRN: [id number]    SUBJECTIVE:     Pre-operative evaluation for Procedure(s) (LRB):  REVISION, SHUNT, VENTRICULOPERITONEAL (Left)     09/21/2022    Shashank Samuel is a 27 y.o. female w/ a significant PMHx of spina bifida (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), Chiari malformation II, and seizures. She presented for evaluation and management of status epilepticus. She has since been intubated and admitted to Neuro ICU. She has a VPS and has had numerous failures in the past, with last revision in 3/2012. NSGY consulted now for management of hydrocephalus.    Vent Mode: A/C  Oxygen Concentration (%):  [30-60] 40  Resp Rate Total:  [14 br/min-28 br/min] 18 br/min  Vt Set:  [325 mL-350 mL] 325 mL  PEEP/CPAP:  [5 cmH20] 5 cmH20  Pressure Support:  [0 cmH20] 0 cmH20  Mean Airway Pressure:  [8.6 vyV03-03 cmH20] 8.6 cmH20    Patient now presents for the above procedure(s).      LDA:        Peripheral IV - Single Lumen 09/20/22 1700 20 G Left Antecubital (Active)   Site Assessment Clean;Dry;Intact;No redness;No swelling 09/21/22 0300   Extremity Assessment Distal to IV No abnormal discoloration;No redness;No swelling;No warmth 09/21/22 0300   Line Status Infusing 09/21/22 0300   Dressing Status Clean;Dry;Intact 09/21/22 0300   Dressing Intervention Integrity maintained 09/21/22 0300   Dressing Change Due 09/24/22 09/21/22 0300   Site Change Due 09/24/22 09/21/22 0030   Reason Not Rotated Not due 09/21/22 0300   Number of days: 0            Peripheral IV - Single Lumen 09/20/22 1949 20 G Anterior;Right Upper Arm (Active)   Site Assessment Clean;Dry;Intact;No redness;No swelling 09/21/22 0300   Extremity Assessment Distal to IV No abnormal discoloration;No redness;No swelling;No warmth 09/21/22 0300   Line Status Infusing 09/21/22 0300   Dressing Status Clean;Dry;Intact 09/21/22 0300   Dressing Intervention  "Integrity maintained 09/21/22 0300   Dressing Change Due 09/24/22 09/21/22 0300   Site Change Due 09/24/22 09/21/22 0030   Reason Not Rotated Not due 09/21/22 0300   Number of days: 0            Peripheral IV - Single Lumen 09/21/22 0505 20 G Anterior;Left Forearm (Active)   Number of days: 0            NG/OG Tube 09/20/22 2052 orogastric 16 Fr. Center mouth (Active)   Tolerance no signs/symptoms of discomfort 09/21/22 0300   Securement secured to commercial device 09/21/22 0300   Clamp Status/Tolerance clamped;no abdominal discomfort;no abdominal distention;no emesis;no nausea;no restlessness 09/21/22 0300   Number of days: 0            Colostomy Other (see comments) RLQ (Active)   Number of days:             Urethral Catheter 09/21/22 0030 Non-latex;Temperature probe (Active)   $ Hernandez Insertion Bedside Insertion Performed 09/21/22 0030   Site Assessment Clean;Intact 09/21/22 0300   Collection Container Urimeter 09/21/22 0300   Securement Method secured to top of thigh w/ adhesive device 09/21/22 0300   Catheter Care Performed yes 09/21/22 0300   Reason for Continuing Urinary Catheterization Non-healing sacral/perineal wound;Critically ill in ICU and requiring hourly monitoring of intake/output 09/21/22 0300   CAUTI Prevention Bundle Securement Device in place with 1" slack;Intact seal between catheter & drainage tubing;Sheeting clip in use;Drainage bag/urimeter off the floor;No dependent loops or kinks;Drainage bag/urimeter not overfilled (<2/3 full);Drainage bag/urimeter below bladder 09/21/22 0030   Output (mL) 15 mL 09/21/22 0600   Number of days: 0       Prev airway: 1/04/21; Placement Time: 1207 (created via procedure documentation); Method of Intubation: Direct laryngoscopy; Mask Ventilation: Easy; Intubated: Postinduction; Blade: Cedric #3; Airway Device Size: 7.0; Cuff Inflation: Minimal occlusive pressure; Placement Verified By: Capnometry; Complicating Factors: None    Drips:    sodium chloride 0.9% " "Stopped (09/21/22 0905)    propofol 40 mcg/kg/min (09/21/22 1943)       Patient Active Problem List   Diagnosis    Spina bifida, lumbar region    Hydrocephalus    Neurogenic bladder    Scoliosis    Paraplegia, T7 Complete    Urinary obstruction    Chronic low back pain    Acute osteomyelitis of right foot    Self-catheterizes urinary bladder    Sacral decubitus ulcer    Latex allergy, anaphylaxis    Toe amputation status, left    Nonintractable generalized idiopathic epilepsy without status epilepticus    Personal history of osteomyelitis    Skin ulcer of right foot, limited to breakdown of skin    Pressure ulcer of right foot, unstageable    Cellulitis of right foot    Ulcer of toe of right foot, with necrosis of bone    Osteomyelitis of great toe of right foot    Meningomyelocele    Osteomyelitis    Skin ulcer of right foot with fat layer exposed    Retroperitoneal abscess    Obstruction of kidney    Sepsis    Anemia    Neurogenic bowel    Coagulopathy    History of seizures    DVT prophylaxis    Sinus tachycardia    Non-functioning kidney    Status epilepticus    Acute cystitis without hematuria    Acute respiratory failure    Anthony coma scale total score 3-8       Review of patient's allergies indicates:   Allergen Reactions    Latex, natural rubber Anaphylaxis and Other (See Comments)     angioedema    Zofran [ondansetron hcl (pf)] Swelling     Hives, "throat closes up"    Gardasil  [h papillomavirus vac,qval (pf)]      Other reaction(s): Shortness of breath    Menactra  [mening vac a,c,y,w135 dip (pf)]      Other reaction(s): Shortness of breath    Nitrofurantoin      Other reaction(s): Difficulty breathing    Sulfa (sulfonamide antibiotics)     Tramadol Hives    Levaquin [levofloxacin] Rash     Spots on face    Macrobid [nitrofurantoin monohyd/m-cryst] Rash     Sppots/rash on face       Current Inpatient Medications:   ceFEPime (MAXIPIME) IVPB  1 g Intravenous " Q8H    famotidine  20 mg Per NG tube BID    gentamicin 10mg/mL injection for intrathecal use  20 mg Intrathecal Once    levetiracetam IV  750 mg Intravenous Q12H    mupirocin   Nasal BID    senna-docusate 8.6-50 mg  1 tablet Per NG tube BID    vancomycin 20 mg/mL injection for intrathecal use  20 mg Intrathecal Once    vancomycin (VANCOCIN) IVPB  1,000 mg Intravenous Q12H       No current facility-administered medications on file prior to encounter.     Current Outpatient Medications on File Prior to Encounter   Medication Sig Dispense Refill    acetaminophen (TYLENOL) 500 MG tablet Take 1,000 mg by mouth every 6 (six) hours as needed for Pain.      cephALEXin (KEFLEX) 500 MG capsule Take 1 capsule (500 mg total) by mouth every evening. Start after you complete the cipro. (Patient not taking: No sig reported) 30 capsule 0    collagenase (SANTYL) ointment Apply topically once daily. (Patient not taking: No sig reported)  0    ferrous sulfate (FEOSOL) 325 mg (65 mg iron) Tab tablet Take 325 mg by mouth 2 (two) times daily.      ibuprofen (ADVIL,MOTRIN) 200 MG tablet Take 200 mg by mouth every 6 (six) hours as needed for Pain.      levETIRAcetam (KEPPRA) 500 MG Tab Take 1 tablet (500 mg total) by mouth 2 (two) times daily. 60 tablet 11       Past Surgical History:   Procedure Laterality Date    AMPUTATION      right 4th and 5th toes; left 5th toe and portion of left great toe    BACK SURGERY      BLADDER SURGERY      BREAST SURGERY  2015    Reduction    COLON SURGERY      flush site for bowel movements from appendix    CYSTOSTOMY      DEBRIDEMENT OF FOOT Right 1/8/2021    Procedure: DEBRIDEMENT, FOOT;  Surgeon: Abdi Bedoya DPM;  [facility]: [facility] OR;  Service: Podiatry;  Laterality: Right;    EYE SURGERY      x 5    FLUOROSCOPIC URODYNAMIC STUDY N/A 3/12/2019    Procedure: URODYNAMIC STUDY, FLUOROSCOPIC;  Surgeon: Kenzie Charles MD;  Location: [address];  Service: Urology;   Laterality: N/A;  1 hour    FLUOROSCOPIC URODYNAMIC STUDY N/A 4/4/2019    Procedure: URODYNAMIC STUDY, FLUOROSCOPIC;  Surgeon: Kenzie Charles MD;  Location: [facility] 1ST FLR;  Service: Urology;  Laterality: N/A;  1 hour    FLUOROSCOPIC URODYNAMIC STUDY N/A 5/11/2022    Procedure: URODYNAMIC STUDY, FLUOROSCOPIC;  Surgeon: Kenzie Charles MD;  Location: [facility] 1ST FLR;  Service: Urology;  Laterality: N/A;  90min    FOOT AMPUTATION THROUGH METATARSAL Right 10/28/2019    Procedure: AMPUTATION, FOOT, TRANSMETATARSAL;  Surgeon: Abdi Bedoya DPM;  Location: [facility];  Service: Podiatry;  Laterality: Right;    FOOT AMPUTATION THROUGH METATARSAL Right 3/27/2020    Procedure: AMPUTATION, FOOT, TRANSMETATARSAL;  Surgeon: Abdi Bedoya DPM;  Location: [facility];  Service: Podiatry;  Laterality: Right;  REVISION    MYELOMININGOCELE REPAIR      NEPHRECTOMY Right 11/4/2021    Procedure: Nephrectomy/ OPEN;  Surgeon: Dash Rosenthal MD;  Location: [facility] 2ND FLR;  Service: Urology;  Laterality: Right;  4HRS    NEPHROSTOMY Right 8/2/2021    Procedure: Nephrostomy and perinephric abscess drain;  Surgeon: Lillian Surgeon;  Location: [facility];  Service: Anesthesiology;  Laterality: Right;    STRABISMUS SURGERY      ou dr peña    VENTRICULOPERITONEAL SHUNT      WOUND DEBRIDEMENT  3/27/2020    Procedure: DEBRIDEMENT, WOUND;  Surgeon: Abdi Bedoya DPM;  Location: [facility];  Service: Podiatry;;       Social History:  Tobacco Use: Low Risk     Smoking Tobacco Use: Never    Smokeless Tobacco Use: Never      Alcohol Use: Not on file        OBJECTIVE:     Vital Signs Range (Last 24H):  Temp:  [34.9 °C (94.8 °F)-37.3 °C (99.1 °F)]   Pulse:  []   Resp:  [12-31]   BP: ()/()   SpO2:  [89 %-100 %]       Significant Labs:  Lab Results   Component Value Date    WBC 13.97 (H) 09/21/2022    HGB 13.2 09/21/2022    HCT 40.6 09/21/2022     09/21/2022    ALT 18 09/21/2022    AST 23 09/21/2022      09/21/2022    K 3.9 09/21/2022     09/21/2022    CREATININE 0.5 09/21/2022    BUN 10 09/21/2022    CO2 21 (L) 09/21/2022    INR 1.0 09/21/2022       Diagnostic Studies: No relevant studies.    EKG:   Results for orders placed or performed during the hospital encounter of 09/20/22   EKG 12-lead    Collection Time: 09/20/22  5:43 PM    Narrative    Test Reason : R41.82,    Vent. Rate : 099 BPM     Atrial Rate : 099 BPM     P-R Int : 132 ms          QRS Dur : 088 ms      QT Int : 374 ms       P-R-T Axes : 047 048 049 degrees     QTc Int : 479 ms    Normal sinus rhythm  Normal ECG  When compared with ECG of 07-NOV-2021 17:50,  Nonspecific T wave abnormality no longer evident in Inferior leads  T wave amplitude has increased in Lateral leads    Referred By: [email or web address]   SELF           Confirmed By:        2D ECHO:  TTE:  No results found for this or any previous visit.    SUNNY:  No results found for this or any previous visit.    ASSESSMENT/PLAN:           Pre-op Assessment    I have reviewed the Patient Summary Reports.     I have reviewed the Nursing Notes. I have reviewed the NPO Status.   I have reviewed the Medications.     Review of Systems  Anesthesia Hx:  Denies Hx of Anesthetic complications  History of prior surgery of interest to airway management or planning:  Denies Personal Hx of Anesthesia complications.   Social:  Non-Smoker    Cardiovascular:  Cardiovascular Normal     Pulmonary:  Pulmonary Normal    Renal/:   Chronic Renal Disease    Hepatic/GI:  Hepatic/GI Normal    Neurological:   Seizures Spina bifida  Chiari II  Paralysis   Endocrine:  Endocrine Normal        Physical Exam  General: Well nourished  Sedated  Airway:  Mallampati: unable to assess   Mouth Opening: Normal  TM Distance: Normal  Tongue: Normal  Neck ROM: Normal ROM  Pre-Existing Airway: Oral Endotracheal tube    Chest/Lungs:  Clear to auscultation, Normal Respiratory Rate    Heart:  Rate: Tachycardia  Rhythm: Regular  Rhythm  Sounds: Normal        Anesthesia Plan  Type of Anesthesia, risks & benefits discussed:    Anesthesia Type: Gen ETT  Intra-op Monitoring Plan: Standard ASA Monitors and Art Line  Post Op Pain Control Plan: multimodal analgesia and IV/PO Opioids PRN  Induction:  IV  Informed Consent: Informed consent signed with the Patient and all parties understand the risks and agree with anesthesia plan.  All questions answered.   ASA Score: 3  Day of Surgery Review of History & Physical: H&P Update referred to the surgeon/provider.    Ready For Surgery From Anesthesia Perspective.     .

## 2022-09-21 NOTE — PT/OT/SLP PROGRESS
Speech Language Pathology  Discharge    Shashank Samuel  MRN: [id number]    Patient not seen today secondary to pt intubated at this time. Please re-consult when medically appropriate. No further ST warranted at this time.   9/21/2022

## 2022-09-21 NOTE — PLAN OF CARE
[facility]  Initial Discharge Assessment       Primary Care Provider: Verito Scott NP    Admission Diagnosis: Status epilepticus [G40.901]    Admission Date: 9/21/2022  Expected Discharge Date: 9/28/2022    Discharge Barriers Identified: Underinsured    Payor: MEDICAID / Plan: [place] / Product Type: Managed Medicaid /     Extended Emergency Contact Information  Primary Emergency Contact: Kayleen Samuel  Address: [address] [place] [address] [place] [address] [facility]  Home Phone: [phone]  Mobile Phone: [phone]  Relation: Mother  Secondary Emergency Contact: Abdi Samuel  Address: [address]           [place] [address] [facility]  Home Phone: [phone]  Mobile Phone: [phone]  Relation: Father    Discharge Plan A: Home with family  Discharge Plan B: Home with family, Home Health      [facility] [address]  [place]  [address]  [address] [facility]  [place] [address]  Phone: [phone] Fax: [phone]    [id number] - [place] - [address]  [address] [place] [address]  Phone: [phone] Fax: [phone]      Transferred from: [facility]     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         CM met with patient and Kayleen Samuel (mother) [phone], Abdi Samuel (father) [phone] in room for Discharge Planning Assessment.  Patient is intubated and unalbe to answer questions.  Per parents, the patient lives with parents in a single story house with 0 step(s) to enter.   Per parents, the patient was independent with ADLS (PTA patient independent with  dressing from bed level, independent with grooming from wc level, independent with transfers and self-catheterization and used a wheelchair for ambulation.  Patient will have assistance from her parents upon discharge.   Discharge Planning Booklet given to patient/family and discussed.  All questions addressed.  CM will follow for needs.    Initial Assessment (most recent)       Adult Discharge Assessment - 09/21/22 1503          Discharge Assessment    Assessment Type Discharge Planning Assessment     Confirmed/corrected address, phone number and insurance Yes     Confirmed Demographics Correct on Facesheet     Source of Information unable to respond     If unable to respond/provide information was family/caregiver contacted? Yes     Contact Name/Number Kayleen Samuel (mother) [phone], Abdi Samuel (father) [phone] (at bedside)     Communicated DELANEY with patient/caregiver Date not available/Unable to determine     Reason For Admission Hydrocephalus     Lives With parent(s);sibling(s)     Facility Arrived From: [facility]     Do you expect to return to your current living situation? Yes     Do you have help at home or someone to help you manage your care at home? Yes     Who are your caregiver(s) and their phone number(s)? Kayleen Pattensch (mother) [phone], Abdi Samuel (father) [phone]     Prior to hospitilization cognitive status: Alert/Oriented     Current cognitive status: Coma/Sedated/Intubated;Unable to Assess     Walking or Climbing Stairs Difficulty ambulation difficulty, requires equipment;stair climbing difficulty, requires equipment;transferring difficulty, requires equipment     Mobility Management Wheelchair,  independent with transfers     Dressing/Bathing Difficulty bathing difficulty, requires equipment     Dressing/Bathing Management patient independent with dressing from bed level, independent with grooming from wc level     Home Accessibility wheelchair accessible     Home  Layout Able to live on 1st floor     Equipment Currently Used at Home urinary catheter supplies;wheelchair;hospital bed;bath bench   air mattress    Readmission within 30 days? No     Patient currently being followed by outpatient case management? No     Do you currently have service(s) that help you manage your care at home? No     Do you take prescription medications? Yes     Do you have prescription coverage? Yes     Coverage Healthy blue     Do you have any problems affording any of your prescribed medications? No     Is the patient taking medications as prescribed? yes     Who is going to help you get home at discharge? Kayleen Samuel (mother) [phone], Abdi Samuel (father) [phone]     How do you get to doctors appointments? family or friend will provide     Are you on dialysis? No     Do you take coumadin? No     Discharge Plan A Home with family     Discharge Plan B Home with family;Home Health     DME Needed Upon Discharge  other (see comments);hospital bed   new air matterss    Discharge Plan discussed with: Parent(s)     Name(s) and Number(s) Kayleen Samuel (mother) [phone], Abdi Samuel (father) [phone] (at bedside)     Discharge Barriers Identified Underinsured        Relationship/Environment    Name(s) of Who Lives With Patient Kayleen Samuel (mother) [phone], Abdi Samuel (father) [phone]                   Radha Allen RN, CCRN-K, [facility]  Neuro-Critical Care   [phone]

## 2022-09-21 NOTE — BRIEF OP NOTE
[facility]  Brief Operative Note    SUMMARY     Surgery Date: 9/21/2022     Surgeon(s) and Role:     * Maynor Calero MD - Assist     * Nevaeh Kwok MD -Surgeon         Assisting Surgeon: None    Pre-op Diagnosis:  Status epilepticus [G40.901]  Hydrocephalus, unspecified type [G91.9]    Post-op Diagnosis:  Post-Op Diagnosis Codes:     * Status epilepticus [G40.901]     * Hydrocephalus, unspecified type [G91.9]    Procedure(s) (LRB):  REVISION, SHUNT, VENTRICULOPERITONEAL (Left)    Anesthesia: General    Operative Findings: Good flow in proximal and distal catheter, sluggish flow through valve.   Valve replaced    Estimated Blood Loss: * No values recorded between 9/21/2022  1:46 PM and 9/21/2022  3:12 PM *    Estimated Blood Loss has been documented.         Specimens:   Specimen (24h ago, onward)      None            [id number]

## 2022-09-21 NOTE — PT/OT/SLP EVAL
"Occupational Therapy   Evaluation    Name: Shashank Samuel  MRN: [id number]  Admitting Diagnosis:  Hydrocephalus  Recent Surgery: Procedure(s) (LRB):  REVISION, SHUNT, VENTRICULOPERITONEAL (Left)      Recommendations:     Discharge Recommendations:  (pending extubation)  Discharge Equipment Recommendations:   (pending extubation)  Barriers to discharge:  None    Assessment:     Shashank Samuel is a 27 y.o. female with a medical diagnosis of Hydrocephalus.  She presents with performance deficits affecting function: weakness, decreased upper extremity function, decreased lower extremity function, impaired balance, impaired endurance, impaired sensation, impaired cognition, impaired self care skills, impaired functional mobility, decreased coordination, abnormal tone, decreased ROM, impaired coordination, impaired fine motor.      Rehab Prognosis: Fair; patient would benefit from acute skilled OT services to address these deficits and reach maximum level of function.       Plan:     Patient to be seen 2 x/week to address the above listed problems via self-care/home management, neuromuscular re-education, cognitive retraining, therapeutic activities, therapeutic exercises  Plan of Care Expires: 10/20/22  Plan of Care Reviewed with: patient, father    Subjective     Patient:  Nonverbal; intubated    Father: "She was able to transfer from the bed to the wheelchair, toilet, tub bench; she could feed herself, dressing in bed, go her grooming while seated in the wheelchair."  "My wife found her slumped in the wheelchair in the bathroom." "Eventually we plan to build a walk in shower for her."    Occupational Profile:  Per patient's father:  Patient resides in [place] with her parents and older sister in one story home with no steps to enter.  Patient is right handed.  PTA patient independent with dressing from bed level, independent with grooming from wc level, independent with transfers and self-catherizations. " Patient is non-ambulatory; wheelchair bound.  DME:  wheelchair, hospital bed with air mattress (that father reports is not working properly), tub bench.  Hobbies: computer.     Pain/Comfort:  Pain Rating 1: 0/10  Pain Rating Post-Intervention 1: 0/10    Patients cultural, spiritual, Cheondoism conflicts given the current situation: no    Objective:     Communicated with: Nurse prior to session.  Patient found supine with bed alarm, naranjo catheter, ventilator, telemetry, blood pressure cuff, peripheral IV, pulse ox (continuous), EEG upon OT entry to room.    General Precautions: Standard, aspiration, fall, NPO   Orthopedic Precautions:spinal precautions   Braces: Cervical collar  Respiratory Status:  ventilator    Occupational Performance:    Bed Mobility:    Patient completed Rolling/Turning to Left with  dependent  Patient completed Rolling/Turning to Right with dependent    Functional Mobility/Transfers:  Dependent drawsheet transfers    Activities of Daily Living:  Feeding:  NPO    Grooming: total assistance while supine    Cognitive/Visual Perceptual:  Cognitive/Psychosocial Skills:     -       Oriented to: Daily orientation provided   -       Follows Commands/attention:Unable to follow one step commands   -       Communication: nonverbal; intubated    Physical Exam:  Postural examination/scapula alignment:    -       Rounded shoulders  Upper Extremity Range of Motion:     -       B UE/LE: PROM within available range; limited for external rotation, wrist extension and ankle dorsiflexion    AMPAC 6 Click ADL:  AMPAC Total Score: 6    Treatment & Education:  Patient/ Family education provided on role of OT.  Daily orientation provided.   PROM performed bilateral UE/LEs one set x 10 rep in all planes of motion with stretches provided at end range; sustained stretch provided for UE external rotation, wrist/finger extension and ankle dorsiflexion.   Patient kept eyes closed.  Patient unable to follow commands.   Provided multi-sensory stimulation to prevent sensory deprivation and improve clinical outcomes.  Positioning provided for midline orientation with bilateral UEs elevated and heels lifted off mattress.  Continued education, patient/ family training recommended.      Patient left right sidelying with all lines intact and call button in reach    GOALS:   Multidisciplinary Problems       Occupational Therapy Goals          Problem: Occupational Therapy    Goal Priority Disciplines Outcome Interventions   Occupational Therapy Goal     OT, PT/OT Ongoing, Progressing    Description: Goals set 9/21 to be addressed for 14 days with expiration date, 10/5:  Patient will increase functional independence with ADLs by performing:    Patient will demonstrate rolling to the right with SBA.  Not met   Patient will demonstrate rolling to the left with SBA.   Not met  Patient will demonstrate supine -sit with min assist.   Not met  Patient will demonstrate scoot pivot transfers with min assist.   Not met  Patient will demonstrate grooming while seated with min assist.   Not met  Patient will demonstrate upper body dressing with min assist while seated EOB.   Not met  Patient will demonstrate lower body dressing with mod assist while seated EOB.   Not met  Patient will demonstrate toileting with mod assist.   Not met  Patient's family / caregiver will demonstrate independence and safety with assisting patient with self-care skills and functional mobility.     Not met  Patient's family / caregiver will demonstrate independence with providing ROM and changes in bed positioning.   Not met                             History:     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         Past Surgical  History:   Procedure Laterality Date    AMPUTATION      right 4th and 5th toes; left 5th toe and portion of left great toe    BACK SURGERY      BLADDER SURGERY      BREAST SURGERY  2015    Reduction    COLON SURGERY      flush site for bowel movements from appendix    CYSTOSTOMY      DEBRIDEMENT OF FOOT Right 1/8/2021    Procedure: DEBRIDEMENT, FOOT;  Surgeon: Abdi Bedoya DPM;  Location: [facility];  Service: Podiatry;  Laterality: Right;    EYE SURGERY      x 5    FLUOROSCOPIC URODYNAMIC STUDY N/A 3/12/2019    Procedure: URODYNAMIC STUDY, FLUOROSCOPIC;  Surgeon: Kenzie Chalres MD;  Location: [facility] 1ST FLR;  Service: Urology;  Laterality: N/A;  1 hour    FLUOROSCOPIC URODYNAMIC STUDY N/A 4/4/2019    Procedure: URODYNAMIC STUDY, FLUOROSCOPIC;  Surgeon: Kenzie Charles MD;  Location: [facility] 1ST FLR;  Service: Urology;  Laterality: N/A;  1 hour    FLUOROSCOPIC URODYNAMIC STUDY N/A 5/11/2022    Procedure: URODYNAMIC STUDY, FLUOROSCOPIC;  Surgeon: Kenzie Charles MD;  [address];  Service: Urology;  Laterality: N/A;  90min    FOOT AMPUTATION THROUGH METATARSAL Right 10/28/2019    Procedure: AMPUTATION, FOOT, TRANSMETATARSAL;  Surgeon: Abdi Bedoya DPM;  Location: [facility];  Service: Podiatry;  Laterality: Right;    FOOT AMPUTATION THROUGH METATARSAL Right 3/27/2020    Procedure: AMPUTATION, FOOT, TRANSMETATARSAL;  Surgeon: Abdi Bedoya DPM;  Location: [facility];  Service: Podiatry;  Laterality: Right;  REVISION    MYELOMININGOCELE REPAIR      NEPHRECTOMY Right 11/4/2021    Procedure: Nephrectomy/ OPEN;  Surgeon: Dash Rosenthal MD;  Location: [facility] 2ND FLR;  Service: Urology;  Laterality: Right;  4HRS    NEPHROSTOMY Right 8/2/2021    Procedure: Nephrostomy and perinephric abscess drain;  Surgeon: Lillian Surgeon;  Location: [facility];  Service: Anesthesiology;  Laterality: Right;    STRABISMUS SURGERY      ou dr peña    VENTRICULOPERITONEAL SHUNT      WOUND DEBRIDEMENT   3/27/2020    Procedure: DEBRIDEMENT, WOUND;  Surgeon: Abdi Bedoya DPM;  Location: [facility];  Service: Podiatry;;       Time Tracking:     OT Date of Treatment: 09/21/22  OT Start Time: 0400 (8:26)  OT Stop Time: 0416 (8:46)  OT Total Time (min): 36 min    Billable Minutes:Evaluation 20  Neuromuscular Re-education 16    9/21/2022

## 2022-09-21 NOTE — SUBJECTIVE & OBJECTIVE
"Medications Prior to Admission   Medication Sig Dispense Refill Last Dose    acetaminophen (TYLENOL) 500 MG tablet Take 1,000 mg by mouth every 6 (six) hours as needed for Pain.       cephALEXin (KEFLEX) 500 MG capsule Take 1 capsule (500 mg total) by mouth every evening. Start after you complete the cipro. (Patient not taking: No sig reported) 30 capsule 0     collagenase (SANTYL) ointment Apply topically once daily. (Patient not taking: No sig reported)  0     ferrous sulfate (FEOSOL) 325 mg (65 mg iron) Tab tablet Take 325 mg by mouth 2 (two) times daily.       ibuprofen (ADVIL,MOTRIN) 200 MG tablet Take 200 mg by mouth every 6 (six) hours as needed for Pain.       levETIRAcetam (KEPPRA) 500 MG Tab Take 1 tablet (500 mg total) by mouth 2 (two) times daily. 60 tablet 11        Review of patient's allergies indicates:   Allergen Reactions    Latex, natural rubber Anaphylaxis and Other (See Comments)     angioedema    Zofran [ondansetron hcl (pf)] Swelling     Hives, "throat closes up"    Gardasil  [h papillomavirus vac,qval (pf)]      Other reaction(s): Shortness of breath    Menactra  [mening vac a,c,y,w135 dip (pf)]      Other reaction(s): Shortness of breath    Nitrofurantoin      Other reaction(s): Difficulty breathing    Sulfa (sulfonamide antibiotics)     Tramadol Hives    Levaquin [levofloxacin] Rash     Spots on face    Macrobid [nitrofurantoin monohyd/m-cryst] Rash     Sppots/rash on face       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     Past Surgical History:   Procedure Laterality Date    AMPUTATION      right 4th and 5th toes; left 5th toe and portion of left great toe    BACK SURGERY      BLADDER SURGERY      BREAST SURGERY  2015    Reduction    COLON " SURGERY      flush site for bowel movements from appendix    CYSTOSTOMY      DEBRIDEMENT OF FOOT Right 1/8/2021    Procedure: DEBRIDEMENT, FOOT;  Surgeon: Abdi Bedoya DPM;  Location: [facility];  Service: Podiatry;  Laterality: Right;    EYE SURGERY      x 5    FLUOROSCOPIC URODYNAMIC STUDY N/A 3/12/2019    Procedure: URODYNAMIC STUDY, FLUOROSCOPIC;  Surgeon: Kenzie Charles MD;  Location: [facility] 1ST FLR;  Service: Urology;  Laterality: N/A;  1 hour    FLUOROSCOPIC URODYNAMIC STUDY N/A 4/4/2019    Procedure: URODYNAMIC STUDY, FLUOROSCOPIC;  Surgeon: Kenzie Charles MD;  Location: [facility] 1ST FLR;  Service: Urology;  Laterality: N/A;  1 hour    FLUOROSCOPIC URODYNAMIC STUDY N/A 5/11/2022    Procedure: URODYNAMIC STUDY, FLUOROSCOPIC;  Surgeon: Kenzie Charles MD;  Location: [facility] 1ST FLR;  Service: Urology;  Laterality: N/A;  90min    FOOT AMPUTATION THROUGH METATARSAL Right 10/28/2019    Procedure: AMPUTATION, FOOT, TRANSMETATARSAL;  Surgeon: Abdi Bedoya DPM;  Location: [facility];  Service: Podiatry;  Laterality: Right;    FOOT AMPUTATION THROUGH METATARSAL Right 3/27/2020    Procedure: AMPUTATION, FOOT, TRANSMETATARSAL;  Surgeon: Abdi Bedoya DPM;  Location: [facility];  Service: Podiatry;  Laterality: Right;  REVISION    MYELOMININGOCELE REPAIR      NEPHRECTOMY Right 11/4/2021    Procedure: Nephrectomy/ OPEN;  Surgeon: Dash Rosenthal MD;  Location: [facility] 2ND FLR;  Service: Urology;  Laterality: Right;  4HRS    NEPHROSTOMY Right 8/2/2021    Procedure: Nephrostomy and perinephric abscess drain;  Surgeon: Lillian Surgeon;  Location: [facility];  Service: Anesthesiology;  Laterality: Right;    STRABISMUS SURGERY      ou dr peña    VENTRICULOPERITONEAL SHUNT      WOUND DEBRIDEMENT  3/27/2020    Procedure: DEBRIDEMENT, WOUND;  Surgeon: Abdi Bedoya DPM;  Location: [facility];  Service: Podiatry;;     Family History       Problem Relation (Age of Onset)    Breast cancer Mother    Cataracts  Maternal Grandmother    Gout Father    Myocarditis Sister          Tobacco Use    Smoking status: Never    Smokeless tobacco: Never   Substance and Sexual Activity    Alcohol use: Not Currently    Drug use: Never    Sexual activity: Never     Review of Systems   Reason unable to perform ROS: patient intubated and sedated.   Objective:     Weight: 57.7 kg (127 lb 3.3 oz)  Body mass index is 28.52 kg/m².  Vital Signs (Most Recent):  Temp: 98.8 °F (37.1 °C) (09/21/22 0300)  Pulse: 106 (09/21/22 0300)  Resp: 20 (09/21/22 0030)  BP: (!) 140/84 (09/21/22 0300)  SpO2: 99 % (09/21/22 0300)   Vital Signs (24h Range):  Temp:  [94.8 °F (34.9 °C)-99.1 °F (37.3 °C)] 98.8 °F (37.1 °C)  Pulse:  [] 106  Resp:  [12-31] 20  SpO2:  [89 %-100 %] 99 %  BP: ()/() 140/84                Vent Mode: A/C  Oxygen Concentration (%):  [30-60] 40  Resp Rate Total:  [19 br/min-28 br/min] 19 br/min  Vt Set:  [325 mL-350 mL] 325 mL  PEEP/CPAP:  [5 cmH20] 5 cmH20  Pressure Support:  [0 cmH20] 0 cmH20  Mean Airway Pressure:  [9.2 iyS19-58 cmH20] 9.2 cmH20         NG/OG Tube 09/20/22 2052 orogastric 16 Fr. Center mouth (Active)            Colostomy Other (see comments) RLQ (Active)            Urethral Catheter 09/21/22 0030 Non-latex;Temperature probe (Active)   Output (mL) 1100 mL 09/21/22 0200       Physical Exam  General: Sedated, intubated  Head: Non-traumatic, normocephalic  Eyes: Pupils equal, EOMI  Neck: Supple, normal ROM, no tenderness to palpation  CVS: Normal rate and rhythm  Pulm: No respiratory distress  GI: Abdomen nondistended, nontender  Skin: Dry, intact  Psych: unable to assess    Neurosurgery Physical Exam  Sedated, intubated  PERRL, +corneals/cough/gag  Spontaneous purposeful movements of head  WD BUE    Significant Labs:  Recent Labs   Lab 09/20/22 1949   *      K 3.8      CO2 18*   BUN 18   CREATININE 0.4*   CALCIUM 8.8   MG 1.9     Recent Labs   Lab 09/20/22 1949   WBC 21.99*   HGB 13.4    HCT 41.9        No results for input(s): LABPT, INR, APTT in the last 48 hours.  Microbiology Results (last 7 days)       Procedure Component Value Units Date/Time    Blood culture [[id number]] Collected: 09/21/22 0435    Order Status: Sent Specimen: Blood from Peripheral, Forearm, Left Updated: 09/21/22 0455    Blood culture [[id number]] Collected: 09/21/22 0423    Order Status: Sent Specimen: Blood from Peripheral, Upper Arm, Left Updated: 09/21/22 0455    CSF culture [[id number]] Collected: 09/21/22 0330    Order Status: Sent Specimen: CSF (Spinal Fluid) from CSF Shunt Updated: 09/21/22 0330    Culture, Respiratory with Gram Stain [[id number]]     Order Status: No result Specimen: Respiratory           All pertinent labs from the last 24 hours have been reviewed.    Significant Diagnostics:  Echoencephalography: No results found in the last 24 hours.  I have reviewed all pertinent imaging results/findings within the past 24 hours.

## 2022-09-21 NOTE — PLAN OF CARE
OT evaluation initiated  Problem: Occupational Therapy  Goal: Occupational Therapy Goal  Description: Goals set 9/21 to be addressed for 14 days with expiration date, 10/5:  Patient will increase functional independence with ADLs by performing:    Patient will demonstrate rolling to the right with SBA.  Not met   Patient will demonstrate rolling to the left with SBA.   Not met  Patient will demonstrate supine -sit with min assist.   Not met  Patient will demonstrate scoot pivot transfers with min assist.   Not met  Patient will demonstrate grooming while seated with min assist.   Not met  Patient will demonstrate upper body dressing with min assist while seated EOB.   Not met  Patient will demonstrate lower body dressing with mod assist while seated EOB.   Not met  Patient will demonstrate toileting with mod assist.   Not met  Patient's family / caregiver will demonstrate independence and safety with assisting patient with self-care skills and functional mobility.     Not met  Patient's family / caregiver will demonstrate independence with providing ROM and changes in bed positioning.   Not met        Outcome: Ongoing, Progressing

## 2022-09-21 NOTE — CARE UPDATE
Naranjo catheter in place on patient arrival with 50cc straw-colored urine to drainage bag. Per report received from [facility] RN, this was all the urine the patient had put out since catheter placement.    [facility] naranjo catheter removed    Small amount of whitish-clear discharge noted in vaginal vestibule.     New naranjo catheter with temp-sensing probe placed by RNs LOWELL Willis and DEBBIE Holt under sterile conditions.   Immediate return of 250cc light yellow urine with moderate amount white sediment. Sample collected.    Per patient's father at bedside, patient typically self-caths through an ostomy created in her umbilicus. Umbilical site cleaned carefully, appears clean with a slight amount whitish drainage.

## 2022-09-21 NOTE — CONSULTS
[facility]  Neurosurgery  Consult Note    Consults  Subjective:     Chief Complaint/Reason for Admission: hydrocephalus    History of Present Illness: Patient is a 27F with PMH of spina bifida (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), Chiari Malformation, VPS, seizure disorder, and neurogenic bladder with UTIs who presents after seizures leading to status epilepticus. Patient was found in bathroom by parents slumped over in wheelchair, uncertain if she hit her head. She had seizures and went into status epilepticus in the ED, needing to be intubated and sedated. At baseline, patient is wheelchair bound, but alert/oriented and catheterizes herself as necessary. NSGY is consulted for hydrocephalus.    Previous shunt failures she has had have lead to nausea/vomiting per father. Her last revision was on 3/19/2012 with Dr. Calero. She has one old right VPS catheter still in place. Her left sided VPS is newer and the revised VPS. Of note, patient had Newman rods put in for scoliosis treatment, of which the left is broken. Patient was last seen in clinic in 2014.      Medications Prior to Admission   Medication Sig Dispense Refill Last Dose    acetaminophen (TYLENOL) 500 MG tablet Take 1,000 mg by mouth every 6 (six) hours as needed for Pain.       cephALEXin (KEFLEX) 500 MG capsule Take 1 capsule (500 mg total) by mouth every evening. Start after you complete the cipro. (Patient not taking: No sig reported) 30 capsule 0     collagenase (SANTYL) ointment Apply topically once daily. (Patient not taking: No sig reported)  0     ferrous sulfate (FEOSOL) 325 mg (65 mg iron) Tab tablet Take 325 mg by mouth 2 (two) times daily.       ibuprofen (ADVIL,MOTRIN) 200 MG tablet Take 200 mg by mouth every 6 (six) hours as needed for Pain.       levETIRAcetam (KEPPRA) 500 MG Tab Take 1 tablet (500 mg total) by mouth 2 (two) times daily. 60 tablet 11        Review of patient's allergies indicates:   Allergen Reactions    Latex,  "natural rubber Anaphylaxis and Other (See Comments)     angioedema    Zofran [ondansetron hcl (pf)] Swelling     Hives, "throat closes up"    Gardasil  [h papillomavirus vac,qval (pf)]      Other reaction(s): Shortness of breath    Menactra  [mening vac a,c,y,w135 dip (pf)]      Other reaction(s): Shortness of breath    Nitrofurantoin      Other reaction(s): Difficulty breathing    Sulfa (sulfonamide antibiotics)     Tramadol Hives    Levaquin [levofloxacin] Rash     Spots on face    Macrobid [nitrofurantoin monohyd/m-cryst] Rash     Sppots/rash on face       Past Medical History:   Diagnosis Date    Anemia     Arnold-Chiari malformation, type II     Encounter for blood transfusion     Hydrocephalus     Neurogenic bladder     self catheterizes every 3 hours    Seizures     only w/ shunt malfunction    Spinal bifida, closed     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     Past Surgical History:   Procedure Laterality Date    AMPUTATION      right 4th and 5th toes; left 5th toe and portion of left great toe    BACK SURGERY      BLADDER SURGERY      BREAST SURGERY  2015    Reduction    COLON SURGERY      flush site for bowel movements from appendix    CYSTOSTOMY      DEBRIDEMENT OF FOOT Right 1/8/2021    Procedure: DEBRIDEMENT, FOOT;  Surgeon: Abdi Bedoya DPM;  Location: [facility];  Service: Podiatry;  Laterality: Right;    EYE SURGERY      x 5    FLUOROSCOPIC URODYNAMIC STUDY N/A 3/12/2019    Procedure: URODYNAMIC STUDY, FLUOROSCOPIC;  Surgeon: Kenzie Charles MD;  Location: [address];  Service: Urology;  Laterality: N/A;  1 hour    FLUOROSCOPIC URODYNAMIC STUDY N/A 4/4/2019    Procedure: URODYNAMIC STUDY, FLUOROSCOPIC;  Surgeon: Kenzie Charles MD;  Location: [address];  Service: Urology;  Laterality: N/A;  1 hour    FLUOROSCOPIC URODYNAMIC STUDY N/A 5/11/2022    Procedure: URODYNAMIC STUDY, FLUOROSCOPIC;  Surgeon: Kenzie Charles MD;  Location: [address];  Service: Urology;  " Laterality: N/A;  90min    FOOT AMPUTATION THROUGH METATARSAL Right 10/28/2019    Procedure: AMPUTATION, FOOT, TRANSMETATARSAL;  Surgeon: Abdi Bedoya DPM;  Location: [facility] OR;  Service: Podiatry;  Laterality: Right;    FOOT AMPUTATION THROUGH METATARSAL Right 3/27/2020    Procedure: AMPUTATION, FOOT, TRANSMETATARSAL;  Surgeon: Abdi Bedoya DPM;  Location: [facility] OR;  Service: Podiatry;  Laterality: Right;  REVISION    MYELOMININGOCELE REPAIR      NEPHRECTOMY Right 11/4/2021    Procedure: Nephrectomy/ OPEN;  Surgeon: Dash Rosenthal MD;  Location: [facility] 2ND FLR;  Service: Urology;  Laterality: Right;  4HRS    NEPHROSTOMY Right 8/2/2021    Procedure: Nephrostomy and perinephric abscess drain;  Surgeon: eMg Surgeon;  Location: [facility];  Service: Anesthesiology;  Laterality: Right;    STRABISMUS SURGERY      ou dr peña    VENTRICULOPERITONEAL SHUNT      WOUND DEBRIDEMENT  3/27/2020    Procedure: DEBRIDEMENT, WOUND;  Surgeon: Abdi Bedoya DPM;  Location: [facility] OR;  Service: Podiatry;;     Family History       Problem Relation (Age of Onset)    Breast cancer Mother    Cataracts Maternal Grandmother    Gout Father    Myocarditis Sister          Tobacco Use    Smoking status: Never    Smokeless tobacco: Never   Substance and Sexual Activity    Alcohol use: Not Currently    Drug use: Never    Sexual activity: Never     Review of Systems   Reason unable to perform ROS: patient intubated and sedated.   Objective:     Weight: 57.7 kg (127 lb 3.3 oz)  Body mass index is 28.52 kg/m².  Vital Signs (Most Recent):  Temp: 98.8 °F (37.1 °C) (09/21/22 0300)  Pulse: 106 (09/21/22 0300)  Resp: 20 (09/21/22 0030)  BP: (!) 140/84 (09/21/22 0300)  SpO2: 99 % (09/21/22 0300)   Vital Signs (24h Range):  Temp:  [94.8 °F (34.9 °C)-99.1 °F (37.3 °C)] 98.8 °F (37.1 °C)  Pulse:  [] 106  Resp:  [12-31] 20  SpO2:  [89 %-100 %] 99 %  BP: ()/() 140/84                Vent Mode: A/C  Oxygen  Concentration (%):  [30-60] 40  Resp Rate Total:  [19 br/min-28 br/min] 19 br/min  Vt Set:  [325 mL-350 mL] 325 mL  PEEP/CPAP:  [5 cmH20] 5 cmH20  Pressure Support:  [0 cmH20] 0 cmH20  Mean Airway Pressure:  [9.2 bkE71-10 cmH20] 9.2 cmH20         NG/OG Tube 09/20/22 2052 orogastric 16 Fr. Center mouth (Active)            Colostomy Other (see comments) RLQ (Active)            Urethral Catheter 09/21/22 0030 Non-latex;Temperature probe (Active)   Output (mL) 1100 mL 09/21/22 0200       Physical Exam  General: Sedated, intubated  Head: Non-traumatic, normocephalic  Eyes: Pupils equal, EOMI  Neck: Supple, normal ROM, no tenderness to palpation  CVS: Normal rate and rhythm  Pulm: No respiratory distress  GI: Abdomen nondistended, nontender  Skin: Dry, intact  Psych: unable to assess    Neurosurgery Physical Exam  Sedated, intubated  PERRL, +corneals/cough/gag  Spontaneous purposeful movements of head  WD BUE    Significant Labs:  Recent Labs   Lab 09/20/22 1949   *      K 3.8      CO2 18*   BUN 18   CREATININE 0.4*   CALCIUM 8.8   MG 1.9     Recent Labs   Lab 09/20/22 1949   WBC 21.99*   HGB 13.4   HCT 41.9        No results for input(s): LABPT, INR, APTT in the last 48 hours.  Microbiology Results (last 7 days)       Procedure Component Value Units Date/Time    Blood culture [[id number]] Collected: 09/21/22 0435    Order Status: Sent Specimen: Blood from Peripheral, Forearm, Left Updated: 09/21/22 0455    Blood culture [[id number]] Collected: 09/21/22 0423    Order Status: Sent Specimen: Blood from Peripheral, Upper Arm, Left Updated: 09/21/22 0455    CSF culture [[id number]] Collected: 09/21/22 0330    Order Status: Sent Specimen: CSF (Spinal Fluid) from CSF Shunt Updated: 09/21/22 0330    Culture, Respiratory with Gram Stain [[id number]]     Order Status: No result Specimen: Respiratory           All pertinent labs from the last 24 hours have been reviewed.    Significant  Diagnostics:  Echoencephalography: No results found in the last 24 hours.  I have reviewed all pertinent imaging results/findings within the past 24 hours.    Assessment/Plan:     * Hydrocephalus  Patient is a 27F with PMH of spina bifida (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), Chiari Malformation, VPS, seizure disorder, and neurogenic bladder with UTIs who presents after seizures leading to status epilepticus. NSGY is consulted for hydrocephalus.    --Patient seen at bedside  --CTH 9/20 showing increased hydrocephalus, dilatation of the lateral ventricles  --High volume shunt tap performed - 25cc CSF drained  --F/u CSF studies   --CT abdomen to rule out pseudocyst  --F/u EEG        Thank you for your consult. I will follow-up with patient. Please contact us if you have any additional questions.    Susan Child MD  Neurosurgery  [facility]

## 2022-09-22 LAB
ALBUMIN SERPL BCP-MCNC: 3.5 G/DL (ref 3.5–5.2)
ALLENS TEST: ABNORMAL
ALP SERPL-CCNC: 65 U/L (ref 55–135)
ALT SERPL W/O P-5'-P-CCNC: 17 U/L (ref 10–44)
ANION GAP SERPL CALC-SCNC: 10 MMOL/L (ref 8–16)
AST SERPL-CCNC: 17 U/L (ref 10–40)
BACTERIA #/AREA URNS AUTO: NORMAL /HPF
BASOPHILS # BLD AUTO: 0.01 K/UL (ref 0–0.2)
BASOPHILS NFR BLD: 0.1 % (ref 0–1.9)
BILIRUB SERPL-MCNC: 0.5 MG/DL (ref 0.1–1)
BILIRUB UR QL STRIP: NEGATIVE
BUN SERPL-MCNC: 7 MG/DL (ref 6–20)
CALCIUM SERPL-MCNC: 8.7 MG/DL (ref 8.7–10.5)
CHLORIDE SERPL-SCNC: 112 MMOL/L (ref 95–110)
CLARITY UR REFRACT.AUTO: CLEAR
CO2 SERPL-SCNC: 16 MMOL/L (ref 23–29)
COLOR UR AUTO: YELLOW
CREAT SERPL-MCNC: 0.6 MG/DL (ref 0.5–1.4)
DELSYS: ABNORMAL
DIFFERENTIAL METHOD: ABNORMAL
EOSINOPHIL # BLD AUTO: 0.2 K/UL (ref 0–0.5)
EOSINOPHIL NFR BLD: 1.6 % (ref 0–8)
ERYTHROCYTE [DISTWIDTH] IN BLOOD BY AUTOMATED COUNT: 15.2 % (ref 11.5–14.5)
ERYTHROCYTE [SEDIMENTATION RATE] IN BLOOD BY WESTERGREN METHOD: 14 MM/H
EST. GFR  (NO RACE VARIABLE): >60 ML/MIN/1.73 M^2
FIO2: 40
GLUCOSE SERPL-MCNC: 76 MG/DL (ref 70–110)
GLUCOSE UR QL STRIP: NEGATIVE
HCO3 UR-SCNC: 18.7 MMOL/L (ref 24–28)
HCT VFR BLD AUTO: 39.8 % (ref 37–48.5)
HGB BLD-MCNC: 12.9 G/DL (ref 12–16)
HGB UR QL STRIP: NEGATIVE
IMM GRANULOCYTES # BLD AUTO: 0.04 K/UL (ref 0–0.04)
IMM GRANULOCYTES NFR BLD AUTO: 0.3 % (ref 0–0.5)
KETONES UR QL STRIP: ABNORMAL
LEUKOCYTE ESTERASE UR QL STRIP: ABNORMAL
LYMPHOCYTES # BLD AUTO: 0.9 K/UL (ref 1–4.8)
LYMPHOCYTES NFR BLD: 6.9 % (ref 18–48)
MAGNESIUM SERPL-MCNC: 2.1 MG/DL (ref 1.6–2.6)
MCH RBC QN AUTO: 28.5 PG (ref 27–31)
MCHC RBC AUTO-ENTMCNC: 32.4 G/DL (ref 32–36)
MCV RBC AUTO: 88 FL (ref 82–98)
MICROSCOPIC COMMENT: NORMAL
MIN VOL: 10.5
MODE: ABNORMAL
MONOCYTES # BLD AUTO: 1.1 K/UL (ref 0.3–1)
MONOCYTES NFR BLD: 8.7 % (ref 4–15)
NEUTROPHILS # BLD AUTO: 10.1 K/UL (ref 1.8–7.7)
NEUTROPHILS NFR BLD: 82.4 % (ref 38–73)
NITRITE UR QL STRIP: NEGATIVE
NRBC BLD-RTO: 0 /100 WBC
PCO2 BLDA: 30.3 MMHG (ref 35–45)
PEEP: 5
PH SMN: 7.4 [PH] (ref 7.35–7.45)
PH UR STRIP: 7 [PH] (ref 5–8)
PHOSPHATE SERPL-MCNC: 2.2 MG/DL (ref 2.7–4.5)
PIP: 36
PLATELET # BLD AUTO: 316 K/UL (ref 150–450)
PMV BLD AUTO: 9.6 FL (ref 9.2–12.9)
PO2 BLDA: 184 MMHG (ref 80–100)
POC BE: -6 MMOL/L
POC SATURATED O2: 100 % (ref 95–100)
POC TCO2: 20 MMOL/L (ref 23–27)
POTASSIUM SERPL-SCNC: 3.7 MMOL/L (ref 3.5–5.1)
PROT SERPL-MCNC: 7.2 G/DL (ref 6–8.4)
PROT UR QL STRIP: NEGATIVE
RBC # BLD AUTO: 4.52 M/UL (ref 4–5.4)
RBC #/AREA URNS AUTO: 4 /HPF (ref 0–4)
SAMPLE: ABNORMAL
SITE: ABNORMAL
SODIUM SERPL-SCNC: 138 MMOL/L (ref 136–145)
SP GR UR STRIP: 1.01 (ref 1–1.03)
SP02: 100
SQUAMOUS #/AREA URNS AUTO: 2 /HPF
URN SPEC COLLECT METH UR: ABNORMAL
VT: 310
WBC # BLD AUTO: 12.24 K/UL (ref 3.9–12.7)
WBC #/AREA URNS AUTO: 5 /HPF (ref 0–5)

## 2022-09-22 PROCEDURE — 25000003 PHARM REV CODE 250

## 2022-09-22 PROCEDURE — 93005 ELECTROCARDIOGRAM TRACING: CPT

## 2022-09-22 PROCEDURE — 25000003 PHARM REV CODE 250: Performed by: PSYCHIATRY & NEUROLOGY

## 2022-09-22 PROCEDURE — 99900026 HC AIRWAY MAINTENANCE (STAT)

## 2022-09-22 PROCEDURE — 25000003 PHARM REV CODE 250: Performed by: NURSE PRACTITIONER

## 2022-09-22 PROCEDURE — 63600175 PHARM REV CODE 636 W HCPCS: Performed by: NURSE PRACTITIONER

## 2022-09-22 PROCEDURE — 63600175 PHARM REV CODE 636 W HCPCS: Performed by: PHYSICIAN ASSISTANT

## 2022-09-22 PROCEDURE — 25000003 PHARM REV CODE 250: Performed by: PHYSICIAN ASSISTANT

## 2022-09-22 PROCEDURE — 85025 COMPLETE CBC W/AUTO DIFF WBC: CPT | Performed by: NURSE PRACTITIONER

## 2022-09-22 PROCEDURE — 93010 ELECTROCARDIOGRAM REPORT: CPT | Mod: ,,, | Performed by: INTERNAL MEDICINE

## 2022-09-22 PROCEDURE — 99900035 HC TECH TIME PER 15 MIN (STAT)

## 2022-09-22 PROCEDURE — 83735 ASSAY OF MAGNESIUM: CPT | Performed by: NURSE PRACTITIONER

## 2022-09-22 PROCEDURE — 80053 COMPREHEN METABOLIC PANEL: CPT | Performed by: NURSE PRACTITIONER

## 2022-09-22 PROCEDURE — 99291 CRITICAL CARE FIRST HOUR: CPT | Mod: ,,, | Performed by: PSYCHIATRY & NEUROLOGY

## 2022-09-22 PROCEDURE — 94761 N-INVAS EAR/PLS OXIMETRY MLT: CPT

## 2022-09-22 PROCEDURE — 99900031 HC PATIENT EDUCATION (STAT)

## 2022-09-22 PROCEDURE — 93010 EKG 12-LEAD: ICD-10-PCS | Mod: ,,, | Performed by: INTERNAL MEDICINE

## 2022-09-22 PROCEDURE — 27200966 HC CLOSED SUCTION SYSTEM

## 2022-09-22 PROCEDURE — 36600 WITHDRAWAL OF ARTERIAL BLOOD: CPT

## 2022-09-22 PROCEDURE — 94150 VITAL CAPACITY TEST: CPT

## 2022-09-22 PROCEDURE — 99900017 HC EXTUBATION W/PARAMETERS (STAT)

## 2022-09-22 PROCEDURE — 82803 BLOOD GASES ANY COMBINATION: CPT

## 2022-09-22 PROCEDURE — 99291 PR CRITICAL CARE, E/M 30-74 MINUTES: ICD-10-PCS | Mod: ,,, | Performed by: PSYCHIATRY & NEUROLOGY

## 2022-09-22 PROCEDURE — 81001 URINALYSIS AUTO W/SCOPE: CPT

## 2022-09-22 PROCEDURE — 84100 ASSAY OF PHOSPHORUS: CPT | Performed by: NURSE PRACTITIONER

## 2022-09-22 PROCEDURE — 94003 VENT MGMT INPAT SUBQ DAY: CPT

## 2022-09-22 PROCEDURE — 63600175 PHARM REV CODE 636 W HCPCS: Performed by: PSYCHIATRY & NEUROLOGY

## 2022-09-22 PROCEDURE — 97112 NEUROMUSCULAR REEDUCATION: CPT

## 2022-09-22 PROCEDURE — 20000000 HC ICU ROOM

## 2022-09-22 PROCEDURE — 27000221 HC OXYGEN, UP TO 24 HOURS

## 2022-09-22 RX ORDER — VANCOMYCIN HCL IN 5 % DEXTROSE 1G/250ML
1000 PLASTIC BAG, INJECTION (ML) INTRAVENOUS
Status: DISCONTINUED | OUTPATIENT
Start: 2022-09-23 | End: 2022-09-22

## 2022-09-22 RX ORDER — MAGNESIUM SULFATE HEPTAHYDRATE 40 MG/ML
4 INJECTION, SOLUTION INTRAVENOUS
Status: DISCONTINUED | OUTPATIENT
Start: 2022-09-22 | End: 2022-09-23

## 2022-09-22 RX ORDER — PSEUDOEPHEDRINE/ACETAMINOPHEN 30MG-500MG
60 TABLET ORAL ONCE
Status: COMPLETED | OUTPATIENT
Start: 2022-09-22 | End: 2022-09-22

## 2022-09-22 RX ORDER — FENTANYL CITRATE 50 UG/ML
50 INJECTION, SOLUTION INTRAMUSCULAR; INTRAVENOUS
Status: DISCONTINUED | OUTPATIENT
Start: 2022-09-22 | End: 2022-09-24 | Stop reason: HOSPADM

## 2022-09-22 RX ORDER — MAGNESIUM SULFATE 1 G/100ML
1 INJECTION INTRAVENOUS ONCE
Status: COMPLETED | OUTPATIENT
Start: 2022-09-22 | End: 2022-09-23

## 2022-09-22 RX ORDER — SODIUM CHLORIDE 9 MG/ML
1000 INJECTION, SOLUTION INTRAVENOUS ONCE
Status: COMPLETED | OUTPATIENT
Start: 2022-09-22 | End: 2022-09-23

## 2022-09-22 RX ORDER — CALCIUM GLUCONATE 20 MG/ML
3 INJECTION, SOLUTION INTRAVENOUS
Status: DISCONTINUED | OUTPATIENT
Start: 2022-09-22 | End: 2022-09-23

## 2022-09-22 RX ORDER — CALCIUM GLUCONATE 20 MG/ML
2 INJECTION, SOLUTION INTRAVENOUS
Status: DISCONTINUED | OUTPATIENT
Start: 2022-09-22 | End: 2022-09-23

## 2022-09-22 RX ORDER — POTASSIUM CHLORIDE 7.45 MG/ML
80 INJECTION INTRAVENOUS
Status: DISCONTINUED | OUTPATIENT
Start: 2022-09-22 | End: 2022-09-23

## 2022-09-22 RX ORDER — CALCIUM GLUCONATE 20 MG/ML
1 INJECTION, SOLUTION INTRAVENOUS
Status: DISCONTINUED | OUTPATIENT
Start: 2022-09-22 | End: 2022-09-23

## 2022-09-22 RX ORDER — POTASSIUM CHLORIDE 7.45 MG/ML
40 INJECTION INTRAVENOUS
Status: DISCONTINUED | OUTPATIENT
Start: 2022-09-22 | End: 2022-09-23

## 2022-09-22 RX ORDER — FENTANYL CITRATE 50 UG/ML
25 INJECTION, SOLUTION INTRAMUSCULAR; INTRAVENOUS ONCE
Status: COMPLETED | OUTPATIENT
Start: 2022-09-22 | End: 2022-09-22

## 2022-09-22 RX ORDER — VANCOMYCIN HCL IN 5 % DEXTROSE 1G/250ML
1000 PLASTIC BAG, INJECTION (ML) INTRAVENOUS
Status: DISCONTINUED | OUTPATIENT
Start: 2022-09-23 | End: 2022-09-24 | Stop reason: HOSPADM

## 2022-09-22 RX ORDER — ACETAMINOPHEN 325 MG/1
650 TABLET ORAL ONCE
Status: COMPLETED | OUTPATIENT
Start: 2022-09-22 | End: 2022-09-22

## 2022-09-22 RX ORDER — MAGNESIUM SULFATE HEPTAHYDRATE 40 MG/ML
2 INJECTION, SOLUTION INTRAVENOUS
Status: DISCONTINUED | OUTPATIENT
Start: 2022-09-22 | End: 2022-09-23

## 2022-09-22 RX ORDER — POTASSIUM CHLORIDE 7.45 MG/ML
60 INJECTION INTRAVENOUS
Status: DISCONTINUED | OUTPATIENT
Start: 2022-09-22 | End: 2022-09-23

## 2022-09-22 RX ORDER — DEXMEDETOMIDINE HYDROCHLORIDE 4 UG/ML
0-1.4 INJECTION, SOLUTION INTRAVENOUS CONTINUOUS
Status: DISCONTINUED | OUTPATIENT
Start: 2022-09-22 | End: 2022-09-22

## 2022-09-22 RX ADMIN — MUPIROCIN: 20 OINTMENT TOPICAL at 08:09

## 2022-09-22 RX ADMIN — SODIUM CHLORIDE 1000 ML: 0.9 INJECTION, SOLUTION INTRAVENOUS at 05:09

## 2022-09-22 RX ADMIN — ACETAMINOPHEN 650 MG: 325 TABLET ORAL at 10:09

## 2022-09-22 RX ADMIN — FENTANYL CITRATE 50 MCG: 0.05 INJECTION, SOLUTION INTRAMUSCULAR; INTRAVENOUS at 08:09

## 2022-09-22 RX ADMIN — PROPOFOL 40 MCG/KG/MIN: 10 INJECTION, EMULSION INTRAVENOUS at 09:09

## 2022-09-22 RX ADMIN — FAMOTIDINE 20 MG: 20 TABLET ORAL at 08:09

## 2022-09-22 RX ADMIN — DEXMEDETOMIDINE HYDROCHLORIDE 0.2 MCG/KG/HR: 4 INJECTION INTRAVENOUS at 09:09

## 2022-09-22 RX ADMIN — MAGNESIUM SULFATE HEPTAHYDRATE 1 G: 500 INJECTION, SOLUTION INTRAMUSCULAR; INTRAVENOUS at 11:09

## 2022-09-22 RX ADMIN — VANCOMYCIN HYDROCHLORIDE 1250 MG: 1.25 INJECTION, POWDER, LYOPHILIZED, FOR SOLUTION INTRAVENOUS at 07:09

## 2022-09-22 RX ADMIN — ACETAMINOPHEN 650 MG: 325 TABLET ORAL at 02:09

## 2022-09-22 RX ADMIN — SENNOSIDES AND DOCUSATE SODIUM 1 TABLET: 50; 8.6 TABLET ORAL at 08:09

## 2022-09-22 RX ADMIN — Medication 60 ML: at 03:09

## 2022-09-22 RX ADMIN — CEFEPIME HYDROCHLORIDE 1 G: 1 INJECTION, SOLUTION INTRAVENOUS at 05:09

## 2022-09-22 RX ADMIN — FENTANYL CITRATE 25 MCG: 0.05 INJECTION, SOLUTION INTRAMUSCULAR; INTRAVENOUS at 02:09

## 2022-09-22 RX ADMIN — LEVETIRACETAM 750 MG: 500 INJECTION, SOLUTION INTRAVENOUS at 09:09

## 2022-09-22 RX ADMIN — MUPIROCIN: 20 OINTMENT TOPICAL at 09:09

## 2022-09-22 RX ADMIN — LEVETIRACETAM 750 MG: 500 INJECTION, SOLUTION INTRAVENOUS at 08:09

## 2022-09-22 NOTE — PT/OT/SLP PROGRESS
Occupational Therapy   Treatment    Name: Shashank Samuel  MRN: [id number]  Admitting Diagnosis:  Hydrocephalus  1 Day Post-Op    Recommendations:     Discharge Recommendations:  (pending extubation)  Discharge Equipment Recommendations:   (pending extubation)  Barriers to discharge:  None    Assessment:     Shashank Samuel is a 27 y.o. female with a medical diagnosis of Hydrocephalus.  She presents with performance deficits affecting function are weakness, abnormal tone, decreased ROM, impaired cognition, impaired endurance, impaired sensation, decreased coordination, decreased upper extremity function, impaired self care skills, impaired functional mobility, gait instability, impaired balance, impaired cardiopulmonary response to activity, decreased safety awareness, decreased lower extremity function, impaired coordination, impaired fine motor.     Rehab Prognosis:  Good; patient would benefit from acute skilled OT services to address these deficits and reach maximum level of function.       Plan:     Patient to be seen 3 x/week to address the above listed problems via self-care/home management, therapeutic activities, neuromuscular re-education, cognitive retraining, sensory integration  Plan of Care Expires: 10/20/22  Plan of Care Reviewed with: patient    Subjective   Patient:  Nonverbal; intubated  Nurse reported that restraints were needed due to attempts to pull at vent tubing and that patient is intermittently following commands.  Pain/Comfort:  Pain Rating 1: 0/10  Pain Rating Post-Intervention 1: 0/10    Objective:     Communicated with: Nurse prior to session.  Patient found supine with bed alarm, naranjo catheter, blood pressure cuff, telemetry, pulse ox (continuous), restraints, peripheral IV, ventilator upon OT entry to room.  Per chart review, s/p  shunt revision.   General Precautions: Standard, aspiration, fall, NPO   Orthopedic Precautions:N/A   Braces:  N/A  Respiratory Status:   ventilator     Occupational Performance:     Bed Mobility:    Patient completed Rolling/Turning to Left with  total assistance  Patient completed Rolling/Turning to Right with total assistance     Functional Mobility/Transfers:  Dependent drawsheet transfers    Activities of Daily Living:  Feeding:  NPO    Grooming: total assistance while supine    [facility] 6 Click ADL: 6    Treatment & Education:  Patient education provided on role of OT.  Daily orientation provided. AAROM performed bilateral UE; PROM bilateral LEs one set x 6 rep in all planes of motion with stretches provided at end range; sustained stretch provided for UE external rotation, wrist/finger extension and ankle dorsiflexion.   Patient kept eyes open throughout the session.  Patient unable to follow commands.  Provided multi-sensory stimulation to prevent sensory deprivation and improve clinical outcomes.  Positioning provided for midline orientation with bilateral UEs elevated and heels lifted off mattress.  Continued education, patient/ family training recommended.      Patient left supine with all lines intact, call button in reach, bed alarm on, and restraints reapplied at end of session    GOALS:   Multidisciplinary Problems       Occupational Therapy Goals          Problem: Occupational Therapy    Goal Priority Disciplines Outcome Interventions   Occupational Therapy Goal     OT, PT/OT Ongoing, Progressing    Description: Goals set 9/21 to be addressed for 14 days with expiration date, 10/5:  Patient will increase functional independence with ADLs by performing:    Patient will demonstrate rolling to the right with SBA.  Not met   Patient will demonstrate rolling to the left with SBA.   Not met  Patient will demonstrate supine -sit with min assist.   Not met  Patient will demonstrate scoot pivot transfers with min assist.   Not met  Patient will demonstrate grooming while seated with min assist.   Not met  Patient will demonstrate upper body  dressing with min assist while seated EOB.   Not met  Patient will demonstrate lower body dressing with mod assist while seated EOB.   Not met  Patient will demonstrate toileting with mod assist.   Not met  Patient's family / caregiver will demonstrate independence and safety with assisting patient with self-care skills and functional mobility.     Not met  Patient's family / caregiver will demonstrate independence with providing ROM and changes in bed positioning.   Not met                             Time Tracking:     OT Date of Treatment: 09/22/22  OT Start Time: 0404  OT Stop Time: 0414  OT Total Time (min): 10 min    Billable Minutes:Neuromuscular Re-education 10    OT/SALLIE: OT          9/22/2022

## 2022-09-22 NOTE — PROGRESS NOTES
[facility]  Neurocritical Care  Progress Note    Admit Date: 9/21/2022  Service Date: 09/22/2022  Length of Stay: 1    Subjective:     Chief Complaint: Hydrocephalus    History of Present Illness: Shashank Samuel is a 27 y.o. 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     She originally presented to OSH after being found at home unresponsive, slumped over in the bathroom with her head hanging over the edge og the toilet/face blue (per father).    Patient was brought to the ER and was hypothermic and hypotensive on arrival. In outside ED pt had recurrent seizures despite multiple doses of ativan and 750 mg keppra. Pt was intubated, but continued to have seizure activity on my exam with tonic posturing of bilateral upper and lower extremities and upward deviated gaze. No abx started prior to transfer. CT cervical spine without acute injury. CT head with hydrocephalus. CXR clear. Pt will need UA to complete septic workup- also has known chronic sacral wound.      Patient's mother is in the room helping with history.  Patient's mother states that she has a history of seizures and she has had about 3 seizures this year.  She was taken off Keppra and was restarted about a month and a half ago for recurrence of seizures.  She currently takes 500 mg b.i.d. of Keppra.     Transferred to [facility] for status Epilepticus management.       Hospital Course: 9/21/2022: CTH with hydrocephalus. Pt had 25 cc shunt tap at bedside. LP pending. EEG without seizure activity. NSGY plan to take pt to OR today for shunt revision.   9/22/2022: Pt recovering well. She is waking up, responding to stimuli, moving BUE. Plan for extubation today. She remains tachycardic- suspect related to agitation. Will  re-evaluate after extubation. EKG ordered.       Interval history: please see hospital course.    Past Medical History:   Diagnosis Date    Anemia     Arnold-Chiari malformation, type II     Encounter for blood transfusion     Hydrocephalus     Neurogenic bladder     self catheterizes every 3 hours    Seizures     only w/ shunt malfunction    Spinal bifida, closed     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     Past Surgical History:   Procedure Laterality Date    AMPUTATION      right 4th and 5th toes; left 5th toe and portion of left great toe    BACK SURGERY      BLADDER SURGERY      BREAST SURGERY  2015    Reduction    COLON SURGERY      flush site for bowel movements from appendix    CYSTOSTOMY      DEBRIDEMENT OF FOOT Right 1/8/2021    Procedure: DEBRIDEMENT, FOOT;  Surgeon: Abdi Bedoya DPM;  Location: [facility];  Service: Podiatry;  Laterality: Right;    EYE SURGERY      x 5    FLUOROSCOPIC URODYNAMIC STUDY N/A 3/12/2019    Procedure: URODYNAMIC STUDY, FLUOROSCOPIC;  Surgeon: Kenzie Charles MD;  Location: [address];  Service: Urology;  Laterality: N/A;  1 hour    FLUOROSCOPIC URODYNAMIC STUDY N/A 4/4/2019    Procedure: URODYNAMIC STUDY, FLUOROSCOPIC;  Surgeon: Kenzie Charles MD;  Location: [facility] OR [address] [place] [address];  Service: Urology;  Laterality: N/A;  1 hour    FLUOROSCOPIC URODYNAMIC STUDY N/A 5/11/2022    Procedure: URODYNAMIC STUDY, FLUOROSCOPIC;  Surgeon: Kenzie Charles MD;  Location: [address];  Service: Urology;  Laterality: N/A;  90min    FOOT AMPUTATION THROUGH METATARSAL Right 10/28/2019    Procedure: AMPUTATION, FOOT, TRANSMETATARSAL;  Surgeon: Abdi Bedoya DPM;  Location: [facility];  Service: Podiatry;  Laterality: Right;    FOOT AMPUTATION THROUGH METATARSAL Right 3/27/2020    Procedure: AMPUTATION, FOOT, TRANSMETATARSAL;  Surgeon: Abdi Bedoya DPM;  Location: [facility];  Service: Podiatry;  Laterality: Right;  REVISION    MYELOMININGOCELE REPAIR      NEPHRECTOMY Right  11/4/2021    Procedure: Nephrectomy/ OPEN;  Surgeon: Dash Rosenthal MD;  Location: [facility] 2ND FLR;  Service: Urology;  Laterality: Right;  4HRS    NEPHROSTOMY Right 8/2/2021    Procedure: Nephrostomy and perinephric abscess drain;  Surgeon: Lillian Surgeon;  Location: [facility];  Service: Anesthesiology;  Laterality: Right;    REVISION OF VENTRICULOPERITONEAL SHUNT Left 9/21/2022    Procedure: REVISION, SHUNT, VENTRICULOPERITONEAL;  Surgeon: Maynor Calero MD;  Location: [address] FLR;  Service: Neurosurgery;  Laterality: Left;    STRABISMUS SURGERY      ou dr peña    VENTRICULOPERITONEAL SHUNT      WOUND DEBRIDEMENT  3/27/2020    Procedure: DEBRIDEMENT, WOUND;  Surgeon: Abdi Bedoya DPM;  Location: [facility];  Service: Podiatry;;      No current facility-administered medications on file prior to encounter.     Current Outpatient Medications on File Prior to Encounter   Medication Sig Dispense Refill    acetaminophen (TYLENOL) 500 MG tablet Take 1,000 mg by mouth every 6 (six) hours as needed for Pain.      cephALEXin (KEFLEX) 500 MG capsule Take 1 capsule (500 mg total) by mouth every evening. Start after you complete the cipro. (Patient not taking: No sig reported) 30 capsule 0    collagenase (SANTYL) ointment Apply topically once daily. (Patient not taking: No sig reported)  0    ferrous sulfate (FEOSOL) 325 mg (65 mg iron) Tab tablet Take 325 mg by mouth 2 (two) times daily.      ibuprofen (ADVIL,MOTRIN) 200 MG tablet Take 200 mg by mouth every 6 (six) hours as needed for Pain.      levETIRAcetam (KEPPRA) 500 MG Tab Take 1 tablet (500 mg total) by mouth 2 (two) times daily. 60 tablet 11      Allergies: Latex, natural rubber; Zofran [ondansetron hcl (pf)]; Gardasil  [h papillomavirus vac,qval (pf)]; Menactra  [mening vac a,c,y,w135 dip (pf)]; Nitrofurantoin; Sulfa (sulfonamide antibiotics); Tramadol; Levaquin [levofloxacin]; and Macrobid [nitrofurantoin monohyd/m-cryst]    Family History    Problem Relation Age of Onset    Breast cancer Mother     Gout Father     Cataracts Maternal Grandmother     Myocarditis Sister     Amblyopia Neg Hx     Blindness Neg Hx     Cancer Neg Hx     Diabetes Neg Hx     Glaucoma Neg Hx     Hypertension Neg Hx     Macular degeneration Neg Hx     Retinal detachment Neg Hx     Strabismus Neg Hx     Stroke Neg Hx     Thyroid disease Neg Hx      Social History     Tobacco Use    Smoking status: Never    Smokeless tobacco: Never   Substance Use Topics    Alcohol use: Not Currently    Drug use: Never     Review of Systems   Unable to perform ROS: Intubated   Objective:     Vitals:    Temp: 98.5 °F (36.9 °C)  Pulse: (!) 128  Rhythm: normal sinus rhythm  BP: 121/73  MAP (mmHg): 90  Resp: (!) 24  SpO2: 100 %  Oxygen Concentration (%): 21  O2 Device (Oxygen Therapy): room air  Vent Mode: SPONT-PS  Set Rate: 14 BPM  Vt Set: 310 mL  Pressure Support: 5 cmH20  PEEP/CPAP: 5 cmH20  Peak Airway Pressure: 10 cmH2O  Mean Airway Pressure: 6.8 cmH20    Temp  Min: 94.1 °F (34.5 °C)  Max: 99.7 °F (37.6 °C)  Pulse  Min: 74  Max: 154  BP  Min: 110/78  Max: 142/96  MAP (mmHg)  Min: 87  Max: 112  Resp  Min: 24  Max: 32  SpO2  Min: 100 %  Max: 100 %  Oxygen Concentration (%)  Min: 21  Max: 40    09/21 0701 - 09/22 0700  In: 986.2 [I.V.:886.2]  Out: 493 [Urine:490]   Unmeasured Output  Pad Count: 1       Physical Exam  Vitals and nursing note reviewed.   HENT:      Nose: Nose normal.      Mouth/Throat:      Pharynx: Oropharynx is clear.   Cardiovascular:      Rate and Rhythm: Regular rhythm. Tachycardia present.      Pulses: Normal pulses.      Heart sounds: Normal heart sounds.   Pulmonary:      Breath sounds: Normal breath sounds.   Abdominal:      General: Bowel sounds are normal.      Palpations: Abdomen is soft.   Skin:     General: Skin is warm.      Comments: Stage 2 sacral ulcer   Neurological:      Comments: E4 V1t M3  Exam on propofol  PERRLA  Pt tracking  Cough/gag  present  Flexion to pain upper extremities  No response to pain lowers        Today I personally reviewed pertinent medications, lines/drains/airways, imaging, cardiology results, laboratory results, microbiology results,         Assessment/Plan:     Neuro  * Hydrocephalus  CTH at OSH with very large vents, decreased grey white, compated to only other CTH in system 02/2015.  Shunt present  NSGY consulted to eval/tap shunt - 25 cc removed  CSF CX WNL     shunt revised 9/21 - improvement on brain imaging and exam     [place] coma scale total score 3-8  IMPROVED     R/o seizure - EEG normal  NSGY to Tap shunt and eval shunt revision to correct hydrocephalus    valve repaired with improvement in mental status       Status epilepticus  Hx is not clear after speaking with father.   Patient was on Keppra Years ago, it was stopped for a period and then restarted recently. Father was unsure exactly why. No head imaging in computer recently to correlate with Keppra being restarted.   Patient having generalized tonic clonic seizures without returning to baseline, taken to OSH ER and give Ativan (unknown amount as notes are not clear 4-8mg) there may have even been versed given as well. The patient was intubated and propofol was started at very low dose, then titrated upward. Versed gtt may have even been given at some point.     Patient arrived, no clinical seizures noted. Propofol at 40mcg/kg/min and EEG cap placed. No epileptiform seizure per epilepsy Dr. Rivers.   Keppra continued from OSH, 750mg bid     Pulmonary  Acute respiratory failure  Intubated at OSH during status epilepticus for airway protection  Wean vent as able  Daily CXR and ABG    Pt awake and gagging on tube 9/22 will extubate today. Actively weaning sedation.     Cardiac/Vascular  Sinus tachycardia  Pt has been tachycardic overnight but awake and alert without convulsions so doubt seizure activity. May be related to agitation as she is awake and still  intubated. Will re-evaluate after extubation.     - CXR pending  - EKG pending  - will recheck UA and UCx for persistent infection, she received 3 days abx, but dc on 9/22- pt with low grade fever 9/22     Renal/  Acute cystitis without hematuria  UA with concern for UTI, broad spec abx continued  Pan CX  No growth in urine culture- abx dc 9/22    Pt tachycardic and low grade fevers starting 9/22. Will obtain straight cath UA for repeat.   Patient does self cath through umbilicus suprapubic cath. Hernandez has been removed 9/22    Orthopedic  Sacral decubitus ulcer  Stage 2. CT without concern for deeper infection.     - wound care         The patient is being Prophylaxed for:  Venous Thromboembolism with: Mechanical  Stress Ulcer with: H2B  Ventilator Pneumonia with: chlorhexidine oral care    Activity Orders          Diet Adult Regular (IDDSI Level 7): Regular starting at 09/22 1120    Turn patient starting at 09/21 0400    Elevate HOB starting at 09/21 0241        Full Code    Sara Chavez MD  Neurocritical Care  [facility]

## 2022-09-22 NOTE — NURSING
Nurse attempt to contact EEG tech to place pt back on monitoring, however was unsuccessful. Provider Young NP notified. Provider does not want the temporary EEG caps place due to risk of tampering with pt incision site.    0100 Provider Young NP attempted to change pt vent setting to pressure control due to high peak >30s. Pt has is adequately pulling volume >308.    0134 RT notified that pt is not pulling adequate volume. HR 120s-130, RR 25-30. PT is switched back to volume control. Provider notified.     0200 Pt persistently tachypenic RR>30. -140s. Provider Young NP order for fentanyl. Order carried out. Pt is waking up and remain tachypenic, HR 130s. Provider notified. Another dose of fentanyl was order. Orders carried out. Will continue pt care and monitoring.

## 2022-09-22 NOTE — PROGRESS NOTES
[facility]  Neurosurgery  Progress Note    Subjective:     History of Present Illness: Patient is a 27F with PMH of spina bifida (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), Chiari Malformation, VPS, seizure disorder, and neurogenic bladder with UTIs who presents after seizures leading to status epilepticus. Patient was found in bathroom by parents slumped over in wheelchair, uncertain if she hit her head. She had seizures and went into status epilepticus in the ED, needing to be intubated and sedated. At baseline, patient is wheelchair bound, but alert/oriented and catheterizes herself as necessary. NSGY is consulted for hydrocephalus.    Previous shunt failures she has had have lead to nausea/vomiting per father. Her last revision was on 3/19/2012 with Dr. Calero. She has one old right VPS catheter still in place. Her left sided VPS is newer and the revised VPS. Of note, patient had Newman rods put in for scoliosis treatment, of which the left is broken. Patient was last seen in clinic in 2014.      Post-Op Info:  Procedure(s) (LRB):  REVISION, SHUNT, VENTRICULOPERITONEAL (Left)   1 Day Post-Op     Medications Prior to Admission   Medication Sig Dispense Refill Last Dose    acetaminophen (TYLENOL) 500 MG tablet Take 1,000 mg by mouth every 6 (six) hours as needed for Pain.       cephALEXin (KEFLEX) 500 MG capsule Take 1 capsule (500 mg total) by mouth every evening. Start after you complete the cipro. (Patient not taking: No sig reported) 30 capsule 0     collagenase (SANTYL) ointment Apply topically once daily. (Patient not taking: No sig reported)  0     ferrous sulfate (FEOSOL) 325 mg (65 mg iron) Tab tablet Take 325 mg by mouth 2 (two) times daily.       ibuprofen (ADVIL,MOTRIN) 200 MG tablet Take 200 mg by mouth every 6 (six) hours as needed for Pain.       levETIRAcetam (KEPPRA) 500 MG Tab Take 1 tablet (500 mg total) by mouth 2 (two) times daily. 60 tablet 11        Review of patient's allergies indicates:  "  Allergen Reactions    Latex, natural rubber Anaphylaxis and Other (See Comments)     angioedema    Zofran [ondansetron hcl (pf)] Swelling     Hives, "throat closes up"    Gardasil  [h papillomavirus vac,qval (pf)]      Other reaction(s): Shortness of breath    Menactra  [mening vac a,c,y,w135 dip (pf)]      Other reaction(s): Shortness of breath    Nitrofurantoin      Other reaction(s): Difficulty breathing    Sulfa (sulfonamide antibiotics)     Tramadol Hives    Levaquin [levofloxacin] Rash     Spots on face    Macrobid [nitrofurantoin monohyd/m-cryst] Rash     Sppots/rash on face       Past Medical History:   Diagnosis Date    Anemia     Arnold-Chiari malformation, type II     Encounter for blood transfusion     Hydrocephalus     Neurogenic bladder     self catheterizes every 3 hours    Seizures     only w/ shunt malfunction    Spinal bifida, closed     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     Past Surgical History:   Procedure Laterality Date    AMPUTATION      right 4th and 5th toes; left 5th toe and portion of left great toe    BACK SURGERY      BLADDER SURGERY      BREAST SURGERY  2015    Reduction    COLON SURGERY      flush site for bowel movements from appendix    CYSTOSTOMY      DEBRIDEMENT OF FOOT Right 1/8/2021    Procedure: DEBRIDEMENT, FOOT;  Surgeon: Abdi Bedoya DPM;  Location: [facility];  Service: Podiatry;  Laterality: Right;    EYE SURGERY      x 5    FLUOROSCOPIC URODYNAMIC STUDY N/A 3/12/2019    Procedure: URODYNAMIC STUDY, FLUOROSCOPIC;  Surgeon: Kenzie Charles MD;  Location: [address];  Service: Urology;  Laterality: N/A;  1 hour    FLUOROSCOPIC URODYNAMIC STUDY N/A 4/4/2019    Procedure: URODYNAMIC STUDY, FLUOROSCOPIC;  Surgeon: Kenzie Charles MD;  Location: [address];  Service: Urology;  Laterality: N/A;  1 hour    FLUOROSCOPIC URODYNAMIC STUDY N/A 5/11/2022    Procedure: URODYNAMIC STUDY, FLUOROSCOPIC;  Surgeon: Kenzie Charles MD;  Location: [address]" FLR;  Service: Urology;  Laterality: N/A;  90min    FOOT AMPUTATION THROUGH METATARSAL Right 10/28/2019    Procedure: AMPUTATION, FOOT, TRANSMETATARSAL;  Surgeon: Abdi Bedoya DPM;  Location: [facility] OR;  Service: Podiatry;  Laterality: Right;    FOOT AMPUTATION THROUGH METATARSAL Right 3/27/2020    Procedure: AMPUTATION, FOOT, TRANSMETATARSAL;  Surgeon: Abdi Bedoya DPM;  Location: [facility] OR;  Service: Podiatry;  Laterality: Right;  REVISION    MYELOMININGOCELE REPAIR      NEPHRECTOMY Right 11/4/2021    Procedure: Nephrectomy/ OPEN;  Surgeon: Dash Rosenthal MD;  Location: [facility] 2ND FLR;  Service: Urology;  Laterality: Right;  4HRS    NEPHROSTOMY Right 8/2/2021    Procedure: Nephrostomy and perinephric abscess drain;  Surgeon: Lillian Surgeon;  Location: [facility];  Service: Anesthesiology;  Laterality: Right;    REVISION OF VENTRICULOPERITONEAL SHUNT Left 9/21/2022    Procedure: REVISION, SHUNT, VENTRICULOPERITONEAL;  Surgeon: Maynor Calero MD;  Location: [facility] 2ND FLR;  Service: Neurosurgery;  Laterality: Left;    STRABISMUS SURGERY      ou dr peña    VENTRICULOPERITONEAL SHUNT      WOUND DEBRIDEMENT  3/27/2020    Procedure: DEBRIDEMENT, WOUND;  Surgeon: Abdi Bedoya DPM;  Location: [facility];  Service: Podiatry;;     Family History       Problem Relation (Age of Onset)    Breast cancer Mother    Cataracts Maternal Grandmother    Gout Father    Myocarditis Sister          Tobacco Use    Smoking status: Never    Smokeless tobacco: Never   Substance and Sexual Activity    Alcohol use: Not Currently    Drug use: Never    Sexual activity: Never     Review of Systems   Reason unable to perform ROS: patient intubated and sedated.   Objective:     Weight: 57.6 kg (126 lb 15.8 oz)  Body mass index is 28.47 kg/m².  Vital Signs (Most Recent):  Temp: 98.5 °F (36.9 °C) (09/22/22 1202)  Pulse: (!) 128 (09/22/22 1202)  Resp: (!) 24 (09/22/22 0855)  BP: 121/73 (09/22/22 1202)  SpO2: 100 %  (09/22/22 1202)   Vital Signs (24h Range):  Temp:  [94.1 °F (34.5 °C)-99.7 °F (37.6 °C)] 98.5 °F (36.9 °C)  Pulse:  [] 128  Resp:  [24-32] 24  SpO2:  [100 %] 100 %  BP: (110-142)/(70-96) 121/73     Date 09/22/22 0700 - 09/23/22 0659   Shift 4819-4863 1183-6023 4969-8755 24 Hour Total   INTAKE   I.V.(mL/kg) 193.8(3.4)   193.8(3.4)   IV Piggyback 0   0   Shift Total(mL/kg) 193.8(3.4)   193.8(3.4)   OUTPUT   Shift Total(mL/kg)       Weight (kg) 57.6 57.6 57.6 57.6              Vent Mode: SPONT-PS  Oxygen Concentration (%):  [21-40] 21  Resp Rate Total:  [14 br/min-35 br/min] 29 br/min  Vt Set:  [310 mL-325 mL] 310 mL  PEEP/CPAP:  [5 cmH20] 5 cmH20  Pressure Support:  [5 cmH20] 5 cmH20  Mean Airway Pressure:  [6.8 cmH20-9.5 cmH20] 6.8 cmH20         NG/OG Tube 09/20/22 2052 orogastric 16 Fr. Center mouth (Active)            Colostomy Other (see comments) RLQ (Active)            Urethral Catheter 09/21/22 0030 Non-latex;Temperature probe (Active)   Output (mL) 1100 mL 09/21/22 0200       Physical Exam  General: Sedated, intubated  Head: Non-traumatic, normocephalic  Eyes: Pupils equal, EOMI  Neck: Supple, normal ROM, no tenderness to palpation  CVS: Normal rate and rhythm  Pulm: No respiratory distress  GI: Abdomen nondistended, nontender  Skin: Dry, intact  Psych: unable to assess    Neurosurgery Physical Exam  Sedated, intubated  [id number]  PERRL, +corneals/cough/gag  FC BUE  No movement BLE (baseline)    Significant Labs:  Recent Labs   Lab 09/20/22  1949 09/21/22  0436 09/22/22  0327   * 93 76    138 138   K 3.8 3.9 3.7    109 112*   CO2 18* 21* 16*   BUN 18 10 7   CREATININE 0.4* 0.5 0.6   CALCIUM 8.8 8.4* 8.7   MG 1.9 1.9 2.1       Recent Labs   Lab 09/20/22  1949 09/21/22 0440 09/22/22 0327   WBC 21.99* 13.97* 12.24   HGB 13.4 13.2 12.9   HCT 41.9 40.6 39.8    253 316       Recent Labs   Lab 09/21/22 0440   INR 1.0   APTT 22.5     Microbiology Results (last 7 days)       Procedure  Component Value Units Date/Time    CSF culture [[id number]] Collected: 09/21/22 0330    Order Status: Completed Specimen: CSF (Spinal Fluid) from CSF Shunt Updated: 09/22/22 0725     CSF CULTURE No Growth to date     Gram Stain Result No WBC's      No organisms seen    Blood culture [[id number]] Collected: 09/21/22 0435    Order Status: Completed Specimen: Blood from Peripheral, Forearm, Left Updated: 09/22/22 0613     Blood Culture, Routine No Growth to date      No Growth to date    Narrative:      Blood cultures from 2 different sites. 4 bottles total.  Please draw before starting antibiotics.    Blood culture [[id number]] Collected: 09/21/22 0423    Order Status: Completed Specimen: Blood from Peripheral, Upper Arm, Left Updated: 09/22/22 0613     Blood Culture, Routine No Growth to date      No Growth to date    Narrative:      Blood cultures x 2 different sites. 4 bottles total. Please  draw cultures before administering antibiotics.    Culture, Respiratory with Gram Stain [[id number]] Collected: 09/21/22 0522    Order Status: Completed Specimen: Respiratory from Endotracheal Aspirate Updated: 09/21/22 0817     Gram Stain (Respiratory) Rare WBC's     Gram Stain (Respiratory) No organisms seen    Narrative:      Mini-BAL.          All pertinent labs from the last 24 hours have been reviewed.    Significant Diagnostics:  Echoencephalography: No results found in the last 24 hours.  I have reviewed all pertinent imaging results/findings within the past 24 hours.    Assessment/Plan:     * Hydrocephalus  Patient is a 27F with PMH of spina bifida (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), Chiari Malformation, VPS, seizure disorder, and neurogenic bladder with UTIs who presents after seizures leading to status epilepticus. NSGY is consulted for hydrocephalus.    --Patient seen at bedside  --CTH 9/20 showing increased hydrocephalus, dilatation of the lateral ventricles  --CT abdomen 9/241: no pseudocyst  --CTH 9/22: stable dilatation of  ventricular system  --Follow up repeat XRSS, could not visualize setting on XRSS today  --High volume shunt tap performed 9/21 - 25cc CSF drained  --CSF studies: R143, W7, P8, G77, No orgs  --F/u EEG  --Continue to monitor clinically, notify NSGY immediately with any changes in neuro status        Susan Child MD  Neurosurgery  [facility]

## 2022-09-22 NOTE — HPI
"27 year old female with a PMHx spina bifida with baseline paralysis and wheelchair bound, Chiari type 2 malformation s/p VPS, neurogenic bladder c/b UTIs, seizure disorder on Levetiracetam 500 mg BID who initially presented to OSH after being found unresponsive at home, reported to be "slumped over in bathroom." HPI per chart review as patient currently intubated and sedated. After arriving to ER, patient had GTCs without return to baseline. She received Lorazepam and Levetiracetam. Patient was intubated for airway protection and sedated on Propofol. CTH concerning for shunt malfunction and new hydrocephalus. Patient transferred to [facility] for Neurosurgery evaluation and admitted to [facility] for higher level of care. Patient is now s/p left shunt revision by Dr. Kwok and Dr. Calero on 9/21. EEG placed 9/22; Epilepsy following for assistance in management.  "

## 2022-09-22 NOTE — HOSPITAL COURSE
9/22: NAEON. Off EEG now. CTH today was stable. Shunt setting could not be visualize on XRSS. Repeat skull xray today.  9/23: extubated, back to neuro baseline, no further seizures.     Patient was admitted for status epilepticus and wa found to have hydrocephalus from  shunt malfunction on 9/21/2022 and underwent  shunt revision where the valve was replaced after found to be not functioning on 9/21/2022 without perioperative complications. The patient remained on appropriate DVT prophylaxis during the course of admission. At the time of discharge, the patient was tolerating PO intake without N/V, dysphagia, denied bowel or bladder dysfunction, denied new neurological symptoms, and reported pain controlled with current regimen. The surgical site was without evidence of drainage, breakdown or infection and all sutures were intact. The patient will follow up in clinic as indicated in discharge instructions. All questions were answered and continued treatment/wound care instructions were discussed in detail prior to discharge.

## 2022-09-22 NOTE — PLAN OF CARE
[facility] Care Plan    POC reviewed with Shashank Samuel and family at 0500. Pt unable to verbalize understanding due to ett and sedation. Questions and concerns addressed with family at bedside. No acute events overnight. Pt progressing toward goals. Will continue to monitor. See below and flowsheets for full assessment and VS info.     -Propofol gtt  -NS gtt  -CTH completed  -x2 fentanyl IVP      Is this a stroke patient? no    Neuro:  Anthony Coma Scale  Best Eye Response: 3-->(E3) to speech  Best Motor Response: 6-->(M6) obeys commands  Best Verbal Response: 1-->(V1) none  Anthony Coma Scale Score: 10  Assessment Qualifiers: patient intubated, no eye obstruction present, patient chemically sedated or paralyzed  Pupil PERRLA: yes     24hr Temp:  [94.1 °F (34.5 °C)-98.8 °F (37.1 °C)]     CV:   Rhythm: normal sinus rhythm  BP goals:   SBP < 160  MAP > 65    Resp:   O2 Device (Oxygen Therapy): ventilator  Vent Mode: A/C  Set Rate: 14 BPM  Oxygen Concentration (%): 40  Vt Set: 325 mL  PEEP/CPAP: 5 cmH20    Plan: N/A    GI/:     Diet/Nutrition Received: NPO     Voiding Characteristics: urethral catheter (bladder)    Intake/Output Summary (Last 24 hours) at 9/22/2022 0529  Last data filed at 9/22/2022 0515  Gross per 24 hour   Intake 953.49 ml   Output 508 ml   Net 445.49 ml     Unmeasured Output  Pad Count: 2    Labs/Accuchecks:  Recent Labs   Lab 09/22/22  0327   WBC 12.24   RBC 4.52   HGB 12.9   HCT 39.8         Recent Labs   Lab 09/22/22  0327      K 3.7   CO2 16*   *   BUN 7   CREATININE 0.6   ALKPHOS 65   ALT 17   AST 17   BILITOT 0.5      Recent Labs   Lab 09/21/22  0440   INR 1.0   APTT 22.5    No results for input(s): CPK, CPKMB, TROPONINI, MB in the last 168 hours.    Electrolytes: No replacement orders  Accuchecks: none    Gtts:   sodium chloride 0.9% 30 mL/hr at 09/22/22 0515    propofol 40 mcg/kg/min (09/22/22 0515)       LDA/Wounds:  Lines/Drains/Airways       Drain  Duration                   Colostomy Other (see comments) RLQ -- days         NG/OG Tube 09/20/22 2052 orogastric 16 Fr. Center mouth 1 day         Urethral Catheter 09/21/22 0030 Non-latex;Temperature probe 1 day              Airway  Duration                  Airway - Non-Surgical 09/20/22 1730 Endotracheal Tube 1 day              Peripheral Intravenous Line  Duration                  Peripheral IV - Single Lumen 09/20/22 1949 20 G Anterior;Right Upper Arm 1 day         Peripheral IV - Single Lumen 09/21/22 0505 20 G Anterior;Left Forearm 1 day         Peripheral IV - Single Lumen 09/21/22 1253 18 G Right Hand <1 day                  Wounds: Yes  Wound care consulted: Yes

## 2022-09-22 NOTE — PROGRESS NOTES
"Pharmacokinetic Initial Assessment: IV Vancomycin    Assessment/Plan:    Initiate intravenous vancomycin with loading dose of 1250 mg once followed by a maintenance dose of vancomycin 1000mg IV every 8 hours  Desired empiric serum trough concentration is 15 to 20 mcg/mL  Draw vancomycin trough level 30 min prior to fourth dose on 9/23 at approximately 1630  Pharmacy will continue to follow and monitor vancomycin.      Please contact pharmacy at extension [phone] with any questions regarding this assessment.     Thank you for the consult,   Gunner Diaz       Patient brief summary:  Shashank Samuel is a 27 y.o. female initiated on antimicrobial therapy with IV Vancomycin for treatment of suspected lower respiratory infection    Drug Allergies:   Review of patient's allergies indicates:   Allergen Reactions    Latex, natural rubber Anaphylaxis and Other (See Comments)     angioedema    Zofran [ondansetron hcl (pf)] Swelling     Hives, "throat closes up"    Gardasil  [h papillomavirus vac,qval (pf)]      Other reaction(s): Shortness of breath    Menactra  [mening vac a,c,y,w135 dip (pf)]      Other reaction(s): Shortness of breath    Nitrofurantoin      Other reaction(s): Difficulty breathing    Sulfa (sulfonamide antibiotics)     Tramadol Hives    Levaquin [levofloxacin] Rash     Spots on face    Macrobid [nitrofurantoin monohyd/m-cryst] Rash     Sppots/rash on face       Actual Body Weight:   57.6kg    Renal Function:   Estimated Creatinine Clearance: 111.8 mL/min (based on SCr of 0.6 mg/dL).,     Dialysis Method (if applicable):  N/A    CBC (last 72 hours):  Recent Labs   Lab Result Units 09/20/22  1949 09/21/22  0440 09/22/22  0327   WBC K/uL 21.99* 13.97* 12.24   Hemoglobin g/dL 13.4 13.2 12.9   Hematocrit % 41.9 40.6 39.8   Platelets K/uL 364 253 316   Gran % % 89.8* 83.8* 82.4*   Lymph % % 5.0* 8.0* 6.9*   Mono % % 4.5 7.2 8.7   Eosinophil % % 0.0 0.1 1.6   Basophil % % 0.1 0.1 0.1   Differential Method  " Automated Automated Automated       Metabolic Panel (last 72 hours):  Recent Labs   Lab Result Units 09/20/22  1924 09/20/22  1949 09/21/22  0230 09/21/22  0330 09/21/22  0436 09/22/22  0327 09/22/22  1357   Sodium mmol/L  --  138  --   --  138 138  --    Potassium mmol/L  --  3.8  --   --  3.9 3.7  --    Chloride mmol/L  --  107  --   --  109 112*  --    CO2 mmol/L  --  18*  --   --  21* 16*  --    Glucose mg/dL  --  135*  --   --  93 76  --    Glucose, CSF mg/dL  --   --   --  77*  --   --   --    Glucose, UA  Negative  --  Negative  --   --   --  Negative   BUN mg/dL  --  18  --   --  10 7  --    Creatinine mg/dL  --  0.4*  --   --  0.5 0.6  --    Albumin g/dL  --  4.2  --   --  3.8 3.5  --    Total Bilirubin mg/dL  --  0.7  --   --  0.4 0.5  --    Alkaline Phosphatase U/L  --  58  --   --  66 65  --    AST U/L  --  20  --   --  23 17  --    ALT U/L  --  20  --   --  18 17  --    Magnesium mg/dL  --  1.9  --   --  1.9 2.1  --    Phosphorus mg/dL  --   --   --   --  2.8 2.2*  --        Drug levels (last 3 results):  No results for input(s): VANCOMYCINRA, VANCORANDOM, VANCOMYCINPE, VANCOPEAK, VANCOMYCINTR, VANCOTROUGH in the last 72 hours.    Microbiologic Results:  Microbiology Results (last 7 days)       Procedure Component Value Units Date/Time    Culture, Respiratory with Gram Stain [[id number]]  (Abnormal) Collected: 09/21/22 0522    Order Status: Completed Specimen: Respiratory from Endotracheal Aspirate Updated: 09/22/22 1404     Respiratory Culture STAPHYLOCOCCUS AUREUS  Moderate  Susceptibility pending  Normal respiratory macy also present        YEAST   Few  Further identified as Pichia kudriavzevii       Gram Stain (Respiratory) Rare WBC's     Gram Stain (Respiratory) No organisms seen    Narrative:      Mini-BAL.    CSF culture [[id number]] Collected: 09/21/22 0330    Order Status: Completed Specimen: CSF (Spinal Fluid) from CSF Shunt Updated: 09/22/22 0725     CSF CULTURE No Growth to date     Gram  Stain Result No WBC's      No organisms seen    Blood culture [[id number]] Collected: 09/21/22 0435    Order Status: Completed Specimen: Blood from Peripheral, Forearm, Left Updated: 09/22/22 0613     Blood Culture, Routine No Growth to date      No Growth to date    Narrative:      Blood cultures from 2 different sites. 4 bottles total.  Please draw before starting antibiotics.    Blood culture [[id number]] Collected: 09/21/22 0423    Order Status: Completed Specimen: Blood from Peripheral, Upper Arm, Left Updated: 09/22/22 0613     Blood Culture, Routine No Growth to date      No Growth to date    Narrative:      Blood cultures x 2 different sites. 4 bottles total. Please  draw cultures before administering antibiotics.

## 2022-09-22 NOTE — PT/OT/SLP PROGRESS
Physical Therapy      Patient Name:  Shashank Samuel   MRN:  [id number]    Patient not seen today secondary to pt is intubated and not appropriate for OOB mobility at this time. Will follow-up pending extubation.

## 2022-09-22 NOTE — SUBJECTIVE & OBJECTIVE
Interval history: please see hospital course.    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     Past Surgical History:   Procedure Laterality Date    AMPUTATION      right 4th and 5th toes; left 5th toe and portion of left great toe    BACK SURGERY      BLADDER SURGERY      BREAST SURGERY  2015    Reduction    COLON SURGERY      flush site for bowel movements from appendix    CYSTOSTOMY      DEBRIDEMENT OF FOOT Right 1/8/2021    Procedure: DEBRIDEMENT, FOOT;  Surgeon: Abdi Bedoya DPM;  Location: [facility];  Service: Podiatry;  Laterality: Right;    EYE SURGERY      x 5    FLUOROSCOPIC URODYNAMIC STUDY N/A 3/12/2019    Procedure: URODYNAMIC STUDY, FLUOROSCOPIC;  Surgeon: Kenzie Charles MD;  Location: [address];  Service: Urology;  Laterality: N/A;  1 hour    FLUOROSCOPIC URODYNAMIC STUDY N/A 4/4/2019    Procedure: URODYNAMIC STUDY, FLUOROSCOPIC;  Surgeon: Kenzie Charles MD;  Location: [address];  Service: Urology;  Laterality: N/A;  1 hour    FLUOROSCOPIC URODYNAMIC STUDY N/A 5/11/2022    Procedure: URODYNAMIC STUDY, FLUOROSCOPIC;  Surgeon: Kenzie Charles MD;  Location: [address];  Service: Urology;  Laterality: N/A;  90min    FOOT AMPUTATION THROUGH METATARSAL Right 10/28/2019    Procedure: AMPUTATION, FOOT, TRANSMETATARSAL;  Surgeon: Adbi Bedoya DPM;  Location: [facility];  Service: Podiatry;  Laterality: Right;    FOOT AMPUTATION THROUGH METATARSAL Right 3/27/2020    Procedure: AMPUTATION, FOOT, TRANSMETATARSAL;  Surgeon: Abdi Bedoya DPM;  Location: [facility];  Service: Podiatry;  Laterality: Right;  REVISION    MYELOMININGOCELE REPAIR      NEPHRECTOMY Right 11/4/2021    Procedure: Nephrectomy/ OPEN;  Surgeon: Dash Rosenthal,  MD;  Location: [facility] 2ND FLR;  Service: Urology;  Laterality: Right;  4HRS    NEPHROSTOMY Right 8/2/2021    Procedure: Nephrostomy and perinephric abscess drain;  Surgeon: Lillian Surgeon;  Location: [facility];  Service: Anesthesiology;  Laterality: Right;    REVISION OF VENTRICULOPERITONEAL SHUNT Left 9/21/2022    Procedure: REVISION, SHUNT, VENTRICULOPERITONEAL;  Surgeon: Maynor Calero MD;  [address];  Service: Neurosurgery;  Laterality: Left;    STRABISMUS SURGERY      ou dr peña    VENTRICULOPERITONEAL SHUNT      WOUND DEBRIDEMENT  3/27/2020    Procedure: DEBRIDEMENT, WOUND;  Surgeon: Abdi Bedoya DPM;  Location: [facility];  Service: Podiatry;;      No current facility-administered medications on file prior to encounter.     Current Outpatient Medications on File Prior to Encounter   Medication Sig Dispense Refill    acetaminophen (TYLENOL) 500 MG tablet Take 1,000 mg by mouth every 6 (six) hours as needed for Pain.      cephALEXin (KEFLEX) 500 MG capsule Take 1 capsule (500 mg total) by mouth every evening. Start after you complete the cipro. (Patient not taking: No sig reported) 30 capsule 0    collagenase (SANTYL) ointment Apply topically once daily. (Patient not taking: No sig reported)  0    ferrous sulfate (FEOSOL) 325 mg (65 mg iron) Tab tablet Take 325 mg by mouth 2 (two) times daily.      ibuprofen (ADVIL,MOTRIN) 200 MG tablet Take 200 mg by mouth every 6 (six) hours as needed for Pain.      levETIRAcetam (KEPPRA) 500 MG Tab Take 1 tablet (500 mg total) by mouth 2 (two) times daily. 60 tablet 11      Allergies: Latex, natural rubber; Zofran [ondansetron hcl (pf)]; Gardasil  [h papillomavirus vac,qval (pf)]; Menactra  [mening vac a,c,y,w135 dip (pf)]; Nitrofurantoin; Sulfa (sulfonamide antibiotics); Tramadol; Levaquin [levofloxacin]; and Macrobid [nitrofurantoin monohyd/m-cryst]    Family History   Problem Relation Age of Onset    Breast cancer Mother     Gout Father     Cataracts  Maternal Grandmother     Myocarditis Sister     Amblyopia Neg Hx     Blindness Neg Hx     Cancer Neg Hx     Diabetes Neg Hx     Glaucoma Neg Hx     Hypertension Neg Hx     Macular degeneration Neg Hx     Retinal detachment Neg Hx     Strabismus Neg Hx     Stroke Neg Hx     Thyroid disease Neg Hx      Social History     Tobacco Use    Smoking status: Never    Smokeless tobacco: Never   Substance Use Topics    Alcohol use: Not Currently    Drug use: Never     Review of Systems   Unable to perform ROS: Intubated   Objective:     Vitals:    Temp: 98.5 °F (36.9 °C)  Pulse: (!) 128  Rhythm: normal sinus rhythm  BP: 121/73  MAP (mmHg): 90  Resp: (!) 24  SpO2: 100 %  Oxygen Concentration (%): 21  O2 Device (Oxygen Therapy): room air  Vent Mode: SPONT-PS  Set Rate: 14 BPM  Vt Set: 310 mL  Pressure Support: 5 cmH20  PEEP/CPAP: 5 cmH20  Peak Airway Pressure: 10 cmH2O  Mean Airway Pressure: 6.8 cmH20    Temp  Min: 94.1 °F (34.5 °C)  Max: 99.7 °F (37.6 °C)  Pulse  Min: 74  Max: 154  BP  Min: 110/78  Max: 142/96  MAP (mmHg)  Min: 87  Max: 112  Resp  Min: 24  Max: 32  SpO2  Min: 100 %  Max: 100 %  Oxygen Concentration (%)  Min: 21  Max: 40    09/21 0701 - 09/22 0700  In: 986.2 [I.V.:886.2]  Out: 493 [Urine:490]   Unmeasured Output  Pad Count: 1       Physical Exam  Vitals and nursing note reviewed.   HENT:      Nose: Nose normal.      Mouth/Throat:      Pharynx: Oropharynx is clear.   Cardiovascular:      Rate and Rhythm: Regular rhythm. Tachycardia present.      Pulses: Normal pulses.      Heart sounds: Normal heart sounds.   Pulmonary:      Breath sounds: Normal breath sounds.   Abdominal:      General: Bowel sounds are normal.      Palpations: Abdomen is soft.   Skin:     General: Skin is warm.      Comments: Stage 2 sacral ulcer   Neurological:      Comments: E4 V1t M3  Exam on propofol  PERRLA  Pt tracking  Cough/gag present  Flexion to pain upper extremities  No response to pain lowers        Today I personally reviewed  pertinent medications, lines/drains/airways, imaging, cardiology results, laboratory results, microbiology results,

## 2022-09-22 NOTE — PLAN OF CARE
Goals remain appropriate.  Problem: Occupational Therapy  Goal: Occupational Therapy Goal  Description: Goals set 9/21 to be addressed for 14 days with expiration date, 10/5:  Patient will increase functional independence with ADLs by performing:    Patient will demonstrate rolling to the right with SBA.  Not met   Patient will demonstrate rolling to the left with SBA.   Not met  Patient will demonstrate supine -sit with min assist.   Not met  Patient will demonstrate scoot pivot transfers with min assist.   Not met  Patient will demonstrate grooming while seated with min assist.   Not met  Patient will demonstrate upper body dressing with min assist while seated EOB.   Not met  Patient will demonstrate lower body dressing with mod assist while seated EOB.   Not met  Patient will demonstrate toileting with mod assist.   Not met  Patient's family / caregiver will demonstrate independence and safety with assisting patient with self-care skills and functional mobility.     Not met  Patient's family / caregiver will demonstrate independence with providing ROM and changes in bed positioning.   Not met        Outcome: Ongoing, Progressing

## 2022-09-22 NOTE — CONSULTS
Therapy with vancomcyin complete and/or consult discontinued by provider.  Pharmacy will sign off, please re-consult as needed.      Trena Murdock, PharmD, [facility]  Neurocritical Care Clinical Pharmacist  Ext. [phone]

## 2022-09-23 PROBLEM — R84.5 POSITIVE CULTURE FINDINGS IN SPUTUM: Status: ACTIVE | Noted: 2022-09-23

## 2022-09-23 LAB
ALBUMIN SERPL BCP-MCNC: 3.1 G/DL (ref 3.5–5.2)
ALP SERPL-CCNC: 70 U/L (ref 55–135)
ALT SERPL W/O P-5'-P-CCNC: 16 U/L (ref 10–44)
ANION GAP SERPL CALC-SCNC: 8 MMOL/L (ref 8–16)
AST SERPL-CCNC: 17 U/L (ref 10–40)
BACTERIA SPEC AEROBE CULT: ABNORMAL
BASOPHILS # BLD AUTO: 0.03 K/UL (ref 0–0.2)
BASOPHILS NFR BLD: 0.2 % (ref 0–1.9)
BILIRUB SERPL-MCNC: 0.4 MG/DL (ref 0.1–1)
BUN SERPL-MCNC: 9 MG/DL (ref 6–20)
CA-I BLDV-SCNC: 1.18 MMOL/L (ref 1.06–1.42)
CALCIUM SERPL-MCNC: 8.1 MG/DL (ref 8.7–10.5)
CHLORIDE SERPL-SCNC: 115 MMOL/L (ref 95–110)
CO2 SERPL-SCNC: 14 MMOL/L (ref 23–29)
CREAT SERPL-MCNC: 0.5 MG/DL (ref 0.5–1.4)
DIFFERENTIAL METHOD: ABNORMAL
EOSINOPHIL # BLD AUTO: 0.2 K/UL (ref 0–0.5)
EOSINOPHIL NFR BLD: 1.7 % (ref 0–8)
ERYTHROCYTE [DISTWIDTH] IN BLOOD BY AUTOMATED COUNT: 15.3 % (ref 11.5–14.5)
EST. GFR  (NO RACE VARIABLE): >60 ML/MIN/1.73 M^2
GLUCOSE SERPL-MCNC: 105 MG/DL (ref 70–110)
GRAM STN SPEC: ABNORMAL
GRAM STN SPEC: ABNORMAL
HCT VFR BLD AUTO: 38.9 % (ref 37–48.5)
HGB BLD-MCNC: 11.8 G/DL (ref 12–16)
IMM GRANULOCYTES # BLD AUTO: 0.04 K/UL (ref 0–0.04)
IMM GRANULOCYTES NFR BLD AUTO: 0.3 % (ref 0–0.5)
LYMPHOCYTES # BLD AUTO: 1.3 K/UL (ref 1–4.8)
LYMPHOCYTES NFR BLD: 9.1 % (ref 18–48)
MAGNESIUM SERPL-MCNC: 2.2 MG/DL (ref 1.6–2.6)
MCH RBC QN AUTO: 28.1 PG (ref 27–31)
MCHC RBC AUTO-ENTMCNC: 30.3 G/DL (ref 32–36)
MCV RBC AUTO: 93 FL (ref 82–98)
MONOCYTES # BLD AUTO: 1.1 K/UL (ref 0.3–1)
MONOCYTES NFR BLD: 8 % (ref 4–15)
NEUTROPHILS # BLD AUTO: 11.2 K/UL (ref 1.8–7.7)
NEUTROPHILS NFR BLD: 80.7 % (ref 38–73)
NRBC BLD-RTO: 0 /100 WBC
PHOSPHATE SERPL-MCNC: 2 MG/DL (ref 2.7–4.5)
PLATELET # BLD AUTO: 310 K/UL (ref 150–450)
PMV BLD AUTO: 9.4 FL (ref 9.2–12.9)
POTASSIUM SERPL-SCNC: 3.7 MMOL/L (ref 3.5–5.1)
PROT SERPL-MCNC: 6.1 G/DL (ref 6–8.4)
RBC # BLD AUTO: 4.2 M/UL (ref 4–5.4)
SODIUM SERPL-SCNC: 137 MMOL/L (ref 136–145)
VANCOMYCIN TROUGH SERPL-MCNC: 15.1 UG/ML (ref 10–22)
WBC # BLD AUTO: 13.88 K/UL (ref 3.9–12.7)

## 2022-09-23 PROCEDURE — 25000003 PHARM REV CODE 250: Performed by: PSYCHIATRY & NEUROLOGY

## 2022-09-23 PROCEDURE — 80202 ASSAY OF VANCOMYCIN: CPT | Performed by: PSYCHIATRY & NEUROLOGY

## 2022-09-23 PROCEDURE — 80053 COMPREHEN METABOLIC PANEL: CPT | Performed by: NURSE PRACTITIONER

## 2022-09-23 PROCEDURE — 25000003 PHARM REV CODE 250: Performed by: PHYSICIAN ASSISTANT

## 2022-09-23 PROCEDURE — 94761 N-INVAS EAR/PLS OXIMETRY MLT: CPT

## 2022-09-23 PROCEDURE — 83735 ASSAY OF MAGNESIUM: CPT | Performed by: NURSE PRACTITIONER

## 2022-09-23 PROCEDURE — 25000003 PHARM REV CODE 250

## 2022-09-23 PROCEDURE — 36415 COLL VENOUS BLD VENIPUNCTURE: CPT | Performed by: PSYCHIATRY & NEUROLOGY

## 2022-09-23 PROCEDURE — 84100 ASSAY OF PHOSPHORUS: CPT | Performed by: NURSE PRACTITIONER

## 2022-09-23 PROCEDURE — 85025 COMPLETE CBC W/AUTO DIFF WBC: CPT | Performed by: NURSE PRACTITIONER

## 2022-09-23 PROCEDURE — 63600175 PHARM REV CODE 636 W HCPCS: Performed by: PHYSICIAN ASSISTANT

## 2022-09-23 PROCEDURE — 82330 ASSAY OF CALCIUM: CPT

## 2022-09-23 PROCEDURE — 93010 ELECTROCARDIOGRAM REPORT: CPT | Mod: ,,, | Performed by: INTERNAL MEDICINE

## 2022-09-23 PROCEDURE — 99233 PR SUBSEQUENT HOSPITAL CARE,LEVL III: ICD-10-PCS | Mod: 95,,, | Performed by: PSYCHIATRY & NEUROLOGY

## 2022-09-23 PROCEDURE — 11000001 HC ACUTE MED/SURG PRIVATE ROOM

## 2022-09-23 PROCEDURE — 63600175 PHARM REV CODE 636 W HCPCS: Performed by: PSYCHIATRY & NEUROLOGY

## 2022-09-23 PROCEDURE — 93010 EKG 12-LEAD: ICD-10-PCS | Mod: ,,, | Performed by: INTERNAL MEDICINE

## 2022-09-23 PROCEDURE — 25000003 PHARM REV CODE 250: Performed by: NURSE PRACTITIONER

## 2022-09-23 PROCEDURE — 99233 SBSQ HOSP IP/OBS HIGH 50: CPT | Mod: 95,,, | Performed by: PSYCHIATRY & NEUROLOGY

## 2022-09-23 PROCEDURE — 93005 ELECTROCARDIOGRAM TRACING: CPT

## 2022-09-23 RX ORDER — SODIUM,POTASSIUM PHOSPHATES 280-250MG
2 POWDER IN PACKET (EA) ORAL
Status: DISCONTINUED | OUTPATIENT
Start: 2022-09-23 | End: 2022-09-24 | Stop reason: HOSPADM

## 2022-09-23 RX ORDER — LANOLIN ALCOHOL/MO/W.PET/CERES
800 CREAM (GRAM) TOPICAL
Status: DISCONTINUED | OUTPATIENT
Start: 2022-09-23 | End: 2022-09-24 | Stop reason: HOSPADM

## 2022-09-23 RX ORDER — LEVETIRACETAM 750 MG/1
750 TABLET ORAL 2 TIMES DAILY
Status: DISCONTINUED | OUTPATIENT
Start: 2022-09-23 | End: 2022-09-24 | Stop reason: HOSPADM

## 2022-09-23 RX ORDER — HEPARIN SODIUM 5000 [USP'U]/ML
5000 INJECTION, SOLUTION INTRAVENOUS; SUBCUTANEOUS EVERY 8 HOURS
Status: DISCONTINUED | OUTPATIENT
Start: 2022-09-23 | End: 2022-09-24 | Stop reason: HOSPADM

## 2022-09-23 RX ADMIN — POTASSIUM & SODIUM PHOSPHATES POWDER PACK 280-160-250 MG 2 PACKET: 280-160-250 PACK at 09:09

## 2022-09-23 RX ADMIN — MUPIROCIN: 20 OINTMENT TOPICAL at 08:09

## 2022-09-23 RX ADMIN — MUPIROCIN: 20 OINTMENT TOPICAL at 09:09

## 2022-09-23 RX ADMIN — POTASSIUM & SODIUM PHOSPHATES POWDER PACK 280-160-250 MG 2 PACKET: 280-160-250 PACK at 01:09

## 2022-09-23 RX ADMIN — VANCOMYCIN HYDROCHLORIDE 1000 MG: 1 INJECTION, POWDER, LYOPHILIZED, FOR SOLUTION INTRAVENOUS at 05:09

## 2022-09-23 RX ADMIN — LEVETIRACETAM 750 MG: 750 TABLET, FILM COATED ORAL at 09:09

## 2022-09-23 RX ADMIN — ACETAMINOPHEN 650 MG: 325 TABLET ORAL at 09:09

## 2022-09-23 RX ADMIN — HEPARIN SODIUM 5000 UNITS: 5000 INJECTION INTRAVENOUS; SUBCUTANEOUS at 01:09

## 2022-09-23 RX ADMIN — LEVETIRACETAM 750 MG: 750 TABLET, FILM COATED ORAL at 08:09

## 2022-09-23 RX ADMIN — VANCOMYCIN HYDROCHLORIDE 1000 MG: 1 INJECTION, POWDER, LYOPHILIZED, FOR SOLUTION INTRAVENOUS at 04:09

## 2022-09-23 RX ADMIN — PROMETHAZINE HYDROCHLORIDE 12.5 MG: 25 INJECTION INTRAMUSCULAR; INTRAVENOUS at 12:09

## 2022-09-23 RX ADMIN — HEPARIN SODIUM 5000 UNITS: 5000 INJECTION INTRAVENOUS; SUBCUTANEOUS at 09:09

## 2022-09-23 NOTE — NURSING
Nursing Transfer Note       Transfer To NPU     Transfer via bed    Transfer with cardiac monitoring    Transported by BILLIE Martino     Medicines sent: yes    Chart sent with patient: Yes    Belongings sent with patient: Mupirocin, phone, glasses, game system    Notified: Mother     Bedside Neuro assessment performed: Yes    Bedside Handoff given to: BILLIE Mallory    Upon arrival to floor: cardiac monitor applied, patient oriented to room, call bell in reach, and bed in lowest position

## 2022-09-23 NOTE — PLAN OF CARE
09/23/22 1617   Post-Acute Status   Post-Acute Authorization Placement;Other   Post-Acute Placement Status Pending medical clearance/testing   Other Status No Post-Acute Service Needs     Mariaelena Irizarry LCSW  Neurocritical Care   [facility]  [id number]

## 2022-09-23 NOTE — PROGRESS NOTES
[facility]  Neurosurgery  Progress Note    Subjective:     History of Present Illness: Patient is a 27F with PMH of spina bifida (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), Chiari Malformation, VPS, seizure disorder, and neurogenic bladder with UTIs who presents after seizures leading to status epilepticus. Patient was found in bathroom by parents slumped over in wheelchair, uncertain if she hit her head. She had seizures and went into status epilepticus in the ED, needing to be intubated and sedated. At baseline, patient is wheelchair bound, but alert/oriented and catheterizes herself as necessary. NSGY is consulted for hydrocephalus.    Previous shunt failures she has had have lead to nausea/vomiting per father. Her last revision was on 3/19/2012 with Dr. Calero. She has one old right VPS catheter still in place. Her left sided VPS is newer and the revised VPS. Of note, patient had Newman rods put in for scoliosis treatment, of which the left is broken. Patient was last seen in clinic in 2014.      Post-Op Info:  Procedure(s) (LRB):  REVISION, SHUNT, VENTRICULOPERITONEAL (Left)   2 Days Post-Op     Interval history 9/23: extubated, back to neuro baseline, no further seizures.     Objective:     Weight: 57.6 kg (126 lb 15.8 oz)  Body mass index is 28.47 kg/m².  Vital Signs (Most Recent):  Temp: 100.2 °F (37.9 °C) (09/23/22 0702)  Pulse: (!) 111 (09/23/22 1002)  Resp: 17 (09/23/22 1002)  BP: 131/84 (09/23/22 1002)  SpO2: 100 % (09/23/22 1002)   Vital Signs (24h Range):  Temp:  [98.2 °F (36.8 °C)-100.2 °F (37.9 °C)] 100.2 °F (37.9 °C)  Pulse:  [111-142] 111  Resp:  [15-19] 17  SpO2:  [96 %-100 %] 100 %  BP: (111-149)/(69-95) 131/84                            NG/OG Tube 09/20/22 2052 orogastric 16 Fr. Center mouth (Active)            Colostomy Other (see comments) RLQ (Active)            Urethral Catheter 09/21/22 0030 Non-latex;Temperature probe (Active)   Output (mL) 1100 mL 09/21/22 0200       Physical Exam  General:  awake  Head:  shunt dressing in place, c/d/i  Eyes: Pupils equal, EOMI  Neck: Supple, normal ROM, no tenderness to palpation  CVS: Normal rate and rhythm  Pulm: No respiratory distress  GI: Abdomen nondistended, nontender  Skin: Dry, intact      Neurosurgery Physical Exam  GCS 15  AOx3  PERRL, EOMI  5/5 BUE  No movement BLE (baseline)    Significant Labs:  Recent Labs   Lab 09/22/22 0327 09/23/22  0236   GLU 76 105    137   K 3.7 3.7   * 115*   CO2 16* 14*   BUN 7 9   CREATININE 0.6 0.5   CALCIUM 8.7 8.1*   MG 2.1 2.2       Recent Labs   Lab 09/22/22 0327 09/23/22  0236   WBC 12.24 13.88*   HGB 12.9 11.8*   HCT 39.8 38.9    310       No results for input(s): LABPT, INR, APTT in the last 48 hours.    Microbiology Results (last 7 days)       Procedure Component Value Units Date/Time    Culture, Respiratory with Gram Stain [[id number]]  (Abnormal)  (Susceptibility) Collected: 09/21/22 0522    Order Status: Completed Specimen: Respiratory from Endotracheal Aspirate Updated: 09/23/22 1054     Respiratory Culture No Pseudomonas isolated.     Comment: Previous comment was modified by GABRIELLA at 10:54 on 09/23/2022 No S   aureus or Pseudomonas isolated.           METHICILLIN RESISTANT STAPHYLOCOCCUS AUREUS  Moderate  Normal respiratory macy also present        YEAST   Few  Further identified as Pichia kudriavzevii       Gram Stain (Respiratory) Rare WBC's     Gram Stain (Respiratory) No organisms seen    Narrative:      Mini-BAL.    CSF culture [[id number]] Collected: 09/21/22 0330    Order Status: Completed Specimen: CSF (Spinal Fluid) from CSF Shunt Updated: 09/23/22 0641     CSF CULTURE No Growth to date     Gram Stain Result No WBC's      No organisms seen    Blood culture [[id number]] Collected: 09/21/22 0435    Order Status: Completed Specimen: Blood from Peripheral, Forearm, Left Updated: 09/23/22 0613     Blood Culture, Routine No Growth to date      No Growth to date      No Growth to date     Narrative:      Blood cultures from 2 different sites. 4 bottles total.  Please draw before starting antibiotics.    Blood culture [[id number]] Collected: 09/21/22 0423    Order Status: Completed Specimen: Blood from Peripheral, Upper Arm, Left Updated: 09/23/22 0613     Blood Culture, Routine No Growth to date      No Growth to date      No Growth to date    Narrative:      Blood cultures x 2 different sites. 4 bottles total. Please  draw cultures before administering antibiotics.          All pertinent labs from the last 24 hours have been reviewed.    Significant Diagnostics:  Echoencephalography: No results found in the last 24 hours.  I have reviewed all pertinent imaging results/findings within the past 24 hours.    Assessment/Plan:     * Hydrocephalus  Patient is a 27F with PMH of spina bifida (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), Chiari Malformation, VPS, seizure disorder, and neurogenic bladder with UTIs who presents after seizures leading to status epilepticus. NSGY is consulted for hydrocephalus.    S/p High volume shunt tap 9/21 - 25cc CSF drained  S/p VPS valve replacement on 9/21      --ICU, OK for step down to floor today  --q4 hour neurochecks.   --All diagnostic imaging reviewed     --CTH 9/20 showing increased hydrocephalus, dilatation of the lateral ventricles     --CT abdomen 9/241: no pseudocyst     --CTH post op 9/22: stable dilatation of ventricular system,      --XR skull: strata II at 1.5  --CSF studies 9/21: R143, W7, P8, G77, No orgs  --EEG discontinued, Please provide final AED recs  --Continue to monitor clinically, notify NSGY immediately with any changes in neuro status  --PT/OT  --OK for SQH    --Dispo: TTF, PT/OT, dispo        Warren Blanca MD  Neurosurgery  [facility]

## 2022-09-23 NOTE — SUBJECTIVE & OBJECTIVE
Interval history: please see hospital course.    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     Past Surgical History:   Procedure Laterality Date    AMPUTATION      right 4th and 5th toes; left 5th toe and portion of left great toe    BACK SURGERY      BLADDER SURGERY      BREAST SURGERY  2015    Reduction    COLON SURGERY      flush site for bowel movements from appendix    CYSTOSTOMY      DEBRIDEMENT OF FOOT Right 1/8/2021    Procedure: DEBRIDEMENT, FOOT;  Surgeon: Abdi Bedoya DPM;  Location: [facility];  Service: Podiatry;  Laterality: Right;    EYE SURGERY      x 5    FLUOROSCOPIC URODYNAMIC STUDY N/A 3/12/2019    Procedure: URODYNAMIC STUDY, FLUOROSCOPIC;  Surgeon: Kenzie Charles MD;  Location: [address];  Service: Urology;  Laterality: N/A;  1 hour    FLUOROSCOPIC URODYNAMIC STUDY N/A 4/4/2019    Procedure: URODYNAMIC STUDY, FLUOROSCOPIC;  Surgeon: Kenzie Charles MD;  Location: [address];  Service: Urology;  Laterality: N/A;  1 hour    FLUOROSCOPIC URODYNAMIC STUDY N/A 5/11/2022    Procedure: URODYNAMIC STUDY, FLUOROSCOPIC;  Surgeon: Kenzie Charles MD;  Location: [address];  Service: Urology;  Laterality: N/A;  90min    FOOT AMPUTATION THROUGH METATARSAL Right 10/28/2019    Procedure: AMPUTATION, FOOT, TRANSMETATARSAL;  Surgeon: Abdi Bedoya DPM;  Location: [facility];  Service: Podiatry;  Laterality: Right;    FOOT AMPUTATION THROUGH METATARSAL Right 3/27/2020    Procedure: AMPUTATION, FOOT, TRANSMETATARSAL;  Surgeon: Abdi Bedoya DPM;  Location: [facility];  Service: Podiatry;  Laterality: Right;  REVISION    MYELOMININGOCELE REPAIR      NEPHRECTOMY Right 11/4/2021    Procedure: Nephrectomy/ OPEN;  Surgeon: Dash Rosenthal,  MD;  Location: [facility] 2ND FLR;  Service: Urology;  Laterality: Right;  4HRS    NEPHROSTOMY Right 8/2/2021    Procedure: Nephrostomy and perinephric abscess drain;  Surgeon: Lillian Surgeon;  Location: [facility];  Service: Anesthesiology;  Laterality: Right;    REVISION OF VENTRICULOPERITONEAL SHUNT Left 9/21/2022    Procedure: REVISION, SHUNT, VENTRICULOPERITONEAL;  Surgeon: Maynor Calero MD;  [address];  Service: Neurosurgery;  Laterality: Left;    STRABISMUS SURGERY      ou dr peña    VENTRICULOPERITONEAL SHUNT      WOUND DEBRIDEMENT  3/27/2020    Procedure: DEBRIDEMENT, WOUND;  Surgeon: Abdi Bedoya DPM;  Location: [facility];  Service: Podiatry;;      No current facility-administered medications on file prior to encounter.     Current Outpatient Medications on File Prior to Encounter   Medication Sig Dispense Refill    acetaminophen (TYLENOL) 500 MG tablet Take 1,000 mg by mouth every 6 (six) hours as needed for Pain.      cephALEXin (KEFLEX) 500 MG capsule Take 1 capsule (500 mg total) by mouth every evening. Start after you complete the cipro. (Patient not taking: No sig reported) 30 capsule 0    collagenase (SANTYL) ointment Apply topically once daily. (Patient not taking: No sig reported)  0    ferrous sulfate (FEOSOL) 325 mg (65 mg iron) Tab tablet Take 325 mg by mouth 2 (two) times daily.      ibuprofen (ADVIL,MOTRIN) 200 MG tablet Take 200 mg by mouth every 6 (six) hours as needed for Pain.      levETIRAcetam (KEPPRA) 500 MG Tab Take 1 tablet (500 mg total) by mouth 2 (two) times daily. 60 tablet 11      Allergies: Latex, natural rubber; Zofran [ondansetron hcl (pf)]; Gardasil  [h papillomavirus vac,qval (pf)]; Menactra  [mening vac a,c,y,w135 dip (pf)]; Nitrofurantoin; Sulfa (sulfonamide antibiotics); Tramadol; Levaquin [levofloxacin]; and Macrobid [nitrofurantoin monohyd/m-cryst]    Family History   Problem Relation Age of Onset    Breast cancer Mother     Gout Father     Cataracts  Maternal Grandmother     Myocarditis Sister     Amblyopia Neg Hx     Blindness Neg Hx     Cancer Neg Hx     Diabetes Neg Hx     Glaucoma Neg Hx     Hypertension Neg Hx     Macular degeneration Neg Hx     Retinal detachment Neg Hx     Strabismus Neg Hx     Stroke Neg Hx     Thyroid disease Neg Hx      Social History     Tobacco Use    Smoking status: Never    Smokeless tobacco: Never   Substance Use Topics    Alcohol use: Not Currently    Drug use: Never     Review of Systems   Constitutional:  Negative for chills and fever.   HENT:  Negative for rhinorrhea and sore throat.    Respiratory:  Negative for cough and shortness of breath.    Cardiovascular:  Negative for chest pain and leg swelling.   Gastrointestinal:  Positive for vomiting (1 episode overnight). Negative for abdominal pain, diarrhea and nausea.   Genitourinary:  Negative for dysuria and hematuria.   Musculoskeletal:  Negative for back pain and neck pain.   Skin:  Negative for rash and wound.   Neurological:  Negative for seizures and headaches.   Psychiatric/Behavioral:  Negative for agitation and behavioral problems.    Objective:     Vitals:    Temp: 98.7 °F (37.1 °C)  Pulse: (!) 126  Rhythm: sinus tachycardia  BP: 138/85  MAP (mmHg): 104  SpO2: 99 %  O2 Device (Oxygen Therapy): room air    Temp  Min: 98.2 °F (36.8 °C)  Max: 100 °F (37.8 °C)  Pulse  Min: 122  Max: 143  BP  Min: 111/70  Max: 145/94  MAP (mmHg)  Min: 83  Max: 112  Resp  Min: 15  Max: 29  SpO2  Min: 96 %  Max: 100 %  Oxygen Concentration (%)  Min: 21  Max: 30    09/22 0701 - 09/23 0700  In: 2413.9 [I.V.:1942.8]  Out: 1830 [Urine:1830]   Unmeasured Output  Stool Occurrence: 2  Pad Count: 3       Physical Exam  Vitals and nursing note reviewed.   Constitutional:       General: She is not in acute distress.     Appearance: She is not ill-appearing.      Comments: Patient sitting up at bed eating breakfast.    HENT:      Head:      Comments: L skull healing scar w/  shunt, shaved temporal  area. No drainage or erythema.      Nose: Nose normal.      Mouth/Throat:      Pharynx: Oropharynx is clear.   Cardiovascular:      Rate and Rhythm: Regular rhythm. Tachycardia present.      Pulses: Normal pulses.      Heart sounds: Normal heart sounds.   Pulmonary:      Breath sounds: Normal breath sounds.   Abdominal:      General: Bowel sounds are normal.      Palpations: Abdomen is soft.   Skin:     General: Skin is warm.      Comments: Stage 2 sacral ulcer   Neurological:      Mental Status: She is alert.      Comments: CGS15  Off sedation  PERRLA  CN II-XII grossly intact.   Purposeful movement of BUE, eating at bedside.   No response to pain lowers.       Today I personally reviewed pertinent medications, lines/drains/airways, imaging, cardiology results, laboratory results, microbiology results,

## 2022-09-23 NOTE — PLAN OF CARE
[facility] Care Plan    POC reviewed with Shashank Samuel and family at 0300. Pt verbalized understanding. Questions and concerns addressed with pt and mother. No acute events overnight. Pt progressing toward goals. Will continue to monitor. See below and flowsheets for full assessment and VS info.         Is this a stroke patient? no    Neuro:  [place] Coma Scale  Best Eye Response: 4-->(E4) spontaneous  Best Motor Response: 6-->(M6) obeys commands  Best Verbal Response: 5-->(V5) oriented  [place] Coma Scale Score: 15  Assessment Qualifiers: no eye obstruction present, patient not sedated/intubated  Pupil PERRLA: yes     24hr Temp:  [98.1 °F (36.7 °C)-100 °F (37.8 °C)]     CV:   Rhythm: sinus tachycardia  BP goals:   SBP < 160  MAP > 65    Resp:   O2 Device (Oxygen Therapy): room air  Vent Mode: SPONT-PS  Set Rate: 14 BPM  Oxygen Concentration (%): 21  Vt Set: 310 mL  PEEP/CPAP: 5 cmH20  Pressure Support: 5 cmH20    Plan: N/A    GI/:     Diet/Nutrition Received: regular  Last Bowel Movement: 09/22/22  Voiding Characteristics: suprapubic catheter    Intake/Output Summary (Last 24 hours) at 9/23/2022 0302  Last data filed at 9/23/2022 0023  Gross per 24 hour   Intake 2336.22 ml   Output 1230 ml   Net 1106.22 ml     Unmeasured Output  Stool Occurrence: 2  Pad Count: 3    Labs/Accuchecks:  Recent Labs   Lab 09/23/22  0236   WBC 13.88*   RBC 4.20   HGB 11.8*   HCT 38.9         Recent Labs   Lab 09/22/22  0327      K 3.7   CO2 16*   *   BUN 7   CREATININE 0.6   ALKPHOS 65   ALT 17   AST 17   BILITOT 0.5      Recent Labs   Lab 09/21/22  0440   INR 1.0   APTT 22.5    No results for input(s): CPK, CPKMB, TROPONINI, MB in the last 168 hours.    Electrolytes: N/A - electrolytes WDL  Accuchecks: none    Gtts:   sodium chloride 0.9% 30 mL/hr at 09/22/22 1727       LDA/Wounds:  Lines/Drains/Airways       Drain  Duration                  Colostomy Other (see comments) RLQ -- days              Peripheral  Intravenous Line  Duration                  Peripheral IV - Single Lumen 09/20/22 1949 20 G Anterior;Right Upper Arm 2 days         Peripheral IV - Single Lumen 09/21/22 0505 20 G Anterior;Left Forearm 1 day         Peripheral IV - Single Lumen 09/21/22 1253 18 G Right Hand 1 day                  Wounds: Yes  Wound care consulted: Yes

## 2022-09-23 NOTE — NURSING
Diana SEBASTIAN notified of sustain HR 140s. Pt is asymptomatic with /81. No new orders, will continue to monitor.

## 2022-09-23 NOTE — SUBJECTIVE & OBJECTIVE
Interval history 9/23: extubated, back to neuro baseline, no further seizures.     Objective:     Weight: 57.6 kg (126 lb 15.8 oz)  Body mass index is 28.47 kg/m².  Vital Signs (Most Recent):  Temp: 100.2 °F (37.9 °C) (09/23/22 0702)  Pulse: (!) 111 (09/23/22 1002)  Resp: 17 (09/23/22 1002)  BP: 131/84 (09/23/22 1002)  SpO2: 100 % (09/23/22 1002)   Vital Signs (24h Range):  Temp:  [98.2 °F (36.8 °C)-100.2 °F (37.9 °C)] 100.2 °F (37.9 °C)  Pulse:  [111-142] 111  Resp:  [15-19] 17  SpO2:  [96 %-100 %] 100 %  BP: (111-149)/(69-95) 131/84                            NG/OG Tube 09/20/22 2052 orogastric 16 Fr. Center mouth (Active)            Colostomy Other (see comments) RLQ (Active)            Urethral Catheter 09/21/22 0030 Non-latex;Temperature probe (Active)   Output (mL) 1100 mL 09/21/22 0200       Physical Exam  General: awake  Head:  shunt dressing in place, c/d/i  Eyes: Pupils equal, EOMI  Neck: Supple, normal ROM, no tenderness to palpation  CVS: Normal rate and rhythm  Pulm: No respiratory distress  GI: Abdomen nondistended, nontender  Skin: Dry, intact      Neurosurgery Physical Exam  GCS 15  AOx3  PERRL, EOMI  5/5 BUE  No movement BLE (baseline)    Significant Labs:  Recent Labs   Lab 09/22/22  0327 09/23/22  0236   GLU 76 105    137   K 3.7 3.7   * 115*   CO2 16* 14*   BUN 7 9   CREATININE 0.6 0.5   CALCIUM 8.7 8.1*   MG 2.1 2.2       Recent Labs   Lab 09/22/22  0327 09/23/22  0236   WBC 12.24 13.88*   HGB 12.9 11.8*   HCT 39.8 38.9    310       No results for input(s): LABPT, INR, APTT in the last 48 hours.    Microbiology Results (last 7 days)       Procedure Component Value Units Date/Time    Culture, Respiratory with Gram Stain [[id number]]  (Abnormal)  (Susceptibility) Collected: 09/21/22 0522    Order Status: Completed Specimen: Respiratory from Endotracheal Aspirate Updated: 09/23/22 1054     Respiratory Culture No Pseudomonas isolated.     Comment: Previous comment was modified by  [email or web address] at 10:54 on 09/23/2022 No S   aureus or Pseudomonas isolated.           METHICILLIN RESISTANT STAPHYLOCOCCUS AUREUS  Moderate  Normal respiratory macy also present        YEAST   Few  Further identified as Pichia kudriavzevii       Gram Stain (Respiratory) Rare WBC's     Gram Stain (Respiratory) No organisms seen    Narrative:      Mini-BAL.    CSF culture [[id number]] Collected: 09/21/22 0330    Order Status: Completed Specimen: CSF (Spinal Fluid) from CSF Shunt Updated: 09/23/22 0641     CSF CULTURE No Growth to date     Gram Stain Result No WBC's      No organisms seen    Blood culture [[id number]] Collected: 09/21/22 0435    Order Status: Completed Specimen: Blood from Peripheral, Forearm, Left Updated: 09/23/22 0613     Blood Culture, Routine No Growth to date      No Growth to date      No Growth to date    Narrative:      Blood cultures from 2 different sites. 4 bottles total.  Please draw before starting antibiotics.    Blood culture [[id number]] Collected: 09/21/22 0423    Order Status: Completed Specimen: Blood from Peripheral, Upper Arm, Left Updated: 09/23/22 0613     Blood Culture, Routine No Growth to date      No Growth to date      No Growth to date    Narrative:      Blood cultures x 2 different sites. 4 bottles total. Please  draw cultures before administering antibiotics.          All pertinent labs from the last 24 hours have been reviewed.    Significant Diagnostics:  Echoencephalography: No results found in the last 24 hours.  I have reviewed all pertinent imaging results/findings within the past 24 hours.

## 2022-09-23 NOTE — NURSING
Patient complaining of splitting headache. I had the on call for neuro sx paged. No return call as of yet. Tylenol order needs to be modified to be able to administer for pain as well as the route is still NG tube which she no longer has. Waiting on return call

## 2022-09-23 NOTE — PT/OT/SLP EVAL
Physical Therapy Screen and Discharge Note    Patient Name:  Shashank Samuel   MRN:  [id number]    Recommendations:     Discharge Recommendations:      Discharge Equipment Recommendations:     Barriers to discharge: None    Assessment:     Shashank Samuel is a 27 y.o. female admitted with a medical diagnosis of Hydrocephalus. At this time, patient is functioning at their prior level of function and does not require further acute PT services.     Recent Surgery: Procedure(s) (LRB):  REVISION, SHUNT, VENTRICULOPERITONEAL (Left) 2 Days Post-Op    Plan:     During this hospitalization, patient does not require further acute PT services.  Please re-consult if situation changes.      Subjective     Chief Complaint: none  Patient/Family Comments/goals: get back home  Pain/Comfort:       Patients cultural, spiritual, Amish conflicts given the current situation:      Living Environment:  Pt lives w/ family in a w/c accessible house w/ all necessary DME equipment and 24/7 assistance from family.    Objective:     Communicated with RN prior to session.  Patient found HOB elevated with   upon PT entry to room.    General Precautions: Standard,     Orthopedic Precautions:    Braces:     Respiratory Status: Room air    Exams:  Pt moving around in bed independently and w/ no noted changes in mobility from baseline. Pt and family feel confident they can manage independently at home with no needs for PT at this time. Pt and family educated on informing RN or MD if this changes and they would like PT to do a formal evaluation of the pt.      Patient left HOB elevated with all lines intact, call button in reach, and family present.    GOALS:   Multidisciplinary Problems       Physical Therapy Goals       Not on file                    History:     Past Medical History:   Diagnosis Date    Anemia     Arnold-Chiari malformation, type II     Encounter for blood transfusion     Hydrocephalus     Neurogenic bladder     self  catheterizes every 3 hours    Seizures     only w/ shunt malfunction    Spinal bifida, closed     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       Past Surgical History:   Procedure Laterality Date    AMPUTATION      right 4th and 5th toes; left 5th toe and portion of left great toe    BACK SURGERY      BLADDER SURGERY      BREAST SURGERY  2015    Reduction    COLON SURGERY      flush site for bowel movements from appendix    CYSTOSTOMY      DEBRIDEMENT OF FOOT Right 1/8/2021    Procedure: DEBRIDEMENT, FOOT;  Surgeon: Abdi Bedoya DPM;  Location: [facility];  Service: Podiatry;  Laterality: Right;    EYE SURGERY      x 5    FLUOROSCOPIC URODYNAMIC STUDY N/A 3/12/2019    Procedure: URODYNAMIC STUDY, FLUOROSCOPIC;  Surgeon: Kenzie Charles MD;  Location: [facility] OR 1ST FLR;  Service: Urology;  Laterality: N/A;  1 hour    FLUOROSCOPIC URODYNAMIC STUDY N/A 4/4/2019    Procedure: URODYNAMIC STUDY, FLUOROSCOPIC;  Surgeon: Kenzie Charles MD;  Location: [facility] 1ST FLR;  Service: Urology;  Laterality: N/A;  1 hour    FLUOROSCOPIC URODYNAMIC STUDY N/A 5/11/2022    Procedure: URODYNAMIC STUDY, FLUOROSCOPIC;  Surgeon: Kenzie Charles MD;  Location: [facility] 1ST FLR;  Service: Urology;  Laterality: N/A;  90min    FOOT AMPUTATION THROUGH METATARSAL Right 10/28/2019    Procedure: AMPUTATION, FOOT, TRANSMETATARSAL;  Surgeon: Abdi Bedoya DPM;  Location: [facility];  Service: Podiatry;  Laterality: Right;    FOOT AMPUTATION THROUGH METATARSAL Right 3/27/2020    Procedure: AMPUTATION, FOOT, TRANSMETATARSAL;  Surgeon: Abdi Bedoya DPM;  Location: [facility] OR;  Service: Podiatry;  Laterality: Right;  REVISION    MYELOMININGOCELE REPAIR      NEPHRECTOMY Right 11/4/2021    Procedure: Nephrectomy/ OPEN;  Surgeon: Dash Rosenthal MD;  Location: [facility] OR 2ND FLR;  Service: Urology;  Laterality: Right;  4HRS    NEPHROSTOMY Right 8/2/2021    Procedure: Nephrostomy and perinephric abscess drain;  Surgeon: Lillian Surgeon;  Location: [facility];  Service:  Anesthesiology;  Laterality: Right;    REVISION OF VENTRICULOPERITONEAL SHUNT Left 9/21/2022    Procedure: REVISION, SHUNT, VENTRICULOPERITONEAL;  Surgeon: Maynor Calero MD;  Location: [address];  Service: Neurosurgery;  Laterality: Left;    STRABISMUS SURGERY      ou dr peña    VENTRICULOPERITONEAL SHUNT      WOUND DEBRIDEMENT  3/27/2020    Procedure: DEBRIDEMENT, WOUND;  Surgeon: Abdi Bedoya DPM;  Location: [facility];  Service: Podiatry;;             09/23/2022

## 2022-09-23 NOTE — PLAN OF CARE
[facility] Care Plan    POC reviewed with Shashanklori Jensen Jimmie and family at 1400. Pt verbalized understanding. Questions and concerns addressed. No acute events today. Pt progressing toward goals. Will continue to monitor. See below and flowsheets for full assessment and VS info.   - 1000 ml bolus of NS given  - NS @ 30  - PRN tylenol given  - naranjo removed          Is this a stroke patient? no    Neuro:  [place] Coma Scale  Best Eye Response: 3-->(E3) to speech  Best Motor Response: 6-->(M6) obeys commands  Best Verbal Response: 1-->(V1) none  [place] Coma Scale Score: 10  Assessment Qualifiers: patient intubated  Pupil PERRLA: yes     24 hr Temp:  [96.1 °F (35.6 °C)-100 °F (37.8 °C)]     CV:   Rhythm: normal sinus rhythm  BP goals:   SBP < 160  MAP > 65    Resp:   O2 Device (Oxygen Therapy): room air  Vent Mode: SPONT-PS  Set Rate: 14 BPM  Oxygen Concentration (%): 21  Vt Set: 310 mL  PEEP/CPAP: 5 cmH20  Pressure Support: 5 cmH20    Plan: N/A    GI/:     Diet/Nutrition Received: regular  Last Bowel Movement: 09/20/22  Voiding Characteristics: urethral catheter (bladder)    Intake/Output Summary (Last 24 hours) at 9/22/2022 1957  Last data filed at 9/22/2022 1800  Gross per 24 hour   Intake 1023.5 ml   Output 1355 ml   Net -331.5 ml     Unmeasured Output  Pad Count: 1    Labs/Accuchecks:  Recent Labs   Lab 09/22/22  0327   WBC 12.24   RBC 4.52   HGB 12.9   HCT 39.8         Recent Labs   Lab 09/22/22  0327      K 3.7   CO2 16*   *   BUN 7   CREATININE 0.6   ALKPHOS 65   ALT 17   AST 17   BILITOT 0.5      Recent Labs   Lab 09/21/22  0440   INR 1.0   APTT 22.5    No results for input(s): CPK, CPKMB, TROPONINI, MB in the last 168 hours.    Electrolytes: No replacement orders  Accuchecks: none    Gtts:   sodium chloride 0.9% 30 mL/hr at 09/22/22 1727       LDA/Wounds:  Lines/Drains/Airways       Drain  Duration                  Colostomy Other (see comments) RLQ -- days              Peripheral  Intravenous Line  Duration                  Peripheral IV - Single Lumen 09/20/22 1949 20 G Anterior;Right Upper Arm 2 days         Peripheral IV - Single Lumen 09/21/22 0505 20 G Anterior;Left Forearm 1 day         Peripheral IV - Single Lumen 09/21/22 1253 18 G Right Hand 1 day                  Wounds: Yes  Wound care consulted: Yes

## 2022-09-23 NOTE — PROGRESS NOTES
[facility]  Neurocritical Care  Progress Note    Admit Date: 9/21/2022  Service Date: 09/23/2022  Length of Stay: 2    Subjective:     Chief Complaint: Hydrocephalus    History of Present Illness: Shashank Samuel is a 27 y.o. 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     She originally presented to OSH after being found at home unresponsive, slumped over in the bathroom with her head hanging over the edge og the toilet/face blue (per father).    Patient was brought to the ER and was hypothermic and hypotensive on arrival. In outside ED pt had recurrent seizures despite multiple doses of ativan and 750 mg keppra. Pt was intubated, but continued to have seizure activity on my exam with tonic posturing of bilateral upper and lower extremities and upward deviated gaze. No abx started prior to transfer. CT cervical spine without acute injury. CT head with hydrocephalus. CXR clear. Pt will need UA to complete septic workup- also has known chronic sacral wound.      Patient's mother is in the room helping with history.  Patient's mother states that she has a history of seizures and she has had about 3 seizures this year.  She was taken off Keppra and was restarted about a month and a half ago for recurrence of seizures.  She currently takes 500 mg b.i.d. of Keppra.     Transferred to [facility] for status Epilepticus management.       Hospital Course: 9/21/2022: CTH with hydrocephalus. Pt had 25 cc shunt tap at bedside. LP pending. EEG without seizure activity. NSGY plan to take pt to OR today for shunt revision.   9/22/2022: Pt recovering well. She is waking up, responding to stimuli, moving BUE. Plan for extubation today. She remains tachycardic- suspect related to agitation. Will  re-evaluate after extubation. EKG ordered.   9/23/2022: Pt extubated to room air. Pt is sinus tachycardia, 1 mg Mag given overnight. Vanc. restarted for positive sputum Cx, low grade fever and unexplained tachycardia yesterday although pt remains without complaints. Pt has history of tachycardia on chart review. Pt will be stepped down today.       Interval history: please see hospital course.    Past Medical History:   Diagnosis Date    Anemia     Arnold-Chiari malformation, type II     Encounter for blood transfusion     Hydrocephalus     Neurogenic bladder     self catheterizes every 3 hours    Seizures     only w/ shunt malfunction    Spinal bifida, closed     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     Past Surgical History:   Procedure Laterality Date    AMPUTATION      right 4th and 5th toes; left 5th toe and portion of left great toe    BACK SURGERY      BLADDER SURGERY      BREAST SURGERY  2015    Reduction    COLON SURGERY      flush site for bowel movements from appendix    CYSTOSTOMY      DEBRIDEMENT OF FOOT Right 1/8/2021    Procedure: DEBRIDEMENT, FOOT;  Surgeon: Abdi Bedoya DPM;  Location: [facility];  Service: Podiatry;  Laterality: Right;    EYE SURGERY      x 5    FLUOROSCOPIC URODYNAMIC STUDY N/A 3/12/2019    Procedure: URODYNAMIC STUDY, FLUOROSCOPIC;  Surgeon: Kenzie Charles MD;  Location: [address];  Service: Urology;  Laterality: N/A;  1 hour    FLUOROSCOPIC URODYNAMIC STUDY N/A 4/4/2019    Procedure: URODYNAMIC STUDY, FLUOROSCOPIC;  Surgeon: Kenize Charles MD;  Location: [address];  Service: Urology;  Laterality: N/A;  1 hour    FLUOROSCOPIC URODYNAMIC STUDY N/A 5/11/2022    Procedure: URODYNAMIC STUDY, FLUOROSCOPIC;  Surgeon: Kenzie Charles MD;  Location: [address];  Service: Urology;  Laterality: N/A;  90min    FOOT AMPUTATION THROUGH METATARSAL Right 10/28/2019    Procedure: AMPUTATION, FOOT, TRANSMETATARSAL;  Surgeon: Abdi Bedoya DPM;  Location: [facility];   Service: Podiatry;  Laterality: Right;    FOOT AMPUTATION THROUGH METATARSAL Right 3/27/2020    Procedure: AMPUTATION, FOOT, TRANSMETATARSAL;  Surgeon: Abdi Bedoya DPM;  Location: [facility];  Service: Podiatry;  Laterality: Right;  REVISION    MYELOMININGOCELE REPAIR      NEPHRECTOMY Right 11/4/2021    Procedure: Nephrectomy/ OPEN;  Surgeon: Dash Rosenthal MD;  Location: [facility] OR 2ND FLR;  Service: Urology;  Laterality: Right;  4HRS    NEPHROSTOMY Right 8/2/2021    Procedure: Nephrostomy and perinephric abscess drain;  Surgeon: Lillian Surgeon;  Location: [facility];  Service: Anesthesiology;  Laterality: Right;    REVISION OF VENTRICULOPERITONEAL SHUNT Left 9/21/2022    Procedure: REVISION, SHUNT, VENTRICULOPERITONEAL;  Surgeon: Maynor Calero MD;  Location: [facility] OR 2ND FLR;  Service: Neurosurgery;  Laterality: Left;    STRABISMUS SURGERY      ou dr peña    VENTRICULOPERITONEAL SHUNT      WOUND DEBRIDEMENT  3/27/2020    Procedure: DEBRIDEMENT, WOUND;  Surgeon: Abdi Bedoya DPM;  Location: [facility];  Service: Podiatry;;      No current facility-administered medications on file prior to encounter.     Current Outpatient Medications on File Prior to Encounter   Medication Sig Dispense Refill    acetaminophen (TYLENOL) 500 MG tablet Take 1,000 mg by mouth every 6 (six) hours as needed for Pain.      cephALEXin (KEFLEX) 500 MG capsule Take 1 capsule (500 mg total) by mouth every evening. Start after you complete the cipro. (Patient not taking: No sig reported) 30 capsule 0    collagenase (SANTYL) ointment Apply topically once daily. (Patient not taking: No sig reported)  0    ferrous sulfate (FEOSOL) 325 mg (65 mg iron) Tab tablet Take 325 mg by mouth 2 (two) times daily.      ibuprofen (ADVIL,MOTRIN) 200 MG tablet Take 200 mg by mouth every 6 (six) hours as needed for Pain.      levETIRAcetam (KEPPRA) 500 MG Tab Take 1 tablet (500 mg total) by mouth 2 (two) times daily. 60 tablet 11       Allergies: Latex, natural rubber; Zofran [ondansetron hcl (pf)]; Gardasil  [h papillomavirus vac,qval (pf)]; Menactra  [mening vac a,c,y,w135 dip (pf)]; Nitrofurantoin; Sulfa (sulfonamide antibiotics); Tramadol; Levaquin [levofloxacin]; and Macrobid [nitrofurantoin monohyd/m-cryst]    Family History   Problem Relation Age of Onset    Breast cancer Mother     Gout Father     Cataracts Maternal Grandmother     Myocarditis Sister     Amblyopia Neg Hx     Blindness Neg Hx     Cancer Neg Hx     Diabetes Neg Hx     Glaucoma Neg Hx     Hypertension Neg Hx     Macular degeneration Neg Hx     Retinal detachment Neg Hx     Strabismus Neg Hx     Stroke Neg Hx     Thyroid disease Neg Hx      Social History     Tobacco Use    Smoking status: Never    Smokeless tobacco: Never   Substance Use Topics    Alcohol use: Not Currently    Drug use: Never     Review of Systems   Constitutional:  Negative for chills and fever.   HENT:  Negative for rhinorrhea and sore throat.    Respiratory:  Negative for cough and shortness of breath.    Cardiovascular:  Negative for chest pain and leg swelling.   Gastrointestinal:  Positive for vomiting (1 episode overnight). Negative for abdominal pain, diarrhea and nausea.   Genitourinary:  Negative for dysuria and hematuria.   Musculoskeletal:  Negative for back pain and neck pain.   Skin:  Negative for rash and wound.   Neurological:  Negative for seizures and headaches.   Psychiatric/Behavioral:  Negative for agitation and behavioral problems.    Objective:     Vitals:    Temp: 98.7 °F (37.1 °C)  Pulse: (!) 126  Rhythm: sinus tachycardia  BP: 138/85  MAP (mmHg): 104  SpO2: 99 %  O2 Device (Oxygen Therapy): room air    Temp  Min: 98.2 °F (36.8 °C)  Max: 100 °F (37.8 °C)  Pulse  Min: 122  Max: 143  BP  Min: 111/70  Max: 145/94  MAP (mmHg)  Min: 83  Max: 112  Resp  Min: 15  Max: 29  SpO2  Min: 96 %  Max: 100 %  Oxygen Concentration (%)  Min: 21  Max: 30    09/22 0701 - 09/23  0700  In: 2413.9 [I.V.:1942.8]  Out: 1830 [Urine:1830]   Unmeasured Output  Stool Occurrence: 2  Pad Count: 3       Physical Exam  Vitals and nursing note reviewed.   Constitutional:       General: She is not in acute distress.     Appearance: She is not ill-appearing.      Comments: Patient sitting up at bed eating breakfast.    HENT:      Head:      Comments: L skull healing scar w/  shunt, shaved temporal area. No drainage or erythema.      Nose: Nose normal.      Mouth/Throat:      Pharynx: Oropharynx is clear.   Cardiovascular:      Rate and Rhythm: Regular rhythm. Tachycardia present.      Pulses: Normal pulses.      Heart sounds: Normal heart sounds.   Pulmonary:      Breath sounds: Normal breath sounds.   Abdominal:      General: Bowel sounds are normal.      Palpations: Abdomen is soft.   Skin:     General: Skin is warm.      Comments: Stage 2 sacral ulcer   Neurological:      Mental Status: She is alert.      Comments: CGS15  Off sedation  PERRLA  CN II-XII grossly intact.   Purposeful movement of BUE, eating at bedside.   No response to pain lowers.       Today I personally reviewed pertinent medications, lines/drains/airways, imaging, cardiology results, laboratory results, microbiology results,         Assessment/Plan:     Neuro  * Hydrocephalus  CTH at OSH with very large vents, decreased grey white, compated to only other CTH in system 02/2015.  Shunt present  NSGY consulted to eval/tap shunt - 25 cc removed  CSF CX WNL     shunt revised 9/21 - improvement on brain imaging and exam     [place] coma scale total score 3-8  IMPROVED     R/o seizure - EEG normal  NSGY to Tap shunt and eval shunt revision to correct hydrocephalus    valve repaired with improvement in mental status  Pt now at neuro baseline.     Status epilepticus  Hx is not clear after speaking with father.   Patient was on Keppra Years ago, it was stopped for a period and then restarted recently. Father was unsure exactly why. No  head imaging in computer recently to correlate with Keppra being restarted. Pt was on 500 mg BID previously.   Patient having generalized tonic clonic seizures without returning to baseline, taken to OSH ER and give Ativan (unknown amount as notes are not clear 4-8mg) there may have even been versed given as well. The patient was intubated and propofol was started at very low dose, then titrated upward. Versed gtt may have even been given at some point.     Patient arrived, no clinical seizures noted. Propofol at 40mcg/kg/min and EEG cap placed. No epileptiform seizure per epilepsy Dr. Rivers.   Keppra continued from OSH, 750mg bid   We recommend 750mg bid and patient to follow up with outpatient neurologist upon dc.     Pulmonary  Positive culture findings in sputum  Sputum Cx grew Staph aureus. Pt also became febrile yesterday when abx were briefly discontinued. Vancomycin restarted. CXR WNL. She remains on room air with normal pulse O2.     Acute respiratory failure  Intubated at OSH during status epilepticus for airway protection  Wean vent as able  Daily CXR and ABG    Pt awake and gagging on tube 9/22 will extubate today. Actively weaning sedation.   Respiratory Cx grew staph aureus. Pt had been persistently tachycardic and with low grade fever since abx dc this morning. Will add vancomycin back on.    Cardiac/Vascular  Sinus tachycardia  Pt has been tachycardic overnight but awake and alert without convulsions so doubt seizure activity. May be related to agitation as she is awake and still intubated. Will re-evaluate after extubation.     - CXR pending  - EKG pending  - will recheck UA and UCx for persistent infection, she received 3 days abx, but dc on 9/22- pt with low grade fever 9/22   - 9/22 afternoon respiratory Cx grew staph a. Given fever today with tachycardia that has persisted will add back on vancomycin.   - 9/23 pt remained tachycardic overnight, but without symptoms or complaints. On chart review  of patient's vitals recorded in [facility], pt is baseline tachycardic (100s) since especially since 2020. She is not missing any home medications that could explain the tachycardia.     Renal/  Acute cystitis without hematuria  UA with concern for UTI, broad spec abx continued  Pan CX  No growth in urine culture- abx dc 9/22    Pt tachycardic and low grade fevers starting 9/22. Will obtain straight cath UA for repeat.   Patient does self cath through umbilicus suprapubic cath. Hernandez has been removed 9/22  Repeat UA WNL. No growth on Cx.     Neurogenic bladder  Pt self caths through suprapubic ostomy.     Orthopedic  Sacral decubitus ulcer  Stage 2. CT without concern for deeper infection.     - wound care         The patient is being Prophylaxed for:  Venous Thromboembolism with: Chemical  Stress Ulcer with: Not Applicable   Ventilator Pneumonia with: not applicable    Activity Orders          Diet Adult Regular (IDDSI Level 7): Regular starting at 09/22 1120    Turn patient starting at 09/21 0400    Elevate HOB starting at 09/21 0241        Full Code    Sara Chavez MD  Neurocritical Care  [facility]

## 2022-09-24 VITALS
WEIGHT: 127 LBS | SYSTOLIC BLOOD PRESSURE: 129 MMHG | RESPIRATION RATE: 18 BRPM | BODY MASS INDEX: 28.57 KG/M2 | OXYGEN SATURATION: 98 % | HEIGHT: 56 IN | TEMPERATURE: 99 F | HEART RATE: 97 BPM | DIASTOLIC BLOOD PRESSURE: 89 MMHG

## 2022-09-24 LAB
ALBUMIN SERPL BCP-MCNC: 3.7 G/DL (ref 3.5–5.2)
ALP SERPL-CCNC: 70 U/L (ref 55–135)
ALT SERPL W/O P-5'-P-CCNC: 19 U/L (ref 10–44)
ANION GAP SERPL CALC-SCNC: 7 MMOL/L (ref 8–16)
AST SERPL-CCNC: 15 U/L (ref 10–40)
BASOPHILS # BLD AUTO: 0.01 K/UL (ref 0–0.2)
BASOPHILS NFR BLD: 0.1 % (ref 0–1.9)
BILIRUB SERPL-MCNC: 0.3 MG/DL (ref 0.1–1)
BUN SERPL-MCNC: 10 MG/DL (ref 6–20)
CALCIUM SERPL-MCNC: 8.9 MG/DL (ref 8.7–10.5)
CHLORIDE SERPL-SCNC: 110 MMOL/L (ref 95–110)
CO2 SERPL-SCNC: 20 MMOL/L (ref 23–29)
CREAT SERPL-MCNC: 0.5 MG/DL (ref 0.5–1.4)
DIFFERENTIAL METHOD: ABNORMAL
EOSINOPHIL # BLD AUTO: 0.2 K/UL (ref 0–0.5)
EOSINOPHIL NFR BLD: 2.3 % (ref 0–8)
ERYTHROCYTE [DISTWIDTH] IN BLOOD BY AUTOMATED COUNT: 14.9 % (ref 11.5–14.5)
EST. GFR  (NO RACE VARIABLE): >60 ML/MIN/1.73 M^2
GLUCOSE SERPL-MCNC: 88 MG/DL (ref 70–110)
HCT VFR BLD AUTO: 39.1 % (ref 37–48.5)
HGB BLD-MCNC: 12.5 G/DL (ref 12–16)
IMM GRANULOCYTES # BLD AUTO: 0.04 K/UL (ref 0–0.04)
IMM GRANULOCYTES NFR BLD AUTO: 0.4 % (ref 0–0.5)
LYMPHOCYTES # BLD AUTO: 2.3 K/UL (ref 1–4.8)
LYMPHOCYTES NFR BLD: 24.6 % (ref 18–48)
MAGNESIUM SERPL-MCNC: 2 MG/DL (ref 1.6–2.6)
MCH RBC QN AUTO: 28.3 PG (ref 27–31)
MCHC RBC AUTO-ENTMCNC: 32 G/DL (ref 32–36)
MCV RBC AUTO: 89 FL (ref 82–98)
MONOCYTES # BLD AUTO: 0.8 K/UL (ref 0.3–1)
MONOCYTES NFR BLD: 8.3 % (ref 4–15)
NEUTROPHILS # BLD AUTO: 6 K/UL (ref 1.8–7.7)
NEUTROPHILS NFR BLD: 64.3 % (ref 38–73)
NRBC BLD-RTO: 0 /100 WBC
PHOSPHATE SERPL-MCNC: 2.5 MG/DL (ref 2.7–4.5)
PLATELET # BLD AUTO: 311 K/UL (ref 150–450)
PMV BLD AUTO: 9.9 FL (ref 9.2–12.9)
POTASSIUM SERPL-SCNC: 3.7 MMOL/L (ref 3.5–5.1)
PROT SERPL-MCNC: 7.2 G/DL (ref 6–8.4)
RBC # BLD AUTO: 4.42 M/UL (ref 4–5.4)
SODIUM SERPL-SCNC: 137 MMOL/L (ref 136–145)
WBC # BLD AUTO: 9.28 K/UL (ref 3.9–12.7)

## 2022-09-24 PROCEDURE — 25000003 PHARM REV CODE 250: Performed by: PSYCHIATRY & NEUROLOGY

## 2022-09-24 PROCEDURE — 94761 N-INVAS EAR/PLS OXIMETRY MLT: CPT

## 2022-09-24 PROCEDURE — 99024 POSTOP FOLLOW-UP VISIT: CPT | Mod: ,,, | Performed by: NEUROLOGICAL SURGERY

## 2022-09-24 PROCEDURE — 63600175 PHARM REV CODE 636 W HCPCS: Performed by: PSYCHIATRY & NEUROLOGY

## 2022-09-24 PROCEDURE — 36415 COLL VENOUS BLD VENIPUNCTURE: CPT | Performed by: NURSE PRACTITIONER

## 2022-09-24 PROCEDURE — 80053 COMPREHEN METABOLIC PANEL: CPT | Performed by: NURSE PRACTITIONER

## 2022-09-24 PROCEDURE — 85025 COMPLETE CBC W/AUTO DIFF WBC: CPT | Performed by: NURSE PRACTITIONER

## 2022-09-24 PROCEDURE — 25000003 PHARM REV CODE 250: Performed by: NURSE PRACTITIONER

## 2022-09-24 PROCEDURE — 99024 PR POST-OP FOLLOW-UP VISIT: ICD-10-PCS | Mod: ,,, | Performed by: NEUROLOGICAL SURGERY

## 2022-09-24 PROCEDURE — 83735 ASSAY OF MAGNESIUM: CPT | Performed by: NURSE PRACTITIONER

## 2022-09-24 PROCEDURE — 84100 ASSAY OF PHOSPHORUS: CPT | Performed by: NURSE PRACTITIONER

## 2022-09-24 RX ORDER — LEVETIRACETAM 500 MG/1
750 TABLET ORAL 2 TIMES DAILY
Qty: 90 TABLET | Refills: 11 | Status: SHIPPED | OUTPATIENT
Start: 2022-09-24 | End: 2022-09-28 | Stop reason: CLARIF

## 2022-09-24 RX ORDER — CEPHALEXIN 500 MG/1
500 CAPSULE ORAL EVERY 6 HOURS
Qty: 20 CAPSULE | Refills: 0 | Status: SHIPPED | OUTPATIENT
Start: 2022-09-24 | End: 2022-09-29

## 2022-09-24 RX ADMIN — ACETAMINOPHEN 650 MG: 325 TABLET ORAL at 11:09

## 2022-09-24 RX ADMIN — ACETAMINOPHEN 650 MG: 325 TABLET ORAL at 12:09

## 2022-09-24 RX ADMIN — VANCOMYCIN HYDROCHLORIDE 1000 MG: 1 INJECTION, POWDER, LYOPHILIZED, FOR SOLUTION INTRAVENOUS at 04:09

## 2022-09-24 RX ADMIN — LEVETIRACETAM 750 MG: 750 TABLET, FILM COATED ORAL at 08:09

## 2022-09-24 RX ADMIN — HEPARIN SODIUM 5000 UNITS: 5000 INJECTION INTRAVENOUS; SUBCUTANEOUS at 05:09

## 2022-09-24 RX ADMIN — HEPARIN SODIUM 5000 UNITS: 5000 INJECTION INTRAVENOUS; SUBCUTANEOUS at 01:09

## 2022-09-24 NOTE — NURSING
Paged team and spoke to Austen Short, I informed Dr. Boyce that  pt has vancomycin 1 gram/250 ml IVPB, vancomycin was started @ 0454 and stopped @ 5.15am .  Pt's IV blew off and is difficult to place a PIV.  Called Anaesthesiologist and he informed me that he has some cases that he is dealing with in the ED and he will come and place one, ICU Charge Nurse was busy taking reports.    Will consult Midline team to place PIV.  Austen Banda was informed about consulting the Midline Team.

## 2022-09-24 NOTE — DISCHARGE INSTRUCTIONS
--Patient stable for discharge to home    --Please take prescriptions as detailed in medication list    --All questions/concerns addressed and answered    --Please followup with neurosurgery clinic in 2 weeks for wound check; to be arranged by Neurosurgery Clinic     --Please call immediately for any new onset nausea/vomiting/fever/chills, wound breakdown, numbness/tingling/weakness    Wound Care Instructions:  -If you have any dressings at discharge, please remove 48 hours after the surgery.  -If you have steri strips, do not remove, as they will fall off.  -If you have staples, do not remove, as they will be removed at clinic follow up.  -You may shower daily but do not soak or submerge wound in water.  -Scalp/head incisions, wash hair daily with baby shampoo and do not use hair products. Pat incision dry, do not rub.  -For head incisions, do not wear scarfs or hats.  -Keep all wounds clean, dry, and open to air.  -Do not apply creams or ointments to the wound.  -No driving while on narcotic pain medications  -Call Neurosurgery if the wound opens, drains, or becomes red

## 2022-09-24 NOTE — PROGRESS NOTES
[facility] ([facility]  Neurosurgery  Progress Note    Subjective:     History of Present Illness: Patient is a 27F with PMH of spina bifida (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), Chiari Malformation, VPS, seizure disorder, and neurogenic bladder with UTIs who presents after seizures leading to status epilepticus. Patient was found in bathroom by parents slumped over in wheelchair, uncertain if she hit her head. She had seizures and went into status epilepticus in the ED, needing to be intubated and sedated. At baseline, patient is wheelchair bound, but alert/oriented and catheterizes herself as necessary. NSGY is consulted for hydrocephalus.    Previous shunt failures she has had have lead to nausea/vomiting per father. Her last revision was on 3/19/2012 with Dr. Calero. She has one old right VPS catheter still in place. Her left sided VPS is newer and the revised VPS. Of note, patient had Newman rods put in for scoliosis treatment, of which the left is broken. Patient was last seen in clinic in 2014.      Post-Op Info:  Procedure(s) (LRB):  REVISION, SHUNT, VENTRICULOPERITONEAL (Left)   3 Days Post-Op     Interval history 9/24: extubated, no further seizures, doing well neurologically.  Lost IV access overnight.     Objective:     Weight: 57.6 kg (126 lb 15.8 oz)  Body mass index is 28.47 kg/m².  Vital Signs (Most Recent):  Temp: 98.6 °F (37 °C) (09/24/22 0736)  Pulse: 85 (09/24/22 0736)  Resp: 18 (09/24/22 0736)  BP: (!) 146/94 (09/24/22 0736)  SpO2: 99 % (09/24/22 0736)   Vital Signs (24h Range):  Temp:  [98.2 °F (36.8 °C)-99.6 °F (37.6 °C)] 98.6 °F (37 °C)  Pulse:  [] 85  Resp:  [15-20] 18  SpO2:  [96 %-100 %] 99 %  BP: (122-149)/(74-94) 146/94                  Resp Rate Total:  [16 br/min] 16 br/min         NG/OG Tube 09/20/22 2052 orogastric 16 Fr. Center mouth (Active)            Colostomy Other (see comments) RLQ (Active)            Urethral Catheter 09/21/22 0030 Non-latex;Temperature probe (Active)    Output (mL) 1100 mL 09/21/22 0200       Physical Exam  General: awake  Head:  shunt dressing in place, c/d/i  Eyes: Pupils equal, EOMI  Neck: Supple, normal ROM, no tenderness to palpation  CVS: Normal rate and rhythm  Pulm: No respiratory distress  GI: Abdomen nondistended, nontender  Skin: Dry, intact      Neurosurgery Physical Exam  GCS 15  AOx3  PERRL, EOMI  5/5 BUE  No movement BLE (baseline)    Significant Labs:  Recent Labs   Lab 09/23/22  0236 09/24/22  0630    88    137   K 3.7 3.7   * 110   CO2 14* 20*   BUN 9 10   CREATININE 0.5 0.5   CALCIUM 8.1* 8.9   MG 2.2 2.0       Recent Labs   Lab 09/23/22  0236 09/24/22  0630   WBC 13.88* 9.28   HGB 11.8* 12.5   HCT 38.9 39.1    311       No results for input(s): LABPT, INR, APTT in the last 48 hours.    Microbiology Results (last 7 days)       Procedure Component Value Units Date/Time    CSF culture [[id number]] Collected: 09/21/22 0330    Order Status: Completed Specimen: CSF (Spinal Fluid) from CSF Shunt Updated: 09/24/22 0658     CSF CULTURE No Growth to date     Gram Stain Result No WBC's      No organisms seen    Blood culture [[id number]] Collected: 09/21/22 0423    Order Status: Completed Specimen: Blood from Peripheral, Upper Arm, Left Updated: 09/24/22 0612     Blood Culture, Routine No Growth to date      No Growth to date      No Growth to date      No Growth to date    Narrative:      Blood cultures x 2 different sites. 4 bottles total. Please  draw cultures before administering antibiotics.    Blood culture [[id number]] Collected: 09/21/22 0435    Order Status: Completed Specimen: Blood from Peripheral, Forearm, Left Updated: 09/24/22 0612     Blood Culture, Routine No Growth to date      No Growth to date      No Growth to date      No Growth to date    Narrative:      Blood cultures from 2 different sites. 4 bottles total.  Please draw before starting antibiotics.    Culture, Respiratory with Gram Stain [[id number]]   (Abnormal)  (Susceptibility) Collected: 09/21/22 0522    Order Status: Completed Specimen: Respiratory from Endotracheal Aspirate Updated: 09/23/22 1054     Respiratory Culture No Pseudomonas isolated.     Comment: Previous comment was modified by [email or web address] at 10:54 on 09/23/2022 No S   aureus or Pseudomonas isolated.           METHICILLIN RESISTANT STAPHYLOCOCCUS AUREUS  Moderate  Normal respiratory macy also present        YEAST   Few  Further identified as Pichia kudriavzevii       Gram Stain (Respiratory) Rare WBC's     Gram Stain (Respiratory) No organisms seen    Narrative:      Mini-BAL.          All pertinent labs from the last 24 hours have been reviewed.    Significant Diagnostics:  Echoencephalography: No results found in the last 24 hours.  I have reviewed all pertinent imaging results/findings within the past 24 hours.  Review of Systems    Assessment/Plan:     * Hydrocephalus  Patient is a 27F with PMH of spina bifida (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), Chiari Malformation, VPS, seizure disorder, and neurogenic bladder with UTIs who presents after seizures leading to status epilepticus. NSGY is consulted for hydrocephalus.    S/p High volume shunt tap 9/21 - 25cc CSF drained  S/p VPS valve replacement on 9/21      Doing well on floor. Potentially home today with oral UTI abx  Vs picc line and IV abx     --q4 hour neurochecks.   --All diagnostic imaging reviewed     --CTH 9/20 showing increased hydrocephalus, dilatation of the lateral ventricles     --CT abdomen 9/241: no pseudocyst     --CTH post op 9/22: stable dilatation of ventricular system,      --XR skull: strata II at 1.5  --CSF studies 9/21: R143, W7, P8, G77, No orgs     --Continue to monitor clinically, notify NSGY immediately with any changes in neuro status  --PT/OT  --OK for SQH    --Dispo: TTF, PT/OT, dispo        Prakash Alvarado MD  Neurosurgery  [facility] - Neurosurgery ([facility])

## 2022-09-24 NOTE — CONSULTS
Seen pt and spoke with nurse. Pt d/t possibly be discharged today on oral abx and has adequate access at this time. Re consult if needed.

## 2022-09-24 NOTE — PLAN OF CARE
Problem: Adult Inpatient Plan of Care  Goal: Plan of Care Review  Outcome: Ongoing, Progressing  Flowsheets (Taken 9/24/2022 0419)  Plan of Care Reviewed With: patient     Problem: Impaired Wound Healing  Goal: Optimal Wound Healing  Outcome: Ongoing, Progressing  Intervention: Promote Wound Healing  Flowsheets (Taken 9/24/2022 0419)  Oral Nutrition Promotion: safe use of adaptive equipment encouraged  Sleep/Rest Enhancement:   noise level reduced   family presence promoted  Pain Management Interventions: medication offered     Problem: Infection  Goal: Absence of Infection Signs and Symptoms  Outcome: Ongoing, Progressing  Intervention: Prevent or Manage Infection  Flowsheets (Taken 9/24/2022 0419)  Infection Management: aseptic technique maintained     Problem: Fall Injury Risk  Goal: Absence of Fall and Fall-Related Injury  Outcome: Ongoing, Progressing  Intervention: Promote Injury-Free Environment  Flowsheets (Taken 9/24/2022 0419)  Safety Promotion/Fall Prevention:   bed alarm set   Fall Risk reviewed with patient/family   Fall Risk signage in place   medications reviewed   lighting adjusted   supervised activity   instructed to call staff for mobility   POC reviewed with pt at the bedside, verbalized understanding of POC.  No neuro changes, wound care performed,  frequent weight shifting encouraged, had low grade temp, tylenol 650 mg PO administered, self cath, output good, hygiene promoted.  Continues on ABTs, no event, seizure precautions maintained.  Safety and aspiration precautions maintained.  Call light within reach.

## 2022-09-24 NOTE — DISCHARGE SUMMARY
[facility] - Neurosurgery ([facility])  Neurosurgery  Discharge Summary      Patient Name: Shashank Samuel  MRN: [id number]  Admission Date: 9/21/2022  Hospital Length of Stay: 3 days  Discharge Date and Time:  09/24/2022 1:31 PM  Attending Physician: Maynor Calero MD   Discharging Provider: Warren Blanca MD  Primary Care Provider: Verito Scott NP    HPI:   Patient is a 27F with PMH of spina bifida (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), Chiari Malformation, VPS, seizure disorder, and neurogenic bladder with UTIs who presents after seizures leading to status epilepticus. Patient was found in bathroom by parents slumped over in wheelchair, uncertain if she hit her head. She had seizures and went into status epilepticus in the ED, needing to be intubated and sedated. At baseline, patient is wheelchair bound, but alert/oriented and catheterizes herself as necessary. NSGY is consulted for hydrocephalus.    Previous shunt failures she has had have lead to nausea/vomiting per father. Her last revision was on 3/19/2012 with Dr. Calero. She has one old right VPS catheter still in place. Her left sided VPS is newer and the revised VPS. Of note, patient had Newman rods put in for scoliosis treatment, of which the left is broken. Patient was last seen in clinic in 2014.      Procedure(s) (LRB):  REVISION, SHUNT, VENTRICULOPERITONEAL (Left)     Hospital Course: 9/22: NAEON. Off EEG now. CTH today was stable. Shunt setting could not be visualize on XRSS. Repeat skull xray today.  9/23: extubated, back to neuro baseline, no further seizures.     Patient was admitted for status epilepticus and wa found to have hydrocephalus from  shunt malfunction on 9/21/2022 and underwent  shunt revision where the valve was replaced after found to be not functioning on 9/21/2022 without perioperative complications. The patient remained on appropriate DVT prophylaxis during the course of admission. At the time of discharge, the patient was tolerating PO  intake without N/V, dysphagia, denied bowel or bladder dysfunction, denied new neurological symptoms, and reported pain controlled with current regimen. The surgical site was without evidence of drainage, breakdown or infection and all sutures were intact. The patient will follow up in clinic as indicated in discharge instructions. All questions were answered and continued treatment/wound care instructions were discussed in detail prior to discharge.      Goals of Care Treatment Preferences:  Code Status: Full Code      Consults:   Consults (From admission, onward)        Status Ordering Provider     Inpatient consult to Midline team  Once        Provider:  (Not yet assigned)    Completed DWAIN STOUT     Pharmacy to dose Vancomycin consult  Once        Provider:  (Not yet assigned)   See Hyperspace for full Linked Orders Report.    Acknowledged SAVANNAH MCGRAW     Pharmacy to dose Vancomycin consult  Once        Provider:  (Not yet assigned)   See Hyperspace for full Linked Orders Report.    Completed KARMA TIPTON     Inpatient consult to Physical Medicine Rehab  Once        Provider:  (Not yet assigned)    Completed KARMA TIPTON     Inpatient consult to Registered Dietitian/Nutritionist  Once        Provider:  (Not yet assigned)    Completed KARMA TIPTON     IP consult to case management/social work  Once        Provider:  (Not yet assigned)    Acknowledged KARMA TIPTON     Inpatient consult to Registered Dietitian/Nutritionist  Once        Provider:  (Not yet assigned)    Completed MAIRA CHAKRABORTY            Pending Diagnostic Studies:     None        Final Active Diagnoses:    Diagnosis Date Noted POA    PRINCIPAL PROBLEM:  Hydrocephalus [G91.9]  Unknown    Positive culture findings in sputum [R84.5] 09/23/2022 Unknown    Status epilepticus [G40.901] 09/21/2022 Yes    Acute cystitis without hematuria [N30.00] 09/21/2022 Yes    Acute respiratory failure [J96.00] 09/21/2022 Yes     Anthony coma scale total score 3-8 [R40.2430] 09/21/2022 Yes    Sinus tachycardia [R00.0] 11/08/2021 Yes    Sacral decubitus ulcer [L89.159] 05/16/2016 Yes    Neurogenic bladder [N31.9]  Yes      Problems Resolved During this Admission:      Discharged Condition: good     Disposition: Home or Self Care    Follow Up:   Follow-up Information     Maynor Calero MD Follow up in 2 week(s).    Specialty: Neurosurgery  Why: For wound re-check  Contact information:  [address]  [facility] [address]  [phone]                       Patient Instructions:      Notify your health care provider if you experience any of the following:  temperature >100.4     Notify your health care provider if you experience any of the following:  persistent nausea and vomiting or diarrhea     Notify your health care provider if you experience any of the following:  severe uncontrolled pain     Notify your health care provider if you experience any of the following:  redness, tenderness, or signs of infection (pain, swelling, redness, odor or green/yellow discharge around incision site)     Notify your health care provider if you experience any of the following:  difficulty breathing or increased cough     Notify your health care provider if you experience any of the following:  severe persistent headache     Notify your health care provider if you experience any of the following:  worsening rash     Notify your health care provider if you experience any of the following:  persistent dizziness, light-headedness, or visual disturbances     Notify your health care provider if you experience any of the following:  increased confusion or weakness     Activity as tolerated     Medications:  Reconciled Home Medications:      Medication List      CHANGE how you take these medications    cephALEXin 500 MG capsule  Commonly known as: KEFLEX  Take 1 capsule (500 mg total) by mouth every 6 (six) hours. for 5 days  What changed:   · when to take  this  · additional instructions     levETIRAcetam 500 MG Tab  Commonly known as: KEPPRA  Take 1.5 tablets (750 mg total) by mouth 2 (two) times daily.  What changed: how much to take        CONTINUE taking these medications    acetaminophen 500 MG tablet  Commonly known as: TYLENOL  Take 1,000 mg by mouth every 6 (six) hours as needed for Pain.     ferrous sulfate 325 mg (65 mg iron) Tab tablet  Commonly known as: FEOSOL  Take 325 mg by mouth 2 (two) times daily.     ibuprofen 200 MG tablet  Commonly known as: ADVIL,MOTRIN  Take 200 mg by mouth every 6 (six) hours as needed for Pain.        STOP taking these medications    collagenase ointment  Commonly known as: MACIE Blanca MD  Neurosurgery  [facility] - Neurosurgery ([facility])

## 2022-09-24 NOTE — ASSESSMENT & PLAN NOTE
"27F PMHx spina bifida with baseline paralysis and wheelchair bound, Chiari type 2 malformation s/p VPS, neurogenic bladder c/b UTIs, seizure disorder on Levetiracetam 500 mg BID who initially presented to OSH after being found unresponsive at home, reported to be "slumped over in bathroom." After arriving to ER, patient had GTCs without return to baseline. She received Lorazepam and Levetiracetam. Patient was intubated for airway protection and sedated on Propofol. CTH concerning for shunt malfunction and new hydrocephalus. Patient transferred to [facility] for Neurosurgery evaluation and admitted to [facility] for higher level of care. Patient is now s/p left shunt revision by Dr. Kwok and Dr. Calero on 9/21. EEG placed 9/22; Epilepsy following for assistance in management.    Recommendations:  - Continuous vEEG monitoring  - Recommend to continue Levetiracetam 750 mg BID  - Currently on Propofol 40 mKm  - Avoid agents that lower seizure threshold  - Management of infectious/metabolic abnormalities per NCC  - Seizure precautions      Discussed plan of care with NCC team. Will follow, please call with questions.  "
- Intubated and sedated  - Vent management per NCC  
CTH at OSH with very large vents, decreased grey white, compated to only other CTH in system 02/2015.  Shunt present  NSGY consulted to eval/tap shunt  CSF CX sent  Shunt series performed prior to transfer    
CTH at OSH with very large vents, decreased grey white, compated to only other CTH in system 02/2015.  Shunt present  NSGY consulted to eval/tap shunt - 25 cc removed  CSF CX WNL     shunt revised 9/21 - improvement on brain imaging and exam   
CTH at OSH with very large vents, decreased grey white, compated to only other CTH in system 02/2015.  Shunt present  NSGY consulted to eval/tap shunt - 25 cc removed  CSF CX WNL     shunt revised 9/21 - improvement on brain imaging and exam   
Hx is not clear after speaking with father.   Patient was on Keppra Years ago, it was stopped for a period and then restarted recently. Father was unsure exactly why. No head imaging in computer recently to correlate with Keppra being restarted.   Patient having generalized tonic clonic seizures without returning to baseline, taken to OSH ER and give Ativan (unknown amount as notes are not clear 4-8mg) there may have even been versed given as well. The patient was intubated and propofol was started at very low dose, then titrated upward. Versed gtt may have even been given at some point.     Patient arrived, no clinical seizures noted. Propofol at 40mcg/kg/min and EEG cap placed. No epileptiform seizure per epilepsy Dr. Rivers.   Keppra continued from OSH, 750mg bid   
Hx is not clear after speaking with father.   Patient was on Keppra Years ago, it was stopped for a period and then restarted recently. Father was unsure exactly why. No head imaging in computer recently to correlate with Keppra being restarted.   Patient having generalized tonic clonic seizures without returning to baseline, taken to OSH ER and give Ativan (unknown amount as notes are not clear 4-8mg) there may have even been versed given as well. The patient was intubated and propofol was started at very low dose, then titrated upward. Versed gtt may have even been given at some point.     Patient arrived, no clinical seizures noted. Propofol at 40mcg/kg/min and EEG cap placed. No epileptiform seizure per epilepsy Dr. Rivers.   Keppra continued from OSH, 750mg bid   
Hx is not clear after speaking with father.   Patient was on Keppra Years ago, it was stopped for a period and then restarted recently. Father was unsure exactly why. No head imaging in computer recently to correlate with Keppra being restarted. Pt was on 500 mg BID previously.   Patient having generalized tonic clonic seizures without returning to baseline, taken to OSH ER and give Ativan (unknown amount as notes are not clear 4-8mg) there may have even been versed given as well. The patient was intubated and propofol was started at very low dose, then titrated upward. Versed gtt may have even been given at some point.     Patient arrived, no clinical seizures noted. Propofol at 40mcg/kg/min and EEG cap placed. No epileptiform seizure per epilepsy Dr. Rivers.   Keppra continued from OSH, 750mg bid   We recommend 750mg bid and patient to follow up with outpatient neurologist upon dc.   
Hx of Chiari type 2 malformation s/p VPS  - CTH @OSH concerning for shunt malfunction and new hydrocephalus  - Now s/p left shunt revision by Dr. Kwok and Dr. Calero on 9/21  - Management per NSGY, NCC  
IMPROVED     R/o seizure - EEG normal  NSGY to Tap shunt and eval shunt revision to correct hydrocephalus    valve repaired with improvement in mental status     
IMPROVED     R/o seizure - EEG normal  NSGY to Tap shunt and eval shunt revision to correct hydrocephalus    valve repaired with improvement in mental status  Pt now at neuro baseline.   
Intubated at OSH during status epilepticus for airway protection  Wean vent as able  Daily CXR and ABG    Pt awake and gagging on tube 9/22 will extubate today. Actively weaning sedation.   
Intubated at OSH during status epilepticus for airway protection  Wean vent as able  Daily CXR and ABG    Pt awake and gagging on tube 9/22 will extubate today. Actively weaning sedation.   Respiratory Cx grew staph aureus. Pt had been persistently tachycardic and with low grade fever since abx dc this morning. Will add vancomycin back on.  
Intubated at OSH during status epilepticus, hypoxia?  Wean vent as able  Daily CXR and ABG  
Neurogenic bladder, self caths as outpatient  - Afebrile, no leukocytosis   - No urine cx  - Blood cx NGTD  - Cefepime and Vanc d/c 9/22  - Management per NCC  
Patient is a 27F with PMH of spina bifida (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), Chiari Malformation, VPS, seizure disorder, and neurogenic bladder with UTIs who presents after seizures leading to status epilepticus. NSGY is consulted for hydrocephalus.    --Patient seen at bedside  --CTH 9/20 showing increased hydrocephalus, dilatation of the lateral ventricles  --CT abdomen 9/241: no pseudocyst  --CTH 9/22: stable dilatation of ventricular system  --Follow up repeat XRSS, could not visualize setting on XRSS today  --High volume shunt tap performed 9/21 - 25cc CSF drained  --CSF studies: R143, W7, P8, G77, No orgs  --F/u EEG  --Continue to monitor clinically, notify NSGY immediately with any changes in neuro status  
Patient is a 27F with PMH of spina bifida (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), Chiari Malformation, VPS, seizure disorder, and neurogenic bladder with UTIs who presents after seizures leading to status epilepticus. NSGY is consulted for hydrocephalus.    --Patient seen at bedside  --CTH 9/20 showing increased hydrocephalus, dilatation of the lateral ventricles  --High volume shunt tap performed - 25cc CSF drained  --F/u CSF studies   --CT abdomen to rule out pseudocyst  --F/u EEG  
Patient is a 27F with PMH of spina bifida (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), Chiari Malformation, VPS, seizure disorder, and neurogenic bladder with UTIs who presents after seizures leading to status epilepticus. NSGY is consulted for hydrocephalus.    S/p High volume shunt tap 9/21 - 25cc CSF drained  S/p VPS valve replacement on 9/21      --ICU, OK for step down to floor today  --q4 hour neurochecks.   --All diagnostic imaging reviewed     --CTH 9/20 showing increased hydrocephalus, dilatation of the lateral ventricles     --CT abdomen 9/241: no pseudocyst     --CTH post op 9/22: stable dilatation of ventricular system,      --XR skull: strata II at 1.5  --CSF studies 9/21: R143, W7, P8, G77, No orgs  --EEG discontinued, Please provide final AED recs  --Continue to monitor clinically, notify NSGY immediately with any changes in neuro status  --PT/OT  --OK for SQH    --Dispo: TTF, PT/OT, dispo  
Patient is a 27F with PMH of spina bifida (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), Chiari Malformation, VPS, seizure disorder, and neurogenic bladder with UTIs who presents after seizures leading to status epilepticus. NSGY is consulted for hydrocephalus.    S/p High volume shunt tap 9/21 - 25cc CSF drained  S/p VPS valve replacement on 9/21      Doing well on floor. Potentially home today with oral UTI abx  Vs picc line and IV abx     --q4 hour neurochecks.   --All diagnostic imaging reviewed     --CTH 9/20 showing increased hydrocephalus, dilatation of the lateral ventricles     --CT abdomen 9/241: no pseudocyst     --CTH post op 9/22: stable dilatation of ventricular system,      --XR skull: strata II at 1.5  --CSF studies 9/21: R143, W7, P8, G77, No orgs  --EEG discontinued, Please provide final AED recs  --Continue to monitor clinically, notify NSGY immediately with any changes in neuro status  --PT/OT  --OK for SQH    --Dispo: TTF, PT/OT, dispo  
Pt has been tachycardic overnight but awake and alert without convulsions so doubt seizure activity. May be related to agitation as she is awake and still intubated. Will re-evaluate after extubation.     - CXR pending  - EKG pending  - will recheck UA and UCx for persistent infection, she received 3 days abx, but dc on 9/22- pt with low grade fever 9/22   
Pt has been tachycardic overnight but awake and alert without convulsions so doubt seizure activity. May be related to agitation as she is awake and still intubated. Will re-evaluate after extubation.     - CXR pending  - EKG pending  - will recheck UA and UCx for persistent infection, she received 3 days abx, but dc on 9/22- pt with low grade fever 9/22   - 9/22 afternoon respiratory Cx grew staph a. Given fever today with tachycardia that has persisted will add back on vancomycin.   - 9/23 pt remained tachycardic overnight, but without symptoms or complaints. On chart review of patient's vitals recorded in [facility], pt is baseline tachycardic (100s) since especially since 2020. She is not missing any home medications that could explain the tachycardia.   
Pt self caths through suprapubic ostomy.   
R/o seizure   NSGy to Tap shunt and eval shunt revision to correct hydrocephalus   
Sputum Cx grew Staph aureus. Pt also became febrile yesterday when abx were briefly discontinued. Vancomycin restarted. CXR WNL. She remains on room air with normal pulse O2.   
Stage 2. CT without concern for deeper infection.     - wound care   
Stage 2. CT without concern for deeper infection.     - wound care   
UA with concern for UTI, broad spec abx continued  Pan CX    Patient does self cath through umbilicus suprapubic cath.   
UA with concern for UTI, broad spec abx continued  Pan CX  No growth in urine culture- abx dc 9/22    Pt tachycardic and low grade fevers starting 9/22. Will obtain straight cath UA for repeat.   Patient does self cath through umbilicus suprapubic cath. Hernandez has been removed 9/22  
UA with concern for UTI, broad spec abx continued  Pan CX  No growth in urine culture- abx dc 9/22    Pt tachycardic and low grade fevers starting 9/22. Will obtain straight cath UA for repeat.   Patient does self cath through umbilicus suprapubic cath. Hernandez has been removed 9/22  Repeat UA WNL. No growth on Cx.   
Patient

## 2022-09-24 NOTE — SUBJECTIVE & OBJECTIVE
Interval history 9/24: extubated, no further seizures, doing well neurologically.  Lost IV access overnight.     Objective:     Weight: 57.6 kg (126 lb 15.8 oz)  Body mass index is 28.47 kg/m².  Vital Signs (Most Recent):  Temp: 98.6 °F (37 °C) (09/24/22 0736)  Pulse: 85 (09/24/22 0736)  Resp: 18 (09/24/22 0736)  BP: (!) 146/94 (09/24/22 0736)  SpO2: 99 % (09/24/22 0736)   Vital Signs (24h Range):  Temp:  [98.2 °F (36.8 °C)-99.6 °F (37.6 °C)] 98.6 °F (37 °C)  Pulse:  [] 85  Resp:  [15-20] 18  SpO2:  [96 %-100 %] 99 %  BP: (122-149)/(74-94) 146/94                  Resp Rate Total:  [16 br/min] 16 br/min         NG/OG Tube 09/20/22 2052 orogastric 16 Fr. Center mouth (Active)            Colostomy Other (see comments) RLQ (Active)            Urethral Catheter 09/21/22 0030 Non-latex;Temperature probe (Active)   Output (mL) 1100 mL 09/21/22 0200       Physical Exam  General: awake  Head:  shunt dressing in place, c/d/i  Eyes: Pupils equal, EOMI  Neck: Supple, normal ROM, no tenderness to palpation  CVS: Normal rate and rhythm  Pulm: No respiratory distress  GI: Abdomen nondistended, nontender  Skin: Dry, intact      Neurosurgery Physical Exam  GCS 15  AOx3  PERRL, EOMI  5/5 BUE  No movement BLE (baseline)    Significant Labs:  Recent Labs   Lab 09/23/22  0236 09/24/22  0630    88    137   K 3.7 3.7   * 110   CO2 14* 20*   BUN 9 10   CREATININE 0.5 0.5   CALCIUM 8.1* 8.9   MG 2.2 2.0       Recent Labs   Lab 09/23/22  0236 09/24/22  0630   WBC 13.88* 9.28   HGB 11.8* 12.5   HCT 38.9 39.1    311       No results for input(s): LABPT, INR, APTT in the last 48 hours.    Microbiology Results (last 7 days)       Procedure Component Value Units Date/Time    CSF culture [[id number]] Collected: 09/21/22 0330    Order Status: Completed Specimen: CSF (Spinal Fluid) from CSF Shunt Updated: 09/24/22 0658     CSF CULTURE No Growth to date     Gram Stain Result No WBC's      No organisms seen    Blood  culture [[id number]] Collected: 09/21/22 0423    Order Status: Completed Specimen: Blood from Peripheral, Upper Arm, Left Updated: 09/24/22 0612     Blood Culture, Routine No Growth to date      No Growth to date      No Growth to date      No Growth to date    Narrative:      Blood cultures x 2 different sites. 4 bottles total. Please  draw cultures before administering antibiotics.    Blood culture [[id number]] Collected: 09/21/22 0435    Order Status: Completed Specimen: Blood from Peripheral, Forearm, Left Updated: 09/24/22 0612     Blood Culture, Routine No Growth to date      No Growth to date      No Growth to date      No Growth to date    Narrative:      Blood cultures from 2 different sites. 4 bottles total.  Please draw before starting antibiotics.    Culture, Respiratory with Gram Stain [[id number]]  (Abnormal)  (Susceptibility) Collected: 09/21/22 0522    Order Status: Completed Specimen: Respiratory from Endotracheal Aspirate Updated: 09/23/22 1054     Respiratory Culture No Pseudomonas isolated.     Comment: Previous comment was modified by GABRIELLA at 10:54 on 09/23/2022 No S   aureus or Pseudomonas isolated.           METHICILLIN RESISTANT STAPHYLOCOCCUS AUREUS  Moderate  Normal respiratory macy also present        YEAST   Few  Further identified as Pichia kudriavzevii       Gram Stain (Respiratory) Rare WBC's     Gram Stain (Respiratory) No organisms seen    Narrative:      Mini-BAL.          All pertinent labs from the last 24 hours have been reviewed.    Significant Diagnostics:  Echoencephalography: No results found in the last 24 hours.  I have reviewed all pertinent imaging results/findings within the past 24 hours.  Review of Systems

## 2022-09-24 NOTE — NURSING
Patient being discharged home in the care of her parents. Discharge instructions, medication changes, and follow up appts discussed and all questions answered. All lines removed prior to discharge

## 2022-09-24 NOTE — PROGRESS NOTES
"Pharmacokinetic Assessment Follow Up: IV Vancomycin    Vancomycin serum concentration assessment(s):    The trough level was drawn correctly and can be used to guide therapy at this time. The measurement is within the desired definitive target range of 15 to 20 mcg/mL.    Vancomycin Regimen Plan:    Continue regimen to Vancomycin 1000 mg IV every 12 hours with next serum trough concentration measured at 1630 prior to 4th dose on 9/25    Drug levels (last 3 results):  Recent Labs   Lab Result Units 09/23/22  1632   Vancomycin-Trough ug/mL 15.1       Pharmacy will continue to follow and monitor vancomycin.    Please contact pharmacy at extension [phone] for questions regarding this assessment.    Thank you for the consult,   Sav Pan       Patient brief summary:  Shashank Samuel is a 27 y.o. female initiated on antimicrobial therapy with IV Vancomycin for treatment of lower respiratory infection      Drug Allergies:   Review of patient's allergies indicates:   Allergen Reactions    Latex, natural rubber Anaphylaxis and Other (See Comments)     angioedema    Zofran [ondansetron hcl (pf)] Swelling     Hives, "throat closes up"    Gardasil  [h papillomavirus vac,qval (pf)]      Other reaction(s): Shortness of breath    Menactra  [mening vac a,c,y,w135 dip (pf)]      Other reaction(s): Shortness of breath    Nitrofurantoin      Other reaction(s): Difficulty breathing    Sulfa (sulfonamide antibiotics)     Tramadol Hives    Levaquin [levofloxacin] Rash     Spots on face    Macrobid [nitrofurantoin monohyd/m-cryst] Rash     Sppots/rash on face       Actual Body Weight:   57 kg    Renal Function:   Estimated Creatinine Clearance: 134.1 mL/min (based on SCr of 0.5 mg/dL).,     Dialysis Method (if applicable):  N/A    CBC (last 72 hours):  Recent Labs   Lab Result Units 09/20/22  1949 09/21/22  0440 09/22/22  0327 09/23/22  0236   WBC K/uL 21.99* 13.97* 12.24 13.88*   Hemoglobin g/dL 13.4 13.2 12.9 11.8*   Hematocrit % " 41.9 40.6 39.8 38.9   Platelets K/uL 364 253 316 310   Gran % % 89.8* 83.8* 82.4* 80.7*   Lymph % % 5.0* 8.0* 6.9* 9.1*   Mono % % 4.5 7.2 8.7 8.0   Eosinophil % % 0.0 0.1 1.6 1.7   Basophil % % 0.1 0.1 0.1 0.2   Differential Method  Automated Automated Automated Automated       Metabolic Panel (last 72 hours):  Recent Labs   Lab Result Units 09/20/22  1949 09/21/22  0230 09/21/22  0330 09/21/22  0436 09/22/22  0327 09/22/22  1357 09/23/22  0236   Sodium mmol/L 138  --   --  138 138  --  137   Potassium mmol/L 3.8  --   --  3.9 3.7  --  3.7   Chloride mmol/L 107  --   --  109 112*  --  115*   CO2 mmol/L 18*  --   --  21* 16*  --  14*   Glucose mg/dL 135*  --   --  93 76  --  105   Glucose, CSF mg/dL  --   --  77*  --   --   --   --    Glucose, UA   --  Negative  --   --   --  Negative  --    BUN mg/dL 18  --   --  10 7  --  9   Creatinine mg/dL 0.4*  --   --  0.5 0.6  --  0.5   Albumin g/dL 4.2  --   --  3.8 3.5  --  3.1*   Total Bilirubin mg/dL 0.7  --   --  0.4 0.5  --  0.4   Alkaline Phosphatase U/L 58  --   --  66 65  --  70   AST U/L 20  --   --  23 17  --  17   ALT U/L 20  --   --  18 17  --  16   Magnesium mg/dL 1.9  --   --  1.9 2.1  --  2.2   Phosphorus mg/dL  --   --   --  2.8 2.2*  --  2.0*       Vancomycin Administrations:  vancomycin given in the last 96 hours                     vancomycin in dextrose 5 % 1 gram/250 mL IVPB 1,000 mg (mg) 1,000 mg New Bag 09/23/22 1652     1,000 mg New Bag  0502    vancomycin 1.25 g in dextrose 5% 250 mL IVPB (ready to mix) ()  Restarted 09/22/22 2059     1,250 mg New Bag  1942    vancomycin in dextrose 5 % 1 gram/250 mL IVPB 1,000 mg (mg) 1,000 mg New Bag 09/21/22 2300     1,000 mg New Bag  1150    vancomycin (VANCOCIN) 20 mg in sodium chloride 0.9% 1 mL (mg) 20 mg Given 09/21/22 1300    vancomycin 750 mg in dextrose 5 % 250 mL IVPB (ready to mix system) (mg) 750 mg New Bag 09/20/22 2247                    Microbiologic Results:  Microbiology Results (last 7 days)        Procedure Component Value Units Date/Time    Culture, Respiratory with Gram Stain [[id number]]  (Abnormal)  (Susceptibility) Collected: 09/21/22 0522    Order Status: Completed Specimen: Respiratory from Endotracheal Aspirate Updated: 09/23/22 1054     Respiratory Culture No Pseudomonas isolated.     Comment: Previous comment was modified by GABRIELLA at 10:54 on 09/23/2022 No S   aureus or Pseudomonas isolated.           METHICILLIN RESISTANT STAPHYLOCOCCUS AUREUS  Moderate  Normal respiratory macy also present        YEAST   Few  Further identified as Pichia kudriavzevii       Gram Stain (Respiratory) Rare WBC's     Gram Stain (Respiratory) No organisms seen    Narrative:      Mini-BAL.    CSF culture [[id number]] Collected: 09/21/22 0330    Order Status: Completed Specimen: CSF (Spinal Fluid) from CSF Shunt Updated: 09/23/22 0641     CSF CULTURE No Growth to date     Gram Stain Result No WBC's      No organisms seen    Blood culture [[id number]] Collected: 09/21/22 0435    Order Status: Completed Specimen: Blood from Peripheral, Forearm, Left Updated: 09/23/22 0613     Blood Culture, Routine No Growth to date      No Growth to date      No Growth to date    Narrative:      Blood cultures from 2 different sites. 4 bottles total.  Please draw before starting antibiotics.    Blood culture [[id number]] Collected: 09/21/22 0423    Order Status: Completed Specimen: Blood from Peripheral, Upper Arm, Left Updated: 09/23/22 0613     Blood Culture, Routine No Growth to date      No Growth to date      No Growth to date    Narrative:      Blood cultures x 2 different sites. 4 bottles total. Please  draw cultures before administering antibiotics.

## 2022-09-25 LAB
BACTERIA BLD CULT: NORMAL
LEVETIRACETAM SERPL-MCNC: 32.2 UG/ML (ref 10–40)

## 2022-09-26 LAB
BACTERIA BLD CULT: NORMAL
BACTERIA BLD CULT: NORMAL
BACTERIA CSF CULT: NO GROWTH
GRAM STN SPEC: NORMAL
GRAM STN SPEC: NORMAL

## 2022-09-27 ENCOUNTER — TELEPHONE (OUTPATIENT)
Dept: NEUROLOGY | Facility: CLINIC | Age: 27
End: 2022-09-27
Payer: MEDICAID

## 2022-09-27 NOTE — TELEPHONE ENCOUNTER
----- Message from Gemini Bose MA sent at 9/22/2022  2:10 PM CDT -----  Regarding: PT Appt  Contact: Kayleen Friedman mom calling in regards to pt appt on 9/28. Pt is currently admitted and mom is unsure if she will be discharged by then. Not sure if she should cancel the appt or if the dr will pass by to see pt in hospital. Please call mom and advise.          Confirmed Contact below:  Contact Name:Shashank Samuel  Phone Number: [phone]

## 2022-09-27 NOTE — TELEPHONE ENCOUNTER
Left patient a voicemail to see if appointment for 9/28 with Dr. Jones was needed or if patient still was looking to be rescheduled.

## 2022-09-28 ENCOUNTER — OFFICE VISIT (OUTPATIENT)
Dept: NEUROLOGY | Facility: CLINIC | Age: 27
End: 2022-09-28
Payer: MEDICAID

## 2022-09-28 VITALS
SYSTOLIC BLOOD PRESSURE: 124 MMHG | WEIGHT: 126 LBS | BODY MASS INDEX: 28.34 KG/M2 | HEIGHT: 56 IN | HEART RATE: 85 BPM | DIASTOLIC BLOOD PRESSURE: 75 MMHG

## 2022-09-28 DIAGNOSIS — Z87.898 HISTORY OF SEIZURES: ICD-10-CM

## 2022-09-28 DIAGNOSIS — R56.9 CONVULSIONS, UNSPECIFIED CONVULSION TYPE: ICD-10-CM

## 2022-09-28 DIAGNOSIS — R56.9 WITNESSED SEIZURE-LIKE ACTIVITY: Primary | ICD-10-CM

## 2022-09-28 PROCEDURE — 99214 OFFICE O/P EST MOD 30 MIN: CPT | Mod: S$PBB,,, | Performed by: PSYCHIATRY & NEUROLOGY

## 2022-09-28 PROCEDURE — 1111F PR DISCHARGE MEDS RECONCILED W/ CURRENT OUTPATIENT MED LIST: ICD-10-PCS | Mod: CPTII,,, | Performed by: PSYCHIATRY & NEUROLOGY

## 2022-09-28 PROCEDURE — 3078F PR MOST RECENT DIASTOLIC BLOOD PRESSURE < 80 MM HG: ICD-10-PCS | Mod: CPTII,,, | Performed by: PSYCHIATRY & NEUROLOGY

## 2022-09-28 PROCEDURE — 99999 PR PBB SHADOW E&M-EST. PATIENT-LVL III: CPT | Mod: PBBFAC,,,

## 2022-09-28 PROCEDURE — 3008F BODY MASS INDEX DOCD: CPT | Mod: CPTII,,, | Performed by: PSYCHIATRY & NEUROLOGY

## 2022-09-28 PROCEDURE — 3074F PR MOST RECENT SYSTOLIC BLOOD PRESSURE < 130 MM HG: ICD-10-PCS | Mod: CPTII,,, | Performed by: PSYCHIATRY & NEUROLOGY

## 2022-09-28 PROCEDURE — 3008F PR BODY MASS INDEX (BMI) DOCUMENTED: ICD-10-PCS | Mod: CPTII,,, | Performed by: PSYCHIATRY & NEUROLOGY

## 2022-09-28 PROCEDURE — 1159F PR MEDICATION LIST DOCUMENTED IN MEDICAL RECORD: ICD-10-PCS | Mod: CPTII,,, | Performed by: PSYCHIATRY & NEUROLOGY

## 2022-09-28 PROCEDURE — 1111F DSCHRG MED/CURRENT MED MERGE: CPT | Mod: CPTII,,, | Performed by: PSYCHIATRY & NEUROLOGY

## 2022-09-28 PROCEDURE — 99999 PR PBB SHADOW E&M-EST. PATIENT-LVL III: ICD-10-PCS | Mod: PBBFAC,,,

## 2022-09-28 PROCEDURE — 3078F DIAST BP <80 MM HG: CPT | Mod: CPTII,,, | Performed by: PSYCHIATRY & NEUROLOGY

## 2022-09-28 PROCEDURE — 99213 OFFICE O/P EST LOW 20 MIN: CPT | Mod: PBBFAC

## 2022-09-28 PROCEDURE — 3074F SYST BP LT 130 MM HG: CPT | Mod: CPTII,,, | Performed by: PSYCHIATRY & NEUROLOGY

## 2022-09-28 PROCEDURE — 99214 PR OFFICE/OUTPT VISIT, EST, LEVL IV, 30-39 MIN: ICD-10-PCS | Mod: S$PBB,,, | Performed by: PSYCHIATRY & NEUROLOGY

## 2022-09-28 PROCEDURE — 1159F MED LIST DOCD IN RCRD: CPT | Mod: CPTII,,, | Performed by: PSYCHIATRY & NEUROLOGY

## 2022-09-28 RX ORDER — LEVETIRACETAM 750 MG/1
750 TABLET ORAL 2 TIMES DAILY
Qty: 60 TABLET | Refills: 11 | Status: SHIPPED | OUTPATIENT
Start: 2022-09-28 | End: 2023-08-09 | Stop reason: SDUPTHER

## 2022-09-28 NOTE — PROGRESS NOTES
[facility] - NEUROLOGY [address]TH [address] [facility], [place]    Date: 9/28/22  Patient Name: Shashank Samuel   MRN: [id number]   PCP: Verito Scott  Referring Provider:     Assessment:   Shashank Samuel is a 27 y.o. female presenting for follow up of seizure-like activity. Patient's did not have further seizure events after being started on Keppra during her last visit 8/17/2022, until a significant episode in which she was found unconscious with signs of cyanosis on 9/20/2022. Mom reports that patient went to the bathroom at 3:15pm, was found at 3:27 p.m. with Shashank in the bathroom leaning against the edge of the tub with her neck in a very abnormal position and she noticed the right side of her head and neck was blue.  They reported she was not responsive and making gurgling respirations.  They did not see seizure-like activity.  She had gone to the bathroom on her own.  They carried her out into the living room.  He reports she did not return to baseline during that time with EMS EMS reported that she remain postictal with them.  On arrival to the ER she had a seizure while in the ambulance stretcher day the lasted more than 2 minutes.  Patient sats remained 100% throughout this time heart rate was in the 120s to 130s.  Patient was immediately brought to a room for treatment. Family reports she has never had a seizure like this in the past. Per patient's mom, patient was intubated and placed in an induced coma to help break the seizures at approximately 8:30 pm that evening.    She was found to have a malfunctioning shunt valve which was replaced during that hospitalization, and she has had no further events since discharge. Patient's seizure episode is most likely related to the shunt malfunction. However, in consultation with family, a joint decision was made to increase the dose slightly to 750mg BID from 500mg BID and follow up in 3 months.    Plan:     Patient counseled  on seizure precautions  until six months seizure free including no driving or operating heavy machinery, no submerging in water, take showers instead of baths whenever possible, do not care for children alone, caution when using hot objects especially cooking and do not scale ladders or heights unsupported or unaccompanied.     -- increase Keppra to 750 mg BID  -- follow up in 3 months    Problem List Items Addressed This Visit          Neuro    History of seizures     Other Visit Diagnoses       Witnessed seizure-like activity    -  Primary    Relevant Medications    levETIRAcetam (KEPPRA) 750 MG Tab    Convulsions, unspecified convulsion type                  09/28/2022  Robert More MD, [facility] [place], PGY-1  Department of Neurology  [facility]        Subjective:           HPI:   Ms. Shashank Samuel is a 27 y.o. female presenting with seizure-like activity of new onset (though she does have a history of seizures in the past). 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    Type 1: First seizure event started at the age of 5, 2/2 to a reported shunt malfunction. Since then, she had episodes twice a year until the age of 10 at which point her seizures stopped. She had a repeat single seizure episode after a shunt malfunction in 2012. These episodes were associated with an abnormal R hand claw gesture as well as a 1 Hz rhythmic elbow extension.    New Type 2: She hasn't had any other seizure events since then until March 2022, since which she has had 5 episodes, lasting up to 30  seconds. In this latest episode, the patient's R arm dropped, followed by vocal grunts. Mom has a video of the event. Patient appears to have a staring spell with eyes fixed in position. These new episodes are always followed by post-ictal cries, and proceeded by hunger. Patient has been drinking a lot of water recently.    AEDs: Patient has not been on any anti-epileptics since 2020, before which she was on Keppra 500 BID.      PAST MEDICAL HISTORY:  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       PAST SURGICAL HISTORY:  Past Surgical History:   Procedure Laterality Date    AMPUTATION      right 4th and 5th toes; left 5th toe and portion of left great toe    BACK SURGERY      BLADDER SURGERY      BREAST SURGERY  2015    Reduction    COLON SURGERY      flush site for bowel movements from appendix    CYSTOSTOMY      DEBRIDEMENT OF FOOT Right 1/8/2021    Procedure: DEBRIDEMENT, FOOT;  Surgeon: Abdi Bedoya DPM;  Location: [facility];  Service: Podiatry;  Laterality: Right;    EYE SURGERY      x 5    FLUOROSCOPIC URODYNAMIC STUDY N/A 3/12/2019    Procedure: URODYNAMIC STUDY, FLUOROSCOPIC;  Surgeon: Kenzie Charles MD;  Location: [address];  Service: Urology;  Laterality: N/A;  1 hour    FLUOROSCOPIC URODYNAMIC STUDY N/A 4/4/2019    Procedure: URODYNAMIC STUDY, FLUOROSCOPIC;  Surgeon: Kenzie Charles MD;  Location: [address];  Service: Urology;  Laterality: N/A;  1 hour    FLUOROSCOPIC URODYNAMIC STUDY N/A 5/11/2022    Procedure: URODYNAMIC STUDY, FLUOROSCOPIC;  Surgeon: Kenzie Charles MD;  Location: [address];  Service: Urology;  Laterality: N/A;  90min    FOOT AMPUTATION THROUGH METATARSAL Right 10/28/2019    Procedure: AMPUTATION, FOOT,  TRANSMETATARSAL;  Surgeon: Abdi Bedoya DPM;  Location: [facility] OR;  Service: Podiatry;  Laterality: Right;    FOOT AMPUTATION THROUGH METATARSAL Right 3/27/2020    Procedure: AMPUTATION, FOOT, TRANSMETATARSAL;  Surgeon: Abdi Bedoya DPM;  Location: [facility] OR;  Service: Podiatry;  Laterality: Right;  REVISION    MYELOMININGOCELE REPAIR      NEPHRECTOMY Right 11/4/2021    Procedure: Nephrectomy/ OPEN;  Surgeon: Dash Rosenthal MD;  Location: [facility] OR 2ND FLR;  Service: Urology;  Laterality: Right;  4HRS    NEPHROSTOMY Right 8/2/2021    Procedure: Nephrostomy and perinephric abscess drain;  Surgeon: Lillian Surgeon;  Location: [facility];  Service: Anesthesiology;  Laterality: Right;    REVISION OF VENTRICULOPERITONEAL SHUNT Left 9/21/2022    Procedure: REVISION, SHUNT, VENTRICULOPERITONEAL;  Surgeon: Maynor Calero MD;  Location: [facility] OR 2ND FLR;  Service: Neurosurgery;  Laterality: Left;    STRABISMUS SURGERY      ou dr peña    VENTRICULOPERITONEAL SHUNT      WOUND DEBRIDEMENT  3/27/2020    Procedure: DEBRIDEMENT, WOUND;  Surgeon: Abdi Bedoya DPM;  Location: [facility] OR;  Service: Podiatry;;       CURRENT MEDS:  Current Outpatient Medications   Medication Sig Dispense Refill    acetaminophen (TYLENOL) 500 MG tablet Take 1,000 mg by mouth every 6 (six) hours as needed for Pain.      cephALEXin (KEFLEX) 500 MG capsule Take 1 capsule (500 mg total) by mouth every 6 (six) hours. for 5 days 20 capsule 0    ferrous sulfate (FEOSOL) 325 mg (65 mg iron) Tab tablet Take 325 mg by mouth 2 (two) times daily.      ibuprofen (ADVIL,MOTRIN) 200 MG tablet Take 200 mg by mouth every 6 (six) hours as needed for Pain.      levETIRAcetam (KEPPRA) 750 MG Tab Take 1 tablet (750 mg total) by mouth 2 (two) times daily. 60 tablet 11     No current facility-administered medications for this visit.       ALLERGIES:  Review of patient's allergies indicates:   Allergen Reactions    Latex, natural rubber Anaphylaxis and Other (See  "Comments)     angioedema    Zofran [ondansetron hcl (pf)] Swelling     Hives, "throat closes up"    Gardasil  [h papillomavirus vac,qval (pf)]      Other reaction(s): Shortness of breath    Menactra  [mening vac a,c,y,w135 dip (pf)]      Other reaction(s): Shortness of breath    Nitrofurantoin      Other reaction(s): Difficulty breathing    Sulfa (sulfonamide antibiotics)     Tramadol Hives    Levaquin [levofloxacin] Rash     Spots on face    Macrobid [nitrofurantoin monohyd/m-cryst] Rash     Sppots/rash on face       FAMILY HISTORY:  Family History   Problem Relation Age of Onset    Breast cancer Mother     Gout Father     Cataracts Maternal Grandmother     Myocarditis Sister     Amblyopia Neg Hx     Blindness Neg Hx     Cancer Neg Hx     Diabetes Neg Hx     Glaucoma Neg Hx     Hypertension Neg Hx     Macular degeneration Neg Hx     Retinal detachment Neg Hx     Strabismus Neg Hx     Stroke Neg Hx     Thyroid disease Neg Hx        SOCIAL HISTORY:  Social History     Tobacco Use    Smoking status: Never    Smokeless tobacco: Never   Substance Use Topics    Alcohol use: Not Currently    Drug use: Never       Review of Systems:  12 system review of systems is negative except for the symptoms mentioned in HPI.        Objective:     Vitals:    09/28/22 1057   BP: 124/75   BP Location: Left arm   Patient Position: Sitting   BP Method: Medium (Automatic)   Pulse: 85   Weight: 57.2 kg (126 lb)   Height: 4' 8" (1.422 m)     General: NAD, well nourished   Eyes: no tearing, discharge, no erythema   ENT: moist mucous membranes of the oral cavity, nares patent    Neck: Supple, full range of motion  Cardiovascular: Warm and well perfused, pulses equal and symmetrical  Lungs: Normal work of breathing, normal chest wall excursions  Skin: No rash, lesions, or breakdown on exposed skin  Psychiatry: Mood and affect are appropriate   Abdomen: soft, non tender, non distended  Extremeties: No cyanosis, clubbing or edema.    Neurological "   MENTAL STATUS: Alert and oriented to person, place, and time. Attention and concentration within normal limits. Speech without dysarthria, able to name and repeat without difficulty. Recent and remote memory within normal limits   CRANIAL NERVES: Visual fields intact. PERRL. EOMI (decrased in range 2/2 eye surguries). Facial sensation intact. Face symmetrical. Hearing grossly intact. Full shoulder shrug bilaterally. Tongue protrudes midline   SENSORY: Sensation is intact to pin and light touch to approximately T7 level.    MOTOR: Normal bulk and tone in UE. No pronator drift.  5/5 deltoid, biceps, triceps, interosseous, hand  bilaterally. 0/5 in LE  REFLEXES: Symmetric and 2+ in UE, absent in LE.  CEREBELLAR/COORDINATION/GAIT: Finger to nose intact. Normal rapid alternating movements.

## 2022-09-28 NOTE — PROCEDURES
DATE: 9/21/22    EEG NUMBER: FH -1    REFERRING PHYSICIAN:  Dr. Garvey      This EEG was performed to assess for subclinical seizures      ELECTROENCEPHALOGRAM REPORT     METHODOLOGY:  Electroencephalographic (EEG) recording is with electrodes placed according to the International 10-20 placement system.  Thirty two (32) channels of digital signal are simultaneously recorded from the scalp and may include EKG, EMG, and/or eye monitors.   Recording band pass was 0.1 to 512 hz.  Digital video recording of the patient is simultaneously recorded with the EEG.  The nursing staff report clinical symptoms and may press an event button when the patient has symptoms of clinical interest to the treating physicians.  EEG and video recording is stored and archived in digital format.  The entire recording is visually reviewed, and the times identified by computer analysis as being spikes or seizures are reviewed again.  Activation procedures which include photic stimulation, hyperventilation and instructing patients to perform simple task are done in selected patients.   Compresses spectral analysis (CSA) is also performed on the activity recorded from each individual channel.  This is displayed as a power display of frequencies from 0 to 30 Hz over time.   The CSA analysis is done and displayed continuously.  This is reviewed for asymmetries in power between homologous areas of the scalp and for presence of changes in power which can be seen when seizures occur.  Sections of suspected abnormalities on the CSA is then compared with the original EEG recording.                The [employer] software was also utilized in the review of this study.  This software suite analyzes the EEG recording in multiple domains.  Coherence and rhythmicity is computed to identify EEG sections which may contain organized seizures.  Each channel undergoes analysis to detect presence of spike and sharp waves which have special and morphological  characteristic of epileptic activity.  The routine EEG recording is converted from spacial into frequency domain.  This is then displayed comparing homologous areas to identify areas of significant asymmetry.  Algorithm to identify non-cortically generated artifact is used to separate eye movement, EMG and other artifact from the EEG.     Recording times   Start on September 21, 2022 at hours 0 minute 41 sec 33   End on September 21, 2022 at hours 7 minute 0 seconds 6  Restart on September 21, 2022 at hours 7 minute 0 seconds 37   End on September 21, 2022 at hour 8 minute 47 seconds 8   The total time of EEG recording for the study was 8 hours and 6 minutes    Portions of the record were obscured by artifacts related to electrodes T5, O1      EEG FINDINGS:  The recording was obtained with a number of standard bipolar and referential montages during obtunded state.  Diffuse disorganized low amplitude mixed rhythmical delta and occasional theta range slowing was noted which was symmetric.  The background was intermixed with symmetric spindles.   Variability and reactivity were noted.  There were no interictal epileptiform abnormalities and no clinical or electrographic seizures were recorded.    The EKG channel revealed a sinus rhythm.     IMPRESSION:  This is an abnormal EEG during obtunded state.  Diffuse disorganized low-amplitude slowing of the background was noted     CLINICAL CORRELATION:  The patient is a 27-year-old female with a history of  shunt who presented with hydrocephalus and is currently maintained on Keppra.  This is an abnormal EEG during obtunded state.  The overall degree of disorganization and slowing for given age is suggestive of a moderate to severe encephalopathy, possibly related to hydrocephalus.  There is no evidence of an epileptic process on this recording.  No seizures were recorded during this study.

## 2022-09-30 ENCOUNTER — TELEPHONE (OUTPATIENT)
Dept: NEUROSURGERY | Facility: CLINIC | Age: 27
End: 2022-09-30
Payer: MEDICAID

## 2022-09-30 NOTE — TELEPHONE ENCOUNTER
Called both numbers - no answer. Left VM for mom informing of scheduled dates and times for both 2 wk VV w/ khang and 6 wk PO with Dr. Calero. Also sent appt letters in mail.      ----- Message from Loyd Paulson MA sent at 9/29/2022 10:19 AM CDT -----  Contact: pt/[phone]    ----- Message -----  From: Clementine Graves  Sent: 9/28/2022   5:32 PM CDT  To: Galilea PFEIFFER Staff    Type:  Patient Call    Who Called:Pt  mother   Would the patient rather a call back or a response via [facility]? call  Best Call Back Number:[phone]  Additional Information: pt  mother is calling to schedule  a  follow up  appointment

## 2022-10-06 ENCOUNTER — CLINICAL SUPPORT (OUTPATIENT)
Dept: NEUROSURGERY | Facility: CLINIC | Age: 27
End: 2022-10-06
Payer: MEDICAID

## 2022-10-06 ENCOUNTER — PATIENT MESSAGE (OUTPATIENT)
Dept: NEUROSURGERY | Facility: CLINIC | Age: 27
End: 2022-10-06

## 2022-10-06 DIAGNOSIS — Z98.2 S/P VP SHUNT: Primary | ICD-10-CM

## 2022-10-06 NOTE — PROGRESS NOTES
Patient seen via video visit  for 2 week post op s/p  shunt revision with Dr Calero 09/21/2022             Left parieto-occipital incision  assessed, dissolvable sutures used for closure, no redness, swelling, or drainage, edges well approximated. There is a scab and glue at the base of the incision where it meets the neck. I advised mom that she can gently wash the incision and let the scab get wet, not to scrub. The scab should eventaully fall off. She is to let me know if the incision is not healed when the scab comes off.    Patient was instructed as follows:   Discontinue Bacitracin after tonight.  May shower normally but pat dry after shower.  Do not submerge wound in bath tub or go swimming until released by the physician  Keep incision clean, dry and open to air as much as possible.  Patient encouraged to walk as much as possible but advised to walk with family member or friend and rest as necessary.  No lifting >10lbs.      All questions were answered. Patient will follow up with Dr. Calero for a VV 11/01, to get CT head in slide 10/31  . Patient was encouraged to call clinic with any future concerns prior to follow up appt. If any worsening symptoms, patient should report to ED.       Petrona Davalos, RN, BSN  Neurosurgery Nurse Navigator

## 2022-10-07 ENCOUNTER — TELEPHONE (OUTPATIENT)
Dept: OPTOMETRY | Facility: CLINIC | Age: 27
End: 2022-10-07
Payer: MEDICAID

## 2022-10-07 NOTE — TELEPHONE ENCOUNTER
----- Message from Paige Mark sent at 10/7/2022 10:42 AM CDT -----  Regarding: pt call back  Name of Who is Calling: ODIN ANGLIN [[id number]] Padma (mom)      What is the request in detail: Pt's mom is requesting an appt. States daughter eyes started getting blurry after a surgery she has previously          Can the clinic reply by [facility]: NO        What Number to Call Back if not in [facility]: [phone]

## 2022-10-10 ENCOUNTER — PATIENT MESSAGE (OUTPATIENT)
Dept: NEUROLOGY | Facility: CLINIC | Age: 27
End: 2022-10-10
Payer: MEDICAID

## 2022-10-17 NOTE — TELEPHONE ENCOUNTER
Spoke to patient's mom and discussed holding off on increasing the keppra further. I agreed with mom's preference to hold off on increasing the medication further, given that she's doing well on the already-increased 750mg dose.

## 2022-11-04 ENCOUNTER — HOSPITAL ENCOUNTER (OUTPATIENT)
Dept: RADIOLOGY | Facility: HOSPITAL | Age: 27
Discharge: HOME | End: 2022-11-04
Attending: NEUROLOGICAL SURGERY
Payer: MEDICAID

## 2022-11-04 DIAGNOSIS — Z98.2 S/P VP SHUNT: ICD-10-CM

## 2022-11-04 PROCEDURE — 70450 CT HEAD/BRAIN W/O DYE: CPT | Mod: TC,PO

## 2022-11-08 ENCOUNTER — OFFICE VISIT (OUTPATIENT)
Dept: NEUROSURGERY | Facility: CLINIC | Age: 27
End: 2022-11-08
Payer: MEDICAID

## 2022-11-08 ENCOUNTER — PATIENT MESSAGE (OUTPATIENT)
Dept: NEUROSURGERY | Facility: CLINIC | Age: 27
End: 2022-11-08

## 2022-11-08 DIAGNOSIS — R29.818 OTHER SYMPTOMS AND SIGNS INVOLVING THE NERVOUS SYSTEM: ICD-10-CM

## 2022-11-08 DIAGNOSIS — Z98.2 S/P VP SHUNT: Primary | ICD-10-CM

## 2022-11-08 DIAGNOSIS — G91.9 HYDROCEPHALUS, UNSPECIFIED TYPE: ICD-10-CM

## 2022-11-08 PROCEDURE — 99024 POSTOP FOLLOW-UP VISIT: CPT | Mod: 95,,, | Performed by: NEUROLOGICAL SURGERY

## 2022-11-08 PROCEDURE — 1159F PR MEDICATION LIST DOCUMENTED IN MEDICAL RECORD: ICD-10-PCS | Mod: CPTII,95,, | Performed by: NEUROLOGICAL SURGERY

## 2022-11-08 PROCEDURE — 99024 PR POST-OP FOLLOW-UP VISIT: ICD-10-PCS | Mod: 95,,, | Performed by: NEUROLOGICAL SURGERY

## 2022-11-08 PROCEDURE — 1159F MED LIST DOCD IN RCRD: CPT | Mod: CPTII,95,, | Performed by: NEUROLOGICAL SURGERY

## 2022-11-08 PROCEDURE — 1160F PR REVIEW ALL MEDS BY PRESCRIBER/CLIN PHARMACIST DOCUMENTED: ICD-10-PCS | Mod: CPTII,95,, | Performed by: NEUROLOGICAL SURGERY

## 2022-11-08 PROCEDURE — 1160F RVW MEDS BY RX/DR IN RCRD: CPT | Mod: CPTII,95,, | Performed by: NEUROLOGICAL SURGERY

## 2022-11-08 NOTE — PROGRESS NOTES
Patient back to see us follow-up after her most recent  shunt revision on 09/21/2022.  Since the operation patient has done very well.  She has improvement of her headaches.  She has no neck pain.  She is been afebrile.  The wound looks well healed.  CT scan done on 11/04/2002 was reviewed looks like catheter in good position the ventricles are smaller.  She would baseline has large ventricles that is never really changed significantly over the years.  At this stage I am advancing her activity levels.  We will plan to see her back in 6 months with CT scan shunt series.

## 2022-12-07 ENCOUNTER — PATIENT MESSAGE (OUTPATIENT)
Dept: NEUROSURGERY | Facility: CLINIC | Age: 27
End: 2022-12-07
Payer: MEDICAID

## 2022-12-15 DIAGNOSIS — R29.818 OTHER SYMPTOMS AND SIGNS INVOLVING THE NERVOUS SYSTEM: ICD-10-CM

## 2022-12-15 DIAGNOSIS — M41.9 SCOLIOSIS, UNSPECIFIED SCOLIOSIS TYPE, UNSPECIFIED SPINAL REGION: Primary | ICD-10-CM

## 2022-12-26 PROBLEM — J96.00 ACUTE RESPIRATORY FAILURE: Status: RESOLVED | Noted: 2022-09-21 | Resolved: 2022-12-26

## 2023-01-11 ENCOUNTER — TELEPHONE (OUTPATIENT)
Dept: NEUROLOGY | Facility: CLINIC | Age: 28
End: 2023-01-11
Payer: COMMERCIAL

## 2023-01-11 NOTE — TELEPHONE ENCOUNTER
----- Message from Daisy Cai CMA sent at 1/11/2023  1:06 PM CST -----  Regarding: Please call  Contact: Padma [phone]  Padma is calling regarding dental clearance and form that states she is disabled. Also they will be updating insurance for [employer], they have not received cards yet but will new cards in on 2/13/23 at 10a.    Thank you

## 2023-01-16 ENCOUNTER — HOSPITAL ENCOUNTER (EMERGENCY)
Facility: HOSPITAL | Age: 28
Discharge: HOME | End: 2023-01-16
Attending: EMERGENCY MEDICINE
Payer: COMMERCIAL

## 2023-01-16 VITALS
OXYGEN SATURATION: 100 % | SYSTOLIC BLOOD PRESSURE: 129 MMHG | RESPIRATION RATE: 15 BRPM | BODY MASS INDEX: 25.19 KG/M2 | DIASTOLIC BLOOD PRESSURE: 87 MMHG | HEART RATE: 77 BPM | TEMPERATURE: 98 F | HEIGHT: 56 IN | WEIGHT: 112 LBS

## 2023-01-16 DIAGNOSIS — N30.00 ACUTE CYSTITIS WITHOUT HEMATURIA: ICD-10-CM

## 2023-01-16 DIAGNOSIS — R56.9 SEIZURE: Primary | ICD-10-CM

## 2023-01-16 LAB
ALBUMIN SERPL BCP-MCNC: 4.2 G/DL (ref 3.5–5.2)
ALP SERPL-CCNC: 65 U/L (ref 55–135)
ALT SERPL W/O P-5'-P-CCNC: 19 U/L (ref 10–44)
ANION GAP SERPL CALC-SCNC: 12 MMOL/L (ref 8–16)
AST SERPL-CCNC: 17 U/L (ref 10–40)
BACTERIA #/AREA URNS HPF: ABNORMAL /HPF
BASOPHILS # BLD AUTO: 0.03 K/UL (ref 0–0.2)
BASOPHILS NFR BLD: 0.3 % (ref 0–1.9)
BILIRUB SERPL-MCNC: 0.1 MG/DL (ref 0.1–1)
BILIRUB UR QL STRIP: NEGATIVE
BNP SERPL-MCNC: 15 PG/ML (ref 0–99)
BUN SERPL-MCNC: 15 MG/DL (ref 6–20)
CALCIUM SERPL-MCNC: 9.1 MG/DL (ref 8.7–10.5)
CHLORIDE SERPL-SCNC: 102 MMOL/L (ref 95–110)
CLARITY UR: CLEAR
CO2 SERPL-SCNC: 20 MMOL/L (ref 23–29)
COLOR UR: YELLOW
CREAT SERPL-MCNC: 0.3 MG/DL (ref 0.5–1.4)
DIFFERENTIAL METHOD: ABNORMAL
EOSINOPHIL # BLD AUTO: 0 K/UL (ref 0–0.5)
EOSINOPHIL NFR BLD: 0.4 % (ref 0–8)
ERYTHROCYTE [DISTWIDTH] IN BLOOD BY AUTOMATED COUNT: 13.6 % (ref 11.5–14.5)
EST. GFR  (NO RACE VARIABLE): >60 ML/MIN/1.73 M^2
GLUCOSE SERPL-MCNC: 88 MG/DL (ref 70–110)
GLUCOSE SERPL-MCNC: 91 MG/DL (ref 70–110)
GLUCOSE UR QL STRIP: NEGATIVE
HCT VFR BLD AUTO: 39.4 % (ref 37–48.5)
HGB BLD-MCNC: 12.9 G/DL (ref 12–16)
HGB UR QL STRIP: NEGATIVE
HYALINE CASTS #/AREA URNS LPF: 99 /LPF
IMM GRANULOCYTES # BLD AUTO: 0.05 K/UL (ref 0–0.04)
IMM GRANULOCYTES NFR BLD AUTO: 0.5 % (ref 0–0.5)
KETONES UR QL STRIP: NEGATIVE
LEUKOCYTE ESTERASE UR QL STRIP: ABNORMAL
LYMPHOCYTES # BLD AUTO: 1.9 K/UL (ref 1–4.8)
LYMPHOCYTES NFR BLD: 17.5 % (ref 18–48)
MCH RBC QN AUTO: 29.3 PG (ref 27–31)
MCHC RBC AUTO-ENTMCNC: 32.7 G/DL (ref 32–36)
MCV RBC AUTO: 89 FL (ref 82–98)
MICROSCOPIC COMMENT: ABNORMAL
MONOCYTES # BLD AUTO: 0.9 K/UL (ref 0.3–1)
MONOCYTES NFR BLD: 8.3 % (ref 4–15)
NEUTROPHILS # BLD AUTO: 8 K/UL (ref 1.8–7.7)
NEUTROPHILS NFR BLD: 73 % (ref 38–73)
NITRITE UR QL STRIP: NEGATIVE
NRBC BLD-RTO: 0 /100 WBC
PH UR STRIP: 7 [PH] (ref 5–8)
PLATELET # BLD AUTO: 333 K/UL (ref 150–450)
PMV BLD AUTO: 9.6 FL (ref 9.2–12.9)
POTASSIUM SERPL-SCNC: 3.5 MMOL/L (ref 3.5–5.1)
PROT SERPL-MCNC: 8 G/DL (ref 6–8.4)
PROT UR QL STRIP: ABNORMAL
RBC # BLD AUTO: 4.41 M/UL (ref 4–5.4)
RBC #/AREA URNS HPF: 2 /HPF (ref 0–4)
SODIUM SERPL-SCNC: 134 MMOL/L (ref 136–145)
SP GR UR STRIP: 1.01 (ref 1–1.03)
SQUAMOUS #/AREA URNS HPF: 2 /HPF
TROPONIN I SERPL HS-MCNC: 3.6 PG/ML (ref 0–14.9)
URN SPEC COLLECT METH UR: ABNORMAL
UROBILINOGEN UR STRIP-ACNC: NEGATIVE EU/DL
WBC # BLD AUTO: 10.99 K/UL (ref 3.9–12.7)
WBC #/AREA URNS HPF: 55 /HPF (ref 0–5)

## 2023-01-16 PROCEDURE — 81001 URINALYSIS AUTO W/SCOPE: CPT | Performed by: EMERGENCY MEDICINE

## 2023-01-16 PROCEDURE — 82962 GLUCOSE BLOOD TEST: CPT

## 2023-01-16 PROCEDURE — 83880 ASSAY OF NATRIURETIC PEPTIDE: CPT | Performed by: EMERGENCY MEDICINE

## 2023-01-16 PROCEDURE — 87186 SC STD MICRODIL/AGAR DIL: CPT | Performed by: EMERGENCY MEDICINE

## 2023-01-16 PROCEDURE — 85025 COMPLETE CBC W/AUTO DIFF WBC: CPT | Performed by: EMERGENCY MEDICINE

## 2023-01-16 PROCEDURE — 93010 EKG 12-LEAD: ICD-10-PCS | Mod: ,,, | Performed by: SPECIALIST

## 2023-01-16 PROCEDURE — 93010 ELECTROCARDIOGRAM REPORT: CPT | Mod: ,,, | Performed by: SPECIALIST

## 2023-01-16 PROCEDURE — 25000003 PHARM REV CODE 250: Performed by: EMERGENCY MEDICINE

## 2023-01-16 PROCEDURE — 63600175 PHARM REV CODE 636 W HCPCS: Performed by: EMERGENCY MEDICINE

## 2023-01-16 PROCEDURE — 84484 ASSAY OF TROPONIN QUANT: CPT | Performed by: EMERGENCY MEDICINE

## 2023-01-16 PROCEDURE — 96375 TX/PRO/DX INJ NEW DRUG ADDON: CPT

## 2023-01-16 PROCEDURE — 87077 CULTURE AEROBIC IDENTIFY: CPT | Performed by: EMERGENCY MEDICINE

## 2023-01-16 PROCEDURE — 99285 EMERGENCY DEPT VISIT HI MDM: CPT | Mod: 25

## 2023-01-16 PROCEDURE — 96365 THER/PROPH/DIAG IV INF INIT: CPT

## 2023-01-16 PROCEDURE — 80053 COMPREHEN METABOLIC PANEL: CPT | Performed by: EMERGENCY MEDICINE

## 2023-01-16 PROCEDURE — 93005 ELECTROCARDIOGRAM TRACING: CPT | Performed by: SPECIALIST

## 2023-01-16 PROCEDURE — 87086 URINE CULTURE/COLONY COUNT: CPT | Performed by: EMERGENCY MEDICINE

## 2023-01-16 RX ORDER — FAMOTIDINE 10 MG/ML
20 INJECTION INTRAVENOUS
Status: COMPLETED | OUTPATIENT
Start: 2023-01-16 | End: 2023-01-16

## 2023-01-16 RX ORDER — LORAZEPAM 2 MG/ML
1 INJECTION INTRAMUSCULAR
Status: COMPLETED | OUTPATIENT
Start: 2023-01-16 | End: 2023-01-16

## 2023-01-16 RX ORDER — ACETAMINOPHEN 500 MG
1000 TABLET ORAL
Status: COMPLETED | OUTPATIENT
Start: 2023-01-16 | End: 2023-01-16

## 2023-01-16 RX ORDER — CEFUROXIME AXETIL 500 MG/1
500 TABLET ORAL EVERY 12 HOURS
Qty: 20 TABLET | Refills: 0 | Status: SHIPPED | OUTPATIENT
Start: 2023-01-16 | End: 2023-04-10 | Stop reason: ALTCHOICE

## 2023-01-16 RX ORDER — DIAZEPAM 10 MG/2G
GEL RECTAL
COMMUNITY

## 2023-01-16 RX ORDER — FAMOTIDINE 20 MG/1
20 TABLET, FILM COATED ORAL 2 TIMES DAILY
Qty: 20 TABLET | Refills: 0 | Status: SHIPPED | OUTPATIENT
Start: 2023-01-16 | End: 2023-04-05

## 2023-01-16 RX ORDER — DIAZEPAM 10 MG/2ML
5 INJECTION INTRAMUSCULAR
Status: COMPLETED | OUTPATIENT
Start: 2023-01-16 | End: 2023-01-16

## 2023-01-16 RX ADMIN — FAMOTIDINE 20 MG: 10 INJECTION, SOLUTION INTRAVENOUS at 11:01

## 2023-01-16 RX ADMIN — ACETAMINOPHEN 1000 MG: 500 TABLET ORAL at 09:01

## 2023-01-16 RX ADMIN — CEFTRIAXONE 2 G: 2 INJECTION, SOLUTION INTRAVENOUS at 11:01

## 2023-01-16 RX ADMIN — SODIUM CHLORIDE, SODIUM LACTATE, POTASSIUM CHLORIDE, AND CALCIUM CHLORIDE 1000 ML: .6; .31; .03; .02 INJECTION, SOLUTION INTRAVENOUS at 10:01

## 2023-01-16 RX ADMIN — LORAZEPAM 1 MG: 2 INJECTION INTRAMUSCULAR; INTRAVENOUS at 09:01

## 2023-01-16 RX ADMIN — DIAZEPAM 5 MG: 5 INJECTION, SOLUTION INTRAMUSCULAR; INTRAVENOUS at 10:01

## 2023-01-17 NOTE — DISCHARGE INSTRUCTIONS
Drink lots of fluids.  Rest.  Return to emergency department for worsening symptoms or any problems.  Follow-up with your neurologist.  Return for any problems.  Continue home Keppra.

## 2023-01-17 NOTE — ED PROVIDER NOTES
"Encounter Date: 1/16/2023       History     Chief Complaint   Patient presents with    Seizures     Mom states three seizures prior to arrival     27-year-old female with history of spina bifida and seizure disorder with also previous history of  shunt presented emergency department after having a seizure at home.  Patient on Keppra.  Patient had a seizure and had a 2nd seizure while at home.  Denies any trauma.  Patient on the bed.  Patient is paraplegic secondary to spina bifida.  Patient had multiple previous abdominal surgeries and patient does self cath herself.  Patient has a slight headache after the seizure.  Patient otherwise is awake and almost back to normal.  Denies dysuria or hematuria.  Denies any focal weakness or numbness that are new.    Review of patient's allergies indicates:   Allergen Reactions    Latex, natural rubber Anaphylaxis and Other (See Comments)     angioedema    Zofran [ondansetron hcl (pf)] Swelling     Hives, "throat closes up"    Gardasil  [h papillomavirus vac,qval (pf)]      Other reaction(s): Shortness of breath    Menactra  [mening vac a,c,y,w135 dip (pf)]      Other reaction(s): Shortness of breath    Nitrofurantoin      Other reaction(s): Difficulty breathing    Sulfa (sulfonamide antibiotics)     Tramadol Hives    Levaquin [levofloxacin] Rash     Spots on face    Macrobid [nitrofurantoin monohyd/m-cryst] Rash     Sppots/rash on face     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     Past Surgical History:   Procedure Laterality Date    AMPUTATION      right 4th and 5th toes; left 5th toe and portion of left great toe    BACK SURGERY      BLADDER SURGERY      BREAST SURGERY  2015    Reduction    COLON SURGERY      flush site for " bowel movements from appendix    CYSTOSTOMY      DEBRIDEMENT OF FOOT Right 1/8/2021    Procedure: DEBRIDEMENT, FOOT;  Surgeon: Abdi Bedoya DPM;  Location: [facility] OR;  Service: Podiatry;  Laterality: Right;    EYE SURGERY      x 5    FLUOROSCOPIC URODYNAMIC STUDY N/A 3/12/2019    Procedure: URODYNAMIC STUDY, FLUOROSCOPIC;  Surgeon: Kenzie Charles MD;  Location: [facility] OR 1ST FLR;  Service: Urology;  Laterality: N/A;  1 hour    FLUOROSCOPIC URODYNAMIC STUDY N/A 4/4/2019    Procedure: URODYNAMIC STUDY, FLUOROSCOPIC;  Surgeon: Kenzie Charles MD;  Location: [facility] OR 1ST FLR;  Service: Urology;  Laterality: N/A;  1 hour    FLUOROSCOPIC URODYNAMIC STUDY N/A 5/11/2022    Procedure: URODYNAMIC STUDY, FLUOROSCOPIC;  Surgeon: Kenzie Charles MD;  Location: [facility] 1ST FLR;  Service: Urology;  Laterality: N/A;  90min    FOOT AMPUTATION THROUGH METATARSAL Right 10/28/2019    Procedure: AMPUTATION, FOOT, TRANSMETATARSAL;  Surgeon: Abdi Bedoya DPM;  Location: [facility] OR;  Service: Podiatry;  Laterality: Right;    FOOT AMPUTATION THROUGH METATARSAL Right 3/27/2020    Procedure: AMPUTATION, FOOT, TRANSMETATARSAL;  Surgeon: Abdi Bedoya DPM;  Location: [facility];  Service: Podiatry;  Laterality: Right;  REVISION    MYELOMININGOCELE REPAIR      NEPHRECTOMY Right 11/4/2021    Procedure: Nephrectomy/ OPEN;  Surgeon: Dash Rosenthal MD;  Location: [facility] OR 2ND FLR;  Service: Urology;  Laterality: Right;  4HRS    NEPHROSTOMY Right 8/2/2021    Procedure: Nephrostomy and perinephric abscess drain;  Surgeon: Lillian Surgeon;  Location: [facility];  Service: Anesthesiology;  Laterality: Right;    REVISION OF VENTRICULOPERITONEAL SHUNT Left 9/21/2022    Procedure: REVISION, SHUNT, VENTRICULOPERITONEAL;  Surgeon: Maynor Calero MD;  Location: [facility] OR 2ND FLR;  Service: Neurosurgery;  Laterality: Left;    STRABISMUS SURGERY      ou dr peña    VENTRICULOPERITONEAL SHUNT      WOUND DEBRIDEMENT  3/27/2020    Procedure:  DEBRIDEMENT, WOUND;  Surgeon: Abdi Bedoya DPM;  Location: [facility] OR;  Service: Podiatry;;     Family History   Problem Relation Age of Onset    Breast cancer Mother     Gout Father     Cataracts Maternal Grandmother     Myocarditis Sister     Amblyopia Neg Hx     Blindness Neg Hx     Cancer Neg Hx     Diabetes Neg Hx     Glaucoma Neg Hx     Hypertension Neg Hx     Macular degeneration Neg Hx     Retinal detachment Neg Hx     Strabismus Neg Hx     Stroke Neg Hx     Thyroid disease Neg Hx      Social History     Tobacco Use    Smoking status: Never    Smokeless tobacco: Never   Substance Use Topics    Alcohol use: Not Currently    Drug use: Never     Review of Systems   Constitutional: Negative.    HENT: Negative.     Eyes: Negative.    Respiratory: Negative.     Cardiovascular: Negative.  Negative for chest pain.   Gastrointestinal: Negative.    Endocrine: Negative.    Genitourinary: Negative.    Musculoskeletal: Negative.    Skin: Negative.    Allergic/Immunologic: Negative.    Neurological:  Positive for seizures and headaches.   Hematological: Negative.    Psychiatric/Behavioral: Negative.     All other systems reviewed and are negative.    Physical Exam     Initial Vitals [01/16/23 2059]   BP Pulse Resp Temp SpO2   (!) 148/90 69 20 98.1 °F (36.7 °C) 100 %      MAP       --         Physical Exam    Nursing note and vitals reviewed.  Constitutional: She appears well-developed and well-nourished.   HENT:   Head: Normocephalic and atraumatic.   Nose: Nose normal.   Mouth/Throat: Oropharynx is clear and moist.   Eyes: Conjunctivae and EOM are normal. Pupils are equal, round, and reactive to light.   Neck: Neck supple. No thyromegaly present. No tracheal deviation present. No JVD present.   Normal range of motion.  Cardiovascular:  Normal rate, regular rhythm, normal heart sounds and intact distal pulses.           No murmur heard.  Pulmonary/Chest: Breath sounds normal. No stridor. No respiratory distress. She  has no wheezes. She has no rales.   Abdominal: Abdomen is soft. Bowel sounds are normal. She exhibits no distension. There is no abdominal tenderness.   Musculoskeletal:         General: No edema. Normal range of motion.      Cervical back: Normal range of motion and neck supple.     Neurological: She is alert and oriented to person, place, and time.   Moving upper extremities without difficulty.  Paraplegic in the lower extremities.  This is baseline.  Previous abdominal scars noted   Skin: Skin is warm. Capillary refill takes less than 2 seconds.   Psychiatric: She has a normal mood and affect. Thought content normal.       ED Course   Critical Care    Date/Time: 1/16/2023 11:26 PM  Performed by: Brando Lewis MD  Authorized by: Brando Lewis MD   Direct patient critical care time: 10 minutes  Total critical care time (exclusive of procedural time) : 10 minutes      Labs Reviewed   CBC W/ AUTO DIFFERENTIAL - Abnormal; Notable for the following components:       Result Value    Gran # (ANC) 8.0 (*)     Immature Grans (Abs) 0.05 (*)     Lymph % 17.5 (*)     All other components within normal limits   COMPREHENSIVE METABOLIC PANEL - Abnormal; Notable for the following components:    Sodium 134 (*)     CO2 20 (*)     Creatinine 0.3 (*)     All other components within normal limits   URINALYSIS, REFLEX TO URINE CULTURE - Abnormal; Notable for the following components:    Protein, UA Trace (*)     Leukocytes, UA 3+ (*)     All other components within normal limits    Narrative:     Specimen Source->Urine   URINALYSIS MICROSCOPIC - Abnormal; Notable for the following components:    WBC, UA 55 (*)     Hyaline Casts, UA 99 (*)     All other components within normal limits    Narrative:     Specimen Source->Urine   CULTURE, URINE   TROPONIN I HIGH SENSITIVITY   B-TYPE NATRIURETIC PEPTIDE   POCT URINE PREGNANCY   POCT GLUCOSE     EKG Readings: (Independently Interpreted)   Initial Reading: No STEMI. Rhythm: Normal Sinus Rhythm.  Ectopy: No Ectopy. Conduction: Normal.     Imaging Results              X-Ray Chest AP Portable (Preliminary result)  Result time 01/16/23 23:21:05      ED Interpretation by Brando Lewis MD (01/16/23 23:21:05, [facility] - Emergency Dept, Emergency Medicine)    Normal                                     CT Head Without Contrast (Final result)  Result time 01/16/23 22:08:24      Final result by Douglas Ferrari MD (01/16/23 22:08:24)                   Narrative:    EXAMINATION: CT HEAD WITHOUT CONTRAST    CLINICAL HISTORY: Dizziness, persistent/recurrent, cardiac or vascular cause suspected    COMPARISON: November 4, 2022    TECHNIQUE: Noncontrast axial images of the head were obtained. Sagittal and coronal reformatted images obtained.    FINDINGS:  CT head: Congenital bone deformities again noted. Ventricular prominence is present with bilateral ventricular shunts present. The appearance is stable. The cerebellar hemispheres are herniated into the upper spinal canal consistent with Chiari malformation. Widening of the upper spinal canal and thinning of the C1 vertebral body likely related to the chronic herniation.    No intracranial hemorrhage or mass lesion is demonstrated. The paranasal sinuses and mastoid air cells are clear.    IMPRESSION:  Stable CT head with bilateral ventricular peritoneal shunts. Stable ventricular prominence. Chiari malformation of the brain. No acute intracranial findings.        This exam was performed according to our departmental dose-optimization program, which includes automated exposure control, adjustment of the mA and/or kV according to patient size and/or use of iterative reconstruction technique.    Electronically signed by:  Douglas Ferrari MD  1/16/2023 10:08 PM [facility] Workstation: [id number]                                  X-Rays:   Independently Interpreted Readings:   Other Readings:  Chest x-ray does not show any acute changes.  CT head does not show any acute  "changes as well  Medications   lactated ringers bolus 1,000 mL (1,000 mLs Intravenous New Bag 1/16/23 2244)   cefTRIAXone (ROCEPHIN) 2 g/50 mL D5W IVPB (2 g Intravenous New Bag 1/16/23 2301)   LORazepam injection 1 mg (1 mg Intravenous Given 1/16/23 2128)   acetaminophen tablet 1,000 mg (1,000 mg Oral Given 1/16/23 2128)   diazePAM injection 5 mg (5 mg Intravenous Given 1/16/23 2243)   famotidine (PF) injection 20 mg (20 mg Intravenous Given 1/16/23 2301)     Medical Decision Making:   Differential Diagnosis:   27-year-old female with spina bifid presented emergency department with seizure.  Patient has history of  shunt occlusion in the past so CT of the head done which is unremarkable.  Patient also had nausea associated with seizure and nausea and vomiting treated.  Patient hydrated.  Patient does have evidence of urinary tract infection so treated.  Benzodiazepine given and patient felt better after that.  Symptoms resolved and patient back at baseline and as feeling better discharged with instructions and follow-up and return precautions given.  Patient to follow-up with her neurologist.  Screening labs and workup reviewed.  EKG chest x-ray CT independently interpreted by me and radiology reports reviewed and labs interpreted by me.  Independently Interpreted Test(s):   I have ordered and independently interpreted X-rays - see prior notes.  I have ordered and independently interpreted EKG Reading(s) - see prior notes  Clinical Tests:   Lab Tests: Reviewed  Radiological Study: Reviewed  Medical Tests: Reviewed  Sepsis Perfusion Assessment: "I attest a sepsis perfusion exam was performed within 6 hours of sepsis, severe sepsis, or septic shock presentation, following fluid resuscitation."                        Clinical Impression:   Final diagnoses:  [R56.9] Seizure (Primary)  [N30.00] Acute cystitis without hematuria        ED Disposition Condition    Discharge Stable          ED Prescriptions       " Medication Sig Dispense Start Date End Date Auth. Provider    cefUROXime (CEFTIN) 500 MG tablet Take 1 tablet (500 mg total) by mouth every 12 (twelve) hours. 20 tablet 1/16/2023 -- Brando Lewis MD    famotidine (PEPCID) 20 MG tablet Take 1 tablet (20 mg total) by mouth 2 (two) times daily. 20 tablet 1/16/2023 1/16/2024 Brando Lewis MD          Follow-up Information       Follow up With Specialties Details Why Contact Info    Verito Scott NP Internal Medicine In 2 days  [address]  [place] [address]  [phone]               Brando Lewis MD  01/16/23 2296

## 2023-01-19 LAB — BACTERIA UR CULT: ABNORMAL

## 2023-02-13 ENCOUNTER — OFFICE VISIT (OUTPATIENT)
Dept: NEUROLOGY | Facility: CLINIC | Age: 28
End: 2023-02-13
Payer: COMMERCIAL

## 2023-02-13 VITALS
BODY MASS INDEX: 30.09 KG/M2 | DIASTOLIC BLOOD PRESSURE: 83 MMHG | HEIGHT: 55 IN | WEIGHT: 130 LBS | HEART RATE: 65 BPM | SYSTOLIC BLOOD PRESSURE: 129 MMHG

## 2023-02-13 DIAGNOSIS — R56.9 SEIZURES: Primary | ICD-10-CM

## 2023-02-13 PROCEDURE — 99999 PR PBB SHADOW E&M-EST. PATIENT-LVL III: CPT | Mod: PBBFAC,,, | Performed by: PSYCHIATRY & NEUROLOGY

## 2023-02-13 PROCEDURE — 3074F SYST BP LT 130 MM HG: CPT | Mod: CPTII,S$GLB,, | Performed by: PSYCHIATRY & NEUROLOGY

## 2023-02-13 PROCEDURE — 3008F BODY MASS INDEX DOCD: CPT | Mod: CPTII,S$GLB,, | Performed by: PSYCHIATRY & NEUROLOGY

## 2023-02-13 PROCEDURE — 99214 OFFICE O/P EST MOD 30 MIN: CPT | Mod: S$GLB,,, | Performed by: PSYCHIATRY & NEUROLOGY

## 2023-02-13 PROCEDURE — 3008F PR BODY MASS INDEX (BMI) DOCUMENTED: ICD-10-PCS | Mod: CPTII,S$GLB,, | Performed by: PSYCHIATRY & NEUROLOGY

## 2023-02-13 PROCEDURE — 99999 PR PBB SHADOW E&M-EST. PATIENT-LVL III: ICD-10-PCS | Mod: PBBFAC,,, | Performed by: PSYCHIATRY & NEUROLOGY

## 2023-02-13 PROCEDURE — 1159F PR MEDICATION LIST DOCUMENTED IN MEDICAL RECORD: ICD-10-PCS | Mod: CPTII,S$GLB,, | Performed by: PSYCHIATRY & NEUROLOGY

## 2023-02-13 PROCEDURE — 1159F MED LIST DOCD IN RCRD: CPT | Mod: CPTII,S$GLB,, | Performed by: PSYCHIATRY & NEUROLOGY

## 2023-02-13 PROCEDURE — 99214 PR OFFICE/OUTPT VISIT, EST, LEVL IV, 30-39 MIN: ICD-10-PCS | Mod: S$GLB,,, | Performed by: PSYCHIATRY & NEUROLOGY

## 2023-02-13 PROCEDURE — 3074F PR MOST RECENT SYSTOLIC BLOOD PRESSURE < 130 MM HG: ICD-10-PCS | Mod: CPTII,S$GLB,, | Performed by: PSYCHIATRY & NEUROLOGY

## 2023-02-13 PROCEDURE — 3079F PR MOST RECENT DIASTOLIC BLOOD PRESSURE 80-89 MM HG: ICD-10-PCS | Mod: CPTII,S$GLB,, | Performed by: PSYCHIATRY & NEUROLOGY

## 2023-02-13 PROCEDURE — 3079F DIAST BP 80-89 MM HG: CPT | Mod: CPTII,S$GLB,, | Performed by: PSYCHIATRY & NEUROLOGY

## 2023-02-13 NOTE — PROGRESS NOTES
Shashank Samuel is a 27 y.o. year old female that  presents for seizure clearance for upcoming dental procedure. She is accompanied by family members.    HPI:  Ms Samuel is well known to this office.  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   Family reports that two weeks ago she sustained a cluster of her typical seizures (body stiffening, head to the left, unresponsiveness) that prompted her evaluation in the ED, where she had blood tests and CT head that were unrevealing and she was eventually discharged home on antibiotics and same dose of keppra.  No further seizures or new neurological developments ever since.  She is scheduled to undergo a routine dental procedure and wants to be cleared from a seizure standpoint.       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     Social History     Socioeconomic History    Marital status: Single   Tobacco Use    Smoking status: Never    Smokeless tobacco: Never   Substance and Sexual Activity    Alcohol use: Not Currently    Drug use: Never    Sexual activity: Never   Social History Narrative    Intact family. Wheelchair bound.  Self catheterizes herself     Past Surgical History:   Procedure Laterality Date    AMPUTATION      right 4th and 5th toes; left 5th toe and portion of left great toe    BACK SURGERY      BLADDER SURGERY      BREAST SURGERY  2015    Reduction    COLON SURGERY      flush site for bowel movements from appendix    CYSTOSTOMY      DEBRIDEMENT OF FOOT Right 1/8/2021     Procedure: DEBRIDEMENT, FOOT;  Surgeon: Abdi Bedoya DPM;  Location: [facility];  Service: Podiatry;  Laterality: Right;    EYE SURGERY      x 5    FLUOROSCOPIC URODYNAMIC STUDY N/A 3/12/2019    Procedure: URODYNAMIC STUDY, FLUOROSCOPIC;  Surgeon: Kenzie Charles MD;  Location: [facility] 1ST FLR;  Service: Urology;  Laterality: N/A;  1 hour    FLUOROSCOPIC URODYNAMIC STUDY N/A 4/4/2019    Procedure: URODYNAMIC STUDY, FLUOROSCOPIC;  Surgeon: Kenzie Charles MD;  Location: [facility] OR 1ST FLR;  Service: Urology;  Laterality: N/A;  1 hour    FLUOROSCOPIC URODYNAMIC STUDY N/A 5/11/2022    Procedure: URODYNAMIC STUDY, FLUOROSCOPIC;  Surgeon: Kenzie Charles MD;  Location: [facility] 1ST FLR;  Service: Urology;  Laterality: N/A;  90min    FOOT AMPUTATION THROUGH METATARSAL Right 10/28/2019    Procedure: AMPUTATION, FOOT, TRANSMETATARSAL;  Surgeon: Abdi Bedoya DPM;  Location: [facility];  Service: Podiatry;  Laterality: Right;    FOOT AMPUTATION THROUGH METATARSAL Right 3/27/2020    Procedure: AMPUTATION, FOOT, TRANSMETATARSAL;  Surgeon: Abdi Bedoya DPM;  Location: [facility];  Service: Podiatry;  Laterality: Right;  REVISION    MYELOMININGOCELE REPAIR      NEPHRECTOMY Right 11/4/2021    Procedure: Nephrectomy/ OPEN;  Surgeon: Dash Rosenthal MD;  Location: [facility] OR 2ND FLR;  Service: Urology;  Laterality: Right;  4HRS    NEPHROSTOMY Right 8/2/2021    Procedure: Nephrostomy and perinephric abscess drain;  Surgeon: Lillian Surgeon;  Location: [facility];  Service: Anesthesiology;  Laterality: Right;    REVISION OF VENTRICULOPERITONEAL SHUNT Left 9/21/2022    Procedure: REVISION, SHUNT, VENTRICULOPERITONEAL;  Surgeon: Maynor Calero MD;  Location: [facility] OR 2ND FLR;  Service: Neurosurgery;  Laterality: Left;    STRABISMUS SURGERY      ou dr peña    VENTRICULOPERITONEAL SHUNT      WOUND DEBRIDEMENT  3/27/2020    Procedure: DEBRIDEMENT, WOUND;  Surgeon: Abdi Bedoya DPM;  Location: [facility];  Service: Podiatry;;  "    Family History   Problem Relation Age of Onset    Breast cancer Mother     Gout Father     Cataracts Maternal Grandmother     Myocarditis Sister     Amblyopia Neg Hx     Blindness Neg Hx     Cancer Neg Hx     Diabetes Neg Hx     Glaucoma Neg Hx     Hypertension Neg Hx     Macular degeneration Neg Hx     Retinal detachment Neg Hx     Strabismus Neg Hx     Stroke Neg Hx     Thyroid disease Neg Hx            Review of Systems  General ROS: negative for chills, fever or weight loss  Psychological ROS: negative for hallucination, depression or suicidal ideation  Ophthalmic ROS: negative for blurry vision, photophobia or eye pain  ENT ROS: negative for epistaxis, sore throat or rhinorrhea  Respiratory ROS: no cough, shortness of breath, or wheezing  Cardiovascular ROS: no chest pain or dyspnea on exertion  Gastrointestinal ROS: no abdominal pain, change in bowel habits, or black/ bloody stools  Genito-Urinary ROS: no dysuria, trouble voiding, or hematuria  Musculoskeletal ROS: positive for gait disturbance and muscular weakness  Neurological ROS: no syncope or seizures  Dermatological ROS: negative for pruritis, rash and jaundice      Physical Exam:  /83   Pulse 65   Ht 4' 7" (1.397 m)   Wt 59 kg (130 lb)   BMI 30.21 kg/m²   General appearance: alert, cooperative, no distress  Constitutional:Oriented to person, place, and time.appears well-developed and well-nourished.   HEENT: Normocephalic, atraumatic, neck symmetrical, no nasal discharge   Eyes: conjunctivae/corneas clear, PERRL, EOM's intact  Lungs: clear to auscultation bilaterally, no dullness to percussion bilaterally  Heart: regular rate and rhythm without rub; no displacement of the PMI   Abdomen: soft, non-tender; bowel sounds normoactive; no organomegaly  Extremities: extremities symmetric; no clubbing, cyanosis, or edema  Integument: Skin color, texture, turgor normal; no rashes; hair distrubution normal  Neurologic:   Mental status: alert and " awake, oriented x 4, comprehension, naming, and repetition intact. No right to left confusion. Performs serial 7's without difficulty .No dysarthria.  CN 2-12: pupils 4 mm bilaterally, reactive to light. Fundi without papilledema. Visual fields full to confrontation. Face sensation normal in all distributions. Face symmetric. Hearing grossly intact. Palate elevates well. Tongue midline without atrophy or fasciculations.  Motor: paraparesis BLE  Sensory: no tested  DTR's: no tested  Plantars: no tested.  Coordination: no tested  Gait: wc, no tested        LABS:    Complete Blood Count  Lab Results   Component Value Date    RBC 4.41 01/16/2023    HGB 12.9 01/16/2023    HCT 39.4 01/16/2023    MCV 89 01/16/2023    MCH 29.3 01/16/2023    MCHC 32.7 01/16/2023    RDW 13.6 01/16/2023     01/16/2023    MPV 9.6 01/16/2023    GRAN 8.0 (H) 01/16/2023    GRAN 73.0 01/16/2023    LYMPH 1.9 01/16/2023    LYMPH 17.5 (L) 01/16/2023    MONO 0.9 01/16/2023    MONO 8.3 01/16/2023    EOS 0.0 01/16/2023    BASO 0.03 01/16/2023    EOSINOPHIL 0.4 01/16/2023    BASOPHIL 0.3 01/16/2023    DIFFMETHOD Automated 01/16/2023       Comprehensive Metabolic Panel  Lab Results   Component Value Date    GLU 88 01/16/2023    BUN 15 01/16/2023    CREATININE 0.3 (L) 01/16/2023     (L) 01/16/2023    K 3.5 01/16/2023     01/16/2023    PROT 8.0 01/16/2023    ALBUMIN 4.2 01/16/2023    BILITOT 0.1 01/16/2023    AST 17 01/16/2023    ALKPHOS 65 01/16/2023    CO2 20 (L) 01/16/2023    ALT 19 01/16/2023    ANIONGAP 12 01/16/2023    EGFRNONAA >60.0 11/08/2021    ESTGFRAFRICA >60.0 11/08/2021       TSH  No results found for: TSH      Assessment: 28 y/o with spina bifida with baseline paraparesis and wheelchair-bound, Arnold 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  Had a recent cluster of seizures without apparent definite trigger, thus likely breakthrough seizures.  Will continue current dose keppra 750 mg BID.  Signed a dentist form stating that she has no contraindications to undergo dental procedure.        No diagnosis found.  There were no encounter diagnoses.      Plan:  1) Spina bifida with baseline paraparesis  2) Arnold Chiari type 2 malformation status post  shunt  3) Seizure disorder on Keppra 750 mg BID                No orders of the defined types were placed in this encounter.          Jerardo Espinosa MD

## 2023-03-01 NOTE — PROGRESS NOTES
[facility]  Podiatry  Progress Note    Patient Name: Shashank Samuel  MRN: [id number]  Admission Date: 3/26/2020  Hospital Length of Stay: 4 days  Attending Physician: Radha Jones MD  Primary Care Provider: Primary Doctor No     Subjective:     Interval History:  Patient is status post revision transmetatarsal amputation right foot    Follow-up For: Procedure(s) (LRB):  AMPUTATION, FOOT, TRANSMETATARSAL (Right)  DEBRIDEMENT, WOUND    Post-Operative Day: 3 Days Post-Op    Scheduled Meds:   balsam peru-castor oiL   Topical (Top) Daily    ceFEPime (MAXIPIME) IVPB  2 g Intravenous Q12H    vancomycin (VANCOCIN) IVPB  1,000 mg Intravenous Q12H     Continuous Infusions:  PRN Meds:acetaminophen, acetaminophen, diphenhydrAMINE, diphenhydrAMINE, fentaNYL, oxyCODONE-acetaminophen, promethazine (PHENERGAN) IVPB, promethazine (PHENERGAN) IVPB, promethazine, Pharmacy to dose Vancomycin consult **AND** vancomycin - pharmacy to dose    Review of Systems  Objective:     Vital Signs (Most Recent):  Temp: 98.7 °F (37.1 °C) (03/30/20 0015)  Pulse: 76 (03/30/20 0015)  Resp: 16 (03/30/20 0015)  BP: (!) 151/81 (03/30/20 0015)  SpO2: 98 % (03/30/20 0015) Vital Signs (24h Range):  Temp:  [98.7 °F (37.1 °C)-99.1 °F (37.3 °C)] 98.7 °F (37.1 °C)  Pulse:  [] 76  Resp:  [16-18] 16  SpO2:  [98 %] 98 %  BP: (134-151)/(81-93) 151/81     Weight: 51.7 kg (113 lb 15.7 oz)  Body mass index is 25.55 kg/m².    Foot Exam I removed the dressing the wound appears to be clean sutures are intact no purulent drainage no fluctuance no redness today.    Laboratory:  Wound Cultures:   Recent Labs   Lab 12/05/19  1144 02/03/20  1100   LABAERO Skin macy,  no predominant organism METHICILLIN RESISTANT STAPHYLOCOCCUS AUREUS  Moderate  *  PROTEUS MIRABILIS  Few  *       Diagnostic Results:  I have reviewed all pertinent imaging results/findings within the past 24 hours.    Clinical Findings:  There was mild no signs of continued  infection sutures intact.  at the distal incision at revision transmetatarsal amputation forefoot.    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  Continue IV antibiotics per Infectious Disease and local wound care 2.  Recommend LTAC for long-term IV antibiotics.    Abdi Bedoya DPM  Podiatry  [facility]   No risk alerts present

## 2023-04-05 ENCOUNTER — OFFICE VISIT (OUTPATIENT)
Dept: UROLOGY | Facility: CLINIC | Age: 28
End: 2023-04-05
Payer: COMMERCIAL

## 2023-04-05 VITALS
SYSTOLIC BLOOD PRESSURE: 121 MMHG | HEART RATE: 72 BPM | DIASTOLIC BLOOD PRESSURE: 74 MMHG | HEIGHT: 55 IN | BODY MASS INDEX: 30.09 KG/M2 | WEIGHT: 130 LBS

## 2023-04-05 DIAGNOSIS — R82.90 ABNORMAL URINALYSIS: Primary | ICD-10-CM

## 2023-04-05 DIAGNOSIS — Q05.2 SPINA BIFIDA OF LUMBAR REGION WITH HYDROCEPHALUS: ICD-10-CM

## 2023-04-05 DIAGNOSIS — N31.9 NEUROGENIC BLADDER: ICD-10-CM

## 2023-04-05 DIAGNOSIS — N30.90 BLADDER INFECTION: ICD-10-CM

## 2023-04-05 DIAGNOSIS — Z78.9 INTERMITTENT SELF-CATHETERIZATION OF BLADDER: ICD-10-CM

## 2023-04-05 PROCEDURE — 87086 URINE CULTURE/COLONY COUNT: CPT | Performed by: UROLOGY

## 2023-04-05 PROCEDURE — 1159F MED LIST DOCD IN RCRD: CPT | Mod: CPTII,S$GLB,, | Performed by: UROLOGY

## 2023-04-05 PROCEDURE — 3008F BODY MASS INDEX DOCD: CPT | Mod: CPTII,S$GLB,, | Performed by: UROLOGY

## 2023-04-05 PROCEDURE — 99215 PR OFFICE/OUTPT VISIT, EST, LEVL V, 40-54 MIN: ICD-10-PCS | Mod: S$GLB,,, | Performed by: UROLOGY

## 2023-04-05 PROCEDURE — 87088 URINE BACTERIA CULTURE: CPT | Performed by: UROLOGY

## 2023-04-05 PROCEDURE — 87186 SC STD MICRODIL/AGAR DIL: CPT | Performed by: UROLOGY

## 2023-04-05 PROCEDURE — 3074F PR MOST RECENT SYSTOLIC BLOOD PRESSURE < 130 MM HG: ICD-10-PCS | Mod: CPTII,S$GLB,, | Performed by: UROLOGY

## 2023-04-05 PROCEDURE — 3078F DIAST BP <80 MM HG: CPT | Mod: CPTII,S$GLB,, | Performed by: UROLOGY

## 2023-04-05 PROCEDURE — 87077 CULTURE AEROBIC IDENTIFY: CPT | Performed by: UROLOGY

## 2023-04-05 PROCEDURE — 3008F PR BODY MASS INDEX (BMI) DOCUMENTED: ICD-10-PCS | Mod: CPTII,S$GLB,, | Performed by: UROLOGY

## 2023-04-05 PROCEDURE — 99215 OFFICE O/P EST HI 40 MIN: CPT | Mod: S$GLB,,, | Performed by: UROLOGY

## 2023-04-05 PROCEDURE — 3074F SYST BP LT 130 MM HG: CPT | Mod: CPTII,S$GLB,, | Performed by: UROLOGY

## 2023-04-05 PROCEDURE — 87184 SC STD DISK METHOD PER PLATE: CPT | Mod: 59 | Performed by: UROLOGY

## 2023-04-05 PROCEDURE — 99999 PR PBB SHADOW E&M-EST. PATIENT-LVL III: ICD-10-PCS | Mod: PBBFAC,,, | Performed by: UROLOGY

## 2023-04-05 PROCEDURE — 99999 PR PBB SHADOW E&M-EST. PATIENT-LVL III: CPT | Mod: PBBFAC,,, | Performed by: UROLOGY

## 2023-04-05 PROCEDURE — 3078F PR MOST RECENT DIASTOLIC BLOOD PRESSURE < 80 MM HG: ICD-10-PCS | Mod: CPTII,S$GLB,, | Performed by: UROLOGY

## 2023-04-05 PROCEDURE — 1159F PR MEDICATION LIST DOCUMENTED IN MEDICAL RECORD: ICD-10-PCS | Mod: CPTII,S$GLB,, | Performed by: UROLOGY

## 2023-04-05 RX ORDER — CEPHALEXIN 500 MG/1
500 CAPSULE ORAL EVERY 6 HOURS
COMMUNITY
End: 2023-04-10 | Stop reason: ALTCHOICE

## 2023-04-05 NOTE — PROGRESS NOTES
CHIEF COMPLAINT:    Ms. Samuel is a 27 y.o. female presenting for a concern for dark urine.  Patient has history of spina bifida and is status post augmentation cystoplasty with continent catheterizable stoma.      PRESENTING ILLNESS:    Shashank Samuel is a 27 y.o. female who is presents with a history of spina bifida and recently had a seizure that could not be controlled.  They found that the shunt was not working.  She was placed in a medically induced coma to break up the seizures.  She had has a number of CTs of the head to monitor.  In the course of this she was found to have a malfunctioning shunt.     She has one functioning kidney.  Her mother was concerned that the patient might have a bladder infection because the urine was dark.  After increasing her water intake she states that the urine is lighter.  She has no fevers, suprapubic tenderness.  (Ms. Samuel is a challenging historian as she often tries to figure out what her caregivers want to hear rather than talk about actual symptoms as is sometimes seen with patients who had lots of interactions with the medical community as children.)  her mother and sister helped with drilling down her symptoms.     The patient is having a challenge catheterizing herself.  After her perinephric abscess she was not as active and has had a 15 lb weight gain which, on her diminutive frame, has a greater impact.     She self catheterizes 5-6 times a day, irritates once a day.    Intermittent catheterization  Catheter type: M14 (needs a long catheter, catheterizes through a Mitrofanoff)  Size:  14 Fr  Frequency:  8  ICD-10 Diagnosis code(s):  neurogenic bladder N31.9, spina bifida Q05.2, intermittent self cath Z78.9  History of Recurrent UTI?: yes  Is this indefinite?:  yes      REVIEW OF SYSTEMS:    Review of Systems   Constitutional: Negative.    HENT: Negative.     Eyes: Negative.    Respiratory: Negative.     Cardiovascular: Negative.    Gastrointestinal:  Negative.    Musculoskeletal:  Positive for back pain (lumbar).   Skin: Negative.    Neurological: Negative.    Endo/Heme/Allergies: Negative.    Psychiatric/Behavioral: Negative.       PATIENT HISTORY:    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       Past Surgical History:   Procedure Laterality Date    AMPUTATION      right 4th and 5th toes; left 5th toe and portion of left great toe    BACK SURGERY      BLADDER SURGERY      BREAST SURGERY  2015    Reduction    COLON SURGERY      flush site for bowel movements from appendix    CYSTOSTOMY      DEBRIDEMENT OF FOOT Right 1/8/2021    Procedure: DEBRIDEMENT, FOOT;  Surgeon: Abdi Bedoya DPM;  Location: [facility];  Service: Podiatry;  Laterality: Right;    EYE SURGERY      x 5    FLUOROSCOPIC URODYNAMIC STUDY N/A 3/12/2019    Procedure: URODYNAMIC STUDY, FLUOROSCOPIC;  Surgeon: Kenzie Charles MD;  Location: [facility] OR [address] [place] [address];  Service: Urology;  Laterality: N/A;  1 hour    FLUOROSCOPIC URODYNAMIC STUDY N/A 4/4/2019    Procedure: URODYNAMIC STUDY, FLUOROSCOPIC;  Surgeon: Kenzie Charles MD;  [facility];  Service: Urology;  Laterality: N/A;  1 hour    FLUOROSCOPIC URODYNAMIC STUDY N/A 5/11/2022    Procedure: URODYNAMIC STUDY, FLUOROSCOPIC;  Surgeon: Kenzie Charles MD;  [facility];  Service: Urology;  Laterality: N/A;  90min    FOOT AMPUTATION THROUGH METATARSAL Right 10/28/2019    Procedure: AMPUTATION, FOOT, TRANSMETATARSAL;  Surgeon: Abdi Bedoya DPM;  Location: [facility];  Service: Podiatry;  Laterality: Right;    FOOT AMPUTATION THROUGH METATARSAL Right 3/27/2020    Procedure: AMPUTATION, FOOT, TRANSMETATARSAL;  Surgeon: Abdi Bedoya DPM;  Location: [facility];  Service:  Podiatry;  Laterality: Right;  REVISION    MYELOMININGOCELE REPAIR      NEPHRECTOMY Right 11/4/2021    Procedure: Nephrectomy/ OPEN;  Surgeon: Dash Rosenthal MD;  [address];  Service: Urology;  Laterality: Right;  4HRS    NEPHROSTOMY Right 8/2/2021    Procedure: Nephrostomy and perinephric abscess drain;  Surgeon: Lillian Surgeon;  Location: [facility];  Service: Anesthesiology;  Laterality: Right;    REVISION OF VENTRICULOPERITONEAL SHUNT Left 9/21/2022    Procedure: REVISION, SHUNT, VENTRICULOPERITONEAL;  Surgeon: Maynor Calero MD;  Location: [facility] OR 2ND FLR;  Service: Neurosurgery;  Laterality: Left;    STRABISMUS SURGERY      ou dr peña    VENTRICULOPERITONEAL SHUNT      WOUND DEBRIDEMENT  3/27/2020    Procedure: DEBRIDEMENT, WOUND;  Surgeon: Abdi Bedoya DPM;  Location: [facility];  Service: Podiatry;;       Family History   Problem Relation Age of Onset    Breast cancer Mother     Gout Father     Cataracts Maternal Grandmother     Myocarditis Sister      Social History     Socioeconomic History    Marital status: Single   Tobacco Use    Smoking status: Never    Smokeless tobacco: Never   Substance and Sexual Activity    Alcohol use: Not Currently    Drug use: Never    Sexual activity: Never   Social History Narrative    Intact family. Wheelchair bound.  Self catheterizes herself       Allergies:  Latex, natural rubber; Zofran [ondansetron hcl (pf)]; Gardasil  [h papillomavirus vac,qval (pf)]; Menactra  [mening vac a,c,y,w135 dip (pf)]; Nitrofurantoin; Sulfa (sulfonamide antibiotics); Tramadol; Levaquin [levofloxacin]; and Macrobid [nitrofurantoin monohyd/m-cryst]    Medications:  Outpatient Encounter Medications as of 4/5/2023   Medication Sig Dispense Refill    acetaminophen (TYLENOL) 500 MG tablet Take 1,000 mg by mouth every 6 (six) hours as needed for Pain.      cefUROXime (CEFTIN) 500 MG tablet Take 1 tablet (500 mg total) by mouth every 12 (twelve) hours. (Patient not taking:  Reported on 2/13/2023) 20 tablet 0    diazePAM 5-7.5-10 mg (DIASTAT ACUDIAL) 5-7.5-10 mg Kit rectal kit Place rectally.      famotidine (PEPCID) 20 MG tablet Take 1 tablet (20 mg total) by mouth 2 (two) times daily. (Patient not taking: Reported on 2/13/2023) 20 tablet 0    ferrous sulfate (FEOSOL) 325 mg (65 mg iron) Tab tablet Take 325 mg by mouth 2 (two) times daily.      ibuprofen (ADVIL,MOTRIN) 200 MG tablet Take 200 mg by mouth every 6 (six) hours as needed for Pain.      levETIRAcetam (KEPPRA) 750 MG Tab Take 1 tablet (750 mg total) by mouth 2 (two) times daily. 60 tablet 11     No facility-administered encounter medications on file as of 4/5/2023.         PHYSICAL EXAMINATION:    The patient generally appears in good health, is appropriately interactive, and is in no apparent distress.    Skin: No lesions.    Mental: Cooperative with normal affect.    Neuro: Grossly intact.    HEENT: Normal. No evidence of lymphadenopathy.    Chest:  normal inspiratory effort.    Abdomen: Soft, non-tender. No masses or organomegaly. Bladder is not palpable. No evidence of flank discomfort. No evidence of inguinal hernia.  The stoma is difficult to cannulate.  Abdomen is round.  MACE opening is intact.     Extremities: No clubbing, cyanosis, or edema      LABS:    Lab Results   Component Value Date    BUN 15 01/16/2023    CREATININE 0.3 (L) 01/16/2023       IMPRESSION:    Neurogenic bladder  Spina bifida  Intermittent catheterization     PLAN:    1. Urine sent for culture  2.  Recommended weight loss as this is likely impacting her Mitrofanoff  3.  Follow up in 6 months.     I spent 40 minutes with the patient of which more than half was spent in direct consultation with the patient in regards to our treatment and plan.

## 2023-04-08 LAB — BACTERIA UR CULT: ABNORMAL

## 2023-04-10 ENCOUNTER — TELEPHONE (OUTPATIENT)
Dept: UROLOGY | Facility: CLINIC | Age: 28
End: 2023-04-10
Payer: COMMERCIAL

## 2023-04-10 DIAGNOSIS — M62.3 PARAPLEGIC IMMOBILITY SYNDROME: Primary | ICD-10-CM

## 2023-04-10 RX ORDER — CEFDINIR 300 MG/1
300 CAPSULE ORAL 2 TIMES DAILY
Qty: 14 CAPSULE | Refills: 0 | Status: SHIPPED | OUTPATIENT
Start: 2023-04-10 | End: 2023-04-10

## 2023-04-10 RX ORDER — CEFDINIR 300 MG/1
300 CAPSULE ORAL 2 TIMES DAILY
Qty: 14 CAPSULE | Refills: 0 | Status: SHIPPED | OUTPATIENT
Start: 2023-04-10 | End: 2023-04-17

## 2023-04-10 NOTE — TELEPHONE ENCOUNTER
----- Message from Kenzie Charles MD sent at 4/10/2023 10:38 AM CDT -----  Cefdinir sent to preferred pharmacy.  Please call her and let her know.

## 2023-04-10 NOTE — TELEPHONE ENCOUNTER
----- Message from Brandie Lane sent at 4/10/2023 10:42 AM CDT -----  Regarding: advice  We received a prescription for cefdinir (OMNICEF) 300 MG capsule.  Calling to inform Dr Charles that the medication is on back order.  Would like to know if LIZ Charles is going to want to change the medication.  Pls call.

## 2023-04-11 ENCOUNTER — PATIENT MESSAGE (OUTPATIENT)
Dept: UROLOGY | Facility: CLINIC | Age: 28
End: 2023-04-11
Payer: MEDICAID

## 2023-04-13 DIAGNOSIS — M62.3 PARAPLEGIC IMMOBILITY SYNDROME: Primary | ICD-10-CM

## 2023-04-20 ENCOUNTER — CLINICAL SUPPORT (OUTPATIENT)
Dept: REHABILITATION | Facility: HOSPITAL | Age: 28
End: 2023-04-20
Payer: COMMERCIAL

## 2023-04-20 DIAGNOSIS — M79.601 PAIN IN BOTH UPPER EXTREMITIES: ICD-10-CM

## 2023-04-20 DIAGNOSIS — Z99.3 WHEELCHAIR DEPENDENCE: ICD-10-CM

## 2023-04-20 DIAGNOSIS — M79.602 PAIN IN BOTH UPPER EXTREMITIES: ICD-10-CM

## 2023-04-20 DIAGNOSIS — Z74.09 IMPAIRED FUNCTIONAL MOBILITY, BALANCE, AND ENDURANCE: ICD-10-CM

## 2023-04-20 PROCEDURE — 97542 WHEELCHAIR MNGMENT TRAINING: CPT | Mod: PN

## 2023-04-20 PROCEDURE — 97161 PT EVAL LOW COMPLEX 20 MIN: CPT | Mod: PN

## 2023-04-20 NOTE — PROGRESS NOTES
Thank you for your referral. Please see PT Functional Mobility and Wheelchair Assessment for specifics.

## 2023-04-26 PROBLEM — M79.603 UPPER EXTREMITY PAIN: Status: ACTIVE | Noted: 2023-04-26

## 2023-04-26 PROBLEM — Z99.3 WHEELCHAIR DEPENDENCE: Status: ACTIVE | Noted: 2023-04-26

## 2023-04-26 PROBLEM — Z74.09 IMPAIRED FUNCTIONAL MOBILITY, BALANCE, AND ENDURANCE: Status: ACTIVE | Noted: 2023-04-26

## 2023-04-28 NOTE — PLAN OF CARE
[facility]  Physical Therapy  Functional Mobility and Wheelchair Assessment    Date: 4/20/2023   Name: Shashank Samuel  Clinic Number: [id number]    Therapy Diagnosis:   Encounter Diagnoses   Name Primary?    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 Fuller for a functional mobility and custom wheelchair assessment due to Paraplegic immobility syndrome. 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 Patient reports a past medical history significant for Epilepsy (most recent seizure required medically induced coma, September), Hydrocephalus with shunt placement (3 shunt revisions), kidney removal (2 years ago) and toe amputation. Reports she has Newman rods that are now broken and fused to her spine preventing surgical removal.     Patient reports she has a pressure sore on her bottom (right side). Is followed by wound care for skin breakdown on her bottom and feet. Patient has a colostomy (bowel) and Mitrofanoff for bladder management.     Prior Therapy: none since childhood     Medical History:   Past Medical History:   Diagnosis Date    Anemia     Arnold-Chiari malformation, type II     Encounter for blood transfusion     Hydrocephalus     Neurogenic bladder     self catheterizes every 3 hours    Seizures     only w/ shunt malfunction    Spinal  "bifida, closed     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       Surgical History:   Shashank Samuel  has a past surgical history that includes Cystostomy; Myelomeningocele repair; Bladder surgery; Colon surgery; Ventriculoperitoneal shunt; Eye surgery; Strabismus surgery; Back surgery; Amputation; Fluoroscopic urodynamic study (N/A, 3/12/2019); Fluoroscopic urodynamic study (N/A, 4/4/2019); Foot amputation through metatarsal (Right, 10/28/2019); Foot amputation through metatarsal (Right, 3/27/2020); Wound debridement (3/27/2020); Debridement of foot (Right, 1/8/2021); Nephrostomy (Right, 8/2/2021); Breast surgery (2015); Nephrectomy (Right, 11/4/2021); Fluoroscopic urodynamic study (N/A, 5/11/2022); and Revision of ventriculoperitoneal shunt (Left, 9/21/2022).    Medications:   Shashank has a current medication list which includes the following prescription(s): diazepam 5-7.5-10 mg, ferrous sulfate, ibuprofen, and levetiracetam.    Allergies:   Review of patient's allergies indicates:   Allergen Reactions    Latex, natural rubber Anaphylaxis and Other (See Comments)     angioedema    Zofran [ondansetron hcl (pf)] Swelling     Hives, "throat closes up"    Gardasil  [h papillomavirus vac,qval (pf)]      Other reaction(s): Shortness of breath    Menactra  [mening vac a,c,y,w135 dip (pf)]      Other reaction(s): Shortness of breath    Nitrofurantoin      Other reaction(s): Difficulty breathing    Sulfa (sulfonamide antibiotics)     Tramadol Hives    Levaquin [levofloxacin] Rash     Spots on face    Macrobid [nitrofurantoin monohyd/m-cryst] Rash     Sppots/rash on face        Patient height: 4'7"  Patient weight:   Wt Readings from Last 1 Encounters:   04/05/23 59 kg (130 lb)      Is this a progressive disease? No  Any recent weight changes or trends? No  Relevant future surgeries: No  Cardio status: intact  Respiratory status: intact   Does this patient have an amputation: Yes - toes on bilateral feet    Orthotic use: No    HOME " ENVIRONMENT  Living environment: One story with ramp to enter   Social support: Mom, dad, and sister   Hours without assistance: 1-4  Home is accessible to patient: yes, WC/handicap accessible (with exception of mom's bathroom)   Storage of wheelchair: in home     COMMUNITY  Transportation: car  Does patient sit in wheelchair during transport? no   Self-?  no  Employment and/or School - specific requirements related to mobility needs: None identified at this time     COMMUNICATION   Verbal communication: WFL receptive and WFL expressive  Language - primary:  English  Language - secondary: n/a   Communication provided by patient    CURRENT SEATING / MOBILITY:   Current Mobility Device: manual wheelchair  , model, serial number and specs listed below if available: Pushpa LENTZ  Age of current device (years): 8 years  Purchased by: non-insurance - [employer] and Medicaid  Current condition of mobility base: current equipment would cost more to repair to functional and safe status than new replacement and no longer safe for daily use (see below)   Current seating system: Rom campbell (2 notable holes in cover)   Current condition of seating system: no longer appropriate given patients report of current pressure sore (see below)   Age of seating system, if different from base (years): 8 years  Describe posture in present seating system: excess thoracic kyphosis and lumbar lordosis   Is the current mobility device meeting medical necessity? No Explain:     Wheelchair brakes do not work sufficiently to stabilize wheelchair. Her current cushion is too long for frame of chair contributing to inappropriate seat depth and altered positioning. Patient is unable to get sufficient contact with foot plate on left due to range of motion restrictions. Reports that her current cushion and improper positioning in wheelchair are contributing to pressure sores. She also reports that her leg gets stuck behind foot plate  during transfers. Her mother reports that patient has gotten sores and cut into her thighs due to wear of wheelchair seat belt.       Prior Level of Function: modified independent with mobility and majority of ADLs in manual wheelchair   Current Level of Function: same as above but reports increased upper extremity pain that is exacerbated by wheelchair propulsion throughout the day or for long distances; pain limits her performance of long distance propulsion     Pain:  Current 0/10, worst 10/10, best 0/10   Location: mid back, bilateral elbow pain     Pt's goals: Obtain manual wheelchair for independent mobility in home and community.   Caregiver goals and specific limitations that may affect care: None identified at this time      See face-sheet for patient contact and insurance information         Objective     MOBILITY / BALANCE  Sitting Balance Standing Balance Transfers Ambulation   Fair: Patient able to maintain balance with handhold support; may require occasional minimal assistance. Patient is unable to stand. independent with safety concerns unable to ambulate    Fall History:   Number of falls in last 6 months: 0  Number of near falls in last 6 months: 3 Transfer method: other forward/backward scoot into wheelchair without sliding board       Ability to complete Mobility-Related Activities of Daily Living (MRADL's) with CURRENT Mobility Device  Move room to room independent with assistive device MRADL's Comments:     All performed from wheelchair level    Meal preparation independent with assistive device    Feeding independent    Bathing independent with assistive device    Grooming independent with assistive device    Upper Extremity Dressing set up    Lower Extremity Dressing set up    Toileting independent with assistive device    Bowel Management: colostomy   Bladder Management: Mitrofanoff      Current Functional Mobility Equipment Skills     Cane/Crutches Does not meet mobility needs due to:, Risk  of falling or History of falls, Safety concerns with physical ability, Decreased / limitations in endurance & strength, Pain, and Pace / Speed   Walker / Rollator Does not meet mobility needs due to:, Risk of falling or History of falls, Safety concerns with physical ability, Decreased / limitations in endurance & strength, Pain, and Pace / Speed   Manual Wheelchair - Does not meet mobility needs due to:, Decreased / limitations in endurance & strength, Pain, and Pace / Speed   Manual Wheelchair with Power Assist () Meets needs for safe Independent functional ambulation / mobility   Scooter Does not meet mobility needs due to:, Risk of falling or History of falls, and Safety concerns with physical ability   Power Wheelchair: standard joystick Does not meet mobility needs due to: and Environmental limitations   Power Wheelchair: alternative controls Does not meet mobility needs due to: and Environmental limitations   Summary: least costly alternative for independent functional mobility was found to be: manual wheelchair with power assist    Functional Processing Skills for Wheeled Mobility        Processing skills are adequate for safe mobility: Yes  Patient is willing and motivated to use recommended mobility equipment: Yes  Patient is UNABLE to safely operate mobility equipment independently and requires dependent care mobility: No       PATIENT MEASUREMENTS (inches)    1 26.5 seat to top of head    2 Left 19  Left 17 seat to shoulder left and right    3 Right 15  Left 13 seat to axilla left and right    4 Right 6  Left 5 seat to 90 degree elbow left and right    5 Right 17  Left 17 seat depth    6 Right 6  Left 6.75 foot length left and right    7 N/A head width    8 18 shoulder width    9 13.5 chest width    10 17 hip width    11 12 floor to seat left    12 12.5 floor to seat right   Comments: N/A        SENSATION and SKIN ISSUES    Sensation: impaired Location(s) of sensation impairment: below  trunk    Pressure Relief Methods: Performs wheelchair push up and side lean pressure relief  Effective pressure relief method(s) above can be performed consistently throughout the day:     Patient can currently perform adequate pressure reliefs while sitting in wheelchair; however, physical therapist has concerns with shoulder protection and risk for upper extremity injury with continued use of wheelchair push up method. Patient is currently getting into bed every 2 hours to fully offload bottom.      Skin Issues/Skin Integrity - Current skin issues:  Yes and open area         History of skin issues:   Yes -     Patient sees wound care for bilateral feet wounds and ischial tuberosity wound  History of skin flap surgeries:   No   PAIN  Current 0/10, worst 10/10, best 0/10   Location: mid back, bilateral elbow pain    How does pain interfere with mobility and/or MRADLs? Pain is exacerbated by wheelchair propulsion throughout the day or for long distances; pain limits her performance of long distance propulsion          MAT EVALUATION    Neuro-Muscular Status (tone, reflexive, responses, etc.): Intact      Pelvis     anterior; fixed       Right obliquity (left elevated); fixed       WFL      Tonal Influence at Pelvis: Normal   Trunk     increased thoracic kyphosis; tendency away from neutral       Convex Right; fixed        neutral       Tonal Influence at Trunk: Normal   Head & Neck functional Good head control Tone/Movement of head and neck comments: N/A      Hips     abducted; flexible - external correction        neutral   Tone/Movement of lower extremities:  Flaccid  Edema: 1+  Location: in bilateral feet        Lower Extremity Passive Range of Motion     ROM   Hip Flexion WFLs     Hip Extension limited as follows:   Left: -30 degrees   Right: -25 degrees    Hip Abduction WFLs   Hip Adduction WFLs   Knee Extension limited as follows:   Left knee extension: -20 degrees  Right knee extension: -18 degrees    Knee  Flexion    Ankle Dorsiflexion limited as follows:   Left dorsiflexion: -35 degrees  Right dorsiflexion: -10 degrees    Ankle Plantarflexion        Lower Extremity Strength  Right LE  Left LE    Hip flexion: 0/5 Hip flexion: 0/5   Hip extension:  0/5 Hip extension: 0/5   Hip abduction: 0/5 Hip abduction: 0/5   Hip adduction: 0/5 Hip adduction 0/5   Knee extension: 0/5 Knee extension: 0/5   Knee flexion: 0/5 Knee flexion: 0/5   Ankle dorsiflexion: 0/5 Ankle dorsiflexion: 0/5   Ankle plantarflexion: 0/5 Ankle plantarflexion: 0/5         Upper Extremity Shoulder Posture:    Elevation-left   Tone/Movement of upper extremities:   Normal    Edema: none  Location: No upper extremity edema observed          Wrist & Hand Handedness: left   Hand Limitations:  WNL -bilateral       Upper Extremity Range of Motion     ROM   Shoulder Flexion WFLs   Shoulder Abduction WFLs   Shoulder Adduction  WFLs   Elbow Flexion WFLs   Elbow Extension WFLs   Wrist Flexion   WFLs   Wrist Extension WFLs   Pinch Strength Not tested      Strength Right: 57  lbs  Left: 65 lbs      Upper Extremity Strength  (R) UE  (L) UE    Shoulder flexion: 5/5 Shoulder flexion: 4/5   Shoulder Abduction: 4/5 Shoulder Abduction: 4/5   Shoulder Adduction: Not tested   Shoulder Adduction: Not tested     Elbow flexion: 5/5 Elbow flexion: 5/5   Elbow extension: 5/5 Elbow extension: 5/5   Wrist flexion: 5/5 Wrist flexion: 5/5   Wrist extension: 4/5 Wrist extension: 4/5       Mobility Base Recommendations and Justification    Mobility Base Recommendation: Manual mobility base  Justification for decision: is not a safe, functional ambulator, limitation prevents from completing a MRADL(s) within a reasonable timeframe, limitation places at high risk of morbidity or mortality secondary to the attempts to perform a MRADL(s), limitation prevents accomplishing a MRADL(s) entirely, provide independent mobility, equipment is a lifetime medical need, walker or cane inadequate,  and scooter/POV inadequate  Number of Hours per day in above selected mobility base: 8+      Component Recommendations and Justification  Should include but not be limited to:   MANUAL MOBILITY     [] Standard manual wheelchair    Arm:  [] Right [] Left [] Bilateral  Foot:  [] Right [] Left [] Bilateral [] self-propels wheelchair   [] will use on regular basis   [] chair fits throughout home   [] willing and motivated to use   [] propels with assistance   [] dependent use    [] Standard geena-manual wheelchair    Arm:  [] Right [] Left [] Bilateral  Foot:  [] Right [] Left [] Bilateral [] lower seat height required to foot propel   [] short stature   [] self-propels wheelchair   [] will use on regular basis   [] chair fits throughout home   [] willing and motivated to use   [] propels with assistance  [] dependent use    [] Lightweight manual wheelchair    Arm:  [] Right [] Left [] Bilateral  Foot:  [] Right [] Left [] Bilateral  [] geena height required [] medical condition and weight of wheelchair affect ability to self propel standard manual wheelchair in the residence   [] can and does self-propel (marginal propulsion skills)    [] chair fits throughout home   [] willing and motivated to use  [] lower seat height required to foot propel   [] short stature    [] High strength lightweight manual wheelchair (Subarctic Limited Ultra 4)    Arm:  [] Right [] Left [] Bilateral  Foot:  [] Right [] Left [] Bilateral  [] geena height required [] medical condition and weight of wheelchair affect ability to self propel while engaging in frequent MRADL(s) that cannot be performed in a standard or lightweight manual wheelchair   [] chair fits throughout home   [] willing and motivated to use  [] prevent repetitive use injuries   [] lower seat height required to foot propel   [] short stature   [x] Ultralightweight manual wheelchair    Arm:  [x] Bilateral  Foot:  [x] Bilateral   [] geena height required   [] heavy  duty     Front seat to floor 18 inches   Rear seat to floor 16.5 inches   Back height 12 inches   Back angle 90 degrees   Front angle 80 degrees [x] full-time manual wheelchair user   [x] Requires individualized fitting and optimal adjustments for multiple features that include adjustable axle configuration, fully adjustable center of gravity, wheel camber, seat and back angle, angle of seat slope, which cannot be accommodated by a  through  manual wheelchair   [x] prevent repetitive use injuries   [x] daily use 8-12 hours   [] user has high activity patterns that frequently require them to go out into the community for the purpose of independently accomplishing high level MRADL activities. Examples of these might include a combination of; shopping, work, school, banking, childcare, independently loading and unloading from a vehicle etc.   [] lower seat height required to foot propel   [] short stature   [] heavy duty - weight over 250lbs    [] Current chair is a  manufacture:___________________ model:_________________ serial#____________________ age:_________  [] First time  user (complete trial)    time and # of strokes to propel 30 feet: ________seconds _________strokes    time and # of strokes to propel 30 feet: ________seconds _________strokes   What was the result of the trial between the  and  manual wheelchair? N/A  What features of the  w/c are needed as compared to the  base? Why? N/A  [] adjustable seat and back angle changes the angle of seat slope of the frame to attain a gravity assisted position for efficient propulsion and proper weight distribution along the frame   [] the front of the wheelchair will be configured higher than the back of the chair to allow gravity to assist the user with postural stability   [] the center of the wheel will be positioned for stability, safety and efficient propulsion   [] adjustable axle allows for vertical,  horizontal, camber and overall width changes throughout the wheels for adjustment of the client's exact needs and abilities.   [] adjustable axle increases the stability and function of the chair allowing for adjustment of the center of gravity.   [] accommodates the client's anatomical position in the chair maximizing independence in mobility and maneuverability in all environments.   [] create a minimal fixed tilt-in space to assist in positioning.   Describe users full-time manual wheelchair activity patterns: ____   [x] Power assist   Comments:   [x] prevent repetitive use injuries  [] repetitive strain injury present in shoulder girdle   [] shoulder pain is (> or =) to 7/10 during manual propulsion  *reports elbow pain 7/10 with activities including manual wheelchair propulsion   Current Pain ___/10   [x] requires conservation of energy to participate in MRADL(s)   [] unable to propel up ramps or curbs using manual wheelchair   [x] been  user greater than one year   [x] user unwilling to use power wheelchair (reason): transportation and home accessibility   [] less expensive option to power wheelchair   [] rim activated power assist - decreased strength    [] Heavy duty manual wheelchair        [] geena height required   [] Dependent base [] user exceeds 250lbs   [] non-functional ambulator   [] extreme spasticity   [] overactive movement  [] broken frame/hx of repeated repairs   [] able to self-propel in residence  [] lower seat to floor height required   [] unable to self-propel in residence    -Extra heavy duty manual wheelchair    Arm:   [] geena height required   [] Dependent base [] user exceeds 300lbs   [] non-functional ambulator   [] able to self-propel in residence   [] lower seat to floor height required  [] unable to self-propel in residence    [] Manual wheelchair with tilt    (Manual Tilt-n-Space)  [] patient is dependent for transfers   [] patient requires frequent positioning  for pressure relief   [] patient requires frequent positioning for poor/absent trunk control    [] Stroller Base [] infant/child   [] unable to propel manual wheelchair   [] allows for growth   [] non-functional ambulator  [] non-functional UE   [] independent mobility is not a goal at this time   MANUAL FRAME OPTIONS   Push handles   [x] extended   [] angle adjustable   [] standard [x] caregiver access   [x] caregiver assist   [x] allows hooking to enable increased ability to perform ADLs or maintain balance   [] Angle Adjustable Back  [] postural control   [] control of tone/spasticity   [] accommodation of range of motion  [] UE functional control   [] accommodation for seating system    Rear wheel placement   [] std/fixed   [x] fully adjustable  [] amputee   [] camber 0 degree   [] removable rear wheel   [] non-removable rear wheel   Wheel size 24 inches     Wheel style: spoke  [x] improved UE access to wheels   [x] increase propulsion ability   [x] improved stability   [x] changing angle in space for improvement of postural stability   [x] remove for transport   [] allow for seating system to fit on base   [] amputee placement   [] 1-arm drive access:   [] enable propulsion of manual wheelchair with one arm   [] amputee    Wheel rims/ Hand rims   [x] Standard   [] Specialized-  [x] provide ability to propel manual wheelchair   [] increase self-propulsion with hand weakness/decreased grasp   [] Spoke protector/guard  [] prevent hands from getting caught in spokes   Tires:   [] pneumatic   [x] flat free inserts   [] solid   Style: Full poly  [] decrease roll resistance   [] increase shock absorbency   [] decrease pain from road shock   [] decrease spasms from road shock   [x] prevent frequent flats   [x] decrease maintenance    Wheel Locks:   [] push [] pull [x] scissor  [x] lock wheels for transfers   [x] lock wheels from rolling   [] Brake/wheel lock extension:  [] allow user to operate wheel locks due to  decreased reach or strength   Lina housing: adjustable  Lina size: 5 inch  Style:   [] suspension fork  [x] maneuverability   [x] stability of wheelchair   [x] durability   [] maintenance   [] angle adjustment for posture   [] allow for feet to come under wheelchair base  [x] allows change in seat to floor height   [] increase shock absorbency  [] decrease pain from road shock   [] decrease spasms from road shock   [x] Side guards  [x] prevent clothing getting caught in wheel or becoming soiled   [] provide hip and pelvic stability  [x] eliminates contact between body and wheels   [] limit hand contact with wheels   [] Anti-tippers  [] prevent wheelchair from tipping backward  [] assist caregiver with curbs      CHAIR OPTIONS MANUAL & POWER   Armrests   [] adjustable height [] removable [] swing away[] fixed   [] flip back [] reclining   [] full length pads [] desk [] tube arms   [] gel pads  [] provide support with elbow at 90  [] remove/flip back/swing away for transfers   [] provide support and positioning of upper body   [] allow to come closer to table top   [] remove for access to tables   [] provide support for w/c tray   [] change of height/angles for variable activities   [] Elbow support / Elbow stop  [] keep elbow positioned on arm pad   [] keep arms from falling off arm pad during tilt and/or recline    Upper Extremity Support    [] decrease gravitational pull on shoulders   [] provide support to increase UE function   [] provide hand support in natural position   [] position flaccid UE   [] decrease subluxation   [] decrease edema   [] manage spasticity   [] provide midline positioning   [] provide work surface   [] placement for AAC/Computer/EADL   Hangers/ Legrests   [x] 80 degree   [] elevating   [] articulating   [] swing away   [x] fixed   [] lift off   [] heavy duty   [] adjustable knee angle   [] adjustable calf panel   [] longer extension tube  [x] provide LE support   [x] maintain placement  of feet on footplate  [x] accommodate lower leg length   [] accommodate to hamstring tightness   [] enable transfers   [] provide change in position for LE's   [] elevate legs during recline   [] decrease edema   [] durability    Foot support   [x] footplate bilateral  [] flip up  [] depth adjustable   [x] angle adjustable  [] foot board/one piece  [x] provide foot support   [x] accommodate to ankle ROM   [x] allow foot to go under wheelchair base   [x] enable transfers    [] Shoe holders  [] position foot   [] decrease / manage spasticity   [] control position of LE   [] stability   [] safety    [] Ankle strap/heel loops   Padded Calf strap  [x] support foot on foot support   [] decrease extraneous movement   [] provide input to heel   []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

## 2023-05-01 ENCOUNTER — PATIENT MESSAGE (OUTPATIENT)
Dept: UROLOGY | Facility: CLINIC | Age: 28
End: 2023-05-01
Payer: MEDICAID

## 2023-07-06 ENCOUNTER — OFFICE VISIT (OUTPATIENT)
Dept: UROLOGY | Facility: CLINIC | Age: 28
End: 2023-07-06
Payer: COMMERCIAL

## 2023-07-06 VITALS
HEIGHT: 55 IN | DIASTOLIC BLOOD PRESSURE: 88 MMHG | BODY MASS INDEX: 30.21 KG/M2 | SYSTOLIC BLOOD PRESSURE: 127 MMHG | HEART RATE: 79 BPM

## 2023-07-06 DIAGNOSIS — Z78.9 INTERMITTENT SELF-CATHETERIZATION OF BLADDER: ICD-10-CM

## 2023-07-06 DIAGNOSIS — Q05.2 SPINA BIFIDA OF LUMBAR REGION WITH HYDROCEPHALUS: ICD-10-CM

## 2023-07-06 DIAGNOSIS — N31.9 NEUROGENIC BLADDER: Primary | ICD-10-CM

## 2023-07-06 PROCEDURE — 3079F DIAST BP 80-89 MM HG: CPT | Mod: CPTII,S$GLB,, | Performed by: UROLOGY

## 2023-07-06 PROCEDURE — 99999 PR PBB SHADOW E&M-EST. PATIENT-LVL III: ICD-10-PCS | Mod: PBBFAC,,, | Performed by: UROLOGY

## 2023-07-06 PROCEDURE — 99214 OFFICE O/P EST MOD 30 MIN: CPT | Mod: S$GLB,,, | Performed by: UROLOGY

## 2023-07-06 PROCEDURE — 3008F BODY MASS INDEX DOCD: CPT | Mod: CPTII,S$GLB,, | Performed by: UROLOGY

## 2023-07-06 PROCEDURE — 3074F SYST BP LT 130 MM HG: CPT | Mod: CPTII,S$GLB,, | Performed by: UROLOGY

## 2023-07-06 PROCEDURE — 3008F PR BODY MASS INDEX (BMI) DOCUMENTED: ICD-10-PCS | Mod: CPTII,S$GLB,, | Performed by: UROLOGY

## 2023-07-06 PROCEDURE — 1159F MED LIST DOCD IN RCRD: CPT | Mod: CPTII,S$GLB,, | Performed by: UROLOGY

## 2023-07-06 PROCEDURE — 1159F PR MEDICATION LIST DOCUMENTED IN MEDICAL RECORD: ICD-10-PCS | Mod: CPTII,S$GLB,, | Performed by: UROLOGY

## 2023-07-06 PROCEDURE — 99214 PR OFFICE/OUTPT VISIT, EST, LEVL IV, 30-39 MIN: ICD-10-PCS | Mod: S$GLB,,, | Performed by: UROLOGY

## 2023-07-06 PROCEDURE — 3074F PR MOST RECENT SYSTOLIC BLOOD PRESSURE < 130 MM HG: ICD-10-PCS | Mod: CPTII,S$GLB,, | Performed by: UROLOGY

## 2023-07-06 PROCEDURE — 99999 PR PBB SHADOW E&M-EST. PATIENT-LVL III: CPT | Mod: PBBFAC,,, | Performed by: UROLOGY

## 2023-07-06 PROCEDURE — 3079F PR MOST RECENT DIASTOLIC BLOOD PRESSURE 80-89 MM HG: ICD-10-PCS | Mod: CPTII,S$GLB,, | Performed by: UROLOGY

## 2023-07-06 RX ORDER — ERGOCALCIFEROL 1.25 MG/1
CAPSULE ORAL
COMMUNITY
Start: 2023-06-20

## 2023-07-06 NOTE — PROGRESS NOTES
CHIEF COMPLAINT:    Mrs. Samuel is a 28 y.o. female presenting for a follow up no neurogenic bladder, status post autmentation cystoplasty     PRESENTING ILLNESS:    Shashank Samuel is a 28 y.o. female who is presents with a history of spina bifida who is status post augmentation cystoplasty.  She performs intermittent catheterization.  She had significant weight gain the last time she had come in and she was having some difficulty self catheterizing.  She has lost some weight and her clothes are fitting more loosely and she is able to self cath with ease.  No bladder infections since she was last seen on 4/5/2023.  She states she needs a refill on her catheters.        Intermittent catheterization  Catheter type: M14 (needs a long catheter, catheterizes through a Mitrofanoff)  Size:  14 Fr  Frequency:  8  ICD-10 Diagnosis code(s):  neurogenic bladder N31.9, spina bifida Q05.2, intermittent self cath Z78.9  History of Recurrent UTI?: yes  Is this indefinite?:  yes  Supplier:  [employer] [phone] Andi Tate    REVIEW OF SYSTEMS:    Review of Systems   Constitutional:  Positive for weight loss. Fever: intentional.  HENT: Negative.     Eyes: Negative.    Respiratory: Negative.     Cardiovascular: Negative.    Gastrointestinal: Negative.    Genitourinary: Negative.    Musculoskeletal: Negative.    Skin: Negative.    Neurological: Negative.         Wheelchair bound   Endo/Heme/Allergies: Negative.    Psychiatric/Behavioral: Negative.       PATIENT HISTORY:    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       Past Surgical History:   Procedure Laterality Date    AMPUTATION      right 4th and 5th toes; left 5th toe and portion of left great toe    BACK SURGERY       BLADDER SURGERY      BREAST SURGERY  2015    Reduction    COLON SURGERY      flush site for bowel movements from appendix    CYSTOSTOMY      DEBRIDEMENT OF FOOT Right 1/8/2021    Procedure: DEBRIDEMENT, FOOT;  Surgeon: Abdi Bedoya DPM;  Location: [facility];  Service: Podiatry;  Laterality: Right;    EYE SURGERY      x 5    FLUOROSCOPIC URODYNAMIC STUDY N/A 3/12/2019    Procedure: URODYNAMIC STUDY, FLUOROSCOPIC;  Surgeon: Kenzie Charles MD;  Location: [facility] 1ST FLR;  Service: Urology;  Laterality: N/A;  1 hour    FLUOROSCOPIC URODYNAMIC STUDY N/A 4/4/2019    Procedure: URODYNAMIC STUDY, FLUOROSCOPIC;  Surgeon: Kenzie Charles MD;  Location: [facility] OR 1ST FLR;  Service: Urology;  Laterality: N/A;  1 hour    FLUOROSCOPIC URODYNAMIC STUDY N/A 5/11/2022    Procedure: URODYNAMIC STUDY, FLUOROSCOPIC;  Surgeon: Kenzie Charles MD;  Location: [facility] 1ST FLR;  Service: Urology;  Laterality: N/A;  90min    FOOT AMPUTATION THROUGH METATARSAL Right 10/28/2019    Procedure: AMPUTATION, FOOT, TRANSMETATARSAL;  Surgeon: Abdi Bedoya DPM;  Location: [facility];  Service: Podiatry;  Laterality: Right;    FOOT AMPUTATION THROUGH METATARSAL Right 3/27/2020    Procedure: AMPUTATION, FOOT, TRANSMETATARSAL;  Surgeon: Abdi Bedoya DPM;  Location: [facility];  Service: Podiatry;  Laterality: Right;  REVISION    MYELOMININGOCELE REPAIR      NEPHRECTOMY Right 11/4/2021    Procedure: Nephrectomy/ OPEN;  Surgeon: Dash Rosenthal MD;  Location: [facility] OR 2ND FLR;  Service: Urology;  Laterality: Right;  4HRS    NEPHROSTOMY Right 8/2/2021    Procedure: Nephrostomy and perinephric abscess drain;  Surgeon: Lillian Surgeon;  Location: [facility];  Service: Anesthesiology;  Laterality: Right;    REVISION OF VENTRICULOPERITONEAL SHUNT Left 9/21/2022    Procedure: REVISION, SHUNT, VENTRICULOPERITONEAL;  Surgeon: Maynor Calero MD;  Location: [facility] OR 2ND FLR;  Service: Neurosurgery;  Laterality: Left;    STRABISMUS SURGERY      ou   eustis    VENTRICULOPERITONEAL SHUNT      WOUND DEBRIDEMENT  3/27/2020    Procedure: DEBRIDEMENT, WOUND;  Surgeon: Abdi Bedoya DPM;  Location: [facility];  Service: Podiatry;;       Family History   Problem Relation Age of Onset    Breast cancer Mother     Gout Father     Cataracts Maternal Grandmother     Myocarditis Sister      Social History     Socioeconomic History    Marital status: Single   Tobacco Use    Smoking status: Never    Smokeless tobacco: Never   Substance and Sexual Activity    Alcohol use: Not Currently    Drug use: Never    Sexual activity: Never   Social History Narrative    Intact family. Wheelchair bound.  Self catheterizes herself       Allergies:  Latex, natural rubber; Zofran [ondansetron hcl (pf)]; Gardasil  [h papillomavirus vac,qval (pf)]; Menactra  [mening vac a,c,y,w135 dip (pf)]; Nitrofurantoin; Sulfa (sulfonamide antibiotics); Tramadol; Levaquin [levofloxacin]; and Macrobid [nitrofurantoin monohyd/m-cryst]    Medications:  Outpatient Encounter Medications as of 7/6/2023   Medication Sig Dispense Refill    ergocalciferol (ERGOCALCIFEROL) 50,000 unit Cap TAKE 1 CAPSULE BY MOUTH ONCE A WEEK WITH MEALS      ibuprofen (ADVIL,MOTRIN) 200 MG tablet Take 200 mg by mouth every 6 (six) hours as needed for Pain.      levETIRAcetam (KEPPRA) 750 MG Tab Take 1 tablet (750 mg total) by mouth 2 (two) times daily. 60 tablet 11    diazePAM 5-7.5-10 mg (DIASTAT ACUDIAL) 5-7.5-10 mg Kit rectal kit Place rectally.      ferrous sulfate (FEOSOL) 325 mg (65 mg iron) Tab tablet Take 325 mg by mouth 2 (two) times daily.       No facility-administered encounter medications on file as of 7/6/2023.         PHYSICAL EXAMINATION:    The patient generally appears in good health, is appropriately interactive, and is in no apparent distress.  She presents in her wheel chair    Skin: No lesions.    Mental: Cooperative with normal affect.    Neuro: Grossly intact.    HEENT: Normal. No evidence of  lymphadenopathy.    Chest:  normal inspiratory effort.    Abdomen: Soft, non-tender. No masses or organomegaly. Bladder is not palpable. No evidence of flank discomfort. No evidence of inguinal hernia.    Extremities: No clubbing, cyanosis, or edema    LABS:    Lab Results   Component Value Date    BUN 15 01/16/2023    CREATININE 0.3 (L) 01/16/2023       IMPRESSION:    Neurogenic bladder secondary to spina bifida  Need for intermittent catheterization    PLAN:    1. Will renew the catheter order  2. Return to clinic in 6 months.     I spent 30 minutes with the patient of which more than half was spent in direct consultation with the patient in regards to our treatment and plan.

## 2023-07-20 ENCOUNTER — TELEPHONE (OUTPATIENT)
Dept: NEUROLOGY | Facility: CLINIC | Age: 28
End: 2023-07-20
Payer: COMMERCIAL

## 2023-07-20 NOTE — TELEPHONE ENCOUNTER
Called and spoke w pt. Pt scheduled for 8/9 at 3:40pm to see Dr. Espinosa for 6 mo f/u. Pt confirmed date/time of appt.

## 2023-07-20 NOTE — TELEPHONE ENCOUNTER
----- Message from Stuart Mead MA sent at 5/11/2023 11:23 AM CDT -----  Schedule 6mo from last appt in August w Jesús

## 2023-08-09 ENCOUNTER — OFFICE VISIT (OUTPATIENT)
Dept: NEUROLOGY | Facility: CLINIC | Age: 28
End: 2023-08-09
Payer: COMMERCIAL

## 2023-08-09 VITALS
HEART RATE: 84 BPM | DIASTOLIC BLOOD PRESSURE: 71 MMHG | HEIGHT: 55 IN | WEIGHT: 120 LBS | SYSTOLIC BLOOD PRESSURE: 112 MMHG | BODY MASS INDEX: 27.77 KG/M2

## 2023-08-09 DIAGNOSIS — R56.9 WITNESSED SEIZURE-LIKE ACTIVITY: ICD-10-CM

## 2023-08-09 PROCEDURE — 3074F PR MOST RECENT SYSTOLIC BLOOD PRESSURE < 130 MM HG: ICD-10-PCS | Mod: CPTII,S$GLB,, | Performed by: PSYCHIATRY & NEUROLOGY

## 2023-08-09 PROCEDURE — 99999 PR PBB SHADOW E&M-EST. PATIENT-LVL III: CPT | Mod: PBBFAC,,, | Performed by: PSYCHIATRY & NEUROLOGY

## 2023-08-09 PROCEDURE — 99214 OFFICE O/P EST MOD 30 MIN: CPT | Mod: S$GLB,,, | Performed by: PSYCHIATRY & NEUROLOGY

## 2023-08-09 PROCEDURE — 99214 PR OFFICE/OUTPT VISIT, EST, LEVL IV, 30-39 MIN: ICD-10-PCS | Mod: S$GLB,,, | Performed by: PSYCHIATRY & NEUROLOGY

## 2023-08-09 PROCEDURE — 3078F PR MOST RECENT DIASTOLIC BLOOD PRESSURE < 80 MM HG: ICD-10-PCS | Mod: CPTII,S$GLB,, | Performed by: PSYCHIATRY & NEUROLOGY

## 2023-08-09 PROCEDURE — 1159F PR MEDICATION LIST DOCUMENTED IN MEDICAL RECORD: ICD-10-PCS | Mod: CPTII,S$GLB,, | Performed by: PSYCHIATRY & NEUROLOGY

## 2023-08-09 PROCEDURE — 3074F SYST BP LT 130 MM HG: CPT | Mod: CPTII,S$GLB,, | Performed by: PSYCHIATRY & NEUROLOGY

## 2023-08-09 PROCEDURE — 3008F BODY MASS INDEX DOCD: CPT | Mod: CPTII,S$GLB,, | Performed by: PSYCHIATRY & NEUROLOGY

## 2023-08-09 PROCEDURE — 1159F MED LIST DOCD IN RCRD: CPT | Mod: CPTII,S$GLB,, | Performed by: PSYCHIATRY & NEUROLOGY

## 2023-08-09 PROCEDURE — 99999 PR PBB SHADOW E&M-EST. PATIENT-LVL III: ICD-10-PCS | Mod: PBBFAC,,, | Performed by: PSYCHIATRY & NEUROLOGY

## 2023-08-09 PROCEDURE — 3008F PR BODY MASS INDEX (BMI) DOCUMENTED: ICD-10-PCS | Mod: CPTII,S$GLB,, | Performed by: PSYCHIATRY & NEUROLOGY

## 2023-08-09 PROCEDURE — 3078F DIAST BP <80 MM HG: CPT | Mod: CPTII,S$GLB,, | Performed by: PSYCHIATRY & NEUROLOGY

## 2023-08-09 RX ORDER — LEVETIRACETAM 750 MG/1
750 TABLET ORAL 2 TIMES DAILY
Qty: 60 TABLET | Refills: 11 | Status: SHIPPED | OUTPATIENT
Start: 2023-08-09 | End: 2024-03-06

## 2023-08-09 RX ORDER — LEVETIRACETAM 500 MG/1
500 TABLET ORAL 2 TIMES DAILY
Qty: 60 TABLET | Refills: 11 | Status: SHIPPED | OUTPATIENT
Start: 2023-08-09 | End: 2024-03-06

## 2023-08-09 NOTE — PROGRESS NOTES
Shashank Samuel is a 28 y.o. year old female that  presents for her routine 6 months follow up. She is accompanied by family members.       HPI:  Ms Samuel is well known to this office.  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   Has been on keppra 750 mg BID and they report no further seizures since her last visit on 2/13/23 or ostensible sided effects.  Denies HA, vertigo, double vision, visual changes, speech or language disturbances.  No interval medical issues.  They are planning a family trip to [place] in few days.      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     Social History     Socioeconomic History    Marital status: Single   Tobacco Use    Smoking status: Never    Smokeless tobacco: Never   Substance and Sexual Activity    Alcohol use: Not Currently    Drug use: Never    Sexual activity: Never   Social History Narrative    Intact family. Wheelchair bound.  Self catheterizes herself     Past Surgical History:   Procedure Laterality Date    AMPUTATION      right 4th and 5th toes; left 5th toe and portion of left great toe    BACK SURGERY      BLADDER SURGERY      BREAST SURGERY  2015    Reduction    COLON SURGERY      flush site for bowel movements from appendix    CYSTOSTOMY      DEBRIDEMENT OF FOOT Right 1/8/2021    Procedure: DEBRIDEMENT, FOOT;  Surgeon: Abdi Bedoya DPM;  Location: [facility];  Service: Podiatry;  Laterality: Right;    EYE SURGERY      x 5    FLUOROSCOPIC URODYNAMIC STUDY N/A  3/12/2019    Procedure: URODYNAMIC STUDY, FLUOROSCOPIC;  Surgeon: Kenzie Charles MD;  Location: [facility] 1ST FLR;  Service: Urology;  Laterality: N/A;  1 hour    FLUOROSCOPIC URODYNAMIC STUDY N/A 4/4/2019    Procedure: URODYNAMIC STUDY, FLUOROSCOPIC;  Surgeon: Kenzie Charles MD;  Location: [facility] 1ST FLR;  Service: Urology;  Laterality: N/A;  1 hour    FLUOROSCOPIC URODYNAMIC STUDY N/A 5/11/2022    Procedure: URODYNAMIC STUDY, FLUOROSCOPIC;  Surgeon: Kenzie Charles MD;  Location: [facility] 1ST FLR;  Service: Urology;  Laterality: N/A;  90min    FOOT AMPUTATION THROUGH METATARSAL Right 10/28/2019    Procedure: AMPUTATION, FOOT, TRANSMETATARSAL;  Surgeon: Abdi Bedoya DPM;  Location: [facility];  Service: Podiatry;  Laterality: Right;    FOOT AMPUTATION THROUGH METATARSAL Right 3/27/2020    Procedure: AMPUTATION, FOOT, TRANSMETATARSAL;  Surgeon: Abdi Bedoya DPM;  Location: [facility];  Service: Podiatry;  Laterality: Right;  REVISION    MYELOMININGOCELE REPAIR      NEPHRECTOMY Right 11/4/2021    Procedure: Nephrectomy/ OPEN;  Surgeon: Dash Rosenthal MD;  Location: [facility] 2ND FLR;  Service: Urology;  Laterality: Right;  4HRS    NEPHROSTOMY Right 8/2/2021    Procedure: Nephrostomy and perinephric abscess drain;  Surgeon: Lillian Surgeon;  Location: [facility];  Service: Anesthesiology;  Laterality: Right;    REVISION OF VENTRICULOPERITONEAL SHUNT Left 9/21/2022    Procedure: REVISION, SHUNT, VENTRICULOPERITONEAL;  Surgeon: Maynor Calero MD;  Location: [facility] 2ND FLR;  Service: Neurosurgery;  Laterality: Left;    STRABISMUS SURGERY      ou dr peña    VENTRICULOPERITONEAL SHUNT      WOUND DEBRIDEMENT  3/27/2020    Procedure: DEBRIDEMENT, WOUND;  Surgeon: Abdi Bedoya DPM;  Location: [facility];  Service: Podiatry;;     Family History   Problem Relation Age of Onset    Breast cancer Mother     Gout Father     Cataracts Maternal Grandmother     Myocarditis Sister     Amblyopia Neg Hx     Blindness  Neg Hx     Cancer Neg Hx     Diabetes Neg Hx     Glaucoma Neg Hx     Hypertension Neg Hx     Macular degeneration Neg Hx     Retinal detachment Neg Hx     Strabismus Neg Hx     Stroke Neg Hx     Thyroid disease Neg Hx            Review of Systems  General ROS: negative for chills, fever or weight loss  Psychological ROS: negative for hallucination, depression or suicidal ideation  Ophthalmic ROS: negative for blurry vision, photophobia or eye pain  ENT ROS: negative for epistaxis, sore throat or rhinorrhea  Respiratory ROS: no cough, shortness of breath, or wheezing  Cardiovascular ROS: no chest pain or dyspnea on exertion  Gastrointestinal ROS: no abdominal pain, change in bowel habits, or black/ bloody stools  Genito-Urinary ROS: no dysuria, trouble voiding, or hematuria  Musculoskeletal ROS: positive for gait disturbance and muscular weakness  Neurological ROS: no syncope or seizures  Dermatological ROS: negative for pruritis, rash and jaundice      Physical Exam:  There were no vitals taken for this visit.  General appearance: alert, cooperative, no distress  Constitutional:Oriented to person, place, and time.appears well-developed and well-nourished.   HEENT: Normocephalic, atraumatic, neck symmetrical, no nasal discharge   Eyes: conjunctivae/corneas clear, PERRL, EOM's intact  Lungs: clear to auscultation bilaterally, no dullness to percussion bilaterally  Heart: regular rate and rhythm without rub; no displacement of the PMI   Abdomen: soft, non-tender; bowel sounds normoactive; no organomegaly  Extremities: extremities symmetric; no clubbing, cyanosis, or edema  Integument: Skin color, texture, turgor normal; no rashes; hair distrubution normal  Neurologic:  Mental status: alert and awake, oriented x 4, comprehension, naming, and repetition intact. No right to left confusion. Performs serial 7's without difficulty .No dysarthria.  CN 2-12: pupils 4 mm bilaterally, reactive to light. Fundi without  "papilledema. Visual fields full to confrontation. Face sensation normal in all distributions. Face symmetric. Hearing grossly intact. Palate elevates well. Tongue midline without atrophy or fasciculations.  Motor: paraparesis BLE  Sensory: no tested  DTR's: no tested  Plantars: no tested.  Coordination: no tested  Gait: wc, no tested       LABS:    Complete Blood Count  Lab Results   Component Value Date    RBC 4.41 01/16/2023    HGB 12.9 01/16/2023    HCT 39.4 01/16/2023    MCV 89 01/16/2023    MCH 29.3 01/16/2023    MCHC 32.7 01/16/2023    RDW 13.6 01/16/2023     01/16/2023    MPV 9.6 01/16/2023    GRAN 8.0 (H) 01/16/2023    GRAN 73.0 01/16/2023    LYMPH 1.9 01/16/2023    LYMPH 17.5 (L) 01/16/2023    MONO 0.9 01/16/2023    MONO 8.3 01/16/2023    EOS 0.0 01/16/2023    BASO 0.03 01/16/2023    EOSINOPHIL 0.4 01/16/2023    BASOPHIL 0.3 01/16/2023    DIFFMETHOD Automated 01/16/2023       Comprehensive Metabolic Panel  Lab Results   Component Value Date    GLU 88 01/16/2023    BUN 15 01/16/2023    CREATININE 0.3 (L) 01/16/2023     (L) 01/16/2023    K 3.5 01/16/2023     01/16/2023    PROT 8.0 01/16/2023    ALBUMIN 4.2 01/16/2023    BILITOT 0.1 01/16/2023    AST 17 01/16/2023    ALKPHOS 65 01/16/2023    CO2 20 (L) 01/16/2023    ALT 19 01/16/2023    ANIONGAP 12 01/16/2023    EGFRNONAA >60.0 11/08/2021    ESTGFRAFRICA >60.0 11/08/2021       TSH  No results found for: "TSH"      Assessment: 29 y/o with spina bifida with baseline paraparesis and neurogenic bladder, wheelchair-bound, 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  Seizure free since last visit  2/13/23   Will continue current dose keppra 750 mg BID.      No diagnosis found.  There were no encounter diagnoses.      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          Jerardo Espinosa MD

## 2023-10-02 ENCOUNTER — HOSPITAL ENCOUNTER (OUTPATIENT)
Dept: RADIOLOGY | Facility: CLINIC | Age: 28
Discharge: HOME | End: 2023-10-02
Attending: NEUROLOGICAL SURGERY
Payer: COMMERCIAL

## 2023-10-02 DIAGNOSIS — G91.9 HYDROCEPHALUS, UNSPECIFIED TYPE: ICD-10-CM

## 2023-10-02 DIAGNOSIS — Z98.2 S/P VP SHUNT: ICD-10-CM

## 2023-10-02 DIAGNOSIS — R29.818 OTHER SYMPTOMS AND SIGNS INVOLVING THE NERVOUS SYSTEM: ICD-10-CM

## 2023-10-02 PROCEDURE — 71045 X-RAY EXAM CHEST 1 VIEW: CPT | Mod: TC,FY,PO

## 2023-10-02 PROCEDURE — 70250 X-RAY EXAM OF SKULL: CPT | Mod: 26,,, | Performed by: RADIOLOGY

## 2023-10-02 PROCEDURE — 74018 XR SHUNT SERIES: ICD-10-PCS | Mod: 26,,, | Performed by: RADIOLOGY

## 2023-10-02 PROCEDURE — 71045 XR SHUNT SERIES: ICD-10-PCS | Mod: 26,,, | Performed by: RADIOLOGY

## 2023-10-02 PROCEDURE — 72020 X-RAY EXAM OF SPINE 1 VIEW: CPT | Mod: 26,,, | Performed by: RADIOLOGY

## 2023-10-02 PROCEDURE — 71045 X-RAY EXAM CHEST 1 VIEW: CPT | Mod: 26,,, | Performed by: RADIOLOGY

## 2023-10-02 PROCEDURE — 70250 XR SHUNT SERIES: ICD-10-PCS | Mod: 26,,, | Performed by: RADIOLOGY

## 2023-10-02 PROCEDURE — 72020 XR SHUNT SERIES: ICD-10-PCS | Mod: 26,,, | Performed by: RADIOLOGY

## 2023-10-02 PROCEDURE — 74018 RADEX ABDOMEN 1 VIEW: CPT | Mod: 26,,, | Performed by: RADIOLOGY

## 2023-11-27 ENCOUNTER — HOSPITAL ENCOUNTER (EMERGENCY)
Facility: HOSPITAL | Age: 28
Discharge: HOME | End: 2023-11-27
Attending: EMERGENCY MEDICINE
Payer: COMMERCIAL

## 2023-11-27 VITALS
HEART RATE: 95 BPM | DIASTOLIC BLOOD PRESSURE: 78 MMHG | RESPIRATION RATE: 18 BRPM | HEIGHT: 55 IN | SYSTOLIC BLOOD PRESSURE: 133 MMHG | BODY MASS INDEX: 25.46 KG/M2 | WEIGHT: 110 LBS | TEMPERATURE: 98 F | OXYGEN SATURATION: 100 %

## 2023-11-27 DIAGNOSIS — G40.919 BREAKTHROUGH SEIZURE: Primary | ICD-10-CM

## 2023-11-27 DIAGNOSIS — Z98.2 VP (VENTRICULOPERITONEAL) SHUNT STATUS: ICD-10-CM

## 2023-11-27 LAB
ALBUMIN SERPL BCP-MCNC: 4.5 G/DL (ref 3.5–5.2)
ALP SERPL-CCNC: 52 U/L (ref 55–135)
ALT SERPL W/O P-5'-P-CCNC: 18 U/L (ref 10–44)
ANION GAP SERPL CALC-SCNC: 9 MMOL/L (ref 8–16)
AST SERPL-CCNC: 13 U/L (ref 10–40)
B-HCG UR QL: NEGATIVE
BACTERIA #/AREA URNS HPF: ABNORMAL /HPF
BASOPHILS # BLD AUTO: 0.02 K/UL (ref 0–0.2)
BASOPHILS NFR BLD: 0.2 % (ref 0–1.9)
BILIRUB SERPL-MCNC: 0.2 MG/DL (ref 0.1–1)
BILIRUB UR QL STRIP: NEGATIVE
BUN SERPL-MCNC: 22 MG/DL (ref 6–20)
CALCIUM SERPL-MCNC: 9.1 MG/DL (ref 8.7–10.5)
CHLORIDE SERPL-SCNC: 105 MMOL/L (ref 95–110)
CLARITY UR: CLEAR
CO2 SERPL-SCNC: 22 MMOL/L (ref 23–29)
COLOR UR: YELLOW
CREAT SERPL-MCNC: 0.4 MG/DL (ref 0.5–1.4)
CTP QC/QA: YES
DIFFERENTIAL METHOD BLD: NORMAL
EOSINOPHIL # BLD AUTO: 0 K/UL (ref 0–0.5)
EOSINOPHIL NFR BLD: 0.4 % (ref 0–8)
ERYTHROCYTE [DISTWIDTH] IN BLOOD BY AUTOMATED COUNT: 13.4 % (ref 11.5–14.5)
EST. GFR  (NO RACE VARIABLE): >60 ML/MIN/1.73 M^2
GLUCOSE SERPL-MCNC: 83 MG/DL (ref 70–110)
GLUCOSE UR QL STRIP: NEGATIVE
HCT VFR BLD AUTO: 39.3 % (ref 37–48.5)
HGB BLD-MCNC: 13.2 G/DL (ref 12–16)
HGB UR QL STRIP: ABNORMAL
HYALINE CASTS #/AREA URNS LPF: 193 /LPF
IMM GRANULOCYTES # BLD AUTO: 0.04 K/UL (ref 0–0.04)
IMM GRANULOCYTES NFR BLD AUTO: 0.4 % (ref 0–0.5)
KETONES UR QL STRIP: NEGATIVE
LEUKOCYTE ESTERASE UR QL STRIP: ABNORMAL
LYMPHOCYTES # BLD AUTO: 2.1 K/UL (ref 1–4.8)
LYMPHOCYTES NFR BLD: 19.9 % (ref 18–48)
MCH RBC QN AUTO: 29.4 PG (ref 27–31)
MCHC RBC AUTO-ENTMCNC: 33.6 G/DL (ref 32–36)
MCV RBC AUTO: 88 FL (ref 82–98)
MICROSCOPIC COMMENT: ABNORMAL
MONOCYTES # BLD AUTO: 0.8 K/UL (ref 0.3–1)
MONOCYTES NFR BLD: 7.5 % (ref 4–15)
NEUTROPHILS # BLD AUTO: 7.4 K/UL (ref 1.8–7.7)
NEUTROPHILS NFR BLD: 71.6 % (ref 38–73)
NITRITE UR QL STRIP: NEGATIVE
NRBC BLD-RTO: 0 /100 WBC
PH UR STRIP: 6 [PH] (ref 5–8)
PLATELET # BLD AUTO: 315 K/UL (ref 150–450)
PMV BLD AUTO: 9.7 FL (ref 9.2–12.9)
POTASSIUM SERPL-SCNC: 3.8 MMOL/L (ref 3.5–5.1)
PROT SERPL-MCNC: 8.1 G/DL (ref 6–8.4)
PROT UR QL STRIP: ABNORMAL
RBC # BLD AUTO: 4.49 M/UL (ref 4–5.4)
RBC #/AREA URNS HPF: 1 /HPF (ref 0–4)
SODIUM SERPL-SCNC: 136 MMOL/L (ref 136–145)
SP GR UR STRIP: 1.02 (ref 1–1.03)
SQUAMOUS #/AREA URNS HPF: 34 /HPF
URN SPEC COLLECT METH UR: ABNORMAL
UROBILINOGEN UR STRIP-ACNC: NEGATIVE EU/DL
WBC # BLD AUTO: 10.35 K/UL (ref 3.9–12.7)
WBC #/AREA URNS HPF: 16 /HPF (ref 0–5)

## 2023-11-27 PROCEDURE — 81001 URINALYSIS AUTO W/SCOPE: CPT | Performed by: NURSE PRACTITIONER

## 2023-11-27 PROCEDURE — 85025 COMPLETE CBC W/AUTO DIFF WBC: CPT | Performed by: PHYSICIAN ASSISTANT

## 2023-11-27 PROCEDURE — 96361 HYDRATE IV INFUSION ADD-ON: CPT

## 2023-11-27 PROCEDURE — 99284 EMERGENCY DEPT VISIT MOD MDM: CPT | Mod: 25

## 2023-11-27 PROCEDURE — 81003 URINALYSIS AUTO W/O SCOPE: CPT | Performed by: NURSE PRACTITIONER

## 2023-11-27 PROCEDURE — 81025 URINE PREGNANCY TEST: CPT | Performed by: PHYSICIAN ASSISTANT

## 2023-11-27 PROCEDURE — 96360 HYDRATION IV INFUSION INIT: CPT

## 2023-11-27 PROCEDURE — 63600175 PHARM REV CODE 636 W HCPCS: Performed by: NURSE PRACTITIONER

## 2023-11-27 PROCEDURE — 87077 CULTURE AEROBIC IDENTIFY: CPT | Performed by: NURSE PRACTITIONER

## 2023-11-27 PROCEDURE — 80053 COMPREHEN METABOLIC PANEL: CPT | Performed by: PHYSICIAN ASSISTANT

## 2023-11-27 PROCEDURE — 87186 SC STD MICRODIL/AGAR DIL: CPT | Performed by: NURSE PRACTITIONER

## 2023-11-27 PROCEDURE — 87086 URINE CULTURE/COLONY COUNT: CPT | Performed by: NURSE PRACTITIONER

## 2023-11-27 RX ADMIN — SODIUM CHLORIDE, SODIUM LACTATE, POTASSIUM CHLORIDE, AND CALCIUM CHLORIDE 1000 ML: .6; .31; .03; .02 INJECTION, SOLUTION INTRAVENOUS at 05:11

## 2023-11-27 NOTE — ED PROVIDER NOTES
"Source of History:  Patient, mother, chart    Chief complaint:  Seizures (TODAY, LASTING APPROX 1.5 MIN),  SHUNT (WANTS CHECKED BEFORE THEY GO OUT OF TOWN), and Headache      HPI:  Shashank Samuel is a 28 y.o. female with medical history of anemia, spinal bifida, seizures, hydrocephalus (with  shunt) presenting with breakthrough seizure earlier today.  Patient states she does have occasional breakthrough seizures.  Patient states seizure loss approximately 1.5 minutes earlier today.  Patient denies any complaints.  Patient states they are going out of town on Wednesday and concerned for a  shunt malfunction.  Patient denies any headache or blurred vision.  Patient and mother state we just want to get checked in case.    This is the extent to the patients complaints today here in the emergency department.    ROS: As per HPI and below:    Constitutional: No fever.  No chills.  Eyes: No visual changes.  ENT: No sore throat. No ear pain    Cardiovascular: No chest pain.  Respiratory: No shortness of breath.  GI: No abdominal pain.  No nausea or vomiting.  Genitourinary: No abnormal urination.  Neurologic: No headache. No focal weakness.  No numbness.  Positive for seizure  MSK: no back pain.  Integument: No rashes or lesions.  Hematologic: No easy bruising.  Endocrine: No excessive thirst or urination.    Review of patient's allergies indicates:   Allergen Reactions    Latex, natural rubber Anaphylaxis and Other (See Comments)     angioedema    Zofran [ondansetron hcl (pf)] Swelling     Hives, "throat closes up"    Gardasil  [h papillomavirus vac,qval (pf)]      Other reaction(s): Shortness of breath    Menactra  [mening vac a,c,y,w135 dip (pf)]      Other reaction(s): Shortness of breath    Nitrofurantoin      Other reaction(s): Difficulty breathing    Sulfa (sulfonamide antibiotics)     Tramadol Hives    Levaquin [levofloxacin] Rash     Spots on face    Macrobid [nitrofurantoin monohyd/m-cryst] Rash     " Sppots/rash on face       PMH:  As per HPI and below:  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     Past Surgical History:   Procedure Laterality Date    AMPUTATION      right 4th and 5th toes; left 5th toe and portion of left great toe    BACK SURGERY      BLADDER SURGERY      BREAST SURGERY  2015    Reduction    COLON SURGERY      flush site for bowel movements from appendix    CYSTOSTOMY      DEBRIDEMENT OF FOOT Right 1/8/2021    Procedure: DEBRIDEMENT, FOOT;  Surgeon: Abdi Bedoya DPM;  Location: [facility];  Service: Podiatry;  Laterality: Right;    EYE SURGERY      x 5    FLUOROSCOPIC URODYNAMIC STUDY N/A 3/12/2019    Procedure: URODYNAMIC STUDY, FLUOROSCOPIC;  Surgeon: Kenzie Charles MD;  Location: [facility] OR [address] [place] [address];  Service: Urology;  Laterality: N/A;  1 hour    FLUOROSCOPIC URODYNAMIC STUDY N/A 4/4/2019    Procedure: URODYNAMIC STUDY, FLUOROSCOPIC;  Surgeon: Kenzie Charles MD;  [facility];  Service: Urology;  Laterality: N/A;  1 hour    FLUOROSCOPIC URODYNAMIC STUDY N/A 5/11/2022    Procedure: URODYNAMIC STUDY, FLUOROSCOPIC;  Surgeon: Kenzie Charles MD;  [facility];  Service: Urology;  Laterality: N/A;  90min    FOOT AMPUTATION THROUGH METATARSAL Right 10/28/2019    Procedure: AMPUTATION, FOOT, TRANSMETATARSAL;  Surgeon: Abdi Bedoya DPM;  Location: [facility];  Service: Podiatry;  Laterality: Right;    FOOT AMPUTATION THROUGH METATARSAL Right 3/27/2020    Procedure: AMPUTATION, FOOT, TRANSMETATARSAL;  Surgeon: Abdi Bedoya DPM;  Location: [facility];  Service: Podiatry;  Laterality: Right;  REVISION    MYELOMININGOCELE REPAIR      NEPHRECTOMY Right 11/4/2021    Procedure: Nephrectomy/ OPEN;  Surgeon: Dash ETIENNE  "MD Galo;  [address];  Service: Urology;  Laterality: Right;  4HRS    NEPHROSTOMY Right 8/2/2021    Procedure: Nephrostomy and perinephric abscess drain;  Surgeon: Lillian Surgeon;  Location: [facility];  Service: Anesthesiology;  Laterality: Right;    REVISION OF VENTRICULOPERITONEAL SHUNT Left 9/21/2022    Procedure: REVISION, SHUNT, VENTRICULOPERITONEAL;  Surgeon: Maynor Calero MD;  [address];  Service: Neurosurgery;  Laterality: Left;    STRABISMUS SURGERY      ou dr peña    VENTRICULOPERITONEAL SHUNT      WOUND DEBRIDEMENT  3/27/2020    Procedure: DEBRIDEMENT, WOUND;  Surgeon: Abdi Bedoya DPM;  Location: [facility];  Service: Podiatry;;       Social History     Tobacco Use    Smoking status: Never    Smokeless tobacco: Never   Substance Use Topics    Alcohol use: Not Currently    Drug use: Never       Physical Exam:    /78   Pulse 95   Temp 97.7 °F (36.5 °C)   Resp 18   Ht 4' 7" (1.397 m)   Wt 49.9 kg (110 lb)   LMP 11/20/2023 (Approximate)   SpO2 100%   BMI 25.57 kg/m²     Nursing note and vital signs reviewed.    Constitutional: No acute distress.  Nontoxic  Eyes: No conjunctival injection.Extraocular muscles are intact.  Pupils equal round reactive 3-2 mm.  ENT: Oropharynx clear.  Normal phonation.   Cardiovascular: Regular rate and rhythm.  No murmurs. No gallops. No rubs  Respiratory: Clear to auscultation bilaterally.  Good air movement.  No wheezes.  No rhonchi. No rales. No accessory muscle use..  Abdomen: Soft.  Not distended.  Nontender.  No guarding.  No rebound. Non-peritoneal.  Musculoskeletal: Good range of motion all joints.  No deformities.  Neck supple.  No meningismus.  Skin: No rashes seen.  Good turgor.  No abrasions.  No ecchymoses.  Neurologic:   shunt noted.  No redness, erythema over shunt site.  No tenderness to palpation.  No bulging.  Motor intact.  Sensation intact.  No ataxia. No focal neurological deficits.  Psych: Appropriate, " conversant    MDM    Emergent evaluation of a 27 yo female presenting for evaluation after breakthrough seizure this morning.  Family concerned about  shunt and malfunction.  Patient denied any eyes any complaints at this time.  Patient states that she was going [place] [facility] [place] and had a seizure.  Mother states episode lasted approximately 1.5 minutes.  Patient denies any complaints at this time.  Mother states they are going out of town to [place] on Wednesday and wanted to get her shunt check before leaving.  On exam pt is A&Ox3. VSS. Nonfebrile and nontoxic appearing.  Mucous membranes pink and moist.  Pupils equal round reactive 3-2 mm.  No nystagmus noted.  shunt noted.  No redness, erythema over shunt site.  No tenderness to palpation.  No bulging.  Pt speaking in full sentences. Cap refill < 3 seconds.      History Acquisition   Additional history was acquired from other historians.  Mother, chart    The patient's list of active medical problems, social history, medications, and allergies as documented per RN staff has been reviewed.     Differential Diagnoses   Based on available information and the initial assessment, the working differential diagnoses considered during this evaluation include but are not limited to breakthrough seizure, medication noncompliance, UTI,  shunt malfunction, others.    I will get labs, imaging, hydrate and reassess.  I discussed this case with my supervising physician.      LABS     Labs Reviewed   COMPREHENSIVE METABOLIC PANEL - Abnormal; Notable for the following components:       Result Value    CO2 22 (*)     BUN 22 (*)     Creatinine 0.4 (*)     Alkaline Phosphatase 52 (*)     All other components within normal limits   URINALYSIS, REFLEX TO URINE CULTURE - Abnormal; Notable for the following components:    Protein, UA Trace (*)     Occult Blood UA Trace (*)     Leukocytes, UA 2+ (*)     All other components within normal limits    Narrative:      Specimen Source->Urine   URINALYSIS MICROSCOPIC - Abnormal; Notable for the following components:    WBC, UA 16 (*)     Hyaline Casts,  (*)     All other components within normal limits    Narrative:     Specimen Source->Urine   CULTURE, URINE   CBC W/ AUTO DIFFERENTIAL   POCT URINE PREGNANCY         Imaging     Imaging Results              X-Ray Shunt Series (Final result)  Result time 11/27/23 20:03:19      Final result by Freddy Bello MD (11/27/23 20:03:19)                   Narrative:      EXAM: XR SHUNT SERIES    HISTORY: seizures    COMPARISON:Shunt series dated 9/20/2022    FINDINGS: A total 5 views of the head and chest and abdomen were obtained. The images demonstrate a  shunt in place entering the skull through the left parietal bone with its tip situated anteriorly slightly to the right of midline. This appears unchanged in position. The tubing for the  shunt is fairly well demonstrated throughout its course in the chest and abdomen, and appears grossly intact. The distal tip of the shunt is in the inferior aspect of the pelvis located slightly to the right of midline.. Previously it was in the left hemipelvis. A disconnected shunt remains in place in the right parietal region as well. The lungs appear grossly well aerated and free of infiltrates. There is no bowel distention. Enwman rods are in place.    IMPRESSION:   Grossly intact  shunt as described. There has been no significant change since the preceding study dated 9/20/2022.    Electronically signed by:  Freddy Bello MD  11/27/2023 08:03 PM CST Workstation: [id number]                                    A radiology report was available for my review at the time of the encounter.    EKG        Additional Consideration   All available testing was considered during the course of this workup.  Comorbidities taken into consideration during the patient's evaluation and treatment include weight, age.    Social determinants of  health were taken into consideration during development of our treatment plan.    Medications   lactated ringers bolus 1,000 mL (1,000 mLs Intravenous New Bag 11/27/23 1713)      ED Course as of 11/27/23 2012 Mon Nov 27, 2023 1719 CBC unremarkable.  No leukocytosis noted.  H&H stable at 13.2 and 39.3.  CMP shows mild may elevated BUN of 22.  Creatinine within normal limits at 0.4.  UA shows 2+ leukocytes.  Negative nitrites.  One hundred ninety-three hyaline casts noted.  Will give IV fluids in the ED.  Urine preg negative.  Awaiting imaging for re-evaluation. [RZ]   2008  shunt grossly intact.  Patient reassessed.  Patient and family updated on results.  Advised to follow up with neurologist as needed.  Continue with seizure medications.  Strict return to ED precautions discussed.  Patient and mother verbalized understanding of this plan of care.  All questions and concerns addressed. [RZ]   2009 Patient is hemodynamically stable, vital signs are normal. Discharge instructions given. Return to ED precautions discussed. Follow up as directed. Pt verbalized understanding of this plan.  Pt is stable for discharge.  [RZ]      ED Course User Index  [RZ] Nory Lang NP             CLINICAL IMPRESSION  1. Breakthrough seizure    2.  (ventriculoperitoneal) shunt status         ED Disposition Condition    Discharge Stable            Instruction:  I see no indication of an emergent process beyond that addressed during our encounter but have duly counseled the patient/family regarding the need for prompt follow-up as well as the indications that should prompt immediate return to the emergency room should new or worrisome developments occur.  The patient/family has been provided with verbal and printed direction regarding our final diagnosis(es) as well as instructions regarding use of OTC and/or Rx medications intended to manage the patient's aforementioned conditions including:  ED Prescriptions    None        Patient has been advised of following recommended follow-up instructions:  Follow-up Information       Follow up With Specialties Details Why Contact Info    Danis Simon MD Wound Care Schedule an appointment as soon as possible for a visit  As needed [id number]  [facility]  [facility] [address]  [phone]            The patient/family communicates understanding of all this information and all remaining questions and concerns were addressed at this time.      The patient's condition did not warrant review of the  and prescription of controlled substances.      This note was created using dictation software.  This program may occasionally mistype words and phrases.         Nory Lang, NP  11/27/23 2012

## 2023-11-27 NOTE — FIRST PROVIDER EVALUATION
" Emergency Department TeleTriage Encounter Note      CHIEF COMPLAINT    Chief Complaint   Patient presents with    Seizures     TODAY, LASTING APPROX 1.5 MIN     SHUNT     WANTS CHECKED BEFORE THEY GO OUT OF TOWN    Headache       VITAL SIGNS   Initial Vitals [11/27/23 1532]   BP Pulse Resp Temp SpO2   (!) 139/99 97 18 98.7 °F (37.1 °C) 99 %      MAP       --            ALLERGIES    Review of patient's allergies indicates:   Allergen Reactions    Latex, natural rubber Anaphylaxis and Other (See Comments)     angioedema    Zofran [ondansetron hcl (pf)] Swelling     Hives, "throat closes up"    Gardasil  [h papillomavirus vac,qval (pf)]      Other reaction(s): Shortness of breath    Menactra  [mening vac a,c,y,w135 dip (pf)]      Other reaction(s): Shortness of breath    Nitrofurantoin      Other reaction(s): Difficulty breathing    Sulfa (sulfonamide antibiotics)     Tramadol Hives    Levaquin [levofloxacin] Rash     Spots on face    Macrobid [nitrofurantoin monohyd/m-cryst] Rash     Sppots/rash on face       PROVIDER TRIAGE NOTE  Patient with  shunt presenting with 2 seizures in 1 week. Had one today lasting 1.5 minutes. Patient still has headache and mild blurred vision.       ORDERS  Labs Reviewed - No data to display    ED Orders (720h ago, onward)      None              Virtual Visit Note: The provider triage portion of this emergency department evaluation and documentation was performed via [employer], a HIPAA-compliant telemedicine application, in concert with a tele-presenter in the room. A face to face patient evaluation with one of my colleagues will occur once the patient is placed in an emergency department room.      DISCLAIMER: This note was prepared with [employer] voice recognition transcription software. Garbled syntax, mangled pronouns, and other bizarre constructions may be attributed to that software system.    "

## 2023-11-28 NOTE — DISCHARGE INSTRUCTIONS
You were seen and evaluated in the ER today.  Your labs are unremarkable.  You did have some mild dehydration which we gave you IV fluids for.  Your  shunt is working appropriately.  Please follow-up with your PCP and neurologist as needed.  Please return to the ED for any worsening symptoms such as chest pain, shortness of breath, fever not controlled with Tylenol or ibuprofen or uncontrolled pain.      Our goal in the emergency department is to always give you outstanding care and exceptional service. You may receive a survey by mail or e-mail in the next week regarding your experience in our ED. We would greatly appreciate your completing and returning the survey. Your feedback provides us with a way to recognize our staff who give very good care and it helps us learn how to improve when your experience was below our aspiration of excellence.

## 2023-11-29 LAB — BACTERIA UR CULT: ABNORMAL

## 2024-01-31 ENCOUNTER — HOSPITAL ENCOUNTER (EMERGENCY)
Facility: HOSPITAL | Age: 29
Discharge: HOME | End: 2024-01-31
Attending: EMERGENCY MEDICINE
Payer: COMMERCIAL

## 2024-01-31 VITALS
HEART RATE: 99 BPM | RESPIRATION RATE: 16 BRPM | BODY MASS INDEX: 26.99 KG/M2 | WEIGHT: 120 LBS | OXYGEN SATURATION: 100 % | HEIGHT: 56 IN | DIASTOLIC BLOOD PRESSURE: 85 MMHG | SYSTOLIC BLOOD PRESSURE: 137 MMHG | TEMPERATURE: 99 F

## 2024-01-31 DIAGNOSIS — R51.9 ACUTE NONINTRACTABLE HEADACHE, UNSPECIFIED HEADACHE TYPE: ICD-10-CM

## 2024-01-31 DIAGNOSIS — R56.9 SEIZURE: ICD-10-CM

## 2024-01-31 DIAGNOSIS — N30.01 ACUTE CYSTITIS WITH HEMATURIA: Primary | ICD-10-CM

## 2024-01-31 LAB
ALBUMIN SERPL BCP-MCNC: 4.2 G/DL (ref 3.5–5.2)
ALP SERPL-CCNC: 56 U/L (ref 55–135)
ALT SERPL W/O P-5'-P-CCNC: 15 U/L (ref 10–44)
ANION GAP SERPL CALC-SCNC: 7 MMOL/L (ref 8–16)
AST SERPL-CCNC: 14 U/L (ref 10–40)
BACTERIA #/AREA URNS HPF: ABNORMAL /HPF
BASOPHILS # BLD AUTO: 0.02 K/UL (ref 0–0.2)
BASOPHILS NFR BLD: 0.2 % (ref 0–1.9)
BILIRUB SERPL-MCNC: 0.3 MG/DL (ref 0.1–1)
BILIRUB UR QL STRIP: NEGATIVE
BUN SERPL-MCNC: 14 MG/DL (ref 6–20)
CALCIUM SERPL-MCNC: 8.7 MG/DL (ref 8.7–10.5)
CHLORIDE SERPL-SCNC: 104 MMOL/L (ref 95–110)
CLARITY UR: ABNORMAL
CO2 SERPL-SCNC: 23 MMOL/L (ref 23–29)
COLOR UR: YELLOW
CREAT SERPL-MCNC: 0.4 MG/DL (ref 0.5–1.4)
DIFFERENTIAL METHOD BLD: ABNORMAL
EOSINOPHIL # BLD AUTO: 0 K/UL (ref 0–0.5)
EOSINOPHIL NFR BLD: 0.3 % (ref 0–8)
ERYTHROCYTE [DISTWIDTH] IN BLOOD BY AUTOMATED COUNT: 13.3 % (ref 11.5–14.5)
EST. GFR  (NO RACE VARIABLE): >60 ML/MIN/1.73 M^2
GLUCOSE SERPL-MCNC: 129 MG/DL (ref 70–110)
GLUCOSE UR QL STRIP: NEGATIVE
HCT VFR BLD AUTO: 38.3 % (ref 37–48.5)
HGB BLD-MCNC: 12.5 G/DL (ref 12–16)
HGB UR QL STRIP: ABNORMAL
HYALINE CASTS #/AREA URNS LPF: 10 /LPF
IMM GRANULOCYTES # BLD AUTO: 0.03 K/UL (ref 0–0.04)
IMM GRANULOCYTES NFR BLD AUTO: 0.3 % (ref 0–0.5)
INR PPP: 1 (ref 0.8–1.2)
KETONES UR QL STRIP: NEGATIVE
LACTATE SERPL-SCNC: 1.3 MMOL/L (ref 0.5–1.9)
LEUKOCYTE ESTERASE UR QL STRIP: ABNORMAL
LYMPHOCYTES # BLD AUTO: 1.3 K/UL (ref 1–4.8)
LYMPHOCYTES NFR BLD: 14.2 % (ref 18–48)
MAGNESIUM SERPL-MCNC: 1.8 MG/DL (ref 1.6–2.6)
MCH RBC QN AUTO: 28.7 PG (ref 27–31)
MCHC RBC AUTO-ENTMCNC: 32.6 G/DL (ref 32–36)
MCV RBC AUTO: 88 FL (ref 82–98)
MICROSCOPIC COMMENT: ABNORMAL
MONOCYTES # BLD AUTO: 0.5 K/UL (ref 0.3–1)
MONOCYTES NFR BLD: 5.1 % (ref 4–15)
NEUTROPHILS # BLD AUTO: 7.2 K/UL (ref 1.8–7.7)
NEUTROPHILS NFR BLD: 79.9 % (ref 38–73)
NITRITE UR QL STRIP: POSITIVE
NRBC BLD-RTO: 0 /100 WBC
PH UR STRIP: 8 [PH] (ref 5–8)
PLATELET # BLD AUTO: 256 K/UL (ref 150–450)
PMV BLD AUTO: 9.7 FL (ref 9.2–12.9)
POTASSIUM SERPL-SCNC: 4.3 MMOL/L (ref 3.5–5.1)
PROT SERPL-MCNC: 7.3 G/DL (ref 6–8.4)
PROT UR QL STRIP: ABNORMAL
PROTHROMBIN TIME: 10.9 SEC (ref 9–12.5)
RBC # BLD AUTO: 4.36 M/UL (ref 4–5.4)
RBC #/AREA URNS HPF: 1 /HPF (ref 0–4)
SODIUM SERPL-SCNC: 134 MMOL/L (ref 136–145)
SP GR UR STRIP: 1.01 (ref 1–1.03)
SQUAMOUS #/AREA URNS HPF: 7 /HPF
URN SPEC COLLECT METH UR: ABNORMAL
UROBILINOGEN UR STRIP-ACNC: NEGATIVE EU/DL
WBC # BLD AUTO: 9.04 K/UL (ref 3.9–12.7)
WBC #/AREA URNS HPF: 11 /HPF (ref 0–5)

## 2024-01-31 PROCEDURE — 99285 EMERGENCY DEPT VISIT HI MDM: CPT | Mod: 25

## 2024-01-31 PROCEDURE — 96365 THER/PROPH/DIAG IV INF INIT: CPT

## 2024-01-31 PROCEDURE — 93010 ELECTROCARDIOGRAM REPORT: CPT | Mod: ,,, | Performed by: GENERAL PRACTICE

## 2024-01-31 PROCEDURE — 85025 COMPLETE CBC W/AUTO DIFF WBC: CPT | Performed by: EMERGENCY MEDICINE

## 2024-01-31 PROCEDURE — 80177 DRUG SCRN QUAN LEVETIRACETAM: CPT | Performed by: EMERGENCY MEDICINE

## 2024-01-31 PROCEDURE — 36415 COLL VENOUS BLD VENIPUNCTURE: CPT | Performed by: EMERGENCY MEDICINE

## 2024-01-31 PROCEDURE — 87040 BLOOD CULTURE FOR BACTERIA: CPT | Mod: 59 | Performed by: EMERGENCY MEDICINE

## 2024-01-31 PROCEDURE — 63600175 PHARM REV CODE 636 W HCPCS: Performed by: EMERGENCY MEDICINE

## 2024-01-31 PROCEDURE — 25000003 PHARM REV CODE 250: Performed by: EMERGENCY MEDICINE

## 2024-01-31 PROCEDURE — 93005 ELECTROCARDIOGRAM TRACING: CPT | Performed by: GENERAL PRACTICE

## 2024-01-31 PROCEDURE — 83735 ASSAY OF MAGNESIUM: CPT | Performed by: EMERGENCY MEDICINE

## 2024-01-31 PROCEDURE — 96375 TX/PRO/DX INJ NEW DRUG ADDON: CPT

## 2024-01-31 PROCEDURE — 80053 COMPREHEN METABOLIC PANEL: CPT | Performed by: EMERGENCY MEDICINE

## 2024-01-31 PROCEDURE — 85610 PROTHROMBIN TIME: CPT | Performed by: EMERGENCY MEDICINE

## 2024-01-31 PROCEDURE — 83605 ASSAY OF LACTIC ACID: CPT | Performed by: EMERGENCY MEDICINE

## 2024-01-31 PROCEDURE — 81001 URINALYSIS AUTO W/SCOPE: CPT | Performed by: EMERGENCY MEDICINE

## 2024-01-31 PROCEDURE — 96361 HYDRATE IV INFUSION ADD-ON: CPT

## 2024-01-31 RX ORDER — LORAZEPAM 2 MG/ML
2 INJECTION INTRAMUSCULAR
Status: COMPLETED | OUTPATIENT
Start: 2024-01-31 | End: 2024-01-31

## 2024-01-31 RX ORDER — SULFAMETHOXAZOLE AND TRIMETHOPRIM 800; 160 MG/1; MG/1
1 TABLET ORAL 2 TIMES DAILY
Qty: 14 TABLET | Refills: 0 | Status: SHIPPED | OUTPATIENT
Start: 2024-01-31 | End: 2024-02-07

## 2024-01-31 RX ORDER — SULFAMETHOXAZOLE AND TRIMETHOPRIM 800; 160 MG/1; MG/1
1 TABLET ORAL 2 TIMES DAILY
Qty: 14 TABLET | Refills: 0 | Status: SHIPPED | OUTPATIENT
Start: 2024-01-31 | End: 2024-01-31

## 2024-01-31 RX ADMIN — LORAZEPAM 2 MG: 2 INJECTION INTRAMUSCULAR; INTRAVENOUS at 03:01

## 2024-01-31 RX ADMIN — SODIUM CHLORIDE 350 ML: 9 INJECTION, SOLUTION INTRAVENOUS at 04:01

## 2024-01-31 RX ADMIN — SODIUM CHLORIDE 1000 ML: 9 INJECTION, SOLUTION INTRAVENOUS at 04:01

## 2024-01-31 RX ADMIN — LEVETIRACETAM 750 MG: 100 INJECTION, SOLUTION INTRAVENOUS at 07:01

## 2024-01-31 NOTE — ED PROVIDER NOTES
"Encounter Date: 1/31/2024       History     Chief Complaint   Patient presents with    Seizures     Pt has hx of seizures.  Pt sister administered rescue meds.  Pt mother said pt complained of not being able to see after seizure     Emergent evaluation of a 28-year-old female with history of spinal bifida closed with 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, history of Arnold Chiari malformation type 2 and hydrocephalus with a left-sided  shunt right-sided shunt no longer functioning, seizure disorder on Keppra, anemia, and neurogenic bladder presents to the ER due to a breakthrough seizure witnessed by her sister today.  Sister reports that she saw her left arm currently up which is consistent with her seizures.  They treated with her intranasal breakthrough seizure medication.  Mother reports the past several days she was reported headaches and neck stiffness.  She has had no fevers chills or sweats.  Positive nausea no vomiting.  Today after the seizure she had a headache and she complained of having blurry vision more than she normally would have without her glasses.  And reported that she could not see well and also had decreased hearing as though she was under water.  The symptoms have been improving since the seizure.  Mother reports that over the past 2 weeks he was noticed elevated blood pressures when she goes to wound care.  She was at wound care for decubitus wound to the right buttocks when she was seen every Monday and wound care she has home health on Fridays and her family changes dressing on Wednesdays.  They have not noticed any worsening of the buttocks wound.  She had a UTI in November.  She self caths every several hours no signs UTI at this time.        Review of patient's allergies indicates:   Allergen Reactions    Latex, natural rubber Anaphylaxis and Other (See Comments)     angioedema    Zofran [ondansetron hcl (pf)] Swelling     Hives, "throat closes up"    Gardasil  [h papillomavirus vac,qval (pf)]      Other " reaction(s): Shortness of breath    Menactra  [mening vac a,c,y,w135 dip (pf)]      Other reaction(s): Shortness of breath    Nitrofurantoin      Other reaction(s): Difficulty breathing    Sulfa (sulfonamide antibiotics)     Tramadol Hives    Levaquin [levofloxacin] Rash     Spots on face    Macrobid [nitrofurantoin monohyd/m-cryst] Rash     Sppots/rash on face     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     Past Surgical History:   Procedure Laterality Date    AMPUTATION      right 4th and 5th toes; left 5th toe and portion of left great toe    BACK SURGERY      BLADDER SURGERY      BREAST SURGERY  2015    Reduction    COLON SURGERY      flush site for bowel movements from appendix    CYSTOSTOMY      DEBRIDEMENT OF FOOT Right 1/8/2021    Procedure: DEBRIDEMENT, FOOT;  Surgeon: Adbi Bedoya DPM;  Location: [facility];  Service: Podiatry;  Laterality: Right;    EYE SURGERY      x 5    FLUOROSCOPIC URODYNAMIC STUDY N/A 3/12/2019    Procedure: URODYNAMIC STUDY, FLUOROSCOPIC;  Surgeon: Kenzie Charles MD;  Location: [facility] OR [address] [place] [address];  Service: Urology;  Laterality: N/A;  1 hour    FLUOROSCOPIC URODYNAMIC STUDY N/A 4/4/2019    Procedure: URODYNAMIC STUDY, FLUOROSCOPIC;  Surgeon: Kenzie Charles MD;  Location: [facility] OR [address] [place] [address];  Service: Urology;  Laterality: N/A;  1 hour    FLUOROSCOPIC URODYNAMIC STUDY N/A 5/11/2022    Procedure: URODYNAMIC STUDY, FLUOROSCOPIC;  Surgeon: Kenzie Charles MD;  Location: [facility] OR [address] [place] [address];  Service: Urology;  Laterality: N/A;  90min    FOOT AMPUTATION THROUGH METATARSAL Right 10/28/2019    Procedure: AMPUTATION, FOOT, TRANSMETATARSAL;  Surgeon: Abdi Bedoya DPM;  Location: [facility];  Service: Podiatry;  Laterality: Right;    FOOT  AMPUTATION THROUGH METATARSAL Right 3/27/2020    Procedure: AMPUTATION, FOOT, TRANSMETATARSAL;  Surgeon: Abdi Bedoya DPM;  Location: [facility] OR;  Service: Podiatry;  Laterality: Right;  REVISION    MYELOMININGOCELE REPAIR      NEPHRECTOMY Right 11/4/2021    Procedure: Nephrectomy/ OPEN;  Surgeon: Dash Rosenthal MD;  Location: [facility];  Service: Urology;  Laterality: Right;  4HRS    NEPHROSTOMY Right 8/2/2021    Procedure: Nephrostomy and perinephric abscess drain;  Surgeon: Lillian Surgeon;  Location: [facility];  Service: Anesthesiology;  Laterality: Right;    REVISION OF VENTRICULOPERITONEAL SHUNT Left 9/21/2022    Procedure: REVISION, SHUNT, VENTRICULOPERITONEAL;  Surgeon: Maynor Calero MD;  Location: [facility] FLR;  Service: Neurosurgery;  Laterality: Left;    STRABISMUS SURGERY      ou dr peña    VENTRICULOPERITONEAL SHUNT      WOUND DEBRIDEMENT  3/27/2020    Procedure: DEBRIDEMENT, WOUND;  Surgeon: Abdi Bedoya DPM;  Location: [facility];  Service: Podiatry;;     Family History   Problem Relation Age of Onset    Breast cancer Mother     Gout Father     Cataracts Maternal Grandmother     Myocarditis Sister     Amblyopia Neg Hx     Blindness Neg Hx     Cancer Neg Hx     Diabetes Neg Hx     Glaucoma Neg Hx     Hypertension Neg Hx     Macular degeneration Neg Hx     Retinal detachment Neg Hx     Strabismus Neg Hx     Stroke Neg Hx     Thyroid disease Neg Hx      Social History     Tobacco Use    Smoking status: Never    Smokeless tobacco: Never   Substance Use Topics    Alcohol use: Not Currently    Drug use: Never     Review of Systems   Constitutional:  Negative for activity change, appetite change, chills, diaphoresis, fatigue and fever.   HENT:  Positive for hearing loss. Negative for congestion, postnasal drip, rhinorrhea and sore throat.    Eyes:  Positive for visual disturbance.   Respiratory:  Negative for cough, chest tightness, shortness of breath, wheezing and stridor.     Cardiovascular:  Negative for chest pain and palpitations.   Gastrointestinal:  Positive for nausea. Negative for abdominal distention, abdominal pain, blood in stool, constipation, diarrhea and vomiting.   Genitourinary:  Negative for dysuria, flank pain, frequency, hematuria and urgency.   Musculoskeletal:  Positive for neck pain and neck stiffness. Negative for arthralgias, back pain and myalgias.   Skin:  Positive for wound. Negative for rash.   Neurological:  Positive for seizures and headaches. Negative for dizziness, tremors, syncope, facial asymmetry, speech difficulty, weakness, light-headedness and numbness.   Hematological:  Does not bruise/bleed easily.   Psychiatric/Behavioral:  Negative for confusion. The patient is not nervous/anxious.    All other systems reviewed and are negative.      Physical Exam     Initial Vitals [01/31/24 0141]   BP Pulse Resp Temp SpO2   135/81 81 18 98.4 °F (36.9 °C) 100 %      MAP       --         Physical Exam    Nursing note and vitals reviewed.  Constitutional: She appears well-developed and well-nourished. She is not diaphoretic. She appears distressed.   Temp 98.4°   HENT:   Head: Normocephalic and atraumatic.   Right Ear: External ear normal.   Left Ear: External ear normal.   Nose: Nose normal.   Mouth/Throat: Oropharynx is clear and moist.   Eyes: Conjunctivae and EOM are normal. Pupils are equal, round, and reactive to light.   Neck: Neck supple. No tracheal deviation present.   Normal range of motion.  Cardiovascular:  Normal rate, regular rhythm, normal heart sounds and intact distal pulses.     Exam reveals no gallop and no friction rub.       No murmur heard.  141/87 pulse 100   Pulmonary/Chest: Breath sounds normal. No stridor. No respiratory distress. She has no wheezes. She has no rhonchi. She has no rales. She exhibits no tenderness.   When I 1st enter the room patient was at the end of a seizure she was breathing shallowly and making mild gurgling noises  but had good chest rise.  She was placed on the pulse ox and pulse ox was 100% respirations were 16 mouth was suctioned patient rapidly woke up and was answering questions appropriately       Abdominal: Abdomen is soft. Bowel sounds are normal. She exhibits no distension and no mass. There is no abdominal tenderness. There is no rebound and no guarding.   Musculoskeletal:         General: No edema. Normal range of motion.      Cervical back: Normal range of motion and neck supple.     Neurological: She is alert and oriented to person, place, and time. No cranial nerve deficit.   5/5 strength bilateral upper extremities normal sensation.  Patient has paralysis bilateral lower extremities which is chronic.    Patient was postictal coming out of a seizure.  Postictal episode resolves quickly within 1-2 minute able to name her mother and sister at the bottom of the bed follow commands and participate with exam   Skin: Skin is warm and dry. No rash noted. No erythema. No pallor.   Stage III decubitus wound to right proximal thigh/buttocks with good granulation tissue   Psychiatric: She has a normal mood and affect. Her behavior is normal. Judgment and thought content normal.         ED Course   Procedures  Labs Reviewed   CBC W/ AUTO DIFFERENTIAL - Abnormal; Notable for the following components:       Result Value    Gran % 79.9 (*)     Lymph % 14.2 (*)     All other components within normal limits   COMPREHENSIVE METABOLIC PANEL - Abnormal; Notable for the following components:    Sodium 134 (*)     Glucose 129 (*)     Creatinine 0.4 (*)     Anion Gap 7 (*)     All other components within normal limits   URINALYSIS - Abnormal; Notable for the following components:    Appearance, UA Hazy (*)     Protein, UA Trace (*)     Occult Blood UA Trace (*)     Nitrite, UA Positive (*)     Leukocytes, UA 1+ (*)     All other components within normal limits    Narrative:     Collection Type->Urine, Clean Catch   URINALYSIS  MICROSCOPIC - Abnormal; Notable for the following components:    WBC, UA 11 (*)     Hyaline Casts, UA 10 (*)     All other components within normal limits    Narrative:     Collection Type->Urine, Clean Catch   CULTURE, BLOOD    Narrative:     Collection has been rescheduled by EL3 at 01/31/2024 02:22 Reason: Pt   in ct  Collection has been rescheduled by EL3 at 01/31/2024 02:22 Reason: Pt   in ct   CULTURE, BLOOD    Narrative:     Collection has been rescheduled by EL3 at 01/31/2024 02:22 Reason: Pt   in ct  Collection has been rescheduled by EL3 at 01/31/2024 02:22 Reason: Pt   in ct   PROTIME-INR   MAGNESIUM   LACTIC ACID, PLASMA   LEVETIRACETAM  (KEPPRA) LEVEL     EKG Readings: (Independently Interpreted)   Initial Reading: No STEMI. Rhythm: Normal Sinus Rhythm. Heart Rate: 90. Ectopy: No Ectopy. Conduction: Normal.     ECG Results              EKG 12-lead (In process)  Result time 01/31/24 05:14:42      In process by Interface, Lab In [facility] (01/31/24 05:14:42)                   Narrative:    Test Reason : R56.9,    Vent. Rate : 090 BPM     Atrial Rate : 090 BPM     P-R Int : 132 ms          QRS Dur : 082 ms      QT Int : 366 ms       P-R-T Axes : 052 060 043 degrees     QTc Int : 447 ms    Normal sinus rhythm  Normal ECG  When compared with ECG of 16-JAN-2023 21:33,  No significant change was found    Referred By: [email or web address]   SELF           Confirmed By:                                   Imaging Results              X-Ray Shunt Series (Final result)  Result time 01/31/24 05:36:06      Final result by Loki Barbosa MD (01/31/24 05:36:06)                   Narrative:    Shunt series.    INDICATION: Seizure.    TECHNIQUE: AP views of the skull, neck, chest, abdomen. Lateral view of the skull.    COMPARISON: 11/27/2023    FINDINGS:    Left parietal approach ventriculostomy is noted with shunt tubing coursing down the left neck and left chest wall. Tubing appears intact without kinking or discontinuity. Tube  terminates in the left lower quadrant.    IMPRESSION:    Unremarkable shunt series.    Electronically signed by:  Loki Barbosa MD  01/31/2024 05:36 AM CST Workstation: [id number]                                     CT Head Without Contrast (Final result)  Result time 01/31/24 04:17:08      Final result by Gladys Owens DO (01/31/24 04:17:08)                   Narrative:    EXAM:  CT Head Without Intravenous Contrast    CLINICAL HISTORY:  Mental status change, unknown cause; seizure with  shunt and vision loss.    TECHNIQUE:  CT images of the head/brain without intravenous contrast. Axial, coronal, and sagittal images.  This CT exam was performed using one or more of the following dose reduction techniques:  automated exposure control, adjustment of the mA and/or kV according to patient size, and/or use of iterative reconstruction technique.    COMPARISON:  1/26/2023    FINDINGS:  Brain:  No acute hemorrhage. No evidence of acute infarct. Brain appears similar with Chiari malformation and colpocephaly again demonstrated.  Ventricles:  See below.  Bones/joints:  No skull fracture.  Soft tissues:  No acute findings.  Sinuses:  No air-fluid level.  Mastoid air cells:  No mastoid effusion.  Tubes, lines and devices:  Ventricular caliber appears stable. Bilateral posterior approach shunt catheters appear similar, the right no longer connected/functional.    IMPRESSION:  No acute findings. Stable appearance of the brain.    Electronically signed by:  Rachel Nuñez MD  01/31/2024 04:17 AM CST Workstation: [id number]                                     Medications   levETIRAcetam (Keppra) 750 mg in dextrose 5 % (D5W) 100 mL IVPB (has no administration in time range)   LORazepam injection 2 mg (2 mg Intravenous Given 1/31/24 0328)   sodium chloride 0.9% bolus 1,000 mL 1,000 mL (1,000 mLs Intravenous New Bag 1/31/24 0451)   sodium chloride 0.9% bolus 350 mL 350 mL (350 mLs Intravenous New Bag 1/31/24 0452)      Medical Decision Making  Emergent evaluation of a 28-year-old female with history of spinal bifida closed with 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, history of Arnold Chiari malformation type 2 and hydrocephalus with a left-sided  shunt right-sided shunt no longer functioning, seizure disorder on Keppra, anemia, and neurogenic bladder presents to the ER due to a breakthrough seizure witnessed by her sister today.  Sister reports that she saw her left arm currently up which is consistent with her seizures.  They treated with her intranasal breakthrough seizure medication.  Mother reports the past several days she was reported headaches and neck stiffness.  She has had no fevers chills or sweats.  Positive nausea no vomiting.  Today after the seizure she had a headache and she complained of having blurry vision more than she normally would have without her glasses.  And reported that she could not see well and also had decreased hearing as though she was under water.  The symptoms have been improving since the seizure.  Mother reports that over the past 2 weeks he was noticed elevated blood pressures when she goes to wound care.  She was at wound care for decubitus wound to the right buttocks when she was seen every Monday and wound care she has home health on Fridays and her family changes dressing on Wednesdays.  They have not noticed any worsening of the buttocks wound.  She had a UTI in November.  She self caths every several hours no signs UTI at this time.  On physical exam patient had just been via back from CT family reported that they felt she was having a seizure.  When I went in the room patient was laying in bed eyes closed with no tonic-clonic motions.  She was breathing shallowly with wet sounding upper airway noise.  Pulse ox was placed under a fair sats are 100%.  Patient was spoken to loudly and tactilely stimulated on the right shoulder and she began to move around and moan.  Suctioning in the mouth cleared upper  airway.  Clear breath sounds bilaterally.  Patient came in for seizure without any medications.  Was postictal for several minutes for being able to respond and talked to family appropriately recognizing family in the room and sitting up.  5/5 strength in upper extremities.  Awake alert and oriented.  [facility]    Patient presents for emergent evaluation of acute breakthrough seizure at home with history of underlying seizure disorder headache for several days and neck pain that poses a threat to life and/or bodily function.   Differential diagnosis includes but was not limited to  shunt malfunction worsening hydrocephalus, meningitis, encephalitis, breakthrough seizure, infection such as sepsis, electrolyte abnormalities, cardiac dysrhythmia.   In the ED patient found to have acute breakthrough seizure with history of seizure disorder  I ordered labs and personally reviewed them.  Labs significant for see below  I ordered X-rays and personally reviewed them and reviewed the radiologist interpretation.  Xray significant for see below.    I ordered EKG and personally reviewed it.  EKG significant for see below.    I ordered CT scan and personally reviewed it and reviewed the radiologist interpretation.  CT significant for IMPRESSION:   No acute findings. Stable appearance of the brain.        Discharge [facility]     Patient was managed in the ED with IV Ativan 2 mg IV given due to patient having seizure while returning from CT scan.  1350 mL of normal saline as a 30 milliliter/kilogram IV bolus while evaluating for pop simple sepsis.  Keppra 750 mg IV prior to discharge to load.  Due to  having a breakthrough seizurewith her neurologist mother will call Neurology today  The response to treatment was improved  Patient was discharged in stable condition.  Detailed return precautions discussed.  Patient was told to follow up with primary care physician or specialist based on their diagnosis  Sandy Nunes MD        Amount  and/or Complexity of Data Reviewed  Independent Historian: parent     Details: Patient's mother and sister  Labs: ordered.     Details: UA positive for urinary tract infection with hazy appearance trace protein trace blood nitrite positive 1+ leuks 11 white blood cell rare bacteria 7 squamous from a catheterized specimen  Normal CBC  Normal CMP  Normal coags  Radiology: ordered and independent interpretation performed.  ECG/medicine tests: ordered and independent interpretation performed.    Risk  Prescription drug management.               ED Course as of 01/31/24 0647   Wed Jan 31, 2024   0523 We have had the patient self cath with Charley assistance no urine was obtained were given IV fluids prior to repeating catheterization for urine  0500 [RM]   0553 Consult to pts Neurologist Dr Derik Nunes M.D.  5:54 AM 1/31/2024   [RM]      ED Course User Index  [RM] Sandy Nunes MD                           Clinical Impression:  Final diagnoses:  [R56.9] Seizure  [N30.01] Acute cystitis with hematuria (Primary)  [R51.9] Acute nonintractable headache, unspecified headache type          ED Disposition Condition    Discharge Stable          ED Prescriptions       Medication Sig Dispense Start Date End Date Auth. Provider    sulfamethoxazole-trimethoprim 800-160mg (BACTRIM DS) 800-160 mg Tab  (Status: Discontinued) Take 1 tablet by mouth 2 (two) times daily. for 7 days 14 tablet 1/31/2024 1/31/2024 Sandy Nunes MD    sulfamethoxazole-trimethoprim 800-160mg (BACTRIM DS) 800-160 mg Tab Take 1 tablet by mouth 2 (two) times daily. for 7 days 14 tablet 1/31/2024 2/7/2024 Sandy Nunes MD          Follow-up Information       Follow up With Specialties Details Why Contact Info Additional Information    [facility] - Emergency Dept Emergency Medicine Go to  If symptoms worsen [address] [facility] [address] [phone] 1st floor    Robert More MD Neurology  Call today  [address] [facility] - Neurology [address] [place] [id number]  [phone]                Sandy Nunes MD  01/31/24 0647

## 2024-01-31 NOTE — DISCHARGE INSTRUCTIONS
Continue Keppra 750 mg twice daily.  You received her morning dose here in the ER.  Take your evening dose as previously directed

## 2024-01-31 NOTE — ED NOTES
Assumed care of pt. Pt resting on cart, family at bedside awaiting d/c after keppra infusion. Denies any needs at this time.

## 2024-02-02 LAB — LEVETIRACETAM SERPL-MCNC: 37.2 UG/ML (ref 10–40)

## 2024-02-05 LAB
BACTERIA BLD CULT: NORMAL
BACTERIA BLD CULT: NORMAL

## 2024-03-06 ENCOUNTER — OFFICE VISIT (OUTPATIENT)
Dept: NEUROLOGY | Facility: CLINIC | Age: 29
End: 2024-03-06
Payer: COMMERCIAL

## 2024-03-06 VITALS — SYSTOLIC BLOOD PRESSURE: 132 MMHG | DIASTOLIC BLOOD PRESSURE: 84 MMHG | HEART RATE: 91 BPM

## 2024-03-06 DIAGNOSIS — R56.9 WITNESSED SEIZURE-LIKE ACTIVITY: ICD-10-CM

## 2024-03-06 DIAGNOSIS — G43.E09 CHRONIC MIGRAINE WITH AURA WITHOUT STATUS MIGRAINOSUS, NOT INTRACTABLE: Primary | ICD-10-CM

## 2024-03-06 DIAGNOSIS — T78.2XXD ANAPHYLAXIS, SUBSEQUENT ENCOUNTER: ICD-10-CM

## 2024-03-06 PROCEDURE — 99999 PR PBB SHADOW E&M-EST. PATIENT-LVL II: CPT | Mod: PBBFAC,,,

## 2024-03-06 PROCEDURE — 99214 OFFICE O/P EST MOD 30 MIN: CPT | Mod: S$GLB,,, | Performed by: STUDENT IN AN ORGANIZED HEALTH CARE EDUCATION/TRAINING PROGRAM

## 2024-03-06 RX ORDER — EPINEPHRINE 0.3 MG/.3ML
1 INJECTION SUBCUTANEOUS
Qty: 0.3 ML | Refills: 0 | Status: SHIPPED | OUTPATIENT
Start: 2024-03-06

## 2024-03-06 RX ORDER — RIBOFLAVIN (VITAMIN B2) 400 MG
400 TABLET ORAL DAILY
Qty: 30 TABLET | Refills: 6 | Status: SHIPPED | OUTPATIENT
Start: 2024-03-06

## 2024-03-06 RX ORDER — LEVETIRACETAM 1000 MG/1
1000 TABLET ORAL 2 TIMES DAILY
Qty: 60 TABLET | Refills: 11 | Status: ON HOLD | OUTPATIENT
Start: 2024-03-06 | End: 2024-04-20 | Stop reason: HOSPADM

## 2024-03-06 NOTE — PROGRESS NOTES
[facility] - NEUROLOGY [address]TH [address] [facility], [place]    Date: 3/6/24  Patient Name: Shashank Samuel   MRN: [id number]   PCP: Danis Simon  Referring Provider:     Assessment:   Shashank Samuel is a 28 y.o. female presenting for follow up. Today, her main issue is that of headaches and for a refill of seizure medications. Her headaches usually last 10-15 minutes, and she has 4-5 per week. They are throbbing / pressure like, most often in the posterior of the head, but can be frontal and retroorbital at times. This began 4-5 months ago, mostly associated with unilateral or bilateral blurry vision that can last for an hour beyond the resolution of her headaches. They also include some visual aura.    At this time, she has elected to not escalate to migraine preventatives; will begin with vitamin B2, coenzyme Q10.    Plan:     Patient counseled on seizure precautions  until six months seizure free including no driving or operating heavy machinery, no submerging in water, take showers instead of baths whenever possible, do not care for children alone, caution when using hot objects especially cooking and do not scale ladders or heights unsupported or unaccompanied.     -- Vit B2, Coenzyme Q  -- Keppra 1g BID  -- Intranasal Midazolam  -- follow up April 15  -- consider topiramate    Problem List Items Addressed This Visit    None  Visit Diagnoses       Chronic migraine with aura without status migrainosus, not intractable    -  Primary    Relevant Medications    riboflavin, vitamin B2, 400 mg Tab    coenzyme Q10 600 mg Wafr    Witnessed seizure-like activity        Relevant Medications    levETIRAcetam (KEPPRA) 1000 MG tablet    midazolam 5 mg/spray (0.1 mL) Spry    Anaphylaxis, subsequent encounter        Relevant Medications    EPINEPHrine (EPIPEN) 0.3 mg/0.3 mL AtIn              03/25/2024  Robert More MD, [facility] [place]  Neurology resident, PGY-1  Department of Neurology  [facility]        Subjective:         Interval 9/28/2023  follow up of seizure-like activity. Patient's did not have further seizure events after being started on Keppra during her last visit 8/17/2022, until a significant episode in which she was found unconscious with signs of cyanosis on 9/20/2022. Mom reports that patient went to the bathroom at 3:15pm, was found at 3:27 p.m. with Shashank in the bathroom leaning against the edge of the tub with her neck in a very abnormal position and she noticed the right side of her head and neck was blue.  They reported she was not responsive and making gurgling respirations.  They did not see seizure-like activity.  She had gone to the bathroom on her own.  They carried her out into the living room.  He reports she did not return to baseline during that time with EMS EMS reported that she remain postictal with them.  On arrival to the ER she had a seizure while in the ambulance stretcher day the lasted more than 2 minutes.  Patient sats remained 100% throughout this time heart rate was in the 120s to 130s.  Patient was immediately brought to a room for treatment. Family reports she has never had a seizure like this in the past. Per patient's mom, patient was intubated and placed in an induced coma to help break the seizures at approximately 8:30 pm that evening.    She was found to have a malfunctioning shunt valve which was replaced during that hospitalization, and she has had no further events since discharge. Patient's seizure episode is most likely related to the shunt malfunction. However, in consultation with family, a joint decision was made to increase the dose slightly to 750mg BID from 500mg BID and follow up in 3 months.    HPI:   Ms. Shashank Samuel is a 28 y.o. female presenting with seizure-like activity of new onset (though she does have a history of seizures in the past). 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    Type 1: First seizure event started at the age of 5, 2/2 to a reported shunt malfunction. Since then, she had episodes twice a year until the age of 10 at which point her seizures stopped. She had a repeat single seizure episode after a shunt malfunction in 2012. These episodes were associated with an abnormal R hand claw gesture as well as a 1 Hz rhythmic elbow extension.    New Type 2: She hasn't had any other seizure events since then until March 2022, since which she has had 5 episodes, lasting up to 30 seconds. In this latest episode, the patient's R arm dropped, followed by vocal grunts. Mom has a video of the event. Patient appears to have a staring spell with eyes fixed in position. These new episodes are always followed by post-ictal cries, and proceeded by hunger. Patient has been drinking a lot of water recently.    AEDs: Patient has not been on any anti-epileptics since 2020, before which she was on Keppra 500 BID.      PAST MEDICAL HISTORY:  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       PAST SURGICAL HISTORY:  Past Surgical History:   Procedure Laterality Date    AMPUTATION      right 4th and 5th toes; left 5th toe and portion of left great toe    BACK SURGERY      BLADDER SURGERY      BREAST SURGERY  2015    Reduction    COLON SURGERY      flush site for bowel movements from appendix     CYSTOSTOMY      DEBRIDEMENT OF FOOT Right 1/8/2021    Procedure: DEBRIDEMENT, FOOT;  Surgeon: Abdi Bedoya DPM;  Location: [facility] OR;  Service: Podiatry;  Laterality: Right;    EYE SURGERY      x 5    FLUOROSCOPIC URODYNAMIC STUDY N/A 3/12/2019    Procedure: URODYNAMIC STUDY, FLUOROSCOPIC;  Surgeon: Kenzie Charles MD;  Location: [facility] OR 1ST FLR;  Service: Urology;  Laterality: N/A;  1 hour    FLUOROSCOPIC URODYNAMIC STUDY N/A 4/4/2019    Procedure: URODYNAMIC STUDY, FLUOROSCOPIC;  Surgeon: Kenzie Charles MD;  Location: [facility] OR 1ST FLR;  Service: Urology;  Laterality: N/A;  1 hour    FLUOROSCOPIC URODYNAMIC STUDY N/A 5/11/2022    Procedure: URODYNAMIC STUDY, FLUOROSCOPIC;  Surgeon: Kenzie Charles MD;  Location: [facility] 1ST FLR;  Service: Urology;  Laterality: N/A;  90min    FOOT AMPUTATION THROUGH METATARSAL Right 10/28/2019    Procedure: AMPUTATION, FOOT, TRANSMETATARSAL;  Surgeon: Abdi Bedoya DPM;  Location: [facility] OR;  Service: Podiatry;  Laterality: Right;    FOOT AMPUTATION THROUGH METATARSAL Right 3/27/2020    Procedure: AMPUTATION, FOOT, TRANSMETATARSAL;  Surgeon: Abdi Bedoya DPM;  Location: [facility];  Service: Podiatry;  Laterality: Right;  REVISION    MYELOMININGOCELE REPAIR      NEPHRECTOMY Right 11/4/2021    Procedure: Nephrectomy/ OPEN;  Surgeon: Dash Rosenthal MD;  Location: [facility] OR 2ND FLR;  Service: Urology;  Laterality: Right;  4HRS    NEPHROSTOMY Right 8/2/2021    Procedure: Nephrostomy and perinephric abscess drain;  Surgeon: Lillian Surgeon;  Location: [facility];  Service: Anesthesiology;  Laterality: Right;    REVISION OF VENTRICULOPERITONEAL SHUNT Left 9/21/2022    Procedure: REVISION, SHUNT, VENTRICULOPERITONEAL;  Surgeon: Maynor Calero MD;  Location: [facility] OR 2ND FLR;  Service: Neurosurgery;  Laterality: Left;    STRABISMUS SURGERY      ou dr peña    VENTRICULOPERITONEAL SHUNT      WOUND DEBRIDEMENT  3/27/2020    Procedure: DEBRIDEMENT, WOUND;  Surgeon: Abdi  "HERRERA Bedoya DPM;  Location: [facility] OR;  Service: Podiatry;;       CURRENT MEDS:  Current Outpatient Medications   Medication Sig Dispense Refill    ergocalciferol (ERGOCALCIFEROL) 50,000 unit Cap TAKE 1 CAPSULE BY MOUTH ONCE A WEEK WITH MEALS      ferrous sulfate (FEOSOL) 325 mg (65 mg iron) Tab tablet Take 325 mg by mouth 2 (two) times daily.      ibuprofen (ADVIL,MOTRIN) 200 MG tablet Take 200 mg by mouth every 6 (six) hours as needed for Pain.      coenzyme Q10 600 mg Wafr Take 600 mg by mouth before evening meal. 30 each 6    diazePAM 5-7.5-10 mg (DIASTAT ACUDIAL) 5-7.5-10 mg Kit rectal kit Place rectally.      EPINEPHrine (EPIPEN) 0.3 mg/0.3 mL AtIn Inject 0.3 mLs (0.3 mg total) into the muscle as needed (Anaphylactic reaction). 0.3 mL 0    levETIRAcetam (KEPPRA) 1000 MG tablet Take 1 tablet (1,000 mg total) by mouth 2 (two) times daily. 60 tablet 11    midazolam 5 mg/spray (0.1 mL) Spry 1 spray by Nasal route as needed (Seizures). Administer 1 spray into 1 nostril; if a second dose is needed, administer in alternate nostril. A second dose should not be administered if the patient is having trouble breathing or excessive sedation 1 each 2    riboflavin, vitamin B2, 400 mg Tab Take 400 mg by mouth once daily. 30 tablet 6     No current facility-administered medications for this visit.       ALLERGIES:  Review of patient's allergies indicates:   Allergen Reactions    Latex, natural rubber Anaphylaxis and Other (See Comments)     angioedema    Zofran [ondansetron hcl (pf)] Swelling     Hives, "throat closes up"    Gardasil  [h papillomavirus vac,qval (pf)]      Other reaction(s): Shortness of breath    Menactra  [mening vac a,c,y,w135 dip (pf)]      Other reaction(s): Shortness of breath    Nitrofurantoin      Other reaction(s): Difficulty breathing    Sulfa (sulfonamide antibiotics)     Tramadol Hives    Levaquin [levofloxacin] Rash     Spots on face    Macrobid [nitrofurantoin monohyd/m-cryst] Rash     Sppots/rash " on face       FAMILY HISTORY:  Family History   Problem Relation Age of Onset    Breast cancer Mother     Gout Father     Cataracts Maternal Grandmother     Myocarditis Sister     Amblyopia Neg Hx     Blindness Neg Hx     Cancer Neg Hx     Diabetes Neg Hx     Glaucoma Neg Hx     Hypertension Neg Hx     Macular degeneration Neg Hx     Retinal detachment Neg Hx     Strabismus Neg Hx     Stroke Neg Hx     Thyroid disease Neg Hx        SOCIAL HISTORY:  Social History     Tobacco Use    Smoking status: Never    Smokeless tobacco: Never   Substance Use Topics    Alcohol use: Not Currently    Drug use: Never       Review of Systems:  12 system review of systems is negative except for the symptoms mentioned in HPI.        Objective:     Vitals:    03/06/24 1311   BP: 132/84   Pulse: 91     General: NAD, well nourished   Eyes: no tearing, discharge, no erythema   ENT: moist mucous membranes of the oral cavity, nares patent    Neck: Supple, full range of motion  Cardiovascular: Warm and well perfused, pulses equal and symmetrical  Lungs: Normal work of breathing, normal chest wall excursions  Skin: No rash, lesions, or breakdown on exposed skin  Psychiatry: Mood and affect are appropriate   Abdomen: soft, non tender, non distended  Extremeties: No cyanosis, clubbing or edema.    Neurological   MENTAL STATUS: Alert and oriented to person, place, and time. Attention and concentration within normal limits. Speech without dysarthria, able to name and repeat without difficulty. Recent and remote memory within normal limits   CRANIAL NERVES: Visual fields intact. PERRL. EOMI (decrased in range 2/2 eye surguries). Facial sensation intact. Face symmetrical. Hearing grossly intact. Full shoulder shrug bilaterally. Tongue protrudes midline   SENSORY: Sensation is intact to pin and light touch to approximately T7 level.    MOTOR: Normal bulk and tone in UE. No pronator drift.  5/5 deltoid, biceps, triceps, interosseous, hand   bilaterally. 0/5 in LE  REFLEXES: Symmetric and 2+ in UE, absent in LE.  CEREBELLAR/COORDINATION/GAIT: Finger to nose intact. Normal rapid alternating movements.

## 2024-04-15 ENCOUNTER — OFFICE VISIT (OUTPATIENT)
Dept: NEUROLOGY | Facility: CLINIC | Age: 29
End: 2024-04-15
Payer: COMMERCIAL

## 2024-04-15 VITALS — SYSTOLIC BLOOD PRESSURE: 143 MMHG | HEART RATE: 83 BPM | DIASTOLIC BLOOD PRESSURE: 96 MMHG

## 2024-04-15 DIAGNOSIS — G43.E09 CHRONIC MIGRAINE WITH AURA WITHOUT STATUS MIGRAINOSUS, NOT INTRACTABLE: Primary | ICD-10-CM

## 2024-04-15 DIAGNOSIS — R56.9 SEIZURES: ICD-10-CM

## 2024-04-15 PROCEDURE — 99214 OFFICE O/P EST MOD 30 MIN: CPT | Mod: S$GLB,,,

## 2024-04-15 PROCEDURE — 3080F DIAST BP >= 90 MM HG: CPT | Mod: CPTII,S$GLB,,

## 2024-04-15 PROCEDURE — 3077F SYST BP >= 140 MM HG: CPT | Mod: CPTII,S$GLB,,

## 2024-04-15 PROCEDURE — 99999 PR PBB SHADOW E&M-EST. PATIENT-LVL III: CPT | Mod: PBBFAC,,,

## 2024-04-15 RX ORDER — ERGOCALCIFEROL 1.25 MG/1
50000 CAPSULE ORAL
COMMUNITY

## 2024-04-15 RX ORDER — DIAZEPAM 10 MG/100UL
10 SPRAY NASAL EVERY 5 MIN PRN
Qty: 1 EACH | Refills: 3 | Status: SHIPPED | OUTPATIENT
Start: 2024-04-15

## 2024-04-15 NOTE — PROGRESS NOTES
[facility] - NEUROLOGY [address]TH [address] [facility], [place]    Date: 4/15/24  Patient Name: Shashank Samuel   MRN: [id number]   PCP: Dash Ponce  Referring Provider:     Assessment:   Shashank Samuel is a 28 y.o. female presenting for follow up.  Patient has had no further seizures since adjustment of her medications. She is primarily here for renewal of her medications.  She has had one headache lasting 2 hours, another lasting 15 minutes. Otherwise, patient is stable.    Plan:     Patient counseled on seizure precautions  until six months seizure free including no driving or operating heavy machinery, no submerging in water, take showers instead of baths whenever possible, do not care for children alone, caution when using hot objects especially cooking and do not scale ladders or heights unsupported or unaccompanied.     -- intranasal seizure abortive diazepam ordered as nayzilam not covered by insurance  -- RTC as needed    Problem List Items Addressed This Visit          Neuro    Seizures    Relevant Medications    diazePAM (VALTOCO) 10 mg/spray (0.1 mL) Spry     Other Visit Diagnoses       Chronic migraine with aura without status migrainosus, not intractable    -  Primary          04/23/2024  Robert More MD, [facility]  Neurology resident, PGY-1  Department of Neurology  [facility]        Subjective:         INTERVAL HISTORY 3/6/2024  Today, her main issue is that of headaches and for a refill of seizure medications. Her headaches usually last 10-15 minutes, and she has 4-5 per week. They are throbbing / pressure like, most often in the posterior of the head, but can be frontal and retroorbital at times. This began 4-5 months ago, mostly associated with unilateral or bilateral blurry vision that can last for an hour beyond the resolution of her headaches. They also include some visual aura.    At this time, she has elected to not escalate to migraine  preventatives; will begin with vitamin B2, coenzyme Q10.    Interval 9/28/2023  follow up of seizure-like activity. Patient's did not have further seizure events after being started on Keppra during her last visit 8/17/2022, until a significant episode in which she was found unconscious with signs of cyanosis on 9/20/2022. Mom reports that patient went to the bathroom at 3:15pm, was found at 3:27 p.m. with Shashank in the bathroom leaning against the edge of the tub with her neck in a very abnormal position and she noticed the right side of her head and neck was blue.  They reported she was not responsive and making gurgling respirations.  They did not see seizure-like activity.  She had gone to the bathroom on her own.  They carried her out into the living room.  He reports she did not return to baseline during that time with EMS EMS reported that she remain postictal with them.  On arrival to the ER she had a seizure while in the ambulance stretcher day the lasted more than 2 minutes.  Patient sats remained 100% throughout this time heart rate was in the 120s to 130s.  Patient was immediately brought to a room for treatment. Family reports she has never had a seizure like this in the past. Per patient's mom, patient was intubated and placed in an induced coma to help break the seizures at approximately 8:30 pm that evening.    She was found to have a malfunctioning shunt valve which was replaced during that hospitalization, and she has had no further events since discharge. Patient's seizure episode is most likely related to the shunt malfunction. However, in consultation with family, a joint decision was made to increase the dose slightly to 750mg BID from 500mg BID and follow up in 3 months.    HPI:   Ms. Shashank Samuel is a 28 y.o. female presenting with seizure-like activity of new onset (though she does have a history of seizures in the past). 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    Type 1: First seizure event started at the age of 5, 2/2 to a reported shunt malfunction. Since then, she had episodes twice a year until the age of 10 at which point her seizures stopped. She had a repeat single seizure episode after a shunt malfunction in 2012. These episodes were associated with an abnormal R hand claw gesture as well as a 1 Hz rhythmic elbow extension.    New Type 2: She hasn't had any other seizure events since then until March 2022, since which she has had 5 episodes, lasting up to 30 seconds. In this latest episode, the patient's R arm dropped, followed by vocal grunts. Mom has a video of the event. Patient appears to have a staring spell with eyes fixed in position. These new episodes are always followed by post-ictal cries, and proceeded by hunger. Patient has been drinking a lot of water recently.    AEDs: Patient has not been on any anti-epileptics since 2020, before which she was on Keppra 500 BID.      PAST MEDICAL HISTORY:  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       PAST SURGICAL HISTORY:  Past Surgical History:   Procedure Laterality Date    AMPUTATION      right 4th and 5th toes; left 5th toe and portion of left great toe    BACK SURGERY      BLADDER SURGERY      BREAST SURGERY  2015    Reduction    COLON SURGERY      flush site for bowel  movements from appendix    CYSTOSTOMY      DEBRIDEMENT OF FOOT Right 1/8/2021    Procedure: DEBRIDEMENT, FOOT;  Surgeon: Abdi Bedoya DPM;  Location: [facility];  Service: Podiatry;  Laterality: Right;    EYE SURGERY      x 5    FLUOROSCOPIC URODYNAMIC STUDY N/A 3/12/2019    Procedure: URODYNAMIC STUDY, FLUOROSCOPIC;  Surgeon: Kenzie Charles MD;  Location: [facility] OR 1ST FLR;  Service: Urology;  Laterality: N/A;  1 hour    FLUOROSCOPIC URODYNAMIC STUDY N/A 4/4/2019    Procedure: URODYNAMIC STUDY, FLUOROSCOPIC;  Surgeon: Kenzie Charles MD;  Location: [facility] OR 1ST FLR;  Service: Urology;  Laterality: N/A;  1 hour    FLUOROSCOPIC URODYNAMIC STUDY N/A 5/11/2022    Procedure: URODYNAMIC STUDY, FLUOROSCOPIC;  Surgeon: Kenzie Charles MD;  Location: [facility] 1ST FLR;  Service: Urology;  Laterality: N/A;  90min    FOOT AMPUTATION THROUGH METATARSAL Right 10/28/2019    Procedure: AMPUTATION, FOOT, TRANSMETATARSAL;  Surgeon: Abdi Bedoya DPM;  Location: [facility] OR;  Service: Podiatry;  Laterality: Right;    FOOT AMPUTATION THROUGH METATARSAL Right 3/27/2020    Procedure: AMPUTATION, FOOT, TRANSMETATARSAL;  Surgeon: Abdi Bedoya DPM;  Location: [facility];  Service: Podiatry;  Laterality: Right;  REVISION    MYELOMININGOCELE REPAIR      NEPHRECTOMY Right 11/4/2021    Procedure: Nephrectomy/ OPEN;  Surgeon: Dash Rosenthal MD;  Location: [facility] OR 2ND FLR;  Service: Urology;  Laterality: Right;  4HRS    NEPHROSTOMY Right 8/2/2021    Procedure: Nephrostomy and perinephric abscess drain;  Surgeon: Lillian Surgeon;  Location: [facility];  Service: Anesthesiology;  Laterality: Right;    REVISION OF VENTRICULOPERITONEAL SHUNT Left 9/21/2022    Procedure: REVISION, SHUNT, VENTRICULOPERITONEAL;  Surgeon: Maynor Calero MD;  Location: [facility] OR 2ND FLR;  Service: Neurosurgery;  Laterality: Left;    STRABISMUS SURGERY      ou dr peña    VENTRICULOPERITONEAL SHUNT      WOUND DEBRIDEMENT  3/27/2020    Procedure: DEBRIDEMENT,  "WOUND;  Surgeon: Abdi Bedoya DPM;  Location: [facility] OR;  Service: Podiatry;;       CURRENT MEDS:  Current Outpatient Medications   Medication Sig Dispense Refill    EPINEPHrine (EPIPEN) 0.3 mg/0.3 mL AtIn Inject 0.3 mLs (0.3 mg total) into the muscle as needed (Anaphylactic reaction). 0.3 mL 0    ergocalciferol (ERGOCALCIFEROL) 50,000 unit Cap Take 50,000 Units by mouth every 7 days. (Patient not taking: Reported on 4/23/2024)      ibuprofen (ADVIL,MOTRIN) 200 MG tablet Take 200 mg by mouth every 6 (six) hours as needed for Pain.      INV nrl-1 (INTRANASAL DIAZEPAM) 10 mg sprm nasal spray 1 spray by Left Nostril route once. Spray in the nose as directed for 1 dose. (Patient not taking: Reported on 4/23/2024)      riboflavin, vitamin B2, 400 mg Tab Take 400 mg by mouth once daily. (Patient not taking: Reported on 4/23/2024) 30 tablet 6    cefdinir (OMNICEF) 300 MG capsule Take 1 capsule (300 mg total) by mouth 2 (two) times daily. for 7 days (Patient not taking: Reported on 4/23/2024) 14 capsule 0    diazePAM (VALTOCO) 10 mg/spray (0.1 mL) Spry 10 mg by Nasal route every 5 (five) minutes as needed (Administer 1 spray into 1 nostril; if a second dose is needed, administer  in alternate nostril. A second dose should not be administered if the  patient is having trouble breathing or excessive sedatio). 1 each 3    levETIRAcetam (KEPPRA) 250 MG Tab Take 5 tablets (1,250 mg total) by mouth 2 (two) times daily. 60 tablet 0    sulfamethoxazole-trimethoprim 800-160mg (BACTRIM DS) 800-160 mg Tab Take 1 tablet by mouth 2 (two) times daily. for 3 days 6 tablet 0     No current facility-administered medications for this visit.       ALLERGIES:  Review of patient's allergies indicates:   Allergen Reactions    Latex, natural rubber Anaphylaxis and Other (See Comments)     angioedema    Zofran [ondansetron hcl (pf)] Swelling     Hives, "throat closes up"    Gardasil  [h papillomavirus vac,qval (pf)]      Other reaction(s): " Shortness of breath    Menactra  [mening vac a,c,y,w135 dip (pf)]      Other reaction(s): Shortness of breath    Nitrofurantoin      Other reaction(s): Difficulty breathing    Sulfa (sulfonamide antibiotics)     Tramadol Hives    Levaquin [levofloxacin] Rash     Spots on face    Macrobid [nitrofurantoin monohyd/m-cryst] Rash     Sppots/rash on face       FAMILY HISTORY:  Family History   Problem Relation Name Age of Onset    Breast cancer Mother      Gout Father      Cataracts Maternal Grandmother      Myocarditis Sister      Amblyopia Neg Hx      Blindness Neg Hx      Cancer Neg Hx      Diabetes Neg Hx      Glaucoma Neg Hx      Hypertension Neg Hx      Macular degeneration Neg Hx      Retinal detachment Neg Hx      Strabismus Neg Hx      Stroke Neg Hx      Thyroid disease Neg Hx         SOCIAL HISTORY:  Social History     Tobacco Use    Smoking status: Never    Smokeless tobacco: Never   Substance Use Topics    Alcohol use: Not Currently    Drug use: Never       Review of Systems:  12 system review of systems is negative except for the symptoms mentioned in HPI.        Objective:     Vitals:    04/15/24 1552   BP: (!) 143/96   Pulse: 83     General: NAD, well nourished   Eyes: no tearing, discharge, no erythema   ENT: moist mucous membranes of the oral cavity, nares patent    Neck: Supple, full range of motion  Cardiovascular: Warm and well perfused, pulses equal and symmetrical  Lungs: Normal work of breathing, normal chest wall excursions  Skin: No rash, lesions, or breakdown on exposed skin  Psychiatry: Mood and affect are appropriate   Abdomen: soft, non tender, non distended  Extremeties: No cyanosis, clubbing or edema.    Neurological   MENTAL STATUS: Alert and oriented to person, place, and time. Attention and concentration within normal limits. Speech without dysarthria, able to name and repeat without difficulty. Recent and remote memory within normal limits   CRANIAL NERVES: Visual fields intact. PERRL.  EOMI (decrased in range 2/2 eye surguries). Facial sensation intact. Face symmetrical. Hearing grossly intact. Full shoulder shrug bilaterally. Tongue protrudes midline   SENSORY: Sensation is intact to pin and light touch to approximately T7 level.    MOTOR: Normal bulk and tone in UE. No pronator drift.  5/5 deltoid, biceps, triceps, interosseous, hand  bilaterally. 0/5 in LE  REFLEXES: Symmetric and 2+ in UE, absent in LE.  CEREBELLAR/COORDINATION/GAIT: Finger to nose intact. Normal rapid alternating movements.

## 2024-04-19 ENCOUNTER — HOSPITAL ENCOUNTER (OUTPATIENT)
Facility: HOSPITAL | Age: 29
Discharge: HOME | End: 2024-04-20
Attending: EMERGENCY MEDICINE | Admitting: INTERNAL MEDICINE
Payer: COMMERCIAL

## 2024-04-19 DIAGNOSIS — N39.0 URINARY TRACT INFECTION WITHOUT HEMATURIA, SITE UNSPECIFIED: ICD-10-CM

## 2024-04-19 DIAGNOSIS — Q05.1 SPINA BIFIDA OF THORACOLUMBAR REGION WITH HYDROCEPHALUS: ICD-10-CM

## 2024-04-19 DIAGNOSIS — R56.9 SEIZURES: Primary | ICD-10-CM

## 2024-04-19 DIAGNOSIS — R07.9 CHEST PAIN: ICD-10-CM

## 2024-04-19 LAB
ALBUMIN SERPL BCP-MCNC: 4.1 G/DL (ref 3.5–5.2)
ALP SERPL-CCNC: 52 U/L (ref 55–135)
ALT SERPL W/O P-5'-P-CCNC: 15 U/L (ref 10–44)
ANION GAP SERPL CALC-SCNC: 10 MMOL/L (ref 8–16)
AST SERPL-CCNC: 12 U/L (ref 10–40)
BACTERIA #/AREA URNS HPF: ABNORMAL /HPF
BASOPHILS # BLD AUTO: 0.01 K/UL (ref 0–0.2)
BASOPHILS NFR BLD: 0.1 % (ref 0–1.9)
BILIRUB SERPL-MCNC: 0.3 MG/DL (ref 0.1–1)
BILIRUB UR QL STRIP: NEGATIVE
BUN SERPL-MCNC: 18 MG/DL (ref 6–20)
CALCIUM SERPL-MCNC: 8.4 MG/DL (ref 8.7–10.5)
CHLORIDE SERPL-SCNC: 105 MMOL/L (ref 95–110)
CLARITY UR: ABNORMAL
CO2 SERPL-SCNC: 21 MMOL/L (ref 23–29)
COLOR UR: ABNORMAL
CREAT SERPL-MCNC: 0.4 MG/DL (ref 0.5–1.4)
DIFFERENTIAL METHOD BLD: NORMAL
EOSINOPHIL # BLD AUTO: 0.1 K/UL (ref 0–0.5)
EOSINOPHIL NFR BLD: 0.8 % (ref 0–8)
ERYTHROCYTE [DISTWIDTH] IN BLOOD BY AUTOMATED COUNT: 13.4 % (ref 11.5–14.5)
EST. GFR  (NO RACE VARIABLE): >60 ML/MIN/1.73 M^2
GLUCOSE SERPL-MCNC: 87 MG/DL (ref 70–110)
GLUCOSE UR QL STRIP: NEGATIVE
HCG INTACT+B SERPL-ACNC: <0.6 MIU/ML
HCT VFR BLD AUTO: 37.4 % (ref 37–48.5)
HGB BLD-MCNC: 12.4 G/DL (ref 12–16)
HGB UR QL STRIP: NEGATIVE
HYALINE CASTS #/AREA URNS LPF: 0 /LPF
IMM GRANULOCYTES # BLD AUTO: 0.04 K/UL (ref 0–0.04)
IMM GRANULOCYTES NFR BLD AUTO: 0.5 % (ref 0–0.5)
KETONES UR QL STRIP: NEGATIVE
LACTATE SERPL-SCNC: 1 MMOL/L (ref 0.5–1.9)
LEUKOCYTE ESTERASE UR QL STRIP: ABNORMAL
LYMPHOCYTES # BLD AUTO: 1.9 K/UL (ref 1–4.8)
LYMPHOCYTES NFR BLD: 25.4 % (ref 18–48)
MAGNESIUM SERPL-MCNC: 2.1 MG/DL (ref 1.6–2.6)
MCH RBC QN AUTO: 29.1 PG (ref 27–31)
MCHC RBC AUTO-ENTMCNC: 33.2 G/DL (ref 32–36)
MCV RBC AUTO: 88 FL (ref 82–98)
MICROSCOPIC COMMENT: ABNORMAL
MONOCYTES # BLD AUTO: 0.7 K/UL (ref 0.3–1)
MONOCYTES NFR BLD: 9.6 % (ref 4–15)
NEUTROPHILS # BLD AUTO: 4.8 K/UL (ref 1.8–7.7)
NEUTROPHILS NFR BLD: 63.6 % (ref 38–73)
NITRITE UR QL STRIP: POSITIVE
NON-SQ EPI CELLS #/AREA URNS HPF: 13 /HPF
NRBC BLD-RTO: 0 /100 WBC
PH UR STRIP: 8 [PH] (ref 5–8)
PLATELET # BLD AUTO: 292 K/UL (ref 150–450)
PMV BLD AUTO: 9.7 FL (ref 9.2–12.9)
POTASSIUM SERPL-SCNC: 3.8 MMOL/L (ref 3.5–5.1)
PROT SERPL-MCNC: 7.2 G/DL (ref 6–8.4)
PROT UR QL STRIP: ABNORMAL
RBC # BLD AUTO: 4.26 M/UL (ref 4–5.4)
RBC #/AREA URNS HPF: 13 /HPF (ref 0–4)
SODIUM SERPL-SCNC: 136 MMOL/L (ref 136–145)
SP GR UR STRIP: 1.02 (ref 1–1.03)
URN SPEC COLLECT METH UR: ABNORMAL
UROBILINOGEN UR STRIP-ACNC: NEGATIVE EU/DL
WBC # BLD AUTO: 7.59 K/UL (ref 3.9–12.7)
WBC #/AREA URNS HPF: >100 /HPF (ref 0–5)

## 2024-04-19 PROCEDURE — 84702 CHORIONIC GONADOTROPIN TEST: CPT | Performed by: EMERGENCY MEDICINE

## 2024-04-19 PROCEDURE — 80177 DRUG SCRN QUAN LEVETIRACETAM: CPT | Performed by: EMERGENCY MEDICINE

## 2024-04-19 PROCEDURE — 83605 ASSAY OF LACTIC ACID: CPT | Performed by: EMERGENCY MEDICINE

## 2024-04-19 PROCEDURE — 63600175 PHARM REV CODE 636 W HCPCS

## 2024-04-19 PROCEDURE — 36415 COLL VENOUS BLD VENIPUNCTURE: CPT | Performed by: EMERGENCY MEDICINE

## 2024-04-19 PROCEDURE — 80053 COMPREHEN METABOLIC PANEL: CPT | Performed by: EMERGENCY MEDICINE

## 2024-04-19 PROCEDURE — 63600175 PHARM REV CODE 636 W HCPCS: Performed by: EMERGENCY MEDICINE

## 2024-04-19 PROCEDURE — 87086 URINE CULTURE/COLONY COUNT: CPT | Performed by: EMERGENCY MEDICINE

## 2024-04-19 PROCEDURE — 99285 EMERGENCY DEPT VISIT HI MDM: CPT | Mod: 25

## 2024-04-19 PROCEDURE — 85025 COMPLETE CBC W/AUTO DIFF WBC: CPT | Performed by: EMERGENCY MEDICINE

## 2024-04-19 PROCEDURE — 87077 CULTURE AEROBIC IDENTIFY: CPT | Performed by: EMERGENCY MEDICINE

## 2024-04-19 PROCEDURE — 96365 THER/PROPH/DIAG IV INF INIT: CPT

## 2024-04-19 PROCEDURE — 25000003 PHARM REV CODE 250: Performed by: EMERGENCY MEDICINE

## 2024-04-19 PROCEDURE — 87186 SC STD MICRODIL/AGAR DIL: CPT | Performed by: EMERGENCY MEDICINE

## 2024-04-19 PROCEDURE — 81001 URINALYSIS AUTO W/SCOPE: CPT | Performed by: EMERGENCY MEDICINE

## 2024-04-19 PROCEDURE — 96375 TX/PRO/DX INJ NEW DRUG ADDON: CPT

## 2024-04-19 PROCEDURE — 83735 ASSAY OF MAGNESIUM: CPT | Performed by: EMERGENCY MEDICINE

## 2024-04-19 RX ORDER — LORAZEPAM 2 MG/ML
1 INJECTION INTRAMUSCULAR
Status: COMPLETED | OUTPATIENT
Start: 2024-04-19 | End: 2024-04-19

## 2024-04-19 RX ORDER — LORAZEPAM 2 MG/ML
INJECTION INTRAMUSCULAR
Status: COMPLETED
Start: 2024-04-19 | End: 2024-04-19

## 2024-04-19 RX ADMIN — LORAZEPAM 1 MG: 2 INJECTION INTRAMUSCULAR at 06:04

## 2024-04-19 RX ADMIN — CEFTRIAXONE SODIUM 1 G: 1 INJECTION, POWDER, FOR SOLUTION INTRAMUSCULAR; INTRAVENOUS at 10:04

## 2024-04-19 RX ADMIN — PROMETHAZINE HYDROCHLORIDE 12.5 MG: 25 INJECTION INTRAMUSCULAR; INTRAVENOUS at 09:04

## 2024-04-19 NOTE — ED PROVIDER NOTES
"Encounter Date: 4/19/2024       History     Chief Complaint   Patient presents with    Seizures     Hx of seizures, had a seizure today  Nasal Valium and 1g of keppra after the seizure today     Patient presents emergency department reported seizure activity patient has a history of seizures states he just increased her Keppra she reports that she used her intranasal Valium which aborted her seizure but at arrival in the emergency department she reports that she feels as though her seizures coming on again patient has a history of shunt in place she has had seizures in the past with shunt malfunctions        Review of patient's allergies indicates:   Allergen Reactions    Latex, natural rubber Anaphylaxis and Other (See Comments)     angioedema    Zofran [ondansetron hcl (pf)] Swelling     Hives, "throat closes up"    Gardasil  [h papillomavirus vac,qval (pf)]      Other reaction(s): Shortness of breath    Menactra  [mening vac a,c,y,w135 dip (pf)]      Other reaction(s): Shortness of breath    Nitrofurantoin      Other reaction(s): Difficulty breathing    Sulfa (sulfonamide antibiotics)     Tramadol Hives    Levaquin [levofloxacin] Rash     Spots on face    Macrobid [nitrofurantoin monohyd/m-cryst] Rash     Sppots/rash on face     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     Past Surgical History:   Procedure Laterality Date    AMPUTATION      right 4th and 5th toes; left 5th toe and portion of left great toe    BACK SURGERY      BLADDER SURGERY      BREAST SURGERY  2015    Reduction    COLON SURGERY      flush site for bowel movements from appendix    CYSTOSTOMY      DEBRIDEMENT OF FOOT Right 1/8/2021    Procedure: DEBRIDEMENT, FOOT;  Surgeon: Abdi Bedoya, DPM;  " Location: [facility];  Service: Podiatry;  Laterality: Right;    EYE SURGERY      x 5    FLUOROSCOPIC URODYNAMIC STUDY N/A 3/12/2019    Procedure: URODYNAMIC STUDY, FLUOROSCOPIC;  Surgeon: Kenzie Charles MD;  Location: [facility] 1ST FLR;  Service: Urology;  Laterality: N/A;  1 hour    FLUOROSCOPIC URODYNAMIC STUDY N/A 4/4/2019    Procedure: URODYNAMIC STUDY, FLUOROSCOPIC;  Surgeon: Kenzie Charles MD;  Location: [facility] 1ST FLR;  Service: Urology;  Laterality: N/A;  1 hour    FLUOROSCOPIC URODYNAMIC STUDY N/A 5/11/2022    Procedure: URODYNAMIC STUDY, FLUOROSCOPIC;  Surgeon: Kenzie Charles MD;  Location: [facility] 1ST FLR;  Service: Urology;  Laterality: N/A;  90min    FOOT AMPUTATION THROUGH METATARSAL Right 10/28/2019    Procedure: AMPUTATION, FOOT, TRANSMETATARSAL;  Surgeon: Abdi Bedoya DPM;  Location: [facility];  Service: Podiatry;  Laterality: Right;    FOOT AMPUTATION THROUGH METATARSAL Right 3/27/2020    Procedure: AMPUTATION, FOOT, TRANSMETATARSAL;  Surgeon: Abdi Bedoya DPM;  Location: [facility];  Service: Podiatry;  Laterality: Right;  REVISION    MYELOMININGOCELE REPAIR      NEPHRECTOMY Right 11/4/2021    Procedure: Nephrectomy/ OPEN;  Surgeon: Dash Rosenthal MD;  Location: [facility] 2ND FLR;  Service: Urology;  Laterality: Right;  4HRS    NEPHROSTOMY Right 8/2/2021    Procedure: Nephrostomy and perinephric abscess drain;  Surgeon: Lillian Surgeon;  Location: [facility];  Service: Anesthesiology;  Laterality: Right;    REVISION OF VENTRICULOPERITONEAL SHUNT Left 9/21/2022    Procedure: REVISION, SHUNT, VENTRICULOPERITONEAL;  Surgeon: Maynor Calero MD;  Location: [facility] 2ND FLR;  Service: Neurosurgery;  Laterality: Left;    STRABISMUS SURGERY      ou dr peña    VENTRICULOPERITONEAL SHUNT      WOUND DEBRIDEMENT  3/27/2020    Procedure: DEBRIDEMENT, WOUND;  Surgeon: Abdi Bedoya DPM;  Location: [facility] OR;  Service: Podiatry;;     Family History   Problem Relation Name Age of Onset    Breast  cancer Mother      Gout Father      Cataracts Maternal Grandmother      Myocarditis Sister      Amblyopia Neg Hx      Blindness Neg Hx      Cancer Neg Hx      Diabetes Neg Hx      Glaucoma Neg Hx      Hypertension Neg Hx      Macular degeneration Neg Hx      Retinal detachment Neg Hx      Strabismus Neg Hx      Stroke Neg Hx      Thyroid disease Neg Hx       Social History     Tobacco Use    Smoking status: Never    Smokeless tobacco: Never   Substance Use Topics    Alcohol use: Not Currently    Drug use: Never     Review of Systems   Neurological:  Positive for seizures.   All other systems reviewed and are negative.      Physical Exam     Initial Vitals   BP Pulse Resp Temp SpO2   04/19/24 1830 04/19/24 1830 04/19/24 1830 04/19/24 1831 04/19/24 1830   (!) 144/94 91 20 98.3 °F (36.8 °C) 100 %      MAP       --                Physical Exam    Constitutional: She appears well-developed and well-nourished. No distress.   HENT:   Head: Normocephalic and atraumatic.   Right Ear: External ear normal.   Left Ear: External ear normal.   Mouth/Throat: Oropharynx is clear and moist.   Eyes: Conjunctivae and EOM are normal. Pupils are equal, round, and reactive to light.   Neck: Neck supple.   Normal range of motion.  Cardiovascular:  Normal rate, regular rhythm, normal heart sounds and intact distal pulses.           Pulmonary/Chest: Breath sounds normal. No respiratory distress.   Abdominal: Abdomen is soft. Bowel sounds are normal. There is no abdominal tenderness.   Musculoskeletal:         General: No edema. Normal range of motion.      Cervical back: Normal range of motion and neck supple.     Neurological: She is alert and oriented to person, place, and time. GCS score is 15. GCS eye subscore is 4. GCS verbal subscore is 5. GCS motor subscore is 6.   T7 paraplegia   Skin: Skin is warm and dry. Capillary refill takes less than 2 seconds. No rash noted.   Psychiatric: She has a normal mood and affect. Her behavior is  normal.         ED Course   Procedures  Labs Reviewed   URINALYSIS, REFLEX TO URINE CULTURE - Abnormal; Notable for the following components:       Result Value    Color, UA Orange (*)     Appearance, UA Cloudy (*)     Protein, UA 3+ (*)     Nitrite, UA Positive (*)     Leukocytes, UA 3+ (*)     All other components within normal limits    Narrative:     Specimen Source->Urine   COMPREHENSIVE METABOLIC PANEL - Abnormal; Notable for the following components:    CO2 21 (*)     Creatinine 0.4 (*)     Calcium 8.4 (*)     Alkaline Phosphatase 52 (*)     All other components within normal limits    Narrative:     Collection has been rescheduled by CH10 at 04/19/2024 19:44    URINALYSIS MICROSCOPIC - Abnormal; Notable for the following components:    RBC, UA 13 (*)     WBC, UA >100 (*)     Non-Squam Epith 13 (*)     All other components within normal limits    Narrative:     Specimen Source->Urine   CULTURE, URINE   CULTURE, BLOOD    Narrative:     Collection has been rescheduled by CZB at 04/20/2024 00:27 Reason:   Unable to collect told shar mcpherson  Collection has been rescheduled by CZB at 04/20/2024 00:27 Reason:   Unable to collect told shar mcpherson   CULTURE, BLOOD    Narrative:     Collection has been rescheduled by CZB at 04/20/2024 00:27 Reason:   Unable to collect told shar mcpherson  Collection has been rescheduled by CZB at 04/20/2024 00:27 Reason:   Unable to collect told shar mcpherson   CBC W/ AUTO DIFFERENTIAL   MAGNESIUM    Narrative:     Collection has been rescheduled by CH10 at 04/19/2024 19:44    HCG, QUANTITATIVE   HCG, QUANTITATIVE   LACTIC ACID, PLASMA   LEVETIRACETAM  (KEPPRA) LEVEL   POCT URINE PREGNANCY          Imaging Results              CT Head Without Contrast (Final result)  Result time 04/19/24 22:23:08      Final result by Champ Vick MD (04/19/24 22:23:08)                   Impression:      Bilateral ventricular shunt catheters in stable position.  Similar size and configuration of  ventricular system and brain parenchyma compared to prior examination.    No CT evidence of acute intracranial hemorrhage.      Electronically signed by: Champ Vick MD  Date:    04/19/2024  Time:    22:23               Narrative:    EXAMINATION:  CT HEAD WITHOUT CONTRAST    CLINICAL HISTORY:  Seizure disorder, clinical change;    TECHNIQUE:  Low dose axial images were obtained through the head.  Coronal and sagittal reformations were also performed. Contrast was not administered.    COMPARISON:  Multiple CT head 01/31/2024    FINDINGS:  There are right and left parietal approach ventricular shunt catheters present both terminating within the right lateral ventricle, similar to prior examination.  There is stable size and configuration of the ventricular system compared to prior examination.  There is a Chiari malformation again demonstrated and partially visualized.  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  No CT evidence of acute intracranial hemorrhage.  No new large region of abnormal parenchymal hypoattenuation identified.  The mastoid air cells and paranasal sinuses are clear of acute process. Calvarium is otherwise intact.                                       Medications   LORazepam injection 1 mg (1 mg Intravenous Given 4/19/24 1845)   promethazine (PHENERGAN) 12.5 mg in dextrose 5 % (D5W) 50 mL IVPB (0 mg Intravenous Stopped 4/19/24 2145)   cefTRIAXone (ROCEPHIN) 1 g in dextrose 5 % 100 mL IVPB (ready to mix) (0 g Intravenous Stopped 4/19/24 2315)   acetaminophen tablet 1,000 mg (1,000 mg Oral Given 4/20/24 0049)     Medical Decision Making  Patient had 2 additional witnessed seizures in the emergency department with postictal phase she received total of 3 mg of Ativan IV emergency department with resolution of her seizures CT scan of the head shows stable findings with her 2 shunts in place in unchanged hydrocephalus laboratory evaluation did reveal evidence of urinary tract  infection ordered Rocephin in the emergency department will admit for further evaluation treatment    Amount and/or Complexity of Data Reviewed  Labs: ordered. Decision-making details documented in ED Course.  Radiology: ordered. Decision-making details documented in ED Course.    Risk  OTC drugs.  Prescription drug management.  Decision regarding hospitalization.                                      Clinical Impression:  Final diagnoses:  [R56.9] Seizures (Primary)  [N39.0] Urinary tract infection without hematuria, site unspecified          ED Disposition Condition    Admit Stable                Ever Navarro MD  04/20/24 0303

## 2024-04-20 VITALS
RESPIRATION RATE: 18 BRPM | HEIGHT: 56 IN | OXYGEN SATURATION: 100 % | TEMPERATURE: 98 F | SYSTOLIC BLOOD PRESSURE: 112 MMHG | HEART RATE: 110 BPM | DIASTOLIC BLOOD PRESSURE: 74 MMHG | WEIGHT: 145.75 LBS | BODY MASS INDEX: 32.79 KG/M2

## 2024-04-20 PROBLEM — R56.9 SEIZURES: Status: ACTIVE | Noted: 2024-04-20

## 2024-04-20 PROBLEM — N39.0 UTI (URINARY TRACT INFECTION): Status: ACTIVE | Noted: 2024-04-20

## 2024-04-20 PROBLEM — Q05.1: Status: ACTIVE | Noted: 2024-04-20

## 2024-04-20 PROBLEM — E83.51 HYPOCALCEMIA: Status: ACTIVE | Noted: 2024-04-20

## 2024-04-20 LAB
25(OH)D3+25(OH)D2 SERPL-MCNC: 39 NG/ML (ref 30–96)
ANION GAP SERPL CALC-SCNC: 6 MMOL/L (ref 8–16)
BASOPHILS # BLD AUTO: 0.02 K/UL (ref 0–0.2)
BASOPHILS NFR BLD: 0.2 % (ref 0–1.9)
BUN SERPL-MCNC: 17 MG/DL (ref 6–20)
CA-I BLDV-SCNC: 1.16 MMOL/L (ref 1.06–1.42)
CALCIUM SERPL-MCNC: 8.7 MG/DL (ref 8.7–10.5)
CHLORIDE SERPL-SCNC: 107 MMOL/L (ref 95–110)
CO2 SERPL-SCNC: 23 MMOL/L (ref 23–29)
CREAT SERPL-MCNC: 0.5 MG/DL (ref 0.5–1.4)
DIFFERENTIAL METHOD BLD: NORMAL
EOSINOPHIL # BLD AUTO: 0.1 K/UL (ref 0–0.5)
EOSINOPHIL NFR BLD: 0.9 % (ref 0–8)
ERYTHROCYTE [DISTWIDTH] IN BLOOD BY AUTOMATED COUNT: 13.5 % (ref 11.5–14.5)
EST. GFR  (NO RACE VARIABLE): >60 ML/MIN/1.73 M^2
GLUCOSE SERPL-MCNC: 90 MG/DL (ref 70–110)
HCT VFR BLD AUTO: 39.2 % (ref 37–48.5)
HGB BLD-MCNC: 12.8 G/DL (ref 12–16)
IMM GRANULOCYTES # BLD AUTO: 0.02 K/UL (ref 0–0.04)
IMM GRANULOCYTES NFR BLD AUTO: 0.2 % (ref 0–0.5)
LYMPHOCYTES # BLD AUTO: 2.2 K/UL (ref 1–4.8)
LYMPHOCYTES NFR BLD: 26.6 % (ref 18–48)
MAGNESIUM SERPL-MCNC: 2 MG/DL (ref 1.6–2.6)
MCH RBC QN AUTO: 29.2 PG (ref 27–31)
MCHC RBC AUTO-ENTMCNC: 32.7 G/DL (ref 32–36)
MCV RBC AUTO: 89 FL (ref 82–98)
MONOCYTES # BLD AUTO: 0.8 K/UL (ref 0.3–1)
MONOCYTES NFR BLD: 10.4 % (ref 4–15)
NEUTROPHILS # BLD AUTO: 5 K/UL (ref 1.8–7.7)
NEUTROPHILS NFR BLD: 61.7 % (ref 38–73)
NRBC BLD-RTO: 0 /100 WBC
PLATELET # BLD AUTO: 219 K/UL (ref 150–450)
PMV BLD AUTO: 10.5 FL (ref 9.2–12.9)
POTASSIUM SERPL-SCNC: 4.5 MMOL/L (ref 3.5–5.1)
PTH-INTACT SERPL-MCNC: 51.1 PG/ML (ref 9–77)
RBC # BLD AUTO: 4.39 M/UL (ref 4–5.4)
SODIUM SERPL-SCNC: 136 MMOL/L (ref 136–145)
T4 FREE SERPL-MCNC: 0.8 NG/DL (ref 0.71–1.51)
TSH SERPL DL<=0.005 MIU/L-ACNC: 1.96 UIU/ML (ref 0.34–5.6)
WBC # BLD AUTO: 8.07 K/UL (ref 3.9–12.7)

## 2024-04-20 PROCEDURE — 36415 COLL VENOUS BLD VENIPUNCTURE: CPT | Performed by: EMERGENCY MEDICINE

## 2024-04-20 PROCEDURE — 36415 COLL VENOUS BLD VENIPUNCTURE: CPT | Performed by: INTERNAL MEDICINE

## 2024-04-20 PROCEDURE — 82306 VITAMIN D 25 HYDROXY: CPT | Performed by: INTERNAL MEDICINE

## 2024-04-20 PROCEDURE — G0378 HOSPITAL OBSERVATION PER HR: HCPCS

## 2024-04-20 PROCEDURE — 85025 COMPLETE CBC W/AUTO DIFF WBC: CPT | Performed by: INTERNAL MEDICINE

## 2024-04-20 PROCEDURE — 63600175 PHARM REV CODE 636 W HCPCS: Performed by: INTERNAL MEDICINE

## 2024-04-20 PROCEDURE — 25000003 PHARM REV CODE 250: Mod: JZ,JG | Performed by: INTERNAL MEDICINE

## 2024-04-20 PROCEDURE — 84439 ASSAY OF FREE THYROXINE: CPT | Performed by: INTERNAL MEDICINE

## 2024-04-20 PROCEDURE — 25000003 PHARM REV CODE 250: Performed by: INTERNAL MEDICINE

## 2024-04-20 PROCEDURE — 96367 TX/PROPH/DG ADDL SEQ IV INF: CPT

## 2024-04-20 PROCEDURE — 83735 ASSAY OF MAGNESIUM: CPT | Performed by: INTERNAL MEDICINE

## 2024-04-20 PROCEDURE — 96375 TX/PRO/DX INJ NEW DRUG ADDON: CPT

## 2024-04-20 PROCEDURE — 80048 BASIC METABOLIC PNL TOTAL CA: CPT | Performed by: INTERNAL MEDICINE

## 2024-04-20 PROCEDURE — 83970 ASSAY OF PARATHORMONE: CPT | Performed by: INTERNAL MEDICINE

## 2024-04-20 PROCEDURE — 96368 THER/DIAG CONCURRENT INF: CPT

## 2024-04-20 PROCEDURE — 84443 ASSAY THYROID STIM HORMONE: CPT | Performed by: INTERNAL MEDICINE

## 2024-04-20 PROCEDURE — 82330 ASSAY OF CALCIUM: CPT | Performed by: INTERNAL MEDICINE

## 2024-04-20 PROCEDURE — 25000003 PHARM REV CODE 250: Performed by: EMERGENCY MEDICINE

## 2024-04-20 PROCEDURE — 87040 BLOOD CULTURE FOR BACTERIA: CPT | Performed by: EMERGENCY MEDICINE

## 2024-04-20 PROCEDURE — 96361 HYDRATE IV INFUSION ADD-ON: CPT

## 2024-04-20 RX ORDER — ACETAMINOPHEN 325 MG/1
650 TABLET ORAL EVERY 8 HOURS PRN
Status: DISCONTINUED | OUTPATIENT
Start: 2024-04-20 | End: 2024-04-20 | Stop reason: HOSPADM

## 2024-04-20 RX ORDER — LANOLIN ALCOHOL/MO/W.PET/CERES
800 CREAM (GRAM) TOPICAL
Status: DISCONTINUED | OUTPATIENT
Start: 2024-04-20 | End: 2024-04-20 | Stop reason: HOSPADM

## 2024-04-20 RX ORDER — SODIUM CHLORIDE AND POTASSIUM CHLORIDE 150; 900 MG/100ML; MG/100ML
INJECTION, SOLUTION INTRAVENOUS CONTINUOUS
Status: DISCONTINUED | OUTPATIENT
Start: 2024-04-20 | End: 2024-04-20

## 2024-04-20 RX ORDER — SODIUM CHLORIDE 0.9 % (FLUSH) 0.9 %
3 SYRINGE (ML) INJECTION EVERY 12 HOURS PRN
Status: DISCONTINUED | OUTPATIENT
Start: 2024-04-20 | End: 2024-04-20 | Stop reason: HOSPADM

## 2024-04-20 RX ORDER — TALC
6 POWDER (GRAM) TOPICAL NIGHTLY PRN
Status: DISCONTINUED | OUTPATIENT
Start: 2024-04-20 | End: 2024-04-20 | Stop reason: HOSPADM

## 2024-04-20 RX ORDER — ACETAMINOPHEN 500 MG
1000 TABLET ORAL
Status: COMPLETED | OUTPATIENT
Start: 2024-04-20 | End: 2024-04-20

## 2024-04-20 RX ORDER — ONDANSETRON HYDROCHLORIDE 2 MG/ML
4 INJECTION, SOLUTION INTRAVENOUS EVERY 6 HOURS PRN
Status: DISCONTINUED | OUTPATIENT
Start: 2024-04-20 | End: 2024-04-20

## 2024-04-20 RX ORDER — ENOXAPARIN SODIUM 100 MG/ML
40 INJECTION SUBCUTANEOUS EVERY 24 HOURS
Status: DISCONTINUED | OUTPATIENT
Start: 2024-04-20 | End: 2024-04-20 | Stop reason: HOSPADM

## 2024-04-20 RX ORDER — FAMOTIDINE 20 MG/50ML
20 INJECTION, SOLUTION INTRAVENOUS 2 TIMES DAILY
Status: DISCONTINUED | OUTPATIENT
Start: 2024-04-20 | End: 2024-04-20

## 2024-04-20 RX ORDER — ACETAMINOPHEN 325 MG/1
650 TABLET ORAL EVERY 4 HOURS PRN
Status: DISCONTINUED | OUTPATIENT
Start: 2024-04-20 | End: 2024-04-20 | Stop reason: HOSPADM

## 2024-04-20 RX ORDER — ALUMINUM HYDROXIDE, MAGNESIUM HYDROXIDE, AND SIMETHICONE 1200; 120; 1200 MG/30ML; MG/30ML; MG/30ML
30 SUSPENSION ORAL 4 TIMES DAILY PRN
Status: DISCONTINUED | OUTPATIENT
Start: 2024-04-20 | End: 2024-04-20 | Stop reason: HOSPADM

## 2024-04-20 RX ORDER — MIDAZOLAM HYDROCHLORIDE 5 MG/ML
10 INJECTION INTRAMUSCULAR; INTRAVENOUS ONCE AS NEEDED
Status: DISCONTINUED | OUTPATIENT
Start: 2024-04-20 | End: 2024-04-20

## 2024-04-20 RX ORDER — SODIUM,POTASSIUM PHOSPHATES 280-250MG
2 POWDER IN PACKET (EA) ORAL
Status: DISCONTINUED | OUTPATIENT
Start: 2024-04-20 | End: 2024-04-20 | Stop reason: HOSPADM

## 2024-04-20 RX ORDER — IBUPROFEN 200 MG
24 TABLET ORAL
Status: DISCONTINUED | OUTPATIENT
Start: 2024-04-20 | End: 2024-04-20 | Stop reason: HOSPADM

## 2024-04-20 RX ORDER — FAMOTIDINE 10 MG/ML
20 INJECTION INTRAVENOUS 2 TIMES DAILY
Status: DISCONTINUED | OUTPATIENT
Start: 2024-04-20 | End: 2024-04-20 | Stop reason: HOSPADM

## 2024-04-20 RX ORDER — LEVETIRACETAM 250 MG/1
1250 TABLET ORAL 2 TIMES DAILY
Qty: 60 TABLET | Refills: 0 | Status: SHIPPED | OUTPATIENT
Start: 2024-04-20 | End: 2024-05-16

## 2024-04-20 RX ORDER — AMOXICILLIN 250 MG
1 CAPSULE ORAL DAILY
Status: DISCONTINUED | OUTPATIENT
Start: 2024-04-20 | End: 2024-04-20 | Stop reason: HOSPADM

## 2024-04-20 RX ORDER — NALOXONE HCL 0.4 MG/ML
0.02 VIAL (ML) INJECTION
Status: DISCONTINUED | OUTPATIENT
Start: 2024-04-20 | End: 2024-04-20 | Stop reason: HOSPADM

## 2024-04-20 RX ORDER — DIAZEPAM 10 MG/2ML
10 INJECTION INTRAMUSCULAR EVERY 5 MIN PRN
Status: DISCONTINUED | OUTPATIENT
Start: 2024-04-20 | End: 2024-04-20 | Stop reason: HOSPADM

## 2024-04-20 RX ORDER — CALCIUM GLUCONATE 20 MG/ML
1 INJECTION, SOLUTION INTRAVENOUS ONCE
Status: COMPLETED | OUTPATIENT
Start: 2024-04-20 | End: 2024-04-20

## 2024-04-20 RX ORDER — SULFAMETHOXAZOLE AND TRIMETHOPRIM 800; 160 MG/1; MG/1
1 TABLET ORAL 2 TIMES DAILY
Qty: 6 TABLET | Refills: 0 | Status: SHIPPED | OUTPATIENT
Start: 2024-04-20 | End: 2024-04-23

## 2024-04-20 RX ORDER — GLUCAGON 1 MG
1 KIT INJECTION
Status: DISCONTINUED | OUTPATIENT
Start: 2024-04-20 | End: 2024-04-20 | Stop reason: HOSPADM

## 2024-04-20 RX ORDER — ENOXAPARIN SODIUM 100 MG/ML
30 INJECTION SUBCUTANEOUS EVERY 24 HOURS
Status: DISCONTINUED | OUTPATIENT
Start: 2024-04-20 | End: 2024-04-20

## 2024-04-20 RX ORDER — LEVETIRACETAM 10 MG/ML
1000 INJECTION INTRAVASCULAR EVERY 12 HOURS
Status: CANCELLED | OUTPATIENT
Start: 2024-04-20

## 2024-04-20 RX ORDER — MIDAZOLAM HYDROCHLORIDE 2 MG/2ML
10 INJECTION, SOLUTION INTRAMUSCULAR; INTRAVENOUS ONCE AS NEEDED
Status: DISCONTINUED | OUTPATIENT
Start: 2024-04-20 | End: 2024-04-20 | Stop reason: HOSPADM

## 2024-04-20 RX ORDER — IBUPROFEN 200 MG
16 TABLET ORAL
Status: DISCONTINUED | OUTPATIENT
Start: 2024-04-20 | End: 2024-04-20 | Stop reason: HOSPADM

## 2024-04-20 RX ORDER — LEVETIRACETAM 500 MG/1
1000 TABLET ORAL 2 TIMES DAILY
Status: DISCONTINUED | OUTPATIENT
Start: 2024-04-20 | End: 2024-04-20

## 2024-04-20 RX ADMIN — LEVETIRACETAM 1000 MG: 500 TABLET, FILM COATED ORAL at 09:04

## 2024-04-20 RX ADMIN — CEFEPIME 1 G: 1 INJECTION, POWDER, FOR SOLUTION INTRAMUSCULAR; INTRAVENOUS at 09:04

## 2024-04-20 RX ADMIN — SODIUM CHLORIDE AND POTASSIUM CHLORIDE: 9; 1.49 INJECTION, SOLUTION INTRAVENOUS at 09:04

## 2024-04-20 RX ADMIN — CALCIUM GLUCONATE 1 G: 20 INJECTION, SOLUTION INTRAVENOUS at 09:04

## 2024-04-20 RX ADMIN — ACETAMINOPHEN 1000 MG: 500 TABLET ORAL at 12:04

## 2024-04-20 RX ADMIN — FAMOTIDINE 20 MG: 10 INJECTION INTRAVENOUS at 09:04

## 2024-04-20 RX ADMIN — MEROPENEM 1 G: 1 INJECTION, POWDER, FOR SOLUTION INTRAVENOUS at 11:04

## 2024-04-20 NOTE — ED NOTES
Patient provided sandwich tray. Ok by Dr. Navarro.  Patient sitting up in bed eating without difficulty.

## 2024-04-20 NOTE — H&P
[facility] - Emergency Dept  Hospital Medicine  History & Physical    Patient Name: Shashank Samuel  MRN: [id number]  Patient Class: OP- Observation  Admission Date: 4/19/2024  Attending Physician: Richy Puentes MD  Primary Care Provider: Dash Ponce MD         Patient information was obtained from patient and ER records.     Subjective:     Principal Problem:Seizures    Chief Complaint:   Chief Complaint   Patient presents with    Seizures     Hx of seizures, had a seizure today  Nasal Valium and 1g of keppra after the seizure today        HPI: 28 WF with pmh of spina bifida with no use of lower body ,  seizure disorder on keppra, Reynauds , hydrocephalus with shunt,  presents from home having seizures.        Non smoker  Non drinker  Lives at home with parents     She has history of seizures but she says it can be a year or more without having one.       And this time she was in usual state of health and was not feeling sick in any way when it happened.       She had just sat down to eat dinner and had not even taken a bite yet she said and then the seizure happened and she only remembers waking up here in the ER.         When she was brought in here they gave her Ativan IV for total of 3 mg and she got IV keppra .   She was sleeping then most of time after that .   Her parents were with her then.      By the time I saw her later in ER , she was awake .  And was talking to me.   They said she was not using her arms at first but she says that happens after the seizures.  And when I saw her she was using her amrs.     She wanted to eat so I got her a sand which and drink and she did fine.        They did find that she had a UTI   And its very common she says   She self caths   She can't feel anything really so she doesn't know if she had urinary symptoms or not       Her CBC Was normal   Her CMP was normal   But her calcium was a little low with normal albumin level .     Mild metabolic  acidosis     CT head was negative     They gave her phenergan IV and Rocephin IV for the nausea and UTI           However prior UTIs grew E coli and it was resistant to Rocephin       We are admitting her for the breakthrough seizure event     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       Past Surgical History:   Procedure Laterality Date    AMPUTATION      right 4th and 5th toes; left 5th toe and portion of left great toe    BACK SURGERY      BLADDER SURGERY      BREAST SURGERY  2015    Reduction    COLON SURGERY      flush site for bowel movements from appendix    CYSTOSTOMY      DEBRIDEMENT OF FOOT Right 1/8/2021    Procedure: DEBRIDEMENT, FOOT;  Surgeon: Abdi Bedoya DPM;  Location: [facility];  Service: Podiatry;  Laterality: Right;    EYE SURGERY      x 5    FLUOROSCOPIC URODYNAMIC STUDY N/A 3/12/2019    Procedure: URODYNAMIC STUDY, FLUOROSCOPIC;  Surgeon: Kenzie Charles MD;  Location: [address];  Service: Urology;  Laterality: N/A;  1 hour    FLUOROSCOPIC URODYNAMIC STUDY N/A 4/4/2019    Procedure: URODYNAMIC STUDY, FLUOROSCOPIC;  Surgeon: Kenzie Charles MD;  Location: [facility] OR [address] [place] [address];  Service: Urology;  Laterality: N/A;  1 hour    FLUOROSCOPIC URODYNAMIC STUDY N/A 5/11/2022    Procedure: URODYNAMIC STUDY, FLUOROSCOPIC;  Surgeon: Kenzie Charles MD;  Location: [address];  Service: Urology;  Laterality: N/A;  90min    FOOT AMPUTATION THROUGH METATARSAL Right 10/28/2019    Procedure: AMPUTATION, FOOT, TRANSMETATARSAL;  Surgeon: Abdi Bedoya DPM;  Location: [facility];  Service: Podiatry;  Laterality: Right;    FOOT AMPUTATION THROUGH METATARSAL Right 3/27/2020    Procedure: AMPUTATION, FOOT, TRANSMETATARSAL;  Surgeon: Abdi Bedoya,  "DPM;  Location: [facility] OR;  Service: Podiatry;  Laterality: Right;  REVISION    MYELOMININGOCELE REPAIR      NEPHRECTOMY Right 11/4/2021    Procedure: Nephrectomy/ OPEN;  Surgeon: Dash Rosenthal MD;  Location: [facility] OR 2ND FLR;  Service: Urology;  Laterality: Right;  4HRS    NEPHROSTOMY Right 8/2/2021    Procedure: Nephrostomy and perinephric abscess drain;  Surgeon: Lillian Surgeon;  Location: [facility];  Service: Anesthesiology;  Laterality: Right;    REVISION OF VENTRICULOPERITONEAL SHUNT Left 9/21/2022    Procedure: REVISION, SHUNT, VENTRICULOPERITONEAL;  Surgeon: Maynor Calero MD;  Location: [facility] OR 2ND FLR;  Service: Neurosurgery;  Laterality: Left;    STRABISMUS SURGERY      ou dr peña    VENTRICULOPERITONEAL SHUNT      WOUND DEBRIDEMENT  3/27/2020    Procedure: DEBRIDEMENT, WOUND;  Surgeon: Abdi Bedoya DPM;  Location: [facility];  Service: Podiatry;;       Review of patient's allergies indicates:   Allergen Reactions    Latex, natural rubber Anaphylaxis and Other (See Comments)     angioedema    Zofran [ondansetron hcl (pf)] Swelling     Hives, "throat closes up"    Gardasil  [h papillomavirus vac,qval (pf)]      Other reaction(s): Shortness of breath    Menactra  [mening vac a,c,y,w135 dip (pf)]      Other reaction(s): Shortness of breath    Nitrofurantoin      Other reaction(s): Difficulty breathing    Sulfa (sulfonamide antibiotics)     Tramadol Hives    Levaquin [levofloxacin] Rash     Spots on face    Macrobid [nitrofurantoin monohyd/m-cryst] Rash     Sppots/rash on face       Current Facility-Administered Medications   Medication Dose Route Frequency Provider Last Rate Last Admin    0.9 % NaCl with KCl 20 mEq infusion   Intravenous Continuous Richy Puentes MD        acetaminophen tablet 650 mg  650 mg Oral Q8H PRN Richy Puentes MD        acetaminophen tablet 650 mg  650 mg Oral Q4H PRN Richy Puentes MD        aluminum-magnesium hydroxide-simethicone 200-200-20 mg/5 mL suspension 30 mL  30 mL " Oral QID PRN Richy Puentes MD        calcium gluconate 1 g in NS IVPB (premixed)  1 g Intravenous Once Richy Puentes MD        cefepime 1g in dextrose 5% 100 mL IVPB (ready to mix)  1 g Intravenous Q8H Richy Puentes MD        dextrose 50% injection 12.5 g  12.5 g Intravenous PRN Richy Puentes MD        dextrose 50% injection 25 g  25 g Intravenous PRN Richy Puentes MD        enoxaparin injection 40 mg  40 mg Subcutaneous Daily Richy Puentes MD        famotidine (PF) injection 20 mg  20 mg Intravenous BID Richy Puentes MD        glucagon (human recombinant) injection 1 mg  1 mg Intramuscular PRN Richy Puentes MD        glucose chewable tablet 16 g  16 g Oral PRN Richy Puentes MD        glucose chewable tablet 24 g  24 g Oral PRN Richy Puentes MD        magnesium oxide tablet 800 mg  800 mg Oral PRN Richy Puentes MD        magnesium oxide tablet 800 mg  800 mg Oral PRN Richy Puentes MD        melatonin tablet 6 mg  6 mg Oral Nightly PRN Richy Puentes MD        naloxone 0.4 mg/mL injection 0.02 mg  0.02 mg Intravenous PRN Richy Puentes MD        ondansetron injection 4 mg  4 mg Intravenous Q6H PRN Richy Puentes MD        potassium bicarbonate disintegrating tablet 35 mEq  35 mEq Oral PRN Richy Puentes MD        potassium bicarbonate disintegrating tablet 50 mEq  50 mEq Oral PRN Richy Puentes MD        potassium bicarbonate disintegrating tablet 60 mEq  60 mEq Oral PRN Richy Puentes MD        potassium, sodium phosphates 280-160-250 mg packet 2 packet  2 packet Oral PRN Richy Puentes MD        potassium, sodium phosphates 280-160-250 mg packet 2 packet  2 packet Oral PRN Richy Puentes MD        potassium, sodium phosphates 280-160-250 mg packet 2 packet  2 packet Oral PRN Richy Puentes MD        senna-docusate 8.6-50 mg per tablet 1 tablet  1 tablet Oral Daily Richy Puentes MD        sodium chloride 0.9% flush 3 mL  3 mL Intravenous Q12H PRN Richy Puentes MD         Current Outpatient Medications    Medication Sig Dispense Refill    diazePAM (VALTOCO) 10 mg/spray (0.1 mL) Spry 10 mg by Nasal route every 5 (five) minutes as needed (Administer 1 spray into 1 nostril; if a second dose is needed, administer  in alternate nostril. A second dose should not be administered if the  patient is having trouble breathing or excessive sedatio). 1 each 3    EPINEPHrine (EPIPEN) 0.3 mg/0.3 mL AtIn Inject 0.3 mLs (0.3 mg total) into the muscle as needed (Anaphylactic reaction). 0.3 mL 0    ergocalciferol (ERGOCALCIFEROL) 50,000 unit Cap Take 50,000 Units by mouth every 7 days.      ibuprofen (ADVIL,MOTRIN) 200 MG tablet Take 200 mg by mouth every 6 (six) hours as needed for Pain.      INV nrl-1 (INTRANASAL DIAZEPAM) 10 mg sprm nasal spray 1 spray by Left Nostril route once. Spray in the nose as directed for 1 dose.      levETIRAcetam (KEPPRA) 1000 MG tablet Take 1 tablet (1,000 mg total) by mouth 2 (two) times daily. 60 tablet 11    riboflavin, vitamin B2, 400 mg Tab Take 400 mg by mouth once daily. 30 tablet 6     Family History       Problem Relation (Age of Onset)    Breast cancer Mother    Cataracts Maternal Grandmother    Gout Father    Myocarditis Sister          Tobacco Use    Smoking status: Never    Smokeless tobacco: Never   Substance and Sexual Activity    Alcohol use: Not Currently    Drug use: Never    Sexual activity: Never     Review of Systems   All other systems reviewed and are negative.    Objective:     Vital Signs (Most Recent):  Temp: 98.3 °F (36.8 °C) (04/19/24 1831)  Pulse: 89 (04/20/24 0600)  Resp: 20 (04/19/24 1830)  BP: 121/69 (04/20/24 0600)  SpO2: 98 % (04/20/24 0600) Vital Signs (24h Range):  Temp:  [98.3 °F (36.8 °C)] 98.3 °F (36.8 °C)  Pulse:  [] 89  Resp:  [20] 20  SpO2:  [96 %-100 %] 98 %  BP: (111-172)/() 121/69     Weight: 59 kg (130 lb)  Body mass index is 29.15 kg/m².     Physical Exam  Vitals and nursing note reviewed.   Constitutional:       Appearance: Normal appearance.  "  HENT:      Head: Normocephalic and atraumatic.      Nose: Nose normal.      Mouth/Throat:      Mouth: Mucous membranes are dry.   Eyes:      Extraocular Movements: Extraocular movements intact.      Pupils: Pupils are equal, round, and reactive to light.   Cardiovascular:      Rate and Rhythm: Normal rate and regular rhythm.      Pulses: Normal pulses.      Heart sounds: Normal heart sounds.   Pulmonary:      Effort: Pulmonary effort is normal.      Breath sounds: Normal breath sounds.   Abdominal:      General: Abdomen is flat. Bowel sounds are normal.      Palpations: Abdomen is soft.   Musculoskeletal:         General: Normal range of motion.      Cervical back: Normal range of motion and neck supple.   Skin:     General: Skin is warm and dry.      Capillary Refill: Capillary refill takes less than 2 seconds.   Neurological:      Mental Status: She is alert and oriented to person, place, and time.      Comments: She's at baseline but has no feeling in lower legs and can't move them    Psychiatric:         Mood and Affect: Mood normal.         Behavior: Behavior normal.              CRANIAL NERVES     CN III, IV, VI   Pupils are equal, round, and reactive to light.       Significant Labs: All pertinent labs within the past 24 hours have been reviewed.  CBC:   Recent Labs   Lab 04/19/24  1835   WBC 7.59   HGB 12.4   HCT 37.4        CMP:   Recent Labs   Lab 04/19/24  1940      K 3.8      CO2 21*   GLU 87   BUN 18   CREATININE 0.4*   CALCIUM 8.4*   PROT 7.2   ALBUMIN 4.1   BILITOT 0.3   ALKPHOS 52*   AST 12   ALT 15   ANIONGAP 10     Cardiac Markers: No results for input(s): "CKMB", "MYOGLOBIN", "BNP", "TROPISTAT" in the last 48 hours.  Coagulation: No results for input(s): "PT", "INR", "APTT" in the last 48 hours.  Lactic Acid:   Recent Labs   Lab 04/19/24  2326   LACTATE 1.0     Magnesium:   Recent Labs   Lab 04/19/24  1940   MG 2.1     Respiratory Culture: No results for input(s): "GSRESP", " ""RESPIRATORYC" in the last 48 hours.  Troponin: No results for input(s): "TROPONINI", "TROPONINIHS" in the last 48 hours.  TSH: No results for input(s): "TSH" in the last 4320 hours.  Urine Studies:   Recent Labs   Lab 04/19/24 2023   COLORU Orange*   APPEARANCEUA Cloudy*   PHUR 8.0   SPECGRAV 1.020   PROTEINUA 3+*   GLUCUA Negative   KETONESU Negative   BILIRUBINUA Negative   OCCULTUA Negative   NITRITE Positive*   UROBILINOGEN Negative   LEUKOCYTESUR 3+*   RBCUA 13*   WBCUA >100*   BACTERIA None   HYALINECASTS 0       Significant Imaging: I have reviewed all pertinent imaging results/findings within the past 24 hours.  I have reviewed and interpreted all pertinent imaging results/findings within the past 24 hours.  Assessment/Plan:     * Seizures  She is fine now .  Got IV ativan for 3 mg total and then some IV Keppra     Maybe the UTI triggered it ?    I think she dry , feels warm and has some sinus tachycardia     And her calcium is low with normal albumin .      Any low  Na or K or mag or Ca could trigger seizure       PLAN    - resume keppra but for now I'm doing It IV   1000 mg IV Q12 start 9 am   - reg diet  - pepcid IV BID    - run fluids with KCL to hydrate her    NS KCL 20 meq at 125 cc hr x 12 hours   - change ABX to Cefepime which the prior cultures were sensitive to     - give 1 gram IV calcium gluconate in mini bag IVPB    - check new labs now with BMP and Mag and ionized calcium         - Valium IV 10 mg PRN Seizure Q5 min x 2 doses       Hypocalcemia  Could be from Low Vit D     Im going to check Vit D and PTH this morning    Also Ionized CA       And I'm giving 1 gram IVPB   calcium gluconate  to help prevent more seizure     Run IVFs         UTI (urinary tract infection)  Change to IV Rocephin to IV cefepime     E coli in prior cultures was resistant to Rocephin        Sensitive to Cefepime    Run IVFs also       Only do ABX x 3 days     no reason to over do it and create more resistance "         VTE Risk Mitigation (From admission, onward)           Ordered     enoxaparin injection 40 mg  Daily        Note to Pharmacy: Ht:    Wt: 59 kg (130 lb)  Estimated Creatinine Clearance: 167.9 mL/min (A) (based on SCr of 0.4 mg/dL (L)).  Body mass index is 29.15 kg/m².    04/20/24 0634     IP VTE HIGH RISK PATIENT  Once         04/20/24 0628     Place sequential compression device  Until discontinued         04/20/24 0628                       On 04/20/2024, patient should be placed in hospital observation services under my care.        Pharmacist Renal Dose Adjustment Note    Shashank Samuel is a 28 y.o. female being treated with Enoxaparin.     Patient Data:    Vital Signs (Most Recent):  Temp: 98.3 °F (36.8 °C) (04/19/24 1831)  Pulse: 89 (04/20/24 0600)  Resp: 20 (04/19/24 1830)  BP: 121/69 (04/20/24 0600)  SpO2: 98 % (04/20/24 0600) Vital Signs (72h Range):  Temp:  [98.3 °F (36.8 °C)]   Pulse:  []   Resp:  [20]   BP: (111-172)/()   SpO2:  [96 %-100 %]      Recent Labs   Lab 04/19/24 1940   CREATININE 0.4*     Serum creatinine: 0.4 mg/dL (L) 04/19/24 1940  Estimated creatinine clearance: 167.9 mL/min (A)  Body mass index is 29.15 kg/m².    Enoxaparin 30 mg every 24 hours will be changed to Enoxaparin 40 mg every 24 hours per Renal Dose Adjustment protocol.    Pharmacist's Name: Bree Melo  Pharmacist's Extension: [phone]      Richy Puentes MD  Department of Hospital Medicine  [facility] - Emergency Dept

## 2024-04-20 NOTE — ASSESSMENT & PLAN NOTE
Could be from Low Vit D     Im going to check Vit D and PTH this morning    Also Ionized CA       And I'm giving 1 gram IVPB   calcium gluconate  to help prevent more seizure     Run IVFs

## 2024-04-20 NOTE — ED NOTES
Ok for patient to have apple sauce per Dr. Navarro.  Patient is being fed and no difficulty swallowing eating applesauce.

## 2024-04-20 NOTE — SUBJECTIVE & OBJECTIVE
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       Past Surgical History:   Procedure Laterality Date    AMPUTATION      right 4th and 5th toes; left 5th toe and portion of left great toe    BACK SURGERY      BLADDER SURGERY      BREAST SURGERY  2015    Reduction    COLON SURGERY      flush site for bowel movements from appendix    CYSTOSTOMY      DEBRIDEMENT OF FOOT Right 1/8/2021    Procedure: DEBRIDEMENT, FOOT;  Surgeon: Abdi Bedoya DPM;  Location: [facility];  Service: Podiatry;  Laterality: Right;    EYE SURGERY      x 5    FLUOROSCOPIC URODYNAMIC STUDY N/A 3/12/2019    Procedure: URODYNAMIC STUDY, FLUOROSCOPIC;  Surgeon: Kenzie Charles MD;  Location: [facility] 1ST FLR;  Service: Urology;  Laterality: N/A;  1 hour    FLUOROSCOPIC URODYNAMIC STUDY N/A 4/4/2019    Procedure: URODYNAMIC STUDY, FLUOROSCOPIC;  Surgeon: Kenzie Charles MD;  Location: [facility] 1ST FLR;  Service: Urology;  Laterality: N/A;  1 hour    FLUOROSCOPIC URODYNAMIC STUDY N/A 5/11/2022    Procedure: URODYNAMIC STUDY, FLUOROSCOPIC;  Surgeon: Kenzie Charles MD;  Location: [facility] 1ST FLR;  Service: Urology;  Laterality: N/A;  90min    FOOT AMPUTATION THROUGH METATARSAL Right 10/28/2019    Procedure: AMPUTATION, FOOT, TRANSMETATARSAL;  Surgeon: Abdi Bedoya DPM;  Location: [facility];  Service: Podiatry;  Laterality: Right;    FOOT AMPUTATION THROUGH METATARSAL Right 3/27/2020    Procedure: AMPUTATION, FOOT, TRANSMETATARSAL;  Surgeon: Abdi Bedoya DPM;  Location: [facility] OR;  Service: Podiatry;  Laterality: Right;  REVISION    MYELOMININGOCELE REPAIR      NEPHRECTOMY Right 11/4/2021    Procedure: Nephrectomy/ OPEN;  Surgeon: Dash Rosenthal MD;  Location: [facility] OR 2ND FLR;  Service:  "Urology;  Laterality: Right;  4HRS    NEPHROSTOMY Right 8/2/2021    Procedure: Nephrostomy and perinephric abscess drain;  Surgeon: Lillian Surgeon;  Location: [facility];  Service: Anesthesiology;  Laterality: Right;    REVISION OF VENTRICULOPERITONEAL SHUNT Left 9/21/2022    Procedure: REVISION, SHUNT, VENTRICULOPERITONEAL;  Surgeon: Maynor Calero MD;  Location: [facility] 2ND FLR;  Service: Neurosurgery;  Laterality: Left;    STRABISMUS SURGERY      ou dr peña    VENTRICULOPERITONEAL SHUNT      WOUND DEBRIDEMENT  3/27/2020    Procedure: DEBRIDEMENT, WOUND;  Surgeon: Abdi Bedoya DPM;  Location: [facility];  Service: Podiatry;;       Review of patient's allergies indicates:   Allergen Reactions    Latex, natural rubber Anaphylaxis and Other (See Comments)     angioedema    Zofran [ondansetron hcl (pf)] Swelling     Hives, "throat closes up"    Gardasil  [h papillomavirus vac,qval (pf)]      Other reaction(s): Shortness of breath    Menactra  [mening vac a,c,y,w135 dip (pf)]      Other reaction(s): Shortness of breath    Nitrofurantoin      Other reaction(s): Difficulty breathing    Sulfa (sulfonamide antibiotics)     Tramadol Hives    Levaquin [levofloxacin] Rash     Spots on face    Macrobid [nitrofurantoin monohyd/m-cryst] Rash     Sppots/rash on face       Current Facility-Administered Medications   Medication Dose Route Frequency Provider Last Rate Last Admin    0.9 % NaCl with KCl 20 mEq infusion   Intravenous Continuous Richy Puentes MD        acetaminophen tablet 650 mg  650 mg Oral Q8H PRN Richy Puentes MD        acetaminophen tablet 650 mg  650 mg Oral Q4H PRN Richy Puentes MD        aluminum-magnesium hydroxide-simethicone 200-200-20 mg/5 mL suspension 30 mL  30 mL Oral QID PRN Richy Puentes MD        calcium gluconate 1 g in NS IVPB (premixed)  1 g Intravenous Once Richy Puentes MD        cefepime 1g in dextrose 5% 100 mL IVPB (ready to mix)  1 g Intravenous Q8H Richy Puentes MD        dextrose 50% " injection 12.5 g  12.5 g Intravenous PRN Richy Puentes MD        dextrose 50% injection 25 g  25 g Intravenous PRN Richy Puentes MD        enoxaparin injection 40 mg  40 mg Subcutaneous Daily Richy Puentes MD        famotidine (PF) injection 20 mg  20 mg Intravenous BID Richy Puentes MD        glucagon (human recombinant) injection 1 mg  1 mg Intramuscular PRN Richy Puentes MD        glucose chewable tablet 16 g  16 g Oral PRN Richy Puentes MD        glucose chewable tablet 24 g  24 g Oral PRN Richy Puentes MD        magnesium oxide tablet 800 mg  800 mg Oral PRN Richy Puentes MD        magnesium oxide tablet 800 mg  800 mg Oral PRN Richy Puentes MD        melatonin tablet 6 mg  6 mg Oral Nightly PRN Richy Puentes MD        naloxone 0.4 mg/mL injection 0.02 mg  0.02 mg Intravenous PRN Richy Puentes MD        ondansetron injection 4 mg  4 mg Intravenous Q6H PRN Richy Puentes MD        potassium bicarbonate disintegrating tablet 35 mEq  35 mEq Oral PRN Richy Puentes MD        potassium bicarbonate disintegrating tablet 50 mEq  50 mEq Oral PRN Richy Puentes MD        potassium bicarbonate disintegrating tablet 60 mEq  60 mEq Oral PRN Richy Puentes MD        potassium, sodium phosphates 280-160-250 mg packet 2 packet  2 packet Oral PRN Richy Puentes MD        potassium, sodium phosphates 280-160-250 mg packet 2 packet  2 packet Oral PRN Richy Puentes MD        potassium, sodium phosphates 280-160-250 mg packet 2 packet  2 packet Oral PRN Richy Puentes MD        senna-docusate 8.6-50 mg per tablet 1 tablet  1 tablet Oral Daily Richy Puentes MD        sodium chloride 0.9% flush 3 mL  3 mL Intravenous Q12H PRN Richy Puentes MD         Current Outpatient Medications   Medication Sig Dispense Refill    diazePAM (VALTOCO) 10 mg/spray (0.1 mL) Spry 10 mg by Nasal route every 5 (five) minutes as needed (Administer 1 spray into 1 nostril; if a second dose is needed, administer  in alternate nostril. A second  dose should not be administered if the  patient is having trouble breathing or excessive sedatio). 1 each 3    EPINEPHrine (EPIPEN) 0.3 mg/0.3 mL AtIn Inject 0.3 mLs (0.3 mg total) into the muscle as needed (Anaphylactic reaction). 0.3 mL 0    ergocalciferol (ERGOCALCIFEROL) 50,000 unit Cap Take 50,000 Units by mouth every 7 days.      ibuprofen (ADVIL,MOTRIN) 200 MG tablet Take 200 mg by mouth every 6 (six) hours as needed for Pain.      INV nrl-1 (INTRANASAL DIAZEPAM) 10 mg sprm nasal spray 1 spray by Left Nostril route once. Spray in the nose as directed for 1 dose.      levETIRAcetam (KEPPRA) 1000 MG tablet Take 1 tablet (1,000 mg total) by mouth 2 (two) times daily. 60 tablet 11    riboflavin, vitamin B2, 400 mg Tab Take 400 mg by mouth once daily. 30 tablet 6     Family History       Problem Relation (Age of Onset)    Breast cancer Mother    Cataracts Maternal Grandmother    Gout Father    Myocarditis Sister          Tobacco Use    Smoking status: Never    Smokeless tobacco: Never   Substance and Sexual Activity    Alcohol use: Not Currently    Drug use: Never    Sexual activity: Never     Review of Systems   All other systems reviewed and are negative.    Objective:     Vital Signs (Most Recent):  Temp: 98.3 °F (36.8 °C) (04/19/24 1831)  Pulse: 89 (04/20/24 0600)  Resp: 20 (04/19/24 1830)  BP: 121/69 (04/20/24 0600)  SpO2: 98 % (04/20/24 0600) Vital Signs (24h Range):  Temp:  [98.3 °F (36.8 °C)] 98.3 °F (36.8 °C)  Pulse:  [] 89  Resp:  [20] 20  SpO2:  [96 %-100 %] 98 %  BP: (111-172)/() 121/69     Weight: 59 kg (130 lb)  Body mass index is 29.15 kg/m².     Physical Exam  Vitals and nursing note reviewed.   Constitutional:       Appearance: Normal appearance.   HENT:      Head: Normocephalic and atraumatic.      Nose: Nose normal.      Mouth/Throat:      Mouth: Mucous membranes are dry.   Eyes:      Extraocular Movements: Extraocular movements intact.      Pupils: Pupils are equal, round, and  "reactive to light.   Cardiovascular:      Rate and Rhythm: Normal rate and regular rhythm.      Pulses: Normal pulses.      Heart sounds: Normal heart sounds.   Pulmonary:      Effort: Pulmonary effort is normal.      Breath sounds: Normal breath sounds.   Abdominal:      General: Abdomen is flat. Bowel sounds are normal.      Palpations: Abdomen is soft.   Musculoskeletal:         General: Normal range of motion.      Cervical back: Normal range of motion and neck supple.   Skin:     General: Skin is warm and dry.      Capillary Refill: Capillary refill takes less than 2 seconds.   Neurological:      Mental Status: She is alert and oriented to person, place, and time.      Comments: She's at baseline but has no feeling in lower legs and can't move them    Psychiatric:         Mood and Affect: Mood normal.         Behavior: Behavior normal.              CRANIAL NERVES     CN III, IV, VI   Pupils are equal, round, and reactive to light.       Significant Labs: All pertinent labs within the past 24 hours have been reviewed.  CBC:   Recent Labs   Lab 04/19/24  1835   WBC 7.59   HGB 12.4   HCT 37.4        CMP:   Recent Labs   Lab 04/19/24  1940      K 3.8      CO2 21*   GLU 87   BUN 18   CREATININE 0.4*   CALCIUM 8.4*   PROT 7.2   ALBUMIN 4.1   BILITOT 0.3   ALKPHOS 52*   AST 12   ALT 15   ANIONGAP 10     Cardiac Markers: No results for input(s): "CKMB", "MYOGLOBIN", "BNP", "TROPISTAT" in the last 48 hours.  Coagulation: No results for input(s): "PT", "INR", "APTT" in the last 48 hours.  Lactic Acid:   Recent Labs   Lab 04/19/24  2326   LACTATE 1.0     Magnesium:   Recent Labs   Lab 04/19/24  1940   MG 2.1     Respiratory Culture: No results for input(s): "GSRESP", "RESPIRATORYC" in the last 48 hours.  Troponin: No results for input(s): "TROPONINI", "TROPONINIHS" in the last 48 hours.  TSH: No results for input(s): "TSH" in the last 4320 hours.  Urine Studies:   Recent Labs   Lab 04/19/24 2023 "   COLORU Orange*   APPEARANCEUA Cloudy*   PHUR 8.0   SPECGRAV 1.020   PROTEINUA 3+*   GLUCUA Negative   KETONESU Negative   BILIRUBINUA Negative   OCCULTUA Negative   NITRITE Positive*   UROBILINOGEN Negative   LEUKOCYTESUR 3+*   RBCUA 13*   WBCUA >100*   BACTERIA None   HYALINECASTS 0       Significant Imaging: I have reviewed all pertinent imaging results/findings within the past 24 hours.  I have reviewed and interpreted all pertinent imaging results/findings within the past 24 hours.

## 2024-04-20 NOTE — HPI
28 WF with pmh of spina bifida with no use of lower body ,  seizure disorder on keppra, Reynauds , hydrocephalus with shunt,  presents from home having seizures.        Non smoker  Non drinker  Lives at home with parents     She has history of seizures but she says it can be a year or more without having one.       And this time she was in usual state of health and was not feeling sick in any way when it happened.       She had just sat down to eat dinner and had not even taken a bite yet she said and then the seizure happened and she only remembers waking up here in the ER.         When she was brought in here they gave her Ativan IV for total of 3 mg and she got IV keppra .   She was sleeping then most of time after that .   Her parents were with her then.      By the time I saw her later in ER , she was awake .  And was talking to me.   They said she was not using her arms at first but she says that happens after the seizures.  And when I saw her she was using her amrs.     She wanted to eat so I got her a sand which and drink and she did fine.        They did find that she had a UTI   And its very common she says   She self caths   She can't feel anything really so she doesn't know if she had urinary symptoms or not       Her CBC Was normal   Her CMP was normal   But her calcium was a little low with normal albumin level .     Mild metabolic acidosis     CT head was negative     They gave her phenergan IV and Rocephin IV for the nausea and UTI           However prior UTIs grew E coli and it was resistant to Rocephin       We are admitting her for the breakthrough seizure event

## 2024-04-20 NOTE — ASSESSMENT & PLAN NOTE
Change to IV Rocephin to IV cefepime     E coli in prior cultures was resistant to Rocephin        Sensitive to Cefepime---DC IV fluids      Only do ABX x 3 days     no reason to over do it and create more resistance

## 2024-04-20 NOTE — PROGRESS NOTES
Pharmacist Renal Dose Adjustment Note    Shashank Samuel is a 28 y.o. female being treated with the medication Meropenem.    Patient Data:    Vital Signs (Most Recent):  Temp: 98.3 °F (36.8 °C) (04/20/24 0809)  Pulse: 92 (04/20/24 0809)  Resp: 17 (04/20/24 0809)  BP: 121/77 (04/20/24 0809)  SpO2: 99 % (04/20/24 0809) Vital Signs (72h Range):  Temp:  [98.3 °F (36.8 °C)]   Pulse:  []   Resp:  [17-20]   BP: (111-172)/()   SpO2:  [96 %-100 %]      Recent Labs   Lab 04/19/24  1940 04/20/24  1004   CREATININE 0.4* 0.5     Serum creatinine: 0.5 mg/dL 04/20/24 1004  Estimated creatinine clearance: 142.1 mL/min    Meropenem 1 gram IV every 12 hours will be changed to Meropenem 1 gram IV every 8 hours due to CrCl > 50 ml/min.    Pharmacist's Name: Luz Obrien  Pharmacist's Extension: [phone]

## 2024-04-20 NOTE — ASSESSMENT & PLAN NOTE
She is fine now .  Got IV ativan for 3 mg total and then some IV Keppra     Maybe the UTI triggered it ?    I think she dry , feels warm and has some sinus tachycardia     And her calcium is low with normal albumin .      Any low  Na or K or mag or Ca could trigger seizure       PLAN    - resume keppra but for now I'm doing It IV   1000 mg IV Q12 start 9 am   - reg diet  - pepcid IV BID    - run fluids with KCL to hydrate her    NS KCL 20 meq at 125 cc hr x 12 hours   - change ABX to Cefepime which the prior cultures were sensitive to     - give 1 gram IV calcium gluconate in mini bag IVPB    - check new labs now with BMP and Mag and ionized calcium         - Valium IV 10 mg PRN Seizure Q5 min x 2 doses

## 2024-04-20 NOTE — PROGRESS NOTES
Pharmacist Renal Dose Adjustment Note    Shashank Samuel is a 28 y.o. female being treated with Enoxaparin.     Patient Data:    Vital Signs (Most Recent):  Temp: 98.3 °F (36.8 °C) (04/19/24 1831)  Pulse: 89 (04/20/24 0600)  Resp: 20 (04/19/24 1830)  BP: 121/69 (04/20/24 0600)  SpO2: 98 % (04/20/24 0600) Vital Signs (72h Range):  Temp:  [98.3 °F (36.8 °C)]   Pulse:  []   Resp:  [20]   BP: (111-172)/()   SpO2:  [96 %-100 %]      Recent Labs   Lab 04/19/24 1940   CREATININE 0.4*     Serum creatinine: 0.4 mg/dL (L) 04/19/24 1940  Estimated creatinine clearance: 167.9 mL/min (A)  Body mass index is 29.15 kg/m².    Enoxaparin 30 mg every 24 hours will be changed to Enoxaparin 40 mg every 24 hours per Renal Dose Adjustment protocol.    Pharmacist's Name: Bree Melo  Pharmacist's Extension: [phone]

## 2024-04-20 NOTE — NURSING
Nurses Note -- 4 Eyes      4/20/2024   10:01 AM      Skin assessed during: Admit      [] No Altered Skin Integrity Present    []Prevention Measures Documented      [x] Yes- Altered Skin Integrity Present or Discovered   [x] LDA Added if Not in Epic (Describe Wound)   [x] New Altered Skin Integrity was Present on Admit and Documented in LDA   [x] Wound Image Taken    Wound Care Consulted? Yes    Attending Nurse:  Dahlia WISE    Second RN/Staff Member:   Ruthie WISE

## 2024-04-20 NOTE — ASSESSMENT & PLAN NOTE
She is fine now .  Got IV ativan for 3 mg total and then some IV Keppra     Maybe the UTI triggered it ?    I think she dry , feels warm and has some sinus tachycardia     And her calcium is low with normal albumin .      Any low  Na or K or mag or Ca could trigger seizure     Consult neurology.    PLAN    - resume keppra but for now I'm doing It IV   1000 mg IV Q12 start 9 am   - reg diet  - pepcid IV BID    - run fluids with KCL to hydrate her    NS KCL 20 meq at 125 cc hr x 12 hours   - change ABX to Cefepime which the prior cultures were sensitive to     - give 1 gram IV calcium gluconate in mini bag IVPB    - check new labs now with BMP and Mag and ionized calcium         - Valium IV 10 mg PRN Seizure Q5 min x 2 doses

## 2024-04-20 NOTE — CONSULTS
[facility]  Department of Neurology  Neurology Consultation Note        PATIENT NAME: Shashank Samuel  MRN: [id number]  CSN: [id number]      TODAY'S DATE: 04/20/2024  ADMIT DATE: 4/19/2024                            CONSULTING PROVIDER: Dov Smart MD  CONSULT REQUESTED BY: Timoteo Reza MD      Reason for consult:  Breakthrough seizures       History obtained from chart review, the patient, and parent.    HPI per EMR: Shashank Samuel is a 28 y.o. female with a history of spina bifida with no use of lower body ,  seizure disorder on keppra, Reynauds , hydrocephalus with shunt,  presents from home having seizures.         Non smoker  Non drinker  Lives at home with parents      She has history of seizures but she says it can be a year or more without having one.       And this time she was in usual state of health and was not feeling sick in any way when it happened.        She had just sat down to eat dinner and had not even taken a bite yet she said and then the seizure happened and she only remembers waking up here in the ER.           When she was brought in here they gave her Ativan IV for total of 3 mg and she got IV keppra .   She was sleeping then most of time after that .   Her parents were with her then.      Neurology consult:  Patient was seen examined me.  He is and oriented x3.  History of seizure disorder on Keppra currently on 1000 mg b.i.d. at home.  Yesterday evaluating breakfast, she had tonic seizure which she was brought to the hospital and she had another seizure in the ER after which she received Ativan and IV Keppra.  She was drowsy postictal and was admitted to the hospital for observation.  Currently she is back to her baseline.      Her Keppra was increased to 1000 mg b.i.d. about 4 months ago and her last seizure was more than 4 months ago.    Currently she feels back to baseline and denies any other symptoms.    PREVIOUS MEDICAL HISTORY:  Past Medical  History:   Diagnosis Date    Anemia     Arnold-Chiari malformation, type II     Encounter for blood transfusion     Hydrocephalus     Neurogenic bladder     self catheterizes every 3 hours    Seizures     only w/ shunt malfunction    Spinal bifida, closed     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     PREVIOUS SURGICAL HISTORY:  Past Surgical History:   Procedure Laterality Date    AMPUTATION      right 4th and 5th toes; left 5th toe and portion of left great toe    BACK SURGERY      BLADDER SURGERY      BREAST SURGERY  2015    Reduction    COLON SURGERY      flush site for bowel movements from appendix    CYSTOSTOMY      DEBRIDEMENT OF FOOT Right 1/8/2021    Procedure: DEBRIDEMENT, FOOT;  Surgeon: Abdi Bedoya DPM;  Location: [facility];  Service: Podiatry;  Laterality: Right;    EYE SURGERY      x 5    FLUOROSCOPIC URODYNAMIC STUDY N/A 3/12/2019    Procedure: URODYNAMIC STUDY, FLUOROSCOPIC;  Surgeon: Kenzie Charles MD;  Location: [address];  Service: Urology;  Laterality: N/A;  1 hour    FLUOROSCOPIC URODYNAMIC STUDY N/A 4/4/2019    Procedure: URODYNAMIC STUDY, FLUOROSCOPIC;  Surgeon: Kenzie Charles MD;  [address];  Service: Urology;  Laterality: N/A;  1 hour    FLUOROSCOPIC URODYNAMIC STUDY N/A 5/11/2022    Procedure: URODYNAMIC STUDY, FLUOROSCOPIC;  Surgeon: Kenzie Charles MD;  Location: [facility] 1ST FLR;  Service: Urology;  Laterality: N/A;  90min    FOOT AMPUTATION THROUGH METATARSAL Right 10/28/2019    Procedure: AMPUTATION, FOOT, TRANSMETATARSAL;  Surgeon: Abdi Bedoya DPM;  Location: [facility];  Service: Podiatry;  Laterality: Right;    FOOT AMPUTATION THROUGH METATARSAL Right 3/27/2020    Procedure: AMPUTATION, FOOT, TRANSMETATARSAL;  Surgeon: Abdi Bedoya DPM;  Location: [facility] OR;  Service: Podiatry;  Laterality: Right;  REVISION    MYELOMININGOCELE REPAIR      NEPHRECTOMY Right 11/4/2021    Procedure: Nephrectomy/ OPEN;  Surgeon: Dash Rosenthal MD;  Location: [facility] OR 2ND FLR;   "Service: Urology;  Laterality: Right;  4HRS    NEPHROSTOMY Right 8/2/2021    Procedure: Nephrostomy and perinephric abscess drain;  Surgeon: Lillian Surgeon;  Location: [facility];  Service: Anesthesiology;  Laterality: Right;    REVISION OF VENTRICULOPERITONEAL SHUNT Left 9/21/2022    Procedure: REVISION, SHUNT, VENTRICULOPERITONEAL;  Surgeon: Maynor Calero MD;  [address];  Service: Neurosurgery;  Laterality: Left;    STRABISMUS SURGERY      ou dr peña    VENTRICULOPERITONEAL SHUNT      WOUND DEBRIDEMENT  3/27/2020    Procedure: DEBRIDEMENT, WOUND;  Surgeon: Abdi Bedoya DPM;  Location: [facility];  Service: Podiatry;;     FAMILY MEDICAL HISTORY:  Family History   Problem Relation Name Age of Onset    Breast cancer Mother      Gout Father      Cataracts Maternal Grandmother      Myocarditis Sister      Amblyopia Neg Hx      Blindness Neg Hx      Cancer Neg Hx      Diabetes Neg Hx      Glaucoma Neg Hx      Hypertension Neg Hx      Macular degeneration Neg Hx      Retinal detachment Neg Hx      Strabismus Neg Hx      Stroke Neg Hx      Thyroid disease Neg Hx       SOCIAL HISTORY:  Social History     Tobacco Use    Smoking status: Never    Smokeless tobacco: Never   Substance Use Topics    Alcohol use: Not Currently    Drug use: Never     ALLERGIES:  Review of patient's allergies indicates:   Allergen Reactions    Latex, natural rubber Anaphylaxis and Other (See Comments)     angioedema    Zofran [ondansetron hcl (pf)] Swelling     Hives, "throat closes up"    Gardasil  [h papillomavirus vac,qval (pf)]      Other reaction(s): Shortness of breath    Menactra  [mening vac a,c,y,w135 dip (pf)]      Other reaction(s): Shortness of breath    Nitrofurantoin      Other reaction(s): Difficulty breathing    Sulfa (sulfonamide antibiotics)     Tramadol Hives    Levaquin [levofloxacin] Rash     Spots on face    Macrobid [nitrofurantoin monohyd/m-cryst] Rash     Sppots/rash on face     HOME MEDICATIONS:  Prior " to Admission medications    Medication Sig Start Date End Date Taking? Authorizing Provider   diazePAM (VALTOCO) 10 mg/spray (0.1 mL) Spry 10 mg by Nasal route every 5 (five) minutes as needed (Administer 1 spray into 1 nostril; if a second dose is needed, administer  in alternate nostril. A second dose should not be administered if the  patient is having trouble breathing or excessive sedatio). 4/15/24   Robert More MD   EPINEPHrine (EPIPEN) 0.3 mg/0.3 mL AtIn Inject 0.3 mLs (0.3 mg total) into the muscle as needed (Anaphylactic reaction). 3/6/24   Robert More MD   ergocalciferol (ERGOCALCIFEROL) 50,000 unit Cap Take 50,000 Units by mouth every 7 days.    Provider, Historical   ibuprofen (ADVIL,MOTRIN) 200 MG tablet Take 200 mg by mouth every 6 (six) hours as needed for Pain.    Provider, Historical   INV nrl-1 (INTRANASAL DIAZEPAM) 10 mg sprm nasal spray 1 spray by Left Nostril route once. Spray in the nose as directed for 1 dose.    Provider, Historical   levETIRAcetam (KEPPRA) 1000 MG tablet Take 1 tablet (1,000 mg total) by mouth 2 (two) times daily. 3/6/24 3/6/25  Robert More MD   riboflavin, vitamin B2, 400 mg Tab Take 400 mg by mouth once daily. 3/6/24   Robert More MD   coenzyme Q10 600 mg Wafr Take 600 mg by mouth before evening meal. 3/6/24 4/20/24  Robert More MD   diazePAM 5-7.5-10 mg (DIASTAT ACUDIAL) 5-7.5-10 mg Kit rectal kit Place rectally.  4/20/24  Provider, Historical   ergocalciferol (ERGOCALCIFEROL) 50,000 unit Cap TAKE 1 CAPSULE BY MOUTH ONCE A WEEK WITH MEALS 6/20/23 4/20/24  Provider, Historical   ferrous sulfate (FEOSOL) 325 mg (65 mg iron) Tab tablet Take 325 mg by mouth 2 (two) times daily.  4/20/24  Provider, Historical     CURRENT SCHEDULED MEDICATIONS:  Current Facility-Administered Medications   Medication Dose Route Frequency    ceFEPime IV (PEDS and ADULTS)  1 g Intravenous Q8H    enoxparin  40 mg Subcutaneous Daily    famotidine (PF)  20 mg Intravenous  "BID    levETIRAcetam  1,000 mg Oral BID    senna-docusate 8.6-50 mg  1 tablet Oral Daily     CURRENT INFUSIONS:  Current Facility-Administered Medications   Medication Dose Route Frequency Last Rate Last Admin    0/9% NACL & POTASSIUM CHLORIDE 20 MEQ/L   Intravenous Continuous 125 mL/hr at 04/20/24 0942 New Bag at 04/20/24 0942     CURRENT PRN MEDICATIONS:    Current Facility-Administered Medications:     acetaminophen, 650 mg, Oral, Q8H PRN    acetaminophen, 650 mg, Oral, Q4H PRN    aluminum-magnesium hydroxide-simethicone, 30 mL, Oral, QID PRN    dextrose 50%, 12.5 g, Intravenous, PRN    dextrose 50%, 25 g, Intravenous, PRN    diazePAM, 10 mg, Intravenous, Q5 Min PRN    glucagon (human recombinant), 1 mg, Intramuscular, PRN    glucose, 16 g, Oral, PRN    glucose, 24 g, Oral, PRN    magnesium oxide, 800 mg, Oral, PRN    magnesium oxide, 800 mg, Oral, PRN    melatonin, 6 mg, Oral, Nightly PRN    midazolam, 10 mg, Intramuscular, Once PRN    naloxone, 0.02 mg, Intravenous, PRN    potassium bicarbonate, 35 mEq, Oral, PRN    potassium bicarbonate, 50 mEq, Oral, PRN    potassium bicarbonate, 60 mEq, Oral, PRN    potassium, sodium phosphates, 2 packet, Oral, PRN    potassium, sodium phosphates, 2 packet, Oral, PRN    potassium, sodium phosphates, 2 packet, Oral, PRN    promethazine (PHENERGAN) 12.5 mg in dextrose 5 % (D5W) 50 mL IVPB, 12.5 mg, Intravenous, Q6H PRN    sodium chloride 0.9%, 3 mL, Intravenous, Q12H PRN    REVIEW OF SYSTEMS:  Please refer to the HPI for all pertinent positive and negative findings. A comprehensive review of all other systems was negative.       PHYSICAL EXAM:  Patient Vitals for the past 24 hrs:   BP Temp Temp src Pulse Resp SpO2 Height Weight   04/20/24 0809 121/77 98.3 °F (36.8 °C) Oral 92 17 99 % 4' 8" (1.422 m) 66.1 kg (145 lb 11.6 oz)   04/20/24 0600 121/69 -- -- 89 -- 98 % -- --   04/20/24 0545 117/67 -- -- 90 -- 98 % -- --   04/20/24 0530 127/72 -- -- 93 -- 100 % -- --   04/20/24 0345 " 112/63 -- -- 93 -- 97 % -- --   04/20/24 0330 114/64 -- -- 90 -- 96 % -- --   04/20/24 0300 125/81 -- -- 93 -- 98 % -- --   04/20/24 0200 -- -- -- 87 -- 98 % -- --   04/20/24 0150 -- -- -- 89 -- 98 % -- --   04/20/24 0140 111/70 -- -- 90 -- 99 % -- --   04/20/24 0030 128/81 -- -- 97 -- 100 % -- --   04/19/24 2327 -- -- -- 109 -- 100 % -- --   04/19/24 2324 -- -- -- 105 -- 100 % -- --   04/19/24 2323 -- -- -- 109 -- 100 % -- --   04/19/24 2322 -- -- -- 108 -- 100 % -- --   04/19/24 2300 137/81 -- -- (!) 112 -- 100 % -- --   04/19/24 2115 119/68 -- -- 106 -- 100 % -- --   04/19/24 2040 -- -- -- 110 -- 100 % -- --   04/19/24 2035 -- -- -- 109 -- 100 % -- --   04/19/24 2030 (!) 140/86 -- -- -- -- 100 % -- --   04/19/24 1946 131/83 -- -- 103 -- 100 % -- --   04/19/24 1930 (!) 135/95 -- -- 97 -- 100 % -- --   04/19/24 1900 (!) 162/101 -- -- 94 -- 100 % -- --   04/19/24 1852 -- -- -- 91 -- 100 % -- --   04/19/24 1846 (!) 172/103 -- -- 97 -- 100 % -- --   04/19/24 1844 -- -- -- 68 -- 100 % -- --   04/19/24 1831 -- 98.3 °F (36.8 °C) Oral -- -- -- -- --   04/19/24 1830 (!) 144/94 -- -- 91 20 100 % -- 59 kg (130 lb)       GENERAL APPEARANCE: Alert, well-developed, well-nourished female in no acute distress.  HEENT: Normocephalic and atraumatic. PERRL. Oropharynx unremarkable.  PULM: Normal respiratory effort. No accessory muscle use.  CV: RRR.  ABDOMEN: Soft, nontender.       NEURO:  MENTAL STATUS: Patient awake and oriented to time, place, and person, recent/remote memory normal, attention span/concentration normal, and speech fluent without paraphasic errors.  Affect euthymic.    CRANIAL NERVES:  CN I: Not tested.  CN II: Fundoscopic exam deferred.  CN III, IV, VI: Pupils equal, round and reactive to light.  Extraocular movements full and intact.  CN V: Facial sensation normal.  CN VII: Facial asymmetry absent.  CN VIII: Hearing grossly normal and equal bilaterally.  No skew deviation or pathologic nystagmus.  CN IX, X:  "Palate elevates symmetrically. Speech/articulation is clear without dysarthria.  CN XI: Shoulder shrug and chin rotation equal with good strength.  CN XII: Tongue protrusion midline.    MOTOR:  Bulk normal. Tone normal and symmetric throughout.  Abnormal movements absent.  Tremor: none present.  Strength  5/5 in bilateral upper extremities and 0/5 in bilateral lower extremities .  SENSATION:  Absent sensation in lower extremities .  COORDINATION: normal finger-to-nose.  STATION: not tested.  GAIT: not tested.        Labs:  Recent Labs   Lab 04/19/24 1940 04/20/24  1004    136   K 3.8 4.5    107   CO2 21* 23   BUN 18 17   CREATININE 0.4* 0.5   GLU 87 90   CALCIUM 8.4* 8.7   MG 2.1 2.0     Recent Labs   Lab 04/19/24  1835 04/20/24  1005   WBC 7.59 8.07   HGB 12.4 12.8   HCT 37.4 39.2    219     Recent Labs   Lab 04/19/24 1940   ALBUMIN 4.1   PROT 7.2   BILITOT 0.3   ALKPHOS 52*   ALT 15   AST 12     Lab Results   Component Value Date    INR 1.0 01/31/2024     No results found for: "CHOLTOT", "TRIG", "HDL", "CHOLHDL", "LDL"  No results found for: "HGBA1C"  Lab Results   Component Value Date    PROTEINCSF 8 (L) 09/21/2022    GLUCCSF 77 (H) 09/21/2022       Imaging:  I have reviewed and interpreted the pertinent imaging and lab results.      CT Head Without Contrast  Narrative: EXAMINATION:  CT HEAD WITHOUT CONTRAST    CLINICAL HISTORY:  Seizure disorder, clinical change;    TECHNIQUE:  Low dose axial images were obtained through the head.  Coronal and sagittal reformations were also performed. Contrast was not administered.    COMPARISON:  Multiple CT head 01/31/2024    FINDINGS:  There are right and left parietal approach ventricular shunt catheters present both terminating within the right lateral ventricle, similar to prior examination.  There is stable size and configuration of the ventricular system compared to prior examination.  There is a Chiari malformation again demonstrated and partially " visualized.  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  No CT evidence of acute intracranial hemorrhage.  No new large region of abnormal parenchymal hypoattenuation identified.  The mastoid air cells and paranasal sinuses are clear of acute process. Calvarium is otherwise intact.  Impression: Bilateral ventricular shunt catheters in stable position.  Similar size and configuration of ventricular system and brain parenchyma compared to prior examination.    No CT evidence of acute intracranial hemorrhage.    Electronically signed by: Champ Vick MD  Date:    04/19/2024  Time:    22:23         ASSESSMENT & PLAN:        Breakthrough seizures    Plan:   Patient presented to the hospital with breakthrough seizures.  She has been compliant with her Keppra.  Increase Keppra to 1250 mg b.i.d..  Workup and management for infectious causes and metabolic derangements per primary team.  Seizure precautions while inpatient.  Neuro checks per unit protocol.    Outpatient neurology follow-up.      Seizure education.      No driving for now, no swimming alone, no climbing high areas, no operation of heavy machinery or working with high risk electricity equipment.    Continue to take AEDs as prescribed. If any major side effects from neurological medications go to the ED including mood changes and severe depression.  Patient and/or next of kin informed.  Follow up Neurology in 2 weeks. Call [phone] to make an appointment.    Medication side effects discussed with the patient and/or caregiver.        Thank you kindly for including us in the care of this patient. Please do not hesitate to contact us with any questions.             Dov Smart MD  Neurology/vascular Neurology  Date of Service: 04/20/2024  10:46  AM    --------------------------------------------------------------------------------------------------------------------------------------------------------------------------------------------------------------------------------------------------------------  Please note: This note was transcribed using voice recognition software. Because of this technology there are often uinintended grammatical, spelling, and other transcription errors. Please disregard these errors.

## 2024-04-20 NOTE — PROGRESS NOTES
[facility]  Progress Note    Patient Name: Shashank Samuel  MRN: [id number]  Patient Class: OP- Observation   Admission Date: 4/19/2024  Length of Stay: 0 days  Attending Physician: Timoteo Reza MD  Primary Care Provider: Dash Ponce MD        Subjective:     Principal Problem:Seizures        HPI:  28 WF with pmh of spina bifida with no use of lower body ,  seizure disorder on keppra, Reynauds , hydrocephalus with shunt,  presents from home having seizures.        Non smoker  Non drinker  Lives at home with parents     She has history of seizures but she says it can be a year or more without having one.       And this time she was in usual state of health and was not feeling sick in any way when it happened.       She had just sat down to eat dinner and had not even taken a bite yet she said and then the seizure happened and she only remembers waking up here in the ER.         When she was brought in here they gave her Ativan IV for total of 3 mg and she got IV keppra .   She was sleeping then most of time after that .   Her parents were with her then.      By the time I saw her later in ER , she was awake .  And was talking to me.   They said she was not using her arms at first but she says that happens after the seizures.  And when I saw her she was using her amrs.     She wanted to eat so I got her a sand which and drink and she did fine.        They did find that she had a UTI   And its very common she says   She self caths   She can't feel anything really so she doesn't know if she had urinary symptoms or not       Her CBC Was normal   Her CMP was normal   But her calcium was a little low with normal albumin level .     Mild metabolic acidosis     CT head was negative     They gave her phenergan IV and Rocephin IV for the nausea and UTI           However prior UTIs grew E coli and it was resistant to Rocephin       We are admitting her for the breakthrough seizure  event     Overview/Hospital Course:  No notes on file    Interval History:  Patient feeling well this morning without any further seizures.  Appetite is good.  Labs reviewed potassium 4.5  Urine culture pending.  Awaiting Neurology evaluation.    Review of Systems   Musculoskeletal:  Positive for gait problem and joint swelling.   Skin:  Negative for color change.   Neurological:  Positive for seizures and weakness.     Objective:     Vital Signs (Most Recent):  Temp: 98.3 °F (36.8 °C) (04/20/24 0809)  Pulse: 92 (04/20/24 0809)  Resp: 17 (04/20/24 0809)  BP: 121/77 (04/20/24 0809)  SpO2: 99 % (04/20/24 0809) Vital Signs (24h Range):  Temp:  [98.3 °F (36.8 °C)] 98.3 °F (36.8 °C)  Pulse:  [] 92  Resp:  [17-20] 17  SpO2:  [96 %-100 %] 99 %  BP: (111-172)/() 121/77     Weight: 66.1 kg (145 lb 11.6 oz)  Body mass index is 32.67 kg/m².    Intake/Output Summary (Last 24 hours) at 4/20/2024 1215  Last data filed at 4/20/2024 1201  Gross per 24 hour   Intake 440 ml   Output --   Net 440 ml         Physical Exam  Cardiovascular:      Rate and Rhythm: Normal rate.   Pulmonary:      Effort: Pulmonary effort is normal.   Musculoskeletal:         General: Deformity present.      Comments: Secondary to spina bifida.  Patient is wheelchair-bound.    Skin:     General: Skin is warm.   Neurological:      Mental Status: She is alert and oriented to person, place, and time.             Significant Labs: All pertinent labs within the past 24 hours have been reviewed.  CBC:   Recent Labs   Lab 04/19/24  1835 04/20/24  1005   WBC 7.59 8.07   HGB 12.4 12.8   HCT 37.4 39.2    219     CMP:   Recent Labs   Lab 04/19/24  1940 04/20/24  1004    136   K 3.8 4.5    107   CO2 21* 23   GLU 87 90   BUN 18 17   CREATININE 0.4* 0.5   CALCIUM 8.4* 8.7   PROT 7.2  --    ALBUMIN 4.1  --    BILITOT 0.3  --    ALKPHOS 52*  --    AST 12  --    ALT 15  --    ANIONGAP 10 6*       Significant Imaging: I have reviewed all  pertinent imaging results/findings within the past 24 hours.    Assessment/Plan:      * Seizures  She is fine now .  Got IV ativan for 3 mg total and then some IV Keppra     Maybe the UTI triggered it ?    I think she dry , feels warm and has some sinus tachycardia     And her calcium is low with normal albumin .      Any low  Na or K or mag or Ca could trigger seizure     Consult neurology.    PLAN    - resume keppra but for now I'm doing It IV   1000 mg IV Q12 start 9 am   - reg diet  - pepcid IV BID    - run fluids with KCL to hydrate her    NS KCL 20 meq at 125 cc hr x 12 hours   - change ABX to Cefepime which the prior cultures were sensitive to     - give 1 gram IV calcium gluconate in mini bag IVPB    - check new labs now with BMP and Mag and ionized calcium         - Valium IV 10 mg PRN Seizure Q5 min x 2 doses       Spina bifida of thoracolumbar region with hydrocephalus  Chronic stable patient with  shunt and paraplegia.      Hypocalcemia  Could be from Low Vit D     Im going to check Vit D and PTH this morning    Also Ionized CA       And I'm giving 1 gram IVPB   calcium gluconate  to help prevent more seizure     Run IVFs         UTI (urinary tract infection)  Change to IV Rocephin to IV cefepime     E coli in prior cultures was resistant to Rocephin        Sensitive to Cefepime---DC IV fluids      Only do ABX x 3 days     no reason to over do it and create more resistance       Neurogenic bladder    With cystotomy.  Requiring manual catheterization      VTE Risk Mitigation (From admission, onward)           Ordered     enoxaparin injection 40 mg  Daily        Note to Pharmacy: Ht:    Wt: 59 kg (130 lb)  Estimated Creatinine Clearance: 167.9 mL/min (A) (based on SCr of 0.4 mg/dL (L)).  Body mass index is 29.15 kg/m².    04/20/24 0634     IP VTE HIGH RISK PATIENT  Once         04/20/24 0628     Place sequential compression device  Until discontinued         04/20/24 0628                    Discharge  Planning   DELANEY:      Code Status: Full Code   Is the patient medically ready for discharge?:     Reason for patient still in hospital (select all that apply): Laboratory test and Treatment  Discharge Plan A: Home Health                  Elisa Hodges NP  Department of Hospital Medicine   [facility]

## 2024-04-20 NOTE — PLAN OF CARE
[facility]  Initial Discharge Assessment       Primary Care Provider: Dash Ponce MD    Admission Diagnosis: Seizures [R56.9]    Admission Date: 4/19/2024  Expected Discharge Date:     Transition of Care Barriers: None  Met with patient and her parents at bedside to complete assessment. No Living will, POA or advance directives, No HD or Coumadin. See DME. Patient is receing services with [facility] in [place]. Patient receives outpatient wound care with [facility]. Patient's family will bring her home when she is discharged. No needs were identified at this time.    Payor: BLUE CROSS BLUE SHIELD / Plan: [place] PPO / Product Type: PPO /     Extended Emergency Contact Information  Primary Emergency Contact: Kayleen Samuel  Address: [address] [place] [address] [place] [address] [facility]  Home Phone: [phone]  Mobile Phone: [phone]  Relation: Mother  Secondary Emergency Contact: Abdi Samuel  Address: [address]           [place] [address] [facility]  Home Phone: [phone]  Mobile Phone: [phone]  Relation: Father    Discharge Plan A: Home Health  Discharge Plan B: Home Health      [facility] [address]  [place]  [address]  [address] [facility]  [place] [address]  Phone: [phone] Fax: [phone]    [id number] [place] [address] [facility] [address]  [address] [address]  Phone: [phone] Fax: [phone]      Initial Assessment (most recent)       Adult Discharge Assessment - 04/20/24 1023          Discharge Assessment    Assessment Type Discharge Planning Assessment     Confirmed/corrected address, phone number and insurance Yes     Confirmed Demographics Correct on Facesheet     Source of Information patient     When was your last doctors appointment? --   one year ago    Communicated DELANEY with patient/caregiver Date  not available/Unable to determine     Reason For Admission seizures     People in Home parent(s)     Facility Arrived From: home     Do you expect to return to your current living situation? Yes     Do you have help at home or someone to help you manage your care at home? Yes     Who are your caregiver(s) and their phone number(s)? Kayleen Samuel/mother [phone] and Abdi Samuel/father [phone]     Prior to hospitilization cognitive status: Alert/Oriented;No Deficits     Current cognitive status: Alert/Oriented;No Deficits     Walking or Climbing Stairs Difficulty yes     Walking or Climbing Stairs ambulation difficulty, requires equipment;stair climbing difficulty, assistance 1 person     Mobility Management uses wheelchair and ramp     Dressing/Bathing Difficulty no     Do you have any problems with: Errands/Grocery     Home Accessibility wheelchair accessible     Home Layout Able to live on 1st floor     Equipment Currently Used at Home wheelchair;grab bar;raised toilet   ramp for stairs    Readmission within 30 days? No     Patient currently being followed by outpatient case management? No     Do you currently have service(s) that help you manage your care at home? Yes     Name and Contact number of agency [facility] in [place]     Is the pt/caregiver preference to resume services with current agency Yes     Do you take prescription medications? Yes     Coverage BCBS PPO, Medicaid     Do you have any problems affording any of your prescribed medications? TBD     Is the patient taking medications as prescribed? yes     Who is going to help you get home at discharge? Kayleen Samuel/mother [phone] and Abdi Samuel/father [phone]     How do you get to doctors appointments? family or friend will provide     Are you on dialysis? No     Do you take coumadin? No     Discharge Plan A Home Health     Discharge Plan B Home Health     DME Needed Upon Discharge  none     Discharge Plan  discussed with: Patient;Spouse/sig other     Name(s) and Number(s) Kayleen Samuel/mother [phone] and Abdi Samuel/father [phone]     Transition of Care Barriers None

## 2024-04-20 NOTE — PLAN OF CARE
04/20/24 1427   Final Note   Assessment Type Final Discharge Note   Anticipated Discharge Disposition Home-Health   What phone number can be called within the next 1-3 days to see how you are doing after discharge? [phone]   Post-Acute Status   Discharge Delays None known at this time

## 2024-04-20 NOTE — PLAN OF CARE
Patient cleared for discharge from case management standpoint. Patient will resume services with [facility]. PEYTON faxed records via [employer]. PEYTON spoke with Perez, the on call nurse. Perez stated that resumption of care date will be on Monday 4/22/2024.    Follow up appointments scheduled and added to AVS.    Chart and discharge orders reviewed.  Patient discharged home with with home health services. There are no further case management needs.                   04/20/24 1427   Final Note   Assessment Type Final Discharge Note   Anticipated Discharge Disposition Home-Health   What phone number can be called within the next 1-3 days to see how you are doing after discharge? [phone]   Post-Acute Status   Discharge Delays None known at this time

## 2024-04-22 ENCOUNTER — PATIENT MESSAGE (OUTPATIENT)
Dept: UROLOGY | Facility: CLINIC | Age: 29
End: 2024-04-22
Payer: COMMERCIAL

## 2024-04-22 DIAGNOSIS — N31.9 NEUROGENIC BLADDER: Primary | ICD-10-CM

## 2024-04-22 DIAGNOSIS — N30.90 BLADDER INFECTION: ICD-10-CM

## 2024-04-22 LAB — BACTERIA UR CULT: ABNORMAL

## 2024-04-22 RX ORDER — CEFDINIR 300 MG/1
300 CAPSULE ORAL 2 TIMES DAILY
Qty: 14 CAPSULE | Refills: 0 | Status: SHIPPED | OUTPATIENT
Start: 2024-04-22 | End: 2024-04-29

## 2024-04-22 NOTE — TELEPHONE ENCOUNTER
Patient had proteus mirabilis which was resistant to Bactrim which was prescribed for the patient.  Cefdinir was sent for 7 days.     Tucker advised and scheduled appt on 8-9-2021.

## 2024-04-23 ENCOUNTER — PATIENT OUTREACH (OUTPATIENT)
Dept: ADMINISTRATIVE | Facility: CLINIC | Age: 29
End: 2024-04-23
Payer: COMMERCIAL

## 2024-04-23 NOTE — PROGRESS NOTES
C3 nurse spoke with Shashank Samuel for a TCC post hospital discharge follow up call. Pt states she is still having headaches, but that they aren't as bad as prior to her hospital stay. She denies any new symptoms and wrote down the OOC number to call with any new or worsening symptoms.     The pt has yet to  her new antibiotic, cefdinir, called in by Dr. Charles yesterday, 4/22/24. She has also yet to  her new dose of Keppra (1250mg BID). She stated she is currently taking 1,500mg BID- a mix of her 1,000mg and 500mg tablets that she already had at home. The pt verbalized understanding to  her new prescriptions asap to begin taking the medications as her physicians prescribed.      The patient does not have a scheduled HOSFU appointment with her PCP, Dash Ponce MD within 5-7 days post hospital discharge date of 4/20/24. C3 nurse was unable to schedule HOSFU appointment in [facility] and the pt denied needing a NPHV.    Message sent to PCP staff requesting they contact patient and schedule follow up appointment.

## 2024-04-24 LAB — LEVETIRACETAM SERPL-MCNC: 75.3 UG/ML (ref 10–40)

## 2024-04-24 NOTE — DISCHARGE SUMMARY
[facility] Medicine  Discharge Summary      Patient Name: Shashank Samuel  MRN: [id number]  GIRMA: [id number]  Patient Class: OP- Observation  Admission Date: 4/19/2024  Hospital Length of Stay: 0 days  Discharge Date and Time: 4/20/2024  4:18 PM  Attending Physician: No att. providers found   Discharging Provider: Elisa Hodges NP  Primary Care Provider: Dash Ponce MD    Primary Care Team: Networked reference to record PCT     HPI:   28 WF with pmh of spina bifida with no use of lower body ,  seizure disorder on keppra, Reynauds , hydrocephalus with shunt,  presents from home having seizures.        Non smoker  Non drinker  Lives at home with parents     She has history of seizures but she says it can be a year or more without having one.       And this time she was in usual state of health and was not feeling sick in any way when it happened.       She had just sat down to eat dinner and had not even taken a bite yet she said and then the seizure happened and she only remembers waking up here in the ER.         When she was brought in here they gave her Ativan IV for total of 3 mg and she got IV keppra .   She was sleeping then most of time after that .   Her parents were with her then.      By the time I saw her later in ER , she was awake .  And was talking to me.   They said she was not using her arms at first but she says that happens after the seizures.  And when I saw her she was using her amrs.     She wanted to eat so I got her a sand which and drink and she did fine.        They did find that she had a UTI   And its very common she says   She self caths   She can't feel anything really so she doesn't know if she had urinary symptoms or not       Her CBC Was normal   Her CMP was normal   But her calcium was a little low with normal albumin level .     Mild metabolic acidosis     CT head was negative     They gave her phenergan IV and Rocephin IV for the nausea and  UTI           However prior UTIs grew E coli and it was resistant to Rocephin       We are admitting her for the breakthrough seizure event     * No surgery found *      Hospital Course:   Patient monitor closely during hospitalization.  Seizure precautions in place.  Neurology consulted.  She was maintained on Keppra a 1000 mg b.i.d. upon admission.  She was initiated on IV antibiotics for urinary tract infection as patient has neurogenic bladder and requires self-catheterizations with assistance from family.  Neurology recommends increase Keppra to 1250 mg b.i.d..  Patient is is medically stable for discharge on oral antibiotics and outpatient follow-up.    Micro positive for    Routine  Abnormal   PROTEUS MIRABILIS   And is resistant to Bactrim.    Called patient's mother to update on micro results and recommended different antibiotic due to resistance.  She states she followed up with her urologist 2 days ago and she was initiated on cefdinir which is sensitive to Proteus.     Goals of Care Treatment Preferences:  Code Status: Full Code      Consults:   Consults (From admission, onward)          Status Ordering Provider     Inpatient consult to Neurology  Once        Provider:  Dov Smart MD    Completed JOAO VALLE            No new Assessment & Plan notes have been filed under this hospital service since the last note was generated.  Service: Hospital Medicine    Final Active Diagnoses:    Diagnosis Date Noted POA    PRINCIPAL PROBLEM:  Seizures [R56.9] 04/20/2024 Yes    UTI (urinary tract infection) [N39.0] 04/20/2024 Yes    Hypocalcemia [E83.51] 04/20/2024 Yes    Spina bifida of thoracolumbar region with hydrocephalus [Q05.1] 04/20/2024 Yes    Neurogenic bladder [N31.9]  Yes      Problems Resolved During this Admission:       Discharged Condition: stable    Disposition: Home or Self Care    Follow Up:   Follow-up Information       Dash Ponce MD Follow up in 1 week(s).    Specialties:  Family Medicine, Home Health Services, Hospice Services  Contact information:  [address] [facility]  [address]  [facility] [address]  [phone]               Robert More MD .    Specialty: Neurology  Contact information:  [facility] [address] [facility] [address] [phone]               Robert More MD Follow up in 1 week(s).    Specialty: Neurology  Contact information:  [facility] - [facility] [address] [facility] [address] [phone]                           Patient Instructions:      Diet Adult Regular     Notify your health care provider if you experience any of the following:  increased confusion or weakness     Notify your health care provider if you experience any of the following:  temperature >100.4     SUBSEQUENT HOME HEALTH ORDERS     Order Specific Question Answer Comments   What Home Health Agency is the patient currently using? Other/External      Activity as tolerated       Significant Diagnostic Studies: N/A    Pending Diagnostic Studies:       None           Medications:  Reconciled Home Medications:      Medication List        CHANGE how you take these medications      levETIRAcetam 250 MG Tab  Commonly known as: KEPPRA  Take 5 tablets (1,250 mg total) by mouth 2 (two) times daily.  What changed:   medication strength  how much to take            CONTINUE taking these medications      EPINEPHrine 0.3 mg/0.3 mL Atin  Commonly known as: EPIPEN  Inject 0.3 mLs (0.3 mg total) into the muscle as needed (Anaphylactic reaction).     ergocalciferol 50,000 unit Cap  Commonly known as: ERGOCALCIFEROL  Take 50,000 Units by mouth every 7 days.     ibuprofen 200 MG tablet  Commonly known as: ADVIL,MOTRIN  Take 200 mg by mouth every 6 (six) hours as needed for Pain.     * INV nrl-1 10 mg Sprm nasal spray  Commonly known as: INTRANASAL DIAZEPAM  1 spray by Left Nostril route once. Spray in the nose as directed for 1 dose.     * VALTOCO 10  mg/spray (0.1 mL) Spry  Generic drug: diazePAM  10 mg by Nasal route every 5 (five) minutes as needed (Administer 1 spray into 1 nostril; if a second dose is needed, administer  in alternate nostril. A second dose should not be administered if the  patient is having trouble breathing or excessive sedatio).     riboflavin (vitamin B2) 400 mg Tab  Take 400 mg by mouth once daily.           * This list has 2 medication(s) that are the same as other medications prescribed for you. Read the directions carefully, and ask your doctor or other care provider to review them with you.                ASK your doctor about these medications      sulfamethoxazole-trimethoprim 800-160mg 800-160 mg Tab  Commonly known as: BACTRIM DS  Take 1 tablet by mouth 2 (two) times daily. for 3 days  Ask about: Should I take this medication?              Indwelling Lines/Drains at time of discharge:   Lines/Drains/Airways       Drain  Duration                  Colostomy Other (see comments) RLQ -- days                    Time spent on the discharge of patient: 50 minutes         Elisa Hodges NP  Department of Hospital Medicine  [facility]

## 2024-04-24 NOTE — HOSPITAL COURSE
Patient monitor closely during hospitalization.  Seizure precautions in place.  Neurology consulted.  She was maintained on Keppra a 1000 mg b.i.d. upon admission.  She was initiated on IV antibiotics for urinary tract infection as patient has neurogenic bladder and requires self-catheterizations with assistance from family.  Neurology recommends increase Keppra to 1250 mg b.i.d..  Patient is is medically stable for discharge on oral antibiotics and outpatient follow-up.    Micro positive for    Routine  Abnormal   PROTEUS MIRABILIS   And is resistant to Bactrim.    Called patient's mother to update on micro results and recommended different antibiotic due to resistance.  She states she followed up with her urologist 2 days ago and she was initiated on cefdinir which is sensitive to Proteus.

## 2024-04-25 LAB
BACTERIA BLD CULT: NORMAL
BACTERIA BLD CULT: NORMAL

## 2024-05-03 ENCOUNTER — PATIENT MESSAGE (OUTPATIENT)
Dept: NEUROLOGY | Facility: CLINIC | Age: 29
End: 2024-05-03
Payer: COMMERCIAL

## 2024-05-15 ENCOUNTER — TELEPHONE (OUTPATIENT)
Dept: NEUROLOGY | Facility: CLINIC | Age: 29
End: 2024-05-15
Payer: COMMERCIAL

## 2024-05-15 NOTE — TELEPHONE ENCOUNTER
----- Message from Dorothy Reese sent at 5/15/2024  3:06 PM CDT -----  .Type:  Patient Call Back    Who Called: PT MOM       Does the patient know what this is regarding?: MOM CALLED TO SPEAK WITH THE OFFICE ABOUT THE MEDICATION KEPPRA. PT PT NOT TAKING THE CORRECT DOSE. THE ER TOOK THE ORIGINAL REFILLS OUT PT HAS BEEN TAKING A LOWER DOSE 750 MG PLEASE REACH OUT TO MOM. PT WILL BE OUT OF MEDICATION     Would the patient rather a call back YES     Best Call Back Number: [phone]    Additional Information: Thank You

## 2024-05-16 ENCOUNTER — OFFICE VISIT (OUTPATIENT)
Dept: FAMILY MEDICINE | Facility: CLINIC | Age: 29
End: 2024-05-16
Payer: COMMERCIAL

## 2024-05-16 VITALS
HEART RATE: 81 BPM | SYSTOLIC BLOOD PRESSURE: 98 MMHG | HEIGHT: 55 IN | DIASTOLIC BLOOD PRESSURE: 60 MMHG | OXYGEN SATURATION: 97 % | BODY MASS INDEX: 33.87 KG/M2

## 2024-05-16 DIAGNOSIS — G40.309 NONINTRACTABLE GENERALIZED IDIOPATHIC EPILEPSY WITHOUT STATUS EPILEPTICUS: Primary | ICD-10-CM

## 2024-05-16 DIAGNOSIS — Z99.3 WHEELCHAIR DEPENDENCE: ICD-10-CM

## 2024-05-16 DIAGNOSIS — Z87.39 PERSONAL HISTORY OF OSTEOMYELITIS: ICD-10-CM

## 2024-05-16 DIAGNOSIS — G82.20 PARAPLEGIA: ICD-10-CM

## 2024-05-16 DIAGNOSIS — Q05.9 SPINA BIFIDA, UNSPECIFIED HYDROCEPHALUS PRESENCE, UNSPECIFIED SPINAL REGION: ICD-10-CM

## 2024-05-16 DIAGNOSIS — Z78.9 SELF-CATHETERIZES URINARY BLADDER: ICD-10-CM

## 2024-05-16 DIAGNOSIS — Z76.89 ESTABLISHING CARE WITH NEW DOCTOR, ENCOUNTER FOR: Primary | ICD-10-CM

## 2024-05-16 DIAGNOSIS — G40.309 NONINTRACTABLE GENERALIZED IDIOPATHIC EPILEPSY WITHOUT STATUS EPILEPTICUS: ICD-10-CM

## 2024-05-16 DIAGNOSIS — L84 CALLUS: ICD-10-CM

## 2024-05-16 DIAGNOSIS — Z13.220 SCREENING CHOLESTEROL LEVEL: ICD-10-CM

## 2024-05-16 DIAGNOSIS — L89.159 PRESSURE INJURY OF SKIN OF SACRAL REGION, UNSPECIFIED INJURY STAGE: ICD-10-CM

## 2024-05-16 DIAGNOSIS — D64.9 ANEMIA, UNSPECIFIED TYPE: ICD-10-CM

## 2024-05-16 PROCEDURE — 3078F DIAST BP <80 MM HG: CPT | Mod: CPTII,S$GLB,,

## 2024-05-16 PROCEDURE — 1160F RVW MEDS BY RX/DR IN RCRD: CPT | Mod: CPTII,S$GLB,,

## 2024-05-16 PROCEDURE — 3074F SYST BP LT 130 MM HG: CPT | Mod: CPTII,S$GLB,,

## 2024-05-16 PROCEDURE — 3008F BODY MASS INDEX DOCD: CPT | Mod: CPTII,S$GLB,,

## 2024-05-16 PROCEDURE — 1159F MED LIST DOCD IN RCRD: CPT | Mod: CPTII,S$GLB,,

## 2024-05-16 PROCEDURE — 99395 PREV VISIT EST AGE 18-39: CPT | Mod: S$GLB,,,

## 2024-05-16 RX ORDER — LEVETIRACETAM 1000 MG/1
1000 TABLET, FILM COATED ORAL 2 TIMES DAILY
COMMUNITY
Start: 2023-09-01 | End: 2024-05-16

## 2024-05-16 RX ORDER — LEVETIRACETAM 750 MG/1
1500 TABLET ORAL 2 TIMES DAILY
Qty: 120 TABLET | Refills: 11 | Status: SHIPPED | OUTPATIENT
Start: 2024-05-16 | End: 2025-05-16

## 2024-05-16 NOTE — TELEPHONE ENCOUNTER
Sent message to provider to contact patient. Provider sent refill and spoke with patient in a call.

## 2024-05-16 NOTE — PROGRESS NOTES
SUBJECTIVE:    Patient ID: Shashank Samuel is a 29 y.o. female.    Chief Complaint: Establish Care (New patient establishment)    29 year old female presents today to establish care. She is accompanied by her mother and her sister.   Past medical, surgical, social and pertinent family history, current medications and allergies reviewed.    Patient sees Dr Little for eye exams.  Is established with neurology. Waiting on call from them regarding her dose of Keppra because she was recently discharged from the hospital for a seizure and her medications were changed.    Patient is very well adapted to managing her sequelae from spina bifida, including wheelchair dependence and self catheterization.     Recently had labs, except lipid panel. Agrees to this to evaluate health.    Has only one acute concern today and that is the thickened, callused skin on the termination of her foot stump. She would like to see podiatry.  She also wants to report that she recently has been having some stiffness in her neck. She does not describe it as really painful, just like the muscles get tight and results in a spasm that pulls her head back slightly. Having no sign or symptoms of shunt issues. States is familiar with changes when she has issues with it. Will be alert for any other unusual symptoms. In the meantime, advised heat application when spasm occurs.    Lipid Panel Never done  TETANUS VACCINE due on 06/26/2018  Pap Smear due on 05/10/2026  Hepatitis C Screening Completed             Admission on 04/19/2024, Discharged on 04/20/2024   Component Date Value Ref Range Status    WBC 04/19/2024 7.59  3.90 - 12.70 K/uL Final    RBC 04/19/2024 4.26  4.00 - 5.40 M/uL Final    Hemoglobin 04/19/2024 12.4  12.0 - 16.0 g/dL Final    Hematocrit 04/19/2024 37.4  37.0 - 48.5 % Final    MCV 04/19/2024 88  82 - 98 fL Final    MCH 04/19/2024 29.1  27.0 - 31.0 pg Final    MCHC 04/19/2024 33.2  32.0 - 36.0 g/dL Final    RDW 04/19/2024  13.4  11.5 - 14.5 % Final    Platelets 04/19/2024 292  150 - 450 K/uL Final    MPV 04/19/2024 9.7  9.2 - 12.9 fL Final    Immature Granulocytes 04/19/2024 0.5  0.0 - 0.5 % Final    Gran # (ANC) 04/19/2024 4.8  1.8 - 7.7 K/uL Final    Immature Grans (Abs) 04/19/2024 0.04  0.00 - 0.04 K/uL Final    Lymph # 04/19/2024 1.9  1.0 - 4.8 K/uL Final    Mono # 04/19/2024 0.7  0.3 - 1.0 K/uL Final    Eos # 04/19/2024 0.1  0.0 - 0.5 K/uL Final    Baso # 04/19/2024 0.01  0.00 - 0.20 K/uL Final    nRBC 04/19/2024 0  0 /100 WBC Final    Gran % 04/19/2024 63.6  38.0 - 73.0 % Final    Lymph % 04/19/2024 25.4  18.0 - 48.0 % Final    Mono % 04/19/2024 9.6  4.0 - 15.0 % Final    Eosinophil % 04/19/2024 0.8  0.0 - 8.0 % Final    Basophil % 04/19/2024 0.1  0.0 - 1.9 % Final    Differential Method 04/19/2024 Automated   Final    Specimen UA 04/19/2024 Urine, Clean Catch   Final    Color, UA 04/19/2024 Orange (A)  Yellow, Straw, Christina Final    Appearance, UA 04/19/2024 Cloudy (A)  Clear Final    pH, UA 04/19/2024 8.0  5.0 - 8.0 Final    Specific Gravity, UA 04/19/2024 1.020  1.005 - 1.030 Final    Protein, UA 04/19/2024 3+ (A)  Negative Final    Glucose, UA 04/19/2024 Negative  Negative Final    Ketones, UA 04/19/2024 Negative  Negative Final    Bilirubin (UA) 04/19/2024 Negative  Negative Final    Occult Blood UA 04/19/2024 Negative  Negative Final    Nitrite, UA 04/19/2024 Positive (A)  Negative Final    Urobilinogen, UA 04/19/2024 Negative  Negative EU/dL Final    Leukocytes, UA 04/19/2024 3+ (A)  Negative Final    Levetiracetam Lvl 04/19/2024 75.3 (H)  10.0 - 40.0 ug/mL Final    Sodium 04/19/2024 136  136 - 145 mmol/L Final    Potassium 04/19/2024 3.8  3.5 - 5.1 mmol/L Final    Chloride 04/19/2024 105  95 - 110 mmol/L Final    CO2 04/19/2024 21 (L)  23 - 29 mmol/L Final    Glucose 04/19/2024 87  70 - 110 mg/dL Final    BUN 04/19/2024 18  6 - 20 mg/dL Final    Creatinine 04/19/2024 0.4 (L)  0.5 - 1.4 mg/dL Final    Calcium 04/19/2024  8.4 (L)  8.7 - 10.5 mg/dL Final    Total Protein 04/19/2024 7.2  6.0 - 8.4 g/dL Final    Albumin 04/19/2024 4.1  3.5 - 5.2 g/dL Final    Total Bilirubin 04/19/2024 0.3  0.1 - 1.0 mg/dL Final    Alkaline Phosphatase 04/19/2024 52 (L)  55 - 135 U/L Final    AST 04/19/2024 12  10 - 40 U/L Final    ALT 04/19/2024 15  10 - 44 U/L Final    eGFR 04/19/2024 >60.0  >60 mL/min/1.73 m^2 Final    Anion Gap 04/19/2024 10  8 - 16 mmol/L Final    Magnesium 04/19/2024 2.1  1.6 - 2.6 mg/dL Final    HCG Quant 04/19/2024 <0.60  See Text mIU/mL Final    RBC, UA 04/19/2024 13 (H)  0 - 4 /hpf Final    WBC, UA 04/19/2024 >100 (H)  0 - 5 /hpf Final    Bacteria 04/19/2024 None  None-Occ /hpf Final    Non-Squam Epith 04/19/2024 13 (A)  <1/hpf /hpf Final    Hyaline Casts, UA 04/19/2024 0  0-1/lpf /lpf Final    Microscopic Comment 04/19/2024 SEE COMMENT   Final    Urine Culture, Routine 04/19/2024  (A)   Final                    Value:PROTEUS MIRABILIS  > 100,000 cfu/ml      Lactate (Lactic Acid) 04/19/2024 1.0  0.5 - 1.9 mmol/L Final    Blood Culture, Routine 04/20/2024 No growth after 5 days.   Final    Blood Culture, Routine 04/20/2024 No growth after 5 days.   Final    Vit D, 25-Hydroxy 04/20/2024 39  30 - 96 ng/mL Final    Sodium 04/20/2024 136  136 - 145 mmol/L Final    Potassium 04/20/2024 4.5  3.5 - 5.1 mmol/L Final    Chloride 04/20/2024 107  95 - 110 mmol/L Final    CO2 04/20/2024 23  23 - 29 mmol/L Final    Glucose 04/20/2024 90  70 - 110 mg/dL Final    BUN 04/20/2024 17  6 - 20 mg/dL Final    Creatinine 04/20/2024 0.5  0.5 - 1.4 mg/dL Final    Calcium 04/20/2024 8.7  8.7 - 10.5 mg/dL Final    Anion Gap 04/20/2024 6 (L)  8 - 16 mmol/L Final    eGFR 04/20/2024 >60.0  >60 mL/min/1.73 m^2 Final    Magnesium 04/20/2024 2.0  1.6 - 2.6 mg/dL Final    WBC 04/20/2024 8.07  3.90 - 12.70 K/uL Final    RBC 04/20/2024 4.39  4.00 - 5.40 M/uL Final    Hemoglobin 04/20/2024 12.8  12.0 - 16.0 g/dL Final    Hematocrit 04/20/2024 39.2  37.0 - 48.5 %  Final    MCV 04/20/2024 89  82 - 98 fL Final    MCH 04/20/2024 29.2  27.0 - 31.0 pg Final    MCHC 04/20/2024 32.7  32.0 - 36.0 g/dL Final    RDW 04/20/2024 13.5  11.5 - 14.5 % Final    Platelets 04/20/2024 219  150 - 450 K/uL Final    MPV 04/20/2024 10.5  9.2 - 12.9 fL Final    Immature Granulocytes 04/20/2024 0.2  0.0 - 0.5 % Final    Gran # (ANC) 04/20/2024 5.0  1.8 - 7.7 K/uL Final    Immature Grans (Abs) 04/20/2024 0.02  0.00 - 0.04 K/uL Final    Lymph # 04/20/2024 2.2  1.0 - 4.8 K/uL Final    Mono # 04/20/2024 0.8  0.3 - 1.0 K/uL Final    Eos # 04/20/2024 0.1  0.0 - 0.5 K/uL Final    Baso # 04/20/2024 0.02  0.00 - 0.20 K/uL Final    nRBC 04/20/2024 0  0 /100 WBC Final    Gran % 04/20/2024 61.7  38.0 - 73.0 % Final    Lymph % 04/20/2024 26.6  18.0 - 48.0 % Final    Mono % 04/20/2024 10.4  4.0 - 15.0 % Final    Eosinophil % 04/20/2024 0.9  0.0 - 8.0 % Final    Basophil % 04/20/2024 0.2  0.0 - 1.9 % Final    Differential Method 04/20/2024 Automated   Final    Ionized Calcium 04/20/2024 1.16  1.06 - 1.42 mmol/L Final    PTH, Intact 04/20/2024 51.1  9.0 - 77.0 pg/mL Final    TSH 04/20/2024 1.956  0.340 - 5.600 uIU/mL Final    Free T4 04/20/2024 0.80  0.71 - 1.51 ng/dL Final   Admission on 01/31/2024, Discharged on 01/31/2024   Component Date Value Ref Range Status    Levetiracetam Lvl 01/31/2024 37.2  10.0 - 40.0 ug/mL Final    WBC 01/31/2024 9.04  3.90 - 12.70 K/uL Final    RBC 01/31/2024 4.36  4.00 - 5.40 M/uL Final    Hemoglobin 01/31/2024 12.5  12.0 - 16.0 g/dL Final    Hematocrit 01/31/2024 38.3  37.0 - 48.5 % Final    MCV 01/31/2024 88  82 - 98 fL Final    MCH 01/31/2024 28.7  27.0 - 31.0 pg Final    MCHC 01/31/2024 32.6  32.0 - 36.0 g/dL Final    RDW 01/31/2024 13.3  11.5 - 14.5 % Final    Platelets 01/31/2024 256  150 - 450 K/uL Final    MPV 01/31/2024 9.7  9.2 - 12.9 fL Final    Immature Granulocytes 01/31/2024 0.3  0.0 - 0.5 % Final    Gran # (ANC) 01/31/2024 7.2  1.8 - 7.7 K/uL Final    Immature Grans  (Abs) 01/31/2024 0.03  0.00 - 0.04 K/uL Final    Lymph # 01/31/2024 1.3  1.0 - 4.8 K/uL Final    Mono # 01/31/2024 0.5  0.3 - 1.0 K/uL Final    Eos # 01/31/2024 0.0  0.0 - 0.5 K/uL Final    Baso # 01/31/2024 0.02  0.00 - 0.20 K/uL Final    nRBC 01/31/2024 0  0 /100 WBC Final    Gran % 01/31/2024 79.9 (H)  38.0 - 73.0 % Final    Lymph % 01/31/2024 14.2 (L)  18.0 - 48.0 % Final    Mono % 01/31/2024 5.1  4.0 - 15.0 % Final    Eosinophil % 01/31/2024 0.3  0.0 - 8.0 % Final    Basophil % 01/31/2024 0.2  0.0 - 1.9 % Final    Differential Method 01/31/2024 Automated   Final    Sodium 01/31/2024 134 (L)  136 - 145 mmol/L Final    Potassium 01/31/2024 4.3  3.5 - 5.1 mmol/L Final    Chloride 01/31/2024 104  95 - 110 mmol/L Final    CO2 01/31/2024 23  23 - 29 mmol/L Final    Glucose 01/31/2024 129 (H)  70 - 110 mg/dL Final    BUN 01/31/2024 14  6 - 20 mg/dL Final    Creatinine 01/31/2024 0.4 (L)  0.5 - 1.4 mg/dL Final    Calcium 01/31/2024 8.7  8.7 - 10.5 mg/dL Final    Total Protein 01/31/2024 7.3  6.0 - 8.4 g/dL Final    Albumin 01/31/2024 4.2  3.5 - 5.2 g/dL Final    Total Bilirubin 01/31/2024 0.3  0.1 - 1.0 mg/dL Final    Alkaline Phosphatase 01/31/2024 56  55 - 135 U/L Final    AST 01/31/2024 14  10 - 40 U/L Final    ALT 01/31/2024 15  10 - 44 U/L Final    eGFR 01/31/2024 >60.0  >60 mL/min/1.73 m^2 Final    Anion Gap 01/31/2024 7 (L)  8 - 16 mmol/L Final    Prothrombin Time 01/31/2024 10.9  9.0 - 12.5 sec Final    INR 01/31/2024 1.0  0.8 - 1.2 Final    Magnesium 01/31/2024 1.8  1.6 - 2.6 mg/dL Final    Blood Culture, Routine 01/31/2024 No growth after 5 days.   Final    Blood Culture, Routine 01/31/2024 No growth after 5 days.   Final    Lactate (Lactic Acid) 01/31/2024 1.3  0.5 - 1.9 mmol/L Final    Specimen UA 01/31/2024 Urine, Clean Catch   Final    Color, UA 01/31/2024 Yellow  Yellow, Straw, Christina Final    Appearance, UA 01/31/2024 Hazy (A)  Clear Final    pH, UA 01/31/2024 8.0  5.0 - 8.0 Final    Specific Gravity, UA  01/31/2024 1.010  1.005 - 1.030 Final    Protein, UA 01/31/2024 Trace (A)  Negative Final    Glucose, UA 01/31/2024 Negative  Negative Final    Ketones, UA 01/31/2024 Negative  Negative Final    Bilirubin (UA) 01/31/2024 Negative  Negative Final    Occult Blood UA 01/31/2024 Trace (A)  Negative Final    Nitrite, UA 01/31/2024 Positive (A)  Negative Final    Urobilinogen, UA 01/31/2024 Negative  Negative EU/dL Final    Leukocytes, UA 01/31/2024 1+ (A)  Negative Final    RBC, UA 01/31/2024 1  0 - 4 /hpf Final    WBC, UA 01/31/2024 11 (H)  0 - 5 /hpf Final    Bacteria 01/31/2024 Rare  None-Occ /hpf Final    Squam Epithel, UA 01/31/2024 7  /hpf Final    Hyaline Casts, UA 01/31/2024 10 (A)  0-1/lpf /lpf Final    Microscopic Comment 01/31/2024 SEE COMMENT   Final   Admission on 11/27/2023, Discharged on 11/27/2023   Component Date Value Ref Range Status    WBC 11/27/2023 10.35  3.90 - 12.70 K/uL Final    RBC 11/27/2023 4.49  4.00 - 5.40 M/uL Final    Hemoglobin 11/27/2023 13.2  12.0 - 16.0 g/dL Final    Hematocrit 11/27/2023 39.3  37.0 - 48.5 % Final    MCV 11/27/2023 88  82 - 98 fL Final    MCH 11/27/2023 29.4  27.0 - 31.0 pg Final    MCHC 11/27/2023 33.6  32.0 - 36.0 g/dL Final    RDW 11/27/2023 13.4  11.5 - 14.5 % Final    Platelets 11/27/2023 315  150 - 450 K/uL Final    MPV 11/27/2023 9.7  9.2 - 12.9 fL Final    Immature Granulocytes 11/27/2023 0.4  0.0 - 0.5 % Final    Gran # (ANC) 11/27/2023 7.4  1.8 - 7.7 K/uL Final    Immature Grans (Abs) 11/27/2023 0.04  0.00 - 0.04 K/uL Final    Lymph # 11/27/2023 2.1  1.0 - 4.8 K/uL Final    Mono # 11/27/2023 0.8  0.3 - 1.0 K/uL Final    Eos # 11/27/2023 0.0  0.0 - 0.5 K/uL Final    Baso # 11/27/2023 0.02  0.00 - 0.20 K/uL Final    nRBC 11/27/2023 0  0 /100 WBC Final    Gran % 11/27/2023 71.6  38.0 - 73.0 % Final    Lymph % 11/27/2023 19.9  18.0 - 48.0 % Final    Mono % 11/27/2023 7.5  4.0 - 15.0 % Final    Eosinophil % 11/27/2023 0.4  0.0 - 8.0 % Final    Basophil %  11/27/2023 0.2  0.0 - 1.9 % Final    Differential Method 11/27/2023 Automated   Final    Sodium 11/27/2023 136  136 - 145 mmol/L Final    Potassium 11/27/2023 3.8  3.5 - 5.1 mmol/L Final    Chloride 11/27/2023 105  95 - 110 mmol/L Final    CO2 11/27/2023 22 (L)  23 - 29 mmol/L Final    Glucose 11/27/2023 83  70 - 110 mg/dL Final    BUN 11/27/2023 22 (H)  6 - 20 mg/dL Final    Creatinine 11/27/2023 0.4 (L)  0.5 - 1.4 mg/dL Final    Calcium 11/27/2023 9.1  8.7 - 10.5 mg/dL Final    Total Protein 11/27/2023 8.1  6.0 - 8.4 g/dL Final    Albumin 11/27/2023 4.5  3.5 - 5.2 g/dL Final    Total Bilirubin 11/27/2023 0.2  0.1 - 1.0 mg/dL Final    Alkaline Phosphatase 11/27/2023 52 (L)  55 - 135 U/L Final    AST 11/27/2023 13  10 - 40 U/L Final    ALT 11/27/2023 18  10 - 44 U/L Final    eGFR 11/27/2023 >60.0  >60 mL/min/1.73 m^2 Final    Anion Gap 11/27/2023 9  8 - 16 mmol/L Final    POC Preg Test, Ur 11/27/2023 Negative  Negative Final     Acceptable 11/27/2023 Yes   Final    Specimen UA 11/27/2023 Urine, Clean Catch   Final    Color, UA 11/27/2023 Yellow  Yellow, Straw, Christina Final    Appearance, UA 11/27/2023 Clear  Clear Final    pH, UA 11/27/2023 6.0  5.0 - 8.0 Final    Specific Gravity, UA 11/27/2023 1.020  1.005 - 1.030 Final    Protein, UA 11/27/2023 Trace (A)  Negative Final    Glucose, UA 11/27/2023 Negative  Negative Final    Ketones, UA 11/27/2023 Negative  Negative Final    Bilirubin (UA) 11/27/2023 Negative  Negative Final    Occult Blood UA 11/27/2023 Trace (A)  Negative Final    Nitrite, UA 11/27/2023 Negative  Negative Final    Urobilinogen, UA 11/27/2023 Negative  Negative EU/dL Final    Leukocytes, UA 11/27/2023 2+ (A)  Negative Final    RBC, UA 11/27/2023 1  0 - 4 /hpf Final    WBC, UA 11/27/2023 16 (H)  0 - 5 /hpf Final    Bacteria 11/27/2023 Rare  None-Occ /hpf Final    Squam Epithel, UA 11/27/2023 34  /hpf Final    Hyaline Casts, UA 11/27/2023 193 (A)  0-1/lpf /lpf Final    Microscopic  Comment 11/27/2023 SEE COMMENT   Final    Urine Culture, Routine 11/27/2023  (A)   Final                    Value:ESCHERICHIA COLI  >100,000 cfu/ml         Past Medical History:   Diagnosis Date    Anemia     Arnold-Chiari malformation, type II     Encounter for blood transfusion     Epilepsy     Hydrocephalus     Neurogenic bladder     self catheterizes every 3 hours    Raynaud disease     Seizures     only w/ shunt malfunction    Spinal bifida, closed     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     Social History     Socioeconomic History    Marital status: Single    Number of children: 0   Occupational History    Occupation: disabled   Tobacco Use    Smoking status: Never    Smokeless tobacco: Never   Substance and Sexual Activity    Alcohol use: Not Currently    Drug use: Never    Sexual activity: Never   Social History Narrative    Intact family. Wheelchair bound.  Self catheterizes herself     Social Determinants of Health     Financial Resource Strain: Medium Risk (3/6/2024)    Overall Financial Resource Strain (CARDIA)     Difficulty of Paying Living Expenses: Somewhat hard   Food Insecurity: Food Insecurity Present (3/6/2024)    Hunger Vital Sign     Worried About Running Out of Food in the Last Year: Sometimes true     Ran Out of Food in the Last Year: Never true   Transportation Needs: No Transportation Needs (3/6/2024)    PRAPARE - Transportation     Lack of Transportation (Medical): No     Lack of Transportation (Non-Medical): No   Physical Activity: Inactive (3/6/2024)    Exercise Vital Sign     Days of Exercise per Week: 0 days     Minutes of Exercise per Session: 0 min   Stress: No Stress Concern Present (3/6/2024)    [employer] [place] [facility] [employer] - Occupational Stress Questionnaire     Feeling of Stress : Only a little   Housing Stability: Low Risk  (3/6/2024)    Housing Stability Vital Sign     Unable to Pay for Housing in the Last Year: No     Number of Places Lived in the Last Year: 1     Unstable Housing in  the Last Year: No     Past Surgical History:   Procedure Laterality Date    AMPUTATION      right 4th and 5th toes; left 5th toe and portion of left great toe    BACK SURGERY      BLADDER SURGERY      BREAST SURGERY  2015    Reduction    COLON SURGERY      flush site for bowel movements from appendix    CYSTOSTOMY      DEBRIDEMENT OF FOOT Right 1/8/2021    Procedure: DEBRIDEMENT, FOOT;  Surgeon: Abdi Bedoya DPM;  Location: [facility];  Service: Podiatry;  Laterality: Right;    EYE SURGERY      x 5    FLUOROSCOPIC URODYNAMIC STUDY N/A 3/12/2019    Procedure: URODYNAMIC STUDY, FLUOROSCOPIC;  Surgeon: Kenzie Charles MD;  Location: [facility] 1ST FLR;  Service: Urology;  Laterality: N/A;  1 hour    FLUOROSCOPIC URODYNAMIC STUDY N/A 4/4/2019    Procedure: URODYNAMIC STUDY, FLUOROSCOPIC;  Surgeon: Kenzie Charles MD;  Location: [facility] 1ST FLR;  Service: Urology;  Laterality: N/A;  1 hour    FLUOROSCOPIC URODYNAMIC STUDY N/A 5/11/2022    Procedure: URODYNAMIC STUDY, FLUOROSCOPIC;  Surgeon: Kenzie Charles MD;  Location: [facility] 1ST FLR;  Service: Urology;  Laterality: N/A;  90min    FOOT AMPUTATION THROUGH METATARSAL Right 10/28/2019    Procedure: AMPUTATION, FOOT, TRANSMETATARSAL;  Surgeon: Abdi Bedoya DPM;  Location: [facility];  Service: Podiatry;  Laterality: Right;    FOOT AMPUTATION THROUGH METATARSAL Right 3/27/2020    Procedure: AMPUTATION, FOOT, TRANSMETATARSAL;  Surgeon: Abdi Bedoya DPM;  Location: [facility];  Service: Podiatry;  Laterality: Right;  REVISION    MYELOMININGOCELE REPAIR      NEPHRECTOMY Right 11/4/2021    Procedure: Nephrectomy/ OPEN;  Surgeon: Dash Rosenthal MD;  Location: [facility] 2ND FLR;  Service: Urology;  Laterality: Right;  4HRS    NEPHROSTOMY Right 8/2/2021    Procedure: Nephrostomy and perinephric abscess drain;  Surgeon: Lillian Surgeon;  Location: [facility];  Service: Anesthesiology;  Laterality: Right;    REVISION OF VENTRICULOPERITONEAL SHUNT Left 9/21/2022     "Procedure: REVISION, SHUNT, VENTRICULOPERITONEAL;  Surgeon: Maynor Calero MD;  Location: [facility] OR [address] [place] [address];  Service: Neurosurgery;  Laterality: Left;    STRABISMUS SURGERY      ou dr peña    VENTRICULOPERITONEAL SHUNT      WOUND DEBRIDEMENT  3/27/2020    Procedure: DEBRIDEMENT, WOUND;  Surgeon: Abdi Bedoya DPM;  Location: [facility] OR;  Service: Podiatry;;     Family History   Problem Relation Name Age of Onset    Arthritis Mother      Breast cancer Mother      Gout Father      Epilepsy Father      Myocarditis Sister      Cataracts Maternal Grandmother      Amblyopia Neg Hx      Blindness Neg Hx      Cancer Neg Hx      Diabetes Neg Hx      Glaucoma Neg Hx      Hypertension Neg Hx      Macular degeneration Neg Hx      Retinal detachment Neg Hx      Strabismus Neg Hx      Stroke Neg Hx      Thyroid disease Neg Hx         Review of patient's allergies indicates:   Allergen Reactions    Latex, natural rubber Anaphylaxis and Other (See Comments)     angioedema    Zofran [ondansetron hcl (pf)] Swelling     Hives, "throat closes up"    Gardasil  [h papillomavirus vac,qval (pf)]      Other reaction(s): Shortness of breath    Menactra  [mening vac a,c,y,w135 dip (pf)]      Other reaction(s): Shortness of breath    Nitrofurantoin      Other reaction(s): Difficulty breathing    Sulfa (sulfonamide antibiotics)     Tramadol Hives    Levaquin [levofloxacin] Rash     Spots on face    Macrobid [nitrofurantoin monohyd/m-cryst] Rash     Sppots/rash on face       Current Outpatient Medications:     diazePAM (VALTOCO) 10 mg/spray (0.1 mL) Spry, 10 mg by Nasal route every 5 (five) minutes as needed (Administer 1 spray into 1 nostril; if a second dose is needed, administer  in alternate nostril. A second dose should not be administered if the  patient is having trouble breathing or excessive sedatio)., Disp: 1 each, Rfl: 3    EPINEPHrine (EPIPEN) 0.3 mg/0.3 mL AtIn, Inject 0.3 mLs (0.3 mg total) into the muscle as needed " "(Anaphylactic reaction)., Disp: 0.3 mL, Rfl: 0    ergocalciferol (ERGOCALCIFEROL) 50,000 unit Cap, Take 50,000 Units by mouth every 7 days., Disp: , Rfl:     ibuprofen (ADVIL,MOTRIN) 200 MG tablet, Take 200 mg by mouth every 6 (six) hours as needed for Pain., Disp: , Rfl:     INV nrl-1 (INTRANASAL DIAZEPAM) 10 mg sprm nasal spray, 1 spray by Left Nostril route once. Spray in the nose as directed for 1 dose., Disp: , Rfl:     levETIRAcetam (KEPPRA) 750 MG Tab, Take 2 tablets (1,500 mg total) by mouth 2 (two) times daily., Disp: 120 tablet, Rfl: 11    riboflavin, vitamin B2, 400 mg Tab, Take 400 mg by mouth once daily. (Patient not taking: Reported on 4/23/2024), Disp: 30 tablet, Rfl: 6    Review of Systems   Constitutional:  Positive for activity change and unexpected weight change.   HENT:  Negative for hearing loss, rhinorrhea and trouble swallowing.    Eyes:  Negative for discharge and visual disturbance.   Respiratory:  Negative for chest tightness and wheezing.    Cardiovascular:  Negative for chest pain and palpitations.   Gastrointestinal:  Negative for blood in stool, constipation, diarrhea and vomiting.   Endocrine: Negative for polydipsia and polyuria.   Genitourinary:  Negative for difficulty urinating, dysuria, hematuria and menstrual problem.   Musculoskeletal:  Positive for neck pain. Negative for arthralgias and joint swelling.   Neurological:  Positive for headaches. Negative for weakness.   Psychiatric/Behavioral:  Negative for confusion and dysphoric mood.            Objective:      Vitals:    05/16/24 1049   BP: 98/60   Pulse: 81   SpO2: 97%   Height: 4' 7" (1.397 m)     Physical Exam  Constitutional:       Appearance: She is well-groomed.      Comments: Pleasant and cooperative female, seated in her personal wheelchair, which is both manual and electric. Well groomed.    HENT:      Head: Normocephalic and atraumatic.      Right Ear: External ear normal.      Left Ear: External ear normal.      " Nose: Nose normal.      Mouth/Throat:      Pharynx: Oropharynx is clear.   Eyes:      General: No scleral icterus.     Pupils: Pupils are equal, round, and reactive to light.   Cardiovascular:      Rate and Rhythm: Normal rate and regular rhythm.      Heart sounds: Normal heart sounds.   Pulmonary:      Effort: Pulmonary effort is normal.      Breath sounds: Normal breath sounds.   Abdominal:      Palpations: Abdomen is soft.      Tenderness: There is no abdominal tenderness.   Musculoskeletal:         General: Deformity present. No swelling, tenderness or signs of injury.      Cervical back: Normal range of motion. No rigidity or tenderness.      Right lower leg: No edema.      Left lower leg: No edema.        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 Has had previous amputations of toes and distal tarsals of both feel from previous osteomyelitis   Feet:      Comments: Left foot previous amputation with some metatarsal bones remaining , with callus to great toe stump  Bilateral erythema, non cellulitic appearing  Lymphadenopathy:      Cervical: No cervical adenopathy.   Skin:     General: Skin is warm and dry.      Capillary Refill: Capillary refill takes less than 2 seconds.      Coloration: Skin is not jaundiced or pale.   Neurological:      Mental Status: She is alert.      Cranial Nerves: No dysarthria or facial asymmetry.      Motor: Atrophy and abnormal muscle tone present. No tremor or seizure activity.      Comments: Non ambulatory. Spina bifida of thoracic spine.    Psychiatric:         Behavior: Behavior is cooperative.           Assessment:       1. Establishing care with new doctor, encounter for    2. Spina bifida, unspecified hydrocephalus presence, unspecified spinal region    3. Paraplegia, T7 Complete    4. Wheelchair dependence    5. Pressure injury of skin of sacral region, unspecified injury stage     6. Self-catheterizes urinary bladder    7. Callus    8. Personal history of osteomyelitis    9. Nonintractable generalized idiopathic epilepsy without status epilepticus    10. Screening cholesterol level    11. Anemia, unspecified type         Plan:       Establishing care with new doctor, encounter for  Comments:  wants to establish care with Dr. Ponce    Spina bifida, unspecified hydrocephalus presence, unspecified spinal region  Comments:  noted condition with several related diagnoses    Paraplegia, T7 Complete  Comments:  well adapted to condition  Orders:  -     Ambulatory referral/consult to Podiatry; Future; Expected date: 05/23/2024    Wheelchair dependence    Pressure injury of skin of sacral region, unspecified injury stage  Comments:  is being currently treated by wound care and mother states almost healed    Self-catheterizes urinary bladder  Comments:  related to spina bifida with associated paraplegia and neurogenic bladder    Callus  Comments:  has callused skin at region of stump where previous foot amputation was performed. wants to see podiatry  Orders:  -     Ambulatory referral/consult to Podiatry; Future; Expected date: 05/23/2024    Personal history of osteomyelitis  -     Ambulatory referral/consult to Podiatry; Future; Expected date: 05/23/2024    Nonintractable generalized idiopathic epilepsy without status epilepticus  Comments:  is established with neurology and plans to continue care    Screening cholesterol level  Comments:  labs for evaluation  Orders:  -     Lipid Panel; Future; Expected date: 05/16/2024    Anemia, unspecified type  Comments:  labs for evaluation      Follow up if symptoms worsen or fail to improve, for ANNUAL.        6/2/2024 Elisa Eng

## 2024-06-02 PROBLEM — L84 CALLUS: Status: ACTIVE | Noted: 2024-06-02

## 2024-06-14 ENCOUNTER — TELEPHONE (OUTPATIENT)
Dept: FAMILY MEDICINE | Facility: CLINIC | Age: 29
End: 2024-06-14
Payer: COMMERCIAL

## 2024-06-14 LAB
CHOLEST SERPL-MCNC: 139 MG/DL
CHOLEST/HDLC SERPL: 3.2 (CALC)
HDLC SERPL-MCNC: 44 MG/DL
LDLC SERPL CALC-MCNC: 79 MG/DL (CALC)
NONHDLC SERPL-MCNC: 95 MG/DL (CALC)
TRIGL SERPL-MCNC: 79 MG/DL

## 2024-06-14 NOTE — TELEPHONE ENCOUNTER
Spoke with pt's mother, Kayleen, verbatim per Elisa. She voiced understanding. She also wanted to let Elisa know that she also did blood work for her wound care doctor and it all came back negative for infection.

## 2024-06-14 NOTE — TELEPHONE ENCOUNTER
----- Message from MICHAEL Oscar sent at 6/14/2024  8:19 AM CDT -----  Let her know cholesterol is good.

## 2024-07-09 ENCOUNTER — HOSPITAL ENCOUNTER (INPATIENT)
Facility: HOSPITAL | Age: 29
LOS: 1 days | Discharge: HOME HEALTH | DRG: 871 | End: 2024-07-11
Attending: EMERGENCY MEDICINE | Admitting: INTERNAL MEDICINE
Payer: COMMERCIAL

## 2024-07-09 DIAGNOSIS — R07.9 CHEST PAIN: ICD-10-CM

## 2024-07-09 DIAGNOSIS — R56.9 SEIZURES: ICD-10-CM

## 2024-07-09 LAB
LDH SERPL L TO P-CCNC: 3.34 MMOL/L (ref 0.5–2.2)
SAMPLE: ABNORMAL

## 2024-07-09 PROCEDURE — 99900031 HC PATIENT EDUCATION (STAT)

## 2024-07-09 PROCEDURE — 96361 HYDRATE IV INFUSION ADD-ON: CPT

## 2024-07-09 PROCEDURE — 94761 N-INVAS EAR/PLS OXIMETRY MLT: CPT

## 2024-07-09 PROCEDURE — 99285 EMERGENCY DEPT VISIT HI MDM: CPT | Mod: 25

## 2024-07-09 PROCEDURE — 85025 COMPLETE CBC W/AUTO DIFF WBC: CPT | Performed by: EMERGENCY MEDICINE

## 2024-07-09 PROCEDURE — 87040 BLOOD CULTURE FOR BACTERIA: CPT | Mod: 59 | Performed by: EMERGENCY MEDICINE

## 2024-07-09 PROCEDURE — 84702 CHORIONIC GONADOTROPIN TEST: CPT | Performed by: EMERGENCY MEDICINE

## 2024-07-09 PROCEDURE — 84100 ASSAY OF PHOSPHORUS: CPT | Performed by: EMERGENCY MEDICINE

## 2024-07-09 PROCEDURE — 83735 ASSAY OF MAGNESIUM: CPT | Performed by: EMERGENCY MEDICINE

## 2024-07-09 PROCEDURE — 80053 COMPREHEN METABOLIC PANEL: CPT | Performed by: EMERGENCY MEDICINE

## 2024-07-09 PROCEDURE — 27000221 HC OXYGEN, UP TO 24 HOURS

## 2024-07-10 PROBLEM — J69.0 ASPIRATION PNEUMONIA: Status: ACTIVE | Noted: 2024-07-10

## 2024-07-10 LAB
ALBUMIN SERPL BCP-MCNC: 4.1 G/DL (ref 3.5–5.2)
ALBUMIN SERPL BCP-MCNC: 4.8 G/DL (ref 3.5–5.2)
ALP SERPL-CCNC: 55 U/L (ref 55–135)
ALP SERPL-CCNC: 67 U/L (ref 55–135)
ALT SERPL W/O P-5'-P-CCNC: 16 U/L (ref 10–44)
ALT SERPL W/O P-5'-P-CCNC: 19 U/L (ref 10–44)
ANION GAP SERPL CALC-SCNC: 10 MMOL/L (ref 8–16)
ANION GAP SERPL CALC-SCNC: 13 MMOL/L (ref 8–16)
APTT PPP: 25.9 SEC (ref 21–32)
AST SERPL-CCNC: 17 U/L (ref 10–40)
AST SERPL-CCNC: 17 U/L (ref 10–40)
BACTERIA #/AREA URNS HPF: ABNORMAL /HPF
BASOPHILS # BLD AUTO: 0.02 K/UL (ref 0–0.2)
BASOPHILS # BLD AUTO: 0.03 K/UL (ref 0–0.2)
BASOPHILS NFR BLD: 0.1 % (ref 0–1.9)
BASOPHILS NFR BLD: 0.3 % (ref 0–1.9)
BILIRUB SERPL-MCNC: 0.2 MG/DL (ref 0.1–1)
BILIRUB SERPL-MCNC: 0.3 MG/DL (ref 0.1–1)
BILIRUB UR QL STRIP: NEGATIVE
BUN SERPL-MCNC: 14 MG/DL (ref 6–20)
BUN SERPL-MCNC: 16 MG/DL (ref 6–20)
CALCIUM SERPL-MCNC: 7.7 MG/DL (ref 8.7–10.5)
CALCIUM SERPL-MCNC: 8.7 MG/DL (ref 8.7–10.5)
CHLORIDE SERPL-SCNC: 103 MMOL/L (ref 95–110)
CHLORIDE SERPL-SCNC: 108 MMOL/L (ref 95–110)
CLARITY UR: ABNORMAL
CO2 SERPL-SCNC: 20 MMOL/L (ref 23–29)
CO2 SERPL-SCNC: 20 MMOL/L (ref 23–29)
COLOR UR: YELLOW
CREAT SERPL-MCNC: 0.5 MG/DL (ref 0.5–1.4)
CREAT SERPL-MCNC: 0.6 MG/DL (ref 0.5–1.4)
D DIMER PPP IA.FEU-MCNC: 1.23 MG/L FEU (ref 0–0.49)
DIFFERENTIAL METHOD BLD: ABNORMAL
DIFFERENTIAL METHOD BLD: ABNORMAL
EOSINOPHIL # BLD AUTO: 0 K/UL (ref 0–0.5)
EOSINOPHIL # BLD AUTO: 0.1 K/UL (ref 0–0.5)
EOSINOPHIL NFR BLD: 0 % (ref 0–8)
EOSINOPHIL NFR BLD: 0.9 % (ref 0–8)
ERYTHROCYTE [DISTWIDTH] IN BLOOD BY AUTOMATED COUNT: 13.2 % (ref 11.5–14.5)
ERYTHROCYTE [DISTWIDTH] IN BLOOD BY AUTOMATED COUNT: 13.2 % (ref 11.5–14.5)
EST. GFR  (NO RACE VARIABLE): >60 ML/MIN/1.73 M^2
EST. GFR  (NO RACE VARIABLE): >60 ML/MIN/1.73 M^2
GLUCOSE SERPL-MCNC: 137 MG/DL (ref 70–110)
GLUCOSE SERPL-MCNC: 152 MG/DL (ref 70–110)
GLUCOSE UR QL STRIP: NEGATIVE
HCG INTACT+B SERPL-ACNC: <0.6 MIU/ML
HCT VFR BLD AUTO: 40.9 % (ref 37–48.5)
HCT VFR BLD AUTO: 46.1 % (ref 37–48.5)
HGB BLD-MCNC: 13.1 G/DL (ref 12–16)
HGB BLD-MCNC: 14.8 G/DL (ref 12–16)
HGB UR QL STRIP: ABNORMAL
IMM GRANULOCYTES # BLD AUTO: 0.05 K/UL (ref 0–0.04)
IMM GRANULOCYTES # BLD AUTO: 0.11 K/UL (ref 0–0.04)
IMM GRANULOCYTES NFR BLD AUTO: 0.4 % (ref 0–0.5)
IMM GRANULOCYTES NFR BLD AUTO: 0.5 % (ref 0–0.5)
INR PPP: 1 (ref 0.8–1.2)
KETONES UR QL STRIP: NEGATIVE
LACTATE SERPL-SCNC: 2.1 MMOL/L (ref 0.5–1.9)
LACTATE SERPL-SCNC: 2.2 MMOL/L (ref 0.5–1.9)
LACTATE SERPL-SCNC: 4.1 MMOL/L (ref 0.5–1.9)
LEUKOCYTE ESTERASE UR QL STRIP: NEGATIVE
LYMPHOCYTES # BLD AUTO: 0.9 K/UL (ref 1–4.8)
LYMPHOCYTES # BLD AUTO: 4.5 K/UL (ref 1–4.8)
LYMPHOCYTES NFR BLD: 38.8 % (ref 18–48)
LYMPHOCYTES NFR BLD: 4.4 % (ref 18–48)
MAGNESIUM SERPL-MCNC: 1.8 MG/DL (ref 1.6–2.6)
MAGNESIUM SERPL-MCNC: 2 MG/DL (ref 1.6–2.6)
MCH RBC QN AUTO: 28.7 PG (ref 27–31)
MCH RBC QN AUTO: 29 PG (ref 27–31)
MCHC RBC AUTO-ENTMCNC: 32 G/DL (ref 32–36)
MCHC RBC AUTO-ENTMCNC: 32.1 G/DL (ref 32–36)
MCV RBC AUTO: 90 FL (ref 82–98)
MCV RBC AUTO: 90 FL (ref 82–98)
MICROSCOPIC COMMENT: ABNORMAL
MONOCYTES # BLD AUTO: 0.8 K/UL (ref 0.3–1)
MONOCYTES # BLD AUTO: 0.8 K/UL (ref 0.3–1)
MONOCYTES NFR BLD: 3.7 % (ref 4–15)
MONOCYTES NFR BLD: 6.6 % (ref 4–15)
NEUTROPHILS # BLD AUTO: 19.1 K/UL (ref 1.8–7.7)
NEUTROPHILS # BLD AUTO: 6.2 K/UL (ref 1.8–7.7)
NEUTROPHILS NFR BLD: 53 % (ref 38–73)
NEUTROPHILS NFR BLD: 91.3 % (ref 38–73)
NITRITE UR QL STRIP: POSITIVE
NON-SQ EPI CELLS #/AREA URNS HPF: 2 /HPF
NRBC BLD-RTO: 0 /100 WBC
NRBC BLD-RTO: 0 /100 WBC
PH UR STRIP: 7 [PH] (ref 5–8)
PHOSPHATE SERPL-MCNC: 3.4 MG/DL (ref 2.7–4.5)
PLATELET # BLD AUTO: 276 K/UL (ref 150–450)
PLATELET # BLD AUTO: 393 K/UL (ref 150–450)
PMV BLD AUTO: 10 FL (ref 9.2–12.9)
PMV BLD AUTO: 10.5 FL (ref 9.2–12.9)
POTASSIUM SERPL-SCNC: 3.3 MMOL/L (ref 3.5–5.1)
POTASSIUM SERPL-SCNC: 4 MMOL/L (ref 3.5–5.1)
PROT SERPL-MCNC: 7.1 G/DL (ref 6–8.4)
PROT SERPL-MCNC: 8.3 G/DL (ref 6–8.4)
PROT UR QL STRIP: ABNORMAL
PROTHROMBIN TIME: 11.2 SEC (ref 9–12.5)
RBC # BLD AUTO: 4.57 M/UL (ref 4–5.4)
RBC # BLD AUTO: 5.1 M/UL (ref 4–5.4)
RBC #/AREA URNS HPF: 7 /HPF (ref 0–4)
SODIUM SERPL-SCNC: 136 MMOL/L (ref 136–145)
SODIUM SERPL-SCNC: 138 MMOL/L (ref 136–145)
SP GR UR STRIP: 1.01 (ref 1–1.03)
SQUAMOUS #/AREA URNS HPF: 1 /HPF
URN SPEC COLLECT METH UR: ABNORMAL
UROBILINOGEN UR STRIP-ACNC: NEGATIVE EU/DL
WBC # BLD AUTO: 11.63 K/UL (ref 3.9–12.7)
WBC # BLD AUTO: 20.93 K/UL (ref 3.9–12.7)
WBC #/AREA URNS HPF: 68 /HPF (ref 0–5)

## 2024-07-10 PROCEDURE — 25000003 PHARM REV CODE 250: Performed by: INTERNAL MEDICINE

## 2024-07-10 PROCEDURE — 81001 URINALYSIS AUTO W/SCOPE: CPT | Performed by: EMERGENCY MEDICINE

## 2024-07-10 PROCEDURE — 25000003 PHARM REV CODE 250: Performed by: NURSE PRACTITIONER

## 2024-07-10 PROCEDURE — 94640 AIRWAY INHALATION TREATMENT: CPT

## 2024-07-10 PROCEDURE — 94799 UNLISTED PULMONARY SVC/PX: CPT

## 2024-07-10 PROCEDURE — 94761 N-INVAS EAR/PLS OXIMETRY MLT: CPT

## 2024-07-10 PROCEDURE — 63600175 PHARM REV CODE 636 W HCPCS: Performed by: NURSE PRACTITIONER

## 2024-07-10 PROCEDURE — 21400001 HC TELEMETRY ROOM

## 2024-07-10 PROCEDURE — 36415 COLL VENOUS BLD VENIPUNCTURE: CPT | Performed by: INTERNAL MEDICINE

## 2024-07-10 PROCEDURE — 96365 THER/PROPH/DIAG IV INF INIT: CPT

## 2024-07-10 PROCEDURE — 83605 ASSAY OF LACTIC ACID: CPT | Mod: 91 | Performed by: NURSE PRACTITIONER

## 2024-07-10 PROCEDURE — 83605 ASSAY OF LACTIC ACID: CPT | Performed by: EMERGENCY MEDICINE

## 2024-07-10 PROCEDURE — 63600175 PHARM REV CODE 636 W HCPCS: Performed by: INTERNAL MEDICINE

## 2024-07-10 PROCEDURE — 80053 COMPREHEN METABOLIC PANEL: CPT | Performed by: INTERNAL MEDICINE

## 2024-07-10 PROCEDURE — 83605 ASSAY OF LACTIC ACID: CPT | Mod: 91 | Performed by: INTERNAL MEDICINE

## 2024-07-10 PROCEDURE — C9254 INJECTION, LACOSAMIDE: HCPCS | Performed by: INTERNAL MEDICINE

## 2024-07-10 PROCEDURE — 85025 COMPLETE CBC W/AUTO DIFF WBC: CPT | Performed by: INTERNAL MEDICINE

## 2024-07-10 PROCEDURE — 25000242 PHARM REV CODE 250 ALT 637 W/ HCPCS: Performed by: NURSE PRACTITIONER

## 2024-07-10 PROCEDURE — 85610 PROTHROMBIN TIME: CPT | Performed by: INTERNAL MEDICINE

## 2024-07-10 PROCEDURE — 85379 FIBRIN DEGRADATION QUANT: CPT | Performed by: INTERNAL MEDICINE

## 2024-07-10 PROCEDURE — 36415 COLL VENOUS BLD VENIPUNCTURE: CPT | Performed by: NURSE PRACTITIONER

## 2024-07-10 PROCEDURE — 63600175 PHARM REV CODE 636 W HCPCS: Performed by: EMERGENCY MEDICINE

## 2024-07-10 PROCEDURE — 85730 THROMBOPLASTIN TIME PARTIAL: CPT | Performed by: INTERNAL MEDICINE

## 2024-07-10 PROCEDURE — 96366 THER/PROPH/DIAG IV INF ADDON: CPT

## 2024-07-10 PROCEDURE — 99900031 HC PATIENT EDUCATION (STAT)

## 2024-07-10 PROCEDURE — 83735 ASSAY OF MAGNESIUM: CPT | Performed by: INTERNAL MEDICINE

## 2024-07-10 PROCEDURE — 99900035 HC TECH TIME PER 15 MIN (STAT)

## 2024-07-10 PROCEDURE — 96375 TX/PRO/DX INJ NEW DRUG ADDON: CPT

## 2024-07-10 PROCEDURE — 25000003 PHARM REV CODE 250: Performed by: EMERGENCY MEDICINE

## 2024-07-10 PROCEDURE — 96367 TX/PROPH/DG ADDL SEQ IV INF: CPT

## 2024-07-10 PROCEDURE — 87086 URINE CULTURE/COLONY COUNT: CPT | Performed by: EMERGENCY MEDICINE

## 2024-07-10 PROCEDURE — 87186 SC STD MICRODIL/AGAR DIL: CPT | Performed by: EMERGENCY MEDICINE

## 2024-07-10 RX ORDER — LORAZEPAM 2 MG/ML
1 INJECTION INTRAMUSCULAR EVERY 10 MIN PRN
Status: DISCONTINUED | OUTPATIENT
Start: 2024-07-10 | End: 2024-07-11 | Stop reason: HOSPADM

## 2024-07-10 RX ORDER — LACOSAMIDE 50 MG/1
50 TABLET ORAL EVERY 12 HOURS
Status: DISCONTINUED | OUTPATIENT
Start: 2024-07-10 | End: 2024-07-11 | Stop reason: HOSPADM

## 2024-07-10 RX ORDER — LEVETIRACETAM 15 MG/ML
1500 INJECTION INTRAVASCULAR EVERY 12 HOURS
Status: DISCONTINUED | OUTPATIENT
Start: 2024-07-10 | End: 2024-07-11 | Stop reason: HOSPADM

## 2024-07-10 RX ORDER — LEVETIRACETAM 500 MG/5ML
1500 INJECTION, SOLUTION, CONCENTRATE INTRAVENOUS EVERY 12 HOURS
Status: DISCONTINUED | OUTPATIENT
Start: 2024-07-10 | End: 2024-07-10

## 2024-07-10 RX ORDER — GLUCAGON 1 MG
1 KIT INJECTION
Status: DISCONTINUED | OUTPATIENT
Start: 2024-07-10 | End: 2024-07-11 | Stop reason: HOSPADM

## 2024-07-10 RX ORDER — DIPHENHYDRAMINE HYDROCHLORIDE 50 MG/ML
25 INJECTION INTRAMUSCULAR; INTRAVENOUS EVERY 6 HOURS PRN
Status: DISCONTINUED | OUTPATIENT
Start: 2024-07-10 | End: 2024-07-11 | Stop reason: HOSPADM

## 2024-07-10 RX ORDER — ACETAMINOPHEN 325 MG/1
650 TABLET ORAL EVERY 8 HOURS PRN
Status: DISCONTINUED | OUTPATIENT
Start: 2024-07-10 | End: 2024-07-11 | Stop reason: HOSPADM

## 2024-07-10 RX ORDER — IBUPROFEN 200 MG
16 TABLET ORAL
Status: DISCONTINUED | OUTPATIENT
Start: 2024-07-10 | End: 2024-07-11 | Stop reason: HOSPADM

## 2024-07-10 RX ORDER — AMOXICILLIN AND CLAVULANATE POTASSIUM 875; 125 MG/1; MG/1
1 TABLET, FILM COATED ORAL 2 TIMES DAILY
Qty: 20 TABLET | Refills: 0 | Status: SHIPPED | OUTPATIENT
Start: 2024-07-10 | End: 2024-07-23

## 2024-07-10 RX ORDER — GUAIFENESIN 600 MG/1
600 TABLET, EXTENDED RELEASE ORAL 2 TIMES DAILY
Status: DISCONTINUED | OUTPATIENT
Start: 2024-07-10 | End: 2024-07-11 | Stop reason: HOSPADM

## 2024-07-10 RX ORDER — HEPARIN SODIUM 5000 [USP'U]/ML
5000 INJECTION, SOLUTION INTRAVENOUS; SUBCUTANEOUS EVERY 8 HOURS
Status: DISCONTINUED | OUTPATIENT
Start: 2024-07-10 | End: 2024-07-11 | Stop reason: HOSPADM

## 2024-07-10 RX ORDER — SODIUM CHLORIDE 0.9 % (FLUSH) 0.9 %
2 SYRINGE (ML) INJECTION EVERY 12 HOURS PRN
Status: DISCONTINUED | OUTPATIENT
Start: 2024-07-10 | End: 2024-07-11 | Stop reason: HOSPADM

## 2024-07-10 RX ORDER — LANOLIN ALCOHOL/MO/W.PET/CERES
800 CREAM (GRAM) TOPICAL
Status: DISCONTINUED | OUTPATIENT
Start: 2024-07-10 | End: 2024-07-11 | Stop reason: HOSPADM

## 2024-07-10 RX ORDER — ACETAMINOPHEN 325 MG/1
650 TABLET ORAL EVERY 4 HOURS PRN
Status: DISCONTINUED | OUTPATIENT
Start: 2024-07-10 | End: 2024-07-11 | Stop reason: HOSPADM

## 2024-07-10 RX ORDER — ALUMINUM HYDROXIDE, MAGNESIUM HYDROXIDE, AND SIMETHICONE 1200; 120; 1200 MG/30ML; MG/30ML; MG/30ML
30 SUSPENSION ORAL 4 TIMES DAILY PRN
Status: DISCONTINUED | OUTPATIENT
Start: 2024-07-10 | End: 2024-07-11 | Stop reason: HOSPADM

## 2024-07-10 RX ORDER — SODIUM CHLORIDE 9 MG/ML
INJECTION, SOLUTION INTRAVENOUS CONTINUOUS
Status: ACTIVE | OUTPATIENT
Start: 2024-07-10 | End: 2024-07-11

## 2024-07-10 RX ORDER — AMOXICILLIN AND CLAVULANATE POTASSIUM 500; 125 MG/1; MG/1
1 TABLET, FILM COATED ORAL EVERY 12 HOURS
COMMUNITY
Start: 2024-07-01 | End: 2024-07-23

## 2024-07-10 RX ORDER — TALC
6 POWDER (GRAM) TOPICAL NIGHTLY PRN
Status: DISCONTINUED | OUTPATIENT
Start: 2024-07-10 | End: 2024-07-11 | Stop reason: HOSPADM

## 2024-07-10 RX ORDER — SODIUM CHLORIDE 9 MG/ML
INJECTION, SOLUTION INTRAVENOUS CONTINUOUS
Status: DISCONTINUED | OUTPATIENT
Start: 2024-07-10 | End: 2024-07-10

## 2024-07-10 RX ORDER — LEVALBUTEROL INHALATION SOLUTION 0.63 MG/3ML
0.63 SOLUTION RESPIRATORY (INHALATION) EVERY 8 HOURS
Status: DISCONTINUED | OUTPATIENT
Start: 2024-07-10 | End: 2024-07-11 | Stop reason: HOSPADM

## 2024-07-10 RX ORDER — AMOXICILLIN 250 MG
1 CAPSULE ORAL DAILY
Status: DISCONTINUED | OUTPATIENT
Start: 2024-07-10 | End: 2024-07-11 | Stop reason: HOSPADM

## 2024-07-10 RX ORDER — SODIUM CHLORIDE 9 MG/ML
1000 INJECTION, SOLUTION INTRAVENOUS
Status: COMPLETED | OUTPATIENT
Start: 2024-07-10 | End: 2024-07-10

## 2024-07-10 RX ORDER — IBUPROFEN 200 MG
24 TABLET ORAL
Status: DISCONTINUED | OUTPATIENT
Start: 2024-07-10 | End: 2024-07-11 | Stop reason: HOSPADM

## 2024-07-10 RX ADMIN — LACOSAMIDE 200 MG: 10 INJECTION INTRAVENOUS at 01:07

## 2024-07-10 RX ADMIN — PIPERACILLIN SODIUM AND TAZOBACTAM SODIUM 3.38 G: 3; .375 INJECTION, POWDER, LYOPHILIZED, FOR SOLUTION INTRAVENOUS at 06:07

## 2024-07-10 RX ADMIN — HEPARIN SODIUM 5000 UNITS: 5000 INJECTION INTRAVENOUS; SUBCUTANEOUS at 09:07

## 2024-07-10 RX ADMIN — LEVALBUTEROL HYDROCHLORIDE 0.63 MG: 0.63 SOLUTION RESPIRATORY (INHALATION) at 11:07

## 2024-07-10 RX ADMIN — HEPARIN SODIUM 5000 UNITS: 5000 INJECTION INTRAVENOUS; SUBCUTANEOUS at 11:07

## 2024-07-10 RX ADMIN — SODIUM CHLORIDE 1000 ML: 9 INJECTION, SOLUTION INTRAVENOUS at 02:07

## 2024-07-10 RX ADMIN — CEFTRIAXONE SODIUM 1 G: 1 INJECTION, POWDER, FOR SOLUTION INTRAMUSCULAR; INTRAVENOUS at 01:07

## 2024-07-10 RX ADMIN — PIPERACILLIN SODIUM AND TAZOBACTAM SODIUM 3.38 G: 3; .375 INJECTION, POWDER, LYOPHILIZED, FOR SOLUTION INTRAVENOUS at 10:07

## 2024-07-10 RX ADMIN — LACOSAMIDE 50 MG: 50 TABLET, FILM COATED ORAL at 06:07

## 2024-07-10 RX ADMIN — GUAIFENESIN 600 MG: 600 TABLET, EXTENDED RELEASE ORAL at 12:07

## 2024-07-10 RX ADMIN — SENNOSIDES AND DOCUSATE SODIUM 1 TABLET: 50; 8.6 TABLET ORAL at 11:07

## 2024-07-10 RX ADMIN — SODIUM CHLORIDE 300 ML: 9 INJECTION, SOLUTION INTRAVENOUS at 12:07

## 2024-07-10 RX ADMIN — POTASSIUM BICARBONATE 50 MEQ: 977.5 TABLET, EFFERVESCENT ORAL at 01:07

## 2024-07-10 RX ADMIN — DIPHENHYDRAMINE HYDROCHLORIDE 25 MG: 50 INJECTION INTRAMUSCULAR; INTRAVENOUS at 11:07

## 2024-07-10 RX ADMIN — GUAIFENESIN 600 MG: 600 TABLET, EXTENDED RELEASE ORAL at 08:07

## 2024-07-10 RX ADMIN — LEVALBUTEROL HYDROCHLORIDE 0.63 MG: 0.63 SOLUTION RESPIRATORY (INHALATION) at 02:07

## 2024-07-10 RX ADMIN — SODIUM CHLORIDE: 9 INJECTION, SOLUTION INTRAVENOUS at 06:07

## 2024-07-10 RX ADMIN — LEVETIRACETAM 1500 MG: 15 INJECTION INTRAVASCULAR at 08:07

## 2024-07-10 NOTE — ASSESSMENT & PLAN NOTE
This patient does have evidence of infective focus  My overall impression is sepsis.  Source: Urinary Tract and Respiratory  Antibiotics given-   Antibiotics (72h ago, onward)      Start     Stop Route Frequency Ordered    07/10/24 1030  piperacillin-tazobactam 3.375 g in dextrose 5 % 100 mL IVPB (ready to mix)         -- IV Every 8 hours (non-standard times) 07/10/24 0933          Latest lactate reviewed-  Recent Labs   Lab 07/09/24  2347 07/10/24  0427   LACTATE  --  4.1*   POCLAC 3.34*  --      Organ dysfunction indicated by  tachycardia and tachypnea    Fluid challenge Actual Body weight- Patient will receive 30ml/kg actual body weight to calculate fluid bolus for treatment of septic shock.     Post- resuscitation assessment No - Post resuscitation assessment not needed       Will Not start Pressors- Levophed for MAP of 65  Source control achieved by: IV antibiotics

## 2024-07-10 NOTE — NURSING
Pt to floor from ER, report given by ER nurse Zarina quinones at bedside. Nurse notified FNP Aliya brown of pt HR running in the 120s in he ER and for the last hour on the floor. Also BP have been soft. Fluids ordered and repeat lactic ordered.

## 2024-07-10 NOTE — PHARMACY MED REC
"Admission Medication History     The home medication history was taken by Aman Morel.    You may go to "Admission" then "Reconcile Home Medications" tabs to review and/or act upon these items.     The home medication list has been updated by the Pharmacy department.   Please read ALL comments highlighted in yellow.   Please address this information as you see fit.    Feel free to contact us if you have any questions or require assistance.      Medications listed below were obtained from: Patient/family and Analytic software- [employer]  No current facility-administered medications on file prior to encounter.     Current Outpatient Medications on File Prior to Encounter   Medication Sig Dispense Refill    amoxicillin-clavulanate 500-125mg (AUGMENTIN) 500-125 mg Tab Take 1 tablet by mouth every 12 (twelve) hours.      diazePAM (VALTOCO) 10 mg/spray (0.1 mL) Spry 10 mg by Nasal route every 5 (five) minutes as needed (Administer 1 spray into 1 nostril; if a second dose is needed, administer  in alternate nostril. A second dose should not be administered if the  patient is having trouble breathing or excessive sedatio). 1 each 3    ergocalciferol (ERGOCALCIFEROL) 50,000 unit Cap Take 50,000 Units by mouth every 7 days.      ibuprofen (ADVIL,MOTRIN) 200 MG tablet Take 200 mg by mouth every 6 (six) hours as needed for Pain.      levETIRAcetam (KEPPRA) 750 MG Tab Take 2 tablets (1,500 mg total) by mouth 2 (two) times daily. 120 tablet 11    EPINEPHrine (EPIPEN) 0.3 mg/0.3 mL AtIn Inject 0.3 mLs (0.3 mg total) into the muscle as needed (Anaphylactic reaction). 0.3 mL 0    riboflavin, vitamin B2, 400 mg Tab Take 400 mg by mouth once daily. (Patient not taking: Reported on 4/23/2024) 30 tablet 6    [DISCONTINUED] INV nrl-1 (INTRANASAL DIAZEPAM) 10 mg sprm nasal spray 1 spray by Left Nostril route once. Spray in the nose as directed for 1 dose.             Aman Morel  EXT 1924                .          "

## 2024-07-10 NOTE — NURSING
Nurses Note -- 4 Eyes      7/10/2024   5:23 PM      Skin assessed during: Admit      [] No Altered Skin Integrity Present    []Prevention Measures Documented      [x] Yes- Altered Skin Integrity Present or Discovered   [] LDA Added if Not in Epic (Describe Wound)   [] New Altered Skin Integrity was Present on Admit and Documented in LDA   [] Wound Image Taken    Wound Care Consulted? Yes    Attending Nurse:  Arabella Nicholas RN/Staff Member:   [email or web address]

## 2024-07-10 NOTE — ASSESSMENT & PLAN NOTE
History frequent seizures  This episode possibly 2/2 infection.  Reports compliance with meds.  Consult neurology   Maintain aspiration, fall, and seizure precautions   Continue Keppra 1500 mg twice daily  Ativan p.r.n. seizure activity

## 2024-07-10 NOTE — CONSULTS
[facility]  Department of Neurology  Neurology Consultation Note        PATIENT NAME: Shashank Samuel  MRN: [id number]  CSN: [id number]      TODAY'S DATE: 07/10/2024  ADMIT DATE: 7/9/2024                            CONSULTING PROVIDER: Dov Smart MD  CONSULT REQUESTED BY: Dash Randall Jr., MD      Reason for consult:  Breakthrough seizure      History obtained from chart review and the patient.    HPI: Shashank Samuel is a 29 y.o. female with a history of unknown Chiari malformation, spina bifida with bilateral lower extremities quadriplegia, hydrocephalus, epilepsy (on Keppra) presents to the hospital after a breakthrough seizure.  During my assessment, patient is back to her baseline and she is alert and oriented x3.  She states that she has seizure about once every month in her last seizure was about 1-2 months ago at that time her Keppra was increased to 1500 mg b.i.d. by her neurologist.  She states that she has been compliant with her medications.    Her seizures are usually tonic seizures without clonic movements.  She states that yesterday she had a similar seizure leading to fall.  Seizure episode lasted for about 5 minutes after which she was somnolent.  She was given intranasal Valium however seizure continued.  Her mother called EMS and she was brought to the hospital.      She denies any recent fevers, headache, chest pain, nausea, vomiting.  In the ER her workup was significant for UTI.  Admitted for further workup and management.       PREVIOUS MEDICAL HISTORY:  Past Medical History:   Diagnosis Date    Anemia     Arnold-Chiari malformation, type II     Encounter for blood transfusion     Epilepsy     Hydrocephalus     Neurogenic bladder     self catheterizes every 3 hours    Raynaud disease     Seizures     only w/ shunt malfunction    Spinal bifida, closed     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     PREVIOUS SURGICAL HISTORY:  Past Surgical History:   Procedure Laterality Date     AMPUTATION      right 4th and 5th toes; left 5th toe and portion of left great toe    BACK SURGERY      BLADDER SURGERY      BREAST SURGERY  2015    Reduction    COLON SURGERY      flush site for bowel movements from appendix    CYSTOSTOMY      DEBRIDEMENT OF FOOT Right 1/8/2021    Procedure: DEBRIDEMENT, FOOT;  Surgeon: Abdi Bedoya DPM;  Location: [facility];  Service: Podiatry;  Laterality: Right;    EYE SURGERY      x 5    FLUOROSCOPIC URODYNAMIC STUDY N/A 3/12/2019    Procedure: URODYNAMIC STUDY, FLUOROSCOPIC;  Surgeon: Kenzie Charles MD;  Location: [facility] 1ST FLR;  Service: Urology;  Laterality: N/A;  1 hour    FLUOROSCOPIC URODYNAMIC STUDY N/A 4/4/2019    Procedure: URODYNAMIC STUDY, FLUOROSCOPIC;  Surgeon: Kenzie Charles MD;  Location: [facility] 1ST FLR;  Service: Urology;  Laterality: N/A;  1 hour    FLUOROSCOPIC URODYNAMIC STUDY N/A 5/11/2022    Procedure: URODYNAMIC STUDY, FLUOROSCOPIC;  Surgeon: Kenzie Charles MD;  Location: [facility] 1ST FLR;  Service: Urology;  Laterality: N/A;  90min    FOOT AMPUTATION THROUGH METATARSAL Right 10/28/2019    Procedure: AMPUTATION, FOOT, TRANSMETATARSAL;  Surgeon: Abdi Bedoya DPM;  Location: [facility];  Service: Podiatry;  Laterality: Right;    FOOT AMPUTATION THROUGH METATARSAL Right 3/27/2020    Procedure: AMPUTATION, FOOT, TRANSMETATARSAL;  Surgeon: Abdi Bedoya DPM;  Location: [facility];  Service: Podiatry;  Laterality: Right;  REVISION    MYELOMININGOCELE REPAIR      NEPHRECTOMY Right 11/4/2021    Procedure: Nephrectomy/ OPEN;  Surgeon: Dash Rosenthal MD;  Location: [facility] OR 2ND FLR;  Service: Urology;  Laterality: Right;  4HRS    NEPHROSTOMY Right 8/2/2021    Procedure: Nephrostomy and perinephric abscess drain;  Surgeon: Lillian Surgeon;  Location: [facility];  Service: Anesthesiology;  Laterality: Right;    REVISION OF VENTRICULOPERITONEAL SHUNT Left 9/21/2022    Procedure: REVISION, SHUNT, VENTRICULOPERITONEAL;  Surgeon: Maynor Calero MD;   [facility];  Service: Neurosurgery;  Laterality: Left;    STRABISMUS SURGERY      ou dr peña    VENTRICULOPERITONEAL SHUNT      WOUND DEBRIDEMENT  3/27/2020    Procedure: DEBRIDEMENT, WOUND;  Surgeon: Abdi Bedoya DPM;  Location: [facility];  Service: Podiatry;;     FAMILY MEDICAL HISTORY:  Family History   Problem Relation Name Age of Onset    Arthritis Mother      Breast cancer Mother      Gout Father      Epilepsy Father      Myocarditis Sister      Cataracts Maternal Grandmother      Amblyopia Neg Hx      Blindness Neg Hx      Cancer Neg Hx      Diabetes Neg Hx      Glaucoma Neg Hx      Hypertension Neg Hx      Macular degeneration Neg Hx      Retinal detachment Neg Hx      Strabismus Neg Hx      Stroke Neg Hx      Thyroid disease Neg Hx       ALLERGIES:  Review of patient's allergies indicates:   Allergen Reactions    Latex, natural rubber Anaphylaxis and Other (See Comments)     angioedema    Zofran [ondansetron hcl (pf)] Swelling     Hives, "throat closes up"    Gardasil  [h papillomavirus vac,qval (pf)]      Other reaction(s): Shortness of breath    Menactra  [mening vac a,c,y,w135 dip (pf)]      Other reaction(s): Shortness of breath    Nitrofurantoin      Other reaction(s): Difficulty breathing    Sulfa (sulfonamide antibiotics)     Tramadol Hives    Levaquin [levofloxacin] Rash     Spots on face    Macrobid [nitrofurantoin monohyd/m-cryst] Rash     Sppots/rash on face     HOME MEDICATIONS:  Prior to Admission medications    Medication Sig Start Date End Date Taking? Authorizing Provider   amoxicillin-clavulanate 500-125mg (AUGMENTIN) 500-125 mg Tab Take 1 tablet by mouth every 12 (twelve) hours. 7/1/24  Yes Provider, Historical   diazePAM (VALTOCO) 10 mg/spray (0.1 mL) Spry 10 mg by Nasal route every 5 (five) minutes as needed (Administer 1 spray into 1 nostril; if a second dose is needed, administer  in alternate nostril. A second dose should not be administered if the  patient is " having trouble breathing or excessive sedatio). 4/15/24  Yes Robert More MD   ergocalciferol (ERGOCALCIFEROL) 50,000 unit Cap Take 50,000 Units by mouth every 7 days.   Yes Provider, Historical   ibuprofen (ADVIL,MOTRIN) 200 MG tablet Take 200 mg by mouth every 6 (six) hours as needed for Pain.   Yes Provider, Historical   levETIRAcetam (KEPPRA) 750 MG Tab Take 2 tablets (1,500 mg total) by mouth 2 (two) times daily. 5/16/24 5/16/25 Yes Robert More MD   amoxicillin-clavulanate 875-125mg (AUGMENTIN) 875-125 mg per tablet Take 1 tablet by mouth 2 (two) times daily. for 10 days 7/10/24 7/20/24  Lucille Rodriges MD   EPINEPHrine (EPIPEN) 0.3 mg/0.3 mL AtIn Inject 0.3 mLs (0.3 mg total) into the muscle as needed (Anaphylactic reaction). 3/6/24   Robert More MD   riboflavin, vitamin B2, 400 mg Tab Take 400 mg by mouth once daily.  Patient not taking: Reported on 4/23/2024 3/6/24   Robert More MD   INV nrl-1 (INTRANASAL DIAZEPAM) 10 mg sprm nasal spray 1 spray by Left Nostril route once. Spray in the nose as directed for 1 dose.  7/10/24  Provider, Historical     CURRENT SCHEDULED MEDICATIONS:   heparin (porcine)  5,000 Units Subcutaneous Q8H    levETIRAcetam (Keppra) IV (PEDS and ADULTS)  1,500 mg Intravenous Q12H    piperacillin-tazobactam (Zosyn) IV (PEDS and ADULTS) (extended infusion is not appropriate)  3.375 g Intravenous Q8H    senna-docusate 8.6-50 mg  1 tablet Oral Daily     CURRENT INFUSIONS:    CURRENT PRN MEDICATIONS:    Current Facility-Administered Medications:     acetaminophen, 650 mg, Oral, Q8H PRN    acetaminophen, 650 mg, Oral, Q4H PRN    aluminum-magnesium hydroxide-simethicone, 30 mL, Oral, QID PRN    dextrose 50%, 12.5 g, Intravenous, PRN    dextrose 50%, 25 g, Intravenous, PRN    glucagon (human recombinant), 1 mg, Intramuscular, PRN    glucose, 16 g, Oral, PRN    glucose, 24 g, Oral, PRN    lorazepam, 1 mg, Intravenous, Q10 Min PRN    magnesium oxide, 800 mg, Oral, PRN     "magnesium oxide, 800 mg, Oral, PRN    melatonin, 6 mg, Oral, Nightly PRN    potassium bicarbonate, 35 mEq, Oral, PRN    potassium bicarbonate, 50 mEq, Oral, PRN    potassium bicarbonate, 60 mEq, Oral, PRN    sodium chloride 0.9%, 2 mL, Intravenous, Q12H PRN    REVIEW OF SYSTEMS:  Please refer to the HPI for all pertinent positive and negative findings. A comprehensive review of all other systems was negative.    PHYSICAL EXAM:  Patient Vitals for the past 24 hrs:   BP Temp Temp src Pulse Resp SpO2 Height Weight   07/10/24 1002 -- -- -- (!) 121 -- -- -- --   07/10/24 0939 -- -- -- (!) 121 (!) 25 97 % -- --   07/10/24 0919 -- -- -- (!) 122 (!) 23 99 % -- --   07/10/24 0859 110/72 -- -- (!) 123 (!) 22 95 % -- --   07/10/24 0829 109/74 -- -- (!) 122 (!) 30 100 % -- --   07/10/24 0759 102/67 -- -- (!) 119 (!) 23 100 % -- --   07/10/24 0729 98/62 -- -- (!) 114 (!) 22 100 % -- --   07/10/24 0659 (!) 99/59 -- -- (!) 113 (!) 23 100 % -- --   07/10/24 0430 102/63 -- -- (!) 135 (!) 28 100 % -- --   07/10/24 0259 (!) 118/55 -- -- (!) 138 (!) 28 100 % -- --   07/10/24 0159 110/79 -- -- (!) 135 (!) 33 100 % -- --   07/10/24 0129 101/67 -- -- (!) 126 (!) 29 100 % -- --   07/10/24 0100 99/63 -- -- (!) 124 (!) 29 100 % -- --   07/10/24 0029 99/67 -- -- (!) 128 (!) 31 95 % -- --   07/10/24 0013 97/66 -- -- (!) 128 (!) 36 100 % -- --   07/09/24 2343 -- -- -- (!) 127 (!) 22 (!) 93 % -- --   07/09/24 2331 115/64 -- -- (!) 126 (!) 36 95 % -- --   07/09/24 2329 121/86 98.1 °F (36.7 °C) Oral (!) 121 (!) 22 96 % 4' 7" (1.397 m) 65.8 kg (145 lb)       GENERAL APPEARANCE: Alert, well-developed, well-nourished female in no acute distress.  HEENT: Normocephalic and atraumatic. PERRL. Oropharynx unremarkable.  PULM: Normal respiratory effort. No accessory muscle use.  CV: RRR.  ABDOMEN: Soft, nontender.  EXTREMITIES: No obvious signs of vascular compromise. Pulses present. No cyanosis, clubbing or edema.  SKIN: Clear; no rashes, lesions or skin " "breaks in exposed areas.    NEURO:  MENTAL STATUS: Patient awake and oriented to time, place, and person, recent/remote memory normal, attention span/concentration normal, and speech fluent without paraphasic errors.  Affect euthymic.    CRANIAL NERVES:  CN I: Not tested.  CN II: Fundoscopic exam deferred.  CN III, IV, VI: Pupils equal, round and reactive to light.  Extraocular movements full and intact.  CN V: Facial sensation normal.  CN VII: Facial asymmetry absent.  CN VIII: Hearing grossly normal and equal bilaterally.  No skew deviation or pathologic nystagmus.  CN IX, X: Palate elevates symmetrically. Speech/articulation is clear without dysarthria.  CN XI: Shoulder shrug and chin rotation equal with good strength.  CN XII: Tongue protrusion midline.    MOTOR:  Bulk normal. Tone normal and symmetric throughout.  Abnormal movements absent.  Tremor: none present.  Strength  5/5 bilateral upper extremities and 0/5 bilateral lower extremities .    REFLEXES:   1+ in bilateral upper extremities .    SENSATION:  Intact in bilateral upper extremities .  COORDINATION: normal finger-to-nose.  STATION: not tested.  GAIT: not tested.      Labs:  Recent Labs   Lab 07/09/24  2348 07/10/24  0427    138   K 3.3* 4.0    108   CO2 20* 20*   BUN 16 14   CREATININE 0.6 0.5   * 137*   CALCIUM 8.7 7.7*   PHOS 3.4  --    MG 2.0 1.8     Recent Labs   Lab 07/09/24  2348 07/10/24  0427   WBC 11.63 20.93*   HGB 14.8 13.1   HCT 46.1 40.9    276     Recent Labs   Lab 07/09/24  2348 07/10/24  0427   ALBUMIN 4.8 4.1   PROT 8.3 7.1   BILITOT 0.3 0.2   ALKPHOS 67 55   ALT 19 16   AST 17 17     Lab Results   Component Value Date    INR 1.0 07/10/2024     Lab Results   Component Value Date    TRIG 79 06/13/2024    HDL 44 (L) 06/13/2024    CHOLHDL 3.2 06/13/2024     No results found for: "HGBA1C"  Lab Results   Component Value Date    PROTEINCSF 8 (L) 09/21/2022    GLUCCSF 77 (H) 09/21/2022       Imaging:  I have " reviewed and interpreted the pertinent imaging and lab results.      X-Ray Chest AP Portable  Narrative: EXAMINATION:  XR CHEST AP PORTABLE    CLINICAL HISTORY:  Seizure;    TECHNIQUE:  Single frontal view of the chest was performed.    COMPARISON:  01/16/2023    FINDINGS:  Cardiac monitoring leads overlie the chest.  There is presumed  shunt catheter tubing projecting over the left neck and chest.  Cardiac silhouette stable in size and configuration.  Lung volumes are mildly diminished with resultant bronchovascular crowding.  There is suspected bilateral increased perihilar interstitial attenuation which may reflect superimposed viral respiratory illness or reactive airways disease.  No significant volume of pleural fluid or pneumothorax appreciated.  Osseous structures demonstrate extensive postoperative changes of the spine.  There is a chronic minimally displaced fracture of the left sided Newman migue.  Impression: Hypoventilatory examination with resultant bronchovascular crowding.  Suspected bilateral increased perihilar interstitial attenuation which may reflect superimposed viral respiratory illness or reactive airways disease.    Electronically signed by: Champ Vick MD  Date:    07/10/2024  Time:    00:23         ASSESSMENT & PLAN:        Breakthrough seizure       Plan:   Etiology of breakthrough seizure is unknown.  Patient has been compliant with her Keppra 1500 mg b.i.d.  CT head ordered and pending.    Loading dose of Vimpat 200 mg once followed by 50 mg b.i.d. maintenance dose of Vimpat.    Seizure precautions while inpatient.    Avoid seizure threshold lowering medications.    Workup and management for metabolic derangements and infectious abnormalities per primary team.  PT OT evaluate and treat.    Will follow         Seizure education.      No driving for now, no swimming alone, no climbing high areas, no operation of heavy machinery or working with high risk electricity  equipment.    Continue to take AEDs as prescribed. If any major side effects from neurological medications go to the ED including mood changes and severe depression.  Patient and/or next of kin informed.  Follow up Neurology in 2 weeks. Call [phone] to make an appointment.    Medication side effects discussed with the patient and/or caregiver.      Thank you kindly for including us in the care of this patient. Please do not hesitate to contact us with any questions.           Dov Smart MD  Neurology/vascular Neurology  Date of Service: 07/10/2024  10:03 AM    --------------------------------------------------------------------------------------------------------------------------------------------------------------------------------------------------------------------------------------------------------------  Please note: This note was transcribed using voice recognition software. Because of this technology there are often uinintended grammatical, spelling, and other transcription errors. Please disregard these errors.

## 2024-07-10 NOTE — ASSESSMENT & PLAN NOTE
Possible due to aspiration during seizure.  Tachypneic on exam with rhonchi.   IV zosyn  Sputum cx  Supplemental oxygen as needed  Bronchodilators  De-escalate abx when appropriate

## 2024-07-10 NOTE — ED NOTES
Family arrives.  Medicated pt, cathed pt and fed. [employer] bag at BS.  Remains alert and oriented. Falls precautions.  NAD at rest.

## 2024-07-10 NOTE — HPI
Shashank Samuel is a 28 y/o F with a past medical history significant for Arnold-Chiari malformation, type II, anemia, epilepsy, spina bifida, and hydrocephalus who presented to the ED by POV with c/o seizure.  She is accompanied by her mother and her sister who report that they found the patient slumped over in her wheelchair last night, making grunting noises.  She was give intranasal valium but continued to seize.  They managed to get her into the car and brought her to the ED.  The seizure halted just prior to arrival.  Mom states the seizure lasted approximately 20 minutes.  She was recently diagnosed with UTI and has been taking augmentin.  Hx neurogenic bladder and performs self cath through the umbilicus.  Denies recent fevers, headache,chest pain, cough or diarrhea, but has had some nausea.  ED workup significant for UTI, leukocytosis, lactic acidosis and hypokalemia.  CXR reflects possible infection.  Given history of seizure, aspiration is suspected.  Currently, she has returned to her baseline mental status.  She is admitted to the service of hospital medicine for continued monitoring and treatment.

## 2024-07-10 NOTE — H&P
[facility] - Emergency Dept  Hospital Medicine  History & Physical    Patient Name: Shashank Samuel  MRN: [id number]  Patient Class: IP- Inpatient  Admission Date: 7/9/2024  Attending Physician: Dash Randall Jr., MD   Primary Care Provider: Dash Ponce MD         Patient information was obtained from patient, parent, relative(s), past medical records, and ER records.     Subjective:     Principal Problem:Seizures    Chief Complaint:   Chief Complaint   Patient presents with    Seizures     Patient arrived to ER with family. Patient was having a seizure in the back seat of the car.        HPI: Shashank Samuel is a 28 y/o F with a past medical history significant for Arnold-Chiari malformation, type II, anemia, epilepsy, spina bifida, and hydrocephalus who presented to the ED by POV with c/o seizure.  She is accompanied by her mother and her sister who report that they found the patient slumped over in her wheelchair last night, making grunting noises.  She was give intranasal valium but continued to seize.  They managed to get her into the car and brought her to the ED.  The seizure halted just prior to arrival.  Mom states the seizure lasted approximately 20 minutes.  She was recently diagnosed with UTI and has been taking augmentin.  Hx neurogenic bladder and performs self cath through the umbilicus.  Denies recent fevers, headache,chest pain, cough or diarrhea, but has had some nausea.  ED workup significant for UTI, leukocytosis, lactic acidosis and hypokalemia.  CXR reflects possible infection.  Given history of seizure, aspiration is suspected.  Currently, she has returned to her baseline mental status.  She is admitted to the service of hospital medicine for continued monitoring and treatment.    Past Medical History:   Diagnosis Date    Anemia     Arnold-Chiari malformation, type II     Encounter for blood transfusion     Epilepsy     Hydrocephalus     Neurogenic bladder      self catheterizes every 3 hours    Raynaud disease     Seizures     only w/ shunt malfunction    Spinal bifida, closed     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       Past Surgical History:   Procedure Laterality Date    AMPUTATION      right 4th and 5th toes; left 5th toe and portion of left great toe    BACK SURGERY      BLADDER SURGERY      BREAST SURGERY  2015    Reduction    COLON SURGERY      flush site for bowel movements from appendix    CYSTOSTOMY      DEBRIDEMENT OF FOOT Right 1/8/2021    Procedure: DEBRIDEMENT, FOOT;  Surgeon: Abdi Bedoya DPM;  Location: [facility] OR;  Service: Podiatry;  Laterality: Right;    EYE SURGERY      x 5    FLUOROSCOPIC URODYNAMIC STUDY N/A 3/12/2019    Procedure: URODYNAMIC STUDY, FLUOROSCOPIC;  Surgeon: Kenzie Charles MD;  Location: [facility] OR 1ST FLR;  Service: Urology;  Laterality: N/A;  1 hour    FLUOROSCOPIC URODYNAMIC STUDY N/A 4/4/2019    Procedure: URODYNAMIC STUDY, FLUOROSCOPIC;  Surgeon: Kenzie Charles MD;  Location: [facility] OR 1ST FLR;  Service: Urology;  Laterality: N/A;  1 hour    FLUOROSCOPIC URODYNAMIC STUDY N/A 5/11/2022    Procedure: URODYNAMIC STUDY, FLUOROSCOPIC;  Surgeon: Kenzie Charles MD;  Location: [facility] OR 1ST FLR;  Service: Urology;  Laterality: N/A;  90min    FOOT AMPUTATION THROUGH METATARSAL Right 10/28/2019    Procedure: AMPUTATION, FOOT, TRANSMETATARSAL;  Surgeon: Abdi Bedoya DPM;  Location: [facility];  Service: Podiatry;  Laterality: Right;    FOOT AMPUTATION THROUGH METATARSAL Right 3/27/2020    Procedure: AMPUTATION, FOOT, TRANSMETATARSAL;  Surgeon: Abdi Bedoya DPM;  Location: [facility] OR;  Service: Podiatry;  Laterality: Right;  REVISION    MYELOMININGOCELE REPAIR      NEPHRECTOMY Right 11/4/2021    Procedure: Nephrectomy/ OPEN;  Surgeon: Dash Rosenthal MD;  Location: [facility] OR 2ND FLR;  Service: Urology;  Laterality: Right;  4HRS    NEPHROSTOMY Right 8/2/2021    Procedure: Nephrostomy and perinephric abscess drain;  Surgeon: Lillian Wilkins;   "Location: [facility];  Service: Anesthesiology;  Laterality: Right;    REVISION OF VENTRICULOPERITONEAL SHUNT Left 9/21/2022    Procedure: REVISION, SHUNT, VENTRICULOPERITONEAL;  Surgeon: Maynor Calero MD;  [address]  Service: Neurosurgery;  Laterality: Left;    STRABISMUS SURGERY      ou dr peña    VENTRICULOPERITONEAL SHUNT      WOUND DEBRIDEMENT  3/27/2020    Procedure: DEBRIDEMENT, WOUND;  Surgeon: Abdi Bedoya DPM;  Location: [facility];  Service: Podiatry;;       Review of patient's allergies indicates:   Allergen Reactions    Latex, natural rubber Anaphylaxis and Other (See Comments)     angioedema    Zofran [ondansetron hcl (pf)] Swelling     Hives, "throat closes up"    Gardasil  [h papillomavirus vac,qval (pf)]      Other reaction(s): Shortness of breath    Menactra  [mening vac a,c,y,w135 dip (pf)]      Other reaction(s): Shortness of breath    Nitrofurantoin      Other reaction(s): Difficulty breathing    Sulfa (sulfonamide antibiotics)     Tramadol Hives    Levaquin [levofloxacin] Rash     Spots on face    Macrobid [nitrofurantoin monohyd/m-cryst] Rash     Sppots/rash on face       No current facility-administered medications on file prior to encounter.     Current Outpatient Medications on File Prior to Encounter   Medication Sig    amoxicillin-clavulanate 500-125mg (AUGMENTIN) 500-125 mg Tab Take 1 tablet by mouth every 12 (twelve) hours.    diazePAM (VALTOCO) 10 mg/spray (0.1 mL) Spry 10 mg by Nasal route every 5 (five) minutes as needed (Administer 1 spray into 1 nostril; if a second dose is needed, administer  in alternate nostril. A second dose should not be administered if the  patient is having trouble breathing or excessive sedatio).    ergocalciferol (ERGOCALCIFEROL) 50,000 unit Cap Take 50,000 Units by mouth every 7 days.    ibuprofen (ADVIL,MOTRIN) 200 MG tablet Take 200 mg by mouth every 6 (six) hours as needed for Pain.    levETIRAcetam (KEPPRA) 750 MG Tab Take 2 " tablets (1,500 mg total) by mouth 2 (two) times daily.    EPINEPHrine (EPIPEN) 0.3 mg/0.3 mL AtIn Inject 0.3 mLs (0.3 mg total) into the muscle as needed (Anaphylactic reaction).    riboflavin, vitamin B2, 400 mg Tab Take 400 mg by mouth once daily. (Patient not taking: Reported on 4/23/2024)    [DISCONTINUED] INV nrl-1 (INTRANASAL DIAZEPAM) 10 mg sprm nasal spray 1 spray by Left Nostril route once. Spray in the nose as directed for 1 dose.     Family History       Problem Relation (Age of Onset)    Arthritis Mother    Breast cancer Mother    Cataracts Maternal Grandmother    Epilepsy Father    Gout Father    Myocarditis Sister          Tobacco Use    Smoking status: Never    Smokeless tobacco: Never   Substance and Sexual Activity    Alcohol use: Not Currently    Drug use: Never    Sexual activity: Never     Review of Systems   Constitutional:  Negative for chills and fever.   Gastrointestinal:  Positive for nausea.   Genitourinary:  Positive for difficulty urinating (self cath).   Neurological:  Positive for seizures.   Psychiatric/Behavioral:  Negative for agitation. The patient is not nervous/anxious.      Objective:     Vital Signs (Most Recent):  Temp: 98.1 °F (36.7 °C) (07/09/24 2329)  Pulse: (!) 125 (07/10/24 1009)  Resp: (!) 26 (07/10/24 1001)  BP: 110/72 (07/10/24 0859)  SpO2: 97 % (07/10/24 1001) Vital Signs (24h Range):  Temp:  [98.1 °F (36.7 °C)] 98.1 °F (36.7 °C)  Pulse:  [113-138] 125  Resp:  [22-36] 26  SpO2:  [93 %-100 %] 97 %  BP: ()/(55-86) 110/72     Weight: 65.8 kg (145 lb)  Body mass index is 33.7 kg/m².     Physical Exam  Constitutional:       General: She is not in acute distress.     Appearance: She is well-developed. She is not toxic-appearing.   HENT:      Head: Normocephalic and atraumatic.      Mouth/Throat:      Mouth: Mucous membranes are dry.   Eyes:      Extraocular Movements:      Right eye: Abnormal extraocular motion present.      Conjunctiva/sclera: Conjunctivae normal.       Pupils: Pupils are equal, round, and reactive to light.   Cardiovascular:      Rate and Rhythm: Regular rhythm. Tachycardia present.      Heart sounds: Normal heart sounds.   Pulmonary:      Effort: Pulmonary effort is normal. Tachypnea present. No respiratory distress.      Breath sounds: Examination of the right-upper field reveals rhonchi. Examination of the left-upper field reveals rhonchi. Rhonchi present.   Abdominal:      General: Bowel sounds are normal. There is no distension.      Palpations: Abdomen is soft.   Musculoskeletal:      Cervical back: Normal range of motion and neck supple.      Comments: Bilateral transmetatarsal amputations   Skin:     General: Skin is warm and dry.   Neurological:      Mental Status: She is alert and oriented to person, place, and time. Mental status is at baseline.      Sensory: Sensory deficit (bilateral lower extremities) present.      Comments: Wheelchair bound at baseline   Psychiatric:         Mood and Affect: Mood normal.         Behavior: Behavior normal.              CRANIAL NERVES     CN III, IV, VI   Pupils are equal, round, and reactive to light.       Significant Labs: All pertinent labs within the past 24 hours have been reviewed.  CBC:   Recent Labs   Lab 07/09/24  2348 07/10/24  0427   WBC 11.63 20.93*   HGB 14.8 13.1   HCT 46.1 40.9    276     CMP:   Recent Labs   Lab 07/09/24  2348 07/10/24  0427    138   K 3.3* 4.0    108   CO2 20* 20*   * 137*   BUN 16 14   CREATININE 0.6 0.5   CALCIUM 8.7 7.7*   PROT 8.3 7.1   ALBUMIN 4.8 4.1   BILITOT 0.3 0.2   ALKPHOS 67 55   AST 17 17   ALT 19 16   ANIONGAP 13 10     Magnesium:   Recent Labs   Lab 07/09/24  2348 07/10/24  0427   MG 2.0 1.8     Urine Studies:   Recent Labs   Lab 07/10/24  0006   COLORU Yellow   APPEARANCEUA Hazy*   PHUR 7.0   SPECGRAV 1.015   PROTEINUA Trace*   GLUCUA Negative   KETONESU Negative   BILIRUBINUA Negative   OCCULTUA TRACE   NITRITE Positive*   UROBILINOGEN  Negative   LEUKOCYTESUR Negative   RBCUA 7*   WBCUA 68*   BACTERIA Occasional   SQUAMEPITHEL 1       Significant Imaging: I have reviewed all pertinent imaging results/findings within the past 24 hours.  CXR:  Hypoventilatory examination with resultant bronchovascular crowding. Suspected bilateral increased perihilar interstitial attenuation which may reflect superimposed viral respiratory illness or reactive airways disease.   Assessment/Plan:     * Seizures  History frequent seizures  This episode possibly 2/2 infection.  Reports compliance with meds.  Consult neurology   Maintain aspiration, fall, and seizure precautions   Continue Keppra 1500 mg twice daily  Ativan p.r.n. seizure activity         Aspiration pneumonia  Possible due to aspiration during seizure.  Tachypneic on exam with rhonchi.   IV zosyn  Sputum cx  Supplemental oxygen as needed  Bronchodilators  De-escalate abx when appropriate    UTI (urinary tract infection)  Has been on augmentin OP  IV zosyn  Follow culture      Sepsis  This patient does have evidence of infective focus  My overall impression is sepsis.  Source: Urinary Tract and Respiratory  Antibiotics given-   Antibiotics (72h ago, onward)      Start     Stop Route Frequency Ordered    07/10/24 1030  piperacillin-tazobactam 3.375 g in dextrose 5 % 100 mL IVPB (ready to mix)         -- IV Every 8 hours (non-standard times) 07/10/24 0933          Latest lactate reviewed-  Recent Labs   Lab 07/09/24  2347 07/10/24  0427   LACTATE  --  4.1*   POCLAC 3.34*  --      Organ dysfunction indicated by  tachycardia and tachypnea    Fluid challenge Actual Body weight- Patient will receive 30ml/kg actual body weight to calculate fluid bolus for treatment of septic shock.     Post- resuscitation assessment No - Post resuscitation assessment not needed       Will Not start Pressors- Levophed for MAP of 65  Source control achieved by: IV antibiotics    Pressure ulcer of sacral region, stage 1  Turn  q2hr  Consult wound care      Paraplegia, T7 Complete  Noted.  Turn q2h  Maintain fall precautions.      Neurogenic bladder  Continue self cath      Hydrocephalus  Has  shunt.  Followed by neurosurgery    Spina bifida, lumbar region    noted      VTE Risk Mitigation (From admission, onward)           Ordered     heparin (porcine) injection 5,000 Units  Every 8 hours         07/10/24 0346     IP VTE HIGH RISK PATIENT  Once         07/10/24 0346     Place sequential compression device  Until discontinued         07/10/24 0346                          CHELO Kahn  Department of Hospital Medicine  [facility] - Emergency Dept

## 2024-07-10 NOTE — ED PROVIDER NOTES
"Encounter Date: 7/9/2024       History     Chief Complaint   Patient presents with    Seizures     Patient arrived to ER with family. Patient was having a seizure in the back seat of the car.     Chief complaint seizure activity.  According to the mother the patient's sister noted her to have a seizure.  After 5 minutes of seizure activity she was given intranasal Valium and then after 5 minutes of no response she was put in a car and did quit seizing on arrival heel.  The mother thinks the seizure lasted about 20 minutes the patient was drooling only no vomiting and has been coughing since she has been here.  She is now on antibiotics for UTI and is on Augmentin.  Pulse ox 96% but decreased slightly so she was put on nasal cannula O2 in his doing slightly better.  She is awake alert answering questions.  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        Review of patient's allergies indicates:   Allergen Reactions    Latex, natural rubber Anaphylaxis and Other (See Comments)     angioedema    Zofran [ondansetron hcl (pf)] Swelling     Hives, "throat closes up"    Gardasil  [h papillomavirus vac,qval (pf)]      Other reaction(s): Shortness of breath    Menactra  [mening vac a,c,y,w135 dip (pf)]      Other reaction(s): Shortness of breath    Nitrofurantoin      Other reaction(s): Difficulty breathing    Sulfa (sulfonamide antibiotics)     Tramadol Hives    Levaquin [levofloxacin] Rash     Spots on face    Macrobid [nitrofurantoin monohyd/m-cryst] Rash     Sppots/rash on face     Past Medical History:   Diagnosis Date    Anemia     Arnold-Chiari malformation, type II     Encounter for blood transfusion     Epilepsy     Hydrocephalus     Neurogenic bladder     self catheterizes every 3 hours    Raynaud disease     Seizures     only w/ shunt malfunction    Spinal bifida, closed     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     Past Surgical History:   Procedure Laterality Date    AMPUTATION   "    right 4th and 5th toes; left 5th toe and portion of left great toe    BACK SURGERY      BLADDER SURGERY      BREAST SURGERY  2015    Reduction    COLON SURGERY      flush site for bowel movements from appendix    CYSTOSTOMY      DEBRIDEMENT OF FOOT Right 1/8/2021    Procedure: DEBRIDEMENT, FOOT;  Surgeon: Abdi Bedoya DPM;  Location: [facility];  Service: Podiatry;  Laterality: Right;    EYE SURGERY      x 5    FLUOROSCOPIC URODYNAMIC STUDY N/A 3/12/2019    Procedure: URODYNAMIC STUDY, FLUOROSCOPIC;  Surgeon: Kenzie Charles MD;  Location: [facility] 1ST FLR;  Service: Urology;  Laterality: N/A;  1 hour    FLUOROSCOPIC URODYNAMIC STUDY N/A 4/4/2019    Procedure: URODYNAMIC STUDY, FLUOROSCOPIC;  Surgeon: Kenzie Charles MD;  Location: [facility] 1ST FLR;  Service: Urology;  Laterality: N/A;  1 hour    FLUOROSCOPIC URODYNAMIC STUDY N/A 5/11/2022    Procedure: URODYNAMIC STUDY, FLUOROSCOPIC;  Surgeon: Kenzie Charles MD;  Location: [facility] 1ST FLR;  Service: Urology;  Laterality: N/A;  90min    FOOT AMPUTATION THROUGH METATARSAL Right 10/28/2019    Procedure: AMPUTATION, FOOT, TRANSMETATARSAL;  Surgeon: Abdi Bedoya DPM;  Location: [facility];  Service: Podiatry;  Laterality: Right;    FOOT AMPUTATION THROUGH METATARSAL Right 3/27/2020    Procedure: AMPUTATION, FOOT, TRANSMETATARSAL;  Surgeon: Abdi Bedoya DPM;  Location: [facility];  Service: Podiatry;  Laterality: Right;  REVISION    MYELOMININGOCELE REPAIR      NEPHRECTOMY Right 11/4/2021    Procedure: Nephrectomy/ OPEN;  Surgeon: Dash Rosenthal MD;  Location: [facility] 2ND FLR;  Service: Urology;  Laterality: Right;  4HRS    NEPHROSTOMY Right 8/2/2021    Procedure: Nephrostomy and perinephric abscess drain;  Surgeon: Lillian Surgeon;  Location: [facility];  Service: Anesthesiology;  Laterality: Right;    REVISION OF VENTRICULOPERITONEAL SHUNT Left 9/21/2022    Procedure: REVISION, SHUNT, VENTRICULOPERITONEAL;  Surgeon: Maynor Calero MD;  Location: [facility]  OR 2ND FLR;  Service: Neurosurgery;  Laterality: Left;    STRABISMUS SURGERY      ou dr peña    VENTRICULOPERITONEAL SHUNT      WOUND DEBRIDEMENT  3/27/2020    Procedure: DEBRIDEMENT, WOUND;  Surgeon: Abdi Bedoya DPM;  Location: [facility] OR;  Service: Podiatry;;     Family History   Problem Relation Name Age of Onset    Arthritis Mother      Breast cancer Mother      Gout Father      Epilepsy Father      Myocarditis Sister      Cataracts Maternal Grandmother      Amblyopia Neg Hx      Blindness Neg Hx      Cancer Neg Hx      Diabetes Neg Hx      Glaucoma Neg Hx      Hypertension Neg Hx      Macular degeneration Neg Hx      Retinal detachment Neg Hx      Strabismus Neg Hx      Stroke Neg Hx      Thyroid disease Neg Hx       Social History     Tobacco Use    Smoking status: Never    Smokeless tobacco: Never   Substance Use Topics    Alcohol use: Not Currently    Drug use: Never     Review of Systems   Constitutional:  Negative for chills and fever.   HENT:  Negative for ear pain, rhinorrhea and sore throat.    Eyes:  Negative for pain and visual disturbance.   Respiratory:  Negative for cough and shortness of breath.    Cardiovascular:  Negative for chest pain and palpitations.   Gastrointestinal:  Negative for abdominal pain, constipation, diarrhea, nausea and vomiting.   Genitourinary:  Negative for dysuria, frequency, hematuria and urgency.   Musculoskeletal:  Negative for back pain, joint swelling and myalgias.   Skin:  Negative for rash.   Neurological:  Positive for seizures. Negative for dizziness, weakness and headaches.   Psychiatric/Behavioral:  Negative for dysphoric mood. The patient is not nervous/anxious.        Physical Exam     Initial Vitals [07/09/24 2329]   BP Pulse Resp Temp SpO2   121/86 (!) 121 (!) 22 98.1 °F (36.7 °C) 96 %      MAP       --         Physical Exam    Nursing note and vitals reviewed.  Constitutional: She appears well-developed and well-nourished.   HENT:   Head:  Normocephalic and atraumatic.   Eyes: Conjunctivae, EOM and lids are normal. Pupils are equal, round, and reactive to light.   Neck: Trachea normal. Neck supple. No thyroid mass and no thyromegaly present.   Normal range of motion.  Cardiovascular:  Normal rate, regular rhythm and normal heart sounds.           Pulmonary/Chest: Effort normal.   Course breath sounds bilaterally.   Abdominal: Abdomen is soft. There is no abdominal tenderness.   Musculoskeletal:         General: Normal range of motion.      Cervical back: Normal range of motion and neck supple.     Neurological: She is alert and oriented to person, place, and time. She has normal strength and normal reflexes. No cranial nerve deficit or sensory deficit.   Skin: Skin is warm and dry.   Multiple scars from previous surgeries.  To the back abdominal area.  Patient with surgeries with movement of her toes on both feet.   Psychiatric: She has a normal mood and affect. Her speech is normal and behavior is normal. Judgment and thought content normal.         ED Course   Procedures  Labs Reviewed   COMPREHENSIVE METABOLIC PANEL - Abnormal; Notable for the following components:       Result Value    Potassium 3.3 (*)     CO2 20 (*)     Glucose 152 (*)     All other components within normal limits    Narrative:     Release to patient->Immediate   URINALYSIS, REFLEX TO URINE CULTURE - Abnormal; Notable for the following components:    Appearance, UA Hazy (*)     Protein, UA Trace (*)     Nitrite, UA Positive (*)     All other components within normal limits    Narrative:     Specimen Source->Urine   CBC W/ AUTO DIFFERENTIAL - Abnormal; Notable for the following components:    Immature Grans (Abs) 0.05 (*)     All other components within normal limits   URINALYSIS MICROSCOPIC - Abnormal; Notable for the following components:    RBC, UA 7 (*)     WBC, UA 68 (*)     Non-Squam Epith 2 (*)     All other components within normal limits    Narrative:     Specimen  Source->Urine   ISTAT LACTATE - Abnormal; Notable for the following components:    POC Lactate 3.34 (*)     All other components within normal limits   CULTURE, BLOOD   CULTURE, BLOOD   CULTURE, URINE   HCG, QUANTITATIVE    Narrative:     Release to patient->Immediate   MAGNESIUM   PHOSPHORUS   LACTIC ACID, PLASMA   COMPREHENSIVE METABOLIC PANEL   MAGNESIUM   CBC W/ AUTO DIFFERENTIAL   PROTIME-INR   APTT   D DIMER, QUANTITATIVE   URINALYSIS, REFLEX TO URINE CULTURE   POCT LACTATE          Imaging Results              X-Ray Chest AP Portable (Final result)  Result time 07/10/24 00:23:15      Final result by Champ Vick MD (07/10/24 00:23:15)                   Impression:      Hypoventilatory examination with resultant bronchovascular crowding.  Suspected bilateral increased perihilar interstitial attenuation which may reflect superimposed viral respiratory illness or reactive airways disease.      Electronically signed by: Champ Vick MD  Date:    07/10/2024  Time:    00:23               Narrative:    EXAMINATION:  XR CHEST AP PORTABLE    CLINICAL HISTORY:  Seizure;    TECHNIQUE:  Single frontal view of the chest was performed.    COMPARISON:  01/16/2023    FINDINGS:  Cardiac monitoring leads overlie the chest.  There is presumed  shunt catheter tubing projecting over the left neck and chest.  Cardiac silhouette stable in size and configuration.  Lung volumes are mildly diminished with resultant bronchovascular crowding.  There is suspected bilateral increased perihilar interstitial attenuation which may reflect superimposed viral respiratory illness or reactive airways disease.  No significant volume of pleural fluid or pneumothorax appreciated.  Osseous structures demonstrate extensive postoperative changes of the spine.  There is a chronic minimally displaced fracture of the left sided Newman migue.                                       Medications   sodium chloride 0.9% flush 2 mL (has no  administration in time range)   melatonin tablet 6 mg (has no administration in time range)   senna-docusate 8.6-50 mg per tablet 1 tablet (has no administration in time range)   acetaminophen tablet 650 mg (has no administration in time range)   aluminum-magnesium hydroxide-simethicone 200-200-20 mg/5 mL suspension 30 mL (has no administration in time range)   acetaminophen tablet 650 mg (has no administration in time range)   potassium bicarbonate disintegrating tablet 50 mEq (has no administration in time range)   potassium bicarbonate disintegrating tablet 35 mEq (has no administration in time range)   potassium bicarbonate disintegrating tablet 60 mEq (has no administration in time range)   magnesium oxide tablet 800 mg (has no administration in time range)   magnesium oxide tablet 800 mg (has no administration in time range)   glucose chewable tablet 16 g (has no administration in time range)   glucose chewable tablet 24 g (has no administration in time range)   dextrose 50% injection 12.5 g (has no administration in time range)   dextrose 50% injection 25 g (has no administration in time range)   glucagon (human recombinant) injection 1 mg (has no administration in time range)   heparin (porcine) injection 5,000 Units (has no administration in time range)   cefTRIAXone (ROCEPHIN) 1 g in dextrose 5 % 100 mL IVPB (ready to mix) (0 g Intravenous Stopped 7/10/24 0225)   potassium bicarbonate disintegrating tablet 50 mEq (50 mEq Oral Given 7/10/24 0153)   sodium chloride 0.9% bolus 500 mL 500 mL ( Intravenous Canceled Entry 7/10/24 0300)   0.9%  NaCl infusion (1,000 mLs Intravenous New Bag 7/10/24 0247)     Medical Decision Making  The patient is here for a seizure but has had what appears to be aspiration and is tachypneic and tachycardic and will be admitted for treatment.  Hospitalist contacted case discussed patient to be admitted. Lucille Rodriges MD  4:04 AM 07/10/2024            Amount and/or  Complexity of Data Reviewed  Labs: ordered.  Radiology: ordered.    Risk  Prescription drug management.                                      Clinical Impression:  Final diagnoses:  [R56.9] Seizures          ED Disposition Condition    Admit                 Lucille Rodriges MD  07/10/24 0400

## 2024-07-10 NOTE — SUBJECTIVE & OBJECTIVE
Past Medical History:   Diagnosis Date    Anemia     Arnold-Chiari malformation, type II     Encounter for blood transfusion     Epilepsy     Hydrocephalus     Neurogenic bladder     self catheterizes every 3 hours    Raynaud disease     Seizures     only w/ shunt malfunction    Spinal bifida, closed     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       Past Surgical History:   Procedure Laterality Date    AMPUTATION      right 4th and 5th toes; left 5th toe and portion of left great toe    BACK SURGERY      BLADDER SURGERY      BREAST SURGERY  2015    Reduction    COLON SURGERY      flush site for bowel movements from appendix    CYSTOSTOMY      DEBRIDEMENT OF FOOT Right 1/8/2021    Procedure: DEBRIDEMENT, FOOT;  Surgeon: Abdi Bedoya DPM;  Location: [facility];  Service: Podiatry;  Laterality: Right;    EYE SURGERY      x 5    FLUOROSCOPIC URODYNAMIC STUDY N/A 3/12/2019    Procedure: URODYNAMIC STUDY, FLUOROSCOPIC;  Surgeon: Kenzie Charles MD;  Location: [address];  Service: Urology;  Laterality: N/A;  1 hour    FLUOROSCOPIC URODYNAMIC STUDY N/A 4/4/2019    Procedure: URODYNAMIC STUDY, FLUOROSCOPIC;  Surgeon: Kenzie Charles MD;  Location: [address];  Service: Urology;  Laterality: N/A;  1 hour    FLUOROSCOPIC URODYNAMIC STUDY N/A 5/11/2022    Procedure: URODYNAMIC STUDY, FLUOROSCOPIC;  Surgeon: Kenzie Charles MD;  Location: [address];  Service: Urology;  Laterality: N/A;  90min    FOOT AMPUTATION THROUGH METATARSAL Right 10/28/2019    Procedure: AMPUTATION, FOOT, TRANSMETATARSAL;  Surgeon: Abdi Bedoya DPM;  Location: [facility];  Service: Podiatry;  Laterality: Right;    FOOT AMPUTATION THROUGH METATARSAL Right 3/27/2020    Procedure: AMPUTATION, FOOT, TRANSMETATARSAL;  Surgeon: Abdi Bedoya DPM;  Location: [facility];  Service: Podiatry;  Laterality: Right;  REVISION    MYELOMININGOCELE REPAIR      NEPHRECTOMY Right 11/4/2021    Procedure: Nephrectomy/ OPEN;  Surgeon: Dash Rosenthal MD;  Location:  "[facility] OR 2ND FLR;  Service: Urology;  Laterality: Right;  4HRS    NEPHROSTOMY Right 8/2/2021    Procedure: Nephrostomy and perinephric abscess drain;  Surgeon: Lillian Surgeon;  Location: [facility];  Service: Anesthesiology;  Laterality: Right;    REVISION OF VENTRICULOPERITONEAL SHUNT Left 9/21/2022    Procedure: REVISION, SHUNT, VENTRICULOPERITONEAL;  Surgeon: Maynor Calero MD;  Location: [facility] 2ND FLR;  Service: Neurosurgery;  Laterality: Left;    STRABISMUS SURGERY      ou dr peña    VENTRICULOPERITONEAL SHUNT      WOUND DEBRIDEMENT  3/27/2020    Procedure: DEBRIDEMENT, WOUND;  Surgeon: Abdi Bedoya DPM;  Location: [facility] OR;  Service: Podiatry;;       Review of patient's allergies indicates:   Allergen Reactions    Latex, natural rubber Anaphylaxis and Other (See Comments)     angioedema    Zofran [ondansetron hcl (pf)] Swelling     Hives, "throat closes up"    Gardasil  [h papillomavirus vac,qval (pf)]      Other reaction(s): Shortness of breath    Menactra  [mening vac a,c,y,w135 dip (pf)]      Other reaction(s): Shortness of breath    Nitrofurantoin      Other reaction(s): Difficulty breathing    Sulfa (sulfonamide antibiotics)     Tramadol Hives    Levaquin [levofloxacin] Rash     Spots on face    Macrobid [nitrofurantoin monohyd/m-cryst] Rash     Sppots/rash on face       No current facility-administered medications on file prior to encounter.     Current Outpatient Medications on File Prior to Encounter   Medication Sig    amoxicillin-clavulanate 500-125mg (AUGMENTIN) 500-125 mg Tab Take 1 tablet by mouth every 12 (twelve) hours.    diazePAM (VALTOCO) 10 mg/spray (0.1 mL) Spry 10 mg by Nasal route every 5 (five) minutes as needed (Administer 1 spray into 1 nostril; if a second dose is needed, administer  in alternate nostril. A second dose should not be administered if the  patient is having trouble breathing or excessive sedatio).    ergocalciferol (ERGOCALCIFEROL) 50,000 unit Cap Take 50,000 Units " by mouth every 7 days.    ibuprofen (ADVIL,MOTRIN) 200 MG tablet Take 200 mg by mouth every 6 (six) hours as needed for Pain.    levETIRAcetam (KEPPRA) 750 MG Tab Take 2 tablets (1,500 mg total) by mouth 2 (two) times daily.    EPINEPHrine (EPIPEN) 0.3 mg/0.3 mL AtIn Inject 0.3 mLs (0.3 mg total) into the muscle as needed (Anaphylactic reaction).    riboflavin, vitamin B2, 400 mg Tab Take 400 mg by mouth once daily. (Patient not taking: Reported on 4/23/2024)    [DISCONTINUED] INV nrl-1 (INTRANASAL DIAZEPAM) 10 mg sprm nasal spray 1 spray by Left Nostril route once. Spray in the nose as directed for 1 dose.     Family History       Problem Relation (Age of Onset)    Arthritis Mother    Breast cancer Mother    Cataracts Maternal Grandmother    Epilepsy Father    Gout Father    Myocarditis Sister          Tobacco Use    Smoking status: Never    Smokeless tobacco: Never   Substance and Sexual Activity    Alcohol use: Not Currently    Drug use: Never    Sexual activity: Never     Review of Systems   Constitutional:  Negative for chills and fever.   Gastrointestinal:  Positive for nausea.   Genitourinary:  Positive for difficulty urinating (self cath).   Neurological:  Positive for seizures.   Psychiatric/Behavioral:  Negative for agitation. The patient is not nervous/anxious.      Objective:     Vital Signs (Most Recent):  Temp: 98.1 °F (36.7 °C) (07/09/24 2329)  Pulse: (!) 125 (07/10/24 1009)  Resp: (!) 26 (07/10/24 1001)  BP: 110/72 (07/10/24 0859)  SpO2: 97 % (07/10/24 1001) Vital Signs (24h Range):  Temp:  [98.1 °F (36.7 °C)] 98.1 °F (36.7 °C)  Pulse:  [113-138] 125  Resp:  [22-36] 26  SpO2:  [93 %-100 %] 97 %  BP: ()/(55-86) 110/72     Weight: 65.8 kg (145 lb)  Body mass index is 33.7 kg/m².     Physical Exam  Constitutional:       General: She is not in acute distress.     Appearance: She is well-developed. She is not toxic-appearing.   HENT:      Head: Normocephalic and atraumatic.      Mouth/Throat:       Mouth: Mucous membranes are dry.   Eyes:      Extraocular Movements:      Right eye: Abnormal extraocular motion present.      Conjunctiva/sclera: Conjunctivae normal.      Pupils: Pupils are equal, round, and reactive to light.   Cardiovascular:      Rate and Rhythm: Regular rhythm. Tachycardia present.      Heart sounds: Normal heart sounds.   Pulmonary:      Effort: Pulmonary effort is normal. Tachypnea present. No respiratory distress.      Breath sounds: Examination of the right-upper field reveals rhonchi. Examination of the left-upper field reveals rhonchi. Rhonchi present.   Abdominal:      General: Bowel sounds are normal. There is no distension.      Palpations: Abdomen is soft.   Musculoskeletal:      Cervical back: Normal range of motion and neck supple.      Comments: Bilateral transmetatarsal amputations   Skin:     General: Skin is warm and dry.   Neurological:      Mental Status: She is alert and oriented to person, place, and time. Mental status is at baseline.      Sensory: Sensory deficit (bilateral lower extremities) present.      Comments: Wheelchair bound at baseline   Psychiatric:         Mood and Affect: Mood normal.         Behavior: Behavior normal.              CRANIAL NERVES     CN III, IV, VI   Pupils are equal, round, and reactive to light.       Significant Labs: All pertinent labs within the past 24 hours have been reviewed.  CBC:   Recent Labs   Lab 07/09/24  2348 07/10/24  0427   WBC 11.63 20.93*   HGB 14.8 13.1   HCT 46.1 40.9    276     CMP:   Recent Labs   Lab 07/09/24  2348 07/10/24  0427    138   K 3.3* 4.0    108   CO2 20* 20*   * 137*   BUN 16 14   CREATININE 0.6 0.5   CALCIUM 8.7 7.7*   PROT 8.3 7.1   ALBUMIN 4.8 4.1   BILITOT 0.3 0.2   ALKPHOS 67 55   AST 17 17   ALT 19 16   ANIONGAP 13 10     Magnesium:   Recent Labs   Lab 07/09/24  2348 07/10/24  0427   MG 2.0 1.8     Urine Studies:   Recent Labs   Lab 07/10/24  0006   COLORU Yellow    APPEARANCEUA Hazy*   PHUR 7.0   SPECGRAV 1.015   PROTEINUA Trace*   GLUCUA Negative   KETONESU Negative   BILIRUBINUA Negative   OCCULTUA TRACE   NITRITE Positive*   UROBILINOGEN Negative   LEUKOCYTESUR Negative   RBCUA 7*   WBCUA 68*   BACTERIA Occasional   SQUAMEPITHEL 1       Significant Imaging: I have reviewed all pertinent imaging results/findings within the past 24 hours.  CXR:  Hypoventilatory examination with resultant bronchovascular crowding. Suspected bilateral increased perihilar interstitial attenuation which may reflect superimposed viral respiratory illness or reactive airways disease.

## 2024-07-11 VITALS
SYSTOLIC BLOOD PRESSURE: 102 MMHG | OXYGEN SATURATION: 98 % | HEIGHT: 55 IN | RESPIRATION RATE: 18 BRPM | WEIGHT: 136.69 LBS | BODY MASS INDEX: 31.63 KG/M2 | DIASTOLIC BLOOD PRESSURE: 64 MMHG | HEART RATE: 130 BPM | TEMPERATURE: 99 F

## 2024-07-11 PROBLEM — J69.0 ASPIRATION PNEUMONIA: Status: RESOLVED | Noted: 2024-07-10 | Resolved: 2024-07-11

## 2024-07-11 PROBLEM — A41.9 SEPSIS: Status: RESOLVED | Noted: 2021-08-02 | Resolved: 2024-07-11

## 2024-07-11 LAB
ALBUMIN SERPL BCP-MCNC: 4.1 G/DL (ref 3.5–5.2)
ALP SERPL-CCNC: 54 U/L (ref 55–135)
ALT SERPL W/O P-5'-P-CCNC: 18 U/L (ref 10–44)
ANION GAP SERPL CALC-SCNC: 5 MMOL/L (ref 8–16)
AST SERPL-CCNC: 19 U/L (ref 10–40)
BASOPHILS # BLD AUTO: 0.02 K/UL (ref 0–0.2)
BASOPHILS NFR BLD: 0.2 % (ref 0–1.9)
BILIRUB SERPL-MCNC: 0.4 MG/DL (ref 0.1–1)
BUN SERPL-MCNC: 8 MG/DL (ref 6–20)
CALCIUM SERPL-MCNC: 8.4 MG/DL (ref 8.7–10.5)
CHLORIDE SERPL-SCNC: 108 MMOL/L (ref 95–110)
CO2 SERPL-SCNC: 23 MMOL/L (ref 23–29)
CREAT SERPL-MCNC: 0.4 MG/DL (ref 0.5–1.4)
DIFFERENTIAL METHOD BLD: ABNORMAL
EOSINOPHIL # BLD AUTO: 0.1 K/UL (ref 0–0.5)
EOSINOPHIL NFR BLD: 0.5 % (ref 0–8)
ERYTHROCYTE [DISTWIDTH] IN BLOOD BY AUTOMATED COUNT: 13.3 % (ref 11.5–14.5)
EST. GFR  (NO RACE VARIABLE): >60 ML/MIN/1.73 M^2
GLUCOSE SERPL-MCNC: 90 MG/DL (ref 70–110)
HCT VFR BLD AUTO: 45.4 % (ref 37–48.5)
HGB BLD-MCNC: 14.5 G/DL (ref 12–16)
IMM GRANULOCYTES # BLD AUTO: 0.04 K/UL (ref 0–0.04)
IMM GRANULOCYTES NFR BLD AUTO: 0.3 % (ref 0–0.5)
LYMPHOCYTES # BLD AUTO: 2.4 K/UL (ref 1–4.8)
LYMPHOCYTES NFR BLD: 21 % (ref 18–48)
MAGNESIUM SERPL-MCNC: 1.9 MG/DL (ref 1.6–2.6)
MCH RBC QN AUTO: 28.6 PG (ref 27–31)
MCHC RBC AUTO-ENTMCNC: 31.9 G/DL (ref 32–36)
MCV RBC AUTO: 90 FL (ref 82–98)
MONOCYTES # BLD AUTO: 0.8 K/UL (ref 0.3–1)
MONOCYTES NFR BLD: 7.3 % (ref 4–15)
NEUTROPHILS # BLD AUTO: 8.2 K/UL (ref 1.8–7.7)
NEUTROPHILS NFR BLD: 70.7 % (ref 38–73)
NRBC BLD-RTO: 0 /100 WBC
PLATELET # BLD AUTO: 228 K/UL (ref 150–450)
PMV BLD AUTO: 10.5 FL (ref 9.2–12.9)
POTASSIUM SERPL-SCNC: 4.1 MMOL/L (ref 3.5–5.1)
PROT SERPL-MCNC: 7 G/DL (ref 6–8.4)
RBC # BLD AUTO: 5.07 M/UL (ref 4–5.4)
SODIUM SERPL-SCNC: 136 MMOL/L (ref 136–145)
WBC # BLD AUTO: 11.54 K/UL (ref 3.9–12.7)

## 2024-07-11 PROCEDURE — 25000003 PHARM REV CODE 250: Performed by: INTERNAL MEDICINE

## 2024-07-11 PROCEDURE — 63600175 PHARM REV CODE 636 W HCPCS: Performed by: NURSE PRACTITIONER

## 2024-07-11 PROCEDURE — 94640 AIRWAY INHALATION TREATMENT: CPT

## 2024-07-11 PROCEDURE — 36415 COLL VENOUS BLD VENIPUNCTURE: CPT | Performed by: INTERNAL MEDICINE

## 2024-07-11 PROCEDURE — 80053 COMPREHEN METABOLIC PANEL: CPT | Performed by: INTERNAL MEDICINE

## 2024-07-11 PROCEDURE — 63600175 PHARM REV CODE 636 W HCPCS: Performed by: INTERNAL MEDICINE

## 2024-07-11 PROCEDURE — 25000242 PHARM REV CODE 250 ALT 637 W/ HCPCS: Performed by: NURSE PRACTITIONER

## 2024-07-11 PROCEDURE — 83735 ASSAY OF MAGNESIUM: CPT | Performed by: INTERNAL MEDICINE

## 2024-07-11 PROCEDURE — 63600175 PHARM REV CODE 636 W HCPCS: Performed by: STUDENT IN AN ORGANIZED HEALTH CARE EDUCATION/TRAINING PROGRAM

## 2024-07-11 PROCEDURE — 94761 N-INVAS EAR/PLS OXIMETRY MLT: CPT

## 2024-07-11 PROCEDURE — 99900031 HC PATIENT EDUCATION (STAT)

## 2024-07-11 PROCEDURE — 25000003 PHARM REV CODE 250: Performed by: NURSE PRACTITIONER

## 2024-07-11 PROCEDURE — 25000003 PHARM REV CODE 250: Performed by: STUDENT IN AN ORGANIZED HEALTH CARE EDUCATION/TRAINING PROGRAM

## 2024-07-11 PROCEDURE — 85025 COMPLETE CBC W/AUTO DIFF WBC: CPT | Performed by: INTERNAL MEDICINE

## 2024-07-11 RX ORDER — PROMETHAZINE HYDROCHLORIDE 25 MG/ML
25 INJECTION, SOLUTION INTRAMUSCULAR; INTRAVENOUS EVERY 6 HOURS PRN
Status: DISCONTINUED | OUTPATIENT
Start: 2024-07-11 | End: 2024-07-11

## 2024-07-11 RX ORDER — SODIUM CHLORIDE, SODIUM LACTATE, POTASSIUM CHLORIDE, CALCIUM CHLORIDE 600; 310; 30; 20 MG/100ML; MG/100ML; MG/100ML; MG/100ML
INJECTION, SOLUTION INTRAVENOUS CONTINUOUS
Status: DISCONTINUED | OUTPATIENT
Start: 2024-07-11 | End: 2024-07-11 | Stop reason: HOSPADM

## 2024-07-11 RX ORDER — LACOSAMIDE 50 MG/1
50 TABLET ORAL EVERY 12 HOURS
Qty: 60 TABLET | Refills: 11 | Status: SHIPPED | OUTPATIENT
Start: 2024-07-11 | End: 2025-07-11

## 2024-07-11 RX ADMIN — GUAIFENESIN 600 MG: 600 TABLET, EXTENDED RELEASE ORAL at 08:07

## 2024-07-11 RX ADMIN — HEPARIN SODIUM 5000 UNITS: 5000 INJECTION INTRAVENOUS; SUBCUTANEOUS at 05:07

## 2024-07-11 RX ADMIN — SENNOSIDES AND DOCUSATE SODIUM 1 TABLET: 50; 8.6 TABLET ORAL at 08:07

## 2024-07-11 RX ADMIN — LACOSAMIDE 50 MG: 50 TABLET, FILM COATED ORAL at 08:07

## 2024-07-11 RX ADMIN — PROMETHAZINE HYDROCHLORIDE 25 MG: 25 INJECTION INTRAMUSCULAR; INTRAVENOUS at 01:07

## 2024-07-11 RX ADMIN — SODIUM CHLORIDE, POTASSIUM CHLORIDE, SODIUM LACTATE AND CALCIUM CHLORIDE: 600; 310; 30; 20 INJECTION, SOLUTION INTRAVENOUS at 01:07

## 2024-07-11 RX ADMIN — ACETAMINOPHEN 650 MG: 325 TABLET ORAL at 05:07

## 2024-07-11 RX ADMIN — PIPERACILLIN SODIUM AND TAZOBACTAM SODIUM 3.38 G: 3; .375 INJECTION, POWDER, LYOPHILIZED, FOR SOLUTION INTRAVENOUS at 03:07

## 2024-07-11 RX ADMIN — HEPARIN SODIUM 5000 UNITS: 5000 INJECTION INTRAVENOUS; SUBCUTANEOUS at 01:07

## 2024-07-11 RX ADMIN — LEVETIRACETAM 1500 MG: 15 INJECTION INTRAVASCULAR at 08:07

## 2024-07-11 RX ADMIN — LEVALBUTEROL HYDROCHLORIDE 0.63 MG: 0.63 SOLUTION RESPIRATORY (INHALATION) at 03:07

## 2024-07-11 RX ADMIN — PIPERACILLIN SODIUM AND TAZOBACTAM SODIUM 3.38 G: 3; .375 INJECTION, POWDER, LYOPHILIZED, FOR SOLUTION INTRAVENOUS at 11:07

## 2024-07-11 RX ADMIN — LEVALBUTEROL HYDROCHLORIDE 0.63 MG: 0.63 SOLUTION RESPIRATORY (INHALATION) at 07:07

## 2024-07-11 NOTE — NURSING
Nurses Note -- 4 Eyes      7/11/2024   5:38 AM      Skin assessed during: Q Shift Change      [x] No Altered Skin Integrity Present    [x]Prevention Measures Documented      [] Yes- Altered Skin Integrity Present or Discovered   [] LDA Added if Not in Epic (Describe Wound)   [] New Altered Skin Integrity was Present on Admit and Documented in LDA   [] Wound Image Taken    Wound Care Consulted? No    Attending Nurse:  Maira Nicholas RN/Staff Member:   Maru Bustillo

## 2024-07-11 NOTE — PLAN OF CARE
SW scheduled hospital f/u with PCP, and pt on wait list for an earlier appt.        07/11/24 1003   Post-Acute Status   Hospital Resources/Appts/Education Provided Appointments scheduled and added to AVS

## 2024-07-11 NOTE — PLAN OF CARE
DC orders and chart reviewed. No discharge needs noted.  Patient cleared for discharge from .    Patient is discharging to home and will resume home health services with [facility].  Resumption orders sent via [facility].  BRICE date will be tomorrow, 7/12.  FU appointment scheduled and added to AVS.  Family to transport home.       07/11/24 1404   Final Note   Assessment Type Final Discharge Note   Anticipated Discharge Disposition Home-Health   What phone number can be called within the next 1-3 days to see how you are doing after discharge? [phone]   Hospital Resources/Appts/Education Provided Appointments scheduled and added to AVS;Provided patient/caregiver with written discharge plan information;Post-Acute resouces added to AVS   Post-Acute Status   Post-Acute Authorization Home Health   Home Health Status Set-up Complete/Auth obtained   Patient choice form signed by patient/caregiver List with quality metrics by geographic area provided   Discharge Delays None known at this time

## 2024-07-11 NOTE — ASSESSMENT & PLAN NOTE
Was being treated for UTI outpatient with amoxicillin  Urine culture showed Gram-negative rods  Continue with IV Zosyn   Questionable pneumonia on chest x-ray, follow up procalcitonin

## 2024-07-11 NOTE — CARE UPDATE
07/10/24 3634   Patient Assessment/Suction   Level of Consciousness (AVPU) alert   Respiratory Effort Normal;Unlabored   Expansion/Accessory Muscles/Retractions no use of accessory muscles;no retractions   ANNEMARIE Breath Sounds clear   LLL Breath Sounds diminished   RUL Breath Sounds clear   RML Breath Sounds clear   RLL Breath Sounds diminished   Rhythm/Pattern, Respiratory unlabored;pattern regular;no shortness of breath reported   Cough Frequency no cough   PRE-TX-O2   Device (Oxygen Therapy) room air   SpO2 100 %   Pulse Oximetry Type Intermittent   $ Pulse Oximetry - Multiple Charge Pulse Oximetry - Multiple   Aerosol Therapy   $ Aerosol Therapy Charges Aerosol Treatment   Daily Review of Necessity (SVN) completed   Respiratory Treatment Status (SVN) given   Treatment Route (SVN) mask;oxygen   Patient Position HOB elevated   Post Treatment Assessment (SVN) increased aeration   Signs of Intolerance (SVN) none   Education   $ Education Bronchodilator;15 min

## 2024-07-11 NOTE — SUBJECTIVE & OBJECTIVE
Interval History:  Seen and examined this morning, reports feeling back to her baseline, WBC downtrending.  Urine cultures growing Gram-negative rods.    Review of Systems   Constitutional:  Negative for chills.   Respiratory:  Negative for cough and choking.    Cardiovascular:  Negative for chest pain and palpitations.     Objective:     Vital Signs (Most Recent):  Temp: 98.5 °F (36.9 °C) (07/11/24 0700)  Pulse: (!) 116 (07/11/24 0743)  Resp: 18 (07/11/24 0743)  BP: (!) 98/55 (07/11/24 0700)  SpO2: 97 % (07/11/24 0743) Vital Signs (24h Range):  Temp:  [97.5 °F (36.4 °C)-99.9 °F (37.7 °C)] 98.5 °F (36.9 °C)  Pulse:  [] 116  Resp:  [14-19] 18  SpO2:  [96 %-100 %] 97 %  BP: ()/(55-69) 98/55     Weight: 62 kg (136 lb 11 oz)  Body mass index is 31.77 kg/m².    Intake/Output Summary (Last 24 hours) at 7/11/2024 1044  Last data filed at 7/11/2024 0330  Gross per 24 hour   Intake 790 ml   Output 1025 ml   Net -235 ml         Physical Exam  Constitutional:       General: She is not in acute distress.     Appearance: She is well-developed.   Eyes:      Extraocular Movements:      Right eye: Normal extraocular motion.   Cardiovascular:      Rate and Rhythm: Regular rhythm. Tachycardia present.   Pulmonary:      Effort: Pulmonary effort is normal. No respiratory distress.   Musculoskeletal:      Comments: Bilateral transmetatarsal amputations   Skin:     General: Skin is warm and dry.   Neurological:      Mental Status: She is alert and oriented to person, place, and time. Mental status is at baseline.      Sensory: Sensory deficit (bilateral lower extremities) present.      Comments: Wheelchair bound at baseline   Psychiatric:         Mood and Affect: Mood normal.         Behavior: Behavior normal.             Significant Labs: All pertinent labs within the past 24 hours have been reviewed.  CBC:   Recent Labs   Lab 07/09/24  2348 07/10/24  0427 07/11/24  0430   WBC 11.63 20.93* 11.54   HGB 14.8 13.1 14.5   HCT  46.1 40.9 45.4    276 228     CMP:   Recent Labs   Lab 07/09/24  2348 07/10/24  0427 07/11/24  0430    138 136   K 3.3* 4.0 4.1    108 108   CO2 20* 20* 23   * 137* 90   BUN 16 14 8   CREATININE 0.6 0.5 0.4*   CALCIUM 8.7 7.7* 8.4*   PROT 8.3 7.1 7.0   ALBUMIN 4.8 4.1 4.1   BILITOT 0.3 0.2 0.4   ALKPHOS 67 55 54*   AST 17 17 19   ALT 19 16 18   ANIONGAP 13 10 5*       Significant Imaging: I have reviewed all pertinent imaging results/findings within the past 24 hours.

## 2024-07-11 NOTE — HOSPITAL COURSE
Patient with a history of Arnold Chiari malformation, anemia, epilepsy, spina bifida, hydrocephalus presents with breakthrough seizure.  Neurology consulted.  Started on Vimpat in addition to Keppra.  Was being treated for UTI outpatient, urine cultures pending, started on Zosyn.  CT head showed no acute findings.  Patient to continue Vimpat and Keppra on discharge.  Patient to follow up with Neurology outpatient.  Stable for discharge.  Patient instructed to continue outpatient antibiotics.    Physical Exam  Constitutional:       General: She is not in acute distress.     Appearance: She is well-developed.   Cardiovascular:      Rate and Rhythm: Regular rhythm.

## 2024-07-11 NOTE — DISCHARGE SUMMARY
[facility] Medicine  Discharge Summary      Patient Name: Shashank Samuel  MRN: [id number]  GIRMA: [id number]  Patient Class: IP- Inpatient  Admission Date: 7/9/2024  Hospital Length of Stay: 1 days  Discharge Date and Time:  07/11/2024 4:33 PM  Attending Physician: No att. providers found   Discharging Provider: Sanaz Abarca MD  Primary Care Provider: Dash Ponce MD    Primary Care Team: Networked reference to record PCT     HPI:   Shashank Samuel is a 30 y/o F with a past medical history significant for Arnold-Chiari malformation, type II, anemia, epilepsy, spina bifida, and hydrocephalus who presented to the ED by POV with c/o seizure.  She is accompanied by her mother and her sister who report that they found the patient slumped over in her wheelchair last night, making grunting noises.  She was give intranasal valium but continued to seize.  They managed to get her into the car and brought her to the ED.  The seizure halted just prior to arrival.  Mom states the seizure lasted approximately 20 minutes.  She was recently diagnosed with UTI and has been taking augmentin.  Hx neurogenic bladder and performs self cath through the umbilicus.  Denies recent fevers, headache,chest pain, cough or diarrhea, but has had some nausea.  ED workup significant for UTI, leukocytosis, lactic acidosis and hypokalemia.  CXR reflects possible infection.  Given history of seizure, aspiration is suspected.  Currently, she has returned to her baseline mental status.  She is admitted to the service of hospital medicine for continued monitoring and treatment.    * No surgery found *      Hospital Course:   Patient with a history of Arnold Chiari malformation, anemia, epilepsy, spina bifida, hydrocephalus presents with breakthrough seizure.  Neurology consulted.  Started on Vimpat in addition to Keppra.  Was being treated for UTI outpatient, urine cultures pending, started on Zosyn.  CT head  Patient called and left a message on voicemail stating that her worry about c-diff was a false alarm and she is feeling better. showed no acute findings.  Patient to continue Vimpat and Keppra on discharge.  Patient to follow up with Neurology outpatient.  Stable for discharge.  Patient instructed to continue outpatient antibiotics.    Physical Exam  Constitutional:       General: She is not in acute distress.     Appearance: She is well-developed.   Cardiovascular:      Rate and Rhythm: Regular rhythm.      Goals of Care Treatment Preferences:  Code Status: Full Code      Consults:   Consults (From admission, onward)          Status Ordering Provider     Inpatient consult to Neurology  Once        Provider:  Dov Smart MD    Completed GREG PALMA     Inpatient consult to Social Work  Once        Provider:  (Not yet assigned)    GENO Huitron            No new Assessment & Plan notes have been filed under this hospital service since the last note was generated.  Service: Hospital Medicine    Final Active Diagnoses:    Diagnosis Date Noted POA    PRINCIPAL PROBLEM:  Seizures [R56.9] 04/20/2024 Yes    UTI (urinary tract infection) [N39.0] 04/20/2024 Yes    Pressure ulcer of sacral region, stage 1 [L89.151] 05/16/2016 Yes    Paraplegia, T7 Complete [G82.20] 12/10/2013 Yes    Hydrocephalus [G91.9]  Yes    Neurogenic bladder [N31.9]  Yes    Spina bifida, lumbar region [Q05.7] 04/16/2013 Not Applicable      Problems Resolved During this Admission:    Diagnosis Date Noted Date Resolved POA    Aspiration pneumonia [J69.0] 07/10/2024 07/11/2024 Yes    Sepsis [A41.9] 08/02/2021 07/11/2024 Yes       Discharged Condition: good    Disposition: Home-Health Care [facility]    Follow Up:   Follow-up Information       Dash Ponce MD. Go on 8/1/2024.    Specialties: Family Medicine, Home Health Services, Hospice Services  Why: hospital f/u 08/01/2024 @ 11:40 AM. Doctor's office will also call you if they have an appt available sooner.  Contact information:  [facility] [address] [address]  [phone]                Robert More MD .    Specialty: Neurology  Contact information:  [address] - Neurology [facility] [address] [phone]               [facility] Follow up.    Specialties: Physical Therapy, Occupational Therapy, Home Health Services  Contact information:  [address]  [place] [address]  [phone]                           Patient Instructions:      Ambulatory referral/consult to Home Health   Standing Status: Future   Referral Priority: Routine Referral Type: Home Health   Referral Reason: Specialty Services Required   Requested Specialty: Home Health Services   Number of Visits Requested: 1     Diet Adult Regular     Notify your health care provider if you experience any of the following:  temperature >100.4     Notify your health care provider if you experience any of the following:  persistent nausea and vomiting or diarrhea     Notify your health care provider if you experience any of the following:  severe uncontrolled pain     Notify your health care provider if you experience any of the following:  redness, tenderness, or signs of infection (pain, swelling, redness, odor or green/yellow discharge around incision site)     Activity as tolerated       Significant Diagnostic Studies: Labs: CMP   Recent Labs   Lab 07/09/24  2348 07/10/24  0427 07/11/24  0430    138 136   K 3.3* 4.0 4.1    108 108   CO2 20* 20* 23   * 137* 90   BUN 16 14 8   CREATININE 0.6 0.5 0.4*   CALCIUM 8.7 7.7* 8.4*   PROT 8.3 7.1 7.0   ALBUMIN 4.8 4.1 4.1   BILITOT 0.3 0.2 0.4   ALKPHOS 67 55 54*   AST 17 17 19   ALT 19 16 18   ANIONGAP 13 10 5*    and CBC   Recent Labs   Lab 07/09/24  2348 07/10/24  0427 07/11/24  0430   WBC 11.63 20.93* 11.54   HGB 14.8 13.1 14.5   HCT 46.1 40.9 45.4    276 228       Pending Diagnostic Studies:       Procedure Component Value Units Date/Time    Procalcitonin [[id number]]     Order Status: Sent Lab Status: No  result     Specimen: Blood            Medications:  Reconciled Home Medications:      Medication List        START taking these medications      lacosamide 50 mg Tab  Commonly known as: VIMPAT  Take 1 tablet (50 mg total) by mouth every 12 (twelve) hours.            CHANGE how you take these medications      * amoxicillin-clavulanate 500-125mg 500-125 mg Tab  Commonly known as: AUGMENTIN  Take 1 tablet by mouth every 12 (twelve) hours.  What changed: Another medication with the same name was added. Make sure you understand how and when to take each.     * amoxicillin-clavulanate 875-125mg 875-125 mg per tablet  Commonly known as: AUGMENTIN  Take 1 tablet by mouth 2 (two) times daily. for 10 days  What changed: You were already taking a medication with the same name, and this prescription was added. Make sure you understand how and when to take each.           * This list has 2 medication(s) that are the same as other medications prescribed for you. Read the directions carefully, and ask your doctor or other care provider to review them with you.                CONTINUE taking these medications      EPINEPHrine 0.3 mg/0.3 mL Atin  Commonly known as: EPIPEN  Inject 0.3 mLs (0.3 mg total) into the muscle as needed (Anaphylactic reaction).     ergocalciferol 50,000 unit Cap  Commonly known as: ERGOCALCIFEROL  Take 50,000 Units by mouth every 7 days.     ibuprofen 200 MG tablet  Commonly known as: ADVIL,MOTRIN  Take 200 mg by mouth every 6 (six) hours as needed for Pain.     levETIRAcetam 750 MG Tab  Commonly known as: KEPPRA  Take 2 tablets (1,500 mg total) by mouth 2 (two) times daily.     VALTOCO 10 mg/spray (0.1 mL) Spry  Generic drug: diazePAM  10 mg by Nasal route every 5 (five) minutes as needed (Administer 1 spray into 1 nostril; if a second dose is needed, administer  in alternate nostril. A second dose should not be administered if the  patient is having trouble breathing or excessive sedatio).            STOP  taking these medications      riboflavin (vitamin B2) 400 mg Tab              Indwelling Lines/Drains at time of discharge:   Lines/Drains/Airways       Drain  Duration                  Colostomy Other (see comments) RLQ -- days                    Time spent on the discharge of patient: 36 minutes         Sanaz Abarca MD  Department of Hospital Medicine  [facility]

## 2024-07-11 NOTE — PROGRESS NOTES
Replaced by [facility]  Department of Neurology  Progress Note  Date: 2024 12:27 PM          Patient Name: Shashank Samuel   MRN: [id number]   : 1995    AGE: 29 y.o.    LOS: 1 days Hospital Day: 3  Admit date: 2024 11:21 PM     HPI: Shashank Samuel is a 29 y.o. female with a history of unknown Chiari malformation, spina bifida with bilateral lower extremities quadriplegia, hydrocephalus, epilepsy (on Keppra) presents to the hospital after a breakthrough seizure.  During my assessment, patient is back to her baseline and she is alert and oriented x3.  She states that she has seizure about once every month in her last seizure was about 1-2 months ago at that time her Keppra was increased to 1500 mg b.i.d. by her neurologist.  She states that she has been compliant with her medications.     Her seizures are usually tonic seizures without clonic movements.  She states that yesterday she had a similar seizure leading to fall.  Seizure episode lasted for about 5 minutes after which she was somnolent.  She was given intranasal Valium however seizure continued.  Her mother called EMS and she was brought to the hospital.       She denies any recent fevers, headache, chest pain, nausea, vomiting.  In the ER her workup was significant for UTI.  Admitted for further workup and management.       2024: No acute events overnight. Patient was seen and examined by me this morning.  No further seizures.  She is back to her baseline now.  Denies any other new complaints.    Vitals:  Patient Vitals for the past 24 hrs:   BP Temp Temp src Pulse Resp SpO2 Height Weight   24 1100 102/64 98.6 °F (37 °C) Oral (!) 117 16 96 % -- --   24 0743 -- -- -- (!) 116 18 97 % -- --   24 0700 (!) 98/55 98.5 °F (36.9 °C) Oral (!) 117 16 96 % -- --   24 0411 (!) 118/57 99.9 °F (37.7 °C) Oral 109 17 97 % -- --   07/10/24 2350 106/66 98.6 °F (37 °C) Oral 107 16 100 % -- --   07/10/24 1217 -- -- --  "-- -- 100 % -- --   07/10/24 2325 -- -- -- (!) 52 14 100 % -- --   07/10/24 1931 102/63 99 °F (37.2 °C) Oral (!) 130 18 97 % -- --   07/10/24 1723 (!) 109/59 97.5 °F (36.4 °C) -- (!) 130 18 96 % -- --   07/10/24 1721 -- 97.5 °F (36.4 °C) Temporal -- -- -- -- --   07/10/24 1700 -- -- -- -- -- -- 4' 7" (1.397 m) 62 kg (136 lb 11 oz)   07/10/24 1456 103/68 -- -- (!) 125 19 99 % -- --   07/10/24 1451 -- -- -- (!) 126 16 100 % -- --   07/10/24 1429 99/69 -- -- (!) 129 -- 98 % -- --   07/10/24 1230 104/69 -- -- (!) 126 -- 100 % -- --     PHYSICAL EXAM:     GENERAL APPEARANCE: Alert, well-developed, well-nourished female in no acute distress.  HEENT: Normocephalic and atraumatic. PERRL. Oropharynx unremarkable.  PULM: Normal respiratory effort. No accessory muscle use.  CV: RRR.  ABDOMEN: Soft, nontender.  SKIN: Clear; no rashes, lesions or skin breaks in exposed areas.     NEURO:  MENTAL STATUS: Patient awake and oriented to time, place, and person, recent/remote memory normal, attention span/concentration normal, and speech fluent without paraphasic errors.  Affect euthymic.     CRANIAL NERVES:  Pupils equal round and reactive to light, extraocular movements are intact, face is grossly symmetric, speech is clear     MOTOR:  Bulk normal. Tone normal and symmetric throughout.  Abnormal movements absent.  Tremor: none present.  Strength  5/5 bilateral upper extremities and 0/5 bilateral lower extremities .     REFLEXES:   1+ in bilateral upper extremities .    SENSATION:  Intact in bilateral upper extremities .  COORDINATION: normal finger-to-nose.  STATION: not tested.  GAIT: not tested.    CURRENT SCHEDULED MEDICATIONS:   guaiFENesin  600 mg Oral BID    heparin (porcine)  5,000 Units Subcutaneous Q8H    lacosamide  50 mg Oral Q12H    levalbuterol  0.63 mg Nebulization Q8H    levETIRAcetam (Keppra) IV (PEDS and ADULTS)  1,500 mg Intravenous Q12H    piperacillin-tazobactam (Zosyn) IV (PEDS and ADULTS) (extended infusion is " "not appropriate)  3.375 g Intravenous Q8H    senna-docusate 8.6-50 mg  1 tablet Oral Daily     CURRENT INFUSIONS:   lactated ringers   Intravenous Continuous         DATA:  Recent Labs   Lab 07/09/24  2348 07/10/24  0427 07/11/24  0430    138 136   K 3.3* 4.0 4.1    108 108   CO2 20* 20* 23   BUN 16 14 8   CREATININE 0.6 0.5 0.4*   * 137* 90   CALCIUM 8.7 7.7* 8.4*   PHOS 3.4  --   --    MG 2.0 1.8 1.9   AST 17 17 19   ALT 19 16 18     Recent Labs   Lab 07/09/24  2348 07/10/24  0427 07/11/24  0430   WBC 11.63 20.93* 11.54   HGB 14.8 13.1 14.5   HCT 46.1 40.9 45.4    276 228     Lab Results   Component Value Date    PROTEINCSF 8 (L) 09/21/2022    GLUCCSF 77 (H) 09/21/2022     No results found for: "HGBA1C"         I have personally reviewed and interpreted the pertinent imaging and lab results.  Imaging Results              CT Head Without Contrast (Final result)  Result time 07/10/24 22:41:13      Final result by Arin Vick MD (07/10/24 22:41:13)                   Impression:      1. Stably positioned biparietal ventricular shunt catheters with unchanged size and configuration of the shunted ventricular system and unchanged CT appearance of the brain parenchyma and posterior fossa as discussed above.  2. No definite CT evidence of acute intracranial hemorrhage.      Electronically signed by: Arin Vick MD  Date:    07/10/2024  Time:    22:41               Narrative:    EXAMINATION:  CT HEAD WITHOUT CONTRAST    CLINICAL HISTORY:  seizure;    TECHNIQUE:  Low dose axial images were obtained through the head.  Coronal and sagittal reformations were also performed. Contrast was not administered.    COMPARISON:  Head CT 04/19/2024    FINDINGS:  Stably position biparietal tracheal ir shunt catheters in place.  There is unchanged size and configuration of the shunted ventricular system with continued prominence of the ventricular system similar to prior study.  No evidence of interval " worsening or new overt hydrocephalus.  There is an unchanged appearance of the posterior fossa with findings in keeping with Chiari II malformation.  No evidence of acute intracranial hemorrhage.  Brain parenchyma appears unchanged.  The visualized paranasal sinuses and mastoid air cells are clear of acute process.  The visualized bones of the calvarium demonstrate no acute osseous abnormality.                                       X-Ray Chest AP Portable (Final result)  Result time 07/10/24 00:23:15      Final result by Champ Vick MD (07/10/24 00:23:15)                   Impression:      Hypoventilatory examination with resultant bronchovascular crowding.  Suspected bilateral increased perihilar interstitial attenuation which may reflect superimposed viral respiratory illness or reactive airways disease.      Electronically signed by: Champ Vick MD  Date:    07/10/2024  Time:    00:23               Narrative:    EXAMINATION:  XR CHEST AP PORTABLE    CLINICAL HISTORY:  Seizure;    TECHNIQUE:  Single frontal view of the chest was performed.    COMPARISON:  01/16/2023    FINDINGS:  Cardiac monitoring leads overlie the chest.  There is presumed  shunt catheter tubing projecting over the left neck and chest.  Cardiac silhouette stable in size and configuration.  Lung volumes are mildly diminished with resultant bronchovascular crowding.  There is suspected bilateral increased perihilar interstitial attenuation which may reflect superimposed viral respiratory illness or reactive airways disease.  No significant volume of pleural fluid or pneumothorax appreciated.  Osseous structures demonstrate extensive postoperative changes of the spine.  There is a chronic minimally displaced fracture of the left sided Newman migue.                                           ASSESSMENT AND PLAN:     Breakthrough seizure        Plan:   Etiology of breakthrough seizure is unknown.  Patient has been compliant with her  Keppra 1500 mg b.i.d.  CT head was done and was stable.  No acute abnormalities were noted and biparietal ventricular shunt catheter were stable  Loading dose of Vimpat 200 mg once followed by 50 mg b.i.d. maintenance dose of Vimpat.  Continue with Vimpat 50 mg b.i.d. on discharge in addition to Keppra 1500 mg b.i.d..  Seizure precautions while inpatient.    Avoid seizure threshold lowering medications.    Workup and management for metabolic derangements and infectious abnormalities per primary team.  PT OT evaluate and treat.    Outpatient neurology follow-up           Seizure education.       No driving for now, no swimming alone, no climbing high areas, no operation of heavy machinery or working with high risk electricity equipment.     Continue to take AEDs as prescribed. If any major side effects from neurological medications go to the ED including mood changes and severe depression.  Patient and/or next of kin informed.     Medication side effects discussed with the patient and/or caregiver.        Thank you kindly for including us in the care of this patient. Please do not hesitate to contact us with any questions.          Dov Smart MD  Neurology/vascular Neurology  Date of Service: 07/11/2024  12:27 PM    Please note: This note was transcribed using voice recognition software. Because of this technology there are often uinintended grammatical, spelling, and other transcription errors. Please disregard these errors.

## 2024-07-11 NOTE — PROGRESS NOTES
Critical access hospital Medicine  Progress Note    Patient Name: Shashank Samuel  MRN: [id number]  Patient Class: IP- Inpatient   Admission Date: 7/9/2024  Length of Stay: 1 days  Attending Physician: Sanaz Abarca MD  Primary Care Provider: Dash Ponce MD        Subjective:     Principal Problem:Seizures        HPI:  Shashank Samuel is a 28 y/o F with a past medical history significant for Arnold-Chiari malformation, type II, anemia, epilepsy, spina bifida, and hydrocephalus who presented to the ED by POV with c/o seizure.  She is accompanied by her mother and her sister who report that they found the patient slumped over in her wheelchair last night, making grunting noises.  She was give intranasal valium but continued to seize.  They managed to get her into the car and brought her to the ED.  The seizure halted just prior to arrival.  Mom states the seizure lasted approximately 20 minutes.  She was recently diagnosed with UTI and has been taking augmentin.  Hx neurogenic bladder and performs self cath through the umbilicus.  Denies recent fevers, headache,chest pain, cough or diarrhea, but has had some nausea.  ED workup significant for UTI, leukocytosis, lactic acidosis and hypokalemia.  CXR reflects possible infection.  Given history of seizure, aspiration is suspected.  Currently, she has returned to her baseline mental status.  She is admitted to the service of hospital medicine for continued monitoring and treatment.    Overview/Hospital Course:  Patient with a history of Arnold Chiari malformation, anemia, epilepsy, spina bifida, hydrocephalus presents with breakthrough seizure.  Neurology consulted.  Started on Vimpat in addition to Keppra.  Was being treated for UTI outpatient, urine cultures pending, started on Zosyn.  CT head showed no acute findings.    Interval History:  Seen and examined this morning, reports feeling back to her baseline, WBC downtrending.  Urine cultures  growing Gram-negative rods.    Review of Systems   Constitutional:  Negative for chills.   Respiratory:  Negative for cough and choking.    Cardiovascular:  Negative for chest pain and palpitations.     Objective:     Vital Signs (Most Recent):  Temp: 98.5 °F (36.9 °C) (07/11/24 0700)  Pulse: (!) 116 (07/11/24 0743)  Resp: 18 (07/11/24 0743)  BP: (!) 98/55 (07/11/24 0700)  SpO2: 97 % (07/11/24 0743) Vital Signs (24h Range):  Temp:  [97.5 °F (36.4 °C)-99.9 °F (37.7 °C)] 98.5 °F (36.9 °C)  Pulse:  [] 116  Resp:  [14-19] 18  SpO2:  [96 %-100 %] 97 %  BP: ()/(55-69) 98/55     Weight: 62 kg (136 lb 11 oz)  Body mass index is 31.77 kg/m².    Intake/Output Summary (Last 24 hours) at 7/11/2024 1044  Last data filed at 7/11/2024 0330  Gross per 24 hour   Intake 790 ml   Output 1025 ml   Net -235 ml         Physical Exam  Constitutional:       General: She is not in acute distress.     Appearance: She is well-developed.   Eyes:      Extraocular Movements:      Right eye: Normal extraocular motion.   Cardiovascular:      Rate and Rhythm: Regular rhythm. Tachycardia present.   Pulmonary:      Effort: Pulmonary effort is normal. No respiratory distress.   Musculoskeletal:      Comments: Bilateral transmetatarsal amputations   Skin:     General: Skin is warm and dry.   Neurological:      Mental Status: She is alert and oriented to person, place, and time. Mental status is at baseline.      Sensory: Sensory deficit (bilateral lower extremities) present.      Comments: Wheelchair bound at baseline   Psychiatric:         Mood and Affect: Mood normal.         Behavior: Behavior normal.             Significant Labs: All pertinent labs within the past 24 hours have been reviewed.  CBC:   Recent Labs   Lab 07/09/24  2348 07/10/24  0427 07/11/24  0430   WBC 11.63 20.93* 11.54   HGB 14.8 13.1 14.5   HCT 46.1 40.9 45.4    276 228     CMP:   Recent Labs   Lab 07/09/24  2348 07/10/24  0427 07/11/24  0430    138 136    K 3.3* 4.0 4.1    108 108   CO2 20* 20* 23   * 137* 90   BUN 16 14 8   CREATININE 0.6 0.5 0.4*   CALCIUM 8.7 7.7* 8.4*   PROT 8.3 7.1 7.0   ALBUMIN 4.8 4.1 4.1   BILITOT 0.3 0.2 0.4   ALKPHOS 67 55 54*   AST 17 17 19   ALT 19 16 18   ANIONGAP 13 10 5*       Significant Imaging: I have reviewed all pertinent imaging results/findings within the past 24 hours.    Assessment/Plan:      * Seizures  History frequent seizures  This episode possibly 2/2 infection.  Reports compliance with meds.  Consult neurology   Maintain aspiration, fall, and seizure precautions   Continue Keppra 1500 mg twice daily  Ativan p.r.n. seizure activity   Started on Vimpat by Neurology  No definitive CT evidence of acute intracranial hemorrhage.        Aspiration pneumonia  Questionable, follow up procalcitonin  Continue IV zosyn  Sputum cx  Supplemental oxygen as needed  Bronchodilators  De-escalate abx when appropriate    UTI (urinary tract infection)  Has been on augmentin OP  IV zosyn  Follow culture      Sepsis  Was being treated for UTI outpatient with amoxicillin  Urine culture showed Gram-negative rods  Continue with IV Zosyn   Questionable pneumonia on chest x-ray, follow up procalcitonin    Pressure ulcer of sacral region, stage 1  Turn q2hr  Consult wound care      Paraplegia, T7 Complete  Noted.  Turn q2h  Maintain fall precautions.      Neurogenic bladder  Continue self cath      Hydrocephalus  Has  shunt.  Followed by neurosurgery    Spina bifida, lumbar region    noted      VTE Risk Mitigation (From admission, onward)           Ordered     heparin (porcine) injection 5,000 Units  Every 8 hours         07/10/24 0346     IP VTE HIGH RISK PATIENT  Once         07/10/24 0346     Place sequential compression device  Until discontinued         07/10/24 0346                    Discharge Planning   DELANEY: 7/11/2024     Code Status: Full Code   Is the patient medically ready for discharge?:     Reason for patient still in  hospital (select all that apply): Patient trending condition  Discharge Plan A: Home Health                  Sanaz Abarca MD  Department of Hospital Medicine   Count includes the [facility]

## 2024-07-11 NOTE — PLAN OF CARE
Problem: Adult Inpatient Plan of Care  Goal: Plan of Care Review  Outcome: Progressing  Goal: Optimal Comfort and Wellbeing  Outcome: Progressing     Problem: Sepsis/Septic Shock  Goal: Optimal Nutrition Intake  Outcome: Progressing     Problem: Skin Injury Risk Increased  Goal: Skin Health and Integrity  Outcome: Progressing

## 2024-07-11 NOTE — CARE UPDATE
07/11/24 0743   Patient Assessment/Suction   Level of Consciousness (AVPU) alert   Respiratory Effort Unlabored   Expansion/Accessory Muscles/Retractions expansion symmetric;no retractions;no use of accessory muscles   All Lung Fields Breath Sounds Anterior:;Lateral:;clear;diminished   Rhythm/Pattern, Respiratory depth regular;pattern regular;unlabored   PRE-TX-O2   Device (Oxygen Therapy) room air   SpO2 97 %   Pulse Oximetry Type Intermittent   $ Pulse Oximetry - Multiple Charge Pulse Oximetry - Multiple   Pulse (!) 116   Resp 18   Aerosol Therapy   $ Aerosol Therapy Charges Aerosol Treatment   Daily Review of Necessity (SVN) completed   Respiratory Treatment Status (SVN) given   Treatment Route (SVN) mask   Patient Position HOB elevated   Post Treatment Assessment (SVN) increased aeration   Signs of Intolerance (SVN) none   Breath Sounds Post-Respiratory Treatment   Throughout All Fields Post-Treatment All Fields   Throughout All Fields Post-Treatment Anterior:;Lateral:;aeration increased   Post-treatment Heart Rate (beats/min) 98   Post-treatment Resp Rate (breaths/min) 18   Education   $ Education Bronchodilator;15 min

## 2024-07-11 NOTE — PLAN OF CARE
[facility]  Initial Discharge Assessment       Primary Care Provider: Dash Ponce MD    Admission Diagnosis: Seizures [R56.9]    Admission Date: 7/9/2024  Expected Discharge Date: 7/11/2024     completed discharge assessment via chart review. Pt AAOx4s. Pt lives at home with her parents. Demographics, PCP, and insurance verified. Resumption of services with [facility]. No dialysis. Pt completes ADLs with assistance of DME listed below. Pt to discharge home via family transport. Pt has no other needs to be addressed at this time.     Transition of Care Barriers: None    Payor: BLUE CROSS BLUE SHIELD / Plan: [place] PPO / Product Type: PPO /     Extended Emergency Contact Information  Primary Emergency Contact: Kayleen Samuel  Address: [address] [place] [address] [place] [address] [address]  Home Phone: [phone]  Mobile Phone: [phone]  Relation: Mother  Secondary Emergency Contact: Abdi Samuel  Address: [address]           [place] [address] [address]  Home Phone: [phone]  Mobile Phone: [phone]  Relation: Father    Discharge Plan A: Home Health  Discharge Plan B: Home Health      [facility] [address]  [place]  [address]  [address]  [place] [address]  Phone: [phone] Fax: [phone]    [id number] [place] [address] [facility] [address]  [address] [address]  Phone: [phone] Fax: [phone]      Initial Assessment (most recent)       Adult Discharge Assessment - 07/11/24 0936          Discharge Assessment    Assessment Type Discharge Planning Assessment     Confirmed/corrected address, phone number and insurance Yes     Confirmed Demographics Correct on Facesheet     Source of Information other (see comments)   Via chart review    Reason For Admission Seizures     People in Home parent(s)      Facility Arrived From: Home     Do you expect to return to your current living situation? Yes     Do you have help at home or someone to help you manage your care at home? Yes     Who are your caregiver(s) and their phone number(s)? Kayleen Samuel/mother [phone] and Abdi     Prior to hospitilization cognitive status: Alert/Oriented     Current cognitive status: Alert/Oriented     Walking or Climbing Stairs Difficulty yes     Walking or Climbing Stairs ambulation difficulty, requires equipment;stair climbing difficulty, assistance 1 person;transferring difficulty, requires equipment   Ramp to get into her house    Dressing/Bathing Difficulty yes     Dressing/Bathing bathing difficulty, requires equipment     Do you have any problems with: Errands/Grocery     Home Accessibility wheelchair accessible     Home Layout Able to live on 1st floor     Equipment Currently Used at Home wheelchair;grab bar;raised toilet   Ramp for stairs    Readmission within 30 days? No     Patient currently being followed by outpatient case management? No     Do you currently have service(s) that help you manage your care at home? Yes     Name and Contact number of agency [facility] in [place]     Is the pt/caregiver preference to resume services with current agency Yes     Do you take prescription medications? Yes     Do you have prescription coverage? Yes     Coverage BLUE CROSS BLUE SHIELD - BCBS OF [facility]     Do you have any problems affording any of your prescribed medications? No     Is the patient taking medications as prescribed? yes     Who is going to help you get home at discharge? Kayleen Samuel/mother [phone] and Abdi Samuel/father [phone]     How do you get to doctors appointments? family or friend will provide     Are you on dialysis? No     Do you take coumadin? No     Discharge Plan A Home Health     Discharge Plan B Home Health     DME Needed Upon Discharge  none     Transition of Care  Barriers None

## 2024-07-11 NOTE — ASSESSMENT & PLAN NOTE
History frequent seizures  This episode possibly 2/2 infection.  Reports compliance with meds.  Consult neurology   Maintain aspiration, fall, and seizure precautions   Continue Keppra 1500 mg twice daily  Ativan p.r.n. seizure activity   Started on Vimpat by Neurology  No definitive CT evidence of acute intracranial hemorrhage.

## 2024-07-11 NOTE — ASSESSMENT & PLAN NOTE
Questionable, follow up procalcitonin  Continue IV zosyn  Sputum cx  Supplemental oxygen as needed  Bronchodilators  De-escalate abx when appropriate

## 2024-07-12 ENCOUNTER — TELEPHONE (OUTPATIENT)
Dept: FAMILY MEDICINE | Facility: CLINIC | Age: 29
End: 2024-07-12
Payer: COMMERCIAL

## 2024-07-12 LAB — BACTERIA UR CULT: ABNORMAL

## 2024-07-12 NOTE — TELEPHONE ENCOUNTER
Spoke with pt's mother, Kayleen, who states that pt is doing ok since discharge. Pt states that she is having some pain in her neck because she has been sleeping on it wrong. Hospital start pt on Vimpat 50mg BID. Pts mother is having some difficulty getting the medication filled. Pt was already established with [facility] for pressure wound. Her mother is going to see about getting her PT/OT. No DME ordered. Pt to f/u with Dr. More. HFU scheduled. TCC note done.

## 2024-07-12 NOTE — TELEPHONE ENCOUNTER
----- Message from Paige Sage MA sent at 7/12/2024  8:10 AM CDT -----  Call patient - needs post-hospital phone call within 2 business days and hospital follow up visit scheduled within 7-14 days.    Discharged yesterday

## 2024-07-13 LAB
OHS QRS DURATION: 86 MS
OHS QTC CALCULATION: 407 MS

## 2024-07-15 ENCOUNTER — OFFICE VISIT (OUTPATIENT)
Dept: NEUROLOGY | Facility: CLINIC | Age: 29
End: 2024-07-15
Payer: COMMERCIAL

## 2024-07-15 VITALS — DIASTOLIC BLOOD PRESSURE: 95 MMHG | SYSTOLIC BLOOD PRESSURE: 135 MMHG | HEART RATE: 86 BPM

## 2024-07-15 DIAGNOSIS — R56.9 SEIZURES: ICD-10-CM

## 2024-07-15 LAB
BACTERIA BLD CULT: NORMAL
BACTERIA BLD CULT: NORMAL

## 2024-07-15 PROCEDURE — 3080F DIAST BP >= 90 MM HG: CPT | Mod: CPTII,S$GLB,,

## 2024-07-15 PROCEDURE — 99999 PR PBB SHADOW E&M-EST. PATIENT-LVL III: CPT | Mod: PBBFAC,,,

## 2024-07-15 PROCEDURE — 99213 OFFICE O/P EST LOW 20 MIN: CPT | Mod: S$GLB,,,

## 2024-07-15 PROCEDURE — 1111F DSCHRG MED/CURRENT MED MERGE: CPT | Mod: CPTII,S$GLB,,

## 2024-07-15 PROCEDURE — 3075F SYST BP GE 130 - 139MM HG: CPT | Mod: CPTII,S$GLB,,

## 2024-07-15 RX ORDER — CIPROFLOXACIN 500 MG/1
500 TABLET ORAL 2 TIMES DAILY
COMMUNITY
Start: 2024-07-12

## 2024-07-16 ENCOUNTER — PATIENT OUTREACH (OUTPATIENT)
Dept: ADMINISTRATIVE | Facility: CLINIC | Age: 29
End: 2024-07-16
Payer: COMMERCIAL

## 2024-07-16 NOTE — PROGRESS NOTES
C3 nurse spoke with Shashank Samuel (Mother) for a TCC post hospital discharge follow up call. The patient has a scheduled HOSFU appointment with Elisa Eng on 07/23/2024 @ 3618.

## 2024-07-16 NOTE — PROGRESS NOTES
C3 nurse attempted to contact Shashank Samuel (Mother) for a TCC post hospital discharge follow up call. The patient is unable to conduct the call @ this time. The patient's mother requested a callback.    The patient has a scheduled HOSFU appointment with Elisa Lewis on 07/23/2024 @ 0138. Message sent to Physician staff.

## 2024-07-23 ENCOUNTER — OFFICE VISIT (OUTPATIENT)
Dept: FAMILY MEDICINE | Facility: CLINIC | Age: 29
End: 2024-07-23
Payer: COMMERCIAL

## 2024-07-23 VITALS
HEIGHT: 55 IN | BODY MASS INDEX: 31.77 KG/M2 | OXYGEN SATURATION: 95 % | DIASTOLIC BLOOD PRESSURE: 84 MMHG | HEART RATE: 88 BPM | SYSTOLIC BLOOD PRESSURE: 112 MMHG

## 2024-07-23 DIAGNOSIS — R56.9 SEIZURES: ICD-10-CM

## 2024-07-23 DIAGNOSIS — Q05.2 SPINA BIFIDA OF LUMBAR REGION WITH HYDROCEPHALUS: ICD-10-CM

## 2024-07-23 DIAGNOSIS — M54.2 NECK PAIN: ICD-10-CM

## 2024-07-23 DIAGNOSIS — M53.82 DECREASED ROM OF INTERVERTEBRAL DISCS OF CERVICAL SPINE: ICD-10-CM

## 2024-07-23 DIAGNOSIS — R00.0 SINUS TACHYCARDIA: ICD-10-CM

## 2024-07-23 DIAGNOSIS — N39.0 URINARY TRACT INFECTION WITHOUT HEMATURIA, SITE UNSPECIFIED: ICD-10-CM

## 2024-07-23 DIAGNOSIS — Z09 HOSPITAL DISCHARGE FOLLOW-UP: Primary | ICD-10-CM

## 2024-07-23 PROCEDURE — 3079F DIAST BP 80-89 MM HG: CPT | Mod: CPTII,,,

## 2024-07-23 PROCEDURE — 99495 TRANSJ CARE MGMT MOD F2F 14D: CPT | Mod: ,,,

## 2024-07-23 PROCEDURE — 1160F RVW MEDS BY RX/DR IN RCRD: CPT | Mod: CPTII,,,

## 2024-07-23 PROCEDURE — 1111F DSCHRG MED/CURRENT MED MERGE: CPT | Mod: CPTII,,,

## 2024-07-23 PROCEDURE — 1159F MED LIST DOCD IN RCRD: CPT | Mod: CPTII,,,

## 2024-07-23 PROCEDURE — 3074F SYST BP LT 130 MM HG: CPT | Mod: CPTII,,,

## 2024-07-25 DIAGNOSIS — T78.2XXD ANAPHYLAXIS, SUBSEQUENT ENCOUNTER: ICD-10-CM

## 2024-07-25 RX ORDER — EPINEPHRINE 0.3 MG/.3ML
1 INJECTION SUBCUTANEOUS
Qty: 0.3 ML | Refills: 0 | Status: SHIPPED | OUTPATIENT
Start: 2024-07-25

## 2024-07-25 NOTE — TELEPHONE ENCOUNTER
Doesn't look like we've ever filled this.     Last OV 7/23/2024    Rx order set up, if ok to fill.

## 2024-08-02 ENCOUNTER — TELEPHONE (OUTPATIENT)
Dept: UROLOGY | Facility: CLINIC | Age: 29
End: 2024-08-02
Payer: COMMERCIAL

## 2024-08-02 DIAGNOSIS — R56.9 SEIZURES: ICD-10-CM

## 2024-08-02 RX ORDER — DIAZEPAM 10 MG/100UL
10 SPRAY NASAL EVERY 5 MIN PRN
Qty: 1 EACH | Refills: 3 | Status: SHIPPED | OUTPATIENT
Start: 2024-08-02

## 2024-08-02 NOTE — PROGRESS NOTES
Problem List Items Addressed This Visit          Neuro    Seizures    Relevant Medications    diazePAM (VALTOCO) 10 mg/spray (0.1 mL) Spry       08/02/2024  Robert More MD, [facility]  Neurology resident, PGY-4  Department of Neurology  [facility]

## 2024-08-02 NOTE — TELEPHONE ENCOUNTER
----- Message from Mary Del Cid sent at 8/2/2024  4:03 PM CDT -----  PATIENTCALL     Pt is calling to speak with someone in provider office regarding she state that [facility] has been calling for months to get her daughters cathfrancy an she has not gotten them yet she states that they need a new order. She is asking for a return call please call.  You can fax the order to [phone]

## 2024-08-05 NOTE — TELEPHONE ENCOUNTER
Called phone number and received voicemail. Left detailed message notifying that I am calling for a copy of pt's rx to be faxed to clinic at [phone] ([facility]) or [phone] ([facility]) for Dr. Charles to sign. No previous scripts received.

## 2024-08-05 NOTE — TELEPHONE ENCOUNTER
States she was contacted by [facility], ph# [phone], and told that they have been trying to reach Dr. Charles for updated script for pt's catheter supplies, with no success. Notified mom, that we have not received and notices or phone calls from [facility] in regards to pt's supplies. Informed that I will call to see what is needed and that pt is due for yearly follow up. Scheduled appt for 9:20am on Tues, 9/3/24 at [facility].

## 2024-08-06 ENCOUNTER — CLINICAL SUPPORT (OUTPATIENT)
Dept: REHABILITATION | Facility: HOSPITAL | Age: 29
End: 2024-08-06
Payer: COMMERCIAL

## 2024-08-06 DIAGNOSIS — Z74.09 DECREASED FUNCTIONAL MOBILITY AND ENDURANCE: ICD-10-CM

## 2024-08-06 DIAGNOSIS — M62.89 ABNORMAL INCREASED MUSCLE TONE: ICD-10-CM

## 2024-08-06 DIAGNOSIS — M53.82 DECREASED ROM OF INTERVERTEBRAL DISCS OF CERVICAL SPINE: ICD-10-CM

## 2024-08-06 DIAGNOSIS — Z55.9 SPECIAL EDUCATIONAL NEEDS: ICD-10-CM

## 2024-08-06 DIAGNOSIS — R53.1 WEAKNESS: ICD-10-CM

## 2024-08-06 DIAGNOSIS — M54.2 NECK PAIN: Primary | ICD-10-CM

## 2024-08-06 PROCEDURE — 97110 THERAPEUTIC EXERCISES: CPT | Mod: PN

## 2024-08-06 PROCEDURE — 97162 PT EVAL MOD COMPLEX 30 MIN: CPT | Mod: PN

## 2024-08-06 SDOH — SOCIAL DETERMINANTS OF HEALTH (SDOH): PROBLEMS RELATED TO EDUCATION AND LITERACY, UNSPECIFIED: Z55.9

## 2024-08-11 PROBLEM — M53.82 DECREASED ROM OF INTERVERTEBRAL DISCS OF CERVICAL SPINE: Status: ACTIVE | Noted: 2024-08-11

## 2024-08-11 NOTE — PROGRESS NOTES
Patient ID: Shashank Samuel is a 29 y.o. female.    Chief Complaint: Follow-up (Bottles brought//Pt is here for a hospital follow up//KE)    29-year-old established female patient presents to clinic today for a hospital follow-up.  Despite being on max dose of Keppra, patient's mother and cyst her found her having a seizure recently.  During evaluation in the emergency room, patient was found to have urinary tract infection which could have contributed to her seizure activity.  Chest x-ray also revealed possible aspiration changes in the lungs.  This could have happened when she had her seizure.  She was admitted for evaluation and treatment of the urinary tract infection, to monitor her lungs, and started on Vimpat in addition to her Keppra and was monitored for seizure activity which was negative.  Below is pasted a copy of the discharge summary.  Patient is doing fine since returned home, with the exception of some neck stiffness, muscle tightness, and decreased range of motion.  Could be from muscle tension during the seizure.    Patient Name: Shashank Samuel  MRN: [id number]  [facility]: [id number]  Patient Class: IP- Inpatient  Admission Date: 7/9/2024  Hospital Length of Stay: 1 days  Discharge Date and Time:  07/11/2024 4:33 PM  Attending Physician: No att. providers found   Discharging Provider: Sanaz Abarca MD  Primary Care Provider: Dash Ponce MD     Primary Care Team: Networked reference to record PCT      HPI:   Shashank Samuel is a 28 y/o F with a past medical history significant for Arnold-Chiari malformation, type II, anemia, epilepsy, spina bifida, and hydrocephalus who presented to the ED by POV with c/o seizure.  She is accompanied by her mother and her sister who report that they found the patient slumped over in her wheelchair last night, making grunting noises.  She was give intranasal valium but continued to seize.  They managed to get her into the car and brought her to the  ED.  The seizure halted just prior to arrival.  Mom states the seizure lasted approximately 20 minutes.  She was recently diagnosed with UTI and has been taking augmentin.  Hx neurogenic bladder and performs self cath through the umbilicus.  Denies recent fevers, headache,chest pain, cough or diarrhea, but has had some nausea.  ED workup significant for UTI, leukocytosis, lactic acidosis and hypokalemia.  CXR reflects possible infection.  Given history of seizure, aspiration is suspected.  Currently, she has returned to her baseline mental status.  She is admitted to the service of hospital medicine for continued monitoring and treatment.     * No surgery found *       Hospital Course:   Patient with a history of Arnold Chiari malformation, anemia, epilepsy, spina bifida, hydrocephalus presents with breakthrough seizure.  Neurology consulted.  Started on Vimpat in addition to Keppra.  Was being treated for UTI outpatient, urine cultures pending, started on Zosyn.  CT head showed no acute findings.  Patient to continue Vimpat and Keppra on discharge.  Patient to follow up with Neurology outpatient.  Stable for discharge.  Patient instructed to continue outpatient antibiotics.         Follow-up  Associated symptoms include neck pain.       Admit Date: 7/9/24   Discharge Date: 7/11/24  Discharge Facility: Hospital    Medication Reconciliation:  Medications changed/added/deleted. antibiotics  New Prescriptions filled after discharge: yes  Discharge summary reviewed:  yes  Pending test results at discharge reviewed:   not applicable  Follow up appointments scheduled:  not applicable     Follow up labs/tests ordered:   not applicable  Home Health ordered on discharge:   no  Home Health company name:   DME ordered at discharge:   not applicable  Disease/illness education: antibiotic management  Discussion with other health care providers: not applicable  Establishment or re-establishment of referral orders for [facility]  resources: No other necessary community resources  Family and/or Caretaker present at visit?  yes    Discharge Summary:reviewed    How patient is feeling since discharge from the hospital?  fine    Patient follow up phone call documented on separate encounter.    Admission on 07/09/2024, Discharged on 07/11/2024   Component Date Value Ref Range Status    Sodium 07/09/2024 136  136 - 145 mmol/L Final    Potassium 07/09/2024 3.3 (L)  3.5 - 5.1 mmol/L Final    Chloride 07/09/2024 103  95 - 110 mmol/L Final    CO2 07/09/2024 20 (L)  23 - 29 mmol/L Final    Glucose 07/09/2024 152 (H)  70 - 110 mg/dL Final    BUN 07/09/2024 16  6 - 20 mg/dL Final    Creatinine 07/09/2024 0.6  0.5 - 1.4 mg/dL Final    Calcium 07/09/2024 8.7  8.7 - 10.5 mg/dL Final    Total Protein 07/09/2024 8.3  6.0 - 8.4 g/dL Final    Albumin 07/09/2024 4.8  3.5 - 5.2 g/dL Final    Total Bilirubin 07/09/2024 0.3  0.1 - 1.0 mg/dL Final    Alkaline Phosphatase 07/09/2024 67  55 - 135 U/L Final    AST 07/09/2024 17  10 - 40 U/L Final    ALT 07/09/2024 19  10 - 44 U/L Final    eGFR 07/09/2024 >60.0  >60 mL/min/1.73 m^2 Final    Anion Gap 07/09/2024 13  8 - 16 mmol/L Final    HCG Quant 07/09/2024 <0.60  See Text mIU/mL Final    Specimen UA 07/10/2024 Urine, Clean Catch   Final    Color, UA 07/10/2024 Yellow  Yellow, Straw, Christina Final    Appearance, UA 07/10/2024 Hazy (A)  Clear Final    pH, UA 07/10/2024 7.0  5.0 - 8.0 Final    Specific Gravity, UA 07/10/2024 1.015  1.005 - 1.030 Final    Protein, UA 07/10/2024 Trace (A)  Negative Final    Glucose, UA 07/10/2024 Negative  Negative Final    Ketones, UA 07/10/2024 Negative  Negative Final    Bilirubin (UA) 07/10/2024 Negative  Negative Final    Occult Blood UA 07/10/2024 TRACE  Negative Final    Nitrite, UA 07/10/2024 Positive (A)  Negative Final    Urobilinogen, UA 07/10/2024 Negative  Negative EU/dL Final    Leukocytes, UA 07/10/2024 Negative  Negative Final    Blood Culture, Routine 07/09/2024 No growth  after 5 days.   Final    Blood Culture, Routine 07/09/2024 No growth after 5 days.   Final    WBC 07/09/2024 11.63  3.90 - 12.70 K/uL Final    RBC 07/09/2024 5.10  4.00 - 5.40 M/uL Final    Hemoglobin 07/09/2024 14.8  12.0 - 16.0 g/dL Final    Hematocrit 07/09/2024 46.1  37.0 - 48.5 % Final    MCV 07/09/2024 90  82 - 98 fL Final    MCH 07/09/2024 29.0  27.0 - 31.0 pg Final    MCHC 07/09/2024 32.1  32.0 - 36.0 g/dL Final    RDW 07/09/2024 13.2  11.5 - 14.5 % Final    Platelets 07/09/2024 393  150 - 450 K/uL Final    MPV 07/09/2024 10.5  9.2 - 12.9 fL Final    Immature Granulocytes 07/09/2024 0.4  0.0 - 0.5 % Final    Gran # (ANC) 07/09/2024 6.2  1.8 - 7.7 K/uL Final    Immature Grans (Abs) 07/09/2024 0.05 (H)  0.00 - 0.04 K/uL Final    Lymph # 07/09/2024 4.5  1.0 - 4.8 K/uL Final    Mono # 07/09/2024 0.8  0.3 - 1.0 K/uL Final    Eos # 07/09/2024 0.1  0.0 - 0.5 K/uL Final    Baso # 07/09/2024 0.03  0.00 - 0.20 K/uL Final    nRBC 07/09/2024 0  0 /100 WBC Final    Gran % 07/09/2024 53.0  38.0 - 73.0 % Final    Lymph % 07/09/2024 38.8  18.0 - 48.0 % Final    Mono % 07/09/2024 6.6  4.0 - 15.0 % Final    Eosinophil % 07/09/2024 0.9  0.0 - 8.0 % Final    Basophil % 07/09/2024 0.3  0.0 - 1.9 % Final    Differential Method 07/09/2024 Automated   Final    Magnesium 07/09/2024 2.0  1.6 - 2.6 mg/dL Final    Phosphorus 07/09/2024 3.4  2.7 - 4.5 mg/dL Final    POC Lactate 07/09/2024 3.34 (H)  0.5 - 2.2 mmol/L Final    Sample 07/09/2024 VENOUS   Final    RBC, UA 07/10/2024 7 (H)  0 - 4 /hpf Final    WBC, UA 07/10/2024 68 (H)  0 - 5 /hpf Final    Bacteria 07/10/2024 Occasional  None-Occ /hpf Final    Squam Epithel, UA 07/10/2024 1  /hpf Final    Non-Squam Epith 07/10/2024 2 (A)  <1/hpf /hpf Final    Microscopic Comment 07/10/2024 SEE COMMENT   Final    Urine Culture, Routine 07/10/2024  (A)   Final                    Value:CITROBACTER FREUNDII  >100,000 cfu/ml      Lactate (Lactic Acid) 07/10/2024 4.1 (HH)  0.5 - 1.9 mmol/L Final     Sodium 07/10/2024 138  136 - 145 mmol/L Final    Potassium 07/10/2024 4.0  3.5 - 5.1 mmol/L Final    Chloride 07/10/2024 108  95 - 110 mmol/L Final    CO2 07/10/2024 20 (L)  23 - 29 mmol/L Final    Glucose 07/10/2024 137 (H)  70 - 110 mg/dL Final    BUN 07/10/2024 14  6 - 20 mg/dL Final    Creatinine 07/10/2024 0.5  0.5 - 1.4 mg/dL Final    Calcium 07/10/2024 7.7 (L)  8.7 - 10.5 mg/dL Final    Total Protein 07/10/2024 7.1  6.0 - 8.4 g/dL Final    Albumin 07/10/2024 4.1  3.5 - 5.2 g/dL Final    Total Bilirubin 07/10/2024 0.2  0.1 - 1.0 mg/dL Final    Alkaline Phosphatase 07/10/2024 55  55 - 135 U/L Final    AST 07/10/2024 17  10 - 40 U/L Final    ALT 07/10/2024 16  10 - 44 U/L Final    eGFR 07/10/2024 >60.0  >60 mL/min/1.73 m^2 Final    Anion Gap 07/10/2024 10  8 - 16 mmol/L Final    Magnesium 07/10/2024 1.8  1.6 - 2.6 mg/dL Final    WBC 07/10/2024 20.93 (H)  3.90 - 12.70 K/uL Final    RBC 07/10/2024 4.57  4.00 - 5.40 M/uL Final    Hemoglobin 07/10/2024 13.1  12.0 - 16.0 g/dL Final    Hematocrit 07/10/2024 40.9  37.0 - 48.5 % Final    MCV 07/10/2024 90  82 - 98 fL Final    MCH 07/10/2024 28.7  27.0 - 31.0 pg Final    MCHC 07/10/2024 32.0  32.0 - 36.0 g/dL Final    RDW 07/10/2024 13.2  11.5 - 14.5 % Final    Platelets 07/10/2024 276  150 - 450 K/uL Final    MPV 07/10/2024 10.0  9.2 - 12.9 fL Final    Immature Granulocytes 07/10/2024 0.5  0.0 - 0.5 % Final    Gran # (ANC) 07/10/2024 19.1 (H)  1.8 - 7.7 K/uL Final    Immature Grans (Abs) 07/10/2024 0.11 (H)  0.00 - 0.04 K/uL Final    Lymph # 07/10/2024 0.9 (L)  1.0 - 4.8 K/uL Final    Mono # 07/10/2024 0.8  0.3 - 1.0 K/uL Final    Eos # 07/10/2024 0.0  0.0 - 0.5 K/uL Final    Baso # 07/10/2024 0.02  0.00 - 0.20 K/uL Final    nRBC 07/10/2024 0  0 /100 WBC Final    Gran % 07/10/2024 91.3 (H)  38.0 - 73.0 % Final    Lymph % 07/10/2024 4.4 (L)  18.0 - 48.0 % Final    Mono % 07/10/2024 3.7 (L)  4.0 - 15.0 % Final    Eosinophil % 07/10/2024 0.0  0.0 - 8.0 % Final    Basophil  % 07/10/2024 0.1  0.0 - 1.9 % Final    Differential Method 07/10/2024 Automated   Final    Prothrombin Time 07/10/2024 11.2  9.0 - 12.5 sec Final    INR 07/10/2024 1.0  0.8 - 1.2 Final    aPTT 07/10/2024 25.9  21.0 - 32.0 sec Final    D-Dimer 07/10/2024 1.23 (HH)  0.00 - 0.49 mg/L FEU Final    Lactate (Lactic Acid) 07/10/2024 2.2 (HH)  0.5 - 1.9 mmol/L Final    Lactate (Lactic Acid) 07/10/2024 2.1 (HH)  0.5 - 1.9 mmol/L Final    Sodium 07/11/2024 136  136 - 145 mmol/L Final    Potassium 07/11/2024 4.1  3.5 - 5.1 mmol/L Final    Chloride 07/11/2024 108  95 - 110 mmol/L Final    CO2 07/11/2024 23  23 - 29 mmol/L Final    Glucose 07/11/2024 90  70 - 110 mg/dL Final    BUN 07/11/2024 8  6 - 20 mg/dL Final    Creatinine 07/11/2024 0.4 (L)  0.5 - 1.4 mg/dL Final    Calcium 07/11/2024 8.4 (L)  8.7 - 10.5 mg/dL Final    Total Protein 07/11/2024 7.0  6.0 - 8.4 g/dL Final    Albumin 07/11/2024 4.1  3.5 - 5.2 g/dL Final    Total Bilirubin 07/11/2024 0.4  0.1 - 1.0 mg/dL Final    Alkaline Phosphatase 07/11/2024 54 (L)  55 - 135 U/L Final    AST 07/11/2024 19  10 - 40 U/L Final    ALT 07/11/2024 18  10 - 44 U/L Final    eGFR 07/11/2024 >60.0  >60 mL/min/1.73 m^2 Final    Anion Gap 07/11/2024 5 (L)  8 - 16 mmol/L Final    Magnesium 07/11/2024 1.9  1.6 - 2.6 mg/dL Final    WBC 07/11/2024 11.54  3.90 - 12.70 K/uL Final    RBC 07/11/2024 5.07  4.00 - 5.40 M/uL Final    Hemoglobin 07/11/2024 14.5  12.0 - 16.0 g/dL Final    Hematocrit 07/11/2024 45.4  37.0 - 48.5 % Final    MCV 07/11/2024 90  82 - 98 fL Final    MCH 07/11/2024 28.6  27.0 - 31.0 pg Final    MCHC 07/11/2024 31.9 (L)  32.0 - 36.0 g/dL Final    RDW 07/11/2024 13.3  11.5 - 14.5 % Final    Platelets 07/11/2024 228  150 - 450 K/uL Final    MPV 07/11/2024 10.5  9.2 - 12.9 fL Final    Immature Granulocytes 07/11/2024 0.3  0.0 - 0.5 % Final    Gran # (ANC) 07/11/2024 8.2 (H)  1.8 - 7.7 K/uL Final    Immature Grans (Abs) 07/11/2024 0.04  0.00 - 0.04 K/uL Final    Lymph #  07/11/2024 2.4  1.0 - 4.8 K/uL Final    Mono # 07/11/2024 0.8  0.3 - 1.0 K/uL Final    Eos # 07/11/2024 0.1  0.0 - 0.5 K/uL Final    Baso # 07/11/2024 0.02  0.00 - 0.20 K/uL Final    nRBC 07/11/2024 0  0 /100 WBC Final    Gran % 07/11/2024 70.7  38.0 - 73.0 % Final    Lymph % 07/11/2024 21.0  18.0 - 48.0 % Final    Mono % 07/11/2024 7.3  4.0 - 15.0 % Final    Eosinophil % 07/11/2024 0.5  0.0 - 8.0 % Final    Basophil % 07/11/2024 0.2  0.0 - 1.9 % Final    Differential Method 07/11/2024 Automated   Final   Orders Only on 07/09/2024   Component Date Value Ref Range Status    QRS Duration 07/09/2024 86  ms Final    [facility] QTC Calculation 07/09/2024 407  ms Final   Office Visit on 05/16/2024   Component Date Value Ref Range Status    Cholesterol 06/13/2024 139  <200 mg/dL Final    HDL 06/13/2024 44 (L)  > OR = 50 mg/dL Final    Triglycerides 06/13/2024 79  <150 mg/dL Final    LDL Cholesterol 06/13/2024 79  mg/dL (calc) Final    HDL/Cholesterol Ratio 06/13/2024 3.2  <5.0 (calc) Final    Non HDL Chol. (LDL+VLDL) 06/13/2024 95  <130 mg/dL (calc) Final   Admission on 04/19/2024, Discharged on 04/20/2024   Component Date Value Ref Range Status    WBC 04/19/2024 7.59  3.90 - 12.70 K/uL Final    RBC 04/19/2024 4.26  4.00 - 5.40 M/uL Final    Hemoglobin 04/19/2024 12.4  12.0 - 16.0 g/dL Final    Hematocrit 04/19/2024 37.4  37.0 - 48.5 % Final    MCV 04/19/2024 88  82 - 98 fL Final    MCH 04/19/2024 29.1  27.0 - 31.0 pg Final    MCHC 04/19/2024 33.2  32.0 - 36.0 g/dL Final    RDW 04/19/2024 13.4  11.5 - 14.5 % Final    Platelets 04/19/2024 292  150 - 450 K/uL Final    MPV 04/19/2024 9.7  9.2 - 12.9 fL Final    Immature Granulocytes 04/19/2024 0.5  0.0 - 0.5 % Final    Gran # (ANC) 04/19/2024 4.8  1.8 - 7.7 K/uL Final    Immature Grans (Abs) 04/19/2024 0.04  0.00 - 0.04 K/uL Final    Lymph # 04/19/2024 1.9  1.0 - 4.8 K/uL Final    Mono # 04/19/2024 0.7  0.3 - 1.0 K/uL Final    Eos # 04/19/2024 0.1  0.0 - 0.5 K/uL Final    Baso #  04/19/2024 0.01  0.00 - 0.20 K/uL Final    nRBC 04/19/2024 0  0 /100 WBC Final    Gran % 04/19/2024 63.6  38.0 - 73.0 % Final    Lymph % 04/19/2024 25.4  18.0 - 48.0 % Final    Mono % 04/19/2024 9.6  4.0 - 15.0 % Final    Eosinophil % 04/19/2024 0.8  0.0 - 8.0 % Final    Basophil % 04/19/2024 0.1  0.0 - 1.9 % Final    Differential Method 04/19/2024 Automated   Final    Specimen UA 04/19/2024 Urine, Clean Catch   Final    Color, UA 04/19/2024 Orange (A)  Yellow, Straw, Christina Final    Appearance, UA 04/19/2024 Cloudy (A)  Clear Final    pH, UA 04/19/2024 8.0  5.0 - 8.0 Final    Specific Gravity, UA 04/19/2024 1.020  1.005 - 1.030 Final    Protein, UA 04/19/2024 3+ (A)  Negative Final    Glucose, UA 04/19/2024 Negative  Negative Final    Ketones, UA 04/19/2024 Negative  Negative Final    Bilirubin (UA) 04/19/2024 Negative  Negative Final    Occult Blood UA 04/19/2024 Negative  Negative Final    Nitrite, UA 04/19/2024 Positive (A)  Negative Final    Urobilinogen, UA 04/19/2024 Negative  Negative EU/dL Final    Leukocytes, UA 04/19/2024 3+ (A)  Negative Final    Levetiracetam Lvl 04/19/2024 75.3 (H)  10.0 - 40.0 ug/mL Final    Sodium 04/19/2024 136  136 - 145 mmol/L Final    Potassium 04/19/2024 3.8  3.5 - 5.1 mmol/L Final    Chloride 04/19/2024 105  95 - 110 mmol/L Final    CO2 04/19/2024 21 (L)  23 - 29 mmol/L Final    Glucose 04/19/2024 87  70 - 110 mg/dL Final    BUN 04/19/2024 18  6 - 20 mg/dL Final    Creatinine 04/19/2024 0.4 (L)  0.5 - 1.4 mg/dL Final    Calcium 04/19/2024 8.4 (L)  8.7 - 10.5 mg/dL Final    Total Protein 04/19/2024 7.2  6.0 - 8.4 g/dL Final    Albumin 04/19/2024 4.1  3.5 - 5.2 g/dL Final    Total Bilirubin 04/19/2024 0.3  0.1 - 1.0 mg/dL Final    Alkaline Phosphatase 04/19/2024 52 (L)  55 - 135 U/L Final    AST 04/19/2024 12  10 - 40 U/L Final    ALT 04/19/2024 15  10 - 44 U/L Final    eGFR 04/19/2024 >60.0  >60 mL/min/1.73 m^2 Final    Anion Gap 04/19/2024 10  8 - 16 mmol/L Final    Magnesium  04/19/2024 2.1  1.6 - 2.6 mg/dL Final    HCG Quant 04/19/2024 <0.60  See Text mIU/mL Final    RBC, UA 04/19/2024 13 (H)  0 - 4 /hpf Final    WBC, UA 04/19/2024 >100 (H)  0 - 5 /hpf Final    Bacteria 04/19/2024 None  None-Occ /hpf Final    Non-Squam Epith 04/19/2024 13 (A)  <1/hpf /hpf Final    Hyaline Casts, UA 04/19/2024 0  0-1/lpf /lpf Final    Microscopic Comment 04/19/2024 SEE COMMENT   Final    Urine Culture, Routine 04/19/2024  (A)   Final                    Value:PROTEUS MIRABILIS  > 100,000 cfu/ml      Lactate (Lactic Acid) 04/19/2024 1.0  0.5 - 1.9 mmol/L Final    Blood Culture, Routine 04/20/2024 No growth after 5 days.   Final    Blood Culture, Routine 04/20/2024 No growth after 5 days.   Final    Vit D, 25-Hydroxy 04/20/2024 39  30 - 96 ng/mL Final    Sodium 04/20/2024 136  136 - 145 mmol/L Final    Potassium 04/20/2024 4.5  3.5 - 5.1 mmol/L Final    Chloride 04/20/2024 107  95 - 110 mmol/L Final    CO2 04/20/2024 23  23 - 29 mmol/L Final    Glucose 04/20/2024 90  70 - 110 mg/dL Final    BUN 04/20/2024 17  6 - 20 mg/dL Final    Creatinine 04/20/2024 0.5  0.5 - 1.4 mg/dL Final    Calcium 04/20/2024 8.7  8.7 - 10.5 mg/dL Final    Anion Gap 04/20/2024 6 (L)  8 - 16 mmol/L Final    eGFR 04/20/2024 >60.0  >60 mL/min/1.73 m^2 Final    Magnesium 04/20/2024 2.0  1.6 - 2.6 mg/dL Final    WBC 04/20/2024 8.07  3.90 - 12.70 K/uL Final    RBC 04/20/2024 4.39  4.00 - 5.40 M/uL Final    Hemoglobin 04/20/2024 12.8  12.0 - 16.0 g/dL Final    Hematocrit 04/20/2024 39.2  37.0 - 48.5 % Final    MCV 04/20/2024 89  82 - 98 fL Final    MCH 04/20/2024 29.2  27.0 - 31.0 pg Final    MCHC 04/20/2024 32.7  32.0 - 36.0 g/dL Final    RDW 04/20/2024 13.5  11.5 - 14.5 % Final    Platelets 04/20/2024 219  150 - 450 K/uL Final    MPV 04/20/2024 10.5  9.2 - 12.9 fL Final    Immature Granulocytes 04/20/2024 0.2  0.0 - 0.5 % Final    Gran # (ANC) 04/20/2024 5.0  1.8 - 7.7 K/uL Final    Immature Grans (Abs) 04/20/2024 0.02  0.00 - 0.04 K/uL  Final    Lymph # 04/20/2024 2.2  1.0 - 4.8 K/uL Final    Mono # 04/20/2024 0.8  0.3 - 1.0 K/uL Final    Eos # 04/20/2024 0.1  0.0 - 0.5 K/uL Final    Baso # 04/20/2024 0.02  0.00 - 0.20 K/uL Final    nRBC 04/20/2024 0  0 /100 WBC Final    Gran % 04/20/2024 61.7  38.0 - 73.0 % Final    Lymph % 04/20/2024 26.6  18.0 - 48.0 % Final    Mono % 04/20/2024 10.4  4.0 - 15.0 % Final    Eosinophil % 04/20/2024 0.9  0.0 - 8.0 % Final    Basophil % 04/20/2024 0.2  0.0 - 1.9 % Final    Differential Method 04/20/2024 Automated   Final    Ionized Calcium 04/20/2024 1.16  1.06 - 1.42 mmol/L Final    PTH, Intact 04/20/2024 51.1  9.0 - 77.0 pg/mL Final    TSH 04/20/2024 1.956  0.340 - 5.600 uIU/mL Final    Free T4 04/20/2024 0.80  0.71 - 1.51 ng/dL Final   Admission on 01/31/2024, Discharged on 01/31/2024   Component Date Value Ref Range Status    Levetiracetam Lvl 01/31/2024 37.2  10.0 - 40.0 ug/mL Final    WBC 01/31/2024 9.04  3.90 - 12.70 K/uL Final    RBC 01/31/2024 4.36  4.00 - 5.40 M/uL Final    Hemoglobin 01/31/2024 12.5  12.0 - 16.0 g/dL Final    Hematocrit 01/31/2024 38.3  37.0 - 48.5 % Final    MCV 01/31/2024 88  82 - 98 fL Final    MCH 01/31/2024 28.7  27.0 - 31.0 pg Final    MCHC 01/31/2024 32.6  32.0 - 36.0 g/dL Final    RDW 01/31/2024 13.3  11.5 - 14.5 % Final    Platelets 01/31/2024 256  150 - 450 K/uL Final    MPV 01/31/2024 9.7  9.2 - 12.9 fL Final    Immature Granulocytes 01/31/2024 0.3  0.0 - 0.5 % Final    Gran # (ANC) 01/31/2024 7.2  1.8 - 7.7 K/uL Final    Immature Grans (Abs) 01/31/2024 0.03  0.00 - 0.04 K/uL Final    Lymph # 01/31/2024 1.3  1.0 - 4.8 K/uL Final    Mono # 01/31/2024 0.5  0.3 - 1.0 K/uL Final    Eos # 01/31/2024 0.0  0.0 - 0.5 K/uL Final    Baso # 01/31/2024 0.02  0.00 - 0.20 K/uL Final    nRBC 01/31/2024 0  0 /100 WBC Final    Gran % 01/31/2024 79.9 (H)  38.0 - 73.0 % Final    Lymph % 01/31/2024 14.2 (L)  18.0 - 48.0 % Final    Mono % 01/31/2024 5.1  4.0 - 15.0 % Final    Eosinophil %  01/31/2024 0.3  0.0 - 8.0 % Final    Basophil % 01/31/2024 0.2  0.0 - 1.9 % Final    Differential Method 01/31/2024 Automated   Final    Sodium 01/31/2024 134 (L)  136 - 145 mmol/L Final    Potassium 01/31/2024 4.3  3.5 - 5.1 mmol/L Final    Chloride 01/31/2024 104  95 - 110 mmol/L Final    CO2 01/31/2024 23  23 - 29 mmol/L Final    Glucose 01/31/2024 129 (H)  70 - 110 mg/dL Final    BUN 01/31/2024 14  6 - 20 mg/dL Final    Creatinine 01/31/2024 0.4 (L)  0.5 - 1.4 mg/dL Final    Calcium 01/31/2024 8.7  8.7 - 10.5 mg/dL Final    Total Protein 01/31/2024 7.3  6.0 - 8.4 g/dL Final    Albumin 01/31/2024 4.2  3.5 - 5.2 g/dL Final    Total Bilirubin 01/31/2024 0.3  0.1 - 1.0 mg/dL Final    Alkaline Phosphatase 01/31/2024 56  55 - 135 U/L Final    AST 01/31/2024 14  10 - 40 U/L Final    ALT 01/31/2024 15  10 - 44 U/L Final    eGFR 01/31/2024 >60.0  >60 mL/min/1.73 m^2 Final    Anion Gap 01/31/2024 7 (L)  8 - 16 mmol/L Final    Prothrombin Time 01/31/2024 10.9  9.0 - 12.5 sec Final    INR 01/31/2024 1.0  0.8 - 1.2 Final    Magnesium 01/31/2024 1.8  1.6 - 2.6 mg/dL Final    Blood Culture, Routine 01/31/2024 No growth after 5 days.   Final    Blood Culture, Routine 01/31/2024 No growth after 5 days.   Final    Lactate (Lactic Acid) 01/31/2024 1.3  0.5 - 1.9 mmol/L Final    Specimen UA 01/31/2024 Urine, Clean Catch   Final    Color, UA 01/31/2024 Yellow  Yellow, Straw, Christina Final    Appearance, UA 01/31/2024 Hazy (A)  Clear Final    pH, UA 01/31/2024 8.0  5.0 - 8.0 Final    Specific Gravity, UA 01/31/2024 1.010  1.005 - 1.030 Final    Protein, UA 01/31/2024 Trace (A)  Negative Final    Glucose, UA 01/31/2024 Negative  Negative Final    Ketones, UA 01/31/2024 Negative  Negative Final    Bilirubin (UA) 01/31/2024 Negative  Negative Final    Occult Blood UA 01/31/2024 Trace (A)  Negative Final    Nitrite, UA 01/31/2024 Positive (A)  Negative Final    Urobilinogen, UA 01/31/2024 Negative  Negative EU/dL Final    Leukocytes, UA  01/31/2024 1+ (A)  Negative Final    RBC, UA 01/31/2024 1  0 - 4 /hpf Final    WBC, UA 01/31/2024 11 (H)  0 - 5 /hpf Final    Bacteria 01/31/2024 Rare  None-Occ /hpf Final    Squam Epithel, UA 01/31/2024 7  /hpf Final    Hyaline Casts, UA 01/31/2024 10 (A)  0-1/lpf /lpf Final    Microscopic Comment 01/31/2024 SEE COMMENT   Final       Past Medical History:   Diagnosis Date    Anemia     Arnold-Chiari malformation, type II     Encounter for blood transfusion     Epilepsy     Hydrocephalus     Neurogenic bladder     self catheterizes every 3 hours    Raynaud disease     Seizures     only w/ shunt malfunction    Spinal bifida, closed     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     Past Surgical History:   Procedure Laterality Date    AMPUTATION      right 4th and 5th toes; left 5th toe and portion of left great toe    BACK SURGERY      BLADDER SURGERY      BREAST SURGERY  2015    Reduction    COLON SURGERY      flush site for bowel movements from appendix    CYSTOSTOMY      DEBRIDEMENT OF FOOT Right 1/8/2021    Procedure: DEBRIDEMENT, FOOT;  Surgeon: Abdi Bedoya DPM;  Location: [facility];  Service: Podiatry;  Laterality: Right;    EYE SURGERY      x 5    FLUOROSCOPIC URODYNAMIC STUDY N/A 3/12/2019    Procedure: URODYNAMIC STUDY, FLUOROSCOPIC;  Surgeon: Kenzie Charles MD;  Location: [address];  Service: Urology;  Laterality: N/A;  1 hour    FLUOROSCOPIC URODYNAMIC STUDY N/A 4/4/2019    Procedure: URODYNAMIC STUDY, FLUOROSCOPIC;  Surgeon: Kenzie Charles MD;  Location: [address];  Service: Urology;  Laterality: N/A;  1 hour    FLUOROSCOPIC URODYNAMIC STUDY N/A 5/11/2022    Procedure: URODYNAMIC STUDY, FLUOROSCOPIC;  Surgeon: Kenzie Charles MD;  Location: [address];  Service: Urology;  Laterality: N/A;  90min    FOOT AMPUTATION THROUGH METATARSAL Right 10/28/2019    Procedure: AMPUTATION, FOOT, TRANSMETATARSAL;  Surgeon: Abdi Bedoya DPM;  Location: [facility];  Service: Podiatry;  Laterality: Right;    FOOT  AMPUTATION THROUGH METATARSAL Right 3/27/2020    Procedure: AMPUTATION, FOOT, TRANSMETATARSAL;  Surgeon: Abdi Bedoya DPM;  Location: [facility] OR;  Service: Podiatry;  Laterality: Right;  REVISION    MYELOMININGOCELE REPAIR      NEPHRECTOMY Right 11/4/2021    Procedure: Nephrectomy/ OPEN;  Surgeon: Dash Rosenthal MD;  [address];  Service: Urology;  Laterality: Right;  4HRS    NEPHROSTOMY Right 8/2/2021    Procedure: Nephrostomy and perinephric abscess drain;  Surgeon: Lillian Surgeon;  Location: [facility] [place];  Service: Anesthesiology;  Laterality: Right;    REVISION OF VENTRICULOPERITONEAL SHUNT Left 9/21/2022    Procedure: REVISION, SHUNT, VENTRICULOPERITONEAL;  Surgeon: Maynor Calero MD;  [address];  Service: Neurosurgery;  Laterality: Left;    STRABISMUS SURGERY      ou dr peña    VENTRICULOPERITONEAL SHUNT      WOUND DEBRIDEMENT  3/27/2020    Procedure: DEBRIDEMENT, WOUND;  Surgeon: Abdi Bedoya DPM;  Location: [facility];  Service: Podiatry;;     Family History   Problem Relation Name Age of Onset    Arthritis Mother      Breast cancer Mother      Gout Father      Epilepsy Father      Myocarditis Sister      Cataracts Maternal Grandmother      Amblyopia Neg Hx      Blindness Neg Hx      Cancer Neg Hx      Diabetes Neg Hx      Glaucoma Neg Hx      Hypertension Neg Hx      Macular degeneration Neg Hx      Retinal detachment Neg Hx      Strabismus Neg Hx      Stroke Neg Hx      Thyroid disease Neg Hx         All of your core healthy metrics are met.      Marital Status: Single  Alcohol History:  reports that she does not currently use alcohol.  Tobacco History:  reports that she has never smoked. She has never used smokeless tobacco.  Drug History:  reports no history of drug use.    Review of patient's allergies indicates:   Allergen Reactions    Latex, natural rubber Anaphylaxis and Other (See Comments)     angioedema    Zofran [ondansetron hcl (pf)] Swelling     Hives,  ""throat closes up"    Gardasil  [h papillomavirus vac,qval (pf)]      Other reaction(s): Shortness of breath    Menactra  [mening vac a,c,y,w135 dip (pf)]      Other reaction(s): Shortness of breath    Nitrofurantoin      Other reaction(s): Difficulty breathing    Sulfa (sulfonamide antibiotics)     Tramadol Hives    Levaquin [levofloxacin] Rash     Spots on face    Macrobid [nitrofurantoin monohyd/m-cryst] Rash     Sppots/rash on face       Current Outpatient Medications:     ciprofloxacin HCl (CIPRO) 500 MG tablet, Take 500 mg by mouth 2 (two) times daily., Disp: , Rfl:     ergocalciferol (ERGOCALCIFEROL) 50,000 unit Cap, Take 50,000 Units by mouth every 7 days., Disp: , Rfl:     ibuprofen (ADVIL,MOTRIN) 200 MG tablet, Take 200 mg by mouth every 6 (six) hours as needed for Pain., Disp: , Rfl:     lacosamide (VIMPAT) 50 mg Tab, Take 1 tablet (50 mg total) by mouth every 12 (twelve) hours., Disp: 60 tablet, Rfl: 11    levETIRAcetam (KEPPRA) 750 MG Tab, Take 2 tablets (1,500 mg total) by mouth 2 (two) times daily., Disp: 120 tablet, Rfl: 11    diazePAM (VALTOCO) 10 mg/spray (0.1 mL) Spry, 10 mg by Nasal route every 5 (five) minutes as needed (Administer 1 spray into 1 nostril; if a second dose is needed, administer  in alternate nostril. A second dose should not be administered if the  patient is having trouble breathing or excessive sedatio)., Disp: 1 each, Rfl: 3    EPINEPHrine (EPIPEN) 0.3 mg/0.3 mL AtIn, Inject 0.3 mLs (0.3 mg total) into the muscle as needed (Anaphylactic reaction)., Disp: 0.3 mL, Rfl: 0    Review of Systems   Musculoskeletal:  Positive for neck pain.        Objective:      Vitals:    07/23/24 1513   BP: 112/84   Pulse: 88   SpO2: 95%   Height: 4' 7" (1.397 m)     Physical Exam  Constitutional:       Appearance: She is well-groomed.      Comments: Pleasant and cooperative female, seated in her personal wheelchair, which is both manual and electric. Well groomed.    HENT:      Head: Normocephalic " and atraumatic.      Mouth/Throat:      Pharynx: Oropharynx is clear.   Eyes:      General: No scleral icterus.     Pupils: Pupils are equal, round, and reactive to light.   Neck:      Comments: Some tightness to neck muscles impairing ROM due to mild discomfort  Cardiovascular:      Rate and Rhythm: Normal rate and regular rhythm.      Heart sounds: Normal heart sounds.   Pulmonary:      Effort: Pulmonary effort is normal.      Breath sounds: Normal breath sounds.   Abdominal:      General: There is no distension.      Palpations: Abdomen is soft.      Tenderness: There is no abdominal tenderness.   Musculoskeletal:      Cervical back: No rigidity or tenderness.        Feet:    Feet:      Comments: Left foot previous amputation with some metatarsal bones remaining , with callus to great toe stump  Bilateral erythema, non cellulitic appearing  Skin:     General: Skin is warm and dry.      Capillary Refill: Capillary refill takes less than 2 seconds.   Neurological:      Mental Status: She is alert.      Cranial Nerves: No dysarthria or facial asymmetry.      Motor: Atrophy and abnormal muscle tone present. No tremor or seizure activity.      Comments: Non ambulatory. Spina bifida of thoracic spine.    Psychiatric:         Mood and Affect: Mood normal.         Behavior: Behavior is cooperative.         Assessment:       1. Hospital discharge follow-up    2. Seizures    3. Urinary tract infection without hematuria, site unspecified    4. Spina bifida of lumbar region with hydrocephalus    5. Sinus tachycardia    6. Neck pain    7. Decreased ROM of intervertebral discs of cervical spine         Plan:       Hospital discharge follow-up    Seizures  On Vimpat from hospitalization. Will follow up with neurology    Urinary tract infection without hematuria, site unspecified  On antibiotics from hospital      Sinus tachycardia  Mom requests referral to cardiology  -     Ambulatory referral/consult to Cardiology; Future;  Expected date: 07/30/2024    Neck pain  Decreased ROM of intervertebral discs of cervical spine  -     Ambulatory referral/consult to Physical/Occupational Therapy; Future; Expected date: 07/30/2024      No follow-ups on file.

## 2024-08-12 NOTE — PROGRESS NOTES
"[facility]   Physical Therapy Treatment Note     Name: Shashank Samuel  Clinic Number: [id number]    Therapy Diagnosis:   Encounter Diagnoses   Name Primary?    Neck pain     Weakness     Abnormal increased muscle tone     Decreased functional mobility and endurance Yes    Special educational needs      Physician: Elisa Eng APRN*    Visit Date: 8/16/2024    Physician Orders: PT Eval and Treat   Medical Diagnosis from Referral: M54.2 (ICD-10-CM) - Neck pain M53.82 (ICD-10-CM) - Decreased ROM of intervertebral discs of cervical spine  Evaluation Date: 8/6/2024  Authorization Period Expiration: 12/31/2024  Plan of Care Expiration: 09/13/20204 at 1x/wk x 4 weeks  Progress Note Due: 09/06/2024  Visit # / Visits authorized: 1/20  (2/4 on the plan of care)  FOTO: 2/3                 +++++++++++(do final FOTO at discharge)+++++++++++++++++  PTA Visit #: 0/5     Time In: 8:02 am    Time Out: 8:54 am  Total Appointment Time (timed & untimed codes): 50 minutes     Precautions: Standard, Fall, and ACTIVE seizures, T-7 complete spinal cord injury and Latex/Rubber allergy  SUBJECTIVE     Pt reports: "No neck or arm pain as we speak. My low back was sore this morning, but that is usually how it is. I have been doing the exercises that you showed me every other day like you said. My mom went and bought me a 3 pound dumb bell to use at home. I do one arm at a time." PT acknowledges.    She was compliant with their home exercise program.    Response to previous treatment: This is the patient's first PT session since the initial evaluation.  Functional change: This is the patient's first PT session since the initial evaluation.    Pain: 0/10 Upon entering the clinic this day.  Location: The bilateral cervical and upper trapezius muscles.   OBJECTIVE     Objective Measures updated at progress report unless specified.     Limitation/Restriction for FOTO Cervical Survey     Therapist reviewed FOTO " scores for Shashank Samuel on 8/16/2024.   FOTO documents entered into [employer] - see Media section.     Limitation Score: 54% Function 08/16/2024 > 52% Function at the evaluation.         Treatment     Shashank received the treatments listed below:      Shashank received therapeutic exercises to develop strength, endurance, ROM, flexibility, posture, and core stabilization for 10 minutes including:  -Bilateral Upper trapezius stretch with 30 second hold x 1 to each side   -Bilateral Levator scapula stretch with 30 second hold x 1 to each side   -Bilateral shoulder flexion to overhead with 3 pound dumb bells x 10 each  -Bilateral shoulder abduction to overhead with 3 pound dumb bells x 10 each     Below not Performed this day:  -Cervical active motion in all directions(flexion, extension, bilateral side bending, and bilateral rotation) with 2 second hold x 20 each direction    Shashank received the following manual therapy techniques: Myofacial release, Soft tissue Mobilization, and Friction Massage were applied to the: cervical and upper trapezius regions for 0 minutes, including:    Shashank participated in neuromuscular re-education activities to improve: Balance, Coordination, Kinesthetic, Sense, Proprioception, Posture, and muscle recruitment for 40 minutes. The following activities were included:  -+horizontal abduction with yellow band 2 x 10  -+seated  press with 3 pound dowel 2 x 10  -+wheelchair press ups 2 x 10  -+cervical isometric: flexion, extension, right side bend, left side bend, right rotation, and left rotation with 2 second hold x 15 each  -+begger's with yellow band 2 x 10  -Bilateral triceps extension with 3 pound dumb bells x 20 each     Shashank participated in dynamic functional therapeutic activities to improve functional performance for 0 minutes, including:  -+ADD next visit++Upper Body Ergometer on Level 1 x 10 minutes forwards and backwards    Shashank received hot pack for 0  minutes to 0.++++++++++  Patient Education and Home Exercises     Home Exercises Provided and Patient Education Provided     Education provided:   -The patient is independent with their initial written home exercise program.  -+08/16/2024 The patient's home program was updated this day. She returned demo to PT. She received bands to use at home. She was without questions.    Written Home Exercises Provided: yes. Exercises were reviewed and Shashank was able to demonstrate them prior to the end of the session.  Shashank demonstrated good  understanding of the education provided. See EMR under Patient Instructions for exercises provided during therapy sessions.  ASSESSMENT     Camila continued her routine. She advanced her neck, shoulder, and core exercises this day. Some were adapted strictly for use while in the wheel chair. She was able to sit unsupported at times in order to improve her postural muscles while performing neck or shoulder exercises. Resistances were adjusted if needed. She demonstrated her initial home program with good form indicating that she has been compliant. As she progresses in strength, PT expects her transferring ability to improve. Her home program was update this day. She reports that she spends eight hours or more in her wheel chair each day. PT will develop progressed wheel chair based exercises so that she can remain in her wheel chair to perform the needed routine. She reported having no pain when she left this day. She was compliant with PT. She tolerated today's PT session well.    Shashank Is progressing well towards her goals.   The patient's prognosis is Good.     The patient will continue to benefit from skilled outpatient physical therapy in order to address the deficits listed in the problem list box on the initial evaluation, provide patient/family education and to maximize the patient's level of independence in the home and in the community environment.     The patient's  spiritual, cultural, and educational needs were considered. The patient was agreeable to the plan of care and its goals.     Anticipated barriers to physical therapy: The continuance of active seizures which contributes to her complaints, her co-morbidities, and she relies on transportation.     Goals:     STG  Weeks/Visits Date Established  Date Met   1.Decrease her max cervical pain to 5/10 in order to improve her quality of life. 2 weeks. 8/6/2024    In Progress 8/16/2024     2. Decrease her bilateral cervical and upper trapezius muscle tone to minimal to moderate increase in order to improve her cervical range of motion. 2 weeks. 8/6/2024    In Progress 8/16/2024     3.Increase her cervical strength a 1/2 grade in order to improve her unsupported activity endurance.  2 weeks. 8/6/2024    In Progress 8/16/2024     4.Increase her bilateral shoulder and elbow strength a 1/2 grade where she is able in order to increase the safety of her transfers. 2 weeks. 8/6/2024    In Progress 8/16/2024     5.Increase her FOTO score to >=57% in order to improve her transfer and activities of daily living abilities.  6. Fair initial written home exercise program knowledge and be without questions in order to have carryover after discharge from PT.  2 weeks.           2 weeks. 8/6/2024 8/6/2024       In Progress 8/16/2024           In Progress 8/16/2024        LTG Weeks/Visits Date Established  Date Met   1.Decrease her max cervical pain to 3/10 in order to improve her quality of life. 4 weeks 8/6/2024    In Progress 8/16/2024     2. Decrease her bilateral cervical and upper trapezius muscle tone to minimal increase in order to improve her cervical range of motion. 4 weeks 8/6/2024       In Progress 8/16/2024     3.Increase her cervical strength one grade from the evaluation date in order to improve her unsupported activity endurance.  4 weeks 8/6/2024    In Progress 8/16/2024     4.Increase her bilateral shoulder and  elbow strength one grade from the evaluation date where she is able in order to increase the safety of her transfers. 4 weeks 8/6/2024    In Progress 8/16/2024     5.Increase her FOTO score to >=63% in order to improve her transfer and activities of daily living abilities.  6. Good Progressed written home exercise program knowledge and be without questions in order to have carryover after discharge from PT.  4 weeks            4 weeks 8/6/2024 8/6/2024       In Progress 8/16/2024          In Progress 8/16/2024        PLAN     Plan of care Certification: 8/6/2024 to 09/13/2024. Outpatient Physical Therapy 1 times weekly for 4 weeks. Plan to improve her cervical and upper extremity strength in order to provide long term pain relief and improved function. Plan to progress her home program as she tolerates.      Jonnie Blank, PT

## 2024-08-16 ENCOUNTER — CLINICAL SUPPORT (OUTPATIENT)
Dept: REHABILITATION | Facility: HOSPITAL | Age: 29
End: 2024-08-16
Payer: COMMERCIAL

## 2024-08-16 DIAGNOSIS — Z74.09 DECREASED FUNCTIONAL MOBILITY AND ENDURANCE: Primary | ICD-10-CM

## 2024-08-16 DIAGNOSIS — M54.2 NECK PAIN: ICD-10-CM

## 2024-08-16 DIAGNOSIS — R53.1 WEAKNESS: ICD-10-CM

## 2024-08-16 DIAGNOSIS — M62.89 ABNORMAL INCREASED MUSCLE TONE: ICD-10-CM

## 2024-08-16 DIAGNOSIS — Z55.9 SPECIAL EDUCATIONAL NEEDS: ICD-10-CM

## 2024-08-16 PROCEDURE — 97110 THERAPEUTIC EXERCISES: CPT | Mod: PN

## 2024-08-16 PROCEDURE — 97112 NEUROMUSCULAR REEDUCATION: CPT | Mod: PN

## 2024-08-16 SDOH — SOCIAL DETERMINANTS OF HEALTH (SDOH): PROBLEMS RELATED TO EDUCATION AND LITERACY, UNSPECIFIED: Z55.9

## 2024-08-21 ENCOUNTER — OFFICE VISIT (OUTPATIENT)
Dept: CARDIOLOGY | Facility: CLINIC | Age: 29
End: 2024-08-21
Payer: COMMERCIAL

## 2024-08-21 VITALS
OXYGEN SATURATION: 100 % | SYSTOLIC BLOOD PRESSURE: 122 MMHG | HEIGHT: 55 IN | HEART RATE: 91 BPM | BODY MASS INDEX: 31.77 KG/M2 | DIASTOLIC BLOOD PRESSURE: 62 MMHG

## 2024-08-21 DIAGNOSIS — R00.0 SINUS TACHYCARDIA: ICD-10-CM

## 2024-08-21 DIAGNOSIS — R00.2 PALPITATIONS: Primary | ICD-10-CM

## 2024-08-21 PROCEDURE — 99999 PR PBB SHADOW E&M-EST. PATIENT-LVL IV: CPT | Mod: PBBFAC,,, | Performed by: INTERNAL MEDICINE

## 2024-08-21 NOTE — PROGRESS NOTES
"Subjective:    Patient ID:  Shashank Samuel is a 29 y.o. female patient here for evaluation Hospital Follow Up and Tachycardia      History of Present Illness:     29-year-old female with past medical history as mentioned below referred from PCP for evaluation of tachycardia.  This patient is new to me.  She was accompanied by family.  She has a history of seizures and was recently started on Vimpat in addition to Keppra after seizure episode.  Denies any history of heart disease in the past however has chronic history of tachycardia at baseline which especially aggravates during the episodes of seizures.  Had an episode of fluttering sensation in the chest week ago.  However has rare palpitations.  Denies chest pain or dyspnea.  Wheelchair-bound at baseline        Review of patient's allergies indicates:   Allergen Reactions    Latex, natural rubber Anaphylaxis and Other (See Comments)     angioedema    Zofran [ondansetron hcl (pf)] Swelling     Hives, "throat closes up"    Gardasil  [h papillomavirus vac,qval (pf)]      Other reaction(s): Shortness of breath    Menactra  [mening vac a,c,y,w135 dip (pf)]      Other reaction(s): Shortness of breath    Nitrofurantoin      Other reaction(s): Difficulty breathing    Sulfa (sulfonamide antibiotics)     Tramadol Hives    Levaquin [levofloxacin] Rash     Spots on face    Macrobid [nitrofurantoin monohyd/m-cryst] Rash     Sppots/rash on face       Past Medical History:   Diagnosis Date    Anemia     Arnold-Chiari malformation, type II     Encounter for blood transfusion     Epilepsy     Hydrocephalus     Neurogenic bladder     self catheterizes every 3 hours    Raynaud disease     Seizures     only w/ shunt malfunction    Spinal bifida, closed     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     Past Surgical History:   Procedure Laterality Date    AMPUTATION      right 4th and 5th toes; left 5th toe and portion of left great toe    BACK SURGERY      BLADDER SURGERY      BREAST SURGERY  2015    " Reduction    COLON SURGERY      flush site for bowel movements from appendix    CYSTOSTOMY      DEBRIDEMENT OF FOOT Right 1/8/2021    Procedure: DEBRIDEMENT, FOOT;  Surgeon: Abdi Bedoya DPM;  Location: [facility];  Service: Podiatry;  Laterality: Right;    EYE SURGERY      x 5    FLUOROSCOPIC URODYNAMIC STUDY N/A 3/12/2019    Procedure: URODYNAMIC STUDY, FLUOROSCOPIC;  Surgeon: Kenzie Charles MD;  Location: [facility] 1ST FLR;  Service: Urology;  Laterality: N/A;  1 hour    FLUOROSCOPIC URODYNAMIC STUDY N/A 4/4/2019    Procedure: URODYNAMIC STUDY, FLUOROSCOPIC;  Surgeon: Kenzie Charles MD;  Location: [facility] OR 1ST FLR;  Service: Urology;  Laterality: N/A;  1 hour    FLUOROSCOPIC URODYNAMIC STUDY N/A 5/11/2022    Procedure: URODYNAMIC STUDY, FLUOROSCOPIC;  Surgeon: Kenzie Charles MD;  [address];  Service: Urology;  Laterality: N/A;  90min    FOOT AMPUTATION THROUGH METATARSAL Right 10/28/2019    Procedure: AMPUTATION, FOOT, TRANSMETATARSAL;  Surgeon: Abdi Bedoya DPM;  Location: [facility];  Service: Podiatry;  Laterality: Right;    FOOT AMPUTATION THROUGH METATARSAL Right 3/27/2020    Procedure: AMPUTATION, FOOT, TRANSMETATARSAL;  Surgeon: Abdi Bedoya DPM;  Location: [facility];  Service: Podiatry;  Laterality: Right;  REVISION    MYELOMININGOCELE REPAIR      NEPHRECTOMY Right 11/4/2021    Procedure: Nephrectomy/ OPEN;  Surgeon: Dash Rosenthal MD;  Location: [facility] OR 2ND FLR;  Service: Urology;  Laterality: Right;  4HRS    NEPHROSTOMY Right 8/2/2021    Procedure: Nephrostomy and perinephric abscess drain;  Surgeon: Lillian Surgeon;  Location: [facility] [place];  Service: Anesthesiology;  Laterality: Right;    REVISION OF VENTRICULOPERITONEAL SHUNT Left 9/21/2022    Procedure: REVISION, SHUNT, VENTRICULOPERITONEAL;  Surgeon: Maynor Calero MD;  Location: [facility] OR 2ND FLR;  Service: Neurosurgery;  Laterality: Left;    STRABISMUS SURGERY      ou dr peña    VENTRICULOPERITONEAL SHUNT      WOUND  DEBRIDEMENT  3/27/2020    Procedure: DEBRIDEMENT, WOUND;  Surgeon: Abdi Bedoya DPM;  Location: [facility];  Service: Podiatry;;     Social History     Tobacco Use    Smoking status: Never    Smokeless tobacco: Never   Substance Use Topics    Alcohol use: Not Currently    Drug use: Never        Review of Systems   Negative except as mentioned in HPI         Objective        Vitals:    08/21/24 1049   BP: 122/62   Pulse: 91       LIPIDS - LAST 2   Lab Results   Component Value Date    CHOL 139 06/13/2024    HDL 44 (L) 06/13/2024    LDLCALC 79 06/13/2024    TRIG 79 06/13/2024    CHOLHDL 3.2 06/13/2024       CBC - LAST 2  Lab Results   Component Value Date    WBC 11.54 07/11/2024    WBC 20.93 (H) 07/10/2024    RBC 5.07 07/11/2024    RBC 4.57 07/10/2024    HGB 14.5 07/11/2024    HGB 13.1 07/10/2024    HCT 45.4 07/11/2024    HCT 40.9 07/10/2024    MCV 90 07/11/2024    MCV 90 07/10/2024    MCH 28.6 07/11/2024    MCH 28.7 07/10/2024    MCHC 31.9 (L) 07/11/2024    MCHC 32.0 07/10/2024    RDW 13.3 07/11/2024    RDW 13.2 07/10/2024     07/11/2024     07/10/2024    MPV 10.5 07/11/2024    MPV 10.0 07/10/2024    GRAN 8.2 (H) 07/11/2024    GRAN 70.7 07/11/2024    LYMPH 2.4 07/11/2024    LYMPH 21.0 07/11/2024    MONO 0.8 07/11/2024    MONO 7.3 07/11/2024    BASO 0.02 07/11/2024    BASO 0.02 07/10/2024    NRBC 0 07/11/2024    NRBC 0 07/10/2024       CHEMISTRY & LIVER FUNCTION - LAST 2  Lab Results   Component Value Date     07/11/2024     07/10/2024    K 4.1 07/11/2024    K 4.0 07/10/2024     07/11/2024     07/10/2024    CO2 23 07/11/2024    CO2 20 (L) 07/10/2024    ANIONGAP 5 (L) 07/11/2024    ANIONGAP 10 07/10/2024    BUN 8 07/11/2024    BUN 14 07/10/2024    CREATININE 0.4 (L) 07/11/2024    CREATININE 0.5 07/10/2024    GLU 90 07/11/2024     (H) 07/10/2024    CALCIUM 8.4 (L) 07/11/2024    CALCIUM 7.7 (L) 07/10/2024    PH 7.398 09/22/2022    PH 7.340 (L) 09/21/2022    MG 1.9  07/11/2024    MG 1.8 07/10/2024    ALBUMIN 4.1 07/11/2024    ALBUMIN 4.1 07/10/2024    PROT 7.0 07/11/2024    PROT 7.1 07/10/2024    ALKPHOS 54 (L) 07/11/2024    ALKPHOS 55 07/10/2024    ALT 18 07/11/2024    ALT 16 07/10/2024    AST 19 07/11/2024    AST 17 07/10/2024    BILITOT 0.4 07/11/2024    BILITOT 0.2 07/10/2024        CARDIAC PROFILE - LAST 2  Lab Results   Component Value Date    BNP 15 01/16/2023    TROPONINI 0.030 08/01/2021    TROPONINIHS 3.6 01/16/2023        COAGULATION - LAST 2  Lab Results   Component Value Date    LABPT 20.0 (H) 08/01/2021    INR 1.0 07/10/2024    INR 1.0 01/31/2024    APTT 25.9 07/10/2024    APTT 22.5 09/21/2022       ENDOCRINE & PSA - LAST 2  Lab Results   Component Value Date    TSH 1.956 04/20/2024    PROCAL 0.09 09/21/2022        ECHOCARDIOGRAM RESULTS  Results for orders placed during the hospital encounter of 09/21/22    Echo    Interpretation Summary  · The left ventricle is normal in size with normal systolic function.  · The estimated ejection fraction is 55%.  · Normal left ventricular diastolic function.  · Mild tricuspid regurgitation.  · Mechanically ventilated; cannot use inferior caval vein diameter to estimate central venous pressure.      CURRENT/PREVIOUS VISIT EKG  Results for orders placed or performed during the hospital encounter of 07/09/24   EKG 12-lead    Collection Time: 07/09/24 12:00 AM    Narrative    Ordered by an unspecified provider.     No valid procedures specified.   No results found for this or any previous visit.    No valid procedures specified.          PREVIOUS STRESS TEST              PREVIOUS ANGIOGRAM        PHYSICAL EXAM    CONSTITUTIONAL: Well built, well nourished in no apparent distress  HEENT: No pallor  NECK: no JVD  LUNGS: CTA b/l  HEART: regular rate and rhythm, S1, S2 normal, no murmur   ABDOMEN:  Nondistended  EXTREMITIES:  Trace lower extremity edema  NEURO: AAO X 3   Psych:  Normal affect    I HAVE REVIEWED :    The vital signs,  nurses notes, and all the pertinent radiology and labs.        Current Outpatient Medications   Medication Instructions    EPINEPHrine (EPIPEN) 0.3 mg, Intramuscular, As needed (PRN)    ergocalciferol (ERGOCALCIFEROL) 50,000 Units, Oral, Every 7 days    ibuprofen (ADVIL,MOTRIN) 200 mg, Oral, Every 6 hours PRN    lacosamide (VIMPAT) 50 mg, Oral, Every 12 hours    levETIRAcetam (KEPPRA) 1,500 mg, Oral, 2 times daily    VALTOCO 10 mg, Nasal, Every 5 min PRN        ECG reviewed by me:  Normal sinus rhythm  Assessment & Plan     Palpitations-rule out arrhythmia  Chronically elevated heart rate  History of epilepsy, spina bifida, hydrocephalus     TSH normal  No anginal symptoms  LDL less than 100  Blood pressure normal  Trace lower extremity edema    Plan:    One-week event monitor  Echocardiogram  Follow up in about 6 weeks (around 10/2/2024).

## 2024-08-23 ENCOUNTER — CLINICAL SUPPORT (OUTPATIENT)
Dept: REHABILITATION | Facility: HOSPITAL | Age: 29
End: 2024-08-23
Payer: COMMERCIAL

## 2024-08-23 DIAGNOSIS — Z74.09 DECREASED FUNCTIONAL MOBILITY AND ENDURANCE: Primary | ICD-10-CM

## 2024-08-23 DIAGNOSIS — Z55.9 SPECIAL EDUCATIONAL NEEDS: ICD-10-CM

## 2024-08-23 DIAGNOSIS — M54.2 NECK PAIN: ICD-10-CM

## 2024-08-23 DIAGNOSIS — R53.1 WEAKNESS: ICD-10-CM

## 2024-08-23 DIAGNOSIS — M62.89 ABNORMAL INCREASED MUSCLE TONE: ICD-10-CM

## 2024-08-23 PROCEDURE — 97110 THERAPEUTIC EXERCISES: CPT | Mod: PN

## 2024-08-23 PROCEDURE — 97530 THERAPEUTIC ACTIVITIES: CPT | Mod: PN

## 2024-08-23 PROCEDURE — 97112 NEUROMUSCULAR REEDUCATION: CPT | Mod: PN

## 2024-08-23 SDOH — SOCIAL DETERMINANTS OF HEALTH (SDOH): PROBLEMS RELATED TO EDUCATION AND LITERACY, UNSPECIFIED: Z55.9

## 2024-08-23 NOTE — PROGRESS NOTES
"[facility]   Physical Therapy Treatment Note     Name: Shashank Samuel  Clinic Number: [id number]    Therapy Diagnosis:   Encounter Diagnoses   Name Primary?    Neck pain     Weakness     Abnormal increased muscle tone     Decreased functional mobility and endurance Yes    Special educational needs      Physician: Elisa Eng APRN*    Visit Date: 8/23/2024    Physician Orders: PT Eval and Treat   Medical Diagnosis from Referral: M54.2 (ICD-10-CM) - Neck pain M53.82 (ICD-10-CM) - Decreased ROM of intervertebral discs of cervical spine  Evaluation Date: 8/6/2024  Authorization Period Expiration: 12/31/2024  Plan of Care Expiration: 09/13/20204 at 1x/wk x 4 weeks  Progress Note Due: 08/30/2024  Visit # / Visits authorized: 2/20  (3/4 on the plan of care)  FOTO: 2/3                 +++++++++++(do final FOTO at discharge)+++++++++++++++++  PTA Visit #: 0/5     Time In: 1:06 pm    Time Out: 2:01 pm  Total Appointment Time (timed & untimed codes): 45 minutes     Precautions: Standard, Fall, and ACTIVE seizures, 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 I had some low back pain this morning. I am getting in and out of my chair easier now." PT acknowledges.    She was compliant with their home exercise program.    Response to previous treatment: She remains compliant with her home program. She reports feeling stronger.   Functional change: She reports that it has gotten easier to get out of her wheelchair.    Pain: 0/10 Upon entering the clinic this day.  Location: The bilateral cervical and upper trapezius muscles.   OBJECTIVE     Objective Measures updated at progress report unless specified.     Limitation/Restriction for FOTO Cervical Survey     Therapist reviewed FOTO scores for Shashank Samuel on 8/16/2024.   FOTO documents entered into [employer] - see Media section.     Limitation Score: 54% " Function 08/16/2024 > 52% Function at the evaluation.         Treatment     Shashank received the treatments listed below:      Shashank received therapeutic exercises to develop strength, endurance, ROM, flexibility, posture, and core stabilization for 10 minutes including:  -+bilateral scapular retraction with red band while in WC 2 x 10 each  -+bilateral upper extremity extension with red band while in WC 2 x 10 each  -Bilateral shoulder flexion to overhead with 3 pound dumb bells x 10 each  -Bilateral shoulder abduction to overhead with 3 pound dumb bells x 10 each     Below not Performed this day:  -Bilateral Upper trapezius stretch with 30 second hold x 1 to each side   -Bilateral Levator scapula stretch with 30 second hold x 1 to each side   -Cervical active motion in all directions(flexion, extension, bilateral side bending, and bilateral rotation) with 2 second hold x 20 each direction    Shashank participated in neuromuscular re-education activities to improve: Balance, Coordination, Kinesthetic, Sense, Proprioception, Posture, and muscle recruitment for 25 minutes. The following activities were included:  -seated  press with 5 pound dowel 2 x 10  -+seated bicep curls with 5 pound dowel 2 x 10  -+Tossing big red ball as high as possible with the wheelchair facing the wall.   -wheelchair press ups 2 x 10  -+bilateral upper extremity red band WC external rotation 2 x 10 each arm   -begger's with RED band 2 x 10  -+bilateral upper extremity red band WC punches 2 x 10 each    Below not Performed this day:  -horizontal abduction with yellow band 2 x 10  -cervical isometric: flexion, extension, right side bend, left side bend, right rotation, and left rotation with 2 second hold x 15 each  -Bilateral triceps extension with 3 pound dumb bells x 20 each     Shashank participated in dynamic functional therapeutic activities to improve functional performance for 10 minutes, including:  -+Upper Body Ergometer on  Level 4 x 10 minutes forwards and backwards    Patient Education and Home Exercises     Home Exercises Provided and Patient Education Provided     Education provided:   -The patient is independent with their initial written home exercise program.  -08/16/2024 The patient's home program was updated this day. She returned demo to PT. She received bands to use at home. She was without questions.  -+08/23/2024 The patient's home program was updated this day. She returned demo to PT. She received bands to use at home. She was without questions.    Written Home Exercises Provided: yes. Exercises were reviewed and Shashank was able to demonstrate them prior to the end of the session.  Shashank demonstrated good  understanding of the education provided. See EMR under Patient Instructions for exercises provided during therapy sessions.  ASSESSMENT     Camila enters in good spirits. She has been compliant with her home program. She reports improved functional ability with her transfers. She added wheelchair banded exercises this day. PT included postural muscles to help with her safety when unsupported. She did well. She had good end range motion. No pain was reported. Her home program was updated this day. She can continue to benefit from the progression of her home program. She works hard. She tolerated today's PT session well.    Shashank Is progressing well towards her goals.   The patient's prognosis is Good.     The patient will continue to benefit from skilled outpatient physical therapy in order to address the deficits listed in the problem list box on the initial evaluation, provide patient/family education and to maximize the patient's level of independence in the home and in the community environment.     The patient's spiritual, cultural, and educational needs were considered. The patient was agreeable to the plan of care and its goals.     Anticipated barriers to physical therapy: The continuance of active seizures  which contributes to her complaints, her co-morbidities, and she relies on transportation.     Goals:     STG  Weeks/Visits Date Established  Date Met   1.Decrease her max cervical pain to 5/10 in order to improve her quality of life. 2 weeks. 8/6/2024    In Progress 8/23/2024     2. Decrease her bilateral cervical and upper trapezius muscle tone to minimal to moderate increase in order to improve her cervical range of motion. 2 weeks. 8/6/2024    In Progress 8/23/2024     3.Increase her cervical strength a 1/2 grade in order to improve her unsupported activity endurance.  2 weeks. 8/6/2024    In Progress 8/23/2024     4.Increase her bilateral shoulder and elbow strength a 1/2 grade where she is able in order to increase the safety of her transfers. 2 weeks. 8/6/2024    In Progress 8/23/2024     5.Increase her FOTO score to >=57% in order to improve her transfer and activities of daily living abilities.  6. Fair initial written home exercise program knowledge and be without questions in order to have carryover after discharge from PT.  2 weeks.           2 weeks. 8/6/2024 8/6/2024       In Progress 8/23/2024           In Progress 8/23/2024        LTG Weeks/Visits Date Established  Date Met   1.Decrease her max cervical pain to 3/10 in order to improve her quality of life. 4 weeks 8/6/2024    In Progress 8/23/2024     2. Decrease her bilateral cervical and upper trapezius muscle tone to minimal increase in order to improve her cervical range of motion. 4 weeks 8/6/2024       In Progress 8/23/2024     3.Increase her cervical strength one grade from the evaluation date in order to improve her unsupported activity endurance.  4 weeks 8/6/2024    In Progress 8/23/2024     4.Increase her bilateral shoulder and elbow strength one grade from the evaluation date where she is able in order to increase the safety of her transfers. 4 weeks 8/6/2024    In Progress 8/23/2024     5.Increase her FOTO score to >=63% in  order to improve her transfer and activities of daily living abilities.  6. Good Progressed written home exercise program knowledge and be without questions in order to have carryover after discharge from PT.  4 weeks            4 weeks 8/6/2024 8/6/2024       In Progress 8/23/2024          In Progress 8/23/2024        PLAN     Plan of care Certification: 8/6/2024 to 09/13/2024. Outpatient Physical Therapy 1 times weekly for 4 weeks. Plan to improve her cervical and upper extremity strength in order to provide long term pain relief and improved function. Plan to continue to progress her home program as she tolerates.      Jonnie Blank, PT

## 2024-08-28 ENCOUNTER — TELEPHONE (OUTPATIENT)
Dept: CARDIOLOGY | Facility: HOSPITAL | Age: 29
End: 2024-08-28

## 2024-08-29 ENCOUNTER — HOSPITAL ENCOUNTER (OUTPATIENT)
Dept: CARDIOLOGY | Facility: CLINIC | Age: 29
Discharge: HOME | End: 2024-08-29
Attending: INTERNAL MEDICINE
Payer: COMMERCIAL

## 2024-08-29 ENCOUNTER — TELEPHONE (OUTPATIENT)
Dept: CARDIOLOGY | Facility: CLINIC | Age: 29
End: 2024-08-29
Payer: COMMERCIAL

## 2024-08-29 ENCOUNTER — CLINICAL SUPPORT (OUTPATIENT)
Dept: CARDIOLOGY | Facility: HOSPITAL | Age: 29
End: 2024-08-29
Attending: INTERNAL MEDICINE
Payer: COMMERCIAL

## 2024-08-29 DIAGNOSIS — R00.2 PALPITATIONS: ICD-10-CM

## 2024-08-29 DIAGNOSIS — R53.1 WEAKNESS: Primary | ICD-10-CM

## 2024-08-29 LAB
AORTIC ROOT ANNULUS: 2.8 CM
AORTIC VALVE CUSP SEPERATION: 1.6 CM
AV INDEX (PROSTH): 0.62
AV MEAN GRADIENT: 2 MMHG
AV PEAK GRADIENT: 4 MMHG
AV VALVE AREA BY VELOCITY RATIO: 1.79 CM²
AV VALVE AREA: 1.95 CM²
AV VELOCITY RATIO: 0.57
CV ECHO LV RWT: 0.67 CM
DOP CALC AO PEAK VEL: 1 M/S
DOP CALC AO VTI: 15.3 CM
DOP CALC LVOT AREA: 3.1 CM2
DOP CALC LVOT DIAMETER: 2 CM
DOP CALC LVOT PEAK VEL: 0.57 M/S
DOP CALC LVOT STROKE VOLUME: 29.83 CM3
DOP CALC MV VTI: 21 CM
DOP CALCLVOT PEAK VEL VTI: 9.5 CM
E WAVE DECELERATION TIME: 134 MSEC
E/A RATIO: 1.5
E/E' RATIO: 7 M/S
ECHO LV POSTERIOR WALL: 0.9 CM (ref 0.6–1.1)
FRACTIONAL SHORTENING: 33 % (ref 28–44)
INTERVENTRICULAR SEPTUM: 0.9 CM (ref 0.6–1.1)
LEFT INTERNAL DIMENSION IN SYSTOLE: 1.8 CM (ref 2.1–4)
LEFT VENTRICLE DIASTOLIC VOLUME: 27.02 ML
LEFT VENTRICLE SYSTOLIC VOLUME: 9.72 ML
LEFT VENTRICULAR INTERNAL DIMENSION IN DIASTOLE: 2.7 CM (ref 3.5–6)
LEFT VENTRICULAR MASS: 60.04 G
LV LATERAL E/E' RATIO: 5.6 M/S
LV SEPTAL E/E' RATIO: 9.33 M/S
LVED V (TEICH): 27.02 ML
LVES V (TEICH): 9.72 ML
LVOT MG: 1 MMHG
LVOT MV: 0.37 CM/S
MV MEAN GRADIENT: 2 MMHG
MV PEAK A VEL: 0.56 M/S
MV PEAK E VEL: 0.84 M/S
MV PEAK GRADIENT: 4 MMHG
MV STENOSIS PRESSURE HALF TIME: 63 MS
MV VALVE AREA BY CONTINUITY EQUATION: 1.42 CM2
MV VALVE AREA P 1/2 METHOD: 3.49 CM2
PISA TR MAX VEL: 2.22 M/S
PV MV: 0.54 M/S
PV PEAK GRADIENT: 3 MMHG
PV PEAK VELOCITY: 0.8 M/S
RA PRESSURE ESTIMATED: 3 MMHG
RV TB RVSP: 5 MMHG
TDI LATERAL: 0.15 M/S
TDI SEPTAL: 0.09 M/S
TDI: 0.12 M/S
TR MAX PG: 20 MMHG
TRICUSPID ANNULAR PLANE SYSTOLIC EXCURSION: 1.06 CM
TV REST PULMONARY ARTERY PRESSURE: 23 MMHG

## 2024-08-29 PROCEDURE — 93306 TTE W/DOPPLER COMPLETE: CPT

## 2024-08-29 PROCEDURE — 93306 TTE W/DOPPLER COMPLETE: CPT | Mod: 26,,, | Performed by: INTERNAL MEDICINE

## 2024-08-29 NOTE — TELEPHONE ENCOUNTER
----- Message from Estrella Sifuentes MA sent at 8/27/2024  2:01 PM CDT -----  Regarding: FW: 8/21 EKG Order    ----- Message -----  From: Xochitl Ho RN  Sent: 8/27/2024   1:56 PM CDT  To: #  Subject: 8/21 EKG Order                                   The EKG performed on 8/21/24 is missing an order.  Please place an order so that the EKG can be read in [facility].    Thank you,  Xochitl Ho, BILLIE  [employer]   [facility] [place] [facility]  3rd Floor Cardiology Clinic  [phone]/ [id number]

## 2024-08-30 ENCOUNTER — CLINICAL SUPPORT (OUTPATIENT)
Dept: REHABILITATION | Facility: HOSPITAL | Age: 29
End: 2024-08-30
Payer: COMMERCIAL

## 2024-08-30 DIAGNOSIS — Z74.09 DECREASED FUNCTIONAL MOBILITY AND ENDURANCE: Primary | ICD-10-CM

## 2024-08-30 DIAGNOSIS — M54.2 NECK PAIN: ICD-10-CM

## 2024-08-30 DIAGNOSIS — Z55.9 SPECIAL EDUCATIONAL NEEDS: ICD-10-CM

## 2024-08-30 DIAGNOSIS — M62.89 ABNORMAL INCREASED MUSCLE TONE: ICD-10-CM

## 2024-08-30 DIAGNOSIS — R53.1 WEAKNESS: ICD-10-CM

## 2024-08-30 PROCEDURE — 97112 NEUROMUSCULAR REEDUCATION: CPT | Mod: PN

## 2024-08-30 PROCEDURE — 97110 THERAPEUTIC EXERCISES: CPT | Mod: PN

## 2024-08-30 PROCEDURE — 97530 THERAPEUTIC ACTIVITIES: CPT | Mod: PN

## 2024-08-30 SDOH — SOCIAL DETERMINANTS OF HEALTH (SDOH): PROBLEMS RELATED TO EDUCATION AND LITERACY, UNSPECIFIED: Z55.9

## 2024-08-30 NOTE — PROGRESS NOTES
"[facility]   Physical Therapy Treatment Note/RE-ASSESSMENT     Name: Shashank Samuel  Clinic Number: [id number]    Therapy Diagnosis:   Encounter Diagnoses   Name Primary?    Neck pain     Weakness     Abnormal increased muscle tone     Decreased functional mobility and endurance Yes    Special educational needs      Physician: Elisa Eng APRN*    Visit Date: 8/30/2024    Physician Orders: PT Eval and Treat   Medical Diagnosis from Referral: M54.2 (ICD-10-CM) - Neck pain M53.82 (ICD-10-CM) - Decreased ROM of intervertebral discs of cervical spine  Evaluation Date: 8/6/2024  Authorization Period Expiration: 12/31/2024  Plan of Care Expiration: 09/13/20204 at 1x/wk x 4 weeks  Progress Note Due: 09/06/2024 for discharge  Visit # / Visits authorized: 3/20  (4/4 on the plan of care)  FOTO: 2/3                 +++++++++++(do final FOTO at discharge)+++++++++++++++++  PTA Visit #: 0/5     Time In: 12:56 pm    Time Out: 1:53 pm  Total Appointment Time (timed & untimed codes): 50 minutes     Precautions: Standard, Fall, and ACTIVE seizures, T-7 complete spinal cord injury and Latex/Rubber allergy  SUBJECTIVE     Pt reports: "I am wearing my heart monitor. I have to wear it for a week. The neck and shoulder are doing good today. I had some neck pain the other day, but I stretched it out and it went away. It was maybe a 2-3/10. I can get in and out of my chair much easier now." PT acknowledges.    She was compliant with their home exercise program.    Response to previous treatment: She reports no current neck or upper shoulder pain.  Functional change: She reports even greater ease getting in and out of her wheelchair.     Pain: 0/10 Upon entering the clinic this day. Max of 2-3/10 within the past week.  Location: The bilateral cervical and upper trapezius muscles.   OBJECTIVE     Objective Measures updated at progress report unless specified.     Palpation for Condition: She exhibits " bilateral minimally increased > moderately increased cervical and upper trapezius muscle tone. No specific trigger points are noted at the evaluation.      Manual Muscle Testing:      Cervical Strength Grade Comments   Flexion 5-/5 >4+/5     Extension 5-/5 >4+/5     Right Side Bend 4+/5 >4/5     Left Side Bend 4+/5 >4/5     Right Rotation 4/5 >4-/5     Left Rotation 4/5 >4-/5        RUE Strength Grade LUE Strength Grade   Shoulder Flexion 5/5 Shoulder Flexion 5/5   Shoulder Extension 5-/5 >4+/5 Shoulder Extension 5-/5 >4+/5   Shoulder Abduction 5/5 Shoulder Abduction 5/5   Shoulder Adduction 5/5 Shoulder Adduction 5/5   Shoulder Internal Rotation 4+/5 >4/5 Shoulder Internal Rotation 4+/5 >4/5   Shoulder External Rotation 4+/5 >4/5 Shoulder External Rotation 4+/5 >4/5   Elbow flexion 5-/5 >4+/5 Elbow flexion 45-/5 >+/5   Elbow extension 4/5 >4-/5 Elbow extension 4/5 >4-/5        Limitation/Restriction for FOTO Cervical Survey     Therapist reviewed FOTO scores for Shashank Samuel on 8/16/2024.   FOTO documents entered into [employer] - see Media section.     Limitation Score: 54% Function 08/16/2024 > 52% Function at the evaluation.         Treatment     Shashank received the treatments listed below:     Shashank received therapeutic exercises to develop strength, endurance, ROM, flexibility, posture, and core stabilization for 10 minutes including:  -bilateral scapular retraction with Green band while in WC 2 x 10 each  -bilateral upper extremity extension with Green band while in WC 2 x 10 each  -Bilateral shoulder flexion to overhead with 3 pound dumb bells x 10 each  -Bilateral shoulder abduction to overhead with 3 pound dumb bells x 10 each     Below not Performed this day:  -Cervical active motion in all directions(flexion, extension, bilateral side bending, and bilateral rotation) with 2 second hold x 20 each direction    Shashank participated in neuromuscular re-education activities to improve: Balance,  Coordination, Kinesthetic, Sense, Proprioception, Posture, and muscle recruitment for 30 minutes. The following activities were included:  -wheelchair press ups 2 x 10  -seated  press with 8 pound dowel 2 x 10  -seated bicep curls with 8 pound dowel 2 x 10  -begger's with Green band 2 x 10  -horizontal abduction with Green band 2 x 10  -+Bilateral banded diagonals with green band 2 x 10 each.  -Tossing big red ball as high as possible with the wheelchair facing the wall.   -+tossing red weighted ball into trampoline and catching it to help trunk control x 4 minutes with rest as needed.    Below not Performed this day:  -bilateral upper extremity red band WC external rotation 2 x 10 each arm   -bilateral upper extremity red band WC punches 2 x 10 each    Shashank participated in dynamic functional therapeutic activities to improve functional performance for 10 minutes, including:  -Upper Body Ergometer on Level 4 x 10 minutes forwards and backwards    Patient Education and Home Exercises     Home Exercises Provided and Patient Education Provided     Education provided:   -The patient is independent with their initial written home exercise program.  -08/16/2024 The patient's home program was updated this day. She returned demo to PT. She received bands to use at home. She was without questions.  -08/23/2024 The patient's home program was updated this day. She returned demo to PT. She received bands to use at home. She was without questions.  -+08/30/2024 She received new bands in order to progress her home program.    Written Home Exercises Provided: Patient instructed to cont prior HEP. Exercises were reviewed and Shashank was able to demonstrate them prior to the end of the session.  Shashank demonstrated good  understanding of the education provided. See EMR under Patient Instructions for exercises provided during therapy sessions.  RE-ASSESSMENT     Shashank was re-assessed this day. As of today, she has met 5  of her 6 short term goals. She is functioning and transferring better at home. Her pain is periodic and only minimal in intensity. She has improved bilateral shoulder and neck strength. She has improved flexibility as her abnormal muscle tone has decreased. She is independent with her current home program. She can benefit from a final PT session to maximally progress her home program and to answer any questions that she may have. She will be ready to continue with her exercises at home soon. She works hard. She tolerated today's PT session well.    Shashank Is progressing well towards her goals.   The patient's prognosis is Good.     The patient will continue to benefit from skilled outpatient physical therapy in order to address the deficits listed in the problem list box on the initial evaluation, provide patient/family education and to maximize the patient's level of independence in the home and in the community environment.     The patient's spiritual, cultural, and educational needs were considered. The patient was agreeable to the plan of care and its goals.     Anticipated barriers to physical therapy: The continuance of active seizures which contributes to her complaints, her co-morbidities, and she relies on transportation.     Goals:     STG  Weeks/Visits Date Established  Date Met   1.Decrease her max cervical pain to 5/10 in order to improve her quality of life. 2 weeks. 8/6/2024    Goal Met 8/30/2024     2. Decrease her bilateral cervical and upper trapezius muscle tone to minimal to moderate increase in order to improve her cervical range of motion. 2 weeks. 8/6/2024    Goal Met 8/30/2024         3.Increase her cervical strength a 1/2 grade in order to improve her unsupported activity endurance.  2 weeks. 8/6/2024    Goal Met 8/30/2024       4.Increase her bilateral shoulder and elbow strength a 1/2 grade where she is able in order to increase the safety of her transfers. 2 weeks. 8/6/2024    Goal Met  8/30/2024       5.Increase her FOTO score to >=57% in order to improve her transfer and activities of daily living abilities.  6. Fair initial written home exercise program knowledge and be without questions in order to have carryover after discharge from PT.  2 weeks.           2 weeks. 8/6/2024 8/6/2024       In Progress 8/30/2024  54%         Goal Met 8/30/2024          LTG Weeks/Visits Date Established  Date Met   1.Decrease her max cervical pain to 3/10 in order to improve her quality of life. 4 weeks 8/6/2024    Goal Met 8/30/2024    2-3/10   2. Decrease her bilateral cervical and upper trapezius muscle tone to minimal increase in order to improve her cervical range of motion. 4 weeks 8/6/2024       Goal Met 8/30/2024     3.Increase her cervical strength one grade from the evaluation date in order to improve her unsupported activity endurance.  4 weeks 8/6/2024    In Progress 8/30/2024     4.Increase her bilateral shoulder and elbow strength one grade from the evaluation date where she is able in order to increase the safety of her transfers. 4 weeks 8/6/2024    In Progress 8/30/2024     5.Increase her FOTO score to >=63% in order to improve her transfer and activities of daily living abilities.  6. Good Progressed written home exercise program knowledge and be without questions in order to have carryover after discharge from PT.  4 weeks            4 weeks 8/6/2024 8/6/2024       In Progress 8/30/2024          In Progress 8/30/2024        PLAN     Plan of care Certification: 8/6/2024 to 09/13/2024. Outpatient Physical Therapy 1 times weekly for 4 weeks. Plan to improve her cervical and upper extremity strength in order to provide long term pain relief and improved function. Plan to continue to progress her home program as she tolerates. Plan to discharge the patient to her home program after the next PT session.      Jonnie Blank, PT

## 2024-09-03 ENCOUNTER — TELEPHONE (OUTPATIENT)
Dept: UROLOGY | Facility: CLINIC | Age: 29
End: 2024-09-03
Payer: COMMERCIAL

## 2024-09-03 NOTE — TELEPHONE ENCOUNTER
9/3/24 @ 8:14 am called patient regarding appointment needing to get cancelled due to Dr. Charles being out sick I spoke to patients mother and was able to R/S the appointment

## 2024-09-04 ENCOUNTER — OFFICE VISIT (OUTPATIENT)
Dept: UROLOGY | Facility: CLINIC | Age: 29
End: 2024-09-04
Payer: COMMERCIAL

## 2024-09-04 VITALS — DIASTOLIC BLOOD PRESSURE: 77 MMHG | SYSTOLIC BLOOD PRESSURE: 120 MMHG | HEART RATE: 87 BPM

## 2024-09-04 DIAGNOSIS — N31.9 NEUROGENIC BLADDER: Primary | ICD-10-CM

## 2024-09-04 DIAGNOSIS — N30.90 BLADDER INFECTION: ICD-10-CM

## 2024-09-04 PROCEDURE — 3074F SYST BP LT 130 MM HG: CPT | Mod: CPTII,S$GLB,, | Performed by: UROLOGY

## 2024-09-04 PROCEDURE — 87086 URINE CULTURE/COLONY COUNT: CPT | Performed by: UROLOGY

## 2024-09-04 PROCEDURE — 1159F MED LIST DOCD IN RCRD: CPT | Mod: CPTII,S$GLB,, | Performed by: UROLOGY

## 2024-09-04 PROCEDURE — 99214 OFFICE O/P EST MOD 30 MIN: CPT | Mod: S$GLB,,, | Performed by: UROLOGY

## 2024-09-04 PROCEDURE — 87088 URINE BACTERIA CULTURE: CPT | Performed by: UROLOGY

## 2024-09-04 PROCEDURE — 99999 PR PBB SHADOW E&M-EST. PATIENT-LVL III: CPT | Mod: PBBFAC,,, | Performed by: UROLOGY

## 2024-09-04 PROCEDURE — 87186 SC STD MICRODIL/AGAR DIL: CPT | Performed by: UROLOGY

## 2024-09-04 PROCEDURE — 3078F DIAST BP <80 MM HG: CPT | Mod: CPTII,S$GLB,, | Performed by: UROLOGY

## 2024-09-04 NOTE — PROGRESS NOTES
CHIEF COMPLAINT:    Mrs. Samuel is a 29 y.o. female presenting for a follow up on atonic bladder with urge incontinence.      PRESENTING ILLNESS:    Shashank Samuel is a 29 y.o. female who presents with a history of spina bifida.  She is status post augmentation cystoplasty with Mitrofanoff performs intermittent catheterization 6 times  a day.  She has a solitary kidney as she developed pyelonephritis with a perinephric abscness and had to have the kidney removed by Dr. Rosenthal.  She states that on occasions she has urge incontinence per her native urethra.  Last infection was in July and they believe it precipitated seizure.  Her meds were adjusted accordingly.  She is now on a nasal form of diazepam for seizures.  She feels like she has a bladder infection now because she has worsening of the urge incontinence in between catheterization and at night.    She sometimes spends 12 hours in bed as she has poor sleep hygiene.  They asked if she should wake up in the middle of her sleep cycle to cath.  She wakes up to take medication so the catheterizing can be done at that time.     Intermittent catheterization  Catheter type: Cure Catheter M14 (needs a long catheter, catheterizes through a Mitrofanoff)  Size:  12 Fr  Frequency:  6  ICD-10 Diagnosis code(s):  neurogenic bladder N31.9, spina bifida Q05.2, intermittent self cath Z78.9  History of Recurrent UTI?: yes  Is this indefinite?:  yes  Supplier:  [employer] [phone] Andi Tate    REVIEW OF SYSTEMS:    Review of Systems   Constitutional: Negative.    HENT: Negative.     Eyes: Negative.    Respiratory: Negative.     Cardiovascular: Negative.    Gastrointestinal: Negative.    Genitourinary:  Positive for urgency.   Musculoskeletal: Negative.    Skin: Negative.        PATIENT HISTORY:    Past Medical History:   Diagnosis Date    Anemia     Arnold-Chiari malformation, type II     Encounter for blood transfusion     Epilepsy     Hydrocephalus      Neurogenic bladder     self catheterizes every 3 hours    Raynaud disease     Seizures     only w/ shunt malfunction    Spinal bifida, closed     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       Past Surgical History:   Procedure Laterality Date    AMPUTATION      right 4th and 5th toes; left 5th toe and portion of left great toe    BACK SURGERY      BLADDER SURGERY      BREAST SURGERY  2015    Reduction    COLON SURGERY      flush site for bowel movements from appendix    CYSTOSTOMY      DEBRIDEMENT OF FOOT Right 1/8/2021    Procedure: DEBRIDEMENT, FOOT;  Surgeon: Abdi Bedoya DPM;  Location: [facility];  Service: Podiatry;  Laterality: Right;    EYE SURGERY      x 5    FLUOROSCOPIC URODYNAMIC STUDY N/A 3/12/2019    Procedure: URODYNAMIC STUDY, FLUOROSCOPIC;  Surgeon: Kenzie Charles MD;  Location: [facility] OR 1ST FLR;  Service: Urology;  Laterality: N/A;  1 hour    FLUOROSCOPIC URODYNAMIC STUDY N/A 4/4/2019    Procedure: URODYNAMIC STUDY, FLUOROSCOPIC;  Surgeon: Kenzie Charles MD;  Location: [facility] OR 1ST FLR;  Service: Urology;  Laterality: N/A;  1 hour    FLUOROSCOPIC URODYNAMIC STUDY N/A 5/11/2022    Procedure: URODYNAMIC STUDY, FLUOROSCOPIC;  Surgeon: Kenzie Charles MD;  Location: [facility] OR 1ST FLR;  Service: Urology;  Laterality: N/A;  90min    FOOT AMPUTATION THROUGH METATARSAL Right 10/28/2019    Procedure: AMPUTATION, FOOT, TRANSMETATARSAL;  Surgeon: Abdi Bedoya DPM;  Location: [facility];  Service: Podiatry;  Laterality: Right;    FOOT AMPUTATION THROUGH METATARSAL Right 3/27/2020    Procedure: AMPUTATION, FOOT, TRANSMETATARSAL;  Surgeon: Abdi Bedoya DPM;  Location: [facility];  Service: Podiatry;  Laterality: Right;  REVISION    MYELOMININGOCELE REPAIR      NEPHRECTOMY Right 11/4/2021    Procedure: Nephrectomy/ OPEN;  Surgeon: Dash Rosenthal MD;  Location: [facility] OR 2ND FLR;  Service: Urology;  Laterality: Right;  4HRS    NEPHROSTOMY Right 8/2/2021    Procedure: Nephrostomy and perinephric abscess drain;  Surgeon:  Lillian Surgeon;  Location: [facility];  Service: Anesthesiology;  Laterality: Right;    REVISION OF VENTRICULOPERITONEAL SHUNT Left 9/21/2022    Procedure: REVISION, SHUNT, VENTRICULOPERITONEAL;  Surgeon: Maynor Calero MD;  [address];  Service: Neurosurgery;  Laterality: Left;    STRABISMUS SURGERY      ou dr peña    VENTRICULOPERITONEAL SHUNT      WOUND DEBRIDEMENT  3/27/2020    Procedure: DEBRIDEMENT, WOUND;  Surgeon: Abdi Bedoya DPM;  Location: [facility];  Service: Podiatry;;       Family History   Problem Relation Name Age of Onset    Arthritis Mother      Breast cancer Mother      Gout Father      Epilepsy Father      Myocarditis Sister      Cataracts Maternal Grandmother       Social History     Socioeconomic History    Marital status: Single    Number of children: 0   Occupational History    Occupation: disabled   Tobacco Use    Smoking status: Never    Smokeless tobacco: Never   Substance and Sexual Activity    Alcohol use: Not Currently    Drug use: Never    Sexual activity: Never   Social History Narrative    Intact family. Wheelchair bound.  Self catheterizes herself     Social Determinants of Health     Financial Resource Strain: Medium Risk (3/6/2024)    Overall Financial Resource Strain (CARDIA)     Difficulty of Paying Living Expenses: Somewhat hard   Food Insecurity: Food Insecurity Present (3/6/2024)    Hunger Vital Sign     Worried About Running Out of Food in the Last Year: Sometimes true     Ran Out of Food in the Last Year: Never true   Transportation Needs: No Transportation Needs (3/6/2024)    PRAPARE - Transportation     Lack of Transportation (Medical): No     Lack of Transportation (Non-Medical): No   Physical Activity: Inactive (3/6/2024)    Exercise Vital Sign     Days of Exercise per Week: 0 days     Minutes of Exercise per Session: 0 min   Stress: No Stress Concern Present (3/6/2024)    [employer] - Occupational Stress Questionnaire      Feeling of Stress : Only a little   Housing Stability: Low Risk  (3/6/2024)    Housing Stability Vital Sign     Unable to Pay for Housing in the Last Year: No     Number of Places Lived in the Last Year: 1     Unstable Housing in the Last Year: No       Allergies:  Latex, natural rubber; Zofran [ondansetron hcl (pf)]; Gardasil  [h papillomavirus vac,qval (pf)]; Menactra  [mening vac a,c,y,w135 dip (pf)]; Nitrofurantoin; Sulfa (sulfonamide antibiotics); Tramadol; Levaquin [levofloxacin]; and Macrobid [nitrofurantoin monohyd/m-cryst]    Medications:  Outpatient Encounter Medications as of 9/4/2024   Medication Sig Dispense Refill    diazePAM (VALTOCO) 10 mg/spray (0.1 mL) Spry 10 mg by Nasal route every 5 (five) minutes as needed (Administer 1 spray into 1 nostril; if a second dose is needed, administer  in alternate nostril. A second dose should not be administered if the  patient is having trouble breathing or excessive sedatio). 1 each 3    EPINEPHrine (EPIPEN) 0.3 mg/0.3 mL AtIn Inject 0.3 mLs (0.3 mg total) into the muscle as needed (Anaphylactic reaction). 0.3 mL 0    ergocalciferol (ERGOCALCIFEROL) 50,000 unit Cap Take 50,000 Units by mouth every 7 days.      ibuprofen (ADVIL,MOTRIN) 200 MG tablet Take 200 mg by mouth every 6 (six) hours as needed for Pain.      lacosamide (VIMPAT) 50 mg Tab Take 1 tablet (50 mg total) by mouth every 12 (twelve) hours. 60 tablet 11    levETIRAcetam (KEPPRA) 750 MG Tab Take 2 tablets (1,500 mg total) by mouth 2 (two) times daily. 120 tablet 11     No facility-administered encounter medications on file as of 9/4/2024.         PHYSICAL EXAMINATION:    The patient generally appears in good health, is appropriately interactive, and is in no apparent distress.    Skin: No lesions.    Mental: Cooperative with normal affect.    Neuro: Grossly intact.    HEENT: Normal. No evidence of lymphadenopathy.    Chest:  normal inspiratory effort.    Abdomen: Soft, non-tender. No masses or  organomegaly. Bladder is not palpable. No evidence of flank discomfort. No evidence of inguinal hernia.    Extremities: No clubbing, cyanosis, or edema      LABS:    Lab Results   Component Value Date    BUN 8 07/11/2024    CREATININE 0.4 (L) 07/11/2024       UA 1.015, pH 6, + leuk, nitrite +, tr protein, tr blood, otherwise, negative.     IMPRESSION:    Neurogenic bladder   Spina bifida    PLAN:    1. They have surplus catheters so recommended continuing the self catheterizing 8 times a day  2.  On her urodynamics, she did not leak until her capacity which was 850 ml.  If she self catheterizes and keeps volumes less, will likely not leak.   3.  Urine sent for culture  4.  They are going to [facility] on 9/19/2024 discussed that we will likely get her treated for the urine culture before they go on their trip  5.  Renal ultrasound ordered.     I spent 30 minutes with the patient of which more than half was spent in direct consultation with the patient in regards to our treatment and plan.

## 2024-09-05 ENCOUNTER — HOSPITAL ENCOUNTER (OUTPATIENT)
Dept: RADIOLOGY | Facility: HOSPITAL | Age: 29
Discharge: HOME | End: 2024-09-05
Attending: UROLOGY
Payer: COMMERCIAL

## 2024-09-05 DIAGNOSIS — N30.90 BLADDER INFECTION: ICD-10-CM

## 2024-09-05 DIAGNOSIS — N31.9 NEUROGENIC BLADDER: ICD-10-CM

## 2024-09-05 PROCEDURE — 76775 US EXAM ABDO BACK WALL LIM: CPT | Mod: TC,PO

## 2024-09-05 PROCEDURE — 76775 US EXAM ABDO BACK WALL LIM: CPT | Mod: 26,,, | Performed by: RADIOLOGY

## 2024-09-05 NOTE — PROGRESS NOTES
"[facility]   Physical Therapy Treatment Note/Discharge Summary    Name: Shashank Samuel  Clinic Number: [id number]    Therapy Diagnosis:   Encounter Diagnoses   Name Primary?    Neck pain     Weakness     Abnormal increased muscle tone     Decreased functional mobility and endurance     Special educational needs Yes     Physician: Elisa Eng APRN*    Visit Date: 9/6/2024    Physician Orders: PT Eval and Treat   Medical Diagnosis from Referral: M54.2 (ICD-10-CM) - Neck pain M53.82 (ICD-10-CM) - Decreased ROM of intervertebral discs of cervical spine  Evaluation Date: 8/6/2024  Authorization Period Expiration: 12/31/2024  Plan of Care Expiration: 09/13/20204 at 1x/wk x 4 weeks  Visit # / Visits authorized: 4/20  (5th on the plan of care)  FOTO: 3/3                     Date of Last visit: 09/06/2024  Total Visits Received: 5    Time In: 1:00 pm    Time Out: 1:57 pm  Total Appointment Time (timed & untimed codes): 52 minutes     Precautions: Standard, Fall, and ACTIVE seizures, T-7 complete spinal cord injury and Latex/Rubber allergy  SUBJECTIVE     Pt reports: "I am doing well. I feel that I am still improving. I appreciate your help. I am not having any pain." PT acknowledges.    She was compliant with her home exercise program.    Response to previous treatment: She enters with no current complaints of pain.   Functional change: She is able to function physically as needed for her activity level at home including transfers.    Pain: 0/10 Upon entering the clinic this day. Max of 0/10 within the past week.  Location: The bilateral cervical and upper trapezius muscles.   OBJECTIVE     Objective Measures updated at progress report unless specified.     Palpation for Condition: She exhibits bilateral Normal to Minimally Increased > minimally increased > moderately increased cervical and upper trapezius muscle tone. No specific trigger points are noted at the evaluation.      Manual " Muscle Testing:      Cervical Strength Grade Comments   Flexion 5-/5 > 4+/5     Extension 5-/5 > 4+/5     Right Side Bend 4+/5 > 4/5     Left Side Bend 4+/5 > 4/5     Right Rotation 4/5 > 4-/5     Left Rotation 4/5 > 4-/5        RUE Strength Grade LUE Strength Grade   Shoulder Flexion 5/5 Shoulder Flexion 5/5   Shoulder Extension 5-/5 > 4+/5 Shoulder Extension 5-/5 > 4+/5   Shoulder Abduction 5/5 Shoulder Abduction 5/5   Shoulder Adduction 5/5 Shoulder Adduction 5/5   Shoulder Internal Rotation 4+/5 > 4/5 Shoulder Internal Rotation 4+/5 > 4/5   Shoulder External Rotation 4+/5 > 4/5 Shoulder External Rotation 4+/5 > 4/5   Elbow flexion 5-/5 > 4+/5 Elbow flexion 5-/5 > 4+/5   Elbow extension 4/5 > 4-/5 Elbow extension 4/5 > 4-/5        Limitation/Restriction for FOTO Cervical Survey     Therapist reviewed FOTO scores for Shashank Samuel on 09/06/2024.   FOTO documents entered into EPIC - see Media section.     Limitation Score:  69% Function 09/06/2024 > 54% Function 08/16/2024 > 52% Function at the evaluation.         Treatment     Shashank received the treatments listed below:     Shashank received therapeutic exercises to develop strength, endurance, ROM, flexibility, posture, and core stabilization for 10 minutes including:  -bilateral scapular retraction with Blue band while in WC 2 x 10 each  -bilateral upper extremity extension with Blue band while in WC 2 x 10 each  -Bilateral shoulder flexion to overhead with 4 pound dumb bells 2 x 10 each  -Bilateral shoulder abduction to overhead with 4 pound dumb bells  2 x 10 each     Shashank participated in neuromuscular re-education activities to improve: Balance, Coordination, Kinesthetic, Sense, Proprioception, Posture, and muscle recruitment for 30 minutes. The following activities were included:  -wheelchair press ups 2 x 10  -seated  press with 10 pound dowel 2 x 10  -seated bicep curls with 10 pound dowel 2 x 10  -begger's with Blue band 2 x  10  -horizontal abduction with Blue band 2 x 10  -Bilateral banded diagonals with Blue band 2 x 10 each.  -Tossing big red ball as high as possible with the wheelchair facing the wall.   -tossing yellow weighted ball into trampoline and catching it to help trunk control x 4 minutes with rest as needed.    Shashank participated in dynamic functional therapeutic activities to improve functional performance for 12 minutes, including:  -Upper Body Ergometer on Level 4 x 12 minutes forwards and backwards    Patient Education and Home Exercises     Home Exercises Provided and Patient Education Provided     Education provided:   -The patient is independent with their initial written home exercise program.  -08/16/2024 The patient's home program was updated this day. She returned demo to PT. She received bands to use at home. She was without questions.  -08/23/2024 The patient's home program was updated this day. She returned demo to PT. She received bands to use at home. She was without questions.  -08/30/2024 She received new bands in order to progress her home program.  -+09/06/2024 The patient is independent with her home program that was progressed throughout her plan of care. She received progressed resistance bands this day to help progress her home program. She was without questions.    Written Home Exercises Provided: Patient instructed to cont prior HEP. Exercises were reviewed and Shashank was able to demonstrate them prior to the end of the session.  Shashank demonstrated good  understanding of the education provided. See EMR under Patient Instructions for exercises provided during therapy sessions.  ASSESSMENT/Discharge Summary     Shashank completed her PT Plan of Care today. As of today, she has met all of her short term goals. She met 4 of her 6 long term goals and showed progression on all of them.  She reports that she is transferring independently at home and with ease. She is reporting no cervical or upper  trapezius pain. She has improved flexibility as her abnormal tone has decreased. This has also improved her cervical range of motion. Her neck and shoulder strength are good. She is functioning as needed. Her FOTO score from today supports this. She is independent with her progressed home program. She is ready to continue with her exercises at home. She tolerated her final PT session well.     Shashank progressed well towards her goals.   The patient's prognosis is Good.     The patient's spiritual, cultural, and educational needs were considered. The patient was agreeable to the plan of care and its goals.     Anticipated barriers to physical therapy: The continuance of active seizures which contributes to her complaints, her co-morbidities, and she relies on transportation.     Goals:     STG  Weeks/Visits Date Established  Date Met   1.Decrease her max cervical pain to 5/10 in order to improve her quality of life. 2 weeks. 8/6/2024    Goal Met 9/6/2024     2. Decrease her bilateral cervical and upper trapezius muscle tone to minimal to moderate increase in order to improve her cervical range of motion. 2 weeks. 8/6/2024    Goal Met 9/6/2024         3.Increase her cervical strength a 1/2 grade in order to improve her unsupported activity endurance.  2 weeks. 8/6/2024    Goal Met 9/6/2024       4.Increase her bilateral shoulder and elbow strength a 1/2 grade where she is able in order to increase the safety of her transfers. 2 weeks. 8/6/2024    Goal Met 9/6/2024       5.Increase her FOTO score to >=57% in order to improve her transfer and activities of daily living abilities.  6. Fair initial written home exercise program knowledge and be without questions in order to have carryover after discharge from PT.  2 weeks.           2 weeks. 8/6/2024 8/6/2024       Goal Met 9/6/2024  69%        Goal Met 9/6/2024          LTG Weeks/Visits Date Established  Date Met   1.Decrease her max cervical pain to 3/10 in  order to improve her quality of life. 4 weeks 8/6/2024    Goal Met 9/6/2024       2. Decrease her bilateral cervical and upper trapezius muscle tone to minimal increase in order to improve her cervical range of motion. 4 weeks 8/6/2024       Goal Met 9/6/2024  Normal to Minimal Increase   3.Increase her cervical strength one grade from the evaluation date in order to improve her unsupported activity endurance.  4 weeks 8/6/2024    In Progress 9/6/2024     4.Increase her bilateral shoulder and elbow strength one grade from the evaluation date where she is able in order to increase the safety of her transfers. 4 weeks 8/6/2024    In Progress 9/6/2024     5.Increase her FOTO score to >=63% in order to improve her transfer and activities of daily living abilities.  6. Good Progressed written home exercise program knowledge and be without questions in order to have carryover after discharge from PT.  4 weeks            4 weeks 8/6/2024 8/6/2024       Goal Met 9/6/2024  69%        Goal Met 9/6/2024         Discharge reason: The patient is now asymptomatic. She has reached the maximum rehab potential for the present time, and she is ready to continue with her exercises at home.    PLAN     The patient is discharged from skilled PT at this time.      Jonnie Blank, PT

## 2024-09-06 ENCOUNTER — CLINICAL SUPPORT (OUTPATIENT)
Dept: REHABILITATION | Facility: HOSPITAL | Age: 29
End: 2024-09-06
Payer: COMMERCIAL

## 2024-09-06 DIAGNOSIS — Z55.9 SPECIAL EDUCATIONAL NEEDS: Primary | ICD-10-CM

## 2024-09-06 DIAGNOSIS — M62.89 ABNORMAL INCREASED MUSCLE TONE: ICD-10-CM

## 2024-09-06 DIAGNOSIS — M54.2 NECK PAIN: ICD-10-CM

## 2024-09-06 DIAGNOSIS — Z74.09 DECREASED FUNCTIONAL MOBILITY AND ENDURANCE: ICD-10-CM

## 2024-09-06 DIAGNOSIS — R53.1 WEAKNESS: ICD-10-CM

## 2024-09-06 LAB — BACTERIA UR CULT: ABNORMAL

## 2024-09-06 PROCEDURE — 97112 NEUROMUSCULAR REEDUCATION: CPT | Mod: PN

## 2024-09-06 PROCEDURE — 97110 THERAPEUTIC EXERCISES: CPT | Mod: PN

## 2024-09-06 PROCEDURE — 97530 THERAPEUTIC ACTIVITIES: CPT | Mod: PN

## 2024-09-06 SDOH — SOCIAL DETERMINANTS OF HEALTH (SDOH): PROBLEMS RELATED TO EDUCATION AND LITERACY, UNSPECIFIED: Z55.9

## 2024-09-09 ENCOUNTER — TELEPHONE (OUTPATIENT)
Dept: UROLOGY | Facility: CLINIC | Age: 29
End: 2024-09-09
Payer: COMMERCIAL

## 2024-09-09 DIAGNOSIS — N30.90 BLADDER INFECTION: Primary | ICD-10-CM

## 2024-09-10 RX ORDER — NITROFURANTOIN 25; 75 MG/1; MG/1
100 CAPSULE ORAL 2 TIMES DAILY
Qty: 20 CAPSULE | Refills: 0 | Status: SHIPPED | OUTPATIENT
Start: 2024-09-10 | End: 2024-09-20

## 2024-09-10 NOTE — TELEPHONE ENCOUNTER
Spoke to Shashank's Mother.  She states that the only substance that caused Shashank to be short of breath is latex.  We discussed the nitrofurantoin at length and she states if she develops a rash she will give her benadryl.  Discussed the sensitivities.  This is the only oral medication that it will respond.  She needs to be very good about self catheterizing regularly.  She expressed understanding and will have Shashank wash her hands and clean well.

## 2024-09-12 ENCOUNTER — TELEPHONE (OUTPATIENT)
Dept: CARDIOLOGY | Facility: CLINIC | Age: 29
End: 2024-09-12
Payer: COMMERCIAL

## 2024-09-12 NOTE — TELEPHONE ENCOUNTER
----- Message from Aleyda Enio sent at 9/12/2024 10:39 AM CDT -----  Contact: CHELI VILLANUEVA  Type:  Needs Medical Advice    Who Called:CHELI VILLANUEVA   Symptoms (please be specific): ZIO - ABNORMAL RESULTS    Would the patient rather a call back or a response via [facility]? CALL   Best Call Back Number:  [phone]   REF #: [id number]  Additional Information: THANK YOU

## 2024-09-12 NOTE — TELEPHONE ENCOUNTER
09/12/24    Returned the call to [facility] and spoke with Regi.

## 2024-09-12 NOTE — TELEPHONE ENCOUNTER
09/12/24    Spoke with Regi at [facility] in ref to patient's Zio monitor showing Idioventricular rhythm with junctional rhythm.

## 2024-09-12 NOTE — TELEPHONE ENCOUNTER
09/12/24    Placed the Zio report in Dr Mendez's basket showing Idioventricular and junctional rhythm with bradycardia.  Notified Dr Mendez via secure message.

## 2024-09-20 DIAGNOSIS — R00.1 BRADYCARDIA: Primary | ICD-10-CM

## 2024-10-07 ENCOUNTER — OFFICE VISIT (OUTPATIENT)
Dept: CARDIOLOGY | Facility: CLINIC | Age: 29
End: 2024-10-07
Payer: COMMERCIAL

## 2024-10-07 VITALS
HEART RATE: 95 BPM | BODY MASS INDEX: 30.09 KG/M2 | DIASTOLIC BLOOD PRESSURE: 82 MMHG | SYSTOLIC BLOOD PRESSURE: 133 MMHG | WEIGHT: 130 LBS | HEIGHT: 55 IN

## 2024-10-07 DIAGNOSIS — R00.2 PALPITATIONS: Primary | ICD-10-CM

## 2024-10-07 DIAGNOSIS — R00.1 BRADYCARDIA: ICD-10-CM

## 2024-10-07 PROCEDURE — 99213 OFFICE O/P EST LOW 20 MIN: CPT | Mod: S$GLB,,, | Performed by: INTERNAL MEDICINE

## 2024-10-07 PROCEDURE — 3079F DIAST BP 80-89 MM HG: CPT | Mod: CPTII,S$GLB,, | Performed by: INTERNAL MEDICINE

## 2024-10-07 PROCEDURE — 99999 PR PBB SHADOW E&M-EST. PATIENT-LVL III: CPT | Mod: PBBFAC,,, | Performed by: INTERNAL MEDICINE

## 2024-10-07 PROCEDURE — 3008F BODY MASS INDEX DOCD: CPT | Mod: CPTII,S$GLB,, | Performed by: INTERNAL MEDICINE

## 2024-10-07 PROCEDURE — 3075F SYST BP GE 130 - 139MM HG: CPT | Mod: CPTII,S$GLB,, | Performed by: INTERNAL MEDICINE

## 2024-10-07 NOTE — PROGRESS NOTES
"Subjective:    Patient ID:  Shashank Samuel is a 29 y.o. female patient here for evaluation Results and Tachycardia  Follow up visit 10/07/2024  Came for follow up of test results.    History of Present Illness during the initial clinic visit:     29-year-old female with past medical history as mentioned below referred from PCP for evaluation of tachycardia.  This patient is new to me.  She was accompanied by family.  She has a history of seizures and was recently started on Vimpat in addition to Keppra after seizure episode.  Denies any history of heart disease in the past however has chronic history of tachycardia at baseline which especially aggravates during the episodes of seizures.  Had an episode of fluttering sensation in the chest week ago.  However has rare palpitations.  Denies chest pain or dyspnea.  Wheelchair-bound at baseline        Review of patient's allergies indicates:   Allergen Reactions    Latex, natural rubber Anaphylaxis and Other (See Comments)     angioedema    Zofran [ondansetron hcl (pf)] Swelling     Hives, "throat closes up"    Gardasil  [h papillomavirus vac,qval (pf)]      Other reaction(s): Shortness of breath    Menactra  [mening vac a,c,y,w135 dip (pf)]      Other reaction(s): Shortness of breath    Sulfa (sulfonamide antibiotics)     Tramadol Hives    Levaquin [levofloxacin] Rash     Spots on face    Macrobid [nitrofurantoin monohyd/m-cryst] Rash     Sppots/rash on face       Past Medical History:   Diagnosis Date    Anemia     Arnold-Chiari malformation, type II     Encounter for blood transfusion     Epilepsy     Hydrocephalus     Neurogenic bladder     self catheterizes every 3 hours    Raynaud disease     Seizures     only w/ shunt malfunction    Spinal bifida, closed     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     Past Surgical History:   Procedure Laterality Date    AMPUTATION      right 4th and 5th toes; left 5th toe and portion of left great toe    BACK SURGERY      BLADDER SURGERY      " BREAST SURGERY  2015    Reduction    COLON SURGERY      flush site for bowel movements from appendix    CYSTOSTOMY      DEBRIDEMENT OF FOOT Right 1/8/2021    Procedure: DEBRIDEMENT, FOOT;  Surgeon: Abdi Bedoya DPM;  Location: [facility];  Service: Podiatry;  Laterality: Right;    EYE SURGERY      x 5    FLUOROSCOPIC URODYNAMIC STUDY N/A 3/12/2019    Procedure: URODYNAMIC STUDY, FLUOROSCOPIC;  Surgeon: Kenzie Charles MD;  Location: [facility] 1ST FLR;  Service: Urology;  Laterality: N/A;  1 hour    FLUOROSCOPIC URODYNAMIC STUDY N/A 4/4/2019    Procedure: URODYNAMIC STUDY, FLUOROSCOPIC;  Surgeon: Kenzie Charles MD;  Location: [facility] 1ST FLR;  Service: Urology;  Laterality: N/A;  1 hour    FLUOROSCOPIC URODYNAMIC STUDY N/A 5/11/2022    Procedure: URODYNAMIC STUDY, FLUOROSCOPIC;  Surgeon: Kenzie Charles MD;  Location: [facility] 1ST FLR;  Service: Urology;  Laterality: N/A;  90min    FOOT AMPUTATION THROUGH METATARSAL Right 10/28/2019    Procedure: AMPUTATION, FOOT, TRANSMETATARSAL;  Surgeon: Abdi Bedoya DPM;  Location: [facility];  Service: Podiatry;  Laterality: Right;    FOOT AMPUTATION THROUGH METATARSAL Right 3/27/2020    Procedure: AMPUTATION, FOOT, TRANSMETATARSAL;  Surgeon: Abdi Bedoya DPM;  Location: [facility];  Service: Podiatry;  Laterality: Right;  REVISION    MYELOMININGOCELE REPAIR      NEPHRECTOMY Right 11/4/2021    Procedure: Nephrectomy/ OPEN;  Surgeon: Dash Rosenthal MD;  Location: [facility] OR 2ND FLR;  Service: Urology;  Laterality: Right;  4HRS    NEPHROSTOMY Right 8/2/2021    Procedure: Nephrostomy and perinephric abscess drain;  Surgeon: Lillian Surgeon;  Location: [facility];  Service: Anesthesiology;  Laterality: Right;    REVISION OF VENTRICULOPERITONEAL SHUNT Left 9/21/2022    Procedure: REVISION, SHUNT, VENTRICULOPERITONEAL;  Surgeon: Maynor Calero MD;  Location: [facility] OR 2ND FLR;  Service: Neurosurgery;  Laterality: Left;    STRABISMUS SURGERY      ou dr peña     VENTRICULOPERITONEAL SHUNT      WOUND DEBRIDEMENT  3/27/2020    Procedure: DEBRIDEMENT, WOUND;  Surgeon: Abdi Bedoya DPM;  Location: [facility];  Service: Podiatry;;     Social History     Tobacco Use    Smoking status: Never     Passive exposure: Never    Smokeless tobacco: Never   Substance Use Topics    Alcohol use: Not Currently    Drug use: Never        Review of Systems   Negative except as mentioned in HPI         Objective        Vitals:    10/07/24 1336   BP: 133/82   Pulse: 95       LIPIDS - LAST 2   Lab Results   Component Value Date    CHOL 139 06/13/2024    HDL 44 (L) 06/13/2024    LDLCALC 79 06/13/2024    TRIG 79 06/13/2024    CHOLHDL 3.2 06/13/2024       CBC - LAST 2  Lab Results   Component Value Date    WBC 11.54 07/11/2024    WBC 20.93 (H) 07/10/2024    RBC 5.07 07/11/2024    RBC 4.57 07/10/2024    HGB 14.5 07/11/2024    HGB 13.1 07/10/2024    HCT 45.4 07/11/2024    HCT 40.9 07/10/2024    MCV 90 07/11/2024    MCV 90 07/10/2024    MCH 28.6 07/11/2024    MCH 28.7 07/10/2024    MCHC 31.9 (L) 07/11/2024    MCHC 32.0 07/10/2024    RDW 13.3 07/11/2024    RDW 13.2 07/10/2024     07/11/2024     07/10/2024    MPV 10.5 07/11/2024    MPV 10.0 07/10/2024    GRAN 8.2 (H) 07/11/2024    GRAN 70.7 07/11/2024    LYMPH 2.4 07/11/2024    LYMPH 21.0 07/11/2024    MONO 0.8 07/11/2024    MONO 7.3 07/11/2024    BASO 0.02 07/11/2024    BASO 0.02 07/10/2024    NRBC 0 07/11/2024    NRBC 0 07/10/2024       CHEMISTRY & LIVER FUNCTION - LAST 2  Lab Results   Component Value Date     07/11/2024     07/10/2024    K 4.1 07/11/2024    K 4.0 07/10/2024     07/11/2024     07/10/2024    CO2 23 07/11/2024    CO2 20 (L) 07/10/2024    ANIONGAP 5 (L) 07/11/2024    ANIONGAP 10 07/10/2024    BUN 8 07/11/2024    BUN 14 07/10/2024    CREATININE 0.4 (L) 07/11/2024    CREATININE 0.5 07/10/2024    GLU 90 07/11/2024     (H) 07/10/2024    CALCIUM 8.4 (L) 07/11/2024    CALCIUM 7.7 (L) 07/10/2024     PH 7.398 09/22/2022    PH 7.340 (L) 09/21/2022    MG 1.9 07/11/2024    MG 1.8 07/10/2024    ALBUMIN 4.1 07/11/2024    ALBUMIN 4.1 07/10/2024    PROT 7.0 07/11/2024    PROT 7.1 07/10/2024    ALKPHOS 54 (L) 07/11/2024    ALKPHOS 55 07/10/2024    ALT 18 07/11/2024    ALT 16 07/10/2024    AST 19 07/11/2024    AST 17 07/10/2024    BILITOT 0.4 07/11/2024    BILITOT 0.2 07/10/2024        CARDIAC PROFILE - LAST 2  Lab Results   Component Value Date    BNP 15 01/16/2023    TROPONINI 0.030 08/01/2021    TROPONINIHS 3.6 01/16/2023        COAGULATION - LAST 2  Lab Results   Component Value Date    LABPT 20.0 (H) 08/01/2021    INR 1.0 07/10/2024    INR 1.0 01/31/2024    APTT 25.9 07/10/2024    APTT 22.5 09/21/2022       ENDOCRINE & PSA - LAST 2  Lab Results   Component Value Date    TSH 1.956 04/20/2024    PROCAL 0.09 09/21/2022        ECHOCARDIOGRAM RESULTS  Results for orders placed during the hospital encounter of 09/21/22    Echo    Interpretation Summary  · The left ventricle is normal in size with normal systolic function.  · The estimated ejection fraction is 55%.  · Normal left ventricular diastolic function.  · Mild tricuspid regurgitation.  · Mechanically ventilated; cannot use inferior caval vein diameter to estimate central venous pressure.      CURRENT/PREVIOUS VISIT EKG  Results for orders placed or performed in visit on 08/21/24   IN OFFICE EKG 12-LEAD (to [place])    Collection Time: 08/21/24 11:35 AM   Result Value Ref Range    QRS Duration 80 ms    [facility] QTC Calculation 428 ms    Narrative    Test Reason : R53.1,    Vent. Rate : 078 BPM     Atrial Rate : 078 BPM     P-R Int : 132 ms          QRS Dur : 080 ms      QT Int : 376 ms       P-R-T Axes : 091 034 039 degrees     QTc Int : 428 ms    Normal sinus rhythm  Normal ECG    Confirmed by Mervin Mendez MD ([id number]) on 9/19/2024 10:42:37 PM    Referred By: JOAO OSBORNE           Confirmed By:Mervin Mendez MD     No valid procedures specified.   No results  found for this or any previous visit.    No valid procedures specified.          PREVIOUS STRESS TEST              PREVIOUS ANGIOGRAM        PHYSICAL EXAM    CONSTITUTIONAL: Well built, well nourished in no apparent distress  HEENT: No pallor  NECK: no JVD  LUNGS: CTA b/l  HEART: regular rate and rhythm, S1, S2 normal, no murmur   ABDOMEN:  Nondistended  EXTREMITIES:  no edema   NEURO: AAO X 3   Psych:  Normal affect    I HAVE REVIEWED :    The vital signs, nurses notes, and all the pertinent radiology and labs.        Current Outpatient Medications   Medication Instructions    EPINEPHrine (EPIPEN) 0.3 mg, Intramuscular, As needed (PRN)    ergocalciferol (ERGOCALCIFEROL) 50,000 Units, Oral, Every 7 days    ibuprofen (ADVIL,MOTRIN) 200 mg, Oral, Every 6 hours PRN    lacosamide (VIMPAT) 50 mg, Oral, Every 12 hours    levETIRAcetam (KEPPRA) 1,500 mg, Oral, 2 times daily    VALTOCO 10 mg, Nasal, Every 5 min PRN          Assessment & Plan     Palpitations-rule out arrhythmia  Chronically elevated heart rate  History of epilepsy, spina bifida, hydrocephalus     TSH normal  No anginal symptoms  LDL less than 100  Blood pressure controlled      Plan:  Event monitor reviewed.  No sustained tachyarrhythmia detected.  Average heart rate 91 beats per minute.  There was an episode of idioventricular/junctional rhythm at the time of seizure with heart rate ranging between 30s to 50s.  However it is unclear if it is secondary to seizure episode.  Patient is also taking Vimpat which can cause bradycardia however the medication is working well for her in controlling the seizures currently and her average heart rate is above 90.  Preserved ejection fraction on echocardiogram.   Advised EP evaluation.  Has appointment soon    Follow up in about 6 months (around 4/7/2025).

## 2024-10-14 PROBLEM — N39.0 UTI (URINARY TRACT INFECTION): Status: RESOLVED | Noted: 2024-04-20 | Resolved: 2024-10-14

## 2024-10-21 NOTE — PROGRESS NOTES
Subjective     HPI    I had the pleasure of seeing Shashank Samuel in consultation at your request for the evaluation of sinus tachycardia. She is a 29F with past medical history significant for Arnold-Chiari malformation, type II, anemia, epilepsy, and spina bifida (wheelchair-bound), nephrectomy, and hydrocephalus, was well until several months ago when she presented to [facility] with a seizure. Started on Vimpat at this time which can slow the HR.    I reviewed a 7 day Zio in 9/2024 which showed sinus rhythm avg HR 91 bpm (range  bpm), PAC burden <1%, no PVCs, no sustained arrhythmias. Notably, junctional rhythm at 35 bpm noted at 156 PM on 8/30/2024, which was symptomatic (she was having a seizure at the time).    I reviewed all ECGs in the EMR dating back to 9/2013 (at which time her HR was 98 bpm). They show sinus rhythm  bpm.    Echo in 8/2024 showed EF 60-65%.    Review of Systems   Constitutional: Negative for decreased appetite, malaise/fatigue, weight gain and weight loss.   HENT:  Negative for sore throat.    Eyes:  Negative for blurred vision.   Cardiovascular:  Negative for chest pain, dyspnea on exertion, irregular heartbeat, leg swelling, near-syncope, orthopnea, palpitations, paroxysmal nocturnal dyspnea and syncope.   Respiratory:  Negative for shortness of breath.    Skin:  Negative for rash.   Musculoskeletal:  Negative for arthritis.   Gastrointestinal:  Negative for abdominal pain.   Neurological:  Negative for focal weakness.   Psychiatric/Behavioral:  Negative for altered mental status.       Objective     Physical Exam  Vitals and nursing note reviewed.   Constitutional:       General: She is not in acute distress.     Appearance: She is well-developed.   HENT:      Head: Normocephalic and atraumatic.   Eyes:      General: No scleral icterus.     Conjunctiva/sclera: Conjunctivae normal.      Pupils: Pupils are equal, round, and reactive to light.   Neck:      Thyroid: No  thyromegaly.      Vascular: No JVD.   Cardiovascular:      Rate and Rhythm: Normal rate and regular rhythm.      Pulses: Intact distal pulses.      Heart sounds: Normal heart sounds. No murmur heard.     No friction rub. No gallop.   Pulmonary:      Effort: Pulmonary effort is normal. No respiratory distress.      Breath sounds: Normal breath sounds.   Abdominal:      General: Bowel sounds are normal. There is no distension.      Palpations: Abdomen is soft.   Musculoskeletal:      Cervical back: Normal range of motion and neck supple.   Skin:     General: Skin is warm and dry.   Neurological:      Mental Status: She is alert and oriented to person, place, and time.   Psychiatric:         Behavior: Behavior normal.            Assessment and Plan     1. Sinus tachycardia    2. Personal history of osteomyelitis    3. Spina bifida of lumbar region, unspecified hydrocephalus presence    4. History of seizures        Plan:     In summary, Shashank Samuel is a 29F with a history of sinus tachycardia. HR trends reasonable on recent Zio monitor. Episode of symptomatic junctional rhythm associated with seizure--explained that pt can have vagal response in settinf of seizure, and it is nothing to be concerned about. Structurally nl heart. Not currently on AVN blockers. Given the very high threshold I have to implant device in this pt, my bias is to hold the course.     Reassurance provided, follow-up PRN.    Thank you for allowing me to participate in the care of this patient. Please do not hesitate to call me with any questions or concerns.

## 2024-10-23 ENCOUNTER — OFFICE VISIT (OUTPATIENT)
Dept: CARDIOLOGY | Facility: CLINIC | Age: 29
End: 2024-10-23
Payer: COMMERCIAL

## 2024-10-23 VITALS
SYSTOLIC BLOOD PRESSURE: 110 MMHG | RESPIRATION RATE: 16 BRPM | WEIGHT: 130 LBS | OXYGEN SATURATION: 100 % | HEIGHT: 55 IN | DIASTOLIC BLOOD PRESSURE: 72 MMHG | BODY MASS INDEX: 30.09 KG/M2 | HEART RATE: 87 BPM

## 2024-10-23 DIAGNOSIS — Q05.7 SPINA BIFIDA OF LUMBAR REGION, UNSPECIFIED HYDROCEPHALUS PRESENCE: ICD-10-CM

## 2024-10-23 DIAGNOSIS — R00.1 BRADYCARDIA: ICD-10-CM

## 2024-10-23 DIAGNOSIS — Z87.39 PERSONAL HISTORY OF OSTEOMYELITIS: ICD-10-CM

## 2024-10-23 DIAGNOSIS — R00.0 SINUS TACHYCARDIA: Primary | ICD-10-CM

## 2024-10-23 DIAGNOSIS — Z87.898 HISTORY OF SEIZURES: ICD-10-CM

## 2024-10-23 PROCEDURE — 99205 OFFICE O/P NEW HI 60 MIN: CPT | Mod: S$GLB,,, | Performed by: INTERNAL MEDICINE

## 2024-10-23 PROCEDURE — 99999 PR PBB SHADOW E&M-EST. PATIENT-LVL IV: CPT | Mod: PBBFAC,,, | Performed by: INTERNAL MEDICINE

## 2024-10-23 PROCEDURE — 3074F SYST BP LT 130 MM HG: CPT | Mod: CPTII,S$GLB,, | Performed by: INTERNAL MEDICINE

## 2024-10-23 PROCEDURE — 3078F DIAST BP <80 MM HG: CPT | Mod: CPTII,S$GLB,, | Performed by: INTERNAL MEDICINE

## 2024-10-23 PROCEDURE — 3008F BODY MASS INDEX DOCD: CPT | Mod: CPTII,S$GLB,, | Performed by: INTERNAL MEDICINE

## 2024-10-23 PROCEDURE — 1159F MED LIST DOCD IN RCRD: CPT | Mod: CPTII,S$GLB,, | Performed by: INTERNAL MEDICINE

## 2024-11-11 ENCOUNTER — TELEPHONE (OUTPATIENT)
Dept: FAMILY MEDICINE | Facility: CLINIC | Age: 29
End: 2024-11-11
Payer: COMMERCIAL

## 2024-11-11 NOTE — TELEPHONE ENCOUNTER
Spoke with pt mother. Pt c/o nose pain, stomach pain, feeling hot, and nausea. Advised no available appt today, but can schedule with Dr. Oscar. Kayleen jackson states she will bring her to .

## 2024-11-11 NOTE — TELEPHONE ENCOUNTER
----- Message from Nurse Dow sent at 11/11/2024  8:30 AM CST -----  Contact: Kayleen    ----- Message -----  From: Gladys Faulkner  Sent: 11/11/2024   8:22 AM CST  To: Dash Ponce Staff    Pt needs to be seen asap. bebo Robles. Kayleen @[phone]

## 2024-11-25 ENCOUNTER — TELEPHONE (OUTPATIENT)
Dept: FAMILY MEDICINE | Facility: CLINIC | Age: 29
End: 2024-11-25

## 2024-11-25 ENCOUNTER — OFFICE VISIT (OUTPATIENT)
Dept: FAMILY MEDICINE | Facility: CLINIC | Age: 29
End: 2024-11-25
Payer: COMMERCIAL

## 2024-11-25 VITALS
BODY MASS INDEX: 30.09 KG/M2 | OXYGEN SATURATION: 97 % | SYSTOLIC BLOOD PRESSURE: 108 MMHG | DIASTOLIC BLOOD PRESSURE: 70 MMHG | WEIGHT: 130 LBS | HEIGHT: 55 IN | HEART RATE: 98 BPM

## 2024-11-25 DIAGNOSIS — G82.20 PARAPLEGIA: ICD-10-CM

## 2024-11-25 DIAGNOSIS — Z23 NEED FOR INFLUENZA VACCINATION: ICD-10-CM

## 2024-11-25 DIAGNOSIS — R51.9 GENERALIZED HEADACHES: ICD-10-CM

## 2024-11-25 DIAGNOSIS — L89.151 PRESSURE ULCER OF SACRAL REGION, STAGE 1: ICD-10-CM

## 2024-11-25 DIAGNOSIS — M54.2 NECK PAIN: ICD-10-CM

## 2024-11-25 DIAGNOSIS — H10.9 CONJUNCTIVITIS OF LEFT EYE, UNSPECIFIED CONJUNCTIVITIS TYPE: Primary | ICD-10-CM

## 2024-11-25 DIAGNOSIS — M79.602 PAIN IN BOTH UPPER EXTREMITIES: ICD-10-CM

## 2024-11-25 DIAGNOSIS — Z99.3 WHEELCHAIR DEPENDENCE: ICD-10-CM

## 2024-11-25 DIAGNOSIS — Z78.9 SELF-CATHETERIZES URINARY BLADDER: ICD-10-CM

## 2024-11-25 DIAGNOSIS — M79.601 PAIN IN BOTH UPPER EXTREMITIES: ICD-10-CM

## 2024-11-25 PROCEDURE — 3074F SYST BP LT 130 MM HG: CPT | Mod: CPTII,S$GLB,,

## 2024-11-25 PROCEDURE — 90471 IMMUNIZATION ADMIN: CPT | Mod: S$GLB,,,

## 2024-11-25 PROCEDURE — 90656 IIV3 VACC NO PRSV 0.5 ML IM: CPT | Mod: S$GLB,,,

## 2024-11-25 PROCEDURE — 3008F BODY MASS INDEX DOCD: CPT | Mod: CPTII,S$GLB,,

## 2024-11-25 PROCEDURE — 99213 OFFICE O/P EST LOW 20 MIN: CPT | Mod: 25,S$GLB,,

## 2024-11-25 PROCEDURE — 3078F DIAST BP <80 MM HG: CPT | Mod: CPTII,S$GLB,,

## 2024-11-25 RX ORDER — NEOMYCIN SULFATE, POLYMYXIN B SULFATE AND DEXAMETHASONE 3.5; 10000; 1 MG/ML; [USP'U]/ML; MG/ML
1 SUSPENSION/ DROPS OPHTHALMIC 3 TIMES DAILY
Qty: 5 ML | Refills: 0 | Status: SHIPPED | OUTPATIENT
Start: 2024-11-25 | End: 2024-11-30

## 2024-11-25 RX ORDER — PROMETHAZINE HYDROCHLORIDE 25 MG/1
25 TABLET ORAL EVERY 6 HOURS PRN
COMMUNITY
Start: 2024-11-11

## 2024-11-25 NOTE — PATIENT INSTRUCTIONS
Diaz Encarnacion,     If you are due for any health screening(s) below please notify me so we can arrange them to be ordered and scheduled. Most healthy patients at your age complete them, but you are free to accept or refuse.     If you can't do it, I'll definitely understand. If you can, I'd certainly appreciate it!    All of your core healthy metrics are met.

## 2024-11-25 NOTE — TELEPHONE ENCOUNTER
----- Message from MICHAEL Oscar sent at 11/25/2024  2:19 PM CST -----  Please look for labs from [facility] from the last 2 months. Thanks.

## 2024-11-26 ENCOUNTER — TELEPHONE (OUTPATIENT)
Dept: FAMILY MEDICINE | Facility: CLINIC | Age: 29
End: 2024-11-26
Payer: COMMERCIAL

## 2024-11-26 ENCOUNTER — HOSPITAL ENCOUNTER (OUTPATIENT)
Facility: HOSPITAL | Age: 29
Discharge: HOME | End: 2024-11-27
Attending: STUDENT IN AN ORGANIZED HEALTH CARE EDUCATION/TRAINING PROGRAM | Admitting: HOSPITALIST
Payer: COMMERCIAL

## 2024-11-26 DIAGNOSIS — N39.0 URINARY TRACT INFECTION WITH HEMATURIA, SITE UNSPECIFIED: ICD-10-CM

## 2024-11-26 DIAGNOSIS — G40.909 SEIZURE DISORDER: ICD-10-CM

## 2024-11-26 DIAGNOSIS — R31.9 URINARY TRACT INFECTION WITH HEMATURIA, SITE UNSPECIFIED: ICD-10-CM

## 2024-11-26 DIAGNOSIS — R07.9 CHEST PAIN: ICD-10-CM

## 2024-11-26 DIAGNOSIS — G40.909 RECURRENT SEIZURES: Primary | ICD-10-CM

## 2024-11-26 PROBLEM — E87.6 HYPOKALEMIA: Status: ACTIVE | Noted: 2024-11-26

## 2024-11-26 LAB
ALBUMIN SERPL BCP-MCNC: 4 G/DL (ref 3.5–5.2)
ALLENS TEST: NORMAL
ALP SERPL-CCNC: 69 U/L (ref 40–150)
ALT SERPL W/O P-5'-P-CCNC: 28 U/L (ref 10–44)
ANION GAP SERPL CALC-SCNC: 15 MMOL/L (ref 8–16)
AST SERPL-CCNC: 20 U/L (ref 10–40)
B-HCG UR QL: NEGATIVE
BACTERIA #/AREA URNS HPF: ABNORMAL /HPF
BASOPHILS # BLD AUTO: 0.01 K/UL (ref 0–0.2)
BASOPHILS NFR BLD: 0.1 % (ref 0–1.9)
BILIRUB SERPL-MCNC: 0.2 MG/DL (ref 0.1–1)
BILIRUB UR QL STRIP: NEGATIVE
BUN SERPL-MCNC: 17 MG/DL (ref 6–20)
CALCIUM SERPL-MCNC: 8.6 MG/DL (ref 8.7–10.5)
CHLORIDE SERPL-SCNC: 106 MMOL/L (ref 95–110)
CLARITY UR: ABNORMAL
CO2 SERPL-SCNC: 14 MMOL/L (ref 23–29)
COLOR UR: YELLOW
CREAT SERPL-MCNC: 0.7 MG/DL (ref 0.5–1.4)
CTP QC/QA: YES
DELSYS: NORMAL
DIFFERENTIAL METHOD BLD: ABNORMAL
EOSINOPHIL # BLD AUTO: 0 K/UL (ref 0–0.5)
EOSINOPHIL NFR BLD: 0.2 % (ref 0–8)
ERYTHROCYTE [DISTWIDTH] IN BLOOD BY AUTOMATED COUNT: 13 % (ref 11.5–14.5)
EST. GFR  (NO RACE VARIABLE): >60 ML/MIN/1.73 M^2
FLOW: 2
GLUCOSE SERPL-MCNC: 104 MG/DL (ref 70–110)
GLUCOSE UR QL STRIP: NEGATIVE
HCT VFR BLD AUTO: 38.8 % (ref 37–48.5)
HCV AB SERPL QL IA: NEGATIVE
HGB BLD-MCNC: 12.9 G/DL (ref 12–16)
HGB UR QL STRIP: ABNORMAL
HIV 1+2 AB+HIV1 P24 AG SERPL QL IA: NEGATIVE
IMM GRANULOCYTES # BLD AUTO: 0.03 K/UL (ref 0–0.04)
IMM GRANULOCYTES NFR BLD AUTO: 0.3 % (ref 0–0.5)
KETONES UR QL STRIP: NEGATIVE
LACTATE SERPL-SCNC: 1 MMOL/L (ref 0.5–2.2)
LDH SERPL L TO P-CCNC: 1.11 MMOL/L (ref 0.5–2.2)
LEUKOCYTE ESTERASE UR QL STRIP: ABNORMAL
LYMPHOCYTES # BLD AUTO: 1.2 K/UL (ref 1–4.8)
LYMPHOCYTES NFR BLD: 12.8 % (ref 18–48)
MAGNESIUM SERPL-MCNC: 1.9 MG/DL (ref 1.6–2.6)
MCH RBC QN AUTO: 29 PG (ref 27–31)
MCHC RBC AUTO-ENTMCNC: 33.2 G/DL (ref 32–36)
MCV RBC AUTO: 87 FL (ref 82–98)
MICROSCOPIC COMMENT: ABNORMAL
MODE: NORMAL
MONOCYTES # BLD AUTO: 0.9 K/UL (ref 0.3–1)
MONOCYTES NFR BLD: 8.8 % (ref 4–15)
NEUTROPHILS # BLD AUTO: 7.5 K/UL (ref 1.8–7.7)
NEUTROPHILS NFR BLD: 77.8 % (ref 38–73)
NITRITE UR QL STRIP: POSITIVE
NON-SQ EPI CELLS #/AREA URNS HPF: 1 /HPF
NRBC BLD-RTO: 0 /100 WBC
PH UR STRIP: 7 [PH] (ref 5–8)
PLATELET # BLD AUTO: 264 K/UL (ref 150–450)
PMV BLD AUTO: 9.5 FL (ref 9.2–12.9)
POTASSIUM SERPL-SCNC: 3.3 MMOL/L (ref 3.5–5.1)
PROT SERPL-MCNC: 7.6 G/DL (ref 6–8.4)
PROT UR QL STRIP: ABNORMAL
RBC # BLD AUTO: 4.45 M/UL (ref 4–5.4)
RBC #/AREA URNS HPF: 7 /HPF (ref 0–4)
SAMPLE: NORMAL
SITE: NORMAL
SODIUM SERPL-SCNC: 135 MMOL/L (ref 136–145)
SP GR UR STRIP: 1.01 (ref 1–1.03)
SP02: 100
SQUAMOUS #/AREA URNS HPF: 0 /HPF
URN SPEC COLLECT METH UR: ABNORMAL
UROBILINOGEN UR STRIP-ACNC: NEGATIVE EU/DL
WBC # BLD AUTO: 9.62 K/UL (ref 3.9–12.7)
WBC #/AREA URNS HPF: 45 /HPF (ref 0–5)

## 2024-11-26 PROCEDURE — G0378 HOSPITAL OBSERVATION PER HR: HCPCS

## 2024-11-26 PROCEDURE — 80177 DRUG SCRN QUAN LEVETIRACETAM: CPT | Performed by: NURSE PRACTITIONER

## 2024-11-26 PROCEDURE — 83605 ASSAY OF LACTIC ACID: CPT

## 2024-11-26 PROCEDURE — 36416 COLLJ CAPILLARY BLOOD SPEC: CPT

## 2024-11-26 PROCEDURE — 25000003 PHARM REV CODE 250: Performed by: STUDENT IN AN ORGANIZED HEALTH CARE EDUCATION/TRAINING PROGRAM

## 2024-11-26 PROCEDURE — 63600175 PHARM REV CODE 636 W HCPCS

## 2024-11-26 PROCEDURE — 36415 COLL VENOUS BLD VENIPUNCTURE: CPT | Performed by: STUDENT IN AN ORGANIZED HEALTH CARE EDUCATION/TRAINING PROGRAM

## 2024-11-26 PROCEDURE — 93005 ELECTROCARDIOGRAM TRACING: CPT

## 2024-11-26 PROCEDURE — 96374 THER/PROPH/DIAG INJ IV PUSH: CPT | Mod: 59

## 2024-11-26 PROCEDURE — 81025 URINE PREGNANCY TEST: CPT | Performed by: NURSE PRACTITIONER

## 2024-11-26 PROCEDURE — 99900035 HC TECH TIME PER 15 MIN (STAT)

## 2024-11-26 PROCEDURE — 86803 HEPATITIS C AB TEST: CPT | Performed by: EMERGENCY MEDICINE

## 2024-11-26 PROCEDURE — 80235 DRUG ASSAY LACOSAMIDE: CPT | Performed by: NURSE PRACTITIONER

## 2024-11-26 PROCEDURE — 87389 HIV-1 AG W/HIV-1&-2 AB AG IA: CPT | Performed by: EMERGENCY MEDICINE

## 2024-11-26 PROCEDURE — 87186 SC STD MICRODIL/AGAR DIL: CPT | Performed by: NURSE PRACTITIONER

## 2024-11-26 PROCEDURE — 83605 ASSAY OF LACTIC ACID: CPT | Performed by: STUDENT IN AN ORGANIZED HEALTH CARE EDUCATION/TRAINING PROGRAM

## 2024-11-26 PROCEDURE — 87086 URINE CULTURE/COLONY COUNT: CPT | Performed by: NURSE PRACTITIONER

## 2024-11-26 PROCEDURE — 36415 COLL VENOUS BLD VENIPUNCTURE: CPT | Performed by: EMERGENCY MEDICINE

## 2024-11-26 PROCEDURE — 96365 THER/PROPH/DIAG IV INF INIT: CPT

## 2024-11-26 PROCEDURE — 25000003 PHARM REV CODE 250: Performed by: NURSE PRACTITIONER

## 2024-11-26 PROCEDURE — 85025 COMPLETE CBC W/AUTO DIFF WBC: CPT | Performed by: EMERGENCY MEDICINE

## 2024-11-26 PROCEDURE — 96361 HYDRATE IV INFUSION ADD-ON: CPT

## 2024-11-26 PROCEDURE — 80053 COMPREHEN METABOLIC PANEL: CPT | Performed by: EMERGENCY MEDICINE

## 2024-11-26 PROCEDURE — 83735 ASSAY OF MAGNESIUM: CPT | Performed by: STUDENT IN AN ORGANIZED HEALTH CARE EDUCATION/TRAINING PROGRAM

## 2024-11-26 PROCEDURE — 99285 EMERGENCY DEPT VISIT HI MDM: CPT | Mod: 25

## 2024-11-26 PROCEDURE — 87040 BLOOD CULTURE FOR BACTERIA: CPT | Mod: 59 | Performed by: STUDENT IN AN ORGANIZED HEALTH CARE EDUCATION/TRAINING PROGRAM

## 2024-11-26 PROCEDURE — 96375 TX/PRO/DX INJ NEW DRUG ADDON: CPT

## 2024-11-26 PROCEDURE — 63600175 PHARM REV CODE 636 W HCPCS: Performed by: STUDENT IN AN ORGANIZED HEALTH CARE EDUCATION/TRAINING PROGRAM

## 2024-11-26 PROCEDURE — 81000 URINALYSIS NONAUTO W/SCOPE: CPT | Performed by: NURSE PRACTITIONER

## 2024-11-26 RX ORDER — MEROPENEM 1 G/1
1 INJECTION, POWDER, FOR SOLUTION INTRAVENOUS
Status: DISCONTINUED | OUTPATIENT
Start: 2024-11-27 | End: 2024-11-27 | Stop reason: HOSPADM

## 2024-11-26 RX ORDER — NALOXONE HCL 0.4 MG/ML
0.02 VIAL (ML) INJECTION
Status: DISCONTINUED | OUTPATIENT
Start: 2024-11-26 | End: 2024-11-27 | Stop reason: HOSPADM

## 2024-11-26 RX ORDER — GLUCAGON 1 MG
1 KIT INJECTION
Status: DISCONTINUED | OUTPATIENT
Start: 2024-11-26 | End: 2024-11-27 | Stop reason: HOSPADM

## 2024-11-26 RX ORDER — ALUMINUM HYDROXIDE, MAGNESIUM HYDROXIDE, AND SIMETHICONE 1200; 120; 1200 MG/30ML; MG/30ML; MG/30ML
30 SUSPENSION ORAL 4 TIMES DAILY PRN
Status: DISCONTINUED | OUTPATIENT
Start: 2024-11-26 | End: 2024-11-27 | Stop reason: HOSPADM

## 2024-11-26 RX ORDER — ACETAMINOPHEN 325 MG/1
650 TABLET ORAL EVERY 8 HOURS PRN
Status: DISCONTINUED | OUTPATIENT
Start: 2024-11-26 | End: 2024-11-27 | Stop reason: HOSPADM

## 2024-11-26 RX ORDER — PROCHLORPERAZINE EDISYLATE 5 MG/ML
5 INJECTION INTRAMUSCULAR; INTRAVENOUS EVERY 6 HOURS PRN
Status: DISCONTINUED | OUTPATIENT
Start: 2024-11-26 | End: 2024-11-27 | Stop reason: HOSPADM

## 2024-11-26 RX ORDER — SODIUM,POTASSIUM PHOSPHATES 280-250MG
2 POWDER IN PACKET (EA) ORAL
Status: DISCONTINUED | OUTPATIENT
Start: 2024-11-26 | End: 2024-11-27 | Stop reason: HOSPADM

## 2024-11-26 RX ORDER — SIMETHICONE 80 MG
1 TABLET,CHEWABLE ORAL 4 TIMES DAILY PRN
Status: DISCONTINUED | OUTPATIENT
Start: 2024-11-26 | End: 2024-11-27 | Stop reason: HOSPADM

## 2024-11-26 RX ORDER — LANOLIN ALCOHOL/MO/W.PET/CERES
800 CREAM (GRAM) TOPICAL
Status: DISCONTINUED | OUTPATIENT
Start: 2024-11-26 | End: 2024-11-27 | Stop reason: HOSPADM

## 2024-11-26 RX ORDER — LEVETIRACETAM 500 MG/1
1500 TABLET ORAL 2 TIMES DAILY
Status: DISCONTINUED | OUTPATIENT
Start: 2024-11-26 | End: 2024-11-26

## 2024-11-26 RX ORDER — AMOXICILLIN 250 MG
1 CAPSULE ORAL 2 TIMES DAILY
Status: DISCONTINUED | OUTPATIENT
Start: 2024-11-26 | End: 2024-11-27 | Stop reason: HOSPADM

## 2024-11-26 RX ORDER — LEVETIRACETAM 500 MG/1
1500 TABLET ORAL 2 TIMES DAILY
Status: DISCONTINUED | OUTPATIENT
Start: 2024-11-27 | End: 2024-11-27 | Stop reason: HOSPADM

## 2024-11-26 RX ORDER — MEROPENEM 1 G/1
1 INJECTION, POWDER, FOR SOLUTION INTRAVENOUS
Status: COMPLETED | OUTPATIENT
Start: 2024-11-26 | End: 2024-11-26

## 2024-11-26 RX ORDER — LACOSAMIDE 50 MG/1
100 TABLET ORAL EVERY 12 HOURS
Status: DISCONTINUED | OUTPATIENT
Start: 2024-11-27 | End: 2024-11-27 | Stop reason: HOSPADM

## 2024-11-26 RX ORDER — ACETAMINOPHEN 325 MG/1
650 TABLET ORAL EVERY 4 HOURS PRN
Status: DISCONTINUED | OUTPATIENT
Start: 2024-11-26 | End: 2024-11-27 | Stop reason: HOSPADM

## 2024-11-26 RX ORDER — LACOSAMIDE 50 MG/1
300 TABLET ORAL
Status: COMPLETED | OUTPATIENT
Start: 2024-11-26 | End: 2024-11-26

## 2024-11-26 RX ORDER — IBUPROFEN 200 MG
24 TABLET ORAL
Status: DISCONTINUED | OUTPATIENT
Start: 2024-11-26 | End: 2024-11-27 | Stop reason: HOSPADM

## 2024-11-26 RX ORDER — LEVETIRACETAM 500 MG/5ML
1000 INJECTION, SOLUTION, CONCENTRATE INTRAVENOUS
Status: COMPLETED | OUTPATIENT
Start: 2024-11-26 | End: 2024-11-26

## 2024-11-26 RX ORDER — TALC
9 POWDER (GRAM) TOPICAL NIGHTLY PRN
Status: DISCONTINUED | OUTPATIENT
Start: 2024-11-26 | End: 2024-11-27 | Stop reason: HOSPADM

## 2024-11-26 RX ORDER — IBUPROFEN 200 MG
16 TABLET ORAL
Status: DISCONTINUED | OUTPATIENT
Start: 2024-11-26 | End: 2024-11-27 | Stop reason: HOSPADM

## 2024-11-26 RX ORDER — ENOXAPARIN SODIUM 100 MG/ML
40 INJECTION SUBCUTANEOUS EVERY 24 HOURS
Status: DISCONTINUED | OUTPATIENT
Start: 2024-11-27 | End: 2024-11-27 | Stop reason: HOSPADM

## 2024-11-26 RX ORDER — LORAZEPAM 2 MG/ML
INJECTION INTRAMUSCULAR
Status: COMPLETED
Start: 2024-11-26 | End: 2024-11-26

## 2024-11-26 RX ORDER — SODIUM CHLORIDE 0.9 % (FLUSH) 0.9 %
3 SYRINGE (ML) INJECTION EVERY 12 HOURS PRN
Status: DISCONTINUED | OUTPATIENT
Start: 2024-11-26 | End: 2024-11-27 | Stop reason: HOSPADM

## 2024-11-26 RX ORDER — LORAZEPAM 2 MG/ML
1 INJECTION INTRAMUSCULAR
Status: COMPLETED | OUTPATIENT
Start: 2024-11-26 | End: 2024-11-26

## 2024-11-26 RX ADMIN — MEROPENEM 1 G: 1 INJECTION INTRAVENOUS at 07:11

## 2024-11-26 RX ADMIN — SENNOSIDES AND DOCUSATE SODIUM 1 TABLET: 50; 8.6 TABLET ORAL at 11:11

## 2024-11-26 RX ADMIN — LORAZEPAM 1 MG: 2 INJECTION INTRAMUSCULAR at 06:11

## 2024-11-26 RX ADMIN — PROMETHAZINE HYDROCHLORIDE 25 MG: 25 INJECTION INTRAMUSCULAR; INTRAVENOUS at 07:11

## 2024-11-26 RX ADMIN — SODIUM CHLORIDE 250 ML: 0.9 INJECTION, SOLUTION INTRAVENOUS at 06:11

## 2024-11-26 RX ADMIN — POTASSIUM BICARBONATE 50 MEQ: 977.5 TABLET, EFFERVESCENT ORAL at 08:11

## 2024-11-26 RX ADMIN — LACOSAMIDE 300 MG: 50 TABLET, FILM COATED ORAL at 07:11

## 2024-11-26 RX ADMIN — LEVETIRACETAM 1000 MG: 100 INJECTION, SOLUTION, CONCENTRATE INTRAVENOUS at 06:11

## 2024-11-26 NOTE — TELEPHONE ENCOUNTER
----- Message from Nurse Lema sent at 11/26/2024  8:22 AM CST -----    ----- Message -----  From: Elisa Jameson  Sent: 11/26/2024   8:18 AM CST  To: Velas[facility] Pérez Staff    Pt got the flu shot yesterday and has had 3 seizures last night

## 2024-11-26 NOTE — TELEPHONE ENCOUNTER
Pt mother calling. States the only thing new to patient is the flu vaccine. States last night at hours of 10:00, 1:00am, and 5:00am she had small seizures. At 5am  mom called paramedics. They advised BP WNL, BS WNL, HR slightly elevated. They advised pt does not need to go to the ER  Pt states she is c/o being hot and cold at the same time. Pt mom reports pt feels clammy. They do not have thermometer at home. States other then that pt is fine. They do have nasal spray at home.   Elisa not available, spoke with Dr. Rivas. I see she has a known seizure disorder. Neurology manages. As long as she is okay right now. Call the neurologist they may need to run test to see I her levels are okay. Pt mother notified and voiced understanding.

## 2024-11-26 NOTE — FIRST PROVIDER EVALUATION
" Emergency Department TeleTriage Encounter Note      CHIEF COMPLAINT    Chief Complaint   Patient presents with    Seizures     5 times today post influenza vaccine       VITAL SIGNS   Initial Vitals [11/26/24 1726]   BP Pulse Resp Temp SpO2   133/76 107 18 98.2 °F (36.8 °C) 100 %      MAP       --            ALLERGIES    Review of patient's allergies indicates:   Allergen Reactions    Latex, natural rubber Anaphylaxis and Other (See Comments)     angioedema    Zofran [ondansetron hcl (pf)] Swelling     Hives, "throat closes up"    Gardasil  [h papillomavirus vac,qval (pf)]      Other reaction(s): Shortness of breath    Menactra  [mening vac a,c,y,w135 dip (pf)]      Other reaction(s): Shortness of breath    Sulfa (sulfonamide antibiotics)     Tramadol Hives    Levaquin [levofloxacin] Rash     Spots on face    Macrobid [nitrofurantoin monohyd/m-cryst] Rash     Sppots/rash on face       PROVIDER TRIAGE NOTE  Received a flu shot yesterday and has had several seizures since that time. History of epilepsy. Prescribed Keppra and Vimpat and has been taking as prescribed.     Limited physical exam via telehealth: The patient is awake, alert, answering questions appropriately and is not in respiratory distress.  As the Telet[facility] provider, I performed an initial assessment and ordered appropriate labs and imaging studies, if any, to facilitate the patient's care once placed in the ED. Once a room is available, care and a full evaluation will be completed by an alternate ED provider.  Any additional orders and the final disposition will be determined by that provider.  All imaging and labs will not be followed-up by the Teletriage Team, including myself.        ORDERS  Labs Reviewed   HEPATITIS C ANTIBODY   HIV 1 / 2 ANTIBODY       ED Orders (720h ago, onward)      Start Ordered     Status Ordering Provider    11/26/24 1738 11/26/24 1737  Saline lock IV  Once         Ordered CARROLL SCANLON    11/26/24 1738 11/26/24 1737  " CBC auto differential  STAT         Ordered DARDAR CARROLL A.    11/26/24 1738 11/26/24 1737  Comprehensive Metabolic Panel  STAT         Ordered DARDAR CARROLL A.    11/26/24 1738 11/26/24 1737  Levetiracetam Level  Once         Ordered DARDAR, CARROLL A.    11/26/24 1738 11/26/24 1737  Lacosamide (Vimpat)  Once         Ordered DARDAR CARROLL A.    11/26/24 1738 11/26/24 1737  Urinalysis, Reflex to Urine Culture Urine, Clean Catch  STAT         Ordered DARDAR CARROLL A.    11/26/24 1738 11/26/24 1737  POCT urine pregnancy  Once         Ordered TESSYDACHRISTINE CARROLL A.    11/26/24 1731 11/26/24 1731  Hepatitis C Antibody  STAT         Pending Collection ALEXA JOSEPH    11/26/24 1731 11/26/24 1731  HIV 1/2 Ag/Ab (4th Gen)  STAT         Pending Collection ALEXA JOSEPH              Virtual Visit Note: The provider triage portion of this emergency department evaluation and documentation was performed via [employer], a HIPAA-compliant telemedicine application, in concert with a tele-presenter in the room. A face to face patient evaluation with one of my colleagues will occur once the patient is placed in an emergency department room.      DISCLAIMER: This note was prepared with [employer] voice recognition transcription software. Garbled syntax, mangled pronouns, and other bizarre constructions may be attributed to that software system.

## 2024-11-27 VITALS
HEIGHT: 55 IN | OXYGEN SATURATION: 100 % | HEART RATE: 115 BPM | WEIGHT: 129.19 LBS | BODY MASS INDEX: 29.9 KG/M2 | RESPIRATION RATE: 18 BRPM | DIASTOLIC BLOOD PRESSURE: 71 MMHG | SYSTOLIC BLOOD PRESSURE: 116 MMHG | TEMPERATURE: 98 F

## 2024-11-27 LAB
ALBUMIN SERPL BCP-MCNC: 3.9 G/DL (ref 3.5–5.2)
ALP SERPL-CCNC: 62 U/L (ref 40–150)
ALT SERPL W/O P-5'-P-CCNC: 26 U/L (ref 10–44)
ANION GAP SERPL CALC-SCNC: 12 MMOL/L (ref 8–16)
AST SERPL-CCNC: 20 U/L (ref 10–40)
BASOPHILS # BLD AUTO: 0.02 K/UL (ref 0–0.2)
BASOPHILS NFR BLD: 0.3 % (ref 0–1.9)
BILIRUB SERPL-MCNC: 0.4 MG/DL (ref 0.1–1)
BUN SERPL-MCNC: 13 MG/DL (ref 6–20)
CALCIUM SERPL-MCNC: 8.5 MG/DL (ref 8.7–10.5)
CHLORIDE SERPL-SCNC: 110 MMOL/L (ref 95–110)
CO2 SERPL-SCNC: 16 MMOL/L (ref 23–29)
CREAT SERPL-MCNC: 0.6 MG/DL (ref 0.5–1.4)
DIFFERENTIAL METHOD BLD: NORMAL
EOSINOPHIL # BLD AUTO: 0 K/UL (ref 0–0.5)
EOSINOPHIL NFR BLD: 0.5 % (ref 0–8)
ERYTHROCYTE [DISTWIDTH] IN BLOOD BY AUTOMATED COUNT: 13.2 % (ref 11.5–14.5)
EST. GFR  (NO RACE VARIABLE): >60 ML/MIN/1.73 M^2
GLUCOSE SERPL-MCNC: 81 MG/DL (ref 70–110)
HCT VFR BLD AUTO: 41 % (ref 37–48.5)
HGB BLD-MCNC: 13.4 G/DL (ref 12–16)
IMM GRANULOCYTES # BLD AUTO: 0.04 K/UL (ref 0–0.04)
IMM GRANULOCYTES NFR BLD AUTO: 0.5 % (ref 0–0.5)
LYMPHOCYTES # BLD AUTO: 1.4 K/UL (ref 1–4.8)
LYMPHOCYTES NFR BLD: 19 % (ref 18–48)
MAGNESIUM SERPL-MCNC: 2.1 MG/DL (ref 1.6–2.6)
MCH RBC QN AUTO: 29.1 PG (ref 27–31)
MCHC RBC AUTO-ENTMCNC: 32.7 G/DL (ref 32–36)
MCV RBC AUTO: 89 FL (ref 82–98)
MONOCYTES # BLD AUTO: 1 K/UL (ref 0.3–1)
MONOCYTES NFR BLD: 12.9 % (ref 4–15)
NEUTROPHILS # BLD AUTO: 5 K/UL (ref 1.8–7.7)
NEUTROPHILS NFR BLD: 66.8 % (ref 38–73)
NRBC BLD-RTO: 0 /100 WBC
PHOSPHATE SERPL-MCNC: 3.3 MG/DL (ref 2.7–4.5)
PLATELET # BLD AUTO: 269 K/UL (ref 150–450)
PMV BLD AUTO: 9.7 FL (ref 9.2–12.9)
POTASSIUM SERPL-SCNC: 3.9 MMOL/L (ref 3.5–5.1)
PROT SERPL-MCNC: 7.2 G/DL (ref 6–8.4)
RBC # BLD AUTO: 4.6 M/UL (ref 4–5.4)
SODIUM SERPL-SCNC: 138 MMOL/L (ref 136–145)
WBC # BLD AUTO: 7.44 K/UL (ref 3.9–12.7)

## 2024-11-27 PROCEDURE — 85025 COMPLETE CBC W/AUTO DIFF WBC: CPT | Performed by: NURSE PRACTITIONER

## 2024-11-27 PROCEDURE — 80053 COMPREHEN METABOLIC PANEL: CPT | Performed by: NURSE PRACTITIONER

## 2024-11-27 PROCEDURE — 96375 TX/PRO/DX INJ NEW DRUG ADDON: CPT

## 2024-11-27 PROCEDURE — 25000003 PHARM REV CODE 250: Performed by: NURSE PRACTITIONER

## 2024-11-27 PROCEDURE — 63600175 PHARM REV CODE 636 W HCPCS: Performed by: NURSE PRACTITIONER

## 2024-11-27 PROCEDURE — 96376 TX/PRO/DX INJ SAME DRUG ADON: CPT

## 2024-11-27 PROCEDURE — 27000221 HC OXYGEN, UP TO 24 HOURS

## 2024-11-27 PROCEDURE — 36415 COLL VENOUS BLD VENIPUNCTURE: CPT | Performed by: NURSE PRACTITIONER

## 2024-11-27 PROCEDURE — G0378 HOSPITAL OBSERVATION PER HR: HCPCS

## 2024-11-27 PROCEDURE — 84100 ASSAY OF PHOSPHORUS: CPT | Performed by: NURSE PRACTITIONER

## 2024-11-27 PROCEDURE — 83735 ASSAY OF MAGNESIUM: CPT | Performed by: NURSE PRACTITIONER

## 2024-11-27 RX ORDER — AMMONIUM LACTATE 12 G/100G
LOTION TOPICAL 2 TIMES DAILY PRN
Status: DISCONTINUED | OUTPATIENT
Start: 2024-11-27 | End: 2024-11-27 | Stop reason: HOSPADM

## 2024-11-27 RX ORDER — LACOSAMIDE 100 MG/1
100 TABLET ORAL EVERY 12 HOURS
Qty: 60 TABLET | Refills: 0 | Status: SHIPPED | OUTPATIENT
Start: 2024-11-27 | End: 2024-12-27

## 2024-11-27 RX ADMIN — MEROPENEM 1 G: 1 INJECTION, POWDER, FOR SOLUTION INTRAVENOUS at 04:11

## 2024-11-27 RX ADMIN — LEVETIRACETAM 1500 MG: 500 TABLET, FILM COATED ORAL at 09:11

## 2024-11-27 RX ADMIN — LACOSAMIDE 100 MG: 50 TABLET, FILM COATED ORAL at 09:11

## 2024-11-27 RX ADMIN — MEROPENEM 1 G: 1 INJECTION, POWDER, FOR SOLUTION INTRAVENOUS at 11:11

## 2024-11-27 NOTE — RESPIRATORY THERAPY
Latest Reference Range & Units 11/26/24 19:17   Sample  VENOUS   Flow  2   DelSys  Nasal Can   Site  LF   Mode  SPONT   POC Lactate 0.5 - 2.2 mmol/L 1.11

## 2024-11-27 NOTE — ED PROVIDER NOTES
"Encounter Date: 11/26/2024       History     Chief Complaint   Patient presents with    Seizures     5 times today post influenza vaccine     29-year-old female history of seizure disorder and compliant with antiepileptic medication presents for evaluation of recurrent seizures, for episodes of seizure activity, duration less than 5 minutes, returns to baseline before neck seizure activity.  Last seizure activity was months ago, symptoms occurred this time after having flu vaccine.  History of  shunt and having obstruction and has not been having those symptoms before these events per patient and mother and other relatives.  No definitive infectious symptoms preceding event    The history is provided by the patient, medical records, a parent and a relative.     Review of patient's allergies indicates:   Allergen Reactions    Latex, natural rubber Anaphylaxis and Other (See Comments)     angioedema    Zofran [ondansetron hcl (pf)] Swelling     Hives, "throat closes up"    Gardasil  [h papillomavirus vac,qval (pf)]      Other reaction(s): Shortness of breath    Menactra  [mening vac a,c,y,w135 dip (pf)]      Other reaction(s): Shortness of breath    Sulfa (sulfonamide antibiotics)     Tramadol Hives    Levaquin [levofloxacin] Rash     Spots on face    Macrobid [nitrofurantoin monohyd/m-cryst] Rash     Sppots/rash on face     Past Medical History:   Diagnosis Date    Anemia     Arnold-Chiari malformation, type II     Encounter for blood transfusion     Epilepsy     Hydrocephalus     Neurogenic bladder     self catheterizes every 3 hours    Raynaud disease     Seizures     only w/ shunt malfunction    Spinal bifida, closed     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     Past Surgical History:   Procedure Laterality Date    AMPUTATION      right 4th and 5th toes; left 5th toe and portion of left great toe    BACK SURGERY      BLADDER SURGERY      BREAST SURGERY  2015    Reduction    COLON SURGERY      flush site for bowel movements from " appendix    CYSTOSTOMY      DEBRIDEMENT OF FOOT Right 1/8/2021    Procedure: DEBRIDEMENT, FOOT;  Surgeon: Abdi Bedoya DPM;  Location: [facility] OR;  Service: Podiatry;  Laterality: Right;    EYE SURGERY      x 5    FLUOROSCOPIC URODYNAMIC STUDY N/A 3/12/2019    Procedure: URODYNAMIC STUDY, FLUOROSCOPIC;  Surgeon: Kenzie Charles MD;  Location: [facility] OR 1ST FLR;  Service: Urology;  Laterality: N/A;  1 hour    FLUOROSCOPIC URODYNAMIC STUDY N/A 4/4/2019    Procedure: URODYNAMIC STUDY, FLUOROSCOPIC;  Surgeon: Kenzie Charles MD;  Location: [facility] OR 1ST FLR;  Service: Urology;  Laterality: N/A;  1 hour    FLUOROSCOPIC URODYNAMIC STUDY N/A 5/11/2022    Procedure: URODYNAMIC STUDY, FLUOROSCOPIC;  Surgeon: Kenzie Charles MD;  Location: [facility] 1ST FLR;  Service: Urology;  Laterality: N/A;  90min    FOOT AMPUTATION THROUGH METATARSAL Right 10/28/2019    Procedure: AMPUTATION, FOOT, TRANSMETATARSAL;  Surgeon: Abdi Bedoya DPM;  Location: [facility] OR;  Service: Podiatry;  Laterality: Right;    FOOT AMPUTATION THROUGH METATARSAL Right 3/27/2020    Procedure: AMPUTATION, FOOT, TRANSMETATARSAL;  Surgeon: Abdi Bedoya DPM;  Location: [facility];  Service: Podiatry;  Laterality: Right;  REVISION    MYELOMININGOCELE REPAIR      NEPHRECTOMY Right 11/4/2021    Procedure: Nephrectomy/ OPEN;  Surgeon: Dash Rosenthal MD;  Location: [facility] OR 2ND FLR;  Service: Urology;  Laterality: Right;  4HRS    NEPHROSTOMY Right 8/2/2021    Procedure: Nephrostomy and perinephric abscess drain;  Surgeon: Lillian Surgeon;  Location: [facility];  Service: Anesthesiology;  Laterality: Right;    REVISION OF VENTRICULOPERITONEAL SHUNT Left 9/21/2022    Procedure: REVISION, SHUNT, VENTRICULOPERITONEAL;  Surgeon: Maynor Calero MD;  Location: [facility] OR 2ND FLR;  Service: Neurosurgery;  Laterality: Left;    STRABISMUS SURGERY      ou dr peña    VENTRICULOPERITONEAL SHUNT      WOUND DEBRIDEMENT  3/27/2020    Procedure: DEBRIDEMENT, WOUND;  Surgeon:  Abdi Bedoya DPM;  Location: [facility] OR;  Service: Podiatry;;     Family History   Problem Relation Name Age of Onset    Arthritis Mother      Breast cancer Mother      Gout Father      Epilepsy Father      Myocarditis Sister      Cataracts Maternal Grandmother      Amblyopia Neg Hx      Blindness Neg Hx      Cancer Neg Hx      Diabetes Neg Hx      Glaucoma Neg Hx      Hypertension Neg Hx      Macular degeneration Neg Hx      Retinal detachment Neg Hx      Strabismus Neg Hx      Stroke Neg Hx      Thyroid disease Neg Hx       Social History     Tobacco Use    Smoking status: Never     Passive exposure: Never    Smokeless tobacco: Never   Substance Use Topics    Alcohol use: Not Currently    Drug use: Never     Review of Systems   All other systems reviewed and are negative.      Physical Exam     Initial Vitals [11/26/24 1726]   BP Pulse Resp Temp SpO2   133/76 107 18 98.2 °F (36.8 °C) 100 %      MAP       --         Physical Exam    Nursing note and vitals reviewed.  Constitutional: She is not diaphoretic.   HENT:   Head: Normocephalic.   Eyes: No scleral icterus.   Cardiovascular:  Normal rate.           Pulmonary/Chest: Effort normal. No stridor.   Breath sounds bilaterally   Abdominal: There is no guarding.     Neurological: She is alert.   Skin: No rash noted. No erythema.         ED Course   Procedures  Labs Reviewed   URINALYSIS, REFLEX TO URINE CULTURE - Abnormal       Result Value    Specimen UA Urine, Clean Catch      Color, UA Yellow      Appearance, UA Hazy (*)     pH, UA 7.0      Specific Gravity, UA 1.015      Protein, UA Trace (*)     Glucose, UA Negative      Ketones, UA Negative      Bilirubin (UA) Negative      Occult Blood UA Trace (*)     Nitrite, UA Positive (*)     Urobilinogen, UA Negative      Leukocytes, UA 3+ (*)     Narrative:     Specimen Source->Urine   URINALYSIS MICROSCOPIC - Abnormal    RBC, UA 7 (*)     WBC, UA 45 (*)     Bacteria Many (*)     Squam Epithel, UA 0      Non-Squam  Epith 1 (*)     Microscopic Comment SEE COMMENT      Narrative:     Specimen Source->Urine   CBC W/ AUTO DIFFERENTIAL - Abnormal    WBC 9.62      RBC 4.45      Hemoglobin 12.9      Hematocrit 38.8      MCV 87      MCH 29.0      MCHC 33.2      RDW 13.0      Platelets 264      MPV 9.5      Immature Granulocytes 0.3      Gran # (ANC) 7.5      Immature Grans (Abs) 0.03      Lymph # 1.2      Mono # 0.9      Eos # 0.0      Baso # 0.01      nRBC 0      Gran % 77.8 (*)     Lymph % 12.8 (*)     Mono % 8.8      Eosinophil % 0.2      Basophil % 0.1      Differential Method Automated     COMPREHENSIVE METABOLIC PANEL - Abnormal    Sodium 135 (*)     Potassium 3.3 (*)     Chloride 106      CO2 14 (*)     Glucose 104      BUN 17      Creatinine 0.7      Calcium 8.6 (*)     Total Protein 7.6      Albumin 4.0      Total Bilirubin 0.2      Alkaline Phosphatase 69      AST 20      ALT 28      eGFR >60      Anion Gap 15     CULTURE, URINE   CULTURE, BLOOD   CULTURE, BLOOD   MAGNESIUM    Magnesium 1.9     HEPATITIS C ANTIBODY    Narrative:     Collection has been rescheduled by RBB at 11/26/2024 17:53 Reason:   Nurse Draw   HIV 1 / 2 ANTIBODY    Narrative:     Collection has been rescheduled by RBB at 11/26/2024 17:53 Reason:   Nurse Draw   LEVETIRACETAM  (KEPPRA) LEVEL    Narrative:     Collection has been rescheduled by RBB at 11/26/2024 17:53 Reason:   Nurse Draw   LACOSAMIDE (VIMPAT)    Narrative:     Collection has been rescheduled by RBB at 11/26/2024 17:53 Reason:   Nurse Draw   POCT URINE PREGNANCY    POC Preg Test, Ur Negative       Acceptable Yes     ISTAT LACTATE    POC Lactate 1.11      Sample VENOUS      Site LF      Allens Test Pass      DelSys Nasal Can      Mode SPONT      Flow 2      Sp02 100            Imaging Results              CT Head Without Contrast (Final result)  Result time 11/26/24 19:59:55      Final result by Jonathan Egan DO (11/26/24 19:59:55)                   Impression:      1. No  acute intracranial abnormality.  2. Stable positioning of the ventriculoperitoneal shunt catheter, with stable size and configuration of the ventricles.  3. Chiari 2 malformation.      Electronically signed by: Jonathan Egan  Date:    11/26/2024  Time:    19:59               Narrative:    EXAMINATION:  CT HEAD WITHOUT CONTRAST    CLINICAL HISTORY:  Mental status change, unknown cause;    TECHNIQUE:  Low dose axial CT images obtained throughout the head without intravenous contrast. Sagittal and coronal reconstructions were performed.    COMPARISON:  CT head from 07/10/2024.    FINDINGS:  There is a left-sided ventriculoperitoneal shunt catheter with the tip in unchanged position in the right lateral ventricle.  There is also a retained, abandoned right-sided catheter, with the tip in unchanged position.  Ventricular size and configuration is stable from the prior study.  There is no evidence of interval worsening or new overt hydrocephalus.  Unchanged appearance of the posterior fossa, with findings in keeping with Chiari 2 malformation.  No extra-axial blood or fluid collections.  No parenchymal mass, hemorrhage, edema or major vascular distribution infarct.    No calvarial fracture.  The scalp is unremarkable.  Bilateral paranasal sinuses and mastoid air cells are clear.                                       X-Ray Shunt Series (Final result)  Result time 11/26/24 19:52:46      Final result by Jonathan Egan DO (11/26/24 19:52:46)                   Impression:       shunt as above.      Electronically signed by: Jonathan Egan  Date:    11/26/2024  Time:    19:52               Narrative:    EXAMINATION:  XR SHUNT SERIES    CLINICAL HISTORY:  seizure;    TECHNIQUE:  Five views of the skull, chest, and the abdomen were performed in the frontal and lateral projections for assessment of the  shunt.    COMPARISON:  01/31/2024.    FINDINGS:  There is a  shunt which appears similar to the prior study, and  demonstrates no evidence of kink or discontinuity.  The shunt terminates in the left hemiabdomen.  Osseous structures are intact.  The lungs are hypoexpanded.  There are median sternotomy wires.  There is thoracolumbar spine hardware.  There is a moderate to large volume of stool.                                       X-Ray Chest AP Portable (Final result)  Result time 11/26/24 18:59:46      Final result by Jonathan Egan DO (11/26/24 18:59:46)                   Impression:      No acute abnormality.      Electronically signed by: Jonathan Egan  Date:    11/26/2024  Time:    18:59               Narrative:    EXAMINATION:  XR CHEST AP PORTABLE    CLINICAL HISTORY:  Sepsis;    TECHNIQUE:  Single frontal view of the chest was performed.    COMPARISON:  07/09/2024.    FINDINGS:  The lungs are hypoexpanded and clear.  No focal opacities are seen.  The pleural spaces are clear.  The cardiac silhouette is unremarkable.  Osseous structures are intact.  There are median sternotomy wires.  Partially imaged thoracic/lumbar spine hardware noted.  There is a  shunt catheter.                                       Medications   sodium chloride 0.9% bolus 250 mL 250 mL (0 mLs Intravenous Stopped 11/26/24 2013)   levETIRAcetam injection 1,000 mg (1,000 mg Intravenous Given 11/26/24 1800)   LORazepam injection 1 mg (1 mg Intravenous Given 11/26/24 1830)   meropenem injection 1 g (1 g Intravenous Given 11/26/24 1922)   promethazine (PHENERGAN) 25 mg in 0.9% NaCl 50 mL IVPB (0 mg Intravenous Stopped 11/26/24 2013)   lacosamide tablet 300 mg (300 mg Oral Given 11/26/24 1930)   potassium bicarbonate disintegrating tablet 50 mEq (50 mEq Oral Given 11/26/24 2043)     Medical Decision Making  29-year-old female presents with recurrent seizures after receiving flu vaccine, patient administered IV Keppra and Vimpat load per neurologist recommendations Dr. Smart.  Patient had 1 episode of seizure activity here before receiving been  antiepileptics.  Placed on nasal cannula for support, seizure aborted with 1 mg Ativan.  Patient administered IV fluids, sepsis protocol initiated and administered broad-spectrum antibiotics, history of drug-resistant bacteria per read previous urine culture review.  Does not meet definitive sepsis criteria.  Spoke with hospitalist team who will admit for further management evaluation, patient and family updated and agree with plan    Amount and/or Complexity of Data Reviewed  Labs: ordered. Decision-making details documented in ED Course.  Radiology: ordered and independent interpretation performed.     Details: No focal infiltrate  ECG/medicine tests: ordered and independent interpretation performed.     Details: Rate 82, QRS 82, , normal sinus rhythm, no STEMI  Discussion of management or test interpretation with external provider(s): Spoke with Deon Corbett NP with hospitalist team will admit for further management and evaluation  Spoke with neurologist , recommendations above  and in ED course    Risk  Prescription drug management.  Decision regarding hospitalization.               ED Course as of 11/26/24 2133 Tue Nov 26, 2024 1927 Spoke with neurologist -recommends 300 mg Vimpat loading dose and 100 mg b.i.d., okay to keep patient [facility] at [facility] [KB]   2001 Sodium(!): 135 [KB]   2001 Potassium(!): 3.3 [KB]   2001 CO2(!): 14 [KB]   2001 Calcium(!): 8.6 [KB]   2001 PROTEIN TOTAL: 7.6 [KB]   2128 This patient does have evidence of infective focus  My overall impression is Uti.  Source: Urinary Tract  Antibiotics given- Antibiotics (72h ago, onward)    None      Latest lactate reviewed-  @[id number](lactate,poclac)@  Organ dysfunction indicated by   N/A    Fluid challenge Not needed - patient is not hypotensive      Post- resuscitation assessment No - Post resuscitation assessment not needed       Will Not start Pressors- Levophed for MAP of 65  Source control achieved by:  Meropenem   [KB]      ED Course User Index  [KB] Osmel Erickson Jr.,                            Clinical Impression:  Final diagnoses:  [G40.909] Seizure disorder  [G40.909] Recurrent seizures (Primary)  [N39.0, R31.9] Urinary tract infection with hematuria, site unspecified          ED Disposition Condition    Admit Stable                Osmel Erickson Jr., DO  11/26/24 3179

## 2024-11-27 NOTE — PLAN OF CARE
[facility] - Med/Surg  Initial Discharge Assessment       Primary Care Provider: Dash Ponce MD    Admission Diagnosis: Recurrent seizures [G40.909]    Admission Date: 11/26/2024  Expected Discharge Date: 11/29/2024    Transition of Care Barriers: None    Payor: BLUE CROSS BLUE SHIELD / Plan: [place] PPO / Product Type: PPO /     Extended Emergency Contact Information  Primary Emergency Contact: Miya Samuele  Address: [address] [place] [address] [place]  Mobile Phone: [phone]  Relation: Mother  Secondary Emergency Contact: JimmieAbdi  Address: [address]           [place] [address] [place]  Mobile Phone: [phone]  Relation: Father    Discharge Plan A: Home with family  Discharge Plan B: Home with family      [facility] - [place] - [address]  [address] [place]  [place] [address]  Phone: [phone] Fax: [phone]    [id number] - [place] - [address]  [address]  [place] [address]  Phone: [phone] Fax: [phone]    DC assessment completed at bedside with pt and mother. Information on facesheet verified. Lives at listed address with parents and sister. Family will drive her home. PCP is Dr. Ponce. Pharm is [facility]. Denies coumadin, HH, HD, DME is listed. Family and DME assist with ADLs. Pt's family drives her to appts. Insurance verified. Denies POA. Denies recent admission. Planning to DC home when clear.       Initial Assessment (most recent)       Adult Discharge Assessment - 11/27/24 1207          Discharge Assessment    Assessment Type Discharge Planning Assessment     Confirmed/corrected address, phone number and insurance Yes     Confirmed Demographics Correct on Facesheet     Source of Information patient;family     Does patient/caregiver understand observation status Yes      Communicated DELANEY with patient/caregiver Yes     People in Home parent(s);sibling(s)     Facility Arrived From: home     Do you expect to return to your current living situation? Yes     Do you have help at home or someone to help you manage your care at home? Yes     Who are your caregiver(s) and their phone number(s)? parents and sister     Prior to hospitilization cognitive status: Unable to Assess     Current cognitive status: Alert/Oriented     Equipment Currently Used at Home wheelchair;bath bench;grab bar;urinary catheter supplies     Readmission within 30 days? No     Patient currently being followed by outpatient case management? No     Do you currently have service(s) that help you manage your care at home? No     Do you take prescription medications? Yes     Do you have prescription coverage? Yes     Do you have any problems affording any of your prescribed medications? No     Is the patient taking medications as prescribed? yes     Who is going to help you get home at discharge? family     How do you get to doctors appointments? family or friend will provide     Are you on dialysis? No     Do you take coumadin? No     Discharge Plan A Home with family     Discharge Plan B Home with family     DME Needed Upon Discharge  none     Discharge Plan discussed with: Patient;Sibling;Parent(s)     Name(s) and Number(s) motherKayleen @ bedside     Transition of Care Barriers None

## 2024-11-27 NOTE — CONSULTS
[facility]/Surg  Wound Care    Patient Name:  Shashank Samuel   MRN:  [id number]  Date: 11/27/2024  Diagnosis: Recurrent seizures    History:     Past Medical History:   Diagnosis Date    Anemia     Arnold-Chiari malformation, type II     Encounter for blood transfusion     Epilepsy     Hydrocephalus     Neurogenic bladder     self catheterizes every 3 hours    Raynaud disease     Seizures     only w/ shunt malfunction    Spinal bifida, closed     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       Social History     Socioeconomic History    Marital status: Single    Number of children: 0   Occupational History    Occupation: disabled   Tobacco Use    Smoking status: Never     Passive exposure: Never    Smokeless tobacco: Never   Substance and Sexual Activity    Alcohol use: Not Currently    Drug use: Never    Sexual activity: Never   Social History Narrative    Intact family. Wheelchair bound.  Self catheterizes herself     Social Drivers of Health     Financial Resource Strain: Patient Declined (11/27/2024)    Overall Financial Resource Strain (CARDIA)     Difficulty of Paying Living Expenses: Patient declined   Food Insecurity: Patient Declined (11/27/2024)    Hunger Vital Sign     Worried About Running Out of Food in the Last Year: Patient declined     Ran Out of Food in the Last Year: Patient declined   Transportation Needs: Patient Declined (11/27/2024)    TRANSPORTATION NEEDS     Transportation : Patient declined   Physical Activity: Inactive (3/6/2024)    Exercise Vital Sign     Days of Exercise per Week: 0 days     Minutes of Exercise per Session: 0 min   Stress: Patient Declined (11/27/2024)    [employer] - Occupational Stress Questionnaire     Feeling of Stress : Patient declined   Housing Stability: Patient Declined (11/27/2024)    Housing Stability Vital Sign     Unable to Pay for Housing in the Last Year: Patient declined     Homeless in the Last Year: Patient declined        Precautions:     Allergies as of 11/26/2024 - Reviewed 11/26/2024   Allergen Reaction Noted    Latex, natural rubber Anaphylaxis and Other (See Comments) 08/22/2012    Zofran [ondansetron hcl (pf)] Swelling 09/12/2013    Gardasil  [h papillomavirus vac,qval (pf)]  08/22/2012    Menactra  [mening vac a,c,y,w135 dip (pf)]  08/22/2012    Sulfa (sulfonamide antibiotics)  03/14/2014    Tramadol Hives 03/26/2015    Levaquin [levofloxacin] Rash 11/11/2019    Macrobid [nitrofurantoin monohyd/m-cryst] Rash 11/11/2019       WO Assessment Details/Treatment     Pt seen by wound care for consultation for Pressure injury to right Ischium.  Skin assessment performed.  Bilateral feet noted with redness to soles of feet.  Pt has Hx of Reynauds.  Plantar foot and heels noted dry with flaking skin.  Blanchable redness with no bogginess.  RLQ continent colostomy.  Pt catheterizes stoma every 48hrs.  Stoma noted red, moist and at skin level.  Pink/Red scar tissue noted to Pubis. No open wounds. Pt applies moisturizer cream to area.  Right Ischium noted with Pressure ulcer. Wound noted with red, granular tissue.  Closed edges with pink scar tissue noted to edges and periwound.  Cleaned with soap and water.  Applied Aquacle ag.  Covered with foam dressing.  Pt will follow up with [facility] for wound care.     11/27/24 1020   WOCN Assessment   WOCN Total Time (mins) 60   Visit Date 11/27/24   Visit Time 1020   Consult Type New   WOCN Speciality Wound   Wound pressure   Ostomy Type Ileostomy   Intervention assessed;changed;applied;chart review;orders   Teaching on-going        Wound 11/27/24 0018 Pressure Injury Right Ischial tuberosity   Date First Assessed/Time First Assessed: 11/27/24 0018   Present on Original Admission: Yes  Primary Wound Type: Pressure Injury  Side: Right  Location: Ischial tuberosity   Wound Image    Pressure Injury Stage 4   Dressing Appearance Intact;Moist drainage   Drainage Amount Small   Drainage  Characteristics/Odor Magana   Appearance Red;Moist;Not granulating   Periwound Area Intact;Dry;Pink;Scar tissue   Wound Edges Defined   Wound Length (cm) 3 cm   Wound Width (cm) 1 cm   Wound Depth (cm) 0.5 cm   Wound Volume (cm^3) 1.5 cm^3   Wound Surface Area (cm^2) 3 cm^2   Care Cleansed with:;Soap and water   Dressing Silver;Hydrofiber   Periwound Care Absorptive dressing applied;Cleansed with pH balanced cleanser;Dry periwound area maintained;Moisture barrier applied        Colostomy Other (see comments) RLQ   No placement date or time found.   Present Prior to Hospital Arrival?: Yes  Colostomy Type: (c) Other (see comments)  Location: [facility]  Additional Comments: stoma used for enema, BM per rectum  Removal Indication and Assessment: not present upon hospital...   Wound Image    Stomal Appliance Other (Comment)  (Continent Colostomy)   Stoma Appearance round;moist;red   Site Assessment Clean;Intact;Dry   Peristomal Assessment Clean;Intact   Stoma Function flatus;stool;other (see comments)  (Pt catheterizes stoma every 48hrs.)       Right Ischium--Stage  4 Pressure Injury--clean with soap and water.  Applied Aquacel ag to wound.  Covered with foam dressing.    Lac-Hydrin for Bilateral Feet 2x daily.     11/27/2024

## 2024-11-27 NOTE — HPI
Shashank Samuel is a 29 year old female with a past medical history of Arnold-Chiari malformation, type II, anemia, epilepsy, neurogenic bladder (self cath's) spina bifida (wheelchair-bound), nephrectomy, and hydrocephalus with  shunt, who presented to the ED with recurrent seizures. She had a few seizures today, about 5 lasting 5 minutes or less, with return to appropriate baseline. Last seizure activity was months ago, and she reports being compliant with her anti seizure medications. Family reports she had her flu shot yesterday. She denies fever, chills or other complaint.    ED work up included a CBC which was unremarkable. CMP showed mild hypokalemia.  Magnesium 1.9, lactic acid 1.11. Urinalysis positive for evidence of infection, and she was initiated on Merrem in the ED. CXR shows no acute cardiopulmonary abnormality. XR Shunt series shows no abnormality. CT of the head show no acute intracranial abnormality. Neurology was consulted by the ED, Dr. Smart, who recommended to load the patient with Vimpat 300 mg and increase her daily dosage to vimpat 100 mg BID. Hospital Medicine consulted for admission and further management.

## 2024-11-27 NOTE — ASSESSMENT & PLAN NOTE
Noted, chronic-little to no use of lower extremities  Wheelchair dependent  PT/OT consult  Progressive mobility plan  Assist with ADLs

## 2024-11-27 NOTE — HOSPITAL COURSE
Patient had no further episodes of seizure after receiving Ativan.  She is alert and oriented, and has no complaints at this time.  CT, x-ray shunt, chest x-ray showed no acute process.  Patient was seen and evaluated by Neurology.  Patient is hemodynamically stable, okay for discharge.  Her dose of Vimpat has been increased to 200 mg.  She will continue current dosage of Keppra.  She will follow up outpatient with the primary care physician, and we will follow up outpatient with Neurology.        Physical Exam:  General- Patient alert and oriented x3 in NAD  HEENT- PERRLA, EOMI, OP clear, MMM  Neck- No JVD, Lymphadenopathy, Thyromegaly  CV- Regular rate and rhythm, No Murmur/james/rubs  Resp- Lungs CTA Bilaterally, No increased WOB  GI- Non tender/non-distended, BS normoactive x4 quads, no HSM  Extrem- No cyanosis, clubbing, edema. Pulses 2+ and symmetric  Neuro- Strength 5/5 flexors/extensors upper extremities  Skin-  No masses, rashes or lesions noted on cursory skin exam.

## 2024-11-27 NOTE — H&P
Formerly [facility]  History & Physical    Patient Name: Shashank Samuel  MRN: [id number]  Patient Class: OP- Observation  Admission Date: 11/26/2024  Attending Physician: Chanel Acuña MD   Primary Care Provider: Dash Ponce MD         Patient information was obtained from patient, parent, past medical records, and ER records.     Subjective:     Principal Problem:Recurrent seizures    Chief Complaint:   Chief Complaint   Patient presents with    Seizures     5 times today post influenza vaccine        HPI: Shashank Samuel is a 29 year old female with a past medical history of Arnold-Chiari malformation, type II, anemia, epilepsy, neurogenic bladder (self cath's) spina bifida (wheelchair-bound), nephrectomy, and hydrocephalus with  shunt, who presented to the ED with recurrent seizures. She had a few seizures today, about 5 lasting 5 minutes or less, with return to appropriate baseline. Last seizure activity was months ago, and she reports being compliant with her anti seizure medications. Family reports she had her flu shot yesterday. She denies fever, chills or other complaint.    ED work up included a CBC which was unremarkable. CMP showed mild hypokalemia.  Magnesium 1.9, lactic acid 1.11. Urinalysis positive for evidence of infection, and she was initiated on Merrem in the ED. CXR shows no acute cardiopulmonary abnormality. XR Shunt series shows no abnormality. CT of the head show no acute intracranial abnormality. Neurology was consulted by the ED, Dr. Smart, who recommended to load the patient with Vimpat 300 mg and increase her daily dosage to vimpat 100 mg BID. Hospital Medicine consulted for admission and further management.     Past Medical History:   Diagnosis Date    Anemia     Arnold-Chiari malformation, type II     Encounter for blood transfusion     Epilepsy     Hydrocephalus     Neurogenic bladder     self catheterizes every 3 hours    Raynaud disease      Seizures     only w/ shunt malfunction    Spinal bifida, closed     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       Past Surgical History:   Procedure Laterality Date    AMPUTATION      right 4th and 5th toes; left 5th toe and portion of left great toe    BACK SURGERY      BLADDER SURGERY      BREAST SURGERY  2015    Reduction    COLON SURGERY      flush site for bowel movements from appendix    CYSTOSTOMY      DEBRIDEMENT OF FOOT Right 1/8/2021    Procedure: DEBRIDEMENT, FOOT;  Surgeon: Abdi Bedoya DPM;  Location: [facility];  Service: Podiatry;  Laterality: Right;    EYE SURGERY      x 5    FLUOROSCOPIC URODYNAMIC STUDY N/A 3/12/2019    Procedure: URODYNAMIC STUDY, FLUOROSCOPIC;  Surgeon: Kenzie Charles MD;  Location: [facility] 1ST FLR;  Service: Urology;  Laterality: N/A;  1 hour    FLUOROSCOPIC URODYNAMIC STUDY N/A 4/4/2019    Procedure: URODYNAMIC STUDY, FLUOROSCOPIC;  Surgeon: Kenzie Charles MD;  Location: [facility] 1ST FLR;  Service: Urology;  Laterality: N/A;  1 hour    FLUOROSCOPIC URODYNAMIC STUDY N/A 5/11/2022    Procedure: URODYNAMIC STUDY, FLUOROSCOPIC;  Surgeon: Kenzie Charles MD;  Location: [facility] 1ST FLR;  Service: Urology;  Laterality: N/A;  90min    FOOT AMPUTATION THROUGH METATARSAL Right 10/28/2019    Procedure: AMPUTATION, FOOT, TRANSMETATARSAL;  Surgeon: Abdi Bedoya DPM;  Location: [facility];  Service: Podiatry;  Laterality: Right;    FOOT AMPUTATION THROUGH METATARSAL Right 3/27/2020    Procedure: AMPUTATION, FOOT, TRANSMETATARSAL;  Surgeon: Abdi Bedoya DPM;  Location: [facility];  Service: Podiatry;  Laterality: Right;  REVISION    MYELOMININGOCELE REPAIR      NEPHRECTOMY Right 11/4/2021    Procedure: Nephrectomy/ OPEN;  Surgeon: Dash Rosenthal MD;  Location: [facility] OR 2ND FLR;  Service: Urology;  Laterality: Right;  4HRS    NEPHROSTOMY Right 8/2/2021    Procedure: Nephrostomy and perinephric abscess drain;  Surgeon: Lillian Surgeon;  Location: [facility];  Service: Anesthesiology;  Laterality:  "Right;    REVISION OF VENTRICULOPERITONEAL SHUNT Left 9/21/2022    Procedure: REVISION, SHUNT, VENTRICULOPERITONEAL;  Surgeon: Maynor Calero MD;  Location: [facility] OR [address] [place] [address];  Service: Neurosurgery;  Laterality: Left;    STRABISMUS SURGERY      ou dr peña    VENTRICULOPERITONEAL SHUNT      WOUND DEBRIDEMENT  3/27/2020    Procedure: DEBRIDEMENT, WOUND;  Surgeon: Abdi Bedoya DPM;  Location: [facility] OR;  Service: Podiatry;;       Review of patient's allergies indicates:   Allergen Reactions    Latex, natural rubber Anaphylaxis and Other (See Comments)     angioedema    Zofran [ondansetron hcl (pf)] Swelling     Hives, "throat closes up"    Gardasil  [h papillomavirus vac,qval (pf)]      Other reaction(s): Shortness of breath    Menactra  [mening vac a,c,y,w135 dip (pf)]      Other reaction(s): Shortness of breath    Sulfa (sulfonamide antibiotics)     Tramadol Hives    Levaquin [levofloxacin] Rash     Spots on face    Macrobid [nitrofurantoin monohyd/m-cryst] Rash     Sppots/rash on face       No current facility-administered medications on file prior to encounter.     Current Outpatient Medications on File Prior to Encounter   Medication Sig    diazePAM (VALTOCO) 10 mg/spray (0.1 mL) Spry 10 mg by Nasal route every 5 (five) minutes as needed (Administer 1 spray into 1 nostril; if a second dose is needed, administer  in alternate nostril. A second dose should not be administered if the  patient is having trouble breathing or excessive sedatio).    EPINEPHrine (EPIPEN) 0.3 mg/0.3 mL AtIn Inject 0.3 mLs (0.3 mg total) into the muscle as needed (Anaphylactic reaction).    ergocalciferol (ERGOCALCIFEROL) 50,000 unit Cap Take 50,000 Units by mouth every 7 days.    ibuprofen (ADVIL,MOTRIN) 200 MG tablet Take 200 mg by mouth every 6 (six) hours as needed for Pain.    lacosamide (VIMPAT) 50 mg Tab Take 1 tablet (50 mg total) by mouth every 12 (twelve) hours.    levETIRAcetam (KEPPRA) 750 MG Tab Take 2 tablets (1,500 mg " total) by mouth 2 (two) times daily.    promethazine (PHENERGAN) 25 MG tablet Take 25 mg by mouth every 6 (six) hours as needed.    neomycin-polymyxin-dexamethasone (MAXITROL) 3.5mg/mL-10,000 unit/mL-0.1 % DrpS Place 1 drop into the left eye 3 (three) times daily. for 5 days     Family History       Problem Relation (Age of Onset)    Arthritis Mother    Breast cancer Mother    Cataracts Maternal Grandmother    Epilepsy Father    Gout Father    Myocarditis Sister          Tobacco Use    Smoking status: Never     Passive exposure: Never    Smokeless tobacco: Never   Substance and Sexual Activity    Alcohol use: Not Currently    Drug use: Never    Sexual activity: Never     Review of Systems   Constitutional:  Negative for chills and fever.   HENT:  Negative for congestion and sore throat.    Eyes:  Negative for visual disturbance.   Respiratory:  Negative for cough and shortness of breath.    Cardiovascular:  Negative for chest pain and palpitations.   Gastrointestinal:  Negative for abdominal pain, nausea and vomiting.   Endocrine: Negative for cold intolerance and heat intolerance.   Genitourinary:  Negative for dysuria and hematuria.   Musculoskeletal:  Negative for arthralgias and myalgias.   Skin:  Negative for pallor and rash.   Neurological:  Positive for seizures. Negative for tremors.   Hematological:  Negative for adenopathy. Does not bruise/bleed easily.   Psychiatric/Behavioral:  Negative for hallucinations.    All other systems reviewed and are negative.    Objective:     Vital Signs (Most Recent):  Temp: 98.3 °F (36.8 °C) (11/26/24 2314)  Pulse: 99 (11/26/24 2314)  Resp: 19 (11/26/24 2314)  BP: 136/76 (11/26/24 2314)  SpO2: 100 % (11/26/24 2314) Vital Signs (24h Range):  Temp:  [98.2 °F (36.8 °C)-98.3 °F (36.8 °C)] 98.3 °F (36.8 °C)  Pulse:  [] 99  Resp:  [18-19] 19  SpO2:  [100 %] 100 %  BP: (109-148)/(62-91) 136/76     Weight: 59 kg (130 lb)  Body mass index is 30.21 kg/m².     Physical  Exam  Vitals and nursing note reviewed.   Constitutional:       General: She is awake. She is not in acute distress.     Appearance: Normal appearance. She is well-developed. She is not ill-appearing.   HENT:      Head: Normocephalic and atraumatic.      Mouth/Throat:      Lips: Pink.      Mouth: Mucous membranes are moist.   Eyes:      General: Lids are normal.      Extraocular Movements:      Right eye: Abnormal extraocular motion present.      Left eye: Abnormal extraocular motion present.      Conjunctiva/sclera: Conjunctivae normal.      Pupils: Pupils are equal, round, and reactive to light.   Neck:      Thyroid: No thyromegaly.      Vascular: No JVD.   Cardiovascular:      Rate and Rhythm: Normal rate and regular rhythm.      Heart sounds: Normal heart sounds, S1 normal and S2 normal. No murmur heard.     No friction rub. No gallop.   Pulmonary:      Effort: Pulmonary effort is normal.      Breath sounds: Normal breath sounds.   Abdominal:      General: Abdomen is protuberant. Bowel sounds are normal. There is no distension.      Palpations: Abdomen is soft. There is no mass.      Tenderness: There is no abdominal tenderness.   Musculoskeletal:         General: Normal range of motion.      Cervical back: Full passive range of motion without pain, normal range of motion and neck supple.      Right lower leg: Deformity present.      Left lower leg: Deformity present.      Comments: Bilateral metatarsal amputation   Skin:     General: Skin is warm and dry.      Capillary Refill: Capillary refill takes less than 2 seconds.      Findings: Wound present.          Neurological:      General: No focal deficit present.      Mental Status: She is alert and oriented to person, place, and time. Mental status is at baseline.      GCS: GCS eye subscore is 4. GCS verbal subscore is 5. GCS motor subscore is 6.      Cranial Nerves: No cranial nerve deficit.      Sensory: Sensation is intact.      Motor: Motor function is  intact.   Psychiatric:         Behavior: Behavior normal. Behavior is cooperative.              CRANIAL NERVES     CN III, IV, VI   Pupils are equal, round, and reactive to light.       Significant Labs: All pertinent labs within the past 24 hours have been reviewed.  CBC:   Recent Labs   Lab 11/26/24  1852   WBC 9.62   HGB 12.9   HCT 38.8        CMP:   Recent Labs   Lab 11/26/24  1838   *   K 3.3*      CO2 14*      BUN 17   CREATININE 0.7   CALCIUM 8.6*   PROT 7.6   ALBUMIN 4.0   BILITOT 0.2   ALKPHOS 69   AST 20   ALT 28   ANIONGAP 15     Lactic Acid:   Recent Labs   Lab 11/26/24  2234   LACTATE 1.0     Magnesium:   Recent Labs   Lab 11/26/24  1839   MG 1.9       Urine Studies:   Recent Labs   Lab 11/26/24  1758   COLORU Yellow   APPEARANCEUA Hazy*   PHUR 7.0   SPECGRAV 1.015   PROTEINUA Trace*   GLUCUA Negative   KETONESU Negative   BILIRUBINUA Negative   OCCULTUA Trace*   NITRITE Positive*   UROBILINOGEN Negative   LEUKOCYTESUR 3+*   RBCUA 7*   WBCUA 45*   BACTERIA Many*   SQUAMEPITHEL 0       Significant Imaging: I have reviewed all pertinent imaging results/findings within the past 24 hours.  Imaging Results              CT Head Without Contrast (Final result)  Result time 11/26/24 19:59:55      Final result by Jonathan Egan DO (11/26/24 19:59:55)                   Impression:      1. No acute intracranial abnormality.  2. Stable positioning of the ventriculoperitoneal shunt catheter, with stable size and configuration of the ventricles.  3. Chiari 2 malformation.      Electronically signed by: Jonathan Egan  Date:    11/26/2024  Time:    19:59               Narrative:    EXAMINATION:  CT HEAD WITHOUT CONTRAST    CLINICAL HISTORY:  Mental status change, unknown cause;    TECHNIQUE:  Low dose axial CT images obtained throughout the head without intravenous contrast. Sagittal and coronal reconstructions were performed.    COMPARISON:  CT head from 07/10/2024.    FINDINGS:  There is  a left-sided ventriculoperitoneal shunt catheter with the tip in unchanged position in the right lateral ventricle.  There is also a retained, abandoned right-sided catheter, with the tip in unchanged position.  Ventricular size and configuration is stable from the prior study.  There is no evidence of interval worsening or new overt hydrocephalus.  Unchanged appearance of the posterior fossa, with findings in keeping with Chiari 2 malformation.  No extra-axial blood or fluid collections.  No parenchymal mass, hemorrhage, edema or major vascular distribution infarct.    No calvarial fracture.  The scalp is unremarkable.  Bilateral paranasal sinuses and mastoid air cells are clear.                                       X-Ray Shunt Series (Final result)  Result time 11/26/24 19:52:46      Final result by Jonathan Egan DO (11/26/24 19:52:46)                   Impression:       shunt as above.      Electronically signed by: Jonathan Egan  Date:    11/26/2024  Time:    19:52               Narrative:    EXAMINATION:  XR SHUNT SERIES    CLINICAL HISTORY:  seizure;    TECHNIQUE:  Five views of the skull, chest, and the abdomen were performed in the frontal and lateral projections for assessment of the  shunt.    COMPARISON:  01/31/2024.    FINDINGS:  There is a  shunt which appears similar to the prior study, and demonstrates no evidence of kink or discontinuity.  The shunt terminates in the left hemiabdomen.  Osseous structures are intact.  The lungs are hypoexpanded.  There are median sternotomy wires.  There is thoracolumbar spine hardware.  There is a moderate to large volume of stool.                                       X-Ray Chest AP Portable (Final result)  Result time 11/26/24 18:59:46      Final result by Jonathan Egan DO (11/26/24 18:59:46)                   Impression:      No acute abnormality.      Electronically signed by: Jonathan Egan  Date:    11/26/2024  Time:    18:59                Narrative:    EXAMINATION:  XR CHEST AP PORTABLE    CLINICAL HISTORY:  Sepsis;    TECHNIQUE:  Single frontal view of the chest was performed.    COMPARISON:  07/09/2024.    FINDINGS:  The lungs are hypoexpanded and clear.  No focal opacities are seen.  The pleural spaces are clear.  The cardiac silhouette is unremarkable.  Osseous structures are intact.  There are median sternotomy wires.  Partially imaged thoracic/lumbar spine hardware noted.  There is a  shunt catheter.                                      Assessment/Plan:     * Recurrent seizures  History frequent seizures, is compliant with medications  This episode possibly 2/2 infection-urine positive for UTI   Also had flu shot yesterday, family concerned may be a cause  Consult neurology-Bing   vimpat 300mg loading dose and increase to 100mg BID   Continue keppra as prescribed  Maintain aspiration, fall, and seizure precautions   Ativan p.r.n. seizure activity       Acute cystitis without hematuria  Follow culture  Merrem IV      Hypokalemia  Patient's most recent potassium results are listed below.   Recent Labs     11/26/24  1838   K 3.3*     Plan  - Replete potassium per protocol  - Monitor potassium Daily  - Patient's hypokalemia is stable  -     Spina bifida of thoracolumbar region with hydrocephalus  Noted  Has  shunt  Followed by neurology      Impaired functional mobility, balance, and endurance  Noted, chronic-little to no use of lower extremities  Wheelchair dependent  PT/OT consult  Progressive mobility plan  Assist with ADLs      Pressure ulcer of sacral region, stage 1  Noted, chronic  Is followed outpatient by wound care  Consult IP wound care  Turn q2h      Paraplegia, T7 Complete  Noted, turn q2h  Maintain fall precautions      Neurogenic bladder  Noted, chronic  Self caths  Urine culture pending  Merrem IV        VTE Risk Mitigation (From admission, onward)           Ordered     enoxaparin injection 40 mg  Daily         11/26/24 5919      IP VTE HIGH RISK PATIENT  Once         11/26/24 2232     Place sequential compression device  Until discontinued         11/26/24 2232                                CHELO Echeverria  Department of Hospital Medicine  [facility]/Surg

## 2024-11-27 NOTE — DISCHARGE SUMMARY
[facility] Medicine  Discharge Summary      Patient Name: Shashank Samuel  MRN: [id number]  GIRMA: [id number]  Patient Class: OP- Observation  Admission Date: 11/26/2024  Hospital Length of Stay: 0 days  Discharge Date and Time:  11/27/2024 2:39 PM  Attending Physician: Shashank Meyer DO   Discharging Provider: Shashank Meyer DO  Primary Care Provider: Dash Ponce MD    Primary Care Team: Networked reference to record PCT     HPI:   Shashank Samuel is a 29 year old female with a past medical history of Arnold-Chiari malformation, type II, anemia, epilepsy, neurogenic bladder (self cath's) spina bifida (wheelchair-bound), nephrectomy, and hydrocephalus with  shunt, who presented to the ED with recurrent seizures. She had a few seizures today, about 5 lasting 5 minutes or less, with return to appropriate baseline. Last seizure activity was months ago, and she reports being compliant with her anti seizure medications. Family reports she had her flu shot yesterday. She denies fever, chills or other complaint.    ED work up included a CBC which was unremarkable. CMP showed mild hypokalemia.  Magnesium 1.9, lactic acid 1.11. Urinalysis positive for evidence of infection, and she was initiated on Merrem in the ED. CXR shows no acute cardiopulmonary abnormality. XR Shunt series shows no abnormality. CT of the head show no acute intracranial abnormality. Neurology was consulted by the ED, Dr. Smart, who recommended to load the patient with Vimpat 300 mg and increase her daily dosage to vimpat 100 mg BID. Hospital Medicine consulted for admission and further management.     * No surgery found *      Hospital Course:   Patient had no further episodes of seizure after receiving Ativan.  She is alert and oriented, and has no complaints at this time.  CT, x-ray shunt, chest x-ray showed no acute process.  Patient was seen and evaluated by Neurology.  Patient is hemodynamically stable,  okay for discharge.  Her dose of Vimpat has been increased to 200 mg.  She will continue current dosage of Keppra.  She will follow up outpatient with the primary care physician, and we will follow up outpatient with Neurology.        Physical Exam:  General- Patient alert and oriented x3 in NAD  HEENT- PERRLA, EOMI, OP clear, MMM  Neck- No JVD, Lymphadenopathy, Thyromegaly  CV- Regular rate and rhythm, No Murmur/james/rubs  Resp- Lungs CTA Bilaterally, No increased WOB  GI- Non tender/non-distended, BS normoactive x4 quads, no HSM  Extrem- No cyanosis, clubbing, edema. Pulses 2+ and symmetric  Neuro- Strength 5/5 flexors/extensors upper extremities  Skin-  No masses, rashes or lesions noted on cursory skin exam.       Goals of Care Treatment Preferences:  Code Status: Full Code      SDOH Screening:  The patient declined to be screened for utility difficulties, food insecurity, transport difficulties, housing insecurity, and interpersonal safety, so no concerns could be identified this admission.     Consults:   Consults (From admission, onward)          Status Ordering Provider     Inpatient consult to Neurology  Once        Provider:  Shashank Meyer DO Completed OWENS, ERIN A.            No new Assessment & Plan notes have been filed under this hospital service since the last note was generated.  Service: Hospital Medicine    Final Active Diagnoses:    Diagnosis Date Noted POA    PRINCIPAL PROBLEM:  Recurrent seizures [G40.909] 11/26/2024 Yes    Hypokalemia [E87.6] 11/26/2024 Yes    Spina bifida of thoracolumbar region with hydrocephalus [Q05.1] 04/20/2024 Yes    Impaired functional mobility, balance, and endurance [Z74.09] 04/26/2023 Yes    Acute cystitis without hematuria [N30.00] 09/21/2022 Yes    Pressure ulcer of sacral region, stage 1 [L89.151] 05/16/2016 Yes    Paraplegia, T7 Complete [G82.20] 12/10/2013 Yes    Neurogenic bladder [N31.9]  Yes      Problems Resolved During this Admission:        Discharged Condition: good    Disposition: Home or Self Care    Follow Up:   Follow-up Information       Dash Ponce MD Follow up in 1 week(s).    Specialties: Family Medicine, Home Health Services, Hospice Services  Contact information:  [address] [facility]  [address]  [facility] [address]  [phone]               Robert More MD .    Specialty: Neurology  Contact information:  [address] [facility] - Neurology [address] [place] [id number] [phone]                           Patient Instructions:      Notify your health care provider if you experience any of the following:  increased confusion or weakness     Notify your health care provider if you experience any of the following:  persistent dizziness, light-headedness, or visual disturbances     Notify your health care provider if you experience any of the following:  worsening rash     Notify your health care provider if you experience any of the following:  severe persistent headache     Notify your health care provider if you experience any of the following:  difficulty breathing or increased cough     Notify your health care provider if you experience any of the following:  redness, tenderness, or signs of infection (pain, swelling, redness, odor or green/yellow discharge around incision site)     Notify your health care provider if you experience any of the following:  severe uncontrolled pain     Notify your health care provider if you experience any of the following:  persistent nausea and vomiting or diarrhea     Notify your health care provider if you experience any of the following:  temperature >100.4     Activity as tolerated       Significant Diagnostic Studies: Labs: CMP   Recent Labs   Lab 11/26/24  1838 11/27/24  0319   * 138   K 3.3* 3.9    110   CO2 14* 16*    81   BUN 17 13   CREATININE 0.7 0.6   CALCIUM 8.6* 8.5*   PROT 7.6 7.2   ALBUMIN 4.0 3.9   BILITOT 0.2 0.4   ALKPHOS 69 62   AST 20 20    ALT 28 26   ANIONGAP 15 12    and CBC   Recent Labs   Lab 11/26/24  1852 11/27/24  0319   WBC 9.62 7.44   HGB 12.9 13.4   HCT 38.8 41.0    269       Pending Diagnostic Studies:       Procedure Component Value Units Date/Time    Lacosamide (Vimpat) [[id number]] Collected: 11/26/24 1839    Order Status: Sent Lab Status: In process Updated: 11/26/24 1844    Specimen: Blood     Narrative:      Collection has been rescheduled by RBB at 11/26/2024 17:53 Reason:   Nurse Draw    Levetiracetam Level [[id number]] Collected: 11/26/24 1839    Order Status: Sent Lab Status: In process Updated: 11/26/24 1844    Specimen: Blood     Narrative:      Collection has been rescheduled by RBB at 11/26/2024 17:53 Reason:   Nurse Draw           Imaging Results              CT Head Without Contrast (Final result)  Result time 11/26/24 19:59:55      Final result by Jonathan Egan DO (11/26/24 19:59:55)                   Impression:      1. No acute intracranial abnormality.  2. Stable positioning of the ventriculoperitoneal shunt catheter, with stable size and configuration of the ventricles.  3. Chiari 2 malformation.      Electronically signed by: Jonathan Egan  Date:    11/26/2024  Time:    19:59               Narrative:    EXAMINATION:  CT HEAD WITHOUT CONTRAST    CLINICAL HISTORY:  Mental status change, unknown cause;    TECHNIQUE:  Low dose axial CT images obtained throughout the head without intravenous contrast. Sagittal and coronal reconstructions were performed.    COMPARISON:  CT head from 07/10/2024.    FINDINGS:  There is a left-sided ventriculoperitoneal shunt catheter with the tip in unchanged position in the right lateral ventricle.  There is also a retained, abandoned right-sided catheter, with the tip in unchanged position.  Ventricular size and configuration is stable from the prior study.  There is no evidence of interval worsening or new overt hydrocephalus.  Unchanged appearance of the posterior fossa, with  findings in keeping with Chiari 2 malformation.  No extra-axial blood or fluid collections.  No parenchymal mass, hemorrhage, edema or major vascular distribution infarct.    No calvarial fracture.  The scalp is unremarkable.  Bilateral paranasal sinuses and mastoid air cells are clear.                                       X-Ray Shunt Series (Final result)  Result time 11/26/24 19:52:46      Final result by Jonathan Egan DO (11/26/24 19:52:46)                   Impression:       shunt as above.      Electronically signed by: Jonathan Egan  Date:    11/26/2024  Time:    19:52               Narrative:    EXAMINATION:  XR SHUNT SERIES    CLINICAL HISTORY:  seizure;    TECHNIQUE:  Five views of the skull, chest, and the abdomen were performed in the frontal and lateral projections for assessment of the  shunt.    COMPARISON:  01/31/2024.    FINDINGS:  There is a  shunt which appears similar to the prior study, and demonstrates no evidence of kink or discontinuity.  The shunt terminates in the left hemiabdomen.  Osseous structures are intact.  The lungs are hypoexpanded.  There are median sternotomy wires.  There is thoracolumbar spine hardware.  There is a moderate to large volume of stool.                                       X-Ray Chest AP Portable (Final result)  Result time 11/26/24 18:59:46      Final result by Jonathan Egan DO (11/26/24 18:59:46)                   Impression:      No acute abnormality.      Electronically signed by: Jonathan Egan  Date:    11/26/2024  Time:    18:59               Narrative:    EXAMINATION:  XR CHEST AP PORTABLE    CLINICAL HISTORY:  Sepsis;    TECHNIQUE:  Single frontal view of the chest was performed.    COMPARISON:  07/09/2024.    FINDINGS:  The lungs are hypoexpanded and clear.  No focal opacities are seen.  The pleural spaces are clear.  The cardiac silhouette is unremarkable.  Osseous structures are intact.  There are median sternotomy wires.  Partially  imaged thoracic/lumbar spine hardware noted.  There is a  shunt catheter.                                      Medications:  Reconciled Home Medications:      Medication List        CHANGE how you take these medications      lacosamide 100 mg Tab  Commonly known as: VIMPAT  Take 1 tablet (100 mg total) by mouth every 12 (twelve) hours.  What changed:   medication strength  how much to take            CONTINUE taking these medications      EPINEPHrine 0.3 mg/0.3 mL Atin  Commonly known as: EPIPEN  Inject 0.3 mLs (0.3 mg total) into the muscle as needed (Anaphylactic reaction).     ergocalciferol 50,000 unit Cap  Commonly known as: ERGOCALCIFEROL  Take 50,000 Units by mouth every 7 days.     ibuprofen 200 MG tablet  Commonly known as: ADVIL,MOTRIN  Take 200 mg by mouth every 6 (six) hours as needed for Pain.     levETIRAcetam 750 MG Tab  Commonly known as: KEPPRA  Take 2 tablets (1,500 mg total) by mouth 2 (two) times daily.     neomycin-polymyxin-dexamethasone 3.5mg/mL-10,000 unit/mL-0.1 % Drps  Commonly known as: MAXITROL  Place 1 drop into the left eye 3 (three) times daily. for 5 days     promethazine 25 MG tablet  Commonly known as: PHENERGAN  Take 25 mg by mouth every 6 (six) hours as needed.     VALTOCO 10 mg/spray (0.1 mL) Spry  Generic drug: diazePAM  10 mg by Nasal route every 5 (five) minutes as needed (Administer 1 spray into 1 nostril; if a second dose is needed, administer  in alternate nostril. A second dose should not be administered if the  patient is having trouble breathing or excessive sedatio).              Indwelling Lines/Drains at time of discharge:   Lines/Drains/Airways       None                   Time spent on the discharge of patient: 35 minutes         Shashank Meyer DO  Department of Hospital Medicine  [facility]/Surg

## 2024-11-27 NOTE — SUBJECTIVE & OBJECTIVE
Past Medical History:   Diagnosis Date    Anemia     Arnold-Chiari malformation, type II     Encounter for blood transfusion     Epilepsy     Hydrocephalus     Neurogenic bladder     self catheterizes every 3 hours    Raynaud disease     Seizures     only w/ shunt malfunction    Spinal bifida, closed     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       Past Surgical History:   Procedure Laterality Date    AMPUTATION      right 4th and 5th toes; left 5th toe and portion of left great toe    BACK SURGERY      BLADDER SURGERY      BREAST SURGERY  2015    Reduction    COLON SURGERY      flush site for bowel movements from appendix    CYSTOSTOMY      DEBRIDEMENT OF FOOT Right 1/8/2021    Procedure: DEBRIDEMENT, FOOT;  Surgeon: Abdi Bedoya DPM;  Location: [facility];  Service: Podiatry;  Laterality: Right;    EYE SURGERY      x 5    FLUOROSCOPIC URODYNAMIC STUDY N/A 3/12/2019    Procedure: URODYNAMIC STUDY, FLUOROSCOPIC;  Surgeon: Kenzie Charles MD;  Location: [address];  Service: Urology;  Laterality: N/A;  1 hour    FLUOROSCOPIC URODYNAMIC STUDY N/A 4/4/2019    Procedure: URODYNAMIC STUDY, FLUOROSCOPIC;  Surgeon: Kenzie Charles MD;  Location: [address];  Service: Urology;  Laterality: N/A;  1 hour    FLUOROSCOPIC URODYNAMIC STUDY N/A 5/11/2022    Procedure: URODYNAMIC STUDY, FLUOROSCOPIC;  Surgeon: Kenzie Charles MD;  Location: [address];  Service: Urology;  Laterality: N/A;  90min    FOOT AMPUTATION THROUGH METATARSAL Right 10/28/2019    Procedure: AMPUTATION, FOOT, TRANSMETATARSAL;  Surgeon: Abdi Bedoya DPM;  Location: [facility];  Service: Podiatry;  Laterality: Right;    FOOT AMPUTATION THROUGH METATARSAL Right 3/27/2020    Procedure: AMPUTATION, FOOT, TRANSMETATARSAL;  Surgeon: Abdi Bedoya DPM;  Location: [facility];  Service: Podiatry;  Laterality: Right;  REVISION    MYELOMININGOCELE REPAIR      NEPHRECTOMY Right 11/4/2021    Procedure: Nephrectomy/ OPEN;  Surgeon: Dash Rosenthal MD;  Location:  "[facility] OR 2ND FLR;  Service: Urology;  Laterality: Right;  4HRS    NEPHROSTOMY Right 8/2/2021    Procedure: Nephrostomy and perinephric abscess drain;  Surgeon: Lillian Surgeon;  Location: [facility];  Service: Anesthesiology;  Laterality: Right;    REVISION OF VENTRICULOPERITONEAL SHUNT Left 9/21/2022    Procedure: REVISION, SHUNT, VENTRICULOPERITONEAL;  Surgeon: Maynor Calero MD;  Location: [facility] 2ND FLR;  Service: Neurosurgery;  Laterality: Left;    STRABISMUS SURGERY      ou dr peña    VENTRICULOPERITONEAL SHUNT      WOUND DEBRIDEMENT  3/27/2020    Procedure: DEBRIDEMENT, WOUND;  Surgeon: Abdi Bedoya DPM;  Location: [facility] OR;  Service: Podiatry;;       Review of patient's allergies indicates:   Allergen Reactions    Latex, natural rubber Anaphylaxis and Other (See Comments)     angioedema    Zofran [ondansetron hcl (pf)] Swelling     Hives, "throat closes up"    Gardasil  [h papillomavirus vac,qval (pf)]      Other reaction(s): Shortness of breath    Menactra  [mening vac a,c,y,w135 dip (pf)]      Other reaction(s): Shortness of breath    Sulfa (sulfonamide antibiotics)     Tramadol Hives    Levaquin [levofloxacin] Rash     Spots on face    Macrobid [nitrofurantoin monohyd/m-cryst] Rash     Sppots/rash on face       No current facility-administered medications on file prior to encounter.     Current Outpatient Medications on File Prior to Encounter   Medication Sig    diazePAM (VALTOCO) 10 mg/spray (0.1 mL) Spry 10 mg by Nasal route every 5 (five) minutes as needed (Administer 1 spray into 1 nostril; if a second dose is needed, administer  in alternate nostril. A second dose should not be administered if the  patient is having trouble breathing or excessive sedatio).    EPINEPHrine (EPIPEN) 0.3 mg/0.3 mL AtIn Inject 0.3 mLs (0.3 mg total) into the muscle as needed (Anaphylactic reaction).    ergocalciferol (ERGOCALCIFEROL) 50,000 unit Cap Take 50,000 Units by mouth every 7 days.    ibuprofen (ADVIL,MOTRIN) " 200 MG tablet Take 200 mg by mouth every 6 (six) hours as needed for Pain.    lacosamide (VIMPAT) 50 mg Tab Take 1 tablet (50 mg total) by mouth every 12 (twelve) hours.    levETIRAcetam (KEPPRA) 750 MG Tab Take 2 tablets (1,500 mg total) by mouth 2 (two) times daily.    promethazine (PHENERGAN) 25 MG tablet Take 25 mg by mouth every 6 (six) hours as needed.    neomycin-polymyxin-dexamethasone (MAXITROL) 3.5mg/mL-10,000 unit/mL-0.1 % DrpS Place 1 drop into the left eye 3 (three) times daily. for 5 days     Family History       Problem Relation (Age of Onset)    Arthritis Mother    Breast cancer Mother    Cataracts Maternal Grandmother    Epilepsy Father    Gout Father    Myocarditis Sister          Tobacco Use    Smoking status: Never     Passive exposure: Never    Smokeless tobacco: Never   Substance and Sexual Activity    Alcohol use: Not Currently    Drug use: Never    Sexual activity: Never     Review of Systems   Constitutional:  Negative for chills and fever.   HENT:  Negative for congestion and sore throat.    Eyes:  Negative for visual disturbance.   Respiratory:  Negative for cough and shortness of breath.    Cardiovascular:  Negative for chest pain and palpitations.   Gastrointestinal:  Negative for abdominal pain, nausea and vomiting.   Endocrine: Negative for cold intolerance and heat intolerance.   Genitourinary:  Negative for dysuria and hematuria.   Musculoskeletal:  Negative for arthralgias and myalgias.   Skin:  Negative for pallor and rash.   Neurological:  Positive for seizures. Negative for tremors.   Hematological:  Negative for adenopathy. Does not bruise/bleed easily.   Psychiatric/Behavioral:  Negative for hallucinations.    All other systems reviewed and are negative.    Objective:     Vital Signs (Most Recent):  Temp: 98.3 °F (36.8 °C) (11/26/24 2314)  Pulse: 99 (11/26/24 2314)  Resp: 19 (11/26/24 2314)  BP: 136/76 (11/26/24 2314)  SpO2: 100 % (11/26/24 2314) Vital Signs (24h Range):  Temp:   [98.2 °F (36.8 °C)-98.3 °F (36.8 °C)] 98.3 °F (36.8 °C)  Pulse:  [] 99  Resp:  [18-19] 19  SpO2:  [100 %] 100 %  BP: (109-148)/(62-91) 136/76     Weight: 59 kg (130 lb)  Body mass index is 30.21 kg/m².     Physical Exam  Vitals and nursing note reviewed.   Constitutional:       General: She is awake. She is not in acute distress.     Appearance: Normal appearance. She is well-developed. She is not ill-appearing.   HENT:      Head: Normocephalic and atraumatic.      Mouth/Throat:      Lips: Pink.      Mouth: Mucous membranes are moist.   Eyes:      General: Lids are normal.      Extraocular Movements:      Right eye: Abnormal extraocular motion present.      Left eye: Abnormal extraocular motion present.      Conjunctiva/sclera: Conjunctivae normal.      Pupils: Pupils are equal, round, and reactive to light.   Neck:      Thyroid: No thyromegaly.      Vascular: No JVD.   Cardiovascular:      Rate and Rhythm: Normal rate and regular rhythm.      Heart sounds: Normal heart sounds, S1 normal and S2 normal. No murmur heard.     No friction rub. No gallop.   Pulmonary:      Effort: Pulmonary effort is normal.      Breath sounds: Normal breath sounds.   Abdominal:      General: Abdomen is protuberant. Bowel sounds are normal. There is no distension.      Palpations: Abdomen is soft. There is no mass.      Tenderness: There is no abdominal tenderness.   Musculoskeletal:         General: Normal range of motion.      Cervical back: Full passive range of motion without pain, normal range of motion and neck supple.      Right lower leg: Deformity present.      Left lower leg: Deformity present.      Comments: Bilateral metatarsal amputation   Skin:     General: Skin is warm and dry.      Capillary Refill: Capillary refill takes less than 2 seconds.      Findings: Wound present.          Neurological:      General: No focal deficit present.      Mental Status: She is alert and oriented to person, place, and time. Mental  status is at baseline.      GCS: GCS eye subscore is 4. GCS verbal subscore is 5. GCS motor subscore is 6.      Cranial Nerves: No cranial nerve deficit.      Sensory: Sensation is intact.      Motor: Motor function is intact.   Psychiatric:         Behavior: Behavior normal. Behavior is cooperative.              CRANIAL NERVES     CN III, IV, VI   Pupils are equal, round, and reactive to light.       Significant Labs: All pertinent labs within the past 24 hours have been reviewed.  CBC:   Recent Labs   Lab 11/26/24  1852   WBC 9.62   HGB 12.9   HCT 38.8        CMP:   Recent Labs   Lab 11/26/24  1838   *   K 3.3*      CO2 14*      BUN 17   CREATININE 0.7   CALCIUM 8.6*   PROT 7.6   ALBUMIN 4.0   BILITOT 0.2   ALKPHOS 69   AST 20   ALT 28   ANIONGAP 15     Lactic Acid:   Recent Labs   Lab 11/26/24  2234   LACTATE 1.0     Magnesium:   Recent Labs   Lab 11/26/24  1839   MG 1.9       Urine Studies:   Recent Labs   Lab 11/26/24  1758   COLORU Yellow   APPEARANCEUA Hazy*   PHUR 7.0   SPECGRAV 1.015   PROTEINUA Trace*   GLUCUA Negative   KETONESU Negative   BILIRUBINUA Negative   OCCULTUA Trace*   NITRITE Positive*   UROBILINOGEN Negative   LEUKOCYTESUR 3+*   RBCUA 7*   WBCUA 45*   BACTERIA Many*   SQUAMEPITHEL 0       Significant Imaging: I have reviewed all pertinent imaging results/findings within the past 24 hours.  Imaging Results              CT Head Without Contrast (Final result)  Result time 11/26/24 19:59:55      Final result by Jonathan Egan DO (11/26/24 19:59:55)                   Impression:      1. No acute intracranial abnormality.  2. Stable positioning of the ventriculoperitoneal shunt catheter, with stable size and configuration of the ventricles.  3. Chiari 2 malformation.      Electronically signed by: Jonathan Egan  Date:    11/26/2024  Time:    19:59               Narrative:    EXAMINATION:  CT HEAD WITHOUT CONTRAST    CLINICAL HISTORY:  Mental status change, unknown  cause;    TECHNIQUE:  Low dose axial CT images obtained throughout the head without intravenous contrast. Sagittal and coronal reconstructions were performed.    COMPARISON:  CT head from 07/10/2024.    FINDINGS:  There is a left-sided ventriculoperitoneal shunt catheter with the tip in unchanged position in the right lateral ventricle.  There is also a retained, abandoned right-sided catheter, with the tip in unchanged position.  Ventricular size and configuration is stable from the prior study.  There is no evidence of interval worsening or new overt hydrocephalus.  Unchanged appearance of the posterior fossa, with findings in keeping with Chiari 2 malformation.  No extra-axial blood or fluid collections.  No parenchymal mass, hemorrhage, edema or major vascular distribution infarct.    No calvarial fracture.  The scalp is unremarkable.  Bilateral paranasal sinuses and mastoid air cells are clear.                                       X-Ray Shunt Series (Final result)  Result time 11/26/24 19:52:46      Final result by Jonathan Egan DO (11/26/24 19:52:46)                   Impression:       shunt as above.      Electronically signed by: Jonathan Egan  Date:    11/26/2024  Time:    19:52               Narrative:    EXAMINATION:  XR SHUNT SERIES    CLINICAL HISTORY:  seizure;    TECHNIQUE:  Five views of the skull, chest, and the abdomen were performed in the frontal and lateral projections for assessment of the  shunt.    COMPARISON:  01/31/2024.    FINDINGS:  There is a  shunt which appears similar to the prior study, and demonstrates no evidence of kink or discontinuity.  The shunt terminates in the left hemiabdomen.  Osseous structures are intact.  The lungs are hypoexpanded.  There are median sternotomy wires.  There is thoracolumbar spine hardware.  There is a moderate to large volume of stool.                                       X-Ray Chest AP Portable (Final result)  Result time 11/26/24  18:59:46      Final result by Jonathan Egan DO (11/26/24 18:59:46)                   Impression:      No acute abnormality.      Electronically signed by: Jonathan Egan  Date:    11/26/2024  Time:    18:59               Narrative:    EXAMINATION:  XR CHEST AP PORTABLE    CLINICAL HISTORY:  Sepsis;    TECHNIQUE:  Single frontal view of the chest was performed.    COMPARISON:  07/09/2024.    FINDINGS:  The lungs are hypoexpanded and clear.  No focal opacities are seen.  The pleural spaces are clear.  The cardiac silhouette is unremarkable.  Osseous structures are intact.  There are median sternotomy wires.  Partially imaged thoracic/lumbar spine hardware noted.  There is a  shunt catheter.

## 2024-11-27 NOTE — ASSESSMENT & PLAN NOTE
Patient's most recent potassium results are listed below.   Recent Labs     11/26/24  1968   K 3.3*     Plan  - Replete potassium per protocol  - Monitor potassium Daily  - Patient's hypokalemia is stable  -

## 2024-11-27 NOTE — ASSESSMENT & PLAN NOTE
History frequent seizures, is compliant with medications  This episode possibly 2/2 infection-urine positive for UTI   Also had flu shot yesterday, family concerned may be a cause  Consult neurology-Bing   vimpat 300mg loading dose and increase to 100mg BID   Continue keppra as prescribed  Maintain aspiration, fall, and seizure precautions   Ativan p.r.n. seizure activity

## 2024-11-27 NOTE — PLAN OF CARE
Pt clear for DC from CM standpoint. Discharging home.     Hospital follow up scheduled for 12/10. Added to AVS.       11/27/24 3605   Final Note   Assessment Type Final Discharge Note   Anticipated Discharge Disposition Home   Hospital Resources/Appts/Education Provided Appointments scheduled and added to AVS

## 2024-11-27 NOTE — CONSULTS
[facility]  Department of Neurology  Neurology Consultation Note        PATIENT NAME: Shashank Samuel  MRN: [id number]  CSN: [id number]      TODAY'S DATE: 11/27/2024  ADMIT DATE: 11/26/2024                            CONSULTING PROVIDER: Dov Smart MD  CONSULT REQUESTED BY: Shashank Meyer DO      Reason for consult:  Breakthrough seizures      History obtained from chart review, the patient, and family at bedside.    HPI per EMR: Shashank Samuel is a 29 y.o. female with a history of Arnold-Chiari malformation, type II, anemia, epilepsy, neurogenic bladder (self cath's) spina bifida (wheelchair-bound), nephrectomy, and hydrocephalus with  shunt, who presented to the ED with recurrent seizures. She had a few seizures today, about 5 lasting 5 minutes or less, with return to appropriate baseline. Last seizure activity was months ago, and she reports being compliant with her anti seizure medications. Family reports she had her flu shot yesterday. She denies fever, chills or other complaint.     ED work up included a CBC which was unremarkable. CMP showed mild hypokalemia.  Magnesium 1.9, lactic acid 1.11. Urinalysis positive for evidence of infection, and she was initiated on Merrem in the ED. CXR shows no acute cardiopulmonary abnormality. XR Shunt series shows no abnormality. CT of the head show no acute intracranial abnormality. Neurology was consulted by the ED, Dr. Smart, who recommended to load the patient with Vimpat 300 mg and increase her daily dosage to vimpat 100 mg BID. Hospital Medicine consulted for admission and further management.        Neurology consult:  Patient was seen and examined by me.  Currently she is back to her baseline.  She has a history of Arnold-Chiari malformation with hydrocephalus and  shunt and presented with breakthrough seizures.  Seizures started after she took her flu vaccine (she has never taken flu vaccine in the past) and she had total of 5  seizures each lasting for 2 minutes or less.  The seizures are tonic seizures with brief confusion after the episode.    She has been compliant with her Keppra and Vimpat.  She was last seen by me in July for breakthrough seizures and was started on Vimpat 50 mg b.i.d. at that time.    Currently she is back to her baseline.  She was given a loading dose of Vimpat 300 mg in the ER increase Vimpat dose 200 mg b.i.d..    PREVIOUS MEDICAL HISTORY:  Past Medical History:   Diagnosis Date    Anemia     Arnold-Chiari malformation, type II     Encounter for blood transfusion     Epilepsy     Hydrocephalus     Neurogenic bladder     self catheterizes every 3 hours    Raynaud disease     Seizures     only w/ shunt malfunction    Spinal bifida, closed     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     PREVIOUS SURGICAL HISTORY:  Past Surgical History:   Procedure Laterality Date    AMPUTATION      right 4th and 5th toes; left 5th toe and portion of left great toe    BACK SURGERY      BLADDER SURGERY      BREAST SURGERY  2015    Reduction    COLON SURGERY      flush site for bowel movements from appendix    CYSTOSTOMY      DEBRIDEMENT OF FOOT Right 1/8/2021    Procedure: DEBRIDEMENT, FOOT;  Surgeon: Abdi Bedoya DPM;  Location: [facility];  Service: Podiatry;  Laterality: Right;    EYE SURGERY      x 5    FLUOROSCOPIC URODYNAMIC STUDY N/A 3/12/2019    Procedure: URODYNAMIC STUDY, FLUOROSCOPIC;  Surgeon: Kenzie Charles MD;  Location: [address];  Service: Urology;  Laterality: N/A;  1 hour    FLUOROSCOPIC URODYNAMIC STUDY N/A 4/4/2019    Procedure: URODYNAMIC STUDY, FLUOROSCOPIC;  Surgeon: Kenzie Charles MD;  Location: [address];  Service: Urology;  Laterality: N/A;  1 hour    FLUOROSCOPIC URODYNAMIC STUDY N/A 5/11/2022    Procedure: URODYNAMIC STUDY, FLUOROSCOPIC;  Surgeon: Kenzie Charles MD;  Location: [address];  Service: Urology;  Laterality: N/A;  90min    FOOT AMPUTATION THROUGH METATARSAL Right 10/28/2019    Procedure:  "AMPUTATION, FOOT, TRANSMETATARSAL;  Surgeon: Abdi Bedoya DPM;  Location: [facility] OR;  Service: Podiatry;  Laterality: Right;    FOOT AMPUTATION THROUGH METATARSAL Right 3/27/2020    Procedure: AMPUTATION, FOOT, TRANSMETATARSAL;  Surgeon: Abdi Bedoya DPM;  Location: [facility] OR;  Service: Podiatry;  Laterality: Right;  REVISION    MYELOMININGOCELE REPAIR      NEPHRECTOMY Right 11/4/2021    Procedure: Nephrectomy/ OPEN;  Surgeon: Dash Rosenthal MD;  Location: [facility] 2ND FLR;  Service: Urology;  Laterality: Right;  4HRS    NEPHROSTOMY Right 8/2/2021    Procedure: Nephrostomy and perinephric abscess drain;  Surgeon: Lillian Surgeon;  Location: [facility];  Service: Anesthesiology;  Laterality: Right;    REVISION OF VENTRICULOPERITONEAL SHUNT Left 9/21/2022    Procedure: REVISION, SHUNT, VENTRICULOPERITONEAL;  Surgeon: Maynor Calero MD;  Location: [facility] 2ND FLR;  Service: Neurosurgery;  Laterality: Left;    STRABISMUS SURGERY      ou dr peña    VENTRICULOPERITONEAL SHUNT      WOUND DEBRIDEMENT  3/27/2020    Procedure: DEBRIDEMENT, WOUND;  Surgeon: Abdi Bedoya DPM;  Location: [facility];  Service: Podiatry;;     FAMILY MEDICAL HISTORY:  Family History   Problem Relation Name Age of Onset    Arthritis Mother      Breast cancer Mother      Gout Father      Epilepsy Father      Myocarditis Sister      Cataracts Maternal Grandmother      Amblyopia Neg Hx      Blindness Neg Hx      Cancer Neg Hx      Diabetes Neg Hx      Glaucoma Neg Hx      Hypertension Neg Hx      Macular degeneration Neg Hx      Retinal detachment Neg Hx      Strabismus Neg Hx      Stroke Neg Hx      Thyroid disease Neg Hx       ALLERGIES:  Review of patient's allergies indicates:   Allergen Reactions    Latex, natural rubber Anaphylaxis and Other (See Comments)     angioedema    Zofran [ondansetron hcl (pf)] Swelling     Hives, "throat closes up"    Gardasil  [h papillomavirus vac,qval (pf)]      Other reaction(s): Shortness of breath    " Menactra  [mening vac a,c,y,w135 dip (pf)]      Other reaction(s): Shortness of breath    Sulfa (sulfonamide antibiotics)     Tramadol Hives    Levaquin [levofloxacin] Rash     Spots on face    Macrobid [nitrofurantoin monohyd/m-cryst] Rash     Sppots/rash on face     HOME MEDICATIONS:  Prior to Admission medications    Medication Sig Start Date End Date Taking? Authorizing Provider   diazePAM (VALTOCO) 10 mg/spray (0.1 mL) Spry 10 mg by Nasal route every 5 (five) minutes as needed (Administer 1 spray into 1 nostril; if a second dose is needed, administer  in alternate nostril. A second dose should not be administered if the  patient is having trouble breathing or excessive sedatio). 8/2/24  Yes Robert More MD   EPINEPHrine (EPIPEN) 0.3 mg/0.3 mL AtIn Inject 0.3 mLs (0.3 mg total) into the muscle as needed (Anaphylactic reaction). 7/25/24  Yes Elisa Eng APRN-CNP   ergocalciferol (ERGOCALCIFEROL) 50,000 unit Cap Take 50,000 Units by mouth every 7 days.   Yes Provider, Historical   ibuprofen (ADVIL,MOTRIN) 200 MG tablet Take 200 mg by mouth every 6 (six) hours as needed for Pain.   Yes Provider, Historical   lacosamide (VIMPAT) 50 mg Tab Take 1 tablet (50 mg total) by mouth every 12 (twelve) hours. 7/11/24 7/11/25 Yes Sanaz Abarca MD   levETIRAcetam (KEPPRA) 750 MG Tab Take 2 tablets (1,500 mg total) by mouth 2 (two) times daily. 5/16/24 5/16/25 Yes Robert More MD   promethazine (PHENERGAN) 25 MG tablet Take 25 mg by mouth every 6 (six) hours as needed. 11/11/24  Yes Provider, Historical   neomycin-polymyxin-dexamethasone (MAXITROL) 3.5mg/mL-10,000 unit/mL-0.1 % DrpS Place 1 drop into the left eye 3 (three) times daily. for 5 days 11/25/24 11/30/24  Elisa Eng APRN-CNP     CURRENT SCHEDULED MEDICATIONS:   enoxparin  40 mg Subcutaneous Daily    lacosamide  100 mg Oral Q12H    levETIRAcetam  1,500 mg Oral BID    meropenem IV (PEDS and ADULTS)  1 g Intravenous Q8H    senna-docusate 8.6-50 mg  " 1 tablet Oral BID     CURRENT INFUSIONS:    CURRENT PRN MEDICATIONS:    Current Facility-Administered Medications:     acetaminophen, 650 mg, Oral, Q8H PRN    acetaminophen, 650 mg, Oral, Q4H PRN    aluminum-magnesium hydroxide-simethicone, 30 mL, Oral, QID PRN    dextrose 10%, 12.5 g, Intravenous, PRN    dextrose 10%, 25 g, Intravenous, PRN    glucagon (human recombinant), 1 mg, Intramuscular, PRN    glucose, 16 g, Oral, PRN    glucose, 24 g, Oral, PRN    magnesium oxide, 800 mg, Oral, PRN    magnesium oxide, 800 mg, Oral, PRN    melatonin, 9 mg, Oral, Nightly PRN    naloxone, 0.02 mg, Intravenous, PRN    potassium bicarbonate, 35 mEq, Oral, PRN    potassium bicarbonate, 50 mEq, Oral, PRN    potassium bicarbonate, 60 mEq, Oral, PRN    potassium, sodium phosphates, 2 packet, Oral, PRN    potassium, sodium phosphates, 2 packet, Oral, PRN    potassium, sodium phosphates, 2 packet, Oral, PRN    prochlorperazine, 5 mg, Intravenous, Q6H PRN    simethicone, 1 tablet, Oral, QID PRN    sodium chloride 0.9%, 3 mL, Intravenous, Q12H PRN    REVIEW OF SYSTEMS:  Please refer to the HPI for all pertinent positive and negative findings. A comprehensive review of all other systems was negative.    PHYSICAL EXAM:  Patient Vitals for the past 24 hrs:   BP Temp Temp src Pulse Resp SpO2 Height Weight   11/27/24 0807 -- -- -- (!) 128 18 100 % -- --   11/27/24 0725 124/77 98 °F (36.7 °C) -- 104 17 100 % -- --   11/27/24 0320 114/71 97.3 °F (36.3 °C) Axillary 74 16 100 % -- --   11/26/24 2314 136/76 98.3 °F (36.8 °C) Axillary 99 19 100 % 4' 7" (1.397 m) 58.6 kg (129 lb 3 oz)   11/26/24 2100 109/62 -- -- 80 18 100 % -- --   11/26/24 2030 114/70 -- -- 96 18 100 % -- --   11/26/24 2000 116/76 -- -- 89 18 100 % -- --   11/26/24 1919 -- -- -- 103 -- 100 % -- --   11/26/24 1845 (!) 138/91 -- -- 76 18 100 % -- --   11/26/24 1830 (!) 148/86 -- -- 81 18 100 % -- --   11/26/24 1726 133/76 98.2 °F (36.8 °C) -- 107 18 100 % 4' 7" (1.397 m) 59 kg " (130 lb)       GENERAL APPEARANCE: Alert, and in no acute distress  HEENT: Normocephalic and atraumatic. PERRL. Oropharynx unremarkable.  PULM: Normal respiratory effort. No accessory muscle use.  CV: RRR.  ABDOMEN: Soft, nontender.    NEURO:  MENTAL STATUS: Patient awake and oriented to time, place, and person, recent/remote memory normal  Affect euthymic.    CRANIAL NERVES:  CN I: Not tested.  CN II: Fundoscopic exam deferred.  CN III, IV, VI: Pupils equal, round and reactive to light.  Extraocular movements full and intact.  CN V: Facial sensation normal.  CN VII: Facial asymmetry absent.  CN VIII: Hearing grossly normal and equal bilaterally.  No skew deviation or pathologic nystagmus.  CN IX, X: Palate elevates symmetrically. Speech/articulation is clear without dysarthria.  CN XI: Shoulder shrug and chin rotation equal with good strength.  CN XII: Tongue protrusion midline.    MOTOR:  Bulk and tone normal in upper extremities.  Bulk and tone decreased in bilateral lower extremities.  Strength is 5/5 in bilateral upper extremities and 0/5 in bilateral lower extremities which is chronic.    REFLEXES:  1+ in bilateral upper extremities  SENSATION:  intact in bilateral upper extremities .  COORDINATION: normal finger-to-nose.  STATION: not tested.  GAIT: not tested.           Labs:  Recent Labs   Lab 11/26/24 1838 11/26/24 1839 11/27/24 0319   *  --  138   K 3.3*  --  3.9     --  110   CO2 14*  --  16*   BUN 17  --  13   CREATININE 0.7  --  0.6     --  81   CALCIUM 8.6*  --  8.5*   PHOS  --   --  3.3   MG  --  1.9 2.1     Recent Labs   Lab 11/26/24 1852 11/27/24 0319   WBC 9.62 7.44   HGB 12.9 13.4   HCT 38.8 41.0    269     Recent Labs   Lab 11/26/24 1838 11/27/24 0319   ALBUMIN 4.0 3.9   PROT 7.6 7.2   BILITOT 0.2 0.4   ALKPHOS 69 62   ALT 28 26   AST 20 20     Lab Results   Component Value Date    INR 1.0 07/10/2024     Lab Results   Component Value Date    TRIG 79 06/13/2024     "HDL 44 (L) 06/13/2024    CHOLHDL 3.2 06/13/2024     No results found for: "HGBA1C"  Lab Results   Component Value Date    PROTEINCSF 8 (L) 09/21/2022    GLUCCSF 77 (H) 09/21/2022       Imaging:  I have reviewed and interpreted the pertinent imaging and lab results.      CT Head Without Contrast  Narrative: EXAMINATION:  CT HEAD WITHOUT CONTRAST    CLINICAL HISTORY:  Mental status change, unknown cause;    TECHNIQUE:  Low dose axial CT images obtained throughout the head without intravenous contrast. Sagittal and coronal reconstructions were performed.    COMPARISON:  CT head from 07/10/2024.    FINDINGS:  There is a left-sided ventriculoperitoneal shunt catheter with the tip in unchanged position in the right lateral ventricle.  There is also a retained, abandoned right-sided catheter, with the tip in unchanged position.  Ventricular size and configuration is stable from the prior study.  There is no evidence of interval worsening or new overt hydrocephalus.  Unchanged appearance of the posterior fossa, with findings in keeping with Chiari 2 malformation.  No extra-axial blood or fluid collections.  No parenchymal mass, hemorrhage, edema or major vascular distribution infarct.    No calvarial fracture.  The scalp is unremarkable.  Bilateral paranasal sinuses and mastoid air cells are clear.  Impression: 1. No acute intracranial abnormality.  2. Stable positioning of the ventriculoperitoneal shunt catheter, with stable size and configuration of the ventricles.  3. Chiari 2 malformation.    Electronically signed by: Jonathan Egan  Date:    11/26/2024  Time:    19:59  X-Ray Shunt Series  Narrative: EXAMINATION:  XR SHUNT SERIES    CLINICAL HISTORY:  seizure;    TECHNIQUE:  Five views of the skull, chest, and the abdomen were performed in the frontal and lateral projections for assessment of the  shunt.    COMPARISON:  01/31/2024.    FINDINGS:  There is a  shunt which appears similar to the prior study, and " demonstrates no evidence of kink or discontinuity.  The shunt terminates in the left hemiabdomen.  Osseous structures are intact.  The lungs are hypoexpanded.  There are median sternotomy wires.  There is thoracolumbar spine hardware.  There is a moderate to large volume of stool.  Impression:  shunt as above.    Electronically signed by: Jonathan Egan  Date:    11/26/2024  Time:    19:52  X-Ray Chest AP Portable  Narrative: EXAMINATION:  XR CHEST AP PORTABLE    CLINICAL HISTORY:  Sepsis;    TECHNIQUE:  Single frontal view of the chest was performed.    COMPARISON:  07/09/2024.    FINDINGS:  The lungs are hypoexpanded and clear.  No focal opacities are seen.  The pleural spaces are clear.  The cardiac silhouette is unremarkable.  Osseous structures are intact.  There are median sternotomy wires.  Partially imaged thoracic/lumbar spine hardware noted.  There is a  shunt catheter.  Impression: No acute abnormality.    Electronically signed by: Jonathan Egan  Date:    11/26/2024  Time:    18:59         ASSESSMENT & PLAN:        Breakthrough seizures  UTI    Plan:   Etiology of breakthrough seizures is unknown however likely lower seizure threshold secondary to UTI(although she has chronic UTIs).  Seizures started after flu vaccine.    Increased Vimpat 200 mg b.i.d..  Continue Keppra 1500 mg b.i.d..    CT head was negative for acute pathology.  X-ray shunt series showed no abnormalities.    Seizure precautions while inpatient.    Avoid medications that lower seizure threshold.    Management of UTI per primary team.    Outpatient neurology follow-up.      Seizure education.      No driving for now, no swimming alone, no climbing high areas, no operation of heavy machinery or working with high risk electricity equipment.    Continue to take AEDs as prescribed. If any major side effects from neurological medications go to the ED including mood changes and severe depression.  Patient and/or next of kin informed.  Follow  up Neurology in 2 weeks.      Medication side effects discussed with the patient and/or caregiver.        Thank you kindly for including us in the care of this patient. Please do not hesitate to contact us with any questions.           Dov Smart MD  Neurology/vascular Neurology  Date of Service: 11/27/2024  10:14 AM    --------------------------------------------------------------------------------------------------------------------------------------------------------------------------------------------------------------------------------------------------------------  Please note: This note was transcribed using voice recognition software. Because of this technology there are often uinintended grammatical, spelling, and other transcription errors. Please disregard these errors.

## 2024-11-29 ENCOUNTER — TELEPHONE (OUTPATIENT)
Dept: FAMILY MEDICINE | Facility: CLINIC | Age: 29
End: 2024-11-29
Payer: COMMERCIAL

## 2024-11-29 ENCOUNTER — PATIENT OUTREACH (OUTPATIENT)
Dept: FAMILY MEDICINE | Facility: CLINIC | Age: 29
End: 2024-11-29
Payer: COMMERCIAL

## 2024-11-29 DIAGNOSIS — N39.0 E. COLI UTI: Primary | ICD-10-CM

## 2024-11-29 DIAGNOSIS — B96.20 E. COLI UTI: Primary | ICD-10-CM

## 2024-11-29 LAB — BACTERIA UR CULT: ABNORMAL

## 2024-11-29 RX ORDER — CEFDINIR 300 MG/1
300 CAPSULE ORAL 2 TIMES DAILY
Qty: 20 CAPSULE | Refills: 0 | Status: SHIPPED | OUTPATIENT
Start: 2024-11-29 | End: 2024-12-09

## 2024-11-29 NOTE — PROGRESS NOTES
Discharge Information     Discharge Date:   11/27/2024    Primary Discharge Diagnosis:  Recurrent Seizures      Discharge Summary:  Reviewed      Medication & Order Review     Were medication changes made or new medications added?   Yes    If so, has the patient filled the prescriptions?  Pt waiting on medication to go through insurance     Was Home Health ordered? No    If so, has Home Health contacted patient and/or initiated services?  No    Name of Home Health Agency? N/A    Durable Medical Equipment ordered?  No     If so, has the DME provider contacted patient and delivered equipment?  N/A    Follow Up               Any problems since discharge? No    How is the patient feeling since returning home?      Have you set up recommended follow up appointments?  (cardiology, surgery, etc.)    Schedule Hospital Follow-up appointment within 7-14 days (preferably 7).      Notes:  Spoke with pt's mother Kayleen who states that pt is feeling better but still not great since discharge from hospital. She states that pt had one more seizure after being discharged for a total of 6 since they began. Has not had any seizures since. Pt had UA done in hospital and was positive for E. Coli, no symptoms. Pt mother unsure if hospital MD going to prescribe anything for pt. Informed Elisa who states she will look into pt's labs and get something sent in for her. Hospital MD wanted to make sure it was known that pt could not have the flu vaccine ever again. Pt to follow up with Neuro, which mother is attempting to set up but is having trouble doing so. HFU rescheduled with Elisa.       Pita Carrasquillo

## 2024-11-29 NOTE — TELEPHONE ENCOUNTER
----- Message from Med Assistant Paige sent at 11/29/2024 10:37 AM CST -----  Call patient - needs post-hospital phone call within 2 business days and hospital follow up visit scheduled within 7-14 days.      Needs to be rescheduled with serena, has never seen Dr. Ponce

## 2024-11-29 NOTE — TELEPHONE ENCOUNTER
Spoke with pt's mother Kayleen who states that pt is feeling better but still not great since discharge from hospital. She states that pt had one more seizure after being discharged for a total of 6 since they began. Has not had any seizures since. Pt had UA done in hospital and was positive for E. Coli, no symptoms. Pt mother unsure if hospital MD going to prescribe anything for pt. Informed Elisa who states she will look into pt's labs and get something sent in for her. Hospital MD wanted to make sure it was known that pt could not have the flu vaccine ever again. Pt to follow up with Neuro, which mother is attempting to set up but is having trouble doing so. HFU rescheduled with Elisa. TCC note done.

## 2024-11-30 LAB
BACTERIA BLD CULT: NORMAL
LACOSAMIDE: 3.5 MCG/ML (ref 1–10)

## 2024-12-01 NOTE — PROGRESS NOTES
"  SUBJECTIVE:    Patient ID: Shashank Samuel is a 29 y.o. female.    Chief Complaint: Follow-up (No bottles//Pt here for follow up//discuss pain in neck, HA, bilat fore arms, and left inner corner. Site is reddened//would like flu vacc//JL)    Follow-up  Associated symptoms include headaches, neck pain and vomiting. Pertinent negatives include no arthralgias, chest pain, joint swelling or weakness.     History of Present Illness    CHIEF COMPLAINT:  Shashank presents today for neck pain and arm pain.    MUSCULOSKELETAL PAIN:  She reports experiencing intermittent neck and arm pain, described as aching and occasionally exacerbated by movement. The pain is most frequently experienced upon waking, suggesting a possible correlation with sleep position. The neck pain is often accompanied by concurrent headaches. Headaches occur every now and then, with the ability to go days without one. Excedrin helps alleviate the pain associated with these headaches.    EYE DISCOMFORT:  She reports pain in the corner of her eye, which she describes as feeling like something is poking her. The discomfort began the previous night. Her eye is running and there is redness in the corner. She acknowledges it could be irritation from her glasses.    SEIZURE MANAGEMENT:  She reports no seizures since the last medication adjustment. She is currently taking Keppra and Vimpat for seizure management. She confirms having diazepam spray available for use in case of major seizure activity. She is managed by neurology.    GASTROINTESTINAL:  She experienced a stomach bug approximately two weeks ago with significant nausea. She was prescribed Phenergan for nausea at urgent care. She has Zofran at home for nausea management, but it was not providing sufficient relief during this recent illness.    LABORATORY TESTS:  She has regular monthly lab work done at [facility]. She reports difficulty with blood draws, describing herself as a "hard " "stick" with limited viable veins. Recent labs were performed at her wound care appointments to monitor for infections, with results reported as "looking good." Attempts to draw blood at the wound care facility were unsuccessful due to difficulty accessing veins.          ROS:  General: -fever, -chills, -fatigue, -weight gain, -weight loss  Eyes: -vision changes, -redness, +discharge, +eye pain  ENT: -ear pain, -nasal congestion, -sore throat  Cardiovascular: -chest pain, -palpitations, -lower extremity edema  Respiratory: -cough, -shortness of breath  Gastrointestinal: -abdominal pain, +nausea, -vomiting, -diarrhea, -constipation, -blood in stool  Genitourinary: -dysuria, -hematuria, -frequency  Musculoskeletal: -joint pain, -muscle pain, +neck pain  Skin: -rash, -lesion  Neurological: +headache, -dizziness, -numbness, -tingling  Psychiatric: -anxiety, -depression, -sleep difficulty         Office Visit on 09/04/2024   Component Date Value Ref Range Status    Urine Culture, Routine 09/04/2024  (A)   Corrected                    Value:ESCHERICHIA COLI ESBL  50,000 - 99,999 cfu/ml     Hospital Outpatient Visit on 08/29/2024   Component Date Value Ref Range Status    Holter Hookup Date 08/29/2024 [id number]   Final    Holter Hookup Time 08/29/2024 [id number]   Final    Holter Study End Date 08/29/2024 [id number]   Final    Holter Study End Time 08/29/2024 [id number]   Final    Holter Scan Date 08/29/2024 [id number]   Final    Sinus min HR 08/29/2024 35  bpm Final    Sinus max hr 08/29/2024 147  bpm Final    Sinus avg hr 08/29/2024 91  bpm Final    Event Monitor Day 08/29/2024 6   Final    Holter length hours 08/29/2024 18   Final    holter length minutes 08/29/2024 0   Final    holter length dec hours 08/29/2024 162.00   Final   Clinical Support on 08/29/2024   Component Date Value Ref Range Status    LVOT stroke volume 08/29/2024 29.83  cm3 Final    LVIDd 08/29/2024 2.70 (A)  3.5 - 6.0 cm Final    LV Systolic Volume 08/29/2024 9.72 "  mL Final    LVIDs 08/29/2024 1.80 (A)  2.1 - 4.0 cm Final    LV Diastolic Volume 08/29/2024 27.02  mL Final    Left Ventricular End Systolic Volu* 08/29/2024 9.72  mL Final    Left Ventricular End Diastolic Vol* 08/29/2024 27.02  mL Final    IVS 08/29/2024 0.90  0.6 - 1.1 cm Final    LVOT diameter 08/29/2024 2.00  cm Final    LVOT area 08/29/2024 3.1  cm2 Final    FS 08/29/2024 33  28 - 44 % Final    Left Ventricle Relative Wall Thick* 08/29/2024 0.67  cm Final    PW 08/29/2024 0.90  0.6 - 1.1 cm Final    LV mass 08/29/2024 60.04  g Final    MV Peak E Levy 08/29/2024 0.84  m/s Final    TDI LATERAL 08/29/2024 0.15  m/s Final    TDI SEPTAL 08/29/2024 0.09  m/s Final    E/E' ratio 08/29/2024 7.00  m/s Final    MV Peak A Levy 08/29/2024 0.56  m/s Final    TR Max Levy 08/29/2024 2.22  m/s Final    E/A ratio 08/29/2024 1.50   Final    E wave deceleration time 08/29/2024 134.00  msec Final    LV SEPTAL E/E' RATIO 08/29/2024 9.33  m/s Final    LV LATERAL E/E' RATIO 08/29/2024 5.60  m/s Final    LVOT peak levy 08/29/2024 0.57  m/s Final    Left Ventricular Outflow Tract Rashmi* 08/29/2024 0.37  cm/s Final    Left Ventricular Outflow Tract Rashmi* 08/29/2024 1.00  mmHg Final    TAPSE 08/29/2024 1.06  cm Final    AV mean gradient 08/29/2024 2  mmHg Final    AV peak gradient 08/29/2024 4  mmHg Final    Ao peak levy 08/29/2024 1.00  m/s Final    Ao VTI 08/29/2024 15.30  cm Final    LVOT peak VTI 08/29/2024 9.50  cm Final    AV valve area 08/29/2024 1.95  cm² Final    AV Velocity Ratio 08/29/2024 0.57   Final    AV index (prosthetic) 08/29/2024 0.62   Final    KEIKO by Velocity Ratio 08/29/2024 1.79  cm² Final    MV mean gradient 08/29/2024 2  mmHg Final    MV peak gradient 08/29/2024 4  mmHg Final    MV stenosis pressure 1/2 time 08/29/2024 63.00  ms Final    MV valve area p 1/2 method 08/29/2024 3.49  cm2 Final    MV valve area by continuity eq 08/29/2024 1.42  cm2 Final    MV VTI 08/29/2024 21.0  cm Final    Triscuspid Valve Regurgitation  Pea* 08/29/2024 20  mmHg Final    PV PEAK VELOCITY 08/29/2024 0.80  m/s Final    PV peak gradient 08/29/2024 3  mmHg Final    Pulmonary Valve Mean Velocity 08/29/2024 0.54  m/s Final    Ao root annulus 08/29/2024 2.80  cm Final    Mean e' 08/29/2024 0.12  m/s Final    AORTIC VALVE CUSP SEPERATION 08/29/2024 1.60  cm Final    TV resting pulmonary artery pressu* 08/29/2024 23  mmHg Final    RV TB RVSP 08/29/2024 5  mmHg Final    Est. RA pres 08/29/2024 3  mmHg Final   Office Visit on 08/21/2024   Component Date Value Ref Range Status    QRS Duration 08/21/2024 80  ms Final    [facility] QTC Calculation 08/21/2024 428  ms Final   Admission on 07/09/2024, Discharged on 07/11/2024   Component Date Value Ref Range Status    Sodium 07/09/2024 136  136 - 145 mmol/L Final    Potassium 07/09/2024 3.3 (L)  3.5 - 5.1 mmol/L Final    Chloride 07/09/2024 103  95 - 110 mmol/L Final    CO2 07/09/2024 20 (L)  23 - 29 mmol/L Final    Glucose 07/09/2024 152 (H)  70 - 110 mg/dL Final    BUN 07/09/2024 16  6 - 20 mg/dL Final    Creatinine 07/09/2024 0.6  0.5 - 1.4 mg/dL Final    Calcium 07/09/2024 8.7  8.7 - 10.5 mg/dL Final    Total Protein 07/09/2024 8.3  6.0 - 8.4 g/dL Final    Albumin 07/09/2024 4.8  3.5 - 5.2 g/dL Final    Total Bilirubin 07/09/2024 0.3  0.1 - 1.0 mg/dL Final    Alkaline Phosphatase 07/09/2024 67  55 - 135 U/L Final    AST 07/09/2024 17  10 - 40 U/L Final    ALT 07/09/2024 19  10 - 44 U/L Final    eGFR 07/09/2024 >60.0  >60 mL/min/1.73 m^2 Final    Anion Gap 07/09/2024 13  8 - 16 mmol/L Final    HCG Quant 07/09/2024 <0.60  See Text mIU/mL Final    Specimen UA 07/10/2024 Urine, Clean Catch   Final    Color, UA 07/10/2024 Yellow  Yellow, Straw, Christina Final    Appearance, UA 07/10/2024 Hazy (A)  Clear Final    pH, UA 07/10/2024 7.0  5.0 - 8.0 Final    Specific Gravity, UA 07/10/2024 1.015  1.005 - 1.030 Final    Protein, UA 07/10/2024 Trace (A)  Negative Final    Glucose, UA 07/10/2024 Negative  Negative Final    Ketones, UA  07/10/2024 Negative  Negative Final    Bilirubin (UA) 07/10/2024 Negative  Negative Final    Occult Blood UA 07/10/2024 TRACE  Negative Final    Nitrite, UA 07/10/2024 Positive (A)  Negative Final    Urobilinogen, UA 07/10/2024 Negative  Negative EU/dL Final    Leukocytes, UA 07/10/2024 Negative  Negative Final    Blood Culture, Routine 07/09/2024 No growth after 5 days.   Final    Blood Culture, Routine 07/09/2024 No growth after 5 days.   Final    WBC 07/09/2024 11.63  3.90 - 12.70 K/uL Final    RBC 07/09/2024 5.10  4.00 - 5.40 M/uL Final    Hemoglobin 07/09/2024 14.8  12.0 - 16.0 g/dL Final    Hematocrit 07/09/2024 46.1  37.0 - 48.5 % Final    MCV 07/09/2024 90  82 - 98 fL Final    MCH 07/09/2024 29.0  27.0 - 31.0 pg Final    MCHC 07/09/2024 32.1  32.0 - 36.0 g/dL Final    RDW 07/09/2024 13.2  11.5 - 14.5 % Final    Platelets 07/09/2024 393  150 - 450 K/uL Final    MPV 07/09/2024 10.5  9.2 - 12.9 fL Final    Immature Granulocytes 07/09/2024 0.4  0.0 - 0.5 % Final    Gran # (ANC) 07/09/2024 6.2  1.8 - 7.7 K/uL Final    Immature Grans (Abs) 07/09/2024 0.05 (H)  0.00 - 0.04 K/uL Final    Lymph # 07/09/2024 4.5  1.0 - 4.8 K/uL Final    Mono # 07/09/2024 0.8  0.3 - 1.0 K/uL Final    Eos # 07/09/2024 0.1  0.0 - 0.5 K/uL Final    Baso # 07/09/2024 0.03  0.00 - 0.20 K/uL Final    nRBC 07/09/2024 0  0 /100 WBC Final    Gran % 07/09/2024 53.0  38.0 - 73.0 % Final    Lymph % 07/09/2024 38.8  18.0 - 48.0 % Final    Mono % 07/09/2024 6.6  4.0 - 15.0 % Final    Eosinophil % 07/09/2024 0.9  0.0 - 8.0 % Final    Basophil % 07/09/2024 0.3  0.0 - 1.9 % Final    Differential Method 07/09/2024 Automated   Final    Magnesium 07/09/2024 2.0  1.6 - 2.6 mg/dL Final    Phosphorus 07/09/2024 3.4  2.7 - 4.5 mg/dL Final    POC Lactate 07/09/2024 3.34 (H)  0.5 - 2.2 mmol/L Final    Sample 07/09/2024 VENOUS   Final    RBC, UA 07/10/2024 7 (H)  0 - 4 /hpf Final    WBC, UA 07/10/2024 68 (H)  0 - 5 /hpf Final    Bacteria 07/10/2024 Occasional   None-Occ /hpf Final    Squam Epithel, UA 07/10/2024 1  /hpf Final    Non-Squam Epith 07/10/2024 2 (A)  <1/hpf /hpf Final    Microscopic Comment 07/10/2024 SEE COMMENT   Final    Urine Culture, Routine 07/10/2024  (A)   Final                    Value:CITROBACTER FREUNDII  >100,000 cfu/ml      Lactate (Lactic Acid) 07/10/2024 4.1 (HH)  0.5 - 1.9 mmol/L Final    Sodium 07/10/2024 138  136 - 145 mmol/L Final    Potassium 07/10/2024 4.0  3.5 - 5.1 mmol/L Final    Chloride 07/10/2024 108  95 - 110 mmol/L Final    CO2 07/10/2024 20 (L)  23 - 29 mmol/L Final    Glucose 07/10/2024 137 (H)  70 - 110 mg/dL Final    BUN 07/10/2024 14  6 - 20 mg/dL Final    Creatinine 07/10/2024 0.5  0.5 - 1.4 mg/dL Final    Calcium 07/10/2024 7.7 (L)  8.7 - 10.5 mg/dL Final    Total Protein 07/10/2024 7.1  6.0 - 8.4 g/dL Final    Albumin 07/10/2024 4.1  3.5 - 5.2 g/dL Final    Total Bilirubin 07/10/2024 0.2  0.1 - 1.0 mg/dL Final    Alkaline Phosphatase 07/10/2024 55  55 - 135 U/L Final    AST 07/10/2024 17  10 - 40 U/L Final    ALT 07/10/2024 16  10 - 44 U/L Final    eGFR 07/10/2024 >60.0  >60 mL/min/1.73 m^2 Final    Anion Gap 07/10/2024 10  8 - 16 mmol/L Final    Magnesium 07/10/2024 1.8  1.6 - 2.6 mg/dL Final    WBC 07/10/2024 20.93 (H)  3.90 - 12.70 K/uL Final    RBC 07/10/2024 4.57  4.00 - 5.40 M/uL Final    Hemoglobin 07/10/2024 13.1  12.0 - 16.0 g/dL Final    Hematocrit 07/10/2024 40.9  37.0 - 48.5 % Final    MCV 07/10/2024 90  82 - 98 fL Final    MCH 07/10/2024 28.7  27.0 - 31.0 pg Final    MCHC 07/10/2024 32.0  32.0 - 36.0 g/dL Final    RDW 07/10/2024 13.2  11.5 - 14.5 % Final    Platelets 07/10/2024 276  150 - 450 K/uL Final    MPV 07/10/2024 10.0  9.2 - 12.9 fL Final    Immature Granulocytes 07/10/2024 0.5  0.0 - 0.5 % Final    Gran # (ANC) 07/10/2024 19.1 (H)  1.8 - 7.7 K/uL Final    Immature Grans (Abs) 07/10/2024 0.11 (H)  0.00 - 0.04 K/uL Final    Lymph # 07/10/2024 0.9 (L)  1.0 - 4.8 K/uL Final    Mono # 07/10/2024 0.8  0.3 -  1.0 K/uL Final    Eos # 07/10/2024 0.0  0.0 - 0.5 K/uL Final    Baso # 07/10/2024 0.02  0.00 - 0.20 K/uL Final    nRBC 07/10/2024 0  0 /100 WBC Final    Gran % 07/10/2024 91.3 (H)  38.0 - 73.0 % Final    Lymph % 07/10/2024 4.4 (L)  18.0 - 48.0 % Final    Mono % 07/10/2024 3.7 (L)  4.0 - 15.0 % Final    Eosinophil % 07/10/2024 0.0  0.0 - 8.0 % Final    Basophil % 07/10/2024 0.1  0.0 - 1.9 % Final    Differential Method 07/10/2024 Automated   Final    Prothrombin Time 07/10/2024 11.2  9.0 - 12.5 sec Final    INR 07/10/2024 1.0  0.8 - 1.2 Final    aPTT 07/10/2024 25.9  21.0 - 32.0 sec Final    D-Dimer 07/10/2024 1.23 (HH)  0.00 - 0.49 mg/L FEU Final    Lactate (Lactic Acid) 07/10/2024 2.2 (HH)  0.5 - 1.9 mmol/L Final    Lactate (Lactic Acid) 07/10/2024 2.1 (HH)  0.5 - 1.9 mmol/L Final    Sodium 07/11/2024 136  136 - 145 mmol/L Final    Potassium 07/11/2024 4.1  3.5 - 5.1 mmol/L Final    Chloride 07/11/2024 108  95 - 110 mmol/L Final    CO2 07/11/2024 23  23 - 29 mmol/L Final    Glucose 07/11/2024 90  70 - 110 mg/dL Final    BUN 07/11/2024 8  6 - 20 mg/dL Final    Creatinine 07/11/2024 0.4 (L)  0.5 - 1.4 mg/dL Final    Calcium 07/11/2024 8.4 (L)  8.7 - 10.5 mg/dL Final    Total Protein 07/11/2024 7.0  6.0 - 8.4 g/dL Final    Albumin 07/11/2024 4.1  3.5 - 5.2 g/dL Final    Total Bilirubin 07/11/2024 0.4  0.1 - 1.0 mg/dL Final    Alkaline Phosphatase 07/11/2024 54 (L)  55 - 135 U/L Final    AST 07/11/2024 19  10 - 40 U/L Final    ALT 07/11/2024 18  10 - 44 U/L Final    eGFR 07/11/2024 >60.0  >60 mL/min/1.73 m^2 Final    Anion Gap 07/11/2024 5 (L)  8 - 16 mmol/L Final    Magnesium 07/11/2024 1.9  1.6 - 2.6 mg/dL Final    WBC 07/11/2024 11.54  3.90 - 12.70 K/uL Final    RBC 07/11/2024 5.07  4.00 - 5.40 M/uL Final    Hemoglobin 07/11/2024 14.5  12.0 - 16.0 g/dL Final    Hematocrit 07/11/2024 45.4  37.0 - 48.5 % Final    MCV 07/11/2024 90  82 - 98 fL Final    MCH 07/11/2024 28.6  27.0 - 31.0 pg Final    MCHC 07/11/2024 31.9  (L)  32.0 - 36.0 g/dL Final    RDW 07/11/2024 13.3  11.5 - 14.5 % Final    Platelets 07/11/2024 228  150 - 450 K/uL Final    MPV 07/11/2024 10.5  9.2 - 12.9 fL Final    Immature Granulocytes 07/11/2024 0.3  0.0 - 0.5 % Final    Gran # (ANC) 07/11/2024 8.2 (H)  1.8 - 7.7 K/uL Final    Immature Grans (Abs) 07/11/2024 0.04  0.00 - 0.04 K/uL Final    Lymph # 07/11/2024 2.4  1.0 - 4.8 K/uL Final    Mono # 07/11/2024 0.8  0.3 - 1.0 K/uL Final    Eos # 07/11/2024 0.1  0.0 - 0.5 K/uL Final    Baso # 07/11/2024 0.02  0.00 - 0.20 K/uL Final    nRBC 07/11/2024 0  0 /100 WBC Final    Gran % 07/11/2024 70.7  38.0 - 73.0 % Final    Lymph % 07/11/2024 21.0  18.0 - 48.0 % Final    Mono % 07/11/2024 7.3  4.0 - 15.0 % Final    Eosinophil % 07/11/2024 0.5  0.0 - 8.0 % Final    Basophil % 07/11/2024 0.2  0.0 - 1.9 % Final    Differential Method 07/11/2024 Automated   Final   Orders Only on 07/09/2024   Component Date Value Ref Range Status    QRS Duration 07/09/2024 86  ms Final    [facility] QTC Calculation 07/09/2024 407  ms Final       Past Medical History:   Diagnosis Date    Anemia     Arnold-Chiari malformation, type II     Encounter for blood transfusion     Epilepsy     Hydrocephalus     Neurogenic bladder     self catheterizes every 3 hours    Raynaud disease     Seizures     only w/ shunt malfunction    Spinal bifida, closed     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 Wheelchair bound.  Self catheterizes herself     Social Drivers of Health     Financial Resource Strain: Patient Declined (11/27/2024)    Overall Financial Resource Strain (CARDIA)     Difficulty of Paying  Living Expenses: Patient declined   Food Insecurity: Patient Declined (11/27/2024)    Hunger Vital Sign     Worried About Running Out of Food in the Last Year: Patient declined     Ran Out of Food in the Last Year: Patient declined   Transportation Needs: Patient Declined (11/27/2024)    TRANSPORTATION NEEDS     Transportation : Patient declined   Physical Activity: Inactive (3/6/2024)    Exercise Vital Sign     Days of Exercise per Week: 0 days     Minutes of Exercise per Session: 0 min   Stress: Patient Declined (11/27/2024)    [employer] - Occupational Stress Questionnaire     Feeling of Stress : Patient declined   Housing Stability: Patient Declined (11/27/2024)    Housing Stability Vital Sign     Unable to Pay for Housing in the Last Year: Patient declined     Homeless in the Last Year: Patient declined     Past Surgical History:   Procedure Laterality Date    AMPUTATION      right 4th and 5th toes; left 5th toe and portion of left great toe    BACK SURGERY      BLADDER SURGERY      BREAST SURGERY  2015    Reduction    COLON SURGERY      flush site for bowel movements from appendix    CYSTOSTOMY      DEBRIDEMENT OF FOOT Right 1/8/2021    Procedure: DEBRIDEMENT, FOOT;  Surgeon: Abdi Bedoya DPM;  Location: [facility];  Service: Podiatry;  Laterality: Right;    EYE SURGERY      x 5    FLUOROSCOPIC URODYNAMIC STUDY N/A 3/12/2019    Procedure: URODYNAMIC STUDY, FLUOROSCOPIC;  Surgeon: Kenzie Charles MD;  Location: [address];  Service: Urology;  Laterality: N/A;  1 hour    FLUOROSCOPIC URODYNAMIC STUDY N/A 4/4/2019    Procedure: URODYNAMIC STUDY, FLUOROSCOPIC;  Surgeon: Kenzie Charles MD;  Location: [address];  Service: Urology;  Laterality: N/A;  1 hour    FLUOROSCOPIC URODYNAMIC STUDY N/A 5/11/2022    Procedure: URODYNAMIC STUDY, FLUOROSCOPIC;  Surgeon: Kenzie Charles MD;  Location: [address];  Service: Urology;  Laterality: N/A;  90min    FOOT  AMPUTATION THROUGH METATARSAL Right 10/28/2019    Procedure: AMPUTATION, FOOT, TRANSMETATARSAL;  Surgeon: Abdi Bedoya DPM;  Location: [facility];  Service: Podiatry;  Laterality: Right;    FOOT AMPUTATION THROUGH METATARSAL Right 3/27/2020    Procedure: AMPUTATION, FOOT, TRANSMETATARSAL;  Surgeon: Abdi Bedoya DPM;  Location: [facility] OR;  Service: Podiatry;  Laterality: Right;  REVISION    MYELOMININGOCELE REPAIR      NEPHRECTOMY Right 11/4/2021    Procedure: Nephrectomy/ OPEN;  Surgeon: Dash Rosenthal MD;  Location: [facility] 2ND FLR;  Service: Urology;  Laterality: Right;  4HRS    NEPHROSTOMY Right 8/2/2021    Procedure: Nephrostomy and perinephric abscess drain;  Surgeon: Lillian Surgeon;  Location: [facility];  Service: Anesthesiology;  Laterality: Right;    REVISION OF VENTRICULOPERITONEAL SHUNT Left 9/21/2022    Procedure: REVISION, SHUNT, VENTRICULOPERITONEAL;  Surgeon: Maynor Calero MD;  Location: [facility] 2ND FLR;  Service: Neurosurgery;  Laterality: Left;    STRABISMUS SURGERY      ou dr peña    VENTRICULOPERITONEAL SHUNT      WOUND DEBRIDEMENT  3/27/2020    Procedure: DEBRIDEMENT, WOUND;  Surgeon: Abdi Bedoya DPM;  Location: [facility];  Service: Podiatry;;     Family History   Problem Relation Name Age of Onset    Arthritis Mother      Breast cancer Mother      Gout Father      Epilepsy Father      Myocarditis Sister      Cataracts Maternal Grandmother      Amblyopia Neg Hx      Blindness Neg Hx      Cancer Neg Hx      Diabetes Neg Hx      Glaucoma Neg Hx      Hypertension Neg Hx      Macular degeneration Neg Hx      Retinal detachment Neg Hx      Strabismus Neg Hx      Stroke Neg Hx      Thyroid disease Neg Hx         The CVD Risk score (D'Agostino, et al., 2008) failed to calculate for the following reasons:    The 2008 CVD risk score is only valid for ages 30 to 74    All of your core healthy metrics are met.      Review of patient's allergies indicates:   Allergen Reactions    Latex,  "natural rubber Anaphylaxis and Other (See Comments)     angioedema    Zofran [ondansetron hcl (pf)] Swelling     Hives, "throat closes up"    Gardasil  [h papillomavirus vac,qval (pf)]      Other reaction(s): Shortness of breath    Menactra  [mening vac a,c,y,w135 dip (pf)]      Other reaction(s): Shortness of breath    Sulfa (sulfonamide antibiotics)     Tramadol Hives    Levaquin [levofloxacin] Rash     Spots on face    Macrobid [nitrofurantoin monohyd/m-cryst] Rash     Sppots/rash on face       Current Outpatient Medications:     diazePAM (VALTOCO) 10 mg/spray (0.1 mL) Spry, 10 mg by Nasal route every 5 (five) minutes as needed (Administer 1 spray into 1 nostril; if a second dose is needed, administer  in alternate nostril. A second dose should not be administered if the  patient is having trouble breathing or excessive sedatio)., Disp: 1 each, Rfl: 3    EPINEPHrine (EPIPEN) 0.3 mg/0.3 mL AtIn, Inject 0.3 mLs (0.3 mg total) into the muscle as needed (Anaphylactic reaction)., Disp: 0.3 mL, Rfl: 0    ergocalciferol (ERGOCALCIFEROL) 50,000 unit Cap, Take 50,000 Units by mouth every 7 days., Disp: , Rfl:     ibuprofen (ADVIL,MOTRIN) 200 MG tablet, Take 200 mg by mouth every 6 (six) hours as needed for Pain., Disp: , Rfl:     levETIRAcetam (KEPPRA) 750 MG Tab, Take 2 tablets (1,500 mg total) by mouth 2 (two) times daily., Disp: 120 tablet, Rfl: 11    promethazine (PHENERGAN) 25 MG tablet, Take 25 mg by mouth every 6 (six) hours as needed., Disp: , Rfl:     cefdinir (OMNICEF) 300 MG capsule, Take 1 capsule (300 mg total) by mouth 2 (two) times daily. for 10 days, Disp: 20 capsule, Rfl: 0    lacosamide (VIMPAT) 100 mg Tab, Take 1 tablet (100 mg total) by mouth every 12 (twelve) hours., Disp: 60 tablet, Rfl: 0    Review of Systems   Constitutional:  Negative for activity change and unexpected weight change.   HENT:  Positive for rhinorrhea. Negative for hearing loss and trouble swallowing.    Eyes:  Negative for " "discharge and visual disturbance.   Respiratory:  Negative for chest tightness and wheezing.    Cardiovascular:  Negative for chest pain and palpitations.   Gastrointestinal:  Positive for vomiting. Negative for blood in stool, constipation and diarrhea.   Endocrine: Negative for polydipsia and polyuria.   Genitourinary:  Negative for difficulty urinating, dysuria, hematuria and menstrual problem.   Musculoskeletal:  Positive for neck pain. Negative for arthralgias and joint swelling.   Neurological:  Positive for headaches. Negative for weakness.   Psychiatric/Behavioral:  Negative for confusion and dysphoric mood.            Objective:      Vitals:    11/25/24 1339   BP: 108/70   Pulse: 98   SpO2: 97%   Weight: 59 kg (130 lb)   Height: 4' 7" (1.397 m)     Physical Exam  Constitutional:       Appearance: She is well-groomed.      Comments: Pleasant and cooperative female, seated in her personal wheelchair, which is both manual and electric.     HENT:      Head: Atraumatic.      Nose: Nose normal.   Eyes:      General: No scleral icterus.  Cardiovascular:      Rate and Rhythm: Normal rate and regular rhythm.      Heart sounds: Normal heart sounds.   Pulmonary:      Effort: Pulmonary effort is normal.      Breath sounds: Normal breath sounds.   Abdominal:      Palpations: Abdomen is soft.   Musculoskeletal:         General: Deformity present. No swelling, tenderness or signs of injury.      Cervical back: Normal range of motion.      Right lower leg: No edema.      Left lower leg: No edema.        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      Capillary Refill: Capillary refill takes less than 2 seconds.      Coloration: Skin is not jaundiced or pale.   Neurological:      Mental Status: She is alert. Mental status is at baseline.      Cranial Nerves: No dysarthria or facial asymmetry.      Motor: Atrophy and abnormal muscle tone present. No tremor or seizure activity.      Comments: Non ambulatory. Spina bifida of thoracic spine.    Psychiatric:         Mood and Affect: Mood normal.         Behavior: Behavior is cooperative.     Physical Exam              Assessment:       1. Conjunctivitis of left eye, unspecified conjunctivitis type    2. Neck pain    3. Generalized headaches    4. Need for influenza vaccination    5. Paraplegia, T7 Complete    6. Pain in both upper extremities    7. Wheelchair dependence    8. Pressure ulcer of sacral region, stage 1    9. Self-catheterizes urinary bladder         Plan:         Assessment & Plan    Assessed eye discomfort, likely due to developing sty  Evaluated ongoing neck pain and headaches, potentially related to sleep position  Reviewed recent seizure medication changes (increased Keppra, added Vimpat) and lack of seizures since adjustment  Considered headaches in context of Arnold Chiari malformation and previous hydrocephalus  Noted recent UTI treated by urology  Assessed healing progress of wound on patient's sacrum    EPILEPSY MANAGEMENT:  - Continued Keppra and Vimpat at current doses for seizure management.    - Explained potential risks of using Phenergan for nausea in patients with seizure disorders.  - Continued Zofran for nausea as needed.    CERVICAL PAIN AND HEADACHE:  Neck pain  Generalized headaches  - Shashank to adjust sleep position to reduce neck pain and headaches.  - Shashank to continue home exercises as prescribed by physical therapy.  - Recommend using heating pad for neck discomfort.  - Advised to use ibuprofen for neck pain and headaches as needed.  - Advised to  use Excedrin for headaches as needed.    EYE IRRITATION AND CONJUNCTIVITIS:  Conjunctivitis of left eye, unspecified conjunctivitis type  -     neomycin-polymyxin-dexamethasone (MAXITROL) 3.5mg/mL-10,000 unit/mL-0.1 % DrpS; Place 1 drop into the left eye 3 (three) times daily. for 5 days  Dispense: 5 mL; Refill: 0  - Discussed importance of proper hygiene and potential sources of eye irritation.  - Shashank to consider wearing alternative glasses while eye is healing.  - Shashank to wash hands before touching eyes.  - Shashank to position glasses slightly lower on face to reduce irritation.  - Started antibiotic eye drops: instill 1 drop in affected eye 3 times daily for 5 days; use in both eyes if symptoms develop bilaterally.    Self-catheterizes urinary bladder  Paraplegia, T7 Complete  Wheelchair dependence  Noted recent UTI treated by urology    Pressure ulcer of sacral region, stage 1  Assessed healing progress of wound on patient's sacrum. Actively being treated by wound care.    IMMUNIZATION:  Need for influenza vaccination  Discussed medication's mechanism of action, side effects, and contraindications. Patient agrees to plan and requests vaccine.  -     influenza (Flulaval, Fluzone, Fluarix) 45 mcg/0.5 mL IM vaccine (> or = 6 mo) 0.5 mL  - Administered flu vaccine in office.    FOLLOW-UP AND MONITORING:  - Requested recent lab results from [facility] [employer] [facility].  - Contact office if headaches worsen, visual changes occur, or neck discomfort persists despite OTC treatment.  - Await call from neurology for 6-month follow-up and yearly appointment.         Follow up in about 6 months (around 5/25/2025) for With Dr. Ponce for well visit .        This note was generated with the assistance of ambient listening technology. Verbal consent was obtained by the patient and accompanying visitor(s) for the recording of patient appointment to facilitate this note. I attest to having reviewed and edited the generated note for  accuracy, though some syntax or spelling errors may persist. Please contact the author of this note for any clarification.      12/1/2024 Elisa Eng

## 2024-12-02 ENCOUNTER — PATIENT OUTREACH (OUTPATIENT)
Dept: ADMINISTRATIVE | Facility: CLINIC | Age: 29
End: 2024-12-02
Payer: COMMERCIAL

## 2024-12-02 NOTE — PROGRESS NOTES
Also taking OTC medication for HA.    C3 nurse spoke with Shashank Samuel ( mother)  for a [facility] post hospital discharge follow up call. The patient has a scheduled HOSFU appointment with KENTRELL Mohr on 12/09/2024@ 7728.    Message sent to PCP staff.

## 2024-12-09 ENCOUNTER — OFFICE VISIT (OUTPATIENT)
Dept: FAMILY MEDICINE | Facility: CLINIC | Age: 29
End: 2024-12-09
Payer: COMMERCIAL

## 2024-12-09 VITALS
SYSTOLIC BLOOD PRESSURE: 106 MMHG | HEART RATE: 110 BPM | BODY MASS INDEX: 30.03 KG/M2 | DIASTOLIC BLOOD PRESSURE: 78 MMHG | OXYGEN SATURATION: 98 % | HEIGHT: 55 IN

## 2024-12-09 DIAGNOSIS — Z09 HOSPITAL DISCHARGE FOLLOW-UP: Primary | ICD-10-CM

## 2024-12-09 DIAGNOSIS — G40.909 RECURRENT SEIZURES: ICD-10-CM

## 2024-12-09 DIAGNOSIS — N39.0 URINARY TRACT INFECTION WITHOUT HEMATURIA, SITE UNSPECIFIED: ICD-10-CM

## 2024-12-09 DIAGNOSIS — Z87.898 HISTORY OF SEIZURES: ICD-10-CM

## 2024-12-09 RX ORDER — LACOSAMIDE 100 MG/1
100 TABLET ORAL EVERY 12 HOURS
Qty: 60 TABLET | Refills: 0 | Status: SHIPPED | OUTPATIENT
Start: 2024-12-20 | End: 2025-01-19

## 2024-12-09 NOTE — PROGRESS NOTES
SUBJECTIVE:    Patient ID: Shashank Samuel is a 29 y.o. female.    Chief Complaint: Follow-up and Sore Throat (Bottles brought//Pt here for HFU. Hospital increased Vimpat with no refills. Cannot see neuro for a while// pt c/o sore throat since last night// pt also neck and back pain. Been having neck pain since Hospital//JL)    Patient Name: Shashank Samuel  MRN: [id number]  GIRMA: [id number]  Patient Class: OP- Observation  Admission Date: 11/26/2024  Hospital Length of Stay: 0 days  Discharge Date and Time:  11/27/2024 2:39 PM  Attending Physician: Shashank Meyer DO   Discharging Provider: Shashank Meyer DO  Primary Care Provider: Dash Ponce MD     Primary Care Team: Networked reference to record PCT      HPI:   Shashank Samuel is a 29 year old female with a past medical history of Arnold-Chiari malformation, type II, anemia, epilepsy, neurogenic bladder (self cath's) spina bifida (wheelchair-bound), nephrectomy, and hydrocephalus with  shunt, who presented to the ED with recurrent seizures. She had a few seizures today, about 5 lasting 5 minutes or less, with return to appropriate baseline. Last seizure activity was months ago, and she reports being compliant with her anti seizure medications. Family reports she had her flu shot yesterday. She denies fever, chills or other complaint.     ED work up included a CBC which was unremarkable. CMP showed mild hypokalemia.  Magnesium 1.9, lactic acid 1.11. Urinalysis positive for evidence of infection, and she was initiated on Merrem in the ED. CXR shows no acute cardiopulmonary abnormality. XR Shunt series shows no abnormality. CT of the head show no acute intracranial abnormality. Neurology was consulted by the ED, Dr. Smart, who recommended to load the patient with Vimpat 300 mg and increase her daily dosage to vimpat 100 mg BID. Hospital Medicine consulted for admission and further management.      * No surgery found *       Hospital Course:    Patient had no further episodes of seizure after receiving Ativan.  She is alert and oriented, and has no complaints at this time.  CT, x-ray shunt, chest x-ray showed no acute process.  Patient was seen and evaluated by Neurology.  Patient is hemodynamically stable, okay for discharge.  Her dose of Vimpat has been increased to 200 mg.  She will continue current dosage of Keppra.  She will follow up outpatient with the primary care physician, and we will follow up outpatient with Neurology.      Admit Date: 11/26/24   Discharge Date: 11/27/24  Discharge Facility: Hospital    Medication Reconciliation:  Medications changed/added/deleted. Vimpat increased to 100mg BID. Confirmed this with Dr. More  New Prescriptions filled after discharge: yes  Discharge summary reviewed:  yes  Pending test results at discharge reviewed:   not applicable  Follow up appointments scheduled:  no- needs neurology     Follow up labs/tests ordered:   not applicable  Home Health ordered on discharge:   not applicable  Home Health company name:   DME ordered at discharge:   not applicable  Disease/illness education: UTI, seizures, medications  Discussion with other health care providers: no  Establishment or re-establishment of referral orders for community resources: No other necessary community resources  Family and/or Caretaker present at visit?  yes    Discharge Summary:reviewed    How patient is feeling since discharge from the hospital?  Ok, just sore          Follow-up  Associated symptoms include a sore throat.   Sore Throat       History of Present Illness    CHIEF COMPLAINT:  Shashank presents today for follow-up after recent seizures and hospitalization.    SEIZURE HISTORY:  She reports experiencing multiple tonic-clonic seizures, possibly related to receiving a flu vaccine. Prior to the seizures, she experienced alternating sensations of feeling hot and cold. She recalls being able to communicate briefly with others before  "the onset of a seizure, with approximately 30 seconds between alerting others and the seizure occurring. She denies any breathing difficulties, swelling, or anaphylactic reactions associated with the seizures.     MEDICATIONS:  She reports an increase in VIMPAT dosage from 50 to 100 mg as prescribed by her neurologist. She has a nasal spray for seizure management, which is kept in her backpack with three refills remaining before it expires in January.    NEUROLOGIST FOLLOW-UP:  She reports difficulty scheduling a follow-up visit with her neurologist, Dr. SUERO. She has contacted the neurology main clinic but was informed there are currently no openings available. She is willing to see any available neurologist and has attempted to schedule an appointment online and requested a call back, but has not received a response.    CHRONIC UTIS:  She reports a history of chronic UTIs. She experiences difficulty detecting UTIs due to their constant presence, describing her urine as being in a state of "constant culture," indicating persistent bacterial growth.    PRESSURE ULCER:  She reports slow healing of her pressure ulcer, describing the process as ongoing for nearly two years. The ulcer was initially very deep, but she currently reports it is "good" and continues to heal.    MUSCULOSKELETAL:  She reports ongoing neck pain, experiencing discomfort when positioning her head and neck in a certain way, particularly when leaning back.    UPPER RESPIRATORY SYMPTOMS:  She reports recent onset of upper respiratory symptoms, including a sore throat. She notes that other family members are experiencing post-nasal drip, but symptoms are the only ones that include a sore throat.      ROS:  General: -fever, -chills, -fatigue, -weight gain, -weight loss  Eyes: -vision changes, -redness, -discharge  ENT: -ear pain, -nasal congestion, +sore throat  Cardiovascular: -chest pain, -palpitations, -lower extremity edema  Respiratory: -cough, " -shortness of breath, -difficulty breathing  Gastrointestinal: -abdominal pain, -nausea, -vomiting, -diarrhea, -constipation, -blood in stool  Genitourinary: -dysuria, -hematuria, -frequency  Musculoskeletal: -joint pain, -muscle pain, +neck pain  Skin: -rash, +lesion  Neurological: -headache, -dizziness, -numbness, -tingling  Psychiatric: -anxiety, -depression, -sleep difficulty         Admission on 11/26/2024, Discharged on 11/27/2024   Component Date Value Ref Range Status    Hepatitis C Ab 11/26/2024 Negative  Negative Final    HIV 1/2 Ag/Ab 11/26/2024 Negative  Negative Final    Levetiracetam Lvl 11/26/2024 78.5 (H)  3.0 - 60.0 ug/mL Final    Lacosamide 11/26/2024 3.5  1.0 - 10.0 mcg/mL Final    Specimen UA 11/26/2024 Urine, Clean Catch   Final    Color, UA 11/26/2024 Yellow  Yellow, Straw, Christina Final    Appearance, UA 11/26/2024 Hazy (A)  Clear Final    pH, UA 11/26/2024 7.0  5.0 - 8.0 Final    Specific [place], UA 11/26/2024 1.015  1.005 - 1.030 Final    Protein, UA 11/26/2024 Trace (A)  Negative Final    Glucose, UA 11/26/2024 Negative  Negative Final    Ketones, UA 11/26/2024 Negative  Negative Final    Bilirubin (UA) 11/26/2024 Negative  Negative Final    Occult Blood UA 11/26/2024 Trace (A)  Negative Final    Nitrite, UA 11/26/2024 Positive (A)  Negative Final    Urobilinogen, UA 11/26/2024 Negative  <2.0 EU/dL Final    Leukocytes, UA 11/26/2024 3+ (A)  Negative Final    POC Preg Test, Ur 11/26/2024 Negative  Negative Final     Acceptable 11/26/2024 Yes   Final    RBC, UA 11/26/2024 7 (H)  0 - 4 /hpf Final    WBC, UA 11/26/2024 45 (H)  0 - 5 /hpf Final    Bacteria 11/26/2024 Many (A)  None-Occ /hpf Final    Squam Epithel, UA 11/26/2024 0  /hpf Final    Non-Squam Epith 11/26/2024 1 (A)  <1/hpf /hpf Final    Microscopic Comment 11/26/2024 SEE COMMENT   Final    Urine Culture, Routine 11/26/2024  (A)   Final                    Value:ESCHERICHIA COLI  >100,000 cfu/ml      Blood Culture,  Routine 11/26/2024 No growth after 5 days.   Final    Blood Culture, Routine 11/26/2024 No growth after 5 days.   Final    QRS Duration 11/26/2024 82  ms Final    [facility] QTC Calculation 11/26/2024 429  ms Final    Magnesium 11/26/2024 1.9  1.6 - 2.6 mg/dL Final    WBC 11/26/2024 9.62  3.90 - 12.70 K/uL Final    RBC 11/26/2024 4.45  4.00 - 5.40 M/uL Final    Hemoglobin 11/26/2024 12.9  12.0 - 16.0 g/dL Final    Hematocrit 11/26/2024 38.8  37.0 - 48.5 % Final    MCV 11/26/2024 87  82 - 98 fL Final    MCH 11/26/2024 29.0  27.0 - 31.0 pg Final    MCHC 11/26/2024 33.2  32.0 - 36.0 g/dL Final    RDW 11/26/2024 13.0  11.5 - 14.5 % Final    Platelets 11/26/2024 264  150 - 450 K/uL Final    MPV 11/26/2024 9.5  9.2 - 12.9 fL Final    Immature Granulocytes 11/26/2024 0.3  0.0 - 0.5 % Final    Gran # (ANC) 11/26/2024 7.5  1.8 - 7.7 K/uL Final    Immature Grans (Abs) 11/26/2024 0.03  0.00 - 0.04 K/uL Final    Lymph # 11/26/2024 1.2  1.0 - 4.8 K/uL Final    Mono # 11/26/2024 0.9  0.3 - 1.0 K/uL Final    Eos # 11/26/2024 0.0  0.0 - 0.5 K/uL Final    Baso # 11/26/2024 0.01  0.00 - 0.20 K/uL Final    nRBC 11/26/2024 0  0 /100 WBC Final    Gran % 11/26/2024 77.8 (H)  38.0 - 73.0 % Final    Lymph % 11/26/2024 12.8 (L)  18.0 - 48.0 % Final    Mono % 11/26/2024 8.8  4.0 - 15.0 % Final    Eosinophil % 11/26/2024 0.2  0.0 - 8.0 % Final    Basophil % 11/26/2024 0.1  0.0 - 1.9 % Final    Differential Method 11/26/2024 Automated   Final    Sodium 11/26/2024 135 (L)  136 - 145 mmol/L Final    Potassium 11/26/2024 3.3 (L)  3.5 - 5.1 mmol/L Final    Chloride 11/26/2024 106  95 - 110 mmol/L Final    CO2 11/26/2024 14 (L)  23 - 29 mmol/L Final    Glucose 11/26/2024 104  70 - 110 mg/dL Final    BUN 11/26/2024 17  6 - 20 mg/dL Final    Creatinine 11/26/2024 0.7  0.5 - 1.4 mg/dL Final    Calcium 11/26/2024 8.6 (L)  8.7 - 10.5 mg/dL Final    Total Protein 11/26/2024 7.6  6.0 - 8.4 g/dL Final    Albumin 11/26/2024 4.0  3.5 - 5.2 g/dL Final    Total  Bilirubin 11/26/2024 0.2  0.1 - 1.0 mg/dL Final    Alkaline Phosphatase 11/26/2024 69  40 - 150 U/L Final    AST 11/26/2024 20  10 - 40 U/L Final    ALT 11/26/2024 28  10 - 44 U/L Final    eGFR 11/26/2024 >60  >60 mL/min/1.73 m^2 Final    Anion Gap 11/26/2024 15  8 - 16 mmol/L Final    POC Lactate 11/26/2024 1.11  0.5 - 2.2 mmol/L Final    Sample 11/26/2024 VENOUS   Final    Site 11/26/2024 LF   Final    Allens Test 11/26/2024 Pass   Final    DelSys 11/26/2024 Nasal Can   Final    Mode 11/26/2024 SPONT   Final    Flow 11/26/2024 2   Final    Sp02 11/26/2024 100   Final    Lactate (Lactic Acid) 11/26/2024 1.0  0.5 - 2.2 mmol/L Final    Magnesium 11/27/2024 2.1  1.6 - 2.6 mg/dL Final    Phosphorus 11/27/2024 3.3  2.7 - 4.5 mg/dL Final    WBC 11/27/2024 7.44  3.90 - 12.70 K/uL Final    RBC 11/27/2024 4.60  4.00 - 5.40 M/uL Final    Hemoglobin 11/27/2024 13.4  12.0 - 16.0 g/dL Final    Hematocrit 11/27/2024 41.0  37.0 - 48.5 % Final    MCV 11/27/2024 89  82 - 98 fL Final    MCH 11/27/2024 29.1  27.0 - 31.0 pg Final    MCHC 11/27/2024 32.7  32.0 - 36.0 g/dL Final    RDW 11/27/2024 13.2  11.5 - 14.5 % Final    Platelets 11/27/2024 269  150 - 450 K/uL Final    MPV 11/27/2024 9.7  9.2 - 12.9 fL Final    Immature Granulocytes 11/27/2024 0.5  0.0 - 0.5 % Final    Gran # (ANC) 11/27/2024 5.0  1.8 - 7.7 K/uL Final    Immature Grans (Abs) 11/27/2024 0.04  0.00 - 0.04 K/uL Final    Lymph # 11/27/2024 1.4  1.0 - 4.8 K/uL Final    Mono # 11/27/2024 1.0  0.3 - 1.0 K/uL Final    Eos # 11/27/2024 0.0  0.0 - 0.5 K/uL Final    Baso # 11/27/2024 0.02  0.00 - 0.20 K/uL Final    nRBC 11/27/2024 0  0 /100 WBC Final    Gran % 11/27/2024 66.8  38.0 - 73.0 % Final    Lymph % 11/27/2024 19.0  18.0 - 48.0 % Final    Mono % 11/27/2024 12.9  4.0 - 15.0 % Final    Eosinophil % 11/27/2024 0.5  0.0 - 8.0 % Final    Basophil % 11/27/2024 0.3  0.0 - 1.9 % Final    Differential Method 11/27/2024 Automated   Final    Sodium 11/27/2024 138  136 - 145  mmol/L Final    Potassium 11/27/2024 3.9  3.5 - 5.1 mmol/L Final    Chloride 11/27/2024 110  95 - 110 mmol/L Final    CO2 11/27/2024 16 (L)  23 - 29 mmol/L Final    Glucose 11/27/2024 81  70 - 110 mg/dL Final    BUN 11/27/2024 13  6 - 20 mg/dL Final    Creatinine 11/27/2024 0.6  0.5 - 1.4 mg/dL Final    Calcium 11/27/2024 8.5 (L)  8.7 - 10.5 mg/dL Final    Total Protein 11/27/2024 7.2  6.0 - 8.4 g/dL Final    Albumin 11/27/2024 3.9  3.5 - 5.2 g/dL Final    Total Bilirubin 11/27/2024 0.4  0.1 - 1.0 mg/dL Final    Alkaline Phosphatase 11/27/2024 62  40 - 150 U/L Final    AST 11/27/2024 20  10 - 40 U/L Final    ALT 11/27/2024 26  10 - 44 U/L Final    eGFR 11/27/2024 >60  >60 mL/min/1.73 m^2 Final    Anion Gap 11/27/2024 12  8 - 16 mmol/L Final   Office Visit on 09/04/2024   Component Date Value Ref Range Status    Urine Culture, Routine 09/04/2024  (A)   Corrected                    Value:ESCHERICHIA COLI ESBL  50,000 - 99,999 cfu/ml     Hospital Outpatient Visit on 08/29/2024   Component Date Value Ref Range Status    Holter Hookup Date 08/29/2024 [id number]   Final    Holter Hookup Time 08/29/2024 [id number]   Final    Holter Study End Date 08/29/2024 [id number]   Final    Holter Study End Time 08/29/2024 [id number]   Final    Holter Scan Date 08/29/2024 [id number]   Final    Sinus min HR 08/29/2024 35  bpm Final    Sinus max hr 08/29/2024 147  bpm Final    Sinus avg hr 08/29/2024 91  bpm Final    Event Monitor Day 08/29/2024 6   Final    Holter length hours 08/29/2024 18   Final    holter length minutes 08/29/2024 0   Final    holter length dec hours 08/29/2024 162.00   Final   Clinical Support on 08/29/2024   Component Date Value Ref Range Status    LVOT stroke volume 08/29/2024 29.83  cm3 Final    LVIDd 08/29/2024 2.70 (A)  3.5 - 6.0 cm Final    LV Systolic Volume 08/29/2024 9.72  mL Final    LVIDs 08/29/2024 1.80 (A)  2.1 - 4.0 cm Final    LV Diastolic Volume 08/29/2024 27.02  mL Final    Left Ventricular End Systolic Volu*  08/29/2024 9.72  mL Final    Left Ventricular End Diastolic Vol* 08/29/2024 27.02  mL Final    IVS 08/29/2024 0.90  0.6 - 1.1 cm Final    LVOT diameter 08/29/2024 2.00  cm Final    LVOT area 08/29/2024 3.1  cm2 Final    FS 08/29/2024 33  28 - 44 % Final    Left Ventricle Relative Wall Thick* 08/29/2024 0.67  cm Final    PW 08/29/2024 0.90  0.6 - 1.1 cm Final    LV mass 08/29/2024 60.04  g Final    MV Peak E Levy 08/29/2024 0.84  m/s Final    TDI LATERAL 08/29/2024 0.15  m/s Final    TDI SEPTAL 08/29/2024 0.09  m/s Final    E/E' ratio 08/29/2024 7.00  m/s Final    MV Peak A Levy 08/29/2024 0.56  m/s Final    TR Max Levy 08/29/2024 2.22  m/s Final    E/A ratio 08/29/2024 1.50   Final    E wave deceleration time 08/29/2024 134.00  msec Final    LV SEPTAL E/E' RATIO 08/29/2024 9.33  m/s Final    LV LATERAL E/E' RATIO 08/29/2024 5.60  m/s Final    LVOT peak levy 08/29/2024 0.57  m/s Final    Left Ventricular Outflow Tract Rashmi* 08/29/2024 0.37  cm/s Final    Left Ventricular Outflow Tract Rashmi* 08/29/2024 1.00  mmHg Final    TAPSE 08/29/2024 1.06  cm Final    AV mean gradient 08/29/2024 2  mmHg Final    AV peak gradient 08/29/2024 4  mmHg Final    Ao peak levy 08/29/2024 1.00  m/s Final    Ao VTI 08/29/2024 15.30  cm Final    LVOT peak VTI 08/29/2024 9.50  cm Final    AV valve area 08/29/2024 1.95  cm² Final    AV Velocity Ratio 08/29/2024 0.57   Final    AV index (prosthetic) 08/29/2024 0.62   Final    KEIKO by Velocity Ratio 08/29/2024 1.79  cm² Final    MV mean gradient 08/29/2024 2  mmHg Final    MV peak gradient 08/29/2024 4  mmHg Final    MV stenosis pressure 1/2 time 08/29/2024 63.00  ms Final    MV valve area p 1/2 method 08/29/2024 3.49  cm2 Final    MV valve area by continuity eq 08/29/2024 1.42  cm2 Final    MV VTI 08/29/2024 21.0  cm Final    Triscuspid Valve Regurgitation Pea* 08/29/2024 20  mmHg Final    PV PEAK VELOCITY 08/29/2024 0.80  m/s Final    PV peak gradient 08/29/2024 3  mmHg Final    Pulmonary Valve Mean  Velocity 08/29/2024 0.54  m/s Final    Ao root annulus 08/29/2024 2.80  cm Final    Mean e' 08/29/2024 0.12  m/s Final    AORTIC VALVE CUSP SEPERATION 08/29/2024 1.60  cm Final    TV resting pulmonary artery pressu* 08/29/2024 23  mmHg Final    RV TB RVSP 08/29/2024 5  mmHg Final    Est. RA pres 08/29/2024 3  mmHg Final   Office Visit on 08/21/2024   Component Date Value Ref Range Status    QRS Duration 08/21/2024 80  ms Final    [facility] QTC Calculation 08/21/2024 428  ms Final   Admission on 07/09/2024, Discharged on 07/11/2024   Component Date Value Ref Range Status    Sodium 07/09/2024 136  136 - 145 mmol/L Final    Potassium 07/09/2024 3.3 (L)  3.5 - 5.1 mmol/L Final    Chloride 07/09/2024 103  95 - 110 mmol/L Final    CO2 07/09/2024 20 (L)  23 - 29 mmol/L Final    Glucose 07/09/2024 152 (H)  70 - 110 mg/dL Final    BUN 07/09/2024 16  6 - 20 mg/dL Final    Creatinine 07/09/2024 0.6  0.5 - 1.4 mg/dL Final    Calcium 07/09/2024 8.7  8.7 - 10.5 mg/dL Final    Total Protein 07/09/2024 8.3  6.0 - 8.4 g/dL Final    Albumin 07/09/2024 4.8  3.5 - 5.2 g/dL Final    Total Bilirubin 07/09/2024 0.3  0.1 - 1.0 mg/dL Final    Alkaline Phosphatase 07/09/2024 67  55 - 135 U/L Final    AST 07/09/2024 17  10 - 40 U/L Final    ALT 07/09/2024 19  10 - 44 U/L Final    eGFR 07/09/2024 >60.0  >60 mL/min/1.73 m^2 Final    Anion Gap 07/09/2024 13  8 - 16 mmol/L Final    HCG Quant 07/09/2024 <0.60  See Text mIU/mL Final    Specimen UA 07/10/2024 Urine, Clean Catch   Final    Color, UA 07/10/2024 Yellow  Yellow, Straw, Christina Final    Appearance, UA 07/10/2024 Hazy (A)  Clear Final    pH, UA 07/10/2024 7.0  5.0 - 8.0 Final    Specific Gravity, UA 07/10/2024 1.015  1.005 - 1.030 Final    Protein, UA 07/10/2024 Trace (A)  Negative Final    Glucose, UA 07/10/2024 Negative  Negative Final    Ketones, UA 07/10/2024 Negative  Negative Final    Bilirubin (UA) 07/10/2024 Negative  Negative Final    Occult Blood UA 07/10/2024 TRACE  Negative Final     Nitrite, UA 07/10/2024 Positive (A)  Negative Final    Urobilinogen, UA 07/10/2024 Negative  Negative EU/dL Final    Leukocytes, UA 07/10/2024 Negative  Negative Final    Blood Culture, Routine 07/09/2024 No growth after 5 days.   Final    Blood Culture, Routine 07/09/2024 No growth after 5 days.   Final    WBC 07/09/2024 11.63  3.90 - 12.70 K/uL Final    RBC 07/09/2024 5.10  4.00 - 5.40 M/uL Final    Hemoglobin 07/09/2024 14.8  12.0 - 16.0 g/dL Final    Hematocrit 07/09/2024 46.1  37.0 - 48.5 % Final    MCV 07/09/2024 90  82 - 98 fL Final    MCH 07/09/2024 29.0  27.0 - 31.0 pg Final    MCHC 07/09/2024 32.1  32.0 - 36.0 g/dL Final    RDW 07/09/2024 13.2  11.5 - 14.5 % Final    Platelets 07/09/2024 393  150 - 450 K/uL Final    MPV 07/09/2024 10.5  9.2 - 12.9 fL Final    Immature Granulocytes 07/09/2024 0.4  0.0 - 0.5 % Final    Gran # (ANC) 07/09/2024 6.2  1.8 - 7.7 K/uL Final    Immature Grans (Abs) 07/09/2024 0.05 (H)  0.00 - 0.04 K/uL Final    Lymph # 07/09/2024 4.5  1.0 - 4.8 K/uL Final    Mono # 07/09/2024 0.8  0.3 - 1.0 K/uL Final    Eos # 07/09/2024 0.1  0.0 - 0.5 K/uL Final    Baso # 07/09/2024 0.03  0.00 - 0.20 K/uL Final    nRBC 07/09/2024 0  0 /100 WBC Final    Gran % 07/09/2024 53.0  38.0 - 73.0 % Final    Lymph % 07/09/2024 38.8  18.0 - 48.0 % Final    Mono % 07/09/2024 6.6  4.0 - 15.0 % Final    Eosinophil % 07/09/2024 0.9  0.0 - 8.0 % Final    Basophil % 07/09/2024 0.3  0.0 - 1.9 % Final    Differential Method 07/09/2024 Automated   Final    Magnesium 07/09/2024 2.0  1.6 - 2.6 mg/dL Final    Phosphorus 07/09/2024 3.4  2.7 - 4.5 mg/dL Final    POC Lactate 07/09/2024 3.34 (H)  0.5 - 2.2 mmol/L Final    Sample 07/09/2024 VENOUS   Final    RBC, UA 07/10/2024 7 (H)  0 - 4 /hpf Final    WBC, UA 07/10/2024 68 (H)  0 - 5 /hpf Final    Bacteria 07/10/2024 Occasional  None-Occ /hpf Final    Squam Epithel, UA 07/10/2024 1  /hpf Final    Non-Squam Epith 07/10/2024 2 (A)  <1/hpf /hpf Final    Microscopic Comment  07/10/2024 SEE COMMENT   Final    Urine Culture, Routine 07/10/2024  (A)   Final                    Value:CITROBACTER FREUNDII  >100,000 cfu/ml      Lactate (Lactic Acid) 07/10/2024 4.1 (HH)  0.5 - 1.9 mmol/L Final    Sodium 07/10/2024 138  136 - 145 mmol/L Final    Potassium 07/10/2024 4.0  3.5 - 5.1 mmol/L Final    Chloride 07/10/2024 108  95 - 110 mmol/L Final    CO2 07/10/2024 20 (L)  23 - 29 mmol/L Final    Glucose 07/10/2024 137 (H)  70 - 110 mg/dL Final    BUN 07/10/2024 14  6 - 20 mg/dL Final    Creatinine 07/10/2024 0.5  0.5 - 1.4 mg/dL Final    Calcium 07/10/2024 7.7 (L)  8.7 - 10.5 mg/dL Final    Total Protein 07/10/2024 7.1  6.0 - 8.4 g/dL Final    Albumin 07/10/2024 4.1  3.5 - 5.2 g/dL Final    Total Bilirubin 07/10/2024 0.2  0.1 - 1.0 mg/dL Final    Alkaline Phosphatase 07/10/2024 55  55 - 135 U/L Final    AST 07/10/2024 17  10 - 40 U/L Final    ALT 07/10/2024 16  10 - 44 U/L Final    eGFR 07/10/2024 >60.0  >60 mL/min/1.73 m^2 Final    Anion Gap 07/10/2024 10  8 - 16 mmol/L Final    Magnesium 07/10/2024 1.8  1.6 - 2.6 mg/dL Final    WBC 07/10/2024 20.93 (H)  3.90 - 12.70 K/uL Final    RBC 07/10/2024 4.57  4.00 - 5.40 M/uL Final    Hemoglobin 07/10/2024 13.1  12.0 - 16.0 g/dL Final    Hematocrit 07/10/2024 40.9  37.0 - 48.5 % Final    MCV 07/10/2024 90  82 - 98 fL Final    MCH 07/10/2024 28.7  27.0 - 31.0 pg Final    MCHC 07/10/2024 32.0  32.0 - 36.0 g/dL Final    RDW 07/10/2024 13.2  11.5 - 14.5 % Final    Platelets 07/10/2024 276  150 - 450 K/uL Final    MPV 07/10/2024 10.0  9.2 - 12.9 fL Final    Immature Granulocytes 07/10/2024 0.5  0.0 - 0.5 % Final    Gran # (ANC) 07/10/2024 19.1 (H)  1.8 - 7.7 K/uL Final    Immature Grans (Abs) 07/10/2024 0.11 (H)  0.00 - 0.04 K/uL Final    Lymph # 07/10/2024 0.9 (L)  1.0 - 4.8 K/uL Final    Mono # 07/10/2024 0.8  0.3 - 1.0 K/uL Final    Eos # 07/10/2024 0.0  0.0 - 0.5 K/uL Final    Baso # 07/10/2024 0.02  0.00 - 0.20 K/uL Final    nRBC 07/10/2024 0  0 /100 WBC  Final    Gran % 07/10/2024 91.3 (H)  38.0 - 73.0 % Final    Lymph % 07/10/2024 4.4 (L)  18.0 - 48.0 % Final    Mono % 07/10/2024 3.7 (L)  4.0 - 15.0 % Final    Eosinophil % 07/10/2024 0.0  0.0 - 8.0 % Final    Basophil % 07/10/2024 0.1  0.0 - 1.9 % Final    Differential Method 07/10/2024 Automated   Final    Prothrombin Time 07/10/2024 11.2  9.0 - 12.5 sec Final    INR 07/10/2024 1.0  0.8 - 1.2 Final    aPTT 07/10/2024 25.9  21.0 - 32.0 sec Final    D-Dimer 07/10/2024 1.23 (HH)  0.00 - 0.49 mg/L FEU Final    Lactate (Lactic Acid) 07/10/2024 2.2 (HH)  0.5 - 1.9 mmol/L Final    Lactate (Lactic Acid) 07/10/2024 2.1 (HH)  0.5 - 1.9 mmol/L Final    Sodium 07/11/2024 136  136 - 145 mmol/L Final    Potassium 07/11/2024 4.1  3.5 - 5.1 mmol/L Final    Chloride 07/11/2024 108  95 - 110 mmol/L Final    CO2 07/11/2024 23  23 - 29 mmol/L Final    Glucose 07/11/2024 90  70 - 110 mg/dL Final    BUN 07/11/2024 8  6 - 20 mg/dL Final    Creatinine 07/11/2024 0.4 (L)  0.5 - 1.4 mg/dL Final    Calcium 07/11/2024 8.4 (L)  8.7 - 10.5 mg/dL Final    Total Protein 07/11/2024 7.0  6.0 - 8.4 g/dL Final    Albumin 07/11/2024 4.1  3.5 - 5.2 g/dL Final    Total Bilirubin 07/11/2024 0.4  0.1 - 1.0 mg/dL Final    Alkaline Phosphatase 07/11/2024 54 (L)  55 - 135 U/L Final    AST 07/11/2024 19  10 - 40 U/L Final    ALT 07/11/2024 18  10 - 44 U/L Final    eGFR 07/11/2024 >60.0  >60 mL/min/1.73 m^2 Final    Anion Gap 07/11/2024 5 (L)  8 - 16 mmol/L Final    Magnesium 07/11/2024 1.9  1.6 - 2.6 mg/dL Final    WBC 07/11/2024 11.54  3.90 - 12.70 K/uL Final    RBC 07/11/2024 5.07  4.00 - 5.40 M/uL Final    Hemoglobin 07/11/2024 14.5  12.0 - 16.0 g/dL Final    Hematocrit 07/11/2024 45.4  37.0 - 48.5 % Final    MCV 07/11/2024 90  82 - 98 fL Final    MCH 07/11/2024 28.6  27.0 - 31.0 pg Final    MCHC 07/11/2024 31.9 (L)  32.0 - 36.0 g/dL Final    RDW 07/11/2024 13.3  11.5 - 14.5 % Final    Platelets 07/11/2024 228  150 - 450 K/uL Final    MPV 07/11/2024 10.5   9.2 - 12.9 fL Final    Immature Granulocytes 07/11/2024 0.3  0.0 - 0.5 % Final    Gran # (ANC) 07/11/2024 8.2 (H)  1.8 - 7.7 K/uL Final    Immature Grans (Abs) 07/11/2024 0.04  0.00 - 0.04 K/uL Final    Lymph # 07/11/2024 2.4  1.0 - 4.8 K/uL Final    Mono # 07/11/2024 0.8  0.3 - 1.0 K/uL Final    Eos # 07/11/2024 0.1  0.0 - 0.5 K/uL Final    Baso # 07/11/2024 0.02  0.00 - 0.20 K/uL Final    nRBC 07/11/2024 0  0 /100 WBC Final    Gran % 07/11/2024 70.7  38.0 - 73.0 % Final    Lymph % 07/11/2024 21.0  18.0 - 48.0 % Final    Mono % 07/11/2024 7.3  4.0 - 15.0 % Final    Eosinophil % 07/11/2024 0.5  0.0 - 8.0 % Final    Basophil % 07/11/2024 0.2  0.0 - 1.9 % Final    Differential Method 07/11/2024 Automated   Final   Orders Only on 07/09/2024   Component Date Value Ref Range Status    QRS Duration 07/09/2024 86  ms Final    [facility] QTC Calculation 07/09/2024 407  ms Final       Past Medical History:   Diagnosis Date    Anemia     Arnold-Chiari malformation, type II     Encounter for blood transfusion     Epilepsy     Hydrocephalus     Neurogenic bladder     self catheterizes every 3 hours    Raynaud disease     Seizures     only w/ shunt malfunction    Spinal bifida, closed     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 Wheelchair bound.  Self catheterizes herself     Social Drivers of Health     Financial Resource Strain: Patient Declined (11/27/2024)    Overall Financial Resource Strain (CARDIA)     Difficulty of Paying Living Expenses: Patient declined   Food Insecurity: Patient Declined (11/27/2024)    Hunger Vital Sign     Worried About Running Out of Food in the  Last Year: Patient declined     Ran Out of Food in the Last Year: Patient declined   Transportation Needs: Patient Declined (11/27/2024)    TRANSPORTATION NEEDS     Transportation : Patient declined   Physical Activity: Inactive (3/6/2024)    Exercise Vital Sign     Days of Exercise per Week: 0 days     Minutes of Exercise per Session: 0 min   Stress: Patient Declined (11/27/2024)    [employer] [place] [employer] - Occupational Stress Questionnaire     Feeling of Stress : Patient declined   Housing Stability: Patient Declined (11/27/2024)    Housing Stability Vital Sign     Unable to Pay for Housing in the Last Year: Patient declined     Homeless in the Last Year: Patient declined     Past Surgical History:   Procedure Laterality Date    AMPUTATION      right 4th and 5th toes; left 5th toe and portion of left great toe    BACK SURGERY      BLADDER SURGERY      BREAST SURGERY  2015    Reduction    COLON SURGERY      flush site for bowel movements from appendix    CYSTOSTOMY      DEBRIDEMENT OF FOOT Right 1/8/2021    Procedure: DEBRIDEMENT, FOOT;  Surgeon: Abdi Bedoya DPM;  Location: [facility];  Service: Podiatry;  Laterality: Right;    EYE SURGERY      x 5    FLUOROSCOPIC URODYNAMIC STUDY N/A 3/12/2019    Procedure: URODYNAMIC STUDY, FLUOROSCOPIC;  Surgeon: Kenzie Charles MD;  Location: [address];  Service: Urology;  Laterality: N/A;  1 hour    FLUOROSCOPIC URODYNAMIC STUDY N/A 4/4/2019    Procedure: URODYNAMIC STUDY, FLUOROSCOPIC;  Surgeon: Kenzie Charles MD;  Location: [address];  Service: Urology;  Laterality: N/A;  1 hour    FLUOROSCOPIC URODYNAMIC STUDY N/A 5/11/2022    Procedure: URODYNAMIC STUDY, FLUOROSCOPIC;  Surgeon: Kenzie Charles MD;  Location: [address];  Service: Urology;  Laterality: N/A;  90min    FOOT AMPUTATION THROUGH METATARSAL Right 10/28/2019    Procedure: AMPUTATION, FOOT, TRANSMETATARSAL;  Surgeon: Abdi Bedoya DPM;  Location: [facility];   "Service: Podiatry;  Laterality: Right;    FOOT AMPUTATION THROUGH METATARSAL Right 3/27/2020    Procedure: AMPUTATION, FOOT, TRANSMETATARSAL;  Surgeon: Abdi Bedoya DPM;  Location: [facility];  Service: Podiatry;  Laterality: Right;  REVISION    MYELOMININGOCELE REPAIR      NEPHRECTOMY Right 11/4/2021    Procedure: Nephrectomy/ OPEN;  Surgeon: Dash Rosenthal MD;  Location: [facility] 2ND FLR;  Service: Urology;  Laterality: Right;  4HRS    NEPHROSTOMY Right 8/2/2021    Procedure: Nephrostomy and perinephric abscess drain;  Surgeon: Lillian Surgeon;  Location: [facility];  Service: Anesthesiology;  Laterality: Right;    REVISION OF VENTRICULOPERITONEAL SHUNT Left 9/21/2022    Procedure: REVISION, SHUNT, VENTRICULOPERITONEAL;  Surgeon: Maynor Calero MD;  Location: [facility] 2ND FLR;  Service: Neurosurgery;  Laterality: Left;    STRABISMUS SURGERY      ou dr peña    VENTRICULOPERITONEAL SHUNT      WOUND DEBRIDEMENT  3/27/2020    Procedure: DEBRIDEMENT, WOUND;  Surgeon: Abdi Bedoya DPM;  Location: [facility];  Service: Podiatry;;     Family History   Problem Relation Name Age of Onset    Arthritis Mother      Breast cancer Mother      Gout Father      Epilepsy Father      Myocarditis Sister      Cataracts Maternal Grandmother      Amblyopia Neg Hx      Blindness Neg Hx      Cancer Neg Hx      Diabetes Neg Hx      Glaucoma Neg Hx      Hypertension Neg Hx      Macular degeneration Neg Hx      Retinal detachment Neg Hx      Strabismus Neg Hx      Stroke Neg Hx      Thyroid disease Neg Hx         The CVD Risk score (D'Agostino, et al., 2008) failed to calculate for the following reasons:    The 2008 CVD risk score is only valid for ages 30 to 74    All of your core healthy metrics are met.      Review of patient's allergies indicates:   Allergen Reactions    Latex, natural rubber Anaphylaxis and Other (See Comments)     angioedema    Zofran [ondansetron hcl (pf)] Swelling     Hives, "throat closes up"    Gardasil  [h " papillomavirus vac,qval (pf)]      Other reaction(s): Shortness of breath    Menactra  [mening vac a,c,y,w135 dip (pf)]      Other reaction(s): Shortness of breath    Sulfa (sulfonamide antibiotics)     Tramadol Hives    Flu vaccine ts 2012-13(6 mos+) Nausea Only and Other (See Comments)     Seizures, sweaty hands, hot and cold flashes    Levaquin [levofloxacin] Rash     Spots on face    Macrobid [nitrofurantoin monohyd/m-cryst] Rash     Sppots/rash on face       Current Outpatient Medications:     aspirin/acetaminophen/caffeine (EXCEDRIN MIGRAINE ORAL), Take by mouth., Disp: , Rfl:     cefdinir (OMNICEF) 300 MG capsule, Take 1 capsule (300 mg total) by mouth 2 (two) times daily. for 10 days, Disp: 20 capsule, Rfl: 0    diazePAM (VALTOCO) 10 mg/spray (0.1 mL) Spry, 10 mg by Nasal route every 5 (five) minutes as needed (Administer 1 spray into 1 nostril; if a second dose is needed, administer  in alternate nostril. A second dose should not be administered if the  patient is having trouble breathing or excessive sedatio)., Disp: 1 each, Rfl: 3    EPINEPHrine (EPIPEN) 0.3 mg/0.3 mL AtIn, Inject 0.3 mLs (0.3 mg total) into the muscle as needed (Anaphylactic reaction)., Disp: 0.3 mL, Rfl: 0    ergocalciferol (ERGOCALCIFEROL) 50,000 unit Cap, Take 50,000 Units by mouth every 7 days., Disp: , Rfl:     ibuprofen (ADVIL,MOTRIN) 200 MG tablet, Take 200 mg by mouth every 6 (six) hours as needed for Pain., Disp: , Rfl:     lacosamide (VIMPAT) 100 mg Tab, Take 1 tablet (100 mg total) by mouth every 12 (twelve) hours., Disp: 60 tablet, Rfl: 0    levETIRAcetam (KEPPRA) 750 MG Tab, Take 2 tablets (1,500 mg total) by mouth 2 (two) times daily., Disp: 120 tablet, Rfl: 11    promethazine (PHENERGAN) 25 MG tablet, Take 25 mg by mouth every 6 (six) hours as needed., Disp: , Rfl:     Review of Systems   HENT:  Positive for sore throat.            Objective:      Vitals:    12/09/24 1342   BP: 106/78   Pulse: 110   SpO2: 98%   Height: 4'  "7" (1.397 m)     Physical Exam  Constitutional:       Appearance: She is well-groomed. She is not ill-appearing.      Comments: Pleasant and cooperative female, seated in her personal wheelchair, which is both manual and electric.     HENT:      Head: Atraumatic.      Nose: Nose normal.   Eyes:      General: No scleral icterus.  Cardiovascular:      Rate and Rhythm: Normal rate and regular rhythm.      Heart sounds: Normal heart sounds.   Pulmonary:      Effort: Pulmonary effort is normal.      Breath sounds: Normal breath sounds.   Abdominal:      Palpations: Abdomen is soft.   Musculoskeletal:         General: Deformity present. No swelling or tenderness.      Cervical back: Normal range of motion.        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 Has had previous amputations of toes and distal tarsals of both feel from previous osteomyelitis   Skin:     General: Skin is warm and dry.      Capillary Refill: Capillary refill takes less than 2 seconds.      Coloration: Skin is not pale.   Neurological:      Mental Status: She is alert. Mental status is at baseline.      Cranial Nerves: No dysarthria or facial asymmetry.      Motor: Atrophy and abnormal muscle tone present. No tremor or seizure activity.      Comments: Non ambulatory. Spina bifida of thoracic spine.    Psychiatric:         Mood and Affect: Mood normal.         Behavior: Behavior is cooperative.       Physical Exam            Assessment:       1. Hospital discharge follow-up    2. History of seizures    3. Urinary tract infection without hematuria, site unspecified         Plan:       Hospital discharge follow-up    History of seizures    Urinary tract infection without hematuria, site unspecified      Assessment & Plan    IMPRESSION:  - Assessed recent seizure episode, potentially related to flu vaccine or infectious process  - Evaluated ongoing UTI " management, noting persistent cultures despite antibiotic treatment  - Reviewed wound care progress for chronic ulcer, noting significant improvement  - Assessed overall condition, including recent hospital visit and ongoing care needs    EPILEPSY:  - Increased VIMPAT from 50 mg to 100 mg as per Dr. Smart's recommendation.  - Continued nasal spray for seizure management, noting 3 refills remaining before January expiration.  -Had conversation with Dr. More who approves my sending another 30 days of Vimpat 100mg BID until he is able to see her in clinic.    PRESSURE ULCER:  - Shashank to use compression socks to aid in circulation.  - Continued silver sulfadiazine, MetaHoney, and collagen for wound care via [facility].    NECK CONDITION:  - Shashank to continue using physical therapy band for neck exercises at home.    FOLLOW-UP AND APPOINTMENTS:  - Follow up for routine appointment in May.  - Contact the office if unable to secure neurology appointment by January.         Follow up if symptoms worsen or fail to improve.        This note was generated with the assistance of ambient listening technology. Verbal consent was obtained by the patient and accompanying visitor(s) for the recording of patient appointment to facilitate this note. I attest to having reviewed and edited the generated note for accuracy, though some syntax or spelling errors may persist. Please contact the author of this note for any clarification.      12/9/2024 Elisa Eng

## 2024-12-16 DIAGNOSIS — T78.2XXD ANAPHYLAXIS, SUBSEQUENT ENCOUNTER: ICD-10-CM

## 2024-12-16 DIAGNOSIS — R56.9 SEIZURES: ICD-10-CM

## 2024-12-16 RX ORDER — EPINEPHRINE 0.3 MG/.3ML
1 INJECTION SUBCUTANEOUS
Qty: 0.3 ML | Refills: 0 | Status: SHIPPED | OUTPATIENT
Start: 2024-12-16

## 2025-01-07 DIAGNOSIS — G40.909 RECURRENT SEIZURES: ICD-10-CM

## 2025-01-07 RX ORDER — LACOSAMIDE 100 MG/1
100 TABLET ORAL EVERY 12 HOURS
Qty: 60 TABLET | Refills: 0 | Status: SHIPPED | OUTPATIENT
Start: 2025-01-07 | End: 2025-02-06

## 2025-01-13 RX ORDER — DIAZEPAM 10 MG/100UL
10 SPRAY NASAL EVERY 5 MIN PRN
Qty: 1 EACH | Refills: 3 | Status: SHIPPED | OUTPATIENT
Start: 2025-01-13

## 2025-02-04 DIAGNOSIS — G40.909 RECURRENT SEIZURES: ICD-10-CM

## 2025-02-04 RX ORDER — LACOSAMIDE 100 MG/1
100 TABLET ORAL EVERY 12 HOURS
Qty: 60 TABLET | Refills: 0 | Status: CANCELLED | OUTPATIENT
Start: 2025-02-04 | End: 2025-03-06

## 2025-02-04 RX ORDER — LACOSAMIDE 100 MG/1
100 TABLET ORAL EVERY 12 HOURS
Qty: 60 TABLET | Refills: 0 | Status: SHIPPED | OUTPATIENT
Start: 2025-02-04 | End: 2025-03-06

## 2025-02-04 NOTE — TELEPHONE ENCOUNTER
Informed Elisa. States she will send medication in.     Informed pt's mom, Kayleen. She voiced under understanding.

## 2025-02-04 NOTE — TELEPHONE ENCOUNTER
Spoke with pt's mom who states she spoke with her neurologists office and told them about refill request. They states that because the medication was changed by the hospital they wouldn't be able to fill it until pt se her neurologist on the 19th. She informed them that pt only has 2 days worth and they states the MD was out of town at the moment. Was wondering if they could get a refill from us due to that.

## 2025-02-12 ENCOUNTER — TELEPHONE (OUTPATIENT)
Dept: NEUROLOGY | Facility: CLINIC | Age: 30
End: 2025-02-12
Payer: COMMERCIAL

## 2025-02-12 ENCOUNTER — TELEPHONE (OUTPATIENT)
Facility: CLINIC | Age: 30
End: 2025-02-12
Payer: COMMERCIAL

## 2025-02-12 DIAGNOSIS — G40.309 NONINTRACTABLE GENERALIZED IDIOPATHIC EPILEPSY WITHOUT STATUS EPILEPTICUS: ICD-10-CM

## 2025-02-12 DIAGNOSIS — R56.9 SEIZURES: ICD-10-CM

## 2025-02-12 RX ORDER — DIAZEPAM 10 MG/100UL
10 SPRAY NASAL EVERY 5 MIN PRN
Qty: 1 EACH | Refills: 3 | Status: SHIPPED | OUTPATIENT
Start: 2025-02-12

## 2025-02-12 RX ORDER — LEVETIRACETAM 750 MG/1
1500 TABLET ORAL 2 TIMES DAILY
Qty: 360 TABLET | Refills: 2 | Status: SHIPPED | OUTPATIENT
Start: 2025-02-12 | End: 2025-11-09

## 2025-02-12 NOTE — TELEPHONE ENCOUNTER
----- Message from [employer] sent at 2/12/2025  4:05 PM CST -----  Type:  Needs Medical Advice    Who Called: pt mom Kayleen    Would the patient rather a call back or a response via [facility]? Call back     Best Call Back Number: [phone]     Additional Information: pt mom st she is running out of her seizures meds levETIRAcetam (KEPPRA) 750 MG & diazePAM (VALTOCO) 10 mg/spray (0.1 mL) Spry. Pt doct went on paternity leave. Pt needs to be seen asap! It was recommened for her to be seen at [facility]. please call to discuss

## 2025-02-12 NOTE — TELEPHONE ENCOUNTER
Spoke with patients mother and scheduled for first available with Dr. Tovar on 8/28/25. Patients mother states patient is going to run out of her medications. Advised Dr. Tovar is not able to fill until patient is seen. Will send message to main campus to see if there is anyone refilling medications since provider is leaving.

## 2025-03-10 ENCOUNTER — TELEPHONE (OUTPATIENT)
Facility: CLINIC | Age: 30
End: 2025-03-10
Payer: COMMERCIAL

## 2025-03-10 NOTE — TELEPHONE ENCOUNTER
Returned call from patient's mother to schedule virtual follow up appointment with Arabella, patient scheduled 3/21/25 at 9:00 am.

## 2025-03-21 ENCOUNTER — OFFICE VISIT (OUTPATIENT)
Dept: NEUROLOGY | Facility: CLINIC | Age: 30
End: 2025-03-21
Payer: COMMERCIAL

## 2025-03-21 ENCOUNTER — PATIENT MESSAGE (OUTPATIENT)
Dept: NEUROLOGY | Facility: CLINIC | Age: 30
End: 2025-03-21

## 2025-03-21 DIAGNOSIS — G40.309 NONINTRACTABLE GENERALIZED IDIOPATHIC EPILEPSY WITHOUT STATUS EPILEPTICUS: Primary | ICD-10-CM

## 2025-03-21 DIAGNOSIS — G40.909 RECURRENT SEIZURES: ICD-10-CM

## 2025-03-21 PROCEDURE — 98006 SYNCH AUDIO-VIDEO EST MOD 30: CPT | Mod: 95,,,

## 2025-03-21 RX ORDER — LACOSAMIDE 100 MG/1
100 TABLET ORAL EVERY 12 HOURS
Qty: 60 TABLET | Refills: 5 | Status: SHIPPED | OUTPATIENT
Start: 2025-03-21 | End: 2025-09-17

## 2025-03-21 RX ORDER — LEVETIRACETAM 750 MG/1
1500 TABLET ORAL 2 TIMES DAILY
Qty: 360 TABLET | Refills: 3 | Status: SHIPPED | OUTPATIENT
Start: 2025-03-21 | End: 2026-03-16

## 2025-03-21 NOTE — PROGRESS NOTES
[facility] - NEUROLOGY [address]TH [address] [facility], [place]    Date: 3/21/25  Patient Name: Shashank Samuel   MRN: [id number]   PCP: Dash Ponce  Referring Provider:     The patient location is: Home  The chief complaint leading to consultation is: Epilepsy  Visit type: Virtual visit with synchronous audio and video  Total time spent with patient: 21 minutes  Each patient to whom he or she provides medical services by telemedicine is:  (1) informed of the relationship between the physician and patient and the respective role of any other health care provider with respect to management of the patient; and (2) notified that he or she may decline to receive medical services by telemedicine and may withdraw from such care at any time.    Assessment:   Shashank Samuel is a 29 y.o. female presenting in follow up for epilepsy. Seizures are relatively well controlled on lacosamide 100mg BID and levetiracetam 1500mg BID. Continue medications at same doses for now, refills provided. Intranasal diazepam PRN for rescue. Levels next visit. F/u in 3 months or sooner with issues. Pt and family are comfortable with plan. All questions and concerns are addressed at this time.   Plan:     Problem List Items Addressed This Visit          Neuro    Nonintractable generalized idiopathic epilepsy without status epilepticus - Primary    Overview   On medication         Relevant Medications    levETIRAcetam (KEPPRA) 750 MG Tab    Recurrent seizures    Relevant Medications    lacosamide (VIMPAT) 100 mg Tab     Felicita Arnold PA-C   Neurology-Epilepsy  [facility]    Collaborating physician, Dr. Nathan Rivers, was available during today's encounter.     I spent approximately 34 minutes on the day of this encounter preparing to see the patient, obtaining and reviewing history and results, performing a medically appropriate exam, counseling and educating the patient/family/caregiver,  documenting clinical information, coordinating care, and ordering medications, tests, procedures, and referrals.    Subjective:   Interval Events/ROS 3/21/2025:    Current ASM/SEs:   Levetiracetam 1500mg BID   Lacosamide 100mg BID   Breakthrough seizures/events: last sz Feb 1st -> believes this was a bad reaction to flu shot, otherwise things have been ok  Driving: no    Slight headaches. Only has one kidney. Otherwise, no fever, no cold symptoms, no new weakness, no chest pain, no shortness of breath, no nausea, no vomiting, no diarrhea, no constipation, no tingling/numbness, no problems walking.    Recent Labs   Lab 09/20/22 1949 01/31/24  0332 04/19/24 1835 11/26/24 1839   Levetiracetam Lvl 32.2 37.2 75.3 H 78.5 H   Lacosamide  --   --   --  3.5       INTERVAL 4/15/2024  Patient has had no further seizures since adjustment of her medications. She is primarily here for renewal of her medications.  She has had one headache lasting 2 hours, another lasting 15 minutes. Otherwise, patient is stable. ntranasal seizure abortive diazepam ordered as nayzilam not covered by insurance    INTERVAL HISTORY 3/6/2024  Today, her main issue is that of headaches and for a refill of seizure medications. Her headaches usually last 10-15 minutes, and she has 4-5 per week. They are throbbing / pressure like, most often in the posterior of the head, but can be frontal and retroorbital at times. This began 4-5 months ago, mostly associated with unilateral or bilateral blurry vision that can last for an hour beyond the resolution of her headaches. They also include some visual aura.    At this time, she has elected to not escalate to migraine preventatives; will begin with vitamin B2, coenzyme Q10.    Interval 9/28/2023  follow up of seizure-like activity. Patient's did not have further seizure events after being started on Keppra during her last visit 8/17/2022, until a significant episode in which she was found unconscious with  signs of cyanosis on 9/20/2022. Mom reports that patient went to the bathroom at 3:15pm, was found at 3:27 p.m. with Shashank in the bathroom leaning against the edge of the tub with her neck in a very abnormal position and she noticed the right side of her head and neck was blue.  They reported she was not responsive and making gurgling respirations.  They did not see seizure-like activity.  She had gone to the bathroom on her own.  They carried her out into the living room.  He reports she did not return to baseline during that time with EMS EMS reported that she remain postictal with them.  On arrival to the ER she had a seizure while in the ambulance stretcher day the lasted more than 2 minutes.  Patient sats remained 100% throughout this time heart rate was in the 120s to 130s.  Patient was immediately brought to a room for treatment. Family reports she has never had a seizure like this in the past. Per patient's mom, patient was intubated and placed in an induced coma to help break the seizures at approximately 8:30 pm that evening.    She was found to have a malfunctioning shunt valve which was replaced during that hospitalization, and she has had no further events since discharge. Patient's seizure episode is most likely related to the shunt malfunction. However, in consultation with family, a joint decision was made to increase the dose slightly to 750mg BID from 500mg BID and follow up in 3 months.    HPI:   Ms. Shashank Samuel is a 29 y.o. female presenting with seizure-like activity of new onset (though she does have a history of seizures in the past). 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    Type 1: First seizure event started at the age of 5, 2/2 to a reported shunt malfunction. Since then, she had episodes twice a year until the age of 10 at which point her seizures stopped. She had a repeat single seizure episode after a shunt malfunction in 2012. These episodes were associated with an abnormal R hand claw gesture as well as a 1 Hz rhythmic elbow extension.    New Type 2: She hasn't had any other seizure events since then until March 2022, since which she has had 5 episodes, lasting up to 30 seconds. In this latest episode, the patient's R arm dropped, followed by vocal grunts. Mom has a video of the event. Patient appears to have a staring spell with eyes fixed in position. These new episodes are always followed by post-ictal cries, and proceeded by hunger. Patient has been drinking a lot of water recently.    AEDs: Patient has not been on any anti-epileptics since 2020, before which she was on Keppra 500 BID.      PAST MEDICAL HISTORY:  Past Medical History:   Diagnosis Date    Anemia     Arnold-Chiari malformation, type II     Encounter for blood transfusion     Epilepsy     Hydrocephalus     Neurogenic bladder     self catheterizes every 3 hours    Raynaud disease     Seizures     only w/ shunt malfunction    Spinal bifida, closed     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       PAST SURGICAL HISTORY:  Past Surgical History:   Procedure Laterality Date    AMPUTATION      right 4th and 5th toes; left 5th toe and portion of left great toe    BACK SURGERY      BLADDER SURGERY      BREAST SURGERY  2015    Reduction    COLON SURGERY      flush site for bowel movements from appendix    CYSTOSTOMY      DEBRIDEMENT OF FOOT Right 1/8/2021    Procedure: DEBRIDEMENT, FOOT;  Surgeon: Abdi Bedoya DPM;  Location: [facility];  Service: Podiatry;  Laterality: Right;    EYE SURGERY      x 5    FLUOROSCOPIC URODYNAMIC  STUDY N/A 3/12/2019    Procedure: URODYNAMIC STUDY, FLUOROSCOPIC;  Surgeon: Kenzie Charles MD;  Location: [facility] OR 1ST FLR;  Service: Urology;  Laterality: N/A;  1 hour    FLUOROSCOPIC URODYNAMIC STUDY N/A 4/4/2019    Procedure: URODYNAMIC STUDY, FLUOROSCOPIC;  Surgeon: Kenzie Charles MD;  Location: [facility] OR 1ST FLR;  Service: Urology;  Laterality: N/A;  1 hour    FLUOROSCOPIC URODYNAMIC STUDY N/A 5/11/2022    Procedure: URODYNAMIC STUDY, FLUOROSCOPIC;  Surgeon: Kenzie Charles MD;  Location: [facility] OR 1ST FLR;  Service: Urology;  Laterality: N/A;  90min    FOOT AMPUTATION THROUGH METATARSAL Right 10/28/2019    Procedure: AMPUTATION, FOOT, TRANSMETATARSAL;  Surgeon: Abdi Bedoya DPM;  Location: [facility];  Service: Podiatry;  Laterality: Right;    FOOT AMPUTATION THROUGH METATARSAL Right 3/27/2020    Procedure: AMPUTATION, FOOT, TRANSMETATARSAL;  Surgeon: Abdi Bedoya DPM;  Location: [facility];  Service: Podiatry;  Laterality: Right;  REVISION    MYELOMININGOCELE REPAIR      NEPHRECTOMY Right 11/4/2021    Procedure: Nephrectomy/ OPEN;  Surgeon: Dash Rosenthal MD;  Location: [facility] OR 2ND FLR;  Service: Urology;  Laterality: Right;  4HRS    NEPHROSTOMY Right 8/2/2021    Procedure: Nephrostomy and perinephric abscess drain;  Surgeon: Lillian Surgeon;  Location: [facility];  Service: Anesthesiology;  Laterality: Right;    REVISION OF VENTRICULOPERITONEAL SHUNT Left 9/21/2022    Procedure: REVISION, SHUNT, VENTRICULOPERITONEAL;  Surgeon: Maynor Calero MD;  Location: [facility] OR 2ND FLR;  Service: Neurosurgery;  Laterality: Left;    STRABISMUS SURGERY      ou dr peña    VENTRICULOPERITONEAL SHUNT      WOUND DEBRIDEMENT  3/27/2020    Procedure: DEBRIDEMENT, WOUND;  Surgeon: Abdi Bedoya DPM;  Location: [facility] OR;  Service: Podiatry;;       CURRENT MEDS:  Current Outpatient Medications   Medication Sig Dispense Refill    aspirin/acetaminophen/caffeine (EXCEDRIN MIGRAINE ORAL) Take by mouth.      diazePAM  "(VALTOCO) 10 mg/spray (0.1 mL) Spry 10 mg by Nasal route every 5 (five) minutes as needed (Administer 1 spray into 1 nostril; if a second dose is needed, administer  in alternate nostril. A second dose should not be administered if the  patient is having trouble breathing or excessive sedatio). 1 each 3    EPINEPHrine (EPIPEN) 0.3 mg/0.3 mL AtIn Inject 0.3 mLs (0.3 mg total) into the muscle as needed (Anaphylactic reaction). 0.3 mL 0    ergocalciferol (ERGOCALCIFEROL) 50,000 unit Cap Take 50,000 Units by mouth every 7 days.      ibuprofen (ADVIL,MOTRIN) 200 MG tablet Take 200 mg by mouth every 6 (six) hours as needed for Pain.      lacosamide (VIMPAT) 100 mg Tab Take 1 tablet (100 mg total) by mouth every 12 (twelve) hours. 60 tablet 0    levETIRAcetam (KEPPRA) 750 MG Tab Take 2 tablets (1,500 mg total) by mouth 2 (two) times daily. 360 tablet 2    promethazine (PHENERGAN) 25 MG tablet Take 25 mg by mouth every 6 (six) hours as needed.       No current facility-administered medications for this visit.       ALLERGIES:  Review of patient's allergies indicates:   Allergen Reactions    Latex, natural rubber Anaphylaxis and Other (See Comments)     angioedema    Zofran [ondansetron hcl (pf)] Swelling     Hives, "throat closes up"    Gardasil  [h papillomavirus vac,qval (pf)]      Other reaction(s): Shortness of breath    Menactra  [mening vac a,c,y,w135 dip (pf)]      Other reaction(s): Shortness of breath    Sulfa (sulfonamide antibiotics)     Tramadol Hives    Flu vaccine ts 2012-13(6 mos+) Nausea Only and Other (See Comments)     Seizures, sweaty hands, hot and cold flashes    Levaquin [levofloxacin] Rash     Spots on face    Macrobid [nitrofurantoin monohyd/m-cryst] Rash     Sppots/rash on face       FAMILY HISTORY:  Family History   Problem Relation Name Age of Onset    Arthritis Mother      Breast cancer Mother      Gout Father      Epilepsy Father      Myocarditis Sister      Cataracts Maternal Grandmother      " Amblyopia Neg Hx      Blindness Neg Hx      Cancer Neg Hx      Diabetes Neg Hx      Glaucoma Neg Hx      Hypertension Neg Hx      Macular degeneration Neg Hx      Retinal detachment Neg Hx      Strabismus Neg Hx      Stroke Neg Hx      Thyroid disease Neg Hx         SOCIAL HISTORY:  Social History     Tobacco Use    Smoking status: Never     Passive exposure: Never    Smokeless tobacco: Never   Substance Use Topics    Alcohol use: Not Currently    Drug use: Never       Review of Systems:  12 system review of systems is negative except for the symptoms mentioned in HPI.     Objective:     There were no vitals filed for this visit.    General: NAD, well nourished   Eyes: no tearing, discharge, no erythema   ENT: moist mucous membranes of the oral cavity, nares patent    Neck: free range of motion  Cardiovascular: well perfused  Lungs: Normal work of breathing, normal chest wall excursions  Skin: No rash, lesions, or breakdown on exposed skin  Psychiatry: Mood and affect are appropriate   Abdomen: non distended  Extremeties: No cyanosis, clubbing or edema visible.    Neurological   MENTAL STATUS: Alert and oriented to person, place, and time. Attention and concentration within normal limits. Speech without dysarthria, able to name and repeat without difficulty. Recent and remote memory within normal limits   CRANIAL NERVES: EOMI (decrased in range 2/2 eye surguries). Face symmetrical. Hearing grossly intact. Full shoulder shrug bilaterally. Tongue protrudes midline   SENSORY: unable to assess   MOTOR: Normal bulk and tone in UE. No pronator drift.  Prior exam: 5/5 deltoid, biceps, triceps, interosseous, hand  bilaterally. 0/5 in LE  CEREBELLAR/COORDINATION/GAIT: remains seated. Finger to nose intact. Normal rapid alternating movements.

## 2025-04-08 DIAGNOSIS — M41.9 SCOLIOSIS, UNSPECIFIED SCOLIOSIS TYPE, UNSPECIFIED SPINAL REGION: Primary | ICD-10-CM

## 2025-04-08 RX ORDER — LIDOCAINE 50 MG/G
1 PATCH TOPICAL DAILY
Qty: 30 PATCH | Refills: 2 | Status: SHIPPED | OUTPATIENT
Start: 2025-04-08

## 2025-04-22 ENCOUNTER — OFFICE VISIT (OUTPATIENT)
Dept: UROLOGY | Facility: CLINIC | Age: 30
End: 2025-04-22
Payer: COMMERCIAL

## 2025-04-22 VITALS
BODY MASS INDEX: 30.03 KG/M2 | WEIGHT: 129.19 LBS | HEART RATE: 77 BPM | DIASTOLIC BLOOD PRESSURE: 91 MMHG | SYSTOLIC BLOOD PRESSURE: 131 MMHG

## 2025-04-22 DIAGNOSIS — N30.90 BLADDER INFECTION: Primary | ICD-10-CM

## 2025-04-22 DIAGNOSIS — N31.9 NEUROGENIC BLADDER: ICD-10-CM

## 2025-04-22 DIAGNOSIS — Z78.9 INTERMITTENT SELF-CATHETERIZATION OF BLADDER: ICD-10-CM

## 2025-04-22 DIAGNOSIS — Q05.2 SPINA BIFIDA OF LUMBAR REGION WITH HYDROCEPHALUS: ICD-10-CM

## 2025-04-22 PROCEDURE — 3008F BODY MASS INDEX DOCD: CPT | Mod: CPTII,S$GLB,, | Performed by: UROLOGY

## 2025-04-22 PROCEDURE — 99999 PR PBB SHADOW E&M-EST. PATIENT-LVL III: CPT | Mod: PBBFAC,,, | Performed by: UROLOGY

## 2025-04-22 PROCEDURE — 1159F MED LIST DOCD IN RCRD: CPT | Mod: CPTII,S$GLB,, | Performed by: UROLOGY

## 2025-04-22 PROCEDURE — 87086 URINE CULTURE/COLONY COUNT: CPT | Performed by: UROLOGY

## 2025-04-22 PROCEDURE — G2211 COMPLEX E/M VISIT ADD ON: HCPCS | Mod: S$GLB,,, | Performed by: UROLOGY

## 2025-04-22 PROCEDURE — 3080F DIAST BP >= 90 MM HG: CPT | Mod: CPTII,S$GLB,, | Performed by: UROLOGY

## 2025-04-22 PROCEDURE — 3075F SYST BP GE 130 - 139MM HG: CPT | Mod: CPTII,S$GLB,, | Performed by: UROLOGY

## 2025-04-22 PROCEDURE — 99214 OFFICE O/P EST MOD 30 MIN: CPT | Mod: S$GLB,,, | Performed by: UROLOGY

## 2025-04-22 RX ORDER — CEFPODOXIME PROXETIL 100 MG/1
100 TABLET, FILM COATED ORAL 2 TIMES DAILY
Qty: 14 TABLET | Refills: 0 | Status: SHIPPED | OUTPATIENT
Start: 2025-04-22

## 2025-04-22 NOTE — PROGRESS NOTES
"History of Present Illness    CHIEF COMPLAINT:  Ms. Samuel presents today for follow up with concern for urinary tract infection    GENITOURINARY:  She obtained a catheterized specimen for culture this morning due to concerns of a urinary tract infection. She performs intermittent catheterization 6 times daily with good hand hygiene practices. She has decreased her fluid intake as it was excessive in the past.  Her mother states she drinks copious fluids before bedtime, however, and in the am, her catheterized volume is 1500 ml.  Generally, the catheterized volume is between 300 and 400 ml.  Shashank irrigates her bladder once a day as she has an augmentation cystoplasty with a Mitrofanoff.     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    She has "graduated" from wound care as the pressure sore on her buttocks has healed.  Her sister accompanied her to her wound care appointments and was included in the picture.     ROS:  General: -fever, -chills, -fatigue, -weight gain, -weight loss  Eyes: -vision changes, -redness, -discharge  ENT: -ear pain, -nasal congestion, -sore throat  Cardiovascular: -chest pain, -palpitations, -lower extremity edema  Respiratory: -cough, -shortness of breath  Gastrointestinal: -abdominal pain, -nausea, -vomiting, -diarrhea, -constipation, -blood in stool  Genitourinary: -dysuria, -hematuria, -frequency  Musculoskeletal: -joint pain, -muscle pain  Skin: -rash, -lesion  Neurological: -headache, -dizziness, -numbness, " -tingling  Psychiatric: -anxiety, -depression, -sleep difficulty       Past Medical History:   Diagnosis Date    Anemia     Arnold-Chiari malformation, type II     Encounter for blood transfusion     Epilepsy     Hydrocephalus     Neurogenic bladder     self catheterizes every 3 hours    Raynaud disease     Seizures     only w/ shunt malfunction    Spinal bifida, closed     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       Past Surgical History:   Procedure Laterality Date    AMPUTATION      right 4th and 5th toes; left 5th toe and portion of left great toe    BACK SURGERY      BLADDER SURGERY      BREAST SURGERY  2015    Reduction    COLON SURGERY      flush site for bowel movements from appendix    CYSTOSTOMY      DEBRIDEMENT OF FOOT Right 1/8/2021    Procedure: DEBRIDEMENT, FOOT;  Surgeon: Abdi Bedoya DPM;  Location: [facility];  Service: Podiatry;  Laterality: Right;    EYE SURGERY      x 5    FLUOROSCOPIC URODYNAMIC STUDY N/A 3/12/2019    Procedure: URODYNAMIC STUDY, FLUOROSCOPIC;  Surgeon: Kenzie Charles MD;  Location: [facility] OR [address] [place] [address];  Service: Urology;  Laterality: N/A;  1 hour    FLUOROSCOPIC URODYNAMIC STUDY N/A 4/4/2019    Procedure: URODYNAMIC STUDY, FLUOROSCOPIC;  Surgeon: Kenzie Charles MD;  Location: [facility] OR [address] [place] [address];  Service: Urology;  Laterality: N/A;  1 hour    FLUOROSCOPIC URODYNAMIC STUDY N/A 5/11/2022    Procedure: URODYNAMIC STUDY, FLUOROSCOPIC;  Surgeon: Kenzie Charles MD;  Location: [facility] OR [address] [place] [address];  Service: Urology;  Laterality: N/A;  90min    FOOT AMPUTATION THROUGH METATARSAL Right 10/28/2019    Procedure: AMPUTATION, FOOT, TRANSMETATARSAL;  Surgeon: Abdi Bedoya DPM;  Location: [facility];  Service: Podiatry;  Laterality: Right;    FOOT AMPUTATION THROUGH METATARSAL Right 3/27/2020    Procedure: AMPUTATION, FOOT, TRANSMETATARSAL;  Surgeon: Abdi Bedoya DPM;  Location: [facility];  Service: Podiatry;  Laterality: Right;  REVISION    MYELOMININGOCELE REPAIR      NEPHRECTOMY Right 11/4/2021     Procedure: Nephrectomy/ OPEN;  Surgeon: Dash Rosenthal MD;  Location: [address] [facility];  Service: Urology;  Laterality: Right;  4HRS    NEPHROSTOMY Right 8/2/2021    Procedure: Nephrostomy and perinephric abscess drain;  Surgeon: Lillian Surgeon;  Location: [facility];  Service: Anesthesiology;  Laterality: Right;    REVISION OF VENTRICULOPERITONEAL SHUNT Left 9/21/2022    Procedure: REVISION, SHUNT, VENTRICULOPERITONEAL;  Surgeon: Maynor Calero MD;  Location: [facility] OR 2ND FLR;  Service: Neurosurgery;  Laterality: Left;    STRABISMUS SURGERY      ou dr peña    VENTRICULOPERITONEAL SHUNT      WOUND DEBRIDEMENT  3/27/2020    Procedure: DEBRIDEMENT, WOUND;  Surgeon: Abdi Bedoya DPM;  Location: [facility] OR;  Service: Podiatry;;       Family History   Problem Relation Name Age of Onset    Arthritis Mother      Breast cancer Mother      Gout Father      Epilepsy Father      Myocarditis Sister      Cataracts Maternal Grandmother       Social History[1]    Allergies:  Latex, natural rubber; Zofran [ondansetron hcl (pf)]; Gardasil  [h papillomavirus vac,qval (pf)]; Menactra  [mening vac a,c,y,w135 dip (pf)]; Sulfa (sulfonamide antibiotics); Tramadol; Flu vaccine ts 2012-13(6 mos+); Levaquin [levofloxacin]; and Macrobid [nitrofurantoin monohyd/m-cryst]    Medications:  Encounter Medications[2]    PHYSICAL EXAMINATION:    The patient generally appears in good health, is appropriately interactive, and is in no apparent distress.    Skin: No lesions.    Mental: Cooperative with normal affect.    Neuro: Grossly intact.    HEENT: Normal. No evidence of lymphadenopathy.    Chest:  normal inspiratory effort.    Abdomen: Soft, non-tender. No masses or organomegaly. Bladder is not palpable. No evidence of flank discomfort.  She has a well healed sub costal incision on the right side.     Extremities: No clubbing, cyanosis, or edema      LABS:    Lab Results   Component Value Date    BUN 13 11/27/2024    CREATININE 0.6 11/27/2024        Assessment & Plan    Q05.8 Sacral spina bifida without hydrocephalus  Z90.5 Acquired absence of kidney  N39.0 Urinary tract infection, site not specified  Z93.51 Cutaneous-vesicostomy status    IMPRESSION:  - 29-year-old woman with spina bifida, neurogenic bladder, status post augmentation cystoplasty, and right nephrectomy for non-functioning kidney with renal abscess.  - Performs intermittent catheterization through Mitrofanoff  - Recent pressure sore on bottom has healed.    URINARY TRACT INFECTION:  - Ordered urine culture for possible UTI.  - Vantin sent, notified her mother. (Was done after the visit)    CUTANEOUS-VESICOSTOMY STATUS:  - Ms. Samuel to continue intermittent catheterization through abdominal stoma 6 times daily.    FOLLOW-UP:  - Follow up in 1 year.       I spent a total of 30 minutes on the day of the visit.  This includes face to face time and non-face to face time preparing to see the patient (eg, review of tests), obtaining and/or reviewing separately obtained history, documenting clinical information in the electronic or other health record, independently interpreting results and communicating results to the patient/family/caregiver, or care coordinator.    Visit complexity today is associated with medical care services that are part of the ongoing care related to the single serious and/or complex condition of Recurrent urinary tract infections ([place]) and neurogenic bladder from spina bifida . A longitudinal relationship exists or is being developed between the patient and this practitioner for the care of this condition.       This note was generated with the assistance of ambient listening technology. Verbal consent was obtained by the patient and accompanying visitor(s) for the recording of patient appointment to facilitate this note. I attest to having reviewed and edited the generated note for accuracy, though some syntax or spelling errors may persist. Please contact the author  of this note for any clarification.             [1]   Social History  Socioeconomic History    Marital status: Single    Number of children: 0   Occupational History    Occupation: disabled   Tobacco Use    Smoking status: Never     Passive exposure: Never    Smokeless tobacco: Never   Substance and Sexual Activity    Alcohol use: Not Currently    Drug use: Never    Sexual activity: Never   Social History Narrative    Intact family. Wheelchair bound.  Self catheterizes herself     Social Drivers of Health     Financial Resource Strain: Patient Declined (11/27/2024)    Overall Financial Resource Strain (CARDIA)     Difficulty of Paying Living Expenses: Patient declined   Food Insecurity: Patient Declined (11/27/2024)    Hunger Vital Sign     Worried About Running Out of Food in the Last Year: Patient declined     Ran Out of Food in the Last Year: Patient declined   Transportation Needs: Patient Declined (11/27/2024)    TRANSPORTATION NEEDS     Transportation : Patient declined   Physical Activity: Inactive (3/6/2024)    Exercise Vital Sign     Days of Exercise per Week: 0 days     Minutes of Exercise per Session: 0 min   Stress: Patient Declined (11/27/2024)    [employer] - Occupational Stress Questionnaire     Feeling of Stress : Patient declined   Housing Stability: Patient Declined (11/27/2024)    Housing Stability Vital Sign     Unable to Pay for Housing in the Last Year: Patient declined     Homeless in the Last Year: Patient declined   [2]   Outpatient Encounter Medications as of 4/22/2025   Medication Sig Dispense Refill    aspirin/acetaminophen/caffeine (EXCEDRIN MIGRAINE ORAL) Take by mouth.      diazePAM (VALTOCO) 10 mg/spray (0.1 mL) Spry 10 mg by Nasal route every 5 (five) minutes as needed (Administer 1 spray into 1 nostril; if a second dose is needed, administer  in alternate nostril. A second dose should not be administered if the  patient is having trouble breathing or  excessive sedatio). 1 each 3    EPINEPHrine (EPIPEN) 0.3 mg/0.3 mL AtIn Inject 0.3 mLs (0.3 mg total) into the muscle as needed (Anaphylactic reaction). 0.3 mL 0    ergocalciferol (ERGOCALCIFEROL) 50,000 unit Cap Take 50,000 Units by mouth every 7 days.      ibuprofen (ADVIL,MOTRIN) 200 MG tablet Take 200 mg by mouth every 6 (six) hours as needed for Pain.      lacosamide (VIMPAT) 100 mg Tab Take 1 tablet (100 mg total) by mouth every 12 (twelve) hours. 60 tablet 5    levETIRAcetam (KEPPRA) 750 MG Tab Take 2 tablets (1,500 mg total) by mouth 2 (two) times daily. 360 tablet 3    LIDOcaine (LIDODERM) 5 % Place 1 patch onto the skin once daily. Remove & Discard patch within 12 hours or as directed by MD 30 patch 2    promethazine (PHENERGAN) 25 MG tablet Take 25 mg by mouth every 6 (six) hours as needed.       No facility-administered encounter medications on file as of 4/22/2025.

## 2025-04-24 ENCOUNTER — RESULTS FOLLOW-UP (OUTPATIENT)
Dept: UROLOGY | Facility: CLINIC | Age: 30
End: 2025-04-24

## 2025-04-24 LAB — BACTERIA UR CULT: ABNORMAL

## 2025-05-06 ENCOUNTER — OFFICE VISIT (OUTPATIENT)
Dept: FAMILY MEDICINE | Facility: CLINIC | Age: 30
End: 2025-05-06
Payer: COMMERCIAL

## 2025-05-06 VITALS
BODY MASS INDEX: 27.77 KG/M2 | DIASTOLIC BLOOD PRESSURE: 76 MMHG | WEIGHT: 120 LBS | HEIGHT: 55 IN | SYSTOLIC BLOOD PRESSURE: 110 MMHG | HEART RATE: 72 BPM | OXYGEN SATURATION: 100 %

## 2025-05-06 DIAGNOSIS — T83.511D URINARY TRACT INFECTION ASSOCIATED WITH CATHETERIZATION OF URINARY TRACT, UNSPECIFIED INDWELLING URINARY CATHETER TYPE, SUBSEQUENT ENCOUNTER: ICD-10-CM

## 2025-05-06 DIAGNOSIS — T78.2XXD ANAPHYLAXIS, SUBSEQUENT ENCOUNTER: ICD-10-CM

## 2025-05-06 DIAGNOSIS — Z13.6 ENCOUNTER FOR LIPID SCREENING FOR CARDIOVASCULAR DISEASE: ICD-10-CM

## 2025-05-06 DIAGNOSIS — Z13.21 SCREENING FOR ENDOCRINE, NUTRITIONAL, METABOLIC AND IMMUNITY DISORDER: ICD-10-CM

## 2025-05-06 DIAGNOSIS — Z13.228 SCREENING FOR ENDOCRINE, NUTRITIONAL, METABOLIC AND IMMUNITY DISORDER: ICD-10-CM

## 2025-05-06 DIAGNOSIS — R56.9 SEIZURES: ICD-10-CM

## 2025-05-06 DIAGNOSIS — R26.89 DECREASED FUNCTIONAL MOBILITY: ICD-10-CM

## 2025-05-06 DIAGNOSIS — Z13.220 ENCOUNTER FOR LIPID SCREENING FOR CARDIOVASCULAR DISEASE: ICD-10-CM

## 2025-05-06 DIAGNOSIS — N39.0 URINARY TRACT INFECTION ASSOCIATED WITH CATHETERIZATION OF URINARY TRACT, UNSPECIFIED INDWELLING URINARY CATHETER TYPE, SUBSEQUENT ENCOUNTER: ICD-10-CM

## 2025-05-06 DIAGNOSIS — Z13.0 SCREENING FOR ENDOCRINE, NUTRITIONAL, METABOLIC AND IMMUNITY DISORDER: ICD-10-CM

## 2025-05-06 DIAGNOSIS — Z00.00 WELLNESS EXAMINATION: Primary | ICD-10-CM

## 2025-05-06 DIAGNOSIS — Z13.29 SCREENING FOR ENDOCRINE, NUTRITIONAL, METABOLIC AND IMMUNITY DISORDER: ICD-10-CM

## 2025-05-06 PROCEDURE — 99395 PREV VISIT EST AGE 18-39: CPT | Mod: S$GLB,,,

## 2025-05-06 PROCEDURE — 1160F RVW MEDS BY RX/DR IN RCRD: CPT | Mod: CPTII,S$GLB,,

## 2025-05-06 PROCEDURE — 3078F DIAST BP <80 MM HG: CPT | Mod: CPTII,S$GLB,,

## 2025-05-06 PROCEDURE — 3008F BODY MASS INDEX DOCD: CPT | Mod: CPTII,S$GLB,,

## 2025-05-06 PROCEDURE — 1159F MED LIST DOCD IN RCRD: CPT | Mod: CPTII,S$GLB,,

## 2025-05-06 PROCEDURE — 3044F HG A1C LEVEL LT 7.0%: CPT | Mod: CPTII,S$GLB,,

## 2025-05-06 PROCEDURE — 3074F SYST BP LT 130 MM HG: CPT | Mod: CPTII,S$GLB,,

## 2025-05-06 RX ORDER — DIAZEPAM 10 MG/100UL
10 SPRAY NASAL EVERY 5 MIN PRN
Qty: 1 EACH | Refills: 3 | Status: SHIPPED | OUTPATIENT
Start: 2025-05-06

## 2025-05-06 RX ORDER — EPINEPHRINE 0.3 MG/.3ML
1 INJECTION SUBCUTANEOUS
Qty: 0.3 ML | Refills: 0 | Status: SHIPPED | OUTPATIENT
Start: 2025-05-06

## 2025-05-06 NOTE — PROGRESS NOTES
SUBJECTIVE:    Patient ID: Shashank Samuel is a 29 y.o. female.    Chief Complaint: Annual Exam (Annual exam//no med bottles//hot flashes, lower to mid back pain//tc)    HPI  History of Present Illness    CHIEF COMPLAINT:  Shashank presents today with heat flashes and stomach issues    GASTROINTESTINAL:  She experiences gas pain that improves with passing gas, which may be related to recent increased vegetable intake. She uses a SuperPube device every other day with glycerin and water for bowel management, which effectively empties her bowels. She denies constipation. History of bowel restructuring surgery at age 7.    GENITOURINARY:  Recent urinary infection with critical values showing >100,000 bacteria on urine culture. She is currently taking antibiotic, which has been effective against the identified bacteria. Follow-up ultrasound of kidneys scheduled in 4 months to evaluate for potential reflux.    MUSCULOSKELETAL:  She reports intermittent back pain and neck stiffness. She has limited leg mobility with legs fixed in position since 6 months of age. She previously attended physical therapy where she received exercise bands and treatment for her arms. History of back surgery involving tendon release.    PAST MEDICAL HISTORY:  History of buttock wound that resolved after 3 years of wound care treatment.      ROS:  General: -fever, -chills, -fatigue, -weight gain, -weight loss, +hot flashes  Eyes: -vision changes, -redness, -discharge  ENT: -ear pain, -nasal congestion, -sore throat  Cardiovascular: -chest pain, -palpitations, -lower extremity edema  Respiratory: -cough, -shortness of breath  Gastrointestinal: -abdominal pain, -nausea, -vomiting, -diarrhea, -constipation, -blood in stool, +indigestion  Genitourinary: -dysuria, -hematuria, -frequency  Musculoskeletal: -joint pain, -muscle pain, +back pain, +neck stiffness, +pain with movement  Skin: -rash, -lesion  Neurological: -headache, -dizziness,  -numbness, -tingling  Psychiatric: -anxiety, -depression, -sleep difficulty         Office Visit on 04/22/2025   Component Date Value Ref Range Status    Urine Culture 04/22/2025 >100,000 cfu/ml Providencia rettgeri (A)   Final   Admission on 11/26/2024, Discharged on 11/27/2024   Component Date Value Ref Range Status    Hepatitis C Ab 11/26/2024 Negative  Negative Final    HIV 1/2 Ag/Ab 11/26/2024 Negative  Negative Final    Levetiracetam Lvl 11/26/2024 78.5 (H)  3.0 - 60.0 ug/mL Final    Lacosamide 11/26/2024 3.5  1.0 - 10.0 mcg/mL Final    Specimen UA 11/26/2024 Urine, Clean Catch   Final    Color, UA 11/26/2024 Yellow  Yellow, Straw, Christina Final    Appearance, UA 11/26/2024 Hazy (A)  Clear Final    pH, UA 11/26/2024 7.0  5.0 - 8.0 Final    Specific Guffey, UA 11/26/2024 1.015  1.005 - 1.030 Final    Protein, UA 11/26/2024 Trace (A)  Negative Final    Glucose, UA 11/26/2024 Negative  Negative Final    Ketones, UA 11/26/2024 Negative  Negative Final    Bilirubin (UA) 11/26/2024 Negative  Negative Final    Occult Blood UA 11/26/2024 Trace (A)  Negative Final    Nitrite, UA 11/26/2024 Positive (A)  Negative Final    Urobilinogen, UA 11/26/2024 Negative  <2.0 EU/dL Final    Leukocytes, UA 11/26/2024 3+ (A)  Negative Final    POC Preg Test, Ur 11/26/2024 Negative  Negative Final     Acceptable 11/26/2024 Yes   Final    RBC, UA 11/26/2024 7 (H)  0 - 4 /hpf Final    WBC, UA 11/26/2024 45 (H)  0 - 5 /hpf Final    Bacteria 11/26/2024 Many (A)  None-Occ /hpf Final    Squam Epithel, UA 11/26/2024 0  /hpf Final    Non-Squam Epith 11/26/2024 1 (A)  <1/hpf /hpf Final    Microscopic Comment 11/26/2024 SEE COMMENT   Final    Urine Culture, Routine 11/26/2024  (A)   Final                    Value:ESCHERICHIA COLI  >100,000 cfu/ml      Blood Culture, Routine 11/26/2024 No growth after 5 days.   Final    Blood Culture, Routine 11/26/2024 No growth after 5 days.   Final    QRS Duration 11/26/2024 82  ms Final    [facility]  QTC Calculation 11/26/2024 429  ms Final    Magnesium 11/26/2024 1.9  1.6 - 2.6 mg/dL Final    WBC 11/26/2024 9.62  3.90 - 12.70 K/uL Final    RBC 11/26/2024 4.45  4.00 - 5.40 M/uL Final    Hemoglobin 11/26/2024 12.9  12.0 - 16.0 g/dL Final    Hematocrit 11/26/2024 38.8  37.0 - 48.5 % Final    MCV 11/26/2024 87  82 - 98 fL Final    MCH 11/26/2024 29.0  27.0 - 31.0 pg Final    MCHC 11/26/2024 33.2  32.0 - 36.0 g/dL Final    RDW 11/26/2024 13.0  11.5 - 14.5 % Final    Platelets 11/26/2024 264  150 - 450 K/uL Final    MPV 11/26/2024 9.5  9.2 - 12.9 fL Final    Immature Granulocytes 11/26/2024 0.3  0.0 - 0.5 % Final    Gran # (ANC) 11/26/2024 7.5  1.8 - 7.7 K/uL Final    Immature Grans (Abs) 11/26/2024 0.03  0.00 - 0.04 K/uL Final    Lymph # 11/26/2024 1.2  1.0 - 4.8 K/uL Final    Mono # 11/26/2024 0.9  0.3 - 1.0 K/uL Final    Eos # 11/26/2024 0.0  0.0 - 0.5 K/uL Final    Baso # 11/26/2024 0.01  0.00 - 0.20 K/uL Final    nRBC 11/26/2024 0  0 /100 WBC Final    Gran % 11/26/2024 77.8 (H)  38.0 - 73.0 % Final    Lymph % 11/26/2024 12.8 (L)  18.0 - 48.0 % Final    Mono % 11/26/2024 8.8  4.0 - 15.0 % Final    Eosinophil % 11/26/2024 0.2  0.0 - 8.0 % Final    Basophil % 11/26/2024 0.1  0.0 - 1.9 % Final    Differential Method 11/26/2024 Automated   Final    Sodium 11/26/2024 135 (L)  136 - 145 mmol/L Final    Potassium 11/26/2024 3.3 (L)  3.5 - 5.1 mmol/L Final    Chloride 11/26/2024 106  95 - 110 mmol/L Final    CO2 11/26/2024 14 (L)  23 - 29 mmol/L Final    Glucose 11/26/2024 104  70 - 110 mg/dL Final    BUN 11/26/2024 17  6 - 20 mg/dL Final    Creatinine 11/26/2024 0.7  0.5 - 1.4 mg/dL Final    Calcium 11/26/2024 8.6 (L)  8.7 - 10.5 mg/dL Final    Total Protein 11/26/2024 7.6  6.0 - 8.4 g/dL Final    Albumin 11/26/2024 4.0  3.5 - 5.2 g/dL Final    Total Bilirubin 11/26/2024 0.2  0.1 - 1.0 mg/dL Final    Alkaline Phosphatase 11/26/2024 69  40 - 150 U/L Final    AST 11/26/2024 20  10 - 40 U/L Final    ALT 11/26/2024 28  10 -  44 U/L Final    eGFR 11/26/2024 >60  >60 mL/min/1.73 m^2 Final    Anion Gap 11/26/2024 15  8 - 16 mmol/L Final    POC Lactate 11/26/2024 1.11  0.5 - 2.2 mmol/L Final    Sample 11/26/2024 VENOUS   Final    Site 11/26/2024 LF   Final    Allens Test 11/26/2024 Pass   Final    DelSys 11/26/2024 Nasal Can   Final    Mode 11/26/2024 SPONT   Final    Flow 11/26/2024 2   Final    Sp02 11/26/2024 100   Final    Lactate (Lactic Acid) 11/26/2024 1.0  0.5 - 2.2 mmol/L Final    Magnesium 11/27/2024 2.1  1.6 - 2.6 mg/dL Final    Phosphorus 11/27/2024 3.3  2.7 - 4.5 mg/dL Final    WBC 11/27/2024 7.44  3.90 - 12.70 K/uL Final    RBC 11/27/2024 4.60  4.00 - 5.40 M/uL Final    Hemoglobin 11/27/2024 13.4  12.0 - 16.0 g/dL Final    Hematocrit 11/27/2024 41.0  37.0 - 48.5 % Final    MCV 11/27/2024 89  82 - 98 fL Final    MCH 11/27/2024 29.1  27.0 - 31.0 pg Final    MCHC 11/27/2024 32.7  32.0 - 36.0 g/dL Final    RDW 11/27/2024 13.2  11.5 - 14.5 % Final    Platelets 11/27/2024 269  150 - 450 K/uL Final    MPV 11/27/2024 9.7  9.2 - 12.9 fL Final    Immature Granulocytes 11/27/2024 0.5  0.0 - 0.5 % Final    Gran # (ANC) 11/27/2024 5.0  1.8 - 7.7 K/uL Final    Immature Grans (Abs) 11/27/2024 0.04  0.00 - 0.04 K/uL Final    Lymph # 11/27/2024 1.4  1.0 - 4.8 K/uL Final    Mono # 11/27/2024 1.0  0.3 - 1.0 K/uL Final    Eos # 11/27/2024 0.0  0.0 - 0.5 K/uL Final    Baso # 11/27/2024 0.02  0.00 - 0.20 K/uL Final    nRBC 11/27/2024 0  0 /100 WBC Final    Gran % 11/27/2024 66.8  38.0 - 73.0 % Final    Lymph % 11/27/2024 19.0  18.0 - 48.0 % Final    Mono % 11/27/2024 12.9  4.0 - 15.0 % Final    Eosinophil % 11/27/2024 0.5  0.0 - 8.0 % Final    Basophil % 11/27/2024 0.3  0.0 - 1.9 % Final    Differential Method 11/27/2024 Automated   Final    Sodium 11/27/2024 138  136 - 145 mmol/L Final    Potassium 11/27/2024 3.9  3.5 - 5.1 mmol/L Final    Chloride 11/27/2024 110  95 - 110 mmol/L Final    CO2 11/27/2024 16 (L)  23 - 29 mmol/L Final    Glucose  11/27/2024 81  70 - 110 mg/dL Final    BUN 11/27/2024 13  6 - 20 mg/dL Final    Creatinine 11/27/2024 0.6  0.5 - 1.4 mg/dL Final    Calcium 11/27/2024 8.5 (L)  8.7 - 10.5 mg/dL Final    Total Protein 11/27/2024 7.2  6.0 - 8.4 g/dL Final    Albumin 11/27/2024 3.9  3.5 - 5.2 g/dL Final    Total Bilirubin 11/27/2024 0.4  0.1 - 1.0 mg/dL Final    Alkaline Phosphatase 11/27/2024 62  40 - 150 U/L Final    AST 11/27/2024 20  10 - 40 U/L Final    ALT 11/27/2024 26  10 - 44 U/L Final    eGFR 11/27/2024 >60  >60 mL/min/1.73 m^2 Final    Anion Gap 11/27/2024 12  8 - 16 mmol/L Final       Past Medical History:   Diagnosis Date    Anemia     Arnold-Chiari malformation, type II     Encounter for blood transfusion     Epilepsy     Hydrocephalus     Neurogenic bladder     self catheterizes every 3 hours    Raynaud disease     Seizures     only w/ shunt malfunction    Spinal bifida, closed     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     Social History[1]  Past Surgical History:   Procedure Laterality Date    AMPUTATION      right 4th and 5th toes; left 5th toe and portion of left great toe    BACK SURGERY      BLADDER SURGERY      BREAST SURGERY  2015    Reduction    COLON SURGERY      flush site for bowel movements from appendix    CYSTOSTOMY      DEBRIDEMENT OF FOOT Right 1/8/2021    Procedure: DEBRIDEMENT, FOOT;  Surgeon: Abdi Bedoya DPM;  Location: [facility];  Service: Podiatry;  Laterality: Right;    EYE SURGERY      x 5    FLUOROSCOPIC URODYNAMIC STUDY N/A 3/12/2019    Procedure: URODYNAMIC STUDY, FLUOROSCOPIC;  Surgeon: Kenzie Charles MD;  Location: [facility] OR [address] [place] [address];  Service: Urology;  Laterality: N/A;  1 hour    FLUOROSCOPIC URODYNAMIC STUDY N/A 4/4/2019    Procedure: URODYNAMIC STUDY, FLUOROSCOPIC;  Surgeon: Kenzie Charles MD;  Location: [facility] OR 1ST FLR;  Service: Urology;  Laterality: N/A;  1 hour    FLUOROSCOPIC URODYNAMIC STUDY N/A 5/11/2022    Procedure: URODYNAMIC STUDY, FLUOROSCOPIC;  Surgeon: Kenzie Charles MD;  Location:  [facility] OR 1ST FLR;  Service: Urology;  Laterality: N/A;  90min    FOOT AMPUTATION THROUGH METATARSAL Right 10/28/2019    Procedure: AMPUTATION, FOOT, TRANSMETATARSAL;  Surgeon: Abdi Bedoya DPM;  Location: [facility];  Service: Podiatry;  Laterality: Right;    FOOT AMPUTATION THROUGH METATARSAL Right 3/27/2020    Procedure: AMPUTATION, FOOT, TRANSMETATARSAL;  Surgeon: Abdi Bedoya DPM;  Location: [facility] OR;  Service: Podiatry;  Laterality: Right;  REVISION    MYELOMININGOCELE REPAIR      NEPHRECTOMY Right 11/4/2021    Procedure: Nephrectomy/ OPEN;  Surgeon: Dash Rosenthal MD;  Location: [facility] 2ND FLR;  Service: Urology;  Laterality: Right;  4HRS    NEPHROSTOMY Right 8/2/2021    Procedure: Nephrostomy and perinephric abscess drain;  Surgeon: Lillian Surgeon;  Location: [facility];  Service: Anesthesiology;  Laterality: Right;    REVISION OF VENTRICULOPERITONEAL SHUNT Left 9/21/2022    Procedure: REVISION, SHUNT, VENTRICULOPERITONEAL;  Surgeon: Maynor Calero MD;  Location: [facility] 2ND FLR;  Service: Neurosurgery;  Laterality: Left;    STRABISMUS SURGERY      ou dr peña    VENTRICULOPERITONEAL SHUNT      WOUND DEBRIDEMENT  3/27/2020    Procedure: DEBRIDEMENT, WOUND;  Surgeon: Abdi Bedoya DPM;  Location: [facility];  Service: Podiatry;;     Family History   Problem Relation Name Age of Onset    Arthritis Mother      Breast cancer Mother      Gout Father      Epilepsy Father      Myocarditis Sister      Cataracts Maternal Grandmother      Amblyopia Neg Hx      Blindness Neg Hx      Cancer Neg Hx      Diabetes Neg Hx      Glaucoma Neg Hx      Hypertension Neg Hx      Macular degeneration Neg Hx      Retinal detachment Neg Hx      Strabismus Neg Hx      Stroke Neg Hx      Thyroid disease Neg Hx         The CVD Risk score (D'Agostino, et al., 2008) failed to calculate for the following reasons:    The 2008 CVD risk score is only valid for ages 30 to 74    All of your core healthy metrics are met.      Review  "of patient's allergies indicates:   Allergen Reactions    Latex, natural rubber Anaphylaxis and Other (See Comments)     angioedema    Zofran [ondansetron hcl (pf)] Swelling     Hives, "throat closes up"    Gardasil  [h papillomavirus vac,qval (pf)]      Other reaction(s): Shortness of breath    Menactra  [mening vac a,c,y,w135 dip (pf)]      Other reaction(s): Shortness of breath    Sulfa (sulfonamide antibiotics)     Tramadol Hives    Flu vaccine ts 2012-13(6 mos+) Nausea Only and Other (See Comments)     Seizures, sweaty hands, hot and cold flashes    Levaquin [levofloxacin] Rash     Spots on face    Macrobid [nitrofurantoin monohyd/m-cryst] Rash     Sppots/rash on face     Current Medications[2]    Review of Systems   Constitutional:  Negative for activity change and unexpected weight change.   HENT:  Negative for hearing loss, rhinorrhea and trouble swallowing.    Eyes:  Negative for discharge and visual disturbance.   Respiratory:  Negative for chest tightness and wheezing.    Cardiovascular:  Negative for chest pain and palpitations.   Gastrointestinal:  Negative for blood in stool, constipation, diarrhea and vomiting.   Endocrine: Negative for polydipsia and polyuria.   Genitourinary:  Positive for difficulty urinating. Negative for dysuria, hematuria and menstrual problem.   Musculoskeletal:  Negative for arthralgias, joint swelling and neck pain.   Neurological:  Negative for weakness and headaches.   Psychiatric/Behavioral:  Negative for confusion and dysphoric mood.            Objective:      Vitals:    05/06/25 0811 05/06/25 0856   BP: 123/79 110/76   Pulse: 72    SpO2: 100%    Weight: 54.4 kg (120 lb)    Height: 4' 7" (1.397 m)      Physical Exam  Constitutional:       Appearance: She is well-groomed.      Comments: Pleasant and cooperative female, seated in her personal wheelchair, which is both manual and electric. Well groomed.    HENT:      Head: Normocephalic and atraumatic.      Mouth/Throat:     "  Pharynx: Oropharynx is clear.   Eyes:      General: No scleral icterus.     Pupils: Pupils are equal, round, and reactive to light.   Neck:      Comments: Some tightness to neck muscles impairing ROM due to mild discomfort  Cardiovascular:      Rate and Rhythm: Normal rate and regular rhythm.      Heart sounds: Normal heart sounds.   Pulmonary:      Effort: Pulmonary effort is normal.      Breath sounds: Normal breath sounds.   Abdominal:      General: There is no distension.      Palpations: Abdomen is soft.      Tenderness: There is no abdominal tenderness.   Musculoskeletal:      Cervical back: No rigidity or tenderness.        Feet:    Feet:      Comments: Left foot previous amputation with some metatarsal bones remaining , with callus to great toe stump  Bilateral erythema, non cellulitic appearing  Skin:     General: Skin is warm and dry.      Capillary Refill: Capillary refill takes less than 2 seconds.   Neurological:      Mental Status: She is alert.      Cranial Nerves: No dysarthria or facial asymmetry.      Motor: Atrophy and abnormal muscle tone present. No tremor or seizure activity.      Comments: Non ambulatory. Spina bifida of thoracic spine.    Psychiatric:         Mood and Affect: Mood normal.         Behavior: Behavior is cooperative.                    Assessment:       1. Wellness examination    2. Encounter for lipid screening for cardiovascular disease    3. Screening for endocrine, nutritional, metabolic and immunity disorder    4. Seizures    5. Anaphylaxis, subsequent encounter    6. Decreased functional mobility    7. Urinary tract infection associated with catheterization of urinary tract, unspecified indwelling urinary catheter type, subsequent encounter         Plan:       Wellness examination  -     CBC Auto Differential; Future; Expected date: 05/06/2025  -     Comprehensive Metabolic Panel; Future; Expected date: 05/06/2025  -     Hemoglobin A1C; Future; Expected date:  05/06/2025  -     Lipid Panel; Future; Expected date: 05/06/2025  -     T4, Free; Future; Expected date: 05/06/2025  -     TSH; Future; Expected date: 05/06/2025  -     Cancel: Urinalysis  -     Urinalysis; Future; Expected date: 05/06/2025    Encounter for lipid screening for cardiovascular disease  -     Lipid Panel; Future; Expected date: 05/06/2025    Screening for endocrine, nutritional, metabolic and immunity disorder  -     Hemoglobin A1C; Future; Expected date: 05/06/2025    Seizures  -     diazePAM (VALTOCO) 10 mg/spray (0.1 mL) Spry; 10 mg by Nasal route every 5 (five) minutes as needed (Administer 1 spray into 1 nostril; if a second dose is needed, administer  in alternate nostril. A second dose should not be administered if the  patient is having trouble breathing or excessive sedatio).  Dispense: 1 each; Refill: 3    Anaphylaxis, subsequent encounter  -     EPINEPHrine (EPIPEN) 0.3 mg/0.3 mL AtIn; Inject 0.3 mLs (0.3 mg total) into the muscle as needed (Anaphylactic reaction).  Dispense: 0.3 mL; Refill: 0    Decreased functional mobility  -     Ambulatory Referral/Consult to Physical Therapy    Urinary tract infection associated with catheterization of urinary tract, unspecified indwelling urinary catheter type, subsequent encounter  -     Cancel: Urinalysis  -     Urine Culture High Risk; Future; Expected date: 05/06/2025  -     Urinalysis; Future; Expected date: 05/06/2025          IMPRESSION:  - Assessed reported gas and stomach discomfort, considering dietary changes as potential cause.  - Evaluated UTI based on recent critical lab results from Dr. Cadet.  - Considered need for physical therapy to address leg stiffness and mobility issues.  - Reviewed weight management goals.      OTHER ARTIFICIAL OPENINGS OF GASTROINTESTINAL TRACT STATUS:  - Noted the patient uses a SuperPube for bowel management, inserting glycerin and water every other day.    URINARY TRACT INFECTION:  - Observed dark red urine  flagged as critical with test results showing >100,000 bacteria, confirming infection.  - Shashank will continue on antibiotic [place], which is effective against the identified bacteria.  - Ordered repeat urine culture to ensure bacteria is cleared.    MUSCLE CONTRACTURE OF LOWER LEGS:  - Shashank has stiff legs and difficulty with leg elevation since 6 months of age.  - Instructed to perform home leg stretches using elastic bands.  - Referred to physical therapy for leg mobility, stretching exercises, and ligament flexibility.    BACK AND NECK PAIN:  - Monitoring patient's intermittent back pain and occasional neck stiffness.    FLATULENCE:  - Shashank experiences gas with relief after passing.  - Discussed that cruciferous vegetables can cause gas but are beneficial for digestive health.  - Reviewed methods to alleviate discomfort including manual abdominal pressure, movement, and the relationship between constipation and increased gas.  - Prescribed Mylicon (simethicone) as needed to break up gas bubbles.    HOT FLASHES:  - Monitoring patient's occasional heat flashes.    PERSONAL HISTORY OF DIGESTIVE SYSTEM DISEASES:  - Shashank had surgery to restructure bowels at age 7.    FOLLOW-UP AND GENERAL HEALTH RECOMMENDATIONS:  - Recommend reducing computer time and increasing physical activity.  - Suggested watching [employer] videos for stretching exercises.  - Ordered annual labs including cholesterol and thyroid tests.  - Shashank to follow up for lab work on a day when fasting (no food consumed that morning).         Follow up in about 6 months (around 11/6/2025) for With Dr. Ponce to alternate with me.        This note was generated with the assistance of ambient listening technology. Verbal consent was obtained by the patient and accompanying visitor(s) for the recording of patient appointment to facilitate this note. I attest to having reviewed and edited the generated note for accuracy, though some syntax or spelling  errors may persist. Please contact the author of this note for any clarification.      5/8/2025 Elisa Eng         [1]   Social History  Socioeconomic History    Marital status: Single    Number of children: 0   Occupational History    Occupation: disabled   Tobacco Use    Smoking status: Never     Passive exposure: Never    Smokeless tobacco: Never   Substance and Sexual Activity    Alcohol use: Not Currently    Drug use: Never    Sexual activity: Never   Social History Narrative    Intact family. Wheelchair bound.  Self catheterizes herself     Social Drivers of Health     Financial Resource Strain: Patient Declined (4/29/2025)    Overall Financial Resource Strain (CARDIA)     Difficulty of Paying Living Expenses: Patient declined   Food Insecurity: No Food Insecurity (4/29/2025)    Hunger Vital Sign     Worried About Running Out of Food in the Last Year: Never true     Ran Out of Food in the Last Year: Never true   Transportation Needs: No Transportation Needs (4/29/2025)    PRAPARE - Transportation     Lack of Transportation (Medical): No     Lack of Transportation (Non-Medical): No   Physical Activity: Insufficiently Active (4/29/2025)    Exercise Vital Sign     Days of Exercise per Week: 1 day     Minutes of Exercise per Session: 10 min   Stress: No Stress Concern Present (4/29/2025)    [employer] [place] [employer] - Occupational Stress Questionnaire     Feeling of Stress : Not at all   Housing Stability: Low Risk  (4/29/2025)    Housing Stability Vital Sign     Unable to Pay for Housing in the Last Year: No     Number of Times Moved in the Last Year: 0     Homeless in the Last Year: No   [2]   Current Outpatient Medications:     aspirin/acetaminophen/caffeine (EXCEDRIN MIGRAINE ORAL), Take by mouth., Disp: , Rfl:     ergocalciferol (ERGOCALCIFEROL) 50,000 unit Cap, Take 50,000 Units by mouth every 7 days., Disp: , Rfl:     ibuprofen (ADVIL,MOTRIN) 200 MG tablet, Take 200 mg by mouth every 6  (six) hours as needed for Pain., Disp: , Rfl:     lacosamide (VIMPAT) 100 mg Tab, Take 1 tablet (100 mg total) by mouth every 12 (twelve) hours., Disp: 60 tablet, Rfl: 5    levETIRAcetam (KEPPRA) 750 MG Tab, Take 2 tablets (1,500 mg total) by mouth 2 (two) times daily., Disp: 360 tablet, Rfl: 3    LIDOcaine (LIDODERM) 5 %, Place 1 patch onto the skin once daily. Remove & Discard patch within 12 hours or as directed by MD, Disp: 30 patch, Rfl: 2    promethazine (PHENERGAN) 25 MG tablet, Take 25 mg by mouth every 6 (six) hours as needed., Disp: , Rfl:     cefpodoxime (VANTIN) 100 MG tablet, Take 1 tablet (100 mg total) by mouth 2 (two) times daily., Disp: 14 tablet, Rfl: 0    diazePAM (VALTOCO) 10 mg/spray (0.1 mL) Spry, 10 mg by Nasal route every 5 (five) minutes as needed (Administer 1 spray into 1 nostril; if a second dose is needed, administer  in alternate nostril. A second dose should not be administered if the  patient is having trouble breathing or excessive sedatio)., Disp: 1 each, Rfl: 3    EPINEPHrine (EPIPEN) 0.3 mg/0.3 mL AtIn, Inject 0.3 mLs (0.3 mg total) into the muscle as needed (Anaphylactic reaction)., Disp: 0.3 mL, Rfl: 0

## 2025-05-12 ENCOUNTER — RESULTS FOLLOW-UP (OUTPATIENT)
Dept: FAMILY MEDICINE | Facility: CLINIC | Age: 30
End: 2025-05-12

## 2025-05-12 DIAGNOSIS — N39.0 E. COLI UTI: Primary | ICD-10-CM

## 2025-05-12 DIAGNOSIS — B96.20 E. COLI UTI: Primary | ICD-10-CM

## 2025-05-12 LAB
ALBUMIN SERPL-MCNC: 4.8 G/DL (ref 3.6–5.1)
ALBUMIN/GLOB SERPL: 1.5 (CALC) (ref 1–2.5)
ALP SERPL-CCNC: 82 U/L (ref 31–125)
ALT SERPL-CCNC: 18 U/L (ref 6–29)
APPEARANCE UR: CLEAR
AST SERPL-CCNC: 9 U/L (ref 10–30)
BACTERIA UR CULT: ABNORMAL
BASOPHILS # BLD AUTO: 19 CELLS/UL (ref 0–200)
BASOPHILS NFR BLD AUTO: 0.3 %
BILIRUB SERPL-MCNC: 0.4 MG/DL (ref 0.2–1.2)
BILIRUB UR QL STRIP: NEGATIVE
BUN SERPL-MCNC: 16 MG/DL (ref 7–25)
BUN/CREAT SERPL: 35 (CALC) (ref 6–22)
CALCIUM SERPL-MCNC: 9.1 MG/DL (ref 8.6–10.2)
CHLORIDE SERPL-SCNC: 105 MMOL/L (ref 98–110)
CHOLEST SERPL-MCNC: 159 MG/DL
CHOLEST/HDLC SERPL: 3.5 (CALC)
CO2 SERPL-SCNC: 24 MMOL/L (ref 20–32)
COLOR UR: YELLOW
CREAT SERPL-MCNC: 0.46 MG/DL (ref 0.5–0.96)
EGFR: 134 ML/MIN/1.73M2
EOSINOPHIL # BLD AUTO: 77 CELLS/UL (ref 15–500)
EOSINOPHIL NFR BLD AUTO: 1.2 %
ERYTHROCYTE [DISTWIDTH] IN BLOOD BY AUTOMATED COUNT: 12.7 % (ref 11–15)
GLOBULIN SER CALC-MCNC: 3.3 G/DL (CALC) (ref 1.9–3.7)
GLUCOSE SERPL-MCNC: 79 MG/DL (ref 65–99)
GLUCOSE UR QL STRIP: NEGATIVE
HBA1C MFR BLD: 5 %
HCT VFR BLD AUTO: 43.9 % (ref 35–45)
HDLC SERPL-MCNC: 46 MG/DL
HGB BLD-MCNC: 14.5 G/DL (ref 11.7–15.5)
HGB UR QL STRIP: ABNORMAL
KETONES UR QL STRIP: NEGATIVE
LDLC SERPL CALC-MCNC: 91 MG/DL (CALC)
LEUKOCYTE ESTERASE UR QL STRIP: ABNORMAL
LYMPHOCYTES # BLD AUTO: 2106 CELLS/UL (ref 850–3900)
LYMPHOCYTES NFR BLD AUTO: 32.9 %
MCH RBC QN AUTO: 29.2 PG (ref 27–33)
MCHC RBC AUTO-ENTMCNC: 33 G/DL (ref 32–36)
MCV RBC AUTO: 88.3 FL (ref 80–100)
MONOCYTES # BLD AUTO: 518 CELLS/UL (ref 200–950)
MONOCYTES NFR BLD AUTO: 8.1 %
NEUTROPHILS # BLD AUTO: 3680 CELLS/UL (ref 1500–7800)
NEUTROPHILS NFR BLD AUTO: 57.5 %
NITRITE UR QL STRIP: POSITIVE
NONHDLC SERPL-MCNC: 113 MG/DL (CALC)
PH UR STRIP: 7 [PH] (ref 5–8)
PLATELET # BLD AUTO: 324 THOUSAND/UL (ref 140–400)
PMV BLD REES-ECKER: 10.2 FL (ref 7.5–12.5)
POTASSIUM SERPL-SCNC: 3.9 MMOL/L (ref 3.5–5.3)
PROT SERPL-MCNC: 8.1 G/DL (ref 6.1–8.1)
PROT UR QL STRIP: ABNORMAL
RBC # BLD AUTO: 4.97 MILLION/UL (ref 3.8–5.1)
SODIUM SERPL-SCNC: 137 MMOL/L (ref 135–146)
SP GR UR STRIP: 1.01 (ref 1–1.03)
T4 FREE SERPL-MCNC: 1.1 NG/DL (ref 0.8–1.8)
TRIGL SERPL-MCNC: 123 MG/DL
TSH SERPL-ACNC: 1.97 MIU/L
WBC # BLD AUTO: 6.4 THOUSAND/UL (ref 3.8–10.8)

## 2025-05-12 RX ORDER — CIPROFLOXACIN 500 MG/1
500 TABLET, FILM COATED ORAL EVERY 12 HOURS
Qty: 10 TABLET | Refills: 0 | Status: SHIPPED | OUTPATIENT
Start: 2025-05-12 | End: 2025-05-17

## 2025-05-12 NOTE — PROGRESS NOTES
Ok. Since she reports she can tolerate Cipro, I sent 500mg to be taken two times a day for 5 days.    Please advise.

## 2025-05-12 NOTE — PROGRESS NOTES
Labs are stable/normal, but she has a UTI with a DIFFERENT bacteria on culture, not the same as the one she was currently treating. It is resistant to several antibiotics that we usually use, and she has listed, or her mother has, allergies to the only other 3 I can use. This includes Cipro (listed allergy to Levaquin), Macrobid, and Bactrim (listed allergy to sulfa). Find out what her ACTUAL allergic reaction was. If it is only nausea, we will have to use one of these, or she will need IV antibiotics, which I cannot order and will have to go to hospital.

## 2025-06-03 ENCOUNTER — TELEPHONE (OUTPATIENT)
Dept: FAMILY MEDICINE | Facility: CLINIC | Age: 30
End: 2025-06-03
Payer: COMMERCIAL

## 2025-06-25 ENCOUNTER — TELEPHONE (OUTPATIENT)
Dept: FAMILY MEDICINE | Facility: CLINIC | Age: 30
End: 2025-06-25
Payer: COMMERCIAL

## 2025-06-25 NOTE — HPI
1. Have you been to the ER, urgent care clinic since your last visit?  Hospitalized since your last visit? No       2.  Where do you normally have your labs drawn?   [facility]    3. Do you need any refills today?   No    4. Which local pharmacy do you use (enter pharmacy)?   [facility]    5. Which mail order pharmacy do you use (enter pharmacy)?   No     6. Are you here for surgical clearance and if so who will be doing your     procedure/surgery (care team)?   No       Shashank Samuel is a 27 y.o. 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     She originally presented to OSH after being found at home unresponsive, slumped over in the bathroom with her head hanging over the edge og the toilet/face blue (per father).    Patient was brought to the ER and was hypothermic and hypotensive on arrival. In outside ED pt had recurrent seizures despite multiple doses of ativan and 750 mg keppra. Pt was intubated, but continued to have seizure activity on my exam with tonic posturing of bilateral upper and lower extremities and upward deviated gaze. No abx started prior to transfer. CT cervical spine without acute injury. CT head with hydrocephalus. CXR clear. Pt will need UA to complete septic workup- also has known chronic sacral wound.      Patient's mother is in the room helping with history.  Patient's mother states that she has a history of seizures and she has had about 3 seizures this year.  She was taken off Keppra and was restarted about a month and a half ago for recurrence of seizures.  She currently takes 500 mg b.i.d. of Keppra.     Transferred to [facility] for status Epilepticus management.

## 2025-06-25 NOTE — TELEPHONE ENCOUNTER
----- Message from Nurse Dow sent at 6/25/2025 10:31 AM CDT -----  This patient has never seen Dr. Ponce, the forms need to be given to jose martin to review  ----- Message -----  From: Sara Oliveira LPN  Sent: 6/25/2025  10:30 AM CDT  To: Dash Ponce Staff      ----- Message -----  From: Paloma Sanches LPN  Sent: 6/25/2025  10:25 AM CDT  To: [facility] P[facility]rez Staff      ----- Message -----  From: Lupe Gipson  Sent: 6/25/2025  10:24 AM CDT  To: Dash Ponce Staff    Pt's mom is dropping off a form for Dr. Ponce to fill out stating that the pt is handicapped.    [phone]

## 2025-06-27 ENCOUNTER — TELEPHONE (OUTPATIENT)
Dept: FAMILY MEDICINE | Facility: CLINIC | Age: 30
End: 2025-06-27
Payer: COMMERCIAL

## 2025-06-27 NOTE — TELEPHONE ENCOUNTER
----- Message from Miguel Gibson sent at 6/27/2025  8:41 AM CDT -----    ----- Message -----  From: Haylie Hoskins MA  Sent: 6/27/2025   8:41 AM CDT  To: Dash Ponce Staff    Patients mom is calling to check on the status of the form they dropped off for completion.  Mom states she dropped off the form two days ago.    Kayleen:  [phone]

## 2025-08-28 ENCOUNTER — OFFICE VISIT (OUTPATIENT)
Dept: NEUROLOGY | Facility: CLINIC | Age: 30
End: 2025-08-28
Payer: COMMERCIAL

## 2025-08-28 VITALS
HEART RATE: 76 BPM | BODY MASS INDEX: 27.89 KG/M2 | DIASTOLIC BLOOD PRESSURE: 76 MMHG | HEIGHT: 55 IN | SYSTOLIC BLOOD PRESSURE: 129 MMHG

## 2025-08-28 DIAGNOSIS — G91.9 HYDROCEPHALUS, UNSPECIFIED TYPE: ICD-10-CM

## 2025-08-28 DIAGNOSIS — G40.309 NONINTRACTABLE GENERALIZED IDIOPATHIC EPILEPSY WITHOUT STATUS EPILEPTICUS: Primary | ICD-10-CM

## 2025-08-28 PROCEDURE — 1159F MED LIST DOCD IN RCRD: CPT | Mod: CPTII,S$GLB,, | Performed by: PSYCHIATRY & NEUROLOGY

## 2025-08-28 PROCEDURE — 1160F RVW MEDS BY RX/DR IN RCRD: CPT | Mod: CPTII,S$GLB,, | Performed by: PSYCHIATRY & NEUROLOGY

## 2025-08-28 PROCEDURE — 99215 OFFICE O/P EST HI 40 MIN: CPT | Mod: S$GLB,,, | Performed by: PSYCHIATRY & NEUROLOGY

## 2025-08-28 PROCEDURE — 3078F DIAST BP <80 MM HG: CPT | Mod: CPTII,S$GLB,, | Performed by: PSYCHIATRY & NEUROLOGY

## 2025-08-28 PROCEDURE — 99999 PR PBB SHADOW E&M-EST. PATIENT-LVL IV: CPT | Mod: PBBFAC,,, | Performed by: PSYCHIATRY & NEUROLOGY

## 2025-08-28 PROCEDURE — 3044F HG A1C LEVEL LT 7.0%: CPT | Mod: CPTII,S$GLB,, | Performed by: PSYCHIATRY & NEUROLOGY

## 2025-08-28 PROCEDURE — G2211 COMPLEX E/M VISIT ADD ON: HCPCS | Mod: S$GLB,,, | Performed by: PSYCHIATRY & NEUROLOGY

## 2025-08-28 PROCEDURE — 3008F BODY MASS INDEX DOCD: CPT | Mod: CPTII,S$GLB,, | Performed by: PSYCHIATRY & NEUROLOGY

## 2025-08-28 PROCEDURE — 3074F SYST BP LT 130 MM HG: CPT | Mod: CPTII,S$GLB,, | Performed by: PSYCHIATRY & NEUROLOGY

## 2025-08-28 RX ORDER — DIAZEPAM 10 MG/100UL
10 SPRAY NASAL EVERY 5 MIN PRN
Qty: 5 EACH | Refills: 0 | Status: SHIPPED | OUTPATIENT
Start: 2025-08-28

## 2025-08-28 RX ORDER — LACOSAMIDE 100 MG/1
100 TABLET ORAL EVERY 12 HOURS
Qty: 60 TABLET | Refills: 5 | Status: SHIPPED | OUTPATIENT
Start: 2025-08-28 | End: 2026-02-24

## 2025-08-28 RX ORDER — LEVETIRACETAM 750 MG/1
1500 TABLET ORAL 2 TIMES DAILY
Qty: 360 TABLET | Refills: 3 | Status: SHIPPED | OUTPATIENT
Start: 2025-08-28 | End: 2026-08-23

## (undated) DEVICE — BANDAGE ESMARK 4X9 55514

## (undated) DEVICE — DRESSING ADAPTIC 3X8 2015

## (undated) DEVICE — DRAPE ORTH SPLIT 77X108IN

## (undated) DEVICE — DRAPE INCISE IOBAN 2 23X17IN

## (undated) DEVICE — CLOSURE SKIN STERI STRIP 1/4X3

## (undated) DEVICE — SUT 2/0 54IN COATED VICRYL

## (undated) DEVICE — NEEDLE SAFETY ECLIPSE 25G 1-1/2IN 305767

## (undated) DEVICE — DRAPE ABDOMINAL TIBURON 14X11

## (undated) DEVICE — GLOVE BIOGEL PI   GOLD SIZE 8

## (undated) DEVICE — GLOVE BIOGEL PI GOLD SZ 6.5

## (undated) DEVICE — CATH BACTISEAL PERITONEAL
Type: IMPLANTABLE DEVICE | Site: CRANIAL | Status: NON-FUNCTIONAL
Removed: 2022-09-21

## (undated) DEVICE — GAUZE SPONGE PEANUT STRL

## (undated) DEVICE — SOLUTION SCRUB IODINE 4OZ

## (undated) DEVICE — ADHESIVE DERMABOND ADVANCED

## (undated) DEVICE — SPONGE GAUZE 16PLY 4X4

## (undated) DEVICE — MARKERS SPHERZ PASSIVE

## (undated) DEVICE — APPLIER LIGACLIP SM 9.38IN

## (undated) DEVICE — TRAY SKIN PREP DRY

## (undated) DEVICE — BIT DRILL PERFORATOR DISP 14MM

## (undated) DEVICE — TRAY LOWER EXTREMITY  SMHS029-05

## (undated) DEVICE — SEE MEDLINE ITEM 156902

## (undated) DEVICE — STAPLER INT LINEAR ARTC 3.5-45

## (undated) DEVICE — SPONGE GAUZE 10S 4X4  442214

## (undated) DEVICE — SUT MCRYL PLUS 4-0 PS2 27IN

## (undated) DEVICE — DIFFUSER

## (undated) DEVICE — DRAPE STERI INSTRUMENT 1018

## (undated) DEVICE — BANDAGE SOFTBAND 4 441506

## (undated) DEVICE — BLADE SMALL BONE OSCILLATING KM33-212

## (undated) DEVICE — Device

## (undated) DEVICE — SUT VICRYL PLUS 3-0 SH 18IN

## (undated) DEVICE — SUT SILK 2-0 STRANDS 30IN

## (undated) DEVICE — SYR DISP LL 5CC

## (undated) DEVICE — SOLUTION PREP IODINE 4OZ

## (undated) DEVICE — PAD BOVIE ADULT

## (undated) DEVICE — CLIP HEMO-LOK MLX LARGE LF

## (undated) DEVICE — SUT SILK 0 STRANDS 30IN BLK

## (undated) DEVICE — SUT 2-0 12-18IN SILK

## (undated) DEVICE — SUT CTD VICRYL CR/RB-1 4-0

## (undated) DEVICE — BANDAGE ACE STERILE 4 REB3114

## (undated) DEVICE — SPONGE DERMACEA 4X4IN 12PLY

## (undated) DEVICE — SUT 0 54IN COATED VICRYL U

## (undated) DEVICE — DURAPREP SURG SCRUB 26ML

## (undated) DEVICE — BLADE SCALPEL #15 371115

## (undated) DEVICE — CARTRIDGE OIL

## (undated) DEVICE — GAUZE SPONGE 4X4 12PLY

## (undated) DEVICE — TUBING SUC UNIV W/CONN 12FT

## (undated) DEVICE — CART STAPLE RELD 45MM WHT

## (undated) DEVICE — SEALER LIGASURE IMPACT 18CM

## (undated) DEVICE — ADHESIVE MASTISOL VIAL 48/BX

## (undated) DEVICE — DRESSING ADH ISLAND 3.6 X 14

## (undated) DEVICE — ELECTRODE REM PLYHSV RETURN 9

## (undated) DEVICE — SOLUTION IRRI NS BOTTLE 1000ML R5200-01

## (undated) DEVICE — SUTURE PROLENE 3-0 SH 48 8534H

## (undated) DEVICE — CUFF TOURNIQUET 18DUAL PRT 5921-218-235

## (undated) DEVICE — PEN NEURO ENDOSCOPE RIGID

## (undated) DEVICE — MARKER SKIN STND TIP BLUE BARR

## (undated) DEVICE — SUT GUT PL. 4-0 27 FS-2

## (undated) DEVICE — SOL LR INJ 1000ML BG

## (undated) DEVICE — APPLICATOR CHLORAPREP ORN 26ML

## (undated) DEVICE — SEE MEDLINE ITEM 156905

## (undated) DEVICE — DRAPE STERI-DRAPE 1000 17X11IN

## (undated) DEVICE — SUTURE PROLENE 4-0 PS-2 18 8682H

## (undated) DEVICE — TUBE FRAZIER 5MM 2FT SOFT TIP

## (undated) DEVICE — SYRINGE 10ML 302995

## (undated) DEVICE — GOWN SURGICAL X-LARGE

## (undated) DEVICE — COVER LIGHT HANDLE LB53

## (undated) DEVICE — CATH RED RUBBER 8FR

## (undated) DEVICE — TOWEL OR DISP STRL BLUE 4/PK

## (undated) DEVICE — LOOP VESSEL BLUE MAXI

## (undated) DEVICE — SYR SLIP TIP 1CC

## (undated) DEVICE — SUT PDS II O CTX MONO 60

## (undated) DEVICE — NDL 22GA X1 1/2 REG BEVEL

## (undated) DEVICE — CORD BIPOLAR 12 FOOT

## (undated) DEVICE — SUT 4/0 18IN NUROLON BLK B

## (undated) DEVICE — TROCAR ENDOPATH XCEL 5MM 7.5CM

## (undated) DEVICE — SUTURE VICRYL 3-0 RB-1 27 J215H

## (undated) DEVICE — SPONGE LAP 18X18 PREWASHED

## (undated) DEVICE — SET DECANTER MEDICHOICE

## (undated) DEVICE — SYR 30CC LUER LOCK

## (undated) DEVICE — SUTURE VICRYL 2-0 RB-1 27 J306H

## (undated) DEVICE — TRAY FOLEY 16FR INFECTION CONT

## (undated) DEVICE — SEE MEDLINE ITEM 146417

## (undated) DEVICE — BLADE SURG CARBON STEEL SZ11